# Patient Record
Sex: MALE | Race: WHITE | URBAN - METROPOLITAN AREA
[De-identification: names, ages, dates, MRNs, and addresses within clinical notes are randomized per-mention and may not be internally consistent; named-entity substitution may affect disease eponyms.]

---

## 2017-06-20 ENCOUNTER — OUTPATIENT (OUTPATIENT)
Dept: OUTPATIENT SERVICES | Facility: HOSPITAL | Age: 32
LOS: 1 days | Discharge: HOME | End: 2017-06-20

## 2017-06-20 DIAGNOSIS — F13.20 SEDATIVE, HYPNOTIC OR ANXIOLYTIC DEPENDENCE, UNCOMPLICATED: ICD-10-CM

## 2017-06-20 DIAGNOSIS — F10.20 ALCOHOL DEPENDENCE, UNCOMPLICATED: ICD-10-CM

## 2017-06-20 DIAGNOSIS — F11.20 OPIOID DEPENDENCE, UNCOMPLICATED: ICD-10-CM

## 2017-06-20 DIAGNOSIS — F17.200 NICOTINE DEPENDENCE, UNSPECIFIED, UNCOMPLICATED: ICD-10-CM

## 2017-06-20 DIAGNOSIS — F90.9 ATTENTION-DEFICIT HYPERACTIVITY DISORDER, UNSPECIFIED TYPE: ICD-10-CM

## 2017-06-20 DIAGNOSIS — F41.9 ANXIETY DISORDER, UNSPECIFIED: ICD-10-CM

## 2017-06-28 DIAGNOSIS — E78.00 PURE HYPERCHOLESTEROLEMIA, UNSPECIFIED: ICD-10-CM

## 2017-10-27 ENCOUNTER — TRANSCRIPTION ENCOUNTER (OUTPATIENT)
Age: 32
End: 2017-10-27

## 2018-01-11 ENCOUNTER — TRANSCRIPTION ENCOUNTER (OUTPATIENT)
Age: 33
End: 2018-01-11

## 2018-01-16 ENCOUNTER — OUTPATIENT (OUTPATIENT)
Dept: OUTPATIENT SERVICES | Facility: HOSPITAL | Age: 33
LOS: 1 days | Discharge: HOME | End: 2018-01-16

## 2018-01-16 DIAGNOSIS — Z00.00 ENCOUNTER FOR GENERAL ADULT MEDICAL EXAMINATION WITHOUT ABNORMAL FINDINGS: ICD-10-CM

## 2018-01-16 DIAGNOSIS — F11.20 OPIOID DEPENDENCE, UNCOMPLICATED: ICD-10-CM

## 2018-01-16 DIAGNOSIS — F17.200 NICOTINE DEPENDENCE, UNSPECIFIED, UNCOMPLICATED: ICD-10-CM

## 2018-01-16 DIAGNOSIS — F90.9 ATTENTION-DEFICIT HYPERACTIVITY DISORDER, UNSPECIFIED TYPE: ICD-10-CM

## 2018-01-16 DIAGNOSIS — F41.9 ANXIETY DISORDER, UNSPECIFIED: ICD-10-CM

## 2018-01-16 DIAGNOSIS — F13.20 SEDATIVE, HYPNOTIC OR ANXIOLYTIC DEPENDENCE, UNCOMPLICATED: ICD-10-CM

## 2018-01-16 DIAGNOSIS — F10.20 ALCOHOL DEPENDENCE, UNCOMPLICATED: ICD-10-CM

## 2018-02-17 ENCOUNTER — TRANSCRIPTION ENCOUNTER (OUTPATIENT)
Age: 33
End: 2018-02-17

## 2018-12-24 ENCOUNTER — TRANSCRIPTION ENCOUNTER (OUTPATIENT)
Age: 33
End: 2018-12-24

## 2019-03-18 ENCOUNTER — INPATIENT (INPATIENT)
Facility: HOSPITAL | Age: 34
LOS: 3 days | Discharge: HOME | End: 2019-03-22
Attending: INTERNAL MEDICINE | Admitting: INTERNAL MEDICINE

## 2019-03-18 VITALS
HEIGHT: 66 IN | OXYGEN SATURATION: 98 % | DIASTOLIC BLOOD PRESSURE: 83 MMHG | TEMPERATURE: 98 F | SYSTOLIC BLOOD PRESSURE: 151 MMHG | RESPIRATION RATE: 20 BRPM | HEART RATE: 95 BPM | WEIGHT: 149.91 LBS

## 2019-03-18 LAB
ALBUMIN SERPL ELPH-MCNC: 4.9 G/DL — SIGNIFICANT CHANGE UP (ref 3.5–5.2)
ALP SERPL-CCNC: 52 U/L — SIGNIFICANT CHANGE UP (ref 30–115)
ALT FLD-CCNC: 16 U/L — SIGNIFICANT CHANGE UP (ref 0–41)
AMMONIA BLD-MCNC: 42 UMOL/L — SIGNIFICANT CHANGE UP (ref 11–55)
ANION GAP SERPL CALC-SCNC: 9 MMOL/L — SIGNIFICANT CHANGE UP (ref 7–14)
APAP SERPL-MCNC: <5 UG/ML — LOW (ref 10–30)
APPEARANCE UR: ABNORMAL
AST SERPL-CCNC: 19 U/L — SIGNIFICANT CHANGE UP (ref 0–41)
BASOPHILS # BLD AUTO: 0.09 K/UL — SIGNIFICANT CHANGE UP (ref 0–0.2)
BASOPHILS NFR BLD AUTO: 0.8 % — SIGNIFICANT CHANGE UP (ref 0–1)
BILIRUB DIRECT SERPL-MCNC: <0.2 MG/DL — SIGNIFICANT CHANGE UP (ref 0–0.2)
BILIRUB INDIRECT FLD-MCNC: >0.4 MG/DL — SIGNIFICANT CHANGE UP (ref 0.2–1.2)
BILIRUB SERPL-MCNC: 0.6 MG/DL — SIGNIFICANT CHANGE UP (ref 0.2–1.2)
BILIRUB UR-MCNC: NEGATIVE — SIGNIFICANT CHANGE UP
BUN SERPL-MCNC: 7 MG/DL — LOW (ref 10–20)
CALCIUM SERPL-MCNC: 9.5 MG/DL — SIGNIFICANT CHANGE UP (ref 8.5–10.1)
CHLORIDE SERPL-SCNC: 97 MMOL/L — LOW (ref 98–110)
CO2 SERPL-SCNC: 30 MMOL/L — SIGNIFICANT CHANGE UP (ref 17–32)
COLOR SPEC: YELLOW — SIGNIFICANT CHANGE UP
CREAT SERPL-MCNC: 1.1 MG/DL — SIGNIFICANT CHANGE UP (ref 0.7–1.5)
DIFF PNL FLD: ABNORMAL
EOSINOPHIL # BLD AUTO: 0.07 K/UL — SIGNIFICANT CHANGE UP (ref 0–0.7)
EOSINOPHIL NFR BLD AUTO: 0.6 % — SIGNIFICANT CHANGE UP (ref 0–8)
EPI CELLS # UR: ABNORMAL /HPF
ETHANOL SERPL-MCNC: <10 MG/DL — HIGH
GLUCOSE SERPL-MCNC: 94 MG/DL — SIGNIFICANT CHANGE UP (ref 70–99)
GLUCOSE UR QL: NEGATIVE MG/DL — SIGNIFICANT CHANGE UP
HCT VFR BLD CALC: 46 % — SIGNIFICANT CHANGE UP (ref 42–52)
HGB BLD-MCNC: 15.2 G/DL — SIGNIFICANT CHANGE UP (ref 14–18)
IMM GRANULOCYTES NFR BLD AUTO: 0.4 % — HIGH (ref 0.1–0.3)
KETONES UR-MCNC: ABNORMAL
LEUKOCYTE ESTERASE UR-ACNC: NEGATIVE — SIGNIFICANT CHANGE UP
LIDOCAIN IGE QN: 32 U/L — SIGNIFICANT CHANGE UP (ref 7–60)
LYMPHOCYTES # BLD AUTO: 18.3 % — LOW (ref 20.5–51.1)
LYMPHOCYTES # BLD AUTO: 2.02 K/UL — SIGNIFICANT CHANGE UP (ref 1.2–3.4)
MAGNESIUM SERPL-MCNC: 1.9 MG/DL — SIGNIFICANT CHANGE UP (ref 1.8–2.4)
MCHC RBC-ENTMCNC: 29.2 PG — SIGNIFICANT CHANGE UP (ref 27–31)
MCHC RBC-ENTMCNC: 33 G/DL — SIGNIFICANT CHANGE UP (ref 32–37)
MCV RBC AUTO: 88.3 FL — SIGNIFICANT CHANGE UP (ref 80–94)
MONOCYTES # BLD AUTO: 0.7 K/UL — HIGH (ref 0.1–0.6)
MONOCYTES NFR BLD AUTO: 6.3 % — SIGNIFICANT CHANGE UP (ref 1.7–9.3)
NEUTROPHILS # BLD AUTO: 8.13 K/UL — HIGH (ref 1.4–6.5)
NEUTROPHILS NFR BLD AUTO: 73.6 % — SIGNIFICANT CHANGE UP (ref 42.2–75.2)
NITRITE UR-MCNC: NEGATIVE — SIGNIFICANT CHANGE UP
NRBC # BLD: 0 /100 WBCS — SIGNIFICANT CHANGE UP (ref 0–0)
PH UR: 7 — SIGNIFICANT CHANGE UP (ref 5–8)
PLATELET # BLD AUTO: 204 K/UL — SIGNIFICANT CHANGE UP (ref 130–400)
POTASSIUM SERPL-MCNC: 4 MMOL/L — SIGNIFICANT CHANGE UP (ref 3.5–5)
POTASSIUM SERPL-SCNC: 4 MMOL/L — SIGNIFICANT CHANGE UP (ref 3.5–5)
PROT SERPL-MCNC: 8 G/DL — SIGNIFICANT CHANGE UP (ref 6–8)
PROT UR-MCNC: 30 MG/DL
RBC # BLD: 5.21 M/UL — SIGNIFICANT CHANGE UP (ref 4.7–6.1)
RBC # FLD: 12.4 % — SIGNIFICANT CHANGE UP (ref 11.5–14.5)
RBC CASTS # UR COMP ASSIST: ABNORMAL /HPF
SALICYLATES SERPL-MCNC: <0.3 MG/DL — LOW (ref 4–30)
SODIUM SERPL-SCNC: 136 MMOL/L — SIGNIFICANT CHANGE UP (ref 135–146)
SP GR SPEC: 1.01 — SIGNIFICANT CHANGE UP (ref 1.01–1.03)
UROBILINOGEN FLD QL: 0.2 MG/DL — SIGNIFICANT CHANGE UP (ref 0.2–0.2)
WBC # BLD: 11.05 K/UL — HIGH (ref 4.8–10.8)
WBC # FLD AUTO: 11.05 K/UL — HIGH (ref 4.8–10.8)
WBC UR QL: SIGNIFICANT CHANGE UP /HPF

## 2019-03-18 RX ORDER — TUBERCULIN PURIFIED PROTEIN DERIVATIVE 5 [IU]/.1ML
5 INJECTION, SOLUTION INTRADERMAL ONCE
Qty: 0 | Refills: 0 | Status: COMPLETED | OUTPATIENT
Start: 2019-03-18 | End: 2019-03-19

## 2019-03-18 RX ORDER — MAGNESIUM HYDROXIDE 400 MG/1
30 TABLET, CHEWABLE ORAL ONCE
Qty: 0 | Refills: 0 | Status: DISCONTINUED | OUTPATIENT
Start: 2019-03-18 | End: 2019-03-22

## 2019-03-18 RX ORDER — PSEUDOEPHEDRINE HCL 30 MG
60 TABLET ORAL EVERY 6 HOURS
Qty: 0 | Refills: 0 | Status: DISCONTINUED | OUTPATIENT
Start: 2019-03-18 | End: 2019-03-22

## 2019-03-18 RX ORDER — GABAPENTIN 400 MG/1
200 CAPSULE ORAL DAILY
Qty: 0 | Refills: 0 | Status: DISCONTINUED | OUTPATIENT
Start: 2019-03-18 | End: 2019-03-22

## 2019-03-18 RX ORDER — THIAMINE MONONITRATE (VIT B1) 100 MG
100 TABLET ORAL DAILY
Qty: 0 | Refills: 0 | Status: COMPLETED | OUTPATIENT
Start: 2019-03-18 | End: 2019-03-21

## 2019-03-18 RX ORDER — GABAPENTIN 400 MG/1
600 CAPSULE ORAL AT BEDTIME
Qty: 0 | Refills: 0 | Status: DISCONTINUED | OUTPATIENT
Start: 2019-03-18 | End: 2019-03-22

## 2019-03-18 RX ORDER — MULTIVIT-MIN/FERROUS GLUCONATE 9 MG/15 ML
1 LIQUID (ML) ORAL DAILY
Qty: 0 | Refills: 0 | Status: DISCONTINUED | OUTPATIENT
Start: 2019-03-18 | End: 2019-03-22

## 2019-03-18 RX ORDER — HYDROXYZINE HCL 10 MG
50 TABLET ORAL EVERY 6 HOURS
Qty: 0 | Refills: 0 | Status: DISCONTINUED | OUTPATIENT
Start: 2019-03-18 | End: 2019-03-22

## 2019-03-18 RX ORDER — FLUOXETINE HCL 10 MG
40 CAPSULE ORAL DAILY
Qty: 0 | Refills: 0 | Status: DISCONTINUED | OUTPATIENT
Start: 2019-03-18 | End: 2019-03-22

## 2019-03-18 RX ORDER — PHENOBARBITAL 60 MG
TABLET ORAL
Qty: 0 | Refills: 0 | Status: COMPLETED | OUTPATIENT
Start: 2019-03-18 | End: 2019-03-22

## 2019-03-18 RX ORDER — METHOCARBAMOL 500 MG/1
500 TABLET, FILM COATED ORAL EVERY 6 HOURS
Qty: 0 | Refills: 0 | Status: DISCONTINUED | OUTPATIENT
Start: 2019-03-18 | End: 2019-03-22

## 2019-03-18 RX ORDER — FOLIC ACID 0.8 MG
1 TABLET ORAL DAILY
Qty: 0 | Refills: 0 | Status: DISCONTINUED | OUTPATIENT
Start: 2019-03-18 | End: 2019-03-22

## 2019-03-18 RX ORDER — TRAZODONE HCL 50 MG
50 TABLET ORAL AT BEDTIME
Qty: 0 | Refills: 0 | Status: DISCONTINUED | OUTPATIENT
Start: 2019-03-18 | End: 2019-03-22

## 2019-03-18 RX ORDER — HYDROXYZINE HCL 10 MG
100 TABLET ORAL AT BEDTIME
Qty: 0 | Refills: 0 | Status: DISCONTINUED | OUTPATIENT
Start: 2019-03-18 | End: 2019-03-22

## 2019-03-18 RX ORDER — DIAZEPAM 5 MG
10 TABLET ORAL ONCE
Qty: 0 | Refills: 0 | Status: DISCONTINUED | OUTPATIENT
Start: 2019-03-18 | End: 2019-03-18

## 2019-03-18 RX ORDER — THIAMINE MONONITRATE (VIT B1) 100 MG
100 TABLET ORAL ONCE
Qty: 0 | Refills: 0 | Status: COMPLETED | OUTPATIENT
Start: 2019-03-18 | End: 2019-03-18

## 2019-03-18 RX ADMIN — Medication 10 MILLIGRAM(S): at 22:05

## 2019-03-18 RX ADMIN — Medication 100 MILLIGRAM(S): at 22:05

## 2019-03-18 NOTE — H&P ADULT - HISTORY OF PRESENT ILLNESS
33 y/o male presents with complaints of polysubstance dependence, etoh and benzo.  Pt states that he uses approx 2 pints of vodka daily and approx 4mg of xanax daily.  Pt states that he does a shot before work and will either take PO or snort the xanax, 2mg.  Pt statse that when he returns home after work he will continue drinking and likely will take another xanax, 2mg.      Seizures: -  KADEEM; -  Tremors: -  HIV: -  STD: -  Hep B/C : - / -

## 2019-03-18 NOTE — ED PROVIDER NOTE - OBJECTIVE STATEMENT
Pt presents today for ETOH and drug detox. Pt no history of prior detox. pt has no current complaints. Pt states he drinks several pints a day and takes xanax last drink was 2 days ago. Pt denies chest pain, shortness of breath, headache, dizziness, nausea/vomiting/diarrhea, head injury, recent trauma. Denies current thoughts of Suicidal and Homicidal Ideations.

## 2019-03-18 NOTE — H&P ADULT - ATTENDING COMMENTS
Patient interviewed and examined.    Chart reviewed.    PA's H&P noted and modified, as appropriate.    Case discussed on team rounds    Following is my summary of the case.    Admitted for detox: from ____ED, _x__Intake, ____Med/Surg Floor    Alcohol_x___   Opioid_____  Benzo_x__ Other_____    Substance amount, duration of use, last usage, and prior attempts at detox or rehabs, are outlined above in the H&P and discussed with patient.    Associated withdrawal symptoms presents.  Comorbid conditions noted. Chronic and Stable.    Past Medical Hx, Psych Hx, family Hx, Social Hx from H&P reviewed and NO changes.    Old medical record and medication Hx. Reviewed    Following items reviewed and addressed:  1. labs  2. EKG  3. Imaging from PACs module    Examination: no change from PA's exam.    Place on following protocol  _____Medically Managed  __X__Medically Supervised    Ciwa_____Librium taper____Ativan taper___Methadone taper___ Phenobarb taper_x___ Suboxone Induction____MMTP____    Narcan Kit Offered    Psych Consult __X__N/A  ___Ordered    Physical Therapy  ___X N/A   ___  Ordered    Aftercare disposition to be addressed by counselors.    Estimated length of stay 3-5 days.

## 2019-03-18 NOTE — ED PROVIDER NOTE - ATTENDING CONTRIBUTION TO CARE
Pt is a 35yo male who comes in for detox from Xanax (8mg daily) and Alcohol (1-2 pints daily).  Last use of alcohol Saturday.  Last Xanax 4h ago.  Headache noted.  Notes that it is now interfering with his work.    Exam: normal neuro exam, no tremor, RRR, CTAB, soft NT abdomen, normal gait  Imp: polysubstance abuse  Plan: admit to detox, labs, EKG

## 2019-03-18 NOTE — H&P ADULT - ASSESSMENT
33 y/o male presents with complaints of polysubstance dependence, etoh and benzo.  Pt states that he uses approx 2 pints of vodka daily and approx 4mg of xanax daily.  Pt states that he does a shot before work and will either take PO or snort the xanax, 2mg.  Pt statse that when he returns home after work he will continue drinking and likely will take another xanax, 2mg.

## 2019-03-18 NOTE — ED ADULT NURSE NOTE - NSIMPLEMENTINTERV_GEN_ALL_ED
Implemented All Universal Safety Interventions:  Sisters to call system. Call bell, personal items and telephone within reach. Instruct patient to call for assistance. Room bathroom lighting operational. Non-slip footwear when patient is off stretcher. Physically safe environment: no spills, clutter or unnecessary equipment. Stretcher in lowest position, wheels locked, appropriate side rails in place.

## 2019-03-19 LAB
AMPHET UR-MCNC: POSITIVE
BARBITURATES UR SCN-MCNC: NEGATIVE — SIGNIFICANT CHANGE UP
BENZODIAZ UR-MCNC: POSITIVE
COCAINE METAB.OTHER UR-MCNC: POSITIVE
DRUG SCREEN 1, URINE RESULT: SIGNIFICANT CHANGE UP
METHADONE UR-MCNC: NEGATIVE — SIGNIFICANT CHANGE UP
OPIATES UR-MCNC: NEGATIVE — SIGNIFICANT CHANGE UP
PCP UR-MCNC: NEGATIVE — SIGNIFICANT CHANGE UP
PROPOXYPHENE QUALITATIVE URINE RESULT: NEGATIVE — SIGNIFICANT CHANGE UP
THC UR QL: NEGATIVE — SIGNIFICANT CHANGE UP

## 2019-03-19 RX ORDER — PHENOBARBITAL 60 MG
32.4 TABLET ORAL EVERY 6 HOURS
Qty: 0 | Refills: 0 | Status: DISCONTINUED | OUTPATIENT
Start: 2019-03-18 | End: 2019-03-20

## 2019-03-19 RX ORDER — PHENOBARBITAL 60 MG
32.4 TABLET ORAL EVERY 6 HOURS
Qty: 0 | Refills: 0 | Status: DISCONTINUED | OUTPATIENT
Start: 2019-03-20 | End: 2019-03-20

## 2019-03-19 RX ORDER — PHENOBARBITAL 60 MG
32.4 TABLET ORAL EVERY 6 HOURS
Qty: 0 | Refills: 0 | Status: DISCONTINUED | OUTPATIENT
Start: 2019-03-19 | End: 2019-03-22

## 2019-03-19 RX ORDER — PANTOPRAZOLE SODIUM 20 MG/1
40 TABLET, DELAYED RELEASE ORAL
Qty: 0 | Refills: 0 | Status: DISCONTINUED | OUTPATIENT
Start: 2019-03-19 | End: 2019-03-22

## 2019-03-19 RX ORDER — IBUPROFEN 200 MG
400 TABLET ORAL EVERY 6 HOURS
Qty: 0 | Refills: 0 | Status: DISCONTINUED | OUTPATIENT
Start: 2019-03-19 | End: 2019-03-22

## 2019-03-19 RX ORDER — PHENOBARBITAL 60 MG
32.4 TABLET ORAL EVERY 12 HOURS
Qty: 0 | Refills: 0 | Status: DISCONTINUED | OUTPATIENT
Start: 2019-03-21 | End: 2019-03-22

## 2019-03-19 RX ADMIN — TUBERCULIN PURIFIED PROTEIN DERIVATIVE 5 UNIT(S): 5 INJECTION, SOLUTION INTRADERMAL at 13:50

## 2019-03-19 RX ADMIN — Medication 32.4 MILLIGRAM(S): at 17:46

## 2019-03-19 RX ADMIN — Medication 1 MILLIGRAM(S): at 08:27

## 2019-03-19 RX ADMIN — GABAPENTIN 600 MILLIGRAM(S): 400 CAPSULE ORAL at 21:56

## 2019-03-19 RX ADMIN — Medication 15 MILLIGRAM(S): at 08:27

## 2019-03-19 RX ADMIN — Medication 100 MILLIGRAM(S): at 08:28

## 2019-03-19 RX ADMIN — Medication 32.4 MILLIGRAM(S): at 06:27

## 2019-03-19 RX ADMIN — Medication 50 MILLIGRAM(S): at 21:56

## 2019-03-19 RX ADMIN — METHOCARBAMOL 500 MILLIGRAM(S): 500 TABLET, FILM COATED ORAL at 17:46

## 2019-03-19 RX ADMIN — Medication 15 MILLIGRAM(S): at 21:56

## 2019-03-19 RX ADMIN — Medication 40 MILLIGRAM(S): at 08:27

## 2019-03-19 RX ADMIN — GABAPENTIN 200 MILLIGRAM(S): 400 CAPSULE ORAL at 08:28

## 2019-03-19 RX ADMIN — METHOCARBAMOL 500 MILLIGRAM(S): 500 TABLET, FILM COATED ORAL at 11:59

## 2019-03-19 RX ADMIN — PANTOPRAZOLE SODIUM 40 MILLIGRAM(S): 20 TABLET, DELAYED RELEASE ORAL at 11:56

## 2019-03-19 RX ADMIN — Medication 32.4 MILLIGRAM(S): at 11:56

## 2019-03-19 RX ADMIN — Medication 32.4 MILLIGRAM(S): at 00:35

## 2019-03-19 RX ADMIN — Medication 1 TABLET(S): at 08:28

## 2019-03-19 NOTE — CHART NOTE - NSCHARTNOTEFT_GEN_A_CORE
PPD placed on LFA, to be read 48-72hrs.................. Alexandra RRT PPD placed on LFA, to be read 48-72hrs.................. Alexandra NGUYEN      PPD read 3/21/19,  0mm induration

## 2019-03-20 LAB
HAV IGM SER-ACNC: SIGNIFICANT CHANGE UP
HBV CORE IGM SER-ACNC: SIGNIFICANT CHANGE UP
HBV SURFACE AG SER-ACNC: SIGNIFICANT CHANGE UP
HCV AB S/CO SERPL IA: 0.14 S/CO — SIGNIFICANT CHANGE UP (ref 0–0.79)
HCV AB SERPL-IMP: SIGNIFICANT CHANGE UP
T PALLIDUM AB TITR SER: NEGATIVE — SIGNIFICANT CHANGE UP

## 2019-03-20 RX ORDER — NICOTINE POLACRILEX 2 MG
1 GUM BUCCAL DAILY
Qty: 0 | Refills: 0 | Status: DISCONTINUED | OUTPATIENT
Start: 2019-03-20 | End: 2019-03-22

## 2019-03-20 RX ADMIN — METHOCARBAMOL 500 MILLIGRAM(S): 500 TABLET, FILM COATED ORAL at 09:31

## 2019-03-20 RX ADMIN — Medication 400 MILLIGRAM(S): at 00:44

## 2019-03-20 RX ADMIN — Medication 15 MILLIGRAM(S): at 21:09

## 2019-03-20 RX ADMIN — Medication 40 MILLIGRAM(S): at 09:10

## 2019-03-20 RX ADMIN — Medication 32.4 MILLIGRAM(S): at 11:54

## 2019-03-20 RX ADMIN — Medication 1 PATCH: at 11:54

## 2019-03-20 RX ADMIN — Medication 32.4 MILLIGRAM(S): at 17:33

## 2019-03-20 RX ADMIN — Medication 400 MILLIGRAM(S): at 00:14

## 2019-03-20 RX ADMIN — Medication 1 MILLIGRAM(S): at 09:11

## 2019-03-20 RX ADMIN — Medication 100 MILLIGRAM(S): at 09:10

## 2019-03-20 RX ADMIN — Medication 400 MILLIGRAM(S): at 09:31

## 2019-03-20 RX ADMIN — PANTOPRAZOLE SODIUM 40 MILLIGRAM(S): 20 TABLET, DELAYED RELEASE ORAL at 06:48

## 2019-03-20 RX ADMIN — GABAPENTIN 200 MILLIGRAM(S): 400 CAPSULE ORAL at 09:10

## 2019-03-20 RX ADMIN — Medication 32.4 MILLIGRAM(S): at 06:48

## 2019-03-20 RX ADMIN — Medication 400 MILLIGRAM(S): at 17:33

## 2019-03-20 RX ADMIN — Medication 32.4 MILLIGRAM(S): at 00:13

## 2019-03-20 RX ADMIN — Medication 32.4 MILLIGRAM(S): at 09:30

## 2019-03-20 RX ADMIN — Medication 15 MILLIGRAM(S): at 09:09

## 2019-03-20 RX ADMIN — Medication 1 TABLET(S): at 09:10

## 2019-03-20 RX ADMIN — Medication 50 MILLIGRAM(S): at 21:10

## 2019-03-20 RX ADMIN — GABAPENTIN 600 MILLIGRAM(S): 400 CAPSULE ORAL at 21:09

## 2019-03-20 NOTE — CHART NOTE - NSCHARTNOTEFT_GEN_A_CORE
Subsequent Inpatient Encounter                                       Detox Unit    JUNE SAINI   34y   Male      Chief Complaint:    Follow up for Polysubstance  Dependency    HPI:     I reviewed previous notes. No Change, except if noted below.             Detail:_    ROS:   I reviewed with patient.  No changes from previous notes except if noted below.             Detail: _    PFSH I reviewed with patient. No changes from previous notes except if noted below.             Detail_    Medication reconciliation performed.    MEDICATIONS  (STANDING):  busPIRone 15 milliGRAM(s) Oral two times a day  FLUoxetine 40 milliGRAM(s) Oral daily  folic acid 1 milliGRAM(s) Oral daily  gabapentin 200 milliGRAM(s) Oral daily  gabapentin 600 milliGRAM(s) Oral at bedtime  multivitamin/minerals 1 Tablet(s) Oral daily  pantoprazole    Tablet 40 milliGRAM(s) Oral before breakfast  PHENobarbital   Oral   PHENobarbital 32.4 milliGRAM(s) Oral every 6 hours  thiamine 100 milliGRAM(s) Oral daily  traZODone 50 milliGRAM(s) Oral at bedtime      MEDICATIONS  (PRN):  aluminum hydroxide/magnesium hydroxide/simethicone Suspension 30 milliLiter(s) Oral every 6 hours PRN Heartburn  bismuth subsalicylate Liquid 30 milliLiter(s) Oral every 6 hours PRN Diarrhea  cloNIDine 0.1 milliGRAM(s) Oral every 8 hours PRN Blood Pressure GREATER THAN 140/90 mmHG  guaiFENesin/dextromethorphan  Syrup 5 milliLiter(s) Oral every 4 hours PRN Cough  hydrOXYzine hydrochloride 50 milliGRAM(s) Oral every 6 hours PRN Anxiety  hydrOXYzine hydrochloride 100 milliGRAM(s) Oral at bedtime PRN insomnia  ibuprofen  Tablet. 400 milliGRAM(s) Oral every 6 hours PRN Temp greater or equal to 38C (100.4F), Mild Pain (1 - 3)  magnesium hydroxide Suspension 30 milliLiter(s) Oral once PRN Constipation  methocarbamol 500 milliGRAM(s) Oral every 6 hours PRN muscle pain  PHENobarbital 32.4 milliGRAM(s) Oral every 6 hours PRN WSXS  pseudoephedrine 60 milliGRAM(s) Oral every 6 hours PRN Rhinitis  trimethobenzamide 300 milliGRAM(s) Oral every 6 hours PRN Nausea and/or Vomiting  trimethobenzamide Injectable 200 milliGRAM(s) IntraMuscular every 6 hours PRN Nausea and/or Vomiting      T(C): 36.1 (19 @ 06:00), Max: 36.8 (19 @ 18:00)  HR: 62 (19 @ 06:00) (61 - 90)  BP: 96/51 (19 @ 06:00) (96/51 - 126/66)  RR: 14 (19 @ 06:00) (14 - 16)  SpO2: --    PHYSICAL EXAM:      Constitutional: NAD, A&O x3    Eyes: PERRLA, no conjuctivitis    Neck: no lymphadenopathy    Respiratory: +air entry, no rales, no rhonchi, no wheezes    Cardiovascular: +S1 and S2, regular rate and rhythm    Gastrointestinal: +BS, soft, non-tender, not distended    Extremities:  no edema, no calf tenderness    Skin: no rashes, normal turgor                            15.2   11.05 )-----------( 204      ( 18 Mar 2019 21:25 )             46.0       136  |  97<L>  |  7<L>  ----------------------------<  94  4.0   |  30  |  1.1    Ca    9.5      18 Mar 2019 21:25  Mg     1.9         TPro  8.0  /  Alb  4.9  /  TBili  0.6  /  DBili  <0.2  /  AST  19  /  ALT  16  /  AlkPhos  52      Magnesium, Serum: 1.9 mg/dL (19 @ 21:25)  Ammonia, Serum: 42 umol/L (19 @ 21:25)  Treponema Pallidum Antibody Interpretation: Negative (19 @ 21:25)  Hepatitis B Surface Antigen: Nonreact (19 @ 21:25)  Hepatitis C Virus S/CO Ratio: 0.14 S/CO (19 @ 21:25)    Hepatitis C Virus Interpretation: Nonreact (19 @ 21:25)      Urinalysis Basic - ( 18 Mar 2019 21:20 )    Color: Yellow / Appearance: Slightly Cloudy / S.015 / pH: x  Gluc: x / Ketone: Trace  / Bili: Negative / Urobili: 0.2 mg/dL   Blood: x / Protein: 30 mg/dL / Nitrite: Negative   Leuk Esterase: Negative / RBC: 26-50 /HPF / WBC 1-2 /HPF   Sq Epi: x / Non Sq Epi: Occasional /HPF / Bacteria: x    Drug Screen 1, Urine Result: Done (19 @ 09:51)        Impression and Plan:    Primary Diagnosis:  Benzo/EtOH Dependency                                Medication: Pheno Protocol    Secondary Diagnosis: MDD/SANDRITA                                                   Medication: c/w Prozac, Buspirone, Gabapentin     Tertiary Diagnosis:                                                                                     Medication:      Continue Detox Protocols. Use of PRNS as needed for withdrawal and comfort.    Adjustments to protocols:    Labs/ Tests reviewed.    Tests ordered:     Likely Disposition: ___Home       ___Rehab       ___Outpatient Program    ___Self Help     _____Other    Estimated Length of stay:_4___

## 2019-03-21 RX ADMIN — Medication 40 MILLIGRAM(S): at 08:45

## 2019-03-21 RX ADMIN — GABAPENTIN 600 MILLIGRAM(S): 400 CAPSULE ORAL at 21:19

## 2019-03-21 RX ADMIN — Medication 1 PATCH: at 06:23

## 2019-03-21 RX ADMIN — Medication 50 MILLIGRAM(S): at 21:31

## 2019-03-21 RX ADMIN — Medication 1 PATCH: at 08:44

## 2019-03-21 RX ADMIN — Medication 1 TABLET(S): at 08:44

## 2019-03-21 RX ADMIN — GABAPENTIN 200 MILLIGRAM(S): 400 CAPSULE ORAL at 08:44

## 2019-03-21 RX ADMIN — PANTOPRAZOLE SODIUM 40 MILLIGRAM(S): 20 TABLET, DELAYED RELEASE ORAL at 06:49

## 2019-03-21 RX ADMIN — Medication 100 MILLIGRAM(S): at 08:47

## 2019-03-21 RX ADMIN — Medication 15 MILLIGRAM(S): at 21:19

## 2019-03-21 RX ADMIN — Medication 15 MILLIGRAM(S): at 08:44

## 2019-03-21 RX ADMIN — TUBERCULIN PURIFIED PROTEIN DERIVATIVE 5 UNIT(S): 5 INJECTION, SOLUTION INTRADERMAL at 12:45

## 2019-03-21 RX ADMIN — Medication 32.4 MILLIGRAM(S): at 18:43

## 2019-03-21 RX ADMIN — Medication 1 MILLIGRAM(S): at 08:45

## 2019-03-21 RX ADMIN — Medication 32.4 MILLIGRAM(S): at 06:49

## 2019-03-21 NOTE — CHART NOTE - NSCHARTNOTEFT_GEN_A_CORE
Subsequent Inpatient Encounter                                       Detox Unit    JUNE SAINI   34y   Male      Chief Complaint:    Follow up for Polysubstance  Dependency    HPI:     I reviewed previous notes. No Change, except if noted below.             Detail:_    ROS:   I reviewed with patient.  No changes from previous notes except if noted below.             Detail: _    PFSH I reviewed with patient. No changes from previous notes except if noted below.             Detail_    Medication reconciliation performed.    MEDICATIONS  (STANDING):  busPIRone 15 milliGRAM(s) Oral two times a day  FLUoxetine 40 milliGRAM(s) Oral daily  folic acid 1 milliGRAM(s) Oral daily  gabapentin 200 milliGRAM(s) Oral daily  gabapentin 600 milliGRAM(s) Oral at bedtime  multivitamin/minerals 1 Tablet(s) Oral daily  pantoprazole    Tablet 40 milliGRAM(s) Oral before breakfast  PHENobarbital   Oral   PHENobarbital 32.4 milliGRAM(s) Oral every 6 hours  thiamine 100 milliGRAM(s) Oral daily  traZODone 50 milliGRAM(s) Oral at bedtime      MEDICATIONS  (PRN):  aluminum hydroxide/magnesium hydroxide/simethicone Suspension 30 milliLiter(s) Oral every 6 hours PRN Heartburn  bismuth subsalicylate Liquid 30 milliLiter(s) Oral every 6 hours PRN Diarrhea  cloNIDine 0.1 milliGRAM(s) Oral every 8 hours PRN Blood Pressure GREATER THAN 140/90 mmHG  guaiFENesin/dextromethorphan  Syrup 5 milliLiter(s) Oral every 4 hours PRN Cough  hydrOXYzine hydrochloride 50 milliGRAM(s) Oral every 6 hours PRN Anxiety  hydrOXYzine hydrochloride 100 milliGRAM(s) Oral at bedtime PRN insomnia  ibuprofen  Tablet. 400 milliGRAM(s) Oral every 6 hours PRN Temp greater or equal to 38C (100.4F), Mild Pain (1 - 3)  magnesium hydroxide Suspension 30 milliLiter(s) Oral once PRN Constipation  methocarbamol 500 milliGRAM(s) Oral every 6 hours PRN muscle pain  PHENobarbital 32.4 milliGRAM(s) Oral every 6 hours PRN WSXS  pseudoephedrine 60 milliGRAM(s) Oral every 6 hours PRN Rhinitis  trimethobenzamide 300 milliGRAM(s) Oral every 6 hours PRN Nausea and/or Vomiting  trimethobenzamide Injectable 200 milliGRAM(s) IntraMuscular every 6 hours PRN Nausea and/or Vomiting      T(C): 36.1 (19 @ 06:00), Max: 36.8 (19 @ 18:00)  HR: 62 (19 @ 06:00) (61 - 90)  BP: 96/51 (19 @ 06:00) (96/51 - 126/66)  RR: 14 (19 @ 06:00) (14 - 16)  SpO2: --    PHYSICAL EXAM:      Constitutional: NAD, A&O x3    Eyes: PERRLA, no conjuctivitis    Neck: no lymphadenopathy    Respiratory: +air entry, no rales, no rhonchi, no wheezes    Cardiovascular: +S1 and S2, regular rate and rhythm    Gastrointestinal: +BS, soft, non-tender, not distended    Extremities:  no edema, no calf tenderness    Skin: no rashes, normal turgor                            15.2   11.05 )-----------( 204      ( 18 Mar 2019 21:25 )             46.0       136  |  97<L>  |  7<L>  ----------------------------<  94  4.0   |  30  |  1.1    Ca    9.5      18 Mar 2019 21:25  Mg     1.9         TPro  8.0  /  Alb  4.9  /  TBili  0.6  /  DBili  <0.2  /  AST  19  /  ALT  16  /  AlkPhos  52      Magnesium, Serum: 1.9 mg/dL (19 @ 21:25)  Ammonia, Serum: 42 umol/L (19 @ 21:25)  Treponema Pallidum Antibody Interpretation: Negative (19 @ 21:25)  Hepatitis B Surface Antigen: Nonreact (19 @ 21:25)  Hepatitis C Virus S/CO Ratio: 0.14 S/CO (19 @ 21:25)    Hepatitis C Virus Interpretation: Nonreact (19 @ 21:25)      Urinalysis Basic - ( 18 Mar 2019 21:20 )    Color: Yellow / Appearance: Slightly Cloudy / S.015 / pH: x  Gluc: x / Ketone: Trace  / Bili: Negative / Urobili: 0.2 mg/dL   Blood: x / Protein: 30 mg/dL / Nitrite: Negative   Leuk Esterase: Negative / RBC: 26-50 /HPF / WBC 1-2 /HPF   Sq Epi: x / Non Sq Epi: Occasional /HPF / Bacteria: x    Drug Screen 1, Urine Result: Done (19 @ 09:51)        Impression and Plan:    Primary Diagnosis:  Benzo/EtOH Dependency                                Medication: Pheno Protocol    Secondary Diagnosis: MDD/SANDRITA                                                   Medication: c/w Prozac, Buspirone, Gabapentin     Tertiary Diagnosis:                                                                                     Medication:      Continue Detox Protocols. Use of PRNS as needed for withdrawal and comfort.    Adjustments to protocols:    Labs/ Tests reviewed.    Tests ordered:     Likely Disposition: _X__Home       ___Rehab       ___Outpatient Program    ___Self Help     _____Other    Estimated Length of stay:_4___

## 2019-03-22 ENCOUNTER — INPATIENT (INPATIENT)
Facility: HOSPITAL | Age: 34
LOS: 12 days | Discharge: HOME | End: 2019-04-04
Attending: INTERNAL MEDICINE | Admitting: INTERNAL MEDICINE

## 2019-03-22 VITALS
HEIGHT: 66 IN | RESPIRATION RATE: 17 BRPM | WEIGHT: 149.91 LBS | TEMPERATURE: 98 F | DIASTOLIC BLOOD PRESSURE: 65 MMHG | HEART RATE: 95 BPM | SYSTOLIC BLOOD PRESSURE: 120 MMHG

## 2019-03-22 VITALS
DIASTOLIC BLOOD PRESSURE: 60 MMHG | TEMPERATURE: 97 F | RESPIRATION RATE: 16 BRPM | SYSTOLIC BLOOD PRESSURE: 105 MMHG | HEART RATE: 78 BPM

## 2019-03-22 DIAGNOSIS — F11.20 OPIOID DEPENDENCE, UNCOMPLICATED: ICD-10-CM

## 2019-03-22 PROBLEM — Z78.9 OTHER SPECIFIED HEALTH STATUS: Chronic | Status: ACTIVE | Noted: 2019-03-18

## 2019-03-22 RX ORDER — PSEUDOEPHEDRINE HCL 30 MG
60 TABLET ORAL EVERY 6 HOURS
Qty: 0 | Refills: 0 | Status: DISCONTINUED | OUTPATIENT
Start: 2019-03-22 | End: 2019-04-04

## 2019-03-22 RX ORDER — METHOCARBAMOL 500 MG/1
500 TABLET, FILM COATED ORAL EVERY 6 HOURS
Qty: 0 | Refills: 0 | Status: DISCONTINUED | OUTPATIENT
Start: 2019-03-22 | End: 2019-04-04

## 2019-03-22 RX ORDER — HYDROXYZINE HCL 10 MG
50 TABLET ORAL EVERY 6 HOURS
Qty: 0 | Refills: 0 | Status: DISCONTINUED | OUTPATIENT
Start: 2019-03-22 | End: 2019-04-04

## 2019-03-22 RX ORDER — FLUOXETINE HCL 10 MG
40 CAPSULE ORAL DAILY
Qty: 0 | Refills: 0 | Status: DISCONTINUED | OUTPATIENT
Start: 2019-03-22 | End: 2019-03-26

## 2019-03-22 RX ORDER — MAGNESIUM HYDROXIDE 400 MG/1
30 TABLET, CHEWABLE ORAL ONCE
Qty: 0 | Refills: 0 | Status: DISCONTINUED | OUTPATIENT
Start: 2019-03-22 | End: 2019-04-04

## 2019-03-22 RX ORDER — FLUOXETINE HCL 10 MG
1 CAPSULE ORAL
Qty: 0 | Refills: 0 | COMMUNITY

## 2019-03-22 RX ORDER — FLUOXETINE HCL 10 MG
1 CAPSULE ORAL
Qty: 0 | Refills: 0 | COMMUNITY
Start: 2019-03-22

## 2019-03-22 RX ORDER — HYDROXYZINE HCL 10 MG
100 TABLET ORAL AT BEDTIME
Qty: 0 | Refills: 0 | Status: DISCONTINUED | OUTPATIENT
Start: 2019-03-22 | End: 2019-04-04

## 2019-03-22 RX ORDER — GABAPENTIN 400 MG/1
200 CAPSULE ORAL
Qty: 0 | Refills: 0 | COMMUNITY

## 2019-03-22 RX ORDER — TRAZODONE HCL 50 MG
1 TABLET ORAL
Qty: 0 | Refills: 0 | COMMUNITY
Start: 2019-03-22

## 2019-03-22 RX ORDER — GABAPENTIN 400 MG/1
200 CAPSULE ORAL DAILY
Qty: 0 | Refills: 0 | Status: DISCONTINUED | OUTPATIENT
Start: 2019-03-22 | End: 2019-04-04

## 2019-03-22 RX ORDER — TRAZODONE HCL 50 MG
50 TABLET ORAL AT BEDTIME
Qty: 0 | Refills: 0 | Status: DISCONTINUED | OUTPATIENT
Start: 2019-03-22 | End: 2019-04-04

## 2019-03-22 RX ORDER — NICOTINE POLACRILEX 2 MG
1 GUM BUCCAL DAILY
Qty: 0 | Refills: 0 | Status: DISCONTINUED | OUTPATIENT
Start: 2019-03-22 | End: 2019-03-27

## 2019-03-22 RX ORDER — MULTIVIT-MIN/FERROUS GLUCONATE 9 MG/15 ML
1 LIQUID (ML) ORAL DAILY
Qty: 0 | Refills: 0 | Status: DISCONTINUED | OUTPATIENT
Start: 2019-03-22 | End: 2019-04-04

## 2019-03-22 RX ORDER — ACETAMINOPHEN 500 MG
650 TABLET ORAL EVERY 4 HOURS
Qty: 0 | Refills: 0 | Status: DISCONTINUED | OUTPATIENT
Start: 2019-03-22 | End: 2019-04-04

## 2019-03-22 RX ORDER — GABAPENTIN 400 MG/1
2 CAPSULE ORAL
Qty: 0 | Refills: 0 | COMMUNITY
Start: 2019-03-22

## 2019-03-22 RX ORDER — IBUPROFEN 200 MG
400 TABLET ORAL EVERY 6 HOURS
Qty: 0 | Refills: 0 | Status: DISCONTINUED | OUTPATIENT
Start: 2019-03-22 | End: 2019-04-04

## 2019-03-22 RX ORDER — GABAPENTIN 400 MG/1
600 CAPSULE ORAL AT BEDTIME
Qty: 0 | Refills: 0 | Status: DISCONTINUED | OUTPATIENT
Start: 2019-03-22 | End: 2019-04-04

## 2019-03-22 RX ORDER — TRAZODONE HCL 50 MG
50 TABLET ORAL
Qty: 0 | Refills: 0 | COMMUNITY

## 2019-03-22 RX ORDER — GABAPENTIN 400 MG/1
600 CAPSULE ORAL
Qty: 0 | Refills: 0 | COMMUNITY

## 2019-03-22 RX ORDER — GABAPENTIN 400 MG/1
1 CAPSULE ORAL
Qty: 0 | Refills: 0 | COMMUNITY
Start: 2019-03-22

## 2019-03-22 RX ORDER — HYDROCORTISONE 1 %
1 OINTMENT (GRAM) TOPICAL
Qty: 0 | Refills: 0 | Status: DISCONTINUED | OUTPATIENT
Start: 2019-03-22 | End: 2019-04-04

## 2019-03-22 RX ADMIN — Medication 1 APPLICATION(S): at 21:18

## 2019-03-22 RX ADMIN — Medication 1 PATCH: at 08:58

## 2019-03-22 RX ADMIN — Medication 50 MILLIGRAM(S): at 23:21

## 2019-03-22 RX ADMIN — Medication 32.4 MILLIGRAM(S): at 06:19

## 2019-03-22 RX ADMIN — Medication 40 MILLIGRAM(S): at 08:57

## 2019-03-22 RX ADMIN — GABAPENTIN 200 MILLIGRAM(S): 400 CAPSULE ORAL at 08:56

## 2019-03-22 RX ADMIN — Medication 15 MILLIGRAM(S): at 21:17

## 2019-03-22 RX ADMIN — Medication 15 MILLIGRAM(S): at 08:57

## 2019-03-22 RX ADMIN — Medication 1 PATCH: at 06:16

## 2019-03-22 RX ADMIN — Medication 1 TABLET(S): at 08:56

## 2019-03-22 RX ADMIN — PANTOPRAZOLE SODIUM 40 MILLIGRAM(S): 20 TABLET, DELAYED RELEASE ORAL at 06:19

## 2019-03-22 RX ADMIN — GABAPENTIN 600 MILLIGRAM(S): 400 CAPSULE ORAL at 21:17

## 2019-03-22 RX ADMIN — Medication 1 MILLIGRAM(S): at 08:57

## 2019-03-22 NOTE — CHART NOTE - NSCHARTNOTEFT_GEN_A_CORE
Allergies:  Ceclor (Other)      Diet: Regular    Activity: as tolerated    Follow up with    1. PMD in 2 weeks    2. Psych in 2 weeks    3.    Follow up for abnormal labs/tests    1.    Extra Instructions:      Flu Vaccine given  Yes_____         No______      Diagnosis:  Chemical Dependency   Maintain sobriety  refrain from all use      Patient Signature___________________________________________  Date_________________      Nurse Signature_____________________________________________Date_________________

## 2019-03-22 NOTE — CHART NOTE - NSCHARTNOTEFT_GEN_A_CORE
The patient was admitted to the inpt detox unit CDU, for   ETOH__x__ Opioid___  Benzo____Polysubstance _____ Dependency.    Pt was admitted from ED____, Intake__x_, Med/surg Floor_______.    Details are present in the preceding History & Physical section and follow up chart notes.  patient was evaluated on daily detox team  rounds.  Withdrawal symptoms and signs were reviewed on a daily basis, and the protocols were adjusted accordingly.    Labs and imaging results were reviewed and discussed with the patient.    All questions from the patient were addressed.  The patient was seen by the Chemical dependency counselors, and different options for after care were discussed.  The patient attended groups, meetings and 1:1 sessions with the counselors.  Narcane Kit was offered and instructions given prior to discharge.    Psychiatry consultation reviewed______, N/A__x____    Physical therapy evaluation reviewed_____, N/A_x___    Pt was given copies of labs and imaging reports, if applicable.    Prescriptions if needed, were sent through [facility] system to the pharmacy amnd are noted in the discharge instruction sheet.    After care was arranged by counselors and pt was discharged to:    Home___, Outpt. Program___, Rehab x___, Long term____ [facility] ____ [facility]____ SNF____, AMA___, Admin Discharge____    Principal Diagnosis: Alcohol Dependency__x__ Opioid Dependency___ Benzo Dependency____ Polysubstance Dependency____

## 2019-03-23 RX ADMIN — GABAPENTIN 600 MILLIGRAM(S): 400 CAPSULE ORAL at 20:45

## 2019-03-23 RX ADMIN — Medication 15 MILLIGRAM(S): at 20:45

## 2019-03-23 RX ADMIN — Medication 1 PATCH: at 09:18

## 2019-03-23 RX ADMIN — Medication 1 TABLET(S): at 09:16

## 2019-03-23 RX ADMIN — Medication 50 MILLIGRAM(S): at 23:10

## 2019-03-23 RX ADMIN — METHOCARBAMOL 500 MILLIGRAM(S): 500 TABLET, FILM COATED ORAL at 09:16

## 2019-03-23 RX ADMIN — Medication 15 MILLIGRAM(S): at 09:15

## 2019-03-23 RX ADMIN — Medication 1 APPLICATION(S): at 21:47

## 2019-03-23 RX ADMIN — Medication 40 MILLIGRAM(S): at 09:15

## 2019-03-24 RX ADMIN — Medication 1 PATCH: at 08:29

## 2019-03-24 RX ADMIN — GABAPENTIN 600 MILLIGRAM(S): 400 CAPSULE ORAL at 22:17

## 2019-03-24 RX ADMIN — Medication 50 MILLIGRAM(S): at 22:17

## 2019-03-24 RX ADMIN — Medication 1 APPLICATION(S): at 08:28

## 2019-03-24 RX ADMIN — Medication 15 MILLIGRAM(S): at 22:16

## 2019-03-24 RX ADMIN — Medication 400 MILLIGRAM(S): at 16:47

## 2019-03-24 RX ADMIN — Medication 15 MILLIGRAM(S): at 08:29

## 2019-03-24 RX ADMIN — Medication 40 MILLIGRAM(S): at 08:29

## 2019-03-24 RX ADMIN — Medication 1 TABLET(S): at 08:30

## 2019-03-24 RX ADMIN — GABAPENTIN 200 MILLIGRAM(S): 400 CAPSULE ORAL at 08:30

## 2019-03-24 RX ADMIN — Medication 400 MILLIGRAM(S): at 08:31

## 2019-03-25 RX ADMIN — GABAPENTIN 200 MILLIGRAM(S): 400 CAPSULE ORAL at 10:06

## 2019-03-25 RX ADMIN — Medication 15 MILLIGRAM(S): at 22:13

## 2019-03-25 RX ADMIN — Medication 1 PATCH: at 10:07

## 2019-03-25 RX ADMIN — Medication 50 MILLIGRAM(S): at 22:12

## 2019-03-25 RX ADMIN — Medication 40 MILLIGRAM(S): at 10:06

## 2019-03-25 RX ADMIN — Medication 15 MILLIGRAM(S): at 10:06

## 2019-03-25 RX ADMIN — GABAPENTIN 600 MILLIGRAM(S): 400 CAPSULE ORAL at 22:13

## 2019-03-25 RX ADMIN — Medication 1 TABLET(S): at 10:06

## 2019-03-25 RX ADMIN — Medication 400 MILLIGRAM(S): at 10:08

## 2019-03-26 DIAGNOSIS — F13.20 SEDATIVE, HYPNOTIC OR ANXIOLYTIC DEPENDENCE, UNCOMPLICATED: ICD-10-CM

## 2019-03-26 DIAGNOSIS — F11.20 OPIOID DEPENDENCE, UNCOMPLICATED: ICD-10-CM

## 2019-03-26 DIAGNOSIS — F10.20 ALCOHOL DEPENDENCE, UNCOMPLICATED: ICD-10-CM

## 2019-03-26 DIAGNOSIS — F17.200 NICOTINE DEPENDENCE, UNSPECIFIED, UNCOMPLICATED: ICD-10-CM

## 2019-03-26 RX ORDER — FLUOXETINE HCL 10 MG
60 CAPSULE ORAL DAILY
Qty: 0 | Refills: 0 | Status: DISCONTINUED | OUTPATIENT
Start: 2019-03-26 | End: 2019-04-04

## 2019-03-26 RX ADMIN — Medication 50 MILLIGRAM(S): at 21:39

## 2019-03-26 RX ADMIN — GABAPENTIN 200 MILLIGRAM(S): 400 CAPSULE ORAL at 08:49

## 2019-03-26 RX ADMIN — Medication 1 APPLICATION(S): at 21:46

## 2019-03-26 RX ADMIN — Medication 10 MILLIGRAM(S): at 21:38

## 2019-03-26 RX ADMIN — Medication 40 MILLIGRAM(S): at 08:49

## 2019-03-26 RX ADMIN — Medication 1 TABLET(S): at 08:49

## 2019-03-26 RX ADMIN — Medication 15 MILLIGRAM(S): at 08:49

## 2019-03-26 RX ADMIN — GABAPENTIN 600 MILLIGRAM(S): 400 CAPSULE ORAL at 21:39

## 2019-03-26 NOTE — BEHAVIORAL HEALTH ASSESSMENT NOTE - HPI (INCLUDE ILLNESS QUALITY, SEVERITY, DURATION, TIMING, CONTEXT, MODIFYING FACTORS, ASSOCIATED SIGNS AND SYMPTOMS)
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 First use of mj and etoh was HS. In college, pt's use increased in frequency and pt began using xtc when he "went out to night clubs and stuff". Pt also states he would intermittently take benzos. Pt states in college his use was "under control". 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 Pt states he had dental surgery and was prescribed opioids which he began liking and started using "blues" and pt states "I got hooked on those really bad and I couldn't afford it so started using heroin". Pt states he went into a program and stayed sober for a year and was doing well with work and a gf but then began drinking again and began having "trouble at work. I wasn't functioning the best I could and ppl started noticing". In 2014, pt again went to recovery and began doing well again and having success in life. Attributes sobriety to "going to meetings but I got comfortable and alisha forgot I had that problem and stopped going to meetings and started drinking and using mj socially". Pt states that he then began smoking mj and then began using other substances "I started spiraling out of control again. My work saw it. I am probably on my last straw at work and I have to go back to the medical unit (office for [facility] fitness for duty assessment)". Pt states that he has a lot of unknowns in his life with respect to his career.   Re mental health, pt states in college, he was dx w depression and started on Zoloft but didn't stay on it very long because he realized he couldn't drink alcohol on it. He was also diagnosed with depression and anxiety while in his first rehab and started on lexapro, seroquel and "I don't remember the names of the other ones". Pt states he has also been to outpt programs that have started variety of meds. Latest program started him on most recent regimen. Pt states he doesn't like being on meds and has been self tapering his meds particularly the gabapentin where he went from 600 mg tid to 300 mg am, and 600 mg pm. Pt describes depressive episodes as "I don't wanna shower, I don't wanna get out of bed, I eat less and then drug use makes it worse." Does endorse fleeting PDW and states that "in between groups, I go to my room and bash myself over my head with things that I couldn't do differently and things that I lost. I talked to my dad and he reminds me I'm still young and I still have things to look forward to and I haven't lost everything". Pt reports he is experiencing sexual side effects from meds but is not sure which ones and states that he is used to taking it with adderall as he was diagnosed with adhd in college.

## 2019-03-26 NOTE — BEHAVIORAL HEALTH ASSESSMENT NOTE - NSBHCONSULTMEDS_PSY_A_CORE FT
Will taper Buspar to 10 mg 2x day x 4 days, then 7.5 mg 2x day x 2 days then 5 mg 2x day  Increase Prozac to 60 mg to address depressive sx

## 2019-03-26 NOTE — BEHAVIORAL HEALTH ASSESSMENT NOTE - NSBHSOCIALHXDETAILSFT_PSY_A_CORE
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

## 2019-03-27 RX ORDER — NICOTINE POLACRILEX 2 MG
1 GUM BUCCAL DAILY
Qty: 0 | Refills: 0 | Status: DISCONTINUED | OUTPATIENT
Start: 2019-03-27 | End: 2019-03-30

## 2019-03-27 RX ADMIN — Medication 1 APPLICATION(S): at 20:51

## 2019-03-27 RX ADMIN — GABAPENTIN 600 MILLIGRAM(S): 400 CAPSULE ORAL at 20:51

## 2019-03-27 RX ADMIN — Medication 50 MILLIGRAM(S): at 20:51

## 2019-03-27 RX ADMIN — Medication 1 PATCH: at 10:04

## 2019-03-27 RX ADMIN — Medication 10 MILLIGRAM(S): at 20:50

## 2019-03-27 RX ADMIN — Medication 60 MILLIGRAM(S): at 10:02

## 2019-03-27 RX ADMIN — Medication 400 MILLIGRAM(S): at 10:01

## 2019-03-27 RX ADMIN — Medication 1 TABLET(S): at 10:04

## 2019-03-27 RX ADMIN — Medication 10 MILLIGRAM(S): at 10:02

## 2019-03-27 RX ADMIN — GABAPENTIN 200 MILLIGRAM(S): 400 CAPSULE ORAL at 10:04

## 2019-03-28 RX ADMIN — Medication 10 MILLIGRAM(S): at 09:44

## 2019-03-28 RX ADMIN — Medication 400 MILLIGRAM(S): at 18:30

## 2019-03-28 RX ADMIN — Medication 10 MILLIGRAM(S): at 22:29

## 2019-03-28 RX ADMIN — Medication 1 TABLET(S): at 09:45

## 2019-03-28 RX ADMIN — Medication 400 MILLIGRAM(S): at 09:47

## 2019-03-28 RX ADMIN — Medication 60 MILLIGRAM(S): at 09:44

## 2019-03-28 RX ADMIN — Medication 50 MILLIGRAM(S): at 22:30

## 2019-03-28 RX ADMIN — Medication 1 PATCH: at 09:48

## 2019-03-28 RX ADMIN — GABAPENTIN 600 MILLIGRAM(S): 400 CAPSULE ORAL at 22:29

## 2019-03-28 RX ADMIN — GABAPENTIN 200 MILLIGRAM(S): 400 CAPSULE ORAL at 09:45

## 2019-03-28 RX ADMIN — Medication 1 PATCH: at 09:45

## 2019-03-29 RX ADMIN — Medication 1 PATCH: at 08:51

## 2019-03-29 RX ADMIN — Medication 50 MILLIGRAM(S): at 23:44

## 2019-03-29 RX ADMIN — Medication 60 MILLIGRAM(S): at 08:46

## 2019-03-29 RX ADMIN — Medication 1 TABLET(S): at 08:48

## 2019-03-29 RX ADMIN — GABAPENTIN 600 MILLIGRAM(S): 400 CAPSULE ORAL at 21:08

## 2019-03-29 RX ADMIN — Medication 10 MILLIGRAM(S): at 21:08

## 2019-03-29 RX ADMIN — Medication 400 MILLIGRAM(S): at 08:49

## 2019-03-29 RX ADMIN — Medication 10 MILLIGRAM(S): at 08:47

## 2019-03-29 RX ADMIN — Medication 1 PATCH: at 08:49

## 2019-03-29 RX ADMIN — GABAPENTIN 200 MILLIGRAM(S): 400 CAPSULE ORAL at 08:47

## 2019-03-30 RX ORDER — NICOTINE POLACRILEX 2 MG
1 GUM BUCCAL DAILY
Qty: 0 | Refills: 0 | Status: DISCONTINUED | OUTPATIENT
Start: 2019-03-30 | End: 2019-04-04

## 2019-03-30 RX ADMIN — Medication 1 TABLET(S): at 09:54

## 2019-03-30 RX ADMIN — Medication 50 MILLIGRAM(S): at 22:02

## 2019-03-30 RX ADMIN — GABAPENTIN 200 MILLIGRAM(S): 400 CAPSULE ORAL at 09:54

## 2019-03-30 RX ADMIN — Medication 1 PATCH: at 09:55

## 2019-03-30 RX ADMIN — Medication 10 MILLIGRAM(S): at 11:52

## 2019-03-30 RX ADMIN — GABAPENTIN 600 MILLIGRAM(S): 400 CAPSULE ORAL at 22:01

## 2019-03-30 RX ADMIN — Medication 400 MILLIGRAM(S): at 10:00

## 2019-03-30 RX ADMIN — Medication 7.5 MILLIGRAM(S): at 22:02

## 2019-03-30 RX ADMIN — Medication 60 MILLIGRAM(S): at 09:53

## 2019-03-31 RX ADMIN — Medication 1 PATCH: at 09:46

## 2019-03-31 RX ADMIN — Medication 7.5 MILLIGRAM(S): at 21:05

## 2019-03-31 RX ADMIN — GABAPENTIN 600 MILLIGRAM(S): 400 CAPSULE ORAL at 21:05

## 2019-03-31 RX ADMIN — Medication 5 MILLIGRAM(S): at 09:43

## 2019-03-31 RX ADMIN — Medication 60 MILLIGRAM(S): at 09:41

## 2019-03-31 RX ADMIN — Medication 1 TABLET(S): at 09:41

## 2019-03-31 RX ADMIN — Medication 50 MILLIGRAM(S): at 21:05

## 2019-03-31 RX ADMIN — GABAPENTIN 200 MILLIGRAM(S): 400 CAPSULE ORAL at 09:41

## 2019-03-31 RX ADMIN — Medication 50 MILLIGRAM(S): at 22:51

## 2019-04-01 RX ADMIN — Medication 400 MILLIGRAM(S): at 10:13

## 2019-04-01 RX ADMIN — Medication 50 MILLIGRAM(S): at 10:13

## 2019-04-01 RX ADMIN — GABAPENTIN 200 MILLIGRAM(S): 400 CAPSULE ORAL at 10:12

## 2019-04-01 RX ADMIN — Medication 1 PATCH: at 10:15

## 2019-04-01 RX ADMIN — Medication 60 MILLIGRAM(S): at 10:12

## 2019-04-01 RX ADMIN — Medication 15 MILLIGRAM(S): at 21:51

## 2019-04-01 RX ADMIN — Medication 50 MILLIGRAM(S): at 21:51

## 2019-04-01 RX ADMIN — Medication 1 PATCH: at 10:13

## 2019-04-01 RX ADMIN — GABAPENTIN 600 MILLIGRAM(S): 400 CAPSULE ORAL at 21:51

## 2019-04-01 RX ADMIN — Medication 10 MILLIGRAM(S): at 13:01

## 2019-04-01 RX ADMIN — Medication 5 MILLIGRAM(S): at 10:11

## 2019-04-01 RX ADMIN — Medication 1 TABLET(S): at 10:11

## 2019-04-02 RX ADMIN — Medication 15 MILLIGRAM(S): at 09:00

## 2019-04-02 RX ADMIN — Medication 60 MILLIGRAM(S): at 09:01

## 2019-04-02 RX ADMIN — Medication 1 PATCH: at 08:59

## 2019-04-02 RX ADMIN — Medication 1 TABLET(S): at 09:01

## 2019-04-02 RX ADMIN — GABAPENTIN 200 MILLIGRAM(S): 400 CAPSULE ORAL at 09:01

## 2019-04-03 VITALS
RESPIRATION RATE: 18 BRPM | DIASTOLIC BLOOD PRESSURE: 62 MMHG | HEART RATE: 91 BPM | TEMPERATURE: 97 F | SYSTOLIC BLOOD PRESSURE: 123 MMHG

## 2019-04-03 RX ADMIN — Medication 1 PATCH: at 09:07

## 2019-04-03 RX ADMIN — Medication 50 MILLIGRAM(S): at 20:56

## 2019-04-03 RX ADMIN — GABAPENTIN 600 MILLIGRAM(S): 400 CAPSULE ORAL at 20:55

## 2019-04-03 RX ADMIN — Medication 1 TABLET(S): at 09:03

## 2019-04-03 RX ADMIN — Medication 15 MILLIGRAM(S): at 20:55

## 2019-04-03 RX ADMIN — Medication 50 MILLIGRAM(S): at 22:36

## 2019-04-03 RX ADMIN — GABAPENTIN 200 MILLIGRAM(S): 400 CAPSULE ORAL at 09:04

## 2019-04-03 RX ADMIN — Medication 1 PATCH: at 09:05

## 2019-04-03 RX ADMIN — Medication 50 MILLIGRAM(S): at 20:55

## 2019-04-03 RX ADMIN — Medication 15 MILLIGRAM(S): at 09:04

## 2019-04-03 RX ADMIN — Medication 60 MILLIGRAM(S): at 09:04

## 2019-04-04 RX ORDER — FLUOXETINE HCL 10 MG
3 CAPSULE ORAL
Qty: 0 | Refills: 0 | COMMUNITY
Start: 2019-04-04

## 2019-04-04 RX ORDER — TRAZODONE HCL 50 MG
1 TABLET ORAL
Qty: 0 | Refills: 0 | COMMUNITY
Start: 2019-04-04

## 2019-04-04 RX ORDER — FLUOXETINE HCL 10 MG
3 CAPSULE ORAL
Qty: 42 | Refills: 0
Start: 2019-04-04 | End: 2019-04-17

## 2019-04-04 RX ORDER — GABAPENTIN 400 MG/1
2 CAPSULE ORAL
Qty: 0 | Refills: 0 | COMMUNITY
Start: 2019-04-04

## 2019-04-04 RX ORDER — TRAZODONE HCL 50 MG
1 TABLET ORAL
Qty: 14 | Refills: 0
Start: 2019-04-04 | End: 2019-04-17

## 2019-04-04 RX ORDER — GABAPENTIN 400 MG/1
1 CAPSULE ORAL
Qty: 0 | Refills: 0 | COMMUNITY
Start: 2019-04-04

## 2019-04-04 RX ORDER — GABAPENTIN 400 MG/1
1 CAPSULE ORAL
Qty: 14 | Refills: 0
Start: 2019-04-04 | End: 2019-04-17

## 2019-04-04 RX ORDER — GABAPENTIN 400 MG/1
2 CAPSULE ORAL
Qty: 28 | Refills: 0
Start: 2019-04-04 | End: 2019-04-17

## 2019-04-04 RX ADMIN — GABAPENTIN 200 MILLIGRAM(S): 400 CAPSULE ORAL at 08:31

## 2019-04-04 RX ADMIN — Medication 15 MILLIGRAM(S): at 08:32

## 2019-04-04 RX ADMIN — Medication 60 MILLIGRAM(S): at 08:32

## 2019-04-04 RX ADMIN — Medication 1 PATCH: at 08:34

## 2019-04-04 RX ADMIN — Medication 1 PATCH: at 08:32

## 2019-04-04 RX ADMIN — Medication 1 TABLET(S): at 08:32

## 2019-04-04 NOTE — CHART NOTE - NSCHARTNOTEFT_GEN_A_CORE
The patient was admitted to the in CDRU, for   ETOH_x___ Opioid___  Benzo__x__Polysubstance _____ Dependency.    Pt was admitted from ED____, Intake____, Med/surg Floor_______CDU____x___    Details are present in the preceding History & Physical section and follow up chart notes.  patient was evaluated on  team  rounds.  Withdrawal symptoms and signs were reviewed on a prn  basis, and the meds  were adjusted accordingly.    Labs and imaging results were reviewed and discussed with the patient.    All questions from the patient were addressed.  The patient was seen by the Chemical dependency counselors, and different options for after care were discussed.  The patient attended groups, meetings and 1:1 sessions with the counselors.  Narcane Kit was offered and instructions given prior to discharge.    Psychiatry consultation reviewed___x___, N/A______    Physical therapy evaluation reviewed_____, N/A_x___    Pt was given copies of labs and imaging reports, if applicable.    Prescriptions if needed, were sent through [facility] system to the pharmacy amnd are noted in the discharge instruction sheet.    After care was arranged by counselors and pt was discharged to:    Home_x__, Outpt. Program___, Rehab ___, Long term____ [facility] ____ [facility]____ SNF____, AMA___, Admin Discharge____    Principal Diagnosis: Alcohol Dependency__x__ Opioid Dependency___ Benzo Dependency__x__ Polysubstance Dependency____

## 2019-04-08 DIAGNOSIS — F11.20 OPIOID DEPENDENCE, UNCOMPLICATED: ICD-10-CM

## 2019-04-08 DIAGNOSIS — F10.20 ALCOHOL DEPENDENCE, UNCOMPLICATED: ICD-10-CM

## 2019-04-08 DIAGNOSIS — Z51.89 ENCOUNTER FOR OTHER SPECIFIED AFTERCARE: ICD-10-CM

## 2019-04-08 DIAGNOSIS — F13.20 SEDATIVE, HYPNOTIC OR ANXIOLYTIC DEPENDENCE, UNCOMPLICATED: ICD-10-CM

## 2019-05-30 ENCOUNTER — TRANSCRIPTION ENCOUNTER (OUTPATIENT)
Age: 34
End: 2019-05-30

## 2020-08-21 ENCOUNTER — TRANSCRIPTION ENCOUNTER (OUTPATIENT)
Age: 35
End: 2020-08-21

## 2021-03-28 ENCOUNTER — TRANSCRIPTION ENCOUNTER (OUTPATIENT)
Age: 36
End: 2021-03-28

## 2021-06-23 ENCOUNTER — OUTPATIENT (OUTPATIENT)
Dept: OUTPATIENT SERVICES | Facility: HOSPITAL | Age: 36
LOS: 1 days | Discharge: HOME | End: 2021-06-23
Payer: COMMERCIAL

## 2021-06-23 DIAGNOSIS — N20.0 CALCULUS OF KIDNEY: ICD-10-CM

## 2021-06-23 PROCEDURE — 76700 US EXAM ABDOM COMPLETE: CPT | Mod: 26

## 2021-07-19 ENCOUNTER — TRANSCRIPTION ENCOUNTER (OUTPATIENT)
Age: 36
End: 2021-07-19

## 2021-08-12 ENCOUNTER — TRANSCRIPTION ENCOUNTER (OUTPATIENT)
Age: 36
End: 2021-08-12

## 2021-08-30 ENCOUNTER — TRANSCRIPTION ENCOUNTER (OUTPATIENT)
Age: 36
End: 2021-08-30

## 2021-11-25 ENCOUNTER — TRANSCRIPTION ENCOUNTER (OUTPATIENT)
Age: 36
End: 2021-11-25

## 2021-12-14 ENCOUNTER — TRANSCRIPTION ENCOUNTER (OUTPATIENT)
Age: 36
End: 2021-12-14

## 2022-01-11 PROBLEM — Z00.00 ENCOUNTER FOR PREVENTIVE HEALTH EXAMINATION: Status: ACTIVE | Noted: 2022-01-11

## 2022-01-13 ENCOUNTER — APPOINTMENT (OUTPATIENT)
Dept: SURGERY | Facility: CLINIC | Age: 37
End: 2022-01-13
Payer: COMMERCIAL

## 2022-01-13 VITALS — BODY MASS INDEX: 26.2 KG/M2 | HEIGHT: 66 IN | WEIGHT: 163 LBS

## 2022-01-13 PROCEDURE — 99243 OFF/OP CNSLTJ NEW/EST LOW 30: CPT

## 2022-01-13 NOTE — CONSULT LETTER
[Dear  ___] : Dear  [unfilled], [Courtesy Letter:] : I had the pleasure of seeing your patient, [unfilled], in my office today. [Please see my note below.] : Please see my note below. [Consult Closing:] : Thank you very much for allowing me to participate in the care of this patient.  If you have any questions, please do not hesitate to contact me. [FreeTextEntry3] : Respectfully,\par \par Bradley Mathis M.D., FACS\par

## 2022-01-13 NOTE — PHYSICAL EXAM
[Normal Breath Sounds] : Normal breath sounds [No Rash or Lesion] : No rash or lesion [Alert] : alert [Calm] : calm [JVD] : no jugular venous distention  [de-identified] : healthy [de-identified] : normal [de-identified] : mildly protuberant abdomen, moderate diastasis recti\par \par  [de-identified] : supraumbilical ventral hernia, reducible

## 2022-01-13 NOTE — ASSESSMENT
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 He was recently lifting heavy desks at work which he believes may have contributed to this condition. \par \par Physical examination demonstrates a raspberry size tender bulge just above the umbilicus which is reducible with a moderate degree of difficulty consistent with a symptomatic protruding supraumbilical ventral hernia warranting surgical repair. This hernia is complicated by a mild diastasis recti which is likely related to his excess abdominal weight. He has gained approximately 30 pounds since the start of the pandemic. His current BMI is 26. \par \par I explained the pros and cons of surgery, as well as all risks, benefits, indications and alternatives of the procedure and the patient understood and agreed. Rafael was scheduled for the repair of his supraumbilical ventral hernia with mesh on Wednesday, February 2, 2022 under LOCAL with IV SEDATION at [facility] with presurgical testing waived.  He was encouraged to avoid heavy lifting and strenuous activity in the interim, of course.

## 2022-02-01 NOTE — ASU PATIENT PROFILE, ADULT - NSTRANFUSIONPLAN_GEN_ALL_CORE_SIUH
(See continued from above) Xray Chest on 1/5/19 displayed small bilateral pleural effusions and mild interstitial edema. No pneumothorax. The cardiac silhouette is enlarged. MR Head No Contrast on 1/6/19 displayed age-appropriate involutional and ischemic gliotic changes. Subacute left parietal infarct with mineralization. A few tiny scattered acute infarcts in the bilateral centrum semiovale ovale. not applicable Pt.'s daughter bedside explains pt. benefited from assistance with ADL's and functional mobility from 1 person while at [facility] for Rehab. Prior to initial hospitalization, pt.'s daughter reports pt was independent with ADL's and functional mobility.     (See continued from above) Xray Chest on 1/5/19 displayed small bilateral pleural effusions and mild interstitial edema. No pneumothorax. The cardiac silhouette is enlarged. MR Head No Contrast on 1/6/19 displayed age-appropriate involutional and ischemic gliotic changes. Subacute left parietal infarct with mineralization. A few tiny scattered acute infarcts in the bilateral centrum semiovale ovale.

## 2022-02-02 ENCOUNTER — OUTPATIENT (OUTPATIENT)
Dept: OUTPATIENT SERVICES | Facility: HOSPITAL | Age: 37
LOS: 1 days | Discharge: HOME | End: 2022-02-02
Payer: COMMERCIAL

## 2022-02-02 ENCOUNTER — APPOINTMENT (OUTPATIENT)
Dept: SURGERY | Facility: AMBULATORY SURGERY CENTER | Age: 37
End: 2022-02-02

## 2022-02-02 VITALS
SYSTOLIC BLOOD PRESSURE: 119 MMHG | OXYGEN SATURATION: 96 % | RESPIRATION RATE: 21 BRPM | HEART RATE: 88 BPM | DIASTOLIC BLOOD PRESSURE: 78 MMHG

## 2022-02-02 VITALS — WEIGHT: 165.35 LBS | HEIGHT: 66 IN

## 2022-02-02 PROCEDURE — 49568: CPT

## 2022-02-02 PROCEDURE — 49560: CPT

## 2022-02-02 RX ORDER — ONDANSETRON 8 MG/1
4 TABLET, FILM COATED ORAL ONCE
Refills: 0 | Status: DISCONTINUED | OUTPATIENT
Start: 2022-02-02 | End: 2022-02-17

## 2022-02-02 RX ORDER — MORPHINE SULFATE 50 MG/1
2 CAPSULE, EXTENDED RELEASE ORAL
Refills: 0 | Status: DISCONTINUED | OUTPATIENT
Start: 2022-02-02 | End: 2022-02-02

## 2022-02-02 RX ORDER — SODIUM CHLORIDE 9 MG/ML
1000 INJECTION, SOLUTION INTRAVENOUS
Refills: 0 | Status: DISCONTINUED | OUTPATIENT
Start: 2022-02-02 | End: 2022-02-17

## 2022-02-02 RX ORDER — TRAMADOL HYDROCHLORIDE 50 MG/1
1 TABLET ORAL
Qty: 20 | Refills: 0
Start: 2022-02-02 | End: 2022-02-05

## 2022-02-02 RX ADMIN — SODIUM CHLORIDE 100 MILLILITER(S): 9 INJECTION, SOLUTION INTRAVENOUS at 14:42

## 2022-02-02 NOTE — ASU DISCHARGE PLAN (ADULT/PEDIATRIC) - CARE PROVIDER_API CALL
Bradley Mathis)  Surgery  [address]  [place] [address]  Phone: [phone]  Fax: [phone]  Scheduled Appointment: 02/10/2022

## 2022-02-02 NOTE — ASU DISCHARGE PLAN (ADULT/PEDIATRIC) - PATIENT EDUCATION MATERIALS PROVIED
pain management./Provider pre-printed instructions given/Pre-printed instructions given for other (specify)

## 2022-02-04 DIAGNOSIS — K43.9 VENTRAL HERNIA WITHOUT OBSTRUCTION OR GANGRENE: ICD-10-CM

## 2022-02-04 DIAGNOSIS — F41.9 ANXIETY DISORDER, UNSPECIFIED: ICD-10-CM

## 2022-02-04 DIAGNOSIS — F32.9 MAJOR DEPRESSIVE DISORDER, SINGLE EPISODE, UNSPECIFIED: ICD-10-CM

## 2022-02-10 ENCOUNTER — APPOINTMENT (OUTPATIENT)
Dept: SURGERY | Facility: CLINIC | Age: 37
End: 2022-02-10
Payer: COMMERCIAL

## 2022-02-10 DIAGNOSIS — M62.08 SEPARATION OF MUSCLE (NONTRAUMATIC), OTHER SITE: ICD-10-CM

## 2022-02-10 DIAGNOSIS — K43.9 VENTRAL HERNIA W/OUT OBSTRUCTION OR GANGRENE: ICD-10-CM

## 2022-02-10 PROCEDURE — 99024 POSTOP FOLLOW-UP VISIT: CPT

## 2022-02-10 NOTE — ASSESSMENT
[FreeTextEntry1] : Cuong underwent the repair of his supraumbilical ventral hernia with mesh on February 2, 2022 under local with IV sedation without any problems or complications. His wound is clean, dry and intact. There is no evidence of erythema, seroma formation or infection. He is tolerating a diet and having normal bowel movements. He denies any significant postoperative pain or discomfort at this time.\par \par He was counseled and reassured. Cuong was discharged from the office with no specific followup necessary, but he knows to avoid any heavy lifting or strenuous activity for the next several weeks. He was encouraged to continue to wear his abdominal binder for the better part of the next month, especially since he was not wearing it today.

## 2022-02-10 NOTE — CONSULT LETTER
[FreeTextEntry1] : Dear Dr. Abdi Mcintyre, \par \par I had the pleasure of seeing your patient, JUNE SAINI, in my office today. Please see my note below. \par \par Thank you very much for allowing me to participate in the care of this patient. If you have any questions, please do not hesitate to contact me. \par \par \par Respectfully,\par \par Bradley Mathis M.D., FACS

## 2022-02-28 ENCOUNTER — TRANSCRIPTION ENCOUNTER (OUTPATIENT)
Age: 37
End: 2022-02-28

## 2022-05-12 ENCOUNTER — INPATIENT (INPATIENT)
Facility: HOSPITAL | Age: 37
LOS: 7 days | Discharge: HOME | End: 2022-05-20
Attending: STUDENT IN AN ORGANIZED HEALTH CARE EDUCATION/TRAINING PROGRAM | Admitting: STUDENT IN AN ORGANIZED HEALTH CARE EDUCATION/TRAINING PROGRAM
Payer: COMMERCIAL

## 2022-05-12 VITALS
DIASTOLIC BLOOD PRESSURE: 85 MMHG | HEIGHT: 66 IN | SYSTOLIC BLOOD PRESSURE: 171 MMHG | HEART RATE: 122 BPM | RESPIRATION RATE: 20 BRPM | OXYGEN SATURATION: 97 % | TEMPERATURE: 98 F

## 2022-05-12 DIAGNOSIS — E87.2 ACIDOSIS: ICD-10-CM

## 2022-05-12 LAB
ALBUMIN SERPL ELPH-MCNC: 4.9 G/DL — SIGNIFICANT CHANGE UP (ref 3.5–5.2)
ALP SERPL-CCNC: 69 U/L — SIGNIFICANT CHANGE UP (ref 30–115)
ALT FLD-CCNC: 151 U/L — HIGH (ref 0–41)
ANION GAP SERPL CALC-SCNC: 28 MMOL/L — HIGH (ref 7–14)
APAP SERPL-MCNC: <5 UG/ML — LOW (ref 10–30)
APTT BLD: 26.8 SEC — LOW (ref 27–39.2)
AST SERPL-CCNC: 217 U/L — HIGH (ref 0–41)
BASE EXCESS BLDV CALC-SCNC: 0.8 MMOL/L — SIGNIFICANT CHANGE UP (ref -2–3)
BASOPHILS # BLD AUTO: 0.07 K/UL — SIGNIFICANT CHANGE UP (ref 0–0.2)
BASOPHILS NFR BLD AUTO: 1.5 % — HIGH (ref 0–1)
BILIRUB SERPL-MCNC: 0.8 MG/DL — SIGNIFICANT CHANGE UP (ref 0.2–1.2)
BUN SERPL-MCNC: 5 MG/DL — LOW (ref 10–20)
CA-I SERPL-SCNC: 1.02 MMOL/L — LOW (ref 1.15–1.33)
CALCIUM SERPL-MCNC: 9.2 MG/DL — SIGNIFICANT CHANGE UP (ref 8.5–10.1)
CHLORIDE SERPL-SCNC: 85 MMOL/L — LOW (ref 98–110)
CO2 SERPL-SCNC: 19 MMOL/L — SIGNIFICANT CHANGE UP (ref 17–32)
CREAT SERPL-MCNC: 0.8 MG/DL — SIGNIFICANT CHANGE UP (ref 0.7–1.5)
EGFR: 118 ML/MIN/1.73M2 — SIGNIFICANT CHANGE UP
EOSINOPHIL # BLD AUTO: 0.04 K/UL — SIGNIFICANT CHANGE UP (ref 0–0.7)
EOSINOPHIL NFR BLD AUTO: 0.9 % — SIGNIFICANT CHANGE UP (ref 0–8)
ETHANOL SERPL-MCNC: 141 MG/DL — HIGH
GAS PNL BLDV: 126 MMOL/L — LOW (ref 136–145)
GAS PNL BLDV: SIGNIFICANT CHANGE UP
GLUCOSE SERPL-MCNC: 147 MG/DL — HIGH (ref 70–99)
HCO3 BLDV-SCNC: 24 MMOL/L — SIGNIFICANT CHANGE UP (ref 22–29)
HCT VFR BLD CALC: 43.9 % — SIGNIFICANT CHANGE UP (ref 42–52)
HCT VFR BLDA CALC: 47 % — SIGNIFICANT CHANGE UP (ref 39–51)
HGB BLD CALC-MCNC: 15.7 G/DL — SIGNIFICANT CHANGE UP (ref 12.6–17.4)
HGB BLD-MCNC: 15.1 G/DL — SIGNIFICANT CHANGE UP (ref 14–18)
IMM GRANULOCYTES NFR BLD AUTO: 0.4 % — HIGH (ref 0.1–0.3)
INR BLD: 0.95 RATIO — SIGNIFICANT CHANGE UP (ref 0.65–1.3)
LACTATE BLDV-MCNC: 6.6 MMOL/L — CRITICAL HIGH (ref 0.5–2)
LIDOCAIN IGE QN: 146 U/L — HIGH (ref 7–60)
LYMPHOCYTES # BLD AUTO: 0.55 K/UL — LOW (ref 1.2–3.4)
LYMPHOCYTES # BLD AUTO: 11.8 % — LOW (ref 20.5–51.1)
MCHC RBC-ENTMCNC: 30 PG — SIGNIFICANT CHANGE UP (ref 27–31)
MCHC RBC-ENTMCNC: 34.4 G/DL — SIGNIFICANT CHANGE UP (ref 32–37)
MCV RBC AUTO: 87.1 FL — SIGNIFICANT CHANGE UP (ref 80–94)
MONOCYTES # BLD AUTO: 0.73 K/UL — HIGH (ref 0.1–0.6)
MONOCYTES NFR BLD AUTO: 15.6 % — HIGH (ref 1.7–9.3)
NEUTROPHILS # BLD AUTO: 3.26 K/UL — SIGNIFICANT CHANGE UP (ref 1.4–6.5)
NEUTROPHILS NFR BLD AUTO: 69.8 % — SIGNIFICANT CHANGE UP (ref 42.2–75.2)
NRBC # BLD: 0 /100 WBCS — SIGNIFICANT CHANGE UP (ref 0–0)
PCO2 BLDV: 35 MMHG — LOW (ref 42–55)
PH BLDV: 7.45 — HIGH (ref 7.32–7.43)
PLATELET # BLD AUTO: 121 K/UL — LOW (ref 130–400)
PO2 BLDV: 40 MMHG — SIGNIFICANT CHANGE UP
POTASSIUM BLDV-SCNC: 3.4 MMOL/L — LOW (ref 3.5–5.1)
POTASSIUM SERPL-MCNC: 3.6 MMOL/L — SIGNIFICANT CHANGE UP (ref 3.5–5)
POTASSIUM SERPL-SCNC: 3.6 MMOL/L — SIGNIFICANT CHANGE UP (ref 3.5–5)
PROT SERPL-MCNC: 7.7 G/DL — SIGNIFICANT CHANGE UP (ref 6–8)
PROTHROM AB SERPL-ACNC: 10.9 SEC — SIGNIFICANT CHANGE UP (ref 9.95–12.87)
RBC # BLD: 5.04 M/UL — SIGNIFICANT CHANGE UP (ref 4.7–6.1)
RBC # FLD: 14.8 % — HIGH (ref 11.5–14.5)
SALICYLATES SERPL-MCNC: <0.3 MG/DL — LOW (ref 4–30)
SAO2 % BLDV: 64.6 % — SIGNIFICANT CHANGE UP
SARS-COV-2 RNA SPEC QL NAA+PROBE: SIGNIFICANT CHANGE UP
SODIUM SERPL-SCNC: 132 MMOL/L — LOW (ref 135–146)
WBC # BLD: 4.67 K/UL — LOW (ref 4.8–10.8)
WBC # FLD AUTO: 4.67 K/UL — LOW (ref 4.8–10.8)

## 2022-05-12 PROCEDURE — 93010 ELECTROCARDIOGRAM REPORT: CPT

## 2022-05-12 PROCEDURE — 99291 CRITICAL CARE FIRST HOUR: CPT

## 2022-05-12 PROCEDURE — 71045 X-RAY EXAM CHEST 1 VIEW: CPT | Mod: 26

## 2022-05-12 PROCEDURE — 76705 ECHO EXAM OF ABDOMEN: CPT | Mod: 26

## 2022-05-12 RX ORDER — CHLORHEXIDINE GLUCONATE 213 G/1000ML
1 SOLUTION TOPICAL
Refills: 0 | Status: DISCONTINUED | OUTPATIENT
Start: 2022-05-12 | End: 2022-05-20

## 2022-05-12 RX ORDER — SODIUM CHLORIDE 9 MG/ML
1000 INJECTION, SOLUTION INTRAVENOUS
Refills: 0 | Status: DISCONTINUED | OUTPATIENT
Start: 2022-05-12 | End: 2022-05-13

## 2022-05-12 RX ORDER — FOLIC ACID 0.8 MG
1 TABLET ORAL ONCE
Refills: 0 | Status: COMPLETED | OUTPATIENT
Start: 2022-05-12 | End: 2022-05-12

## 2022-05-12 RX ORDER — FLUOXETINE HCL 10 MG
60 CAPSULE ORAL DAILY
Refills: 0 | Status: DISCONTINUED | OUTPATIENT
Start: 2022-05-12 | End: 2022-05-14

## 2022-05-12 RX ORDER — SODIUM CHLORIDE 9 MG/ML
1000 INJECTION INTRAMUSCULAR; INTRAVENOUS; SUBCUTANEOUS ONCE
Refills: 0 | Status: COMPLETED | OUTPATIENT
Start: 2022-05-12 | End: 2022-05-12

## 2022-05-12 RX ORDER — DIAZEPAM 5 MG
10 TABLET ORAL ONCE
Refills: 0 | Status: DISCONTINUED | OUTPATIENT
Start: 2022-05-12 | End: 2022-05-12

## 2022-05-12 RX ORDER — THIAMINE MONONITRATE (VIT B1) 100 MG
100 TABLET ORAL DAILY
Refills: 0 | Status: DISCONTINUED | OUTPATIENT
Start: 2022-05-13 | End: 2022-05-20

## 2022-05-12 RX ORDER — DIAZEPAM 5 MG
20 TABLET ORAL ONCE
Refills: 0 | Status: DISCONTINUED | OUTPATIENT
Start: 2022-05-12 | End: 2022-05-12

## 2022-05-12 RX ORDER — MIDAZOLAM HYDROCHLORIDE 1 MG/ML
2 INJECTION, SOLUTION INTRAMUSCULAR; INTRAVENOUS ONCE
Refills: 0 | Status: DISCONTINUED | OUTPATIENT
Start: 2022-05-12 | End: 2022-05-12

## 2022-05-12 RX ORDER — GABAPENTIN 400 MG/1
600 CAPSULE ORAL AT BEDTIME
Refills: 0 | Status: DISCONTINUED | OUTPATIENT
Start: 2022-05-12 | End: 2022-05-20

## 2022-05-12 RX ORDER — THIAMINE MONONITRATE (VIT B1) 100 MG
100 TABLET ORAL ONCE
Refills: 0 | Status: COMPLETED | OUTPATIENT
Start: 2022-05-12 | End: 2022-05-12

## 2022-05-12 RX ORDER — FOLIC ACID 0.8 MG
1 TABLET ORAL DAILY
Refills: 0 | Status: DISCONTINUED | OUTPATIENT
Start: 2022-05-12 | End: 2022-05-20

## 2022-05-12 RX ORDER — PANTOPRAZOLE SODIUM 20 MG/1
40 TABLET, DELAYED RELEASE ORAL
Refills: 0 | Status: DISCONTINUED | OUTPATIENT
Start: 2022-05-12 | End: 2022-05-20

## 2022-05-12 RX ORDER — TRAZODONE HCL 50 MG
50 TABLET ORAL AT BEDTIME
Refills: 0 | Status: DISCONTINUED | OUTPATIENT
Start: 2022-05-12 | End: 2022-05-19

## 2022-05-12 RX ADMIN — SODIUM CHLORIDE 1000 MILLILITER(S): 9 INJECTION INTRAMUSCULAR; INTRAVENOUS; SUBCUTANEOUS at 20:48

## 2022-05-12 RX ADMIN — GABAPENTIN 600 MILLIGRAM(S): 400 CAPSULE ORAL at 23:57

## 2022-05-12 RX ADMIN — Medication 20 MILLIGRAM(S): at 18:20

## 2022-05-12 RX ADMIN — Medication 10 MILLIGRAM(S): at 17:08

## 2022-05-12 RX ADMIN — Medication 1 MILLIGRAM(S): at 20:48

## 2022-05-12 RX ADMIN — MIDAZOLAM HYDROCHLORIDE 2 MILLIGRAM(S): 1 INJECTION, SOLUTION INTRAMUSCULAR; INTRAVENOUS at 21:01

## 2022-05-12 RX ADMIN — Medication 1 MILLIGRAM(S): at 23:57

## 2022-05-12 RX ADMIN — Medication 100 MILLIGRAM(S): at 23:05

## 2022-05-12 RX ADMIN — SODIUM CHLORIDE 1000 MILLILITER(S): 9 INJECTION INTRAMUSCULAR; INTRAVENOUS; SUBCUTANEOUS at 20:49

## 2022-05-12 RX ADMIN — Medication 100 MILLIGRAM(S): at 18:41

## 2022-05-12 RX ADMIN — SODIUM CHLORIDE 1000 MILLILITER(S): 9 INJECTION INTRAMUSCULAR; INTRAVENOUS; SUBCUTANEOUS at 22:05

## 2022-05-12 RX ADMIN — Medication 4 MILLIGRAM(S): at 21:00

## 2022-05-12 RX ADMIN — SODIUM CHLORIDE 1000 MILLILITER(S): 9 INJECTION INTRAMUSCULAR; INTRAVENOUS; SUBCUTANEOUS at 19:18

## 2022-05-12 RX ADMIN — Medication 10 MILLIGRAM(S): at 19:49

## 2022-05-12 RX ADMIN — SODIUM CHLORIDE 2000 MILLILITER(S): 9 INJECTION INTRAMUSCULAR; INTRAVENOUS; SUBCUTANEOUS at 17:09

## 2022-05-12 RX ADMIN — SODIUM CHLORIDE 1000 MILLILITER(S): 9 INJECTION INTRAMUSCULAR; INTRAVENOUS; SUBCUTANEOUS at 21:50

## 2022-05-12 NOTE — ED ADULT TRIAGE NOTE - ACCOMPANIED BY
MONTHLY NURSING HOME VISIT    NAME: Michelle Child  : 1952  MRN: [id number]    DATE & TIME SEEN: [id number]    PCP: Coral Berry MD    NURSING HOME: [facility]    Monthly Nursing Home Visit for hospital follow-up for pneumonia, UTI, deconditioning and recently diagnosed squamous cell carcinoma    Chief Complaint: Shortness of breath    Subjective:     Michelle Child is a 68 y.o. female who is currently residing in [facility] after being discharged from our hospital after a hospitalization for pneumonia, UTI and was diagnosed recently with rectal squamous cell carcinoma.    Patient was seen by oncology yesterday with Dr. Everett.  She is scheduled to have a PET scan done tomorrow.  She was also started on B12 supplement.  Per RN at [facility] patient had an oxygen saturation on room air of 78% this morning and was started on supplemental oxygen via nasal cannula which was titrated to the flow rate she is currently on now of 2.5 L satting in the low 90s.  Per nursing her lungs sound diminished but no wheezes or rhonchi.  No unilateral or bilateral leg swelling.  She has been up and moving with physical therapy.  Patient has no complaints today other than mild constipation.  Her last bowel movement was a few days ago.  She is on multiple medications for this.    Current Meds:    Current Outpatient Medications:   •  acetaminophen (TYLENOL) 325 MG tablet, Take 650 mg by mouth Every 4 (Four) Hours As Needed for Mild Pain ., Disp: , Rfl:   •  albuterol sulfate  (90 Base) MCG/ACT inhaler, INHALE 2 PUFFS EVERY 4 (FOUR) AS NEEDED  FOR WHEEZING., Disp: 8.5 g, Rfl: 11  •  docusate sodium (COLACE) 100 MG capsule, Take 3 capsules by mouth Daily., Disp: 90 capsule, Rfl: 0  •  ferrous sulfate 325 (65 Fe) MG tablet, TAKE ONE TABLET BY MOUTH TWICE A DAY WITH MEALS , Disp: 60 each, Rfl: 5  •  FLUoxetine (PROzac) 20 MG capsule, Take 60 mg by mouth Daily., Disp: , Rfl:   •   folic acid (FOLVITE) 1 MG tablet, TAKE ONE TABLET BY MOUTH ONCE DAILY (Patient taking differently: Take 1 mg by mouth Daily.), Disp: 90 tablet, Rfl: 3  •  furosemide (LASIX) 20 MG tablet, TAKE 1/2 TABLET BY MOUTH DAILY FOR FLUID RETENTION (Patient taking differently: Take 20 mg by mouth Take As Directed. TAKE 1/2 TABLET BY MOUTH DAILY), Disp: 45 tablet, Rfl: 3  •  HYDROcodone-acetaminophen (Norco) 7.5-325 MG per tablet, Take 1 tablet by mouth Every 6 (Six) Hours As Needed for Moderate Pain ., Disp: 5 tablet, Rfl: 0  •  losartan (COZAAR) 100 MG tablet, Take 1 tablet by mouth Daily. For heart, Disp: 90 tablet, Rfl: 3  •  omeprazole (priLOSEC) 20 MG capsule, Take 20 mg by mouth Daily., Disp: , Rfl:   •  potassium chloride 10 MEQ CR tablet, TAKE ONE TABLET BY MOUTH TWICE A DAY , Disp: 60 tablet, Rfl: 0  •  pravastatin (PRAVACHOL) 40 MG tablet, Take 1 tablet by mouth Every Night., Disp: 90 tablet, Rfl: 3  •  risperiDONE (risperDAL) 1 MG tablet, TAKE ONE TABLET BY MOUTH EACH EVENING AT BEDTIME FOR INSOMNIA, Disp: 90 tablet, Rfl: 3  •  SM Aspirin Adult Low Strength 81 MG EC tablet, TAKE ONE TABLET BY MOUTH ONCE DAILY , Disp: 30 tablet, Rfl: 3  •  Symbicort 160-4.5 MCG/ACT inhaler, INHALE TWO (2) PUFFS BY MOUTH TWICE A DAY , Disp: 10.2 g, Rfl: 5  •  vitamin B-12 (CYANOCOBALAMIN) 1000 MCG tablet, Take 1 tablet by mouth Daily., Disp: 90 tablet, Rfl: 1    Allergies:  Codeine, Penicillins, and Sulfa antibiotics    Review of Systems:  Review of Systems   Constitutional: Negative for chills and fever.   HENT: Negative for congestion and sore throat.    Eyes: Negative for pain and redness.   Respiratory: Negative for cough and wheezing.    Cardiovascular: Negative for chest pain, palpitations and leg swelling.   Gastrointestinal: Positive for constipation. Negative for abdominal pain and blood in stool.   Endocrine: Negative for polydipsia and polyphagia.   Genitourinary: Negative for difficulty urinating and dysuria.   Skin:  Negative for rash and wound.   Neurological: Negative for dizziness and light-headedness.   Psychiatric/Behavioral: Negative for agitation and confusion.       Objective:     /60   Pulse 91   Temp 98 °F (36.7 °C)   Resp 18   SpO2 92% Comment: on 2.5 L  Physical Exam  Constitutional:       Comments: A full physical examination could not be completed as this was a video visit.  However patient appeared in no acute distress.  She had supplemental oxygen via nasal cannula.  Her breathing appears nonlabored.       Diagnostic Data:   Recent CBC from yesterday notable for elevated white count of 17,000.     Assessment/Plan:      68 y.o. female with:  Problem List Items Addressed This Visit     None      Visit Diagnoses     Acute hypoxemic respiratory failure (CMS/HCC)    -  Primary    Relevant Medications    ipratropium-albuterol (DUO-NEB) 0.5-2.5 mg/3 ml nebulizer    Other Relevant Orders    XR Chest PA & Lateral        Patient with new oxygen requirement starting this morning after satting 70% on room air.  Currently stable on 2.5 L.  Has a history of COPD.  Given her recent elevation in white count this is possibly due to an early pneumonia versus aspiration pneumonia.  We will start treatment with DuoNebs every 6 hours, get a chest x-ray and additional basic lab work today before pursuing further evaluation.    CODE STATUS: DNR    Dr. Berry  is the attending on record for this patient, She is aware of the patient's status and agrees with the above.      This document has been electronically signed by Michael Chavez MD on April 8, 2021 15:18 CDT         Immediate family member

## 2022-05-12 NOTE — ED ADULT NURSE NOTE - OBJECTIVE STATEMENT
Pt every day drinker states he has had alcohol since yesterday. complaining of shaking and withdrawal symptoms. Pt admits feeling depressed but denies suicidal ideations, GF at bedside states he had verbalized thoughts of self harm. Patient placed on one to one sit. MD aware

## 2022-05-12 NOTE — ED PROVIDER NOTE - PHYSICAL EXAMINATION
Gen: Alert, Shaking and tremulous  Skin: Warm, dry, intact  Head: NCAT  ENT: Mucous membranes moist  Neck: Supple  CV:Tachycardic, RR, normal S1, S2, no m/r/g  Resp: Non labored respirations, Lungs CTA b/l, no wheezes, rales, rhonchi  Abdomen: Soft, nondistended, nontender  Extremities: Moving all extremities, no edema  Neuro: No focal neuro deficits  Psych: Cooperative, Depressed, denies active SI right now but recent suicidal ideation

## 2022-05-12 NOTE — H&P ADULT - NSHPLABSRESULTS_GEN_ALL_CORE
15.1   4.67  )-----------( 121      ( 12 May 2022 19:26 )             43.9   05-12-22 @ 19:26    132<L>  |  85<L>  |  5<L>             --------------------------< 147<H>     3.6  |  19  | 0.8    eGFR AA: --  eGFR N-AA: --    Calcium: 9.2  Phosphorus: --  Magnesium: --    AST: 217<H>    ALT: 151<H>  AlkPhos: 69  Protein: 7.7  Albumin: 4.9  TBili: 0.8  D-Bili: --    Lipase, Serum: 146 U/L (05.12.22 @ 17:18)  Blood Gas Venous - Lactate: 6.60: Md. Diaz notified with critical results. mmol/L (05.12.22 @ 17:18)    < from: US Abdomen Upper Quadrant Right (05.12.22 @ 22:39) >      IMPRESSION:  No biliary dilation.  Evidence of hepatic steatosis.    < end of copied text >

## 2022-05-12 NOTE — ED PROVIDER NOTE - ATTENDING CONTRIBUTION TO CARE
Patient here with alcohol withdrawal.  Have given Valium 10 mg, then 20 mg.  Awaiting labs, does have lactic acidosis.  No infectious symptoms otherwise so suspect from withdrawal and not from sepsis.  Abdomen benign.  EKG nonischemic, chest x-ray clear, doubt ACS as cause of chest pain.  We will continue to treat withdrawal as needed with IV benzodiazepines and anticipate admission to ICU.  Exam by me above.

## 2022-05-12 NOTE — ED PROVIDER NOTE - OBJECTIVE STATEMENT
37-year-old male with past medical history of depression, anxiety, alcohol abuse, drinks daily, comes in with withdrawal.  Constant, since last night, last drink yesterday, drinks daily, last time he did not drink before today was months ago.  No history of alcohol withdrawal seizures or DTs but history of withdrawal if stopped drinking in the past.  Denies any nausea, vomiting, abdominal pain, or shortness of breath.  Mild mid intermittent nonexertional chest tightness.  No cough or fever.  Occasional diarrhea.  No melena or bright red blood per rectum.  Denies any urinary symptoms.  Also endorsed suicidal ideation to his girlfriend yesterday, no plan, depressed now, but denies suicidal ideation.Denies hallucinations 37-year-old male with past medical history of depression, anxiety, alcohol abuse, drinks daily, comes in with withdrawal.  Constant, since last night, last drink yesterday, drinks daily, last time he did not drink before today was months ago.  No history of alcohol withdrawal seizures or DTs but history of withdrawal if stopped drinking in the past.  Denies any nausea, vomiting, abdominal pain, or shortness of breath.  Mild mid intermittent nonexertional chest tightness.  No cough or fever.  Occasional diarrhea.  No melena or bright red blood per rectum.  Denies any urinary symptoms.  Also endorsed suicidal ideation to his girlfriend yesterday, no plan, depressed now, but denies suicidal ideation. Denies hallucinations

## 2022-05-12 NOTE — ED ADULT TRIAGE NOTE - CHIEF COMPLAINT QUOTE
pt every day drinker ( vodka) requesting detox. pt tremulous, + tongue fasciculations. last drink was yesterday

## 2022-05-12 NOTE — H&P ADULT - HISTORY OF PRESENT ILLNESS
36 yo M with PMH of depression (on fluoxetine), active alcohol abuse (1-2 bottles of vodka daily), and cocaine use (few times a week) presents to the hospital with sweats, anxiety, mild nausea, and tremors present for one day. Patient also endorsed one episode of chest discomfort in ed which resolved and lasted for a few minutes. Reports last drink and cocaine use yesterday. Has been drinking for a few years but denies history of alcohol withdrawal seizures or DTs. Patient also with suicidal ideation with no plan yesterday, however denies active suicidal or homicidal ideation on admission. He reports that he has been drinking more since covid19 started but had withdrawals prior to covid19 due to drinking alcohol. Has remote history of snorting xanax. Patient denies fever/ chills, cough, cp, dyspnea, abd pain, constipation, diarrhea, urinary frequency/urgency, melena, hematochezia. Denies visual, auditory or tactile hallucinations. Reports feeling hungry in emergency room. Endorses wants to inpatient rehab/detox when he is discharged. All ros negative except as above.     In Ed, received 30 mg of valium and NS Bolus 1L x 3.   CIWA score > 8 (~ 10) on admission      T(C): 36.8 , HR: 104, BP: 159/80 , RR: 20 , SpO2: 98% on room air   Labs: , Lactate 6.6, / , Alcohol level 141, lipase 146  EKG: no changes  CXR: wet read unremarkable- pending official read

## 2022-05-12 NOTE — ED PROVIDER NOTE - PROGRESS NOTE DETAILS
JFK: No focal consolidation, signs of opacities or edema, pneumothorax, free air or evidence of trauma on my interpretation. PS: HEIDI 11. Spoke to ICU fellow. Recommends 2 versed IV and 4 ativan po. Approved for ICU

## 2022-05-12 NOTE — H&P ADULT - NSHPPHYSICALEXAM_GEN_ALL_CORE
Vital Signs (24 Hrs):  T(C): 36.8 (05-12-22 @ 22:23), Max: 37.1 (05-12-22 @ 18:05)  HR: 104 (05-12-22 @ 22:23) (104 - 122)  BP: 159/80 (05-12-22 @ 22:23) (149/79 - 171/85)  RR: 20 (05-12-22 @ 22:23) (19 - 20)  SpO2: 98% on room air    PHYSICAL EXAM:  GENERAL: NAD, lying in bed comfortably, ANXIOUS APPEARING, TREMULOUS , SWEATING   HEAD:  Atraumatic, Normocephalic  EYES: EOMI, PERRLA, conjunctiva and sclera clear  ENT: Moist mucous membranes  NECK: Supple, No JVD  CHEST/LUNG: Clear to auscultation bilaterally; No rales, rhonchi, wheezing, or rubs. Unlabored respirations  HEART: Regular rate and rhythm; No murmurs, rubs, or gallops  ABDOMEN: Bowel sounds present; Soft, Nontender, Nondistended.   EXTREMITIES:  2+ Peripheral Pulses, brisk capillary refill. No clubbing, cyanosis, or edema  NERVOUS SYSTEM:  Alert & Oriented X3, speech clear. No deficits   MSK: FROM all 4 extremities, full and equal strength

## 2022-05-12 NOTE — H&P ADULT - ASSESSMENT
IMPRESSION:  Alcohol withdrawal, CIWA > 8   Lactic acidosis  Thrombocytopenia  Transaminitis   Elevated lipase   Suspected folate and thiamine deficiency   h/o alcohol abuse  h/o cocaine use   h/o xanax abuse       PLAN:  CNS: Avoid further sedatives if possible. Ativan taper. Urine/serum drug screen. CATCH Team consult. Psych consult for suicidal ideation. Constant observation. c/w home dose psych medications, hold gabapentin/ trazadone is sedated . Social work consult     HEENT: Oral care    PULMONARY: Supplemental O2 PRN - wean/titrate as tolerated. Target SpO2 92-96%. HOB @ 45 degrees. Aspiration precautions.     CARDIOVASCULAR:  I=O.  Avoid overload. c/w IVF. Check cardiac enzymes    GI: GI prophylaxis. Feeding as tolerated.  Bowel regimen. CT A/P . Hepatitis panel.     RENAL: Follow up renal function and lytes. Correct as needed. Keep potassium >4 and magnesium >2. Check repeat lactate    INFECTIOUS DISEASE: Monitor vital signs. Follow up cultures. MRSA. Procal. Monitor off abx.     HEMATOLOGICAL: DVT prophylaxis with SCD    ENDOCRINE: Follow up fasting sugar. Insulin protocol if needed. Folic acid and thiamine supplement. Check B12 level    MUSCULOSKELETAL: As tolerated    DISPO: MICU   IMPRESSION:  Alcohol withdrawal, CIWA > 8   Lactic acidosis  Thrombocytopenia  Transaminitis   Elevated lipase   Suspected folate and thiamine deficiency   h/o alcohol abuse  h/o cocaine use   h/o xanax abuse       PLAN:  CNS: Avoid further sedatives if possible. Ativan taper. Urine/serum drug screen. CATCH Team consult. Psych consult for suicidal ideation. Constant observation. c/w home dose psych medications, hold gabapentin/ trazadone is sedated . Social work consult     HEENT: Oral care    PULMONARY: Supplemental O2 PRN - wean/titrate as tolerated. Target SpO2 92-96%. HOB @ 45 degrees. Aspiration precautions.     CARDIOVASCULAR:  I=O.  Avoid overload. c/w IVF. Check cardiac enzymes    GI: GI prophylaxis. NPO except medications. CT A/P . RUQ sono noted.  Hepatitis panel.     RENAL: Follow up renal function and lytes. Correct as needed. Keep potassium >4 and magnesium >2. Check repeat lactate    INFECTIOUS DISEASE: Monitor vital signs. Follow up cultures. MRSA. Procal. Monitor off abx.     HEMATOLOGICAL: DVT prophylaxis with SCD    ENDOCRINE: Follow up fasting sugar. Insulin protocol if needed. Folic acid and thiamine supplement. Check B12 level    MUSCULOSKELETAL: As tolerated    DISPO: MICU   IMPRESSION:  Alcohol withdrawal, CIWA > 8   Lactic acidosis  Thrombocytopenia  Transaminitis   Elevated lipase   Suspected folate and thiamine deficiency   h/o alcohol abuse  h/o cocaine use   h/o xanax abuse       PLAN:  CNS: Avoid further sedatives if possible. Ativan taper. Urine/serum drug screen. CATCH Team consult. Psych consult for suicidal ideation. Constant observation. c/w home dose psych medications, hold gabapentin/ trazadone if sedated . Social work consult     HEENT: Oral care    PULMONARY: Supplemental O2 PRN - wean/titrate as tolerated. Target SpO2 92-96%. HOB @ 45 degrees. Aspiration precautions.     CARDIOVASCULAR:  I=O.  Avoid overload. c/w IVF. Check cardiac enzymes    GI: GI prophylaxis. NPO except medications. CT A/P . RUQ sono noted.  Hepatitis panel.     RENAL: Follow up renal function and lytes. Correct as needed. Keep potassium >4 and magnesium >2. Check repeat lactate    INFECTIOUS DISEASE: Monitor vital signs. Follow up cultures. MRSA. Procal. Monitor off abx.     HEMATOLOGICAL: DVT prophylaxis with SCD    ENDOCRINE: Follow up fasting sugar. Insulin protocol if needed. Folic acid and thiamine supplement. Check B12 level    MUSCULOSKELETAL: As tolerated    DISPO: MICU   IMPRESSION:  Alcohol withdrawal, CIWA > 8   Lactic acidosis  Thrombocytopenia  Transaminitis   Elevated lipase   Suspected folate and thiamine deficiency   h/o alcohol abuse  h/o cocaine use   h/o xanax abuse       PLAN:  CNS: Avoid further sedatives if possible. Ativan taper. Urine/serum drug screen. CATCH Team consult. Psych consult for suicidal ideation. Constant observation. c/w home dose psych medications, hold gabapentin/ trazadone if sedated . Social work consult     HEENT: Oral care    PULMONARY: Supplemental O2 PRN - wean/titrate as tolerated. Target SpO2 92-96%. HOB @ 45 degrees. Aspiration precautions.     CARDIOVASCULAR:  I=O.  Avoid overload. c/w IVF. Check cardiac enzymes    GI: GI prophylaxis. NPO except medications. CT A/P . RUQ sono noted.  Hepatitis panel.     RENAL: Follow up renal function and lytes. Correct as needed. Keep potassium >4 and magnesium >2. Check repeat lactate    INFECTIOUS DISEASE: Monitor vital signs. Follow up cultures. MRSA. Procal. Monitor off abx.     HEMATOLOGICAL: DVT prophylaxis with SCD. Monitor platelets     ENDOCRINE: Follow up fasting sugar. Insulin protocol if needed. Folic acid and thiamine supplement. Check B12 level    MUSCULOSKELETAL: As tolerated    DISPO: MICU   IMPRESSION:  Alcohol withdrawal, CIWA > 8   Lactic acidosis  Thrombocytopenia  Transaminitis   Elevated lipase   Suspected folate and thiamine deficiency   h/o alcohol abuse  h/o cocaine use   h/o xanax abuse       PLAN:  CNS: Ativan taper. Urine/serum drug screen. CATCH Team consult. Psych consult for suicidal ideation. Constant observation. c/w home dose psych medications, hold gabapentin/ trazadone if sedated . Social work consult     HEENT: Oral care    PULMONARY: Supplemental O2 PRN - wean/titrate as tolerated. Target SpO2 92-96%. HOB @ 45 degrees. Aspiration precautions.     CARDIOVASCULAR:  I=O.  Avoid overload. c/w IVF. Check cardiac enzymes    GI: GI prophylaxis. NPO except medications. CT A/P . RUQ sono noted.  Hepatitis panel.     RENAL: Follow up renal function and lytes. Correct as needed. Keep potassium >4 and magnesium >2. Check repeat lactate. Check Mg    INFECTIOUS DISEASE: Monitor vital signs. Follow up cultures. MRSA. Procal. Monitor off abx.     HEMATOLOGICAL: DVT prophylaxis with SCD. Monitor platelets     ENDOCRINE: Follow up fasting sugar. Insulin protocol if needed. Folic acid and thiamine supplement. Check B12 level    MUSCULOSKELETAL: As tolerated    DISPO: MICU   IMPRESSION:  Alcohol withdrawal, CIWA > 8   Suspected cocaine withdrawal  Lactic acidosis  Thrombocytopenia  Transaminitis   Elevated lipase   Suspected folate and thiamine deficiency   h/o alcohol abuse  h/o cocaine use   h/o xanax abuse       PLAN:  CNS: Ativan taper. Urine/serum drug screen. CATCH Team consult. Psych consult for suicidal ideation. Constant observation. c/w home dose psych medications, hold gabapentin/ trazadone if sedated . Social work consult     HEENT: Oral care    PULMONARY: Supplemental O2 PRN - wean/titrate as tolerated. Target SpO2 92-96%. HOB @ 45 degrees. Aspiration precautions.     CARDIOVASCULAR:  I=O.  Avoid overload. c/w IVF. Check cardiac enzymes    GI: GI prophylaxis. NPO except medications. CT A/P . RUQ sono noted.  Hepatitis panel.     RENAL: Follow up renal function and lytes. Correct as needed. Keep potassium >4 and magnesium >2. Check repeat lactate. Check Mg    INFECTIOUS DISEASE: Monitor vital signs. Follow up cultures. MRSA. Procal. Monitor off abx.     HEMATOLOGICAL: DVT prophylaxis with SCD. Monitor platelets     ENDOCRINE: Follow up fasting sugar. Insulin protocol if needed. Folic acid and thiamine supplement. Check B12 level    MUSCULOSKELETAL: As tolerated    DISPO: MICU

## 2022-05-12 NOTE — H&P ADULT - NSHPSOCIALHISTORY_GEN_ALL_CORE
active alcohol abuse and cocaine abuse  vapes  denies other illicit substance use  per chart review remote history of snorting xanax (2019)

## 2022-05-13 DIAGNOSIS — F32.9 MAJOR DEPRESSIVE DISORDER, SINGLE EPISODE, UNSPECIFIED: ICD-10-CM

## 2022-05-13 DIAGNOSIS — F10.20 ALCOHOL DEPENDENCE, UNCOMPLICATED: ICD-10-CM

## 2022-05-13 DIAGNOSIS — F14.10 COCAINE ABUSE, UNCOMPLICATED: ICD-10-CM

## 2022-05-13 LAB
ALBUMIN SERPL ELPH-MCNC: 4.3 G/DL — SIGNIFICANT CHANGE UP (ref 3.5–5.2)
ALP SERPL-CCNC: 60 U/L — SIGNIFICANT CHANGE UP (ref 30–115)
ALT FLD-CCNC: 110 U/L — HIGH (ref 0–41)
ANION GAP SERPL CALC-SCNC: 11 MMOL/L — SIGNIFICANT CHANGE UP (ref 7–14)
ANION GAP SERPL CALC-SCNC: 13 MMOL/L — SIGNIFICANT CHANGE UP (ref 7–14)
ANION GAP SERPL CALC-SCNC: 14 MMOL/L — SIGNIFICANT CHANGE UP (ref 7–14)
AST SERPL-CCNC: 150 U/L — HIGH (ref 0–41)
BASOPHILS # BLD AUTO: 0.05 K/UL — SIGNIFICANT CHANGE UP (ref 0–0.2)
BASOPHILS NFR BLD AUTO: 1 % — SIGNIFICANT CHANGE UP (ref 0–1)
BILIRUB SERPL-MCNC: 0.9 MG/DL — SIGNIFICANT CHANGE UP (ref 0.2–1.2)
BUN SERPL-MCNC: 7 MG/DL — LOW (ref 10–20)
CALCIUM SERPL-MCNC: 8.6 MG/DL — SIGNIFICANT CHANGE UP (ref 8.5–10.1)
CALCIUM SERPL-MCNC: 8.8 MG/DL — SIGNIFICANT CHANGE UP (ref 8.5–10.1)
CALCIUM SERPL-MCNC: 9.2 MG/DL — SIGNIFICANT CHANGE UP (ref 8.5–10.1)
CHLORIDE SERPL-SCNC: 100 MMOL/L — SIGNIFICANT CHANGE UP (ref 98–110)
CHLORIDE SERPL-SCNC: 98 MMOL/L — SIGNIFICANT CHANGE UP (ref 98–110)
CHLORIDE SERPL-SCNC: 99 MMOL/L — SIGNIFICANT CHANGE UP (ref 98–110)
CK MB CFR SERPL CALC: 3.6 NG/ML — SIGNIFICANT CHANGE UP (ref 0.6–6.3)
CO2 SERPL-SCNC: 24 MMOL/L — SIGNIFICANT CHANGE UP (ref 17–32)
CO2 SERPL-SCNC: 25 MMOL/L — SIGNIFICANT CHANGE UP (ref 17–32)
CO2 SERPL-SCNC: 28 MMOL/L — SIGNIFICANT CHANGE UP (ref 17–32)
CREAT SERPL-MCNC: 0.8 MG/DL — SIGNIFICANT CHANGE UP (ref 0.7–1.5)
D DIMER BLD IA.RAPID-MCNC: 330 NG/ML DDU — HIGH (ref 0–230)
EGFR: 118 ML/MIN/1.73M2 — SIGNIFICANT CHANGE UP
EOSINOPHIL # BLD AUTO: 0.03 K/UL — SIGNIFICANT CHANGE UP (ref 0–0.7)
EOSINOPHIL NFR BLD AUTO: 0.6 % — SIGNIFICANT CHANGE UP (ref 0–8)
GLUCOSE SERPL-MCNC: 100 MG/DL — HIGH (ref 70–99)
GLUCOSE SERPL-MCNC: 77 MG/DL — SIGNIFICANT CHANGE UP (ref 70–99)
GLUCOSE SERPL-MCNC: 78 MG/DL — SIGNIFICANT CHANGE UP (ref 70–99)
HAV IGM SER-ACNC: SIGNIFICANT CHANGE UP
HBV CORE IGM SER-ACNC: SIGNIFICANT CHANGE UP
HBV SURFACE AB SER-ACNC: REACTIVE
HBV SURFACE AG SER-ACNC: SIGNIFICANT CHANGE UP
HCT VFR BLD CALC: 42.9 % — SIGNIFICANT CHANGE UP (ref 42–52)
HCV AB S/CO SERPL IA: 0.04 COI — SIGNIFICANT CHANGE UP
HCV AB S/CO SERPL IA: 0.09 S/CO — SIGNIFICANT CHANGE UP (ref 0–0.99)
HCV AB SERPL-IMP: SIGNIFICANT CHANGE UP
HCV AB SERPL-IMP: SIGNIFICANT CHANGE UP
HGB BLD-MCNC: 14.4 G/DL — SIGNIFICANT CHANGE UP (ref 14–18)
IMM GRANULOCYTES NFR BLD AUTO: 0.4 % — HIGH (ref 0.1–0.3)
LACTATE SERPL-SCNC: 0.9 MMOL/L — SIGNIFICANT CHANGE UP (ref 0.7–2)
LACTATE SERPL-SCNC: 1 MMOL/L — SIGNIFICANT CHANGE UP (ref 0.7–2)
LACTATE SERPL-SCNC: 1.3 MMOL/L — SIGNIFICANT CHANGE UP (ref 0.7–2)
LACTATE SERPL-SCNC: 1.5 MMOL/L — SIGNIFICANT CHANGE UP (ref 0.7–2)
LYMPHOCYTES # BLD AUTO: 0.85 K/UL — LOW (ref 1.2–3.4)
LYMPHOCYTES # BLD AUTO: 17.2 % — LOW (ref 20.5–51.1)
MAGNESIUM SERPL-MCNC: 1 MG/DL — LOW (ref 1.8–2.4)
MAGNESIUM SERPL-MCNC: 2.4 MG/DL — SIGNIFICANT CHANGE UP (ref 1.8–2.4)
MCHC RBC-ENTMCNC: 30.3 PG — SIGNIFICANT CHANGE UP (ref 27–31)
MCHC RBC-ENTMCNC: 33.6 G/DL — SIGNIFICANT CHANGE UP (ref 32–37)
MCV RBC AUTO: 90.1 FL — SIGNIFICANT CHANGE UP (ref 80–94)
MONOCYTES # BLD AUTO: 0.73 K/UL — HIGH (ref 0.1–0.6)
MONOCYTES NFR BLD AUTO: 14.7 % — HIGH (ref 1.7–9.3)
NEUTROPHILS # BLD AUTO: 3.27 K/UL — SIGNIFICANT CHANGE UP (ref 1.4–6.5)
NEUTROPHILS NFR BLD AUTO: 66.1 % — SIGNIFICANT CHANGE UP (ref 42.2–75.2)
NRBC # BLD: 0 /100 WBCS — SIGNIFICANT CHANGE UP (ref 0–0)
PLATELET # BLD AUTO: 81 K/UL — LOW (ref 130–400)
POTASSIUM SERPL-MCNC: 3.7 MMOL/L — SIGNIFICANT CHANGE UP (ref 3.5–5)
POTASSIUM SERPL-MCNC: 4 MMOL/L — SIGNIFICANT CHANGE UP (ref 3.5–5)
POTASSIUM SERPL-MCNC: 4 MMOL/L — SIGNIFICANT CHANGE UP (ref 3.5–5)
POTASSIUM SERPL-SCNC: 3.7 MMOL/L — SIGNIFICANT CHANGE UP (ref 3.5–5)
POTASSIUM SERPL-SCNC: 4 MMOL/L — SIGNIFICANT CHANGE UP (ref 3.5–5)
POTASSIUM SERPL-SCNC: 4 MMOL/L — SIGNIFICANT CHANGE UP (ref 3.5–5)
PROCALCITONIN SERPL-MCNC: 0.08 NG/ML — SIGNIFICANT CHANGE UP (ref 0.02–0.1)
PROT SERPL-MCNC: 6.7 G/DL — SIGNIFICANT CHANGE UP (ref 6–8)
RBC # BLD: 4.76 M/UL — SIGNIFICANT CHANGE UP (ref 4.7–6.1)
RBC # FLD: 15 % — HIGH (ref 11.5–14.5)
SODIUM SERPL-SCNC: 135 MMOL/L — SIGNIFICANT CHANGE UP (ref 135–146)
SODIUM SERPL-SCNC: 138 MMOL/L — SIGNIFICANT CHANGE UP (ref 135–146)
SODIUM SERPL-SCNC: 139 MMOL/L — SIGNIFICANT CHANGE UP (ref 135–146)
TROPONIN T SERPL-MCNC: <0.01 NG/ML — SIGNIFICANT CHANGE UP
WBC # BLD: 4.95 K/UL — SIGNIFICANT CHANGE UP (ref 4.8–10.8)
WBC # FLD AUTO: 4.95 K/UL — SIGNIFICANT CHANGE UP (ref 4.8–10.8)

## 2022-05-13 PROCEDURE — 99222 1ST HOSP IP/OBS MODERATE 55: CPT

## 2022-05-13 PROCEDURE — 74176 CT ABD & PELVIS W/O CONTRAST: CPT | Mod: 26

## 2022-05-13 PROCEDURE — 71045 X-RAY EXAM CHEST 1 VIEW: CPT | Mod: 26

## 2022-05-13 PROCEDURE — 93010 ELECTROCARDIOGRAM REPORT: CPT

## 2022-05-13 PROCEDURE — 99253 IP/OBS CNSLTJ NEW/EST LOW 45: CPT

## 2022-05-13 RX ORDER — NICOTINE POLACRILEX 2 MG
1 GUM BUCCAL DAILY
Refills: 0 | Status: DISCONTINUED | OUTPATIENT
Start: 2022-05-13 | End: 2022-05-20

## 2022-05-13 RX ORDER — MAGNESIUM SULFATE 500 MG/ML
2 VIAL (ML) INJECTION ONCE
Refills: 0 | Status: COMPLETED | OUTPATIENT
Start: 2022-05-13 | End: 2022-05-13

## 2022-05-13 RX ORDER — SODIUM CHLORIDE 9 MG/ML
1000 INJECTION, SOLUTION INTRAVENOUS
Refills: 0 | Status: DISCONTINUED | OUTPATIENT
Start: 2022-05-13 | End: 2022-05-14

## 2022-05-13 RX ADMIN — Medication 4 MILLIGRAM(S): at 12:01

## 2022-05-13 RX ADMIN — Medication 1 MILLIGRAM(S): at 14:50

## 2022-05-13 RX ADMIN — Medication 4 MILLIGRAM(S): at 21:23

## 2022-05-13 RX ADMIN — Medication 100 MILLIGRAM(S): at 12:01

## 2022-05-13 RX ADMIN — GABAPENTIN 600 MILLIGRAM(S): 400 CAPSULE ORAL at 22:39

## 2022-05-13 RX ADMIN — Medication 25 GRAM(S): at 05:07

## 2022-05-13 RX ADMIN — Medication 4 MILLIGRAM(S): at 17:17

## 2022-05-13 RX ADMIN — Medication 50 MILLIGRAM(S): at 01:01

## 2022-05-13 RX ADMIN — SODIUM CHLORIDE 100 MILLILITER(S): 9 INJECTION, SOLUTION INTRAVENOUS at 08:11

## 2022-05-13 RX ADMIN — Medication 50 MILLIGRAM(S): at 23:06

## 2022-05-13 RX ADMIN — Medication 60 MILLIGRAM(S): at 12:01

## 2022-05-13 RX ADMIN — Medication 1 PATCH: at 22:37

## 2022-05-13 RX ADMIN — Medication 1 MILLIGRAM(S): at 23:54

## 2022-05-13 RX ADMIN — PANTOPRAZOLE SODIUM 40 MILLIGRAM(S): 20 TABLET, DELAYED RELEASE ORAL at 06:17

## 2022-05-13 RX ADMIN — Medication 1 MILLIGRAM(S): at 12:01

## 2022-05-13 RX ADMIN — Medication 4 MILLIGRAM(S): at 08:57

## 2022-05-13 RX ADMIN — Medication 4 MILLIGRAM(S): at 05:08

## 2022-05-13 RX ADMIN — SODIUM CHLORIDE 100 MILLILITER(S): 9 INJECTION, SOLUTION INTRAVENOUS at 01:02

## 2022-05-13 NOTE — CHART NOTE - NSCHARTNOTEFT_GEN_A_CORE
MICU Transfer Note    Transfer from: MICU  Transfer to:  (  ) Medicine    (  ) Telemetry    (  ) RCU    (  ) Palliative    (  ) Stroke Unit    ( x ) _SDU______________  Accepting physican:  HPI:  38 yo M with PMH of depression (on fluoxetine), active alcohol abuse (1-2 bottles of vodka daily), and cocaine use (few times a week) presents to the hospital with sweats, anxiety, mild nausea, and tremors present for one day. Patient also endorsed one episode of chest discomfort in ed which resolved and lasted for a few minutes. Reports last drink and cocaine use yesterday. Has been drinking for a few years but denies history of alcohol withdrawal seizures or DTs. Patient also with suicidal ideation with no plan yesterday, however denies active suicidal or homicidal ideation on admission. He reports that he has been drinking more since covid19 started but had withdrawals prior to covid19 due to drinking alcohol. Has remote history of snorting xanax. Patient denies fever/ chills, cough, cp, dyspnea, abd pain, constipation, diarrhea, urinary frequency/urgency, melena, hematochezia. Denies visual, auditory or tactile hallucinations. Reports feeling hungry in emergency room. Endorses wants to inpatient rehab/detox when he is discharged. All ros negative except as above.     In Ed, received 30 mg of valium and NS Bolus 1L x 3.   CIWA score > 8 (~ 10) on admission      T(C): 36.8 , HR: 104, BP: 159/80 , RR: 20 , SpO2: 98% on room air   Labs: , Lactate 6.6, / , Alcohol level 141, lipase 146  EKG: no changes  CXR: wet read unremarkable- pending official read   (12 May 2022 22:12)      MICU COURSE:  PT placed on an ativan taper with prn. Catch team consulted, will see pt for possible rehab.         ASSESSMENT & PLAN:     Alcohol withdrawal,   Suspected cocaine withdrawal  Lactic acidosis  Thrombocytopenia  Transaminitis   Elevated lipase CTA negative   h/o alcohol abuse  h/o cocaine use   h/o xanax abuse       PLAN:    CNS:  CIWA Protocol. Thiamine, Folic Acid. CT head -ve. Addiction medicine Eval    HEENT: Oral care    PULMONARY: Supplemental O2 PRN - wean/titrate as tolerated. Target SpO2 92-96%. HOB @ 45 degrees. Aspiration precautions.     CARDIOVASCULAR: Goal directed Fluid Resuscitation  Avoid overload. CE x3. D5WNS 100.    GI: GI prophylaxis. CTA negative.  RUQ sono noted.  Hepatitis panel. HIV     RENAL: Follow up renal function and lytes. Correct as needed. Keep potassium >4 and magnesium >2. Check repeat lactate. Check Mg    INFECTIOUS DISEASE:  Monitor off abx.     HEMATOLOGICAL: DVT prophylaxis.  FU CBC. Monitor platelets     ENDOCRINE: Follow up fasting sugar. Insulin protocol if needed.      MUSCULOSKELETAL: As tolerated    DISPO: SDU     For Follow-Up:  - Addiction medicine   - lytes       Vital Signs Last 24 Hrs  T(C): 36.5 (13 May 2022 11:46), Max: 37.1 (12 May 2022 18:05)  T(F): 97.7 (13 May 2022 11:46), Max: 98.7 (12 May 2022 18:05)  HR: 100 (13 May 2022 11:46) (84 - 122)  BP: 140/89 (13 May 2022 11:46) (132/81 - 171/85)  BP(mean): --  RR: 18 (13 May 2022 11:46) (18 - 20)  SpO2: 98% (13 May 2022 11:46) (96% - 100%)  I&O's Summary    12 May 2022 07:01  -  13 May 2022 07:00  --------------------------------------------------------  IN: 500 mL / OUT: 1200 mL / NET: -700 mL          MEDICATIONS  (STANDING):  chlorhexidine 4% Liquid 1 Application(s) Topical <User Schedule>  dextrose 5% + sodium chloride 0.9%. 1000 milliLiter(s) (100 mL/Hr) IV Continuous <Continuous>  FLUoxetine 60 milliGRAM(s) Oral daily  folic acid 1 milliGRAM(s) Oral daily  gabapentin 600 milliGRAM(s) Oral at bedtime  LORazepam   Injectable   IV Push   LORazepam   Injectable 4 milliGRAM(s) IV Push every 4 hours  pantoprazole    Tablet 40 milliGRAM(s) Oral before breakfast  thiamine 100 milliGRAM(s) Oral daily    MEDICATIONS  (PRN):  LORazepam   Injectable 1 milliGRAM(s) IV Push every 1 hour PRN CIWA-Ar score 8 or greater  traZODone 50 milliGRAM(s) Oral at bedtime PRN SLEEP/ INSOMNIA        LABS                                            14.4                  Neurophils% (auto):   66.1   (05-13 @ 07:08):    4.95 )-----------(81           Lymphocytes% (auto):  17.2                                          42.9                   Eosinphils% (auto):   0.6      Manual%: Neutrophils x    ; Lymphocytes x    ; Eosinophils x    ; Bands%: x    ; Blasts x                                    139    |  98     |  7                   Calcium: 8.8   / iCa: x      (05-13 @ 07:08)    ----------------------------<  77        Magnesium: 2.4                              4.0     |  28     |  0.8              Phosphorous: x        TPro  6.7    /  Alb  4.3    /  TBili  0.9    /  DBili  x      /  AST  150    /  ALT  110    /  AlkPhos  60     13 May 2022 07:08    ( 05-12 @ 17:18 )   PT: 10.90 sec;   INR: 0.95 ratio  aPTT: 26.8 sec MICU Transfer Note    Transfer from: MICU  Transfer to:  (  ) Medicine    (  ) Telemetry    (  ) RCU    (  ) Palliative    (  ) Stroke Unit    ( x ) _SDU______________  Accepting physican:  HPI:  36 yo M with PMH of depression (on fluoxetine), active alcohol abuse (1-2 bottles of vodka daily), and cocaine use (few times a week) presents to the hospital with sweats, anxiety, mild nausea, and tremors present for one day. Patient also endorsed one episode of chest discomfort in ed which resolved and lasted for a few minutes. Reports last drink and cocaine use yesterday. Has been drinking for a few years but denies history of alcohol withdrawal seizures or DTs. Patient also with suicidal ideation with no plan yesterday, however denies active suicidal or homicidal ideation on admission. He reports that he has been drinking more since covid19 started but had withdrawals prior to covid19 due to drinking alcohol. Has remote history of snorting xanax. Patient denies fever/ chills, cough, cp, dyspnea, abd pain, constipation, diarrhea, urinary frequency/urgency, melena, hematochezia. Denies visual, auditory or tactile hallucinations. Reports feeling hungry in emergency room. Endorses wants to inpatient rehab/detox when he is discharged. All ros negative except as above.     In Ed, received 30 mg of valium and NS Bolus 1L x 3.   CIWA score > 8 (~ 10) on admission      T(C): 36.8 , HR: 104, BP: 159/80 , RR: 20 , SpO2: 98% on room air   Labs: , Lactate 6.6, / , Alcohol level 141, lipase 146  EKG: no changes  CXR: wet read unremarkable- pending official read   (12 May 2022 22:12)      MICU COURSE:  PT placed on an ativan taper with prn. Catch team consulted, will see pt for possible rehab. Pt is stable for downgrade to SDU.         ASSESSMENT & PLAN:     Alcohol withdrawal,   Suspected cocaine withdrawal  Lactic acidosis  Thrombocytopenia  Transaminitis   Elevated lipase CTA negative   h/o alcohol abuse  h/o cocaine use   h/o xanax abuse       PLAN:    CNS:  CIWA Protocol. Thiamine, Folic Acid. CT head -ve. Addiction medicine Eval    HEENT: Oral care    PULMONARY: Supplemental O2 PRN - wean/titrate as tolerated. Target SpO2 92-96%. HOB @ 45 degrees. Aspiration precautions.     CARDIOVASCULAR: Goal directed Fluid Resuscitation  Avoid overload. CE x3. D5WNS 100.    GI: GI prophylaxis. CTA negative.  RUQ sono noted.  Hepatitis panel. HIV     RENAL: Follow up renal function and lytes. Correct as needed. Keep potassium >4 and magnesium >2. Check repeat lactate. Check Mg    INFECTIOUS DISEASE:  Monitor off abx.     HEMATOLOGICAL: DVT prophylaxis.  FU CBC. Monitor platelets     ENDOCRINE: Follow up fasting sugar. Insulin protocol if needed.      MUSCULOSKELETAL: As tolerated    DISPO: SDU     For Follow-Up:  - Addiction medicine   - lytes       Vital Signs Last 24 Hrs  T(C): 36.5 (13 May 2022 11:46), Max: 37.1 (12 May 2022 18:05)  T(F): 97.7 (13 May 2022 11:46), Max: 98.7 (12 May 2022 18:05)  HR: 100 (13 May 2022 11:46) (84 - 122)  BP: 140/89 (13 May 2022 11:46) (132/81 - 171/85)  BP(mean): --  RR: 18 (13 May 2022 11:46) (18 - 20)  SpO2: 98% (13 May 2022 11:46) (96% - 100%)  I&O's Summary    12 May 2022 07:01  -  13 May 2022 07:00  --------------------------------------------------------  IN: 500 mL / OUT: 1200 mL / NET: -700 mL          MEDICATIONS  (STANDING):  chlorhexidine 4% Liquid 1 Application(s) Topical <User Schedule>  dextrose 5% + sodium chloride 0.9%. 1000 milliLiter(s) (100 mL/Hr) IV Continuous <Continuous>  FLUoxetine 60 milliGRAM(s) Oral daily  folic acid 1 milliGRAM(s) Oral daily  gabapentin 600 milliGRAM(s) Oral at bedtime  LORazepam   Injectable   IV Push   LORazepam   Injectable 4 milliGRAM(s) IV Push every 4 hours  pantoprazole    Tablet 40 milliGRAM(s) Oral before breakfast  thiamine 100 milliGRAM(s) Oral daily    MEDICATIONS  (PRN):  LORazepam   Injectable 1 milliGRAM(s) IV Push every 1 hour PRN CIWA-Ar score 8 or greater  traZODone 50 milliGRAM(s) Oral at bedtime PRN SLEEP/ INSOMNIA        LABS                                            14.4                  Neurophils% (auto):   66.1   (05-13 @ 07:08):    4.95 )-----------(81           Lymphocytes% (auto):  17.2                                          42.9                   Eosinphils% (auto):   0.6      Manual%: Neutrophils x    ; Lymphocytes x    ; Eosinophils x    ; Bands%: x    ; Blasts x                                    139    |  98     |  7                   Calcium: 8.8   / iCa: x      (05-13 @ 07:08)    ----------------------------<  77        Magnesium: 2.4                              4.0     |  28     |  0.8              Phosphorous: x        TPro  6.7    /  Alb  4.3    /  TBili  0.9    /  DBili  x      /  AST  150    /  ALT  110    /  AlkPhos  60     13 May 2022 07:08    ( 05-12 @ 17:18 )   PT: 10.90 sec;   INR: 0.95 ratio  aPTT: 26.8 sec

## 2022-05-13 NOTE — PATIENT PROFILE ADULT - FALL HARM RISK - HARM RISK INTERVENTIONS
Assistance with ambulation/Assistance OOB with selected safe patient handling equipment/Communicate Risk of Fall with Harm to all staff/Monitor for mental status changes/Monitor gait and stability/Reinforce activity limits and safety measures with patient and family/Tailored Fall Risk Interventions/Toileting schedule using arm’s reach rule for commode and bathroom/Use of alarms - bed, chair and/or voice tab/Visual Cue: Yellow wristband and red socks/Bed in lowest position, wheels locked, appropriate side rails in place/Call bell, personal items and telephone in reach/Instruct patient to call for assistance before getting out of bed or chair/Non-slip footwear when patient is out of bed/[place] to call system/Physically safe environment - no spills, clutter or unnecessary equipment/Purposeful Proactive Rounding/Room/bathroom lighting operational, light cord in reach

## 2022-05-13 NOTE — BH CONSULTATION LIAISON ASSESSMENT NOTE - NSICDXBHSECONDARYDX_PSY_ALL_CORE
Severe alcohol use disorder   F10.20  Cocaine use disorder   F14.10  Current episode of major depressive disorder without prior episode, unspecified depression episode severity   F32.9

## 2022-05-13 NOTE — CONSULT NOTE ADULT - SUBJECTIVE AND OBJECTIVE BOX
Patient is a 37y old  Male who presents with a chief complaint of alcohol/ ? cocaine withdrawal (12 May 2022 22:12)      HPI:  36 yo M with PMH of depression (on fluoxetine), active alcohol abuse (1-2 bottles of vodka daily), and cocaine use (few times a week) presents to the hospital with sweats, anxiety, mild nausea, and tremors present for one day. Patient also endorsed one episode of chest discomfort in ed which resolved and lasted for a few minutes. Reports last drink and cocaine use yesterday. Has been drinking for a few years but denies history of alcohol withdrawal seizures or DTs. Patient also with suicidal ideation with no plan yesterday, however denies active suicidal or homicidal ideation on admission. He reports that he has been drinking more since covid19 started but had withdrawals prior to covid19 due to drinking alcohol. Has remote history of snorting xanax. Patient denies fever/ chills, cough, cp, dyspnea, abd pain, constipation, diarrhea, urinary frequency/urgency, melena, hematochezia. Denies visual, auditory or tactile hallucinations. Reports feeling hungry in emergency room. Endorses wants to inpatient rehab/detox when he is discharged. All ros negative except as above.     In Ed, received 30 mg of valium and NS Bolus 1L x 3.   CIWA score > 8 (~ 10) on admission      T(C): 36.8 , HR: 104, BP: 159/80 , RR: 20 , SpO2: 98% on room air   Labs: , Lactate 6.6, / , Alcohol level 141, lipase 146  EKG: no changes  CXR: wet read unremarkable- pending official read   (12 May 2022 22:12)      PAST MEDICAL & SURGICAL HISTORY:  No pertinent past medical history  anxiety  depression  polysubstance      No significant past surgical history          SOCIAL HX:   Smoking +ve                          FAMILY HISTORY:  :  No known cardiovacular family hisotry     Review Of Systems:     All ROS are negative except per HPI       Allergies    Ceclor (Other)    Intolerances          PHYSICAL EXAM    ICU Vital Signs Last 24 Hrs  T(C): 36.6 (13 May 2022 07:23), Max: 37.1 (12 May 2022 18:05)  T(F): 97.9 (13 May 2022 07:23), Max: 98.7 (12 May 2022 18:05)  HR: 106 (13 May 2022 07:23) (103 - 122)  BP: 145/81 (13 May 2022 07:23) (141/84 - 171/85)-  RR: 18 (13 May 2022 07:23) (18 - 20)  SpO2: 100% (13 May 2022 07:23) (96% - 100%)      CONSTITUTIONAL:  Anxious     ENT:   Airway patent,   Mouth with normal mucosa.   No thrush      CARDIAC:   Tachycardiac      Vascular:   normal systolic impulse  no bruits    RESPIRATORY:   No wheezing  Bilateral BS   Not tachypneic,  No use of accessory muscles    GASTROINTESTINAL:  Abdomen soft,   Non-tender,   No guarding,   + BS      NEUROLOGICAL:   Alert and oriented   No motor deficits.    SKIN:   Skin normal color for race,   No evidence of rash.      HEME LYMPH: .  No cervical  lymphadenopathy.  No inguinal lymphadenopathy            05-12-22 @ 07:01  -  05-13-22 @ 07:00  --------------------------------------------------------  IN:    IV PiggyBack: 50 mL    Lactated Ringers: 450 mL  Total IN: 500 mL    OUT:  Total OUT: 0 mL    Total NET: 500 mL          LABS:                          15.1   4.67  )-----------( 121      ( 12 May 2022 19:26 )             43.9                                               05-12    132<L>  |  85<L>  |  5<L>  ----------------------------<  147<H>  3.6   |  19  |  0.8    Ca    9.2      12 May 2022 19:26  Mg     1.0     05-13    TPro  7.7  /  Alb  4.9  /  TBili  0.8  /  DBili  x   /  AST  217<H>  /  ALT  151<H>  /  AlkPhos  69  05-12      PT/INR - ( 12 May 2022 17:18 )   PT: 10.90 sec;   INR: 0.95 ratio         PTT - ( 12 May 2022 17:18 )  PTT:26.8 sec                                           CARDIAC MARKERS ( 13 May 2022 00:56 )  x     / <0.01 ng/mL / x     / x     / 3.6 ng/mL                                            LIVER FUNCTIONS - ( 12 May 2022 19:26 )  Alb: 4.9 g/dL / Pro: 7.7 g/dL / ALK PHOS: 69 U/L / ALT: 151 U/L / AST: 217 U/L / GGT: x                                                                                                                                          CXR interpreted by me No consolidation    MEDICATIONS  (STANDING):  chlorhexidine 4% Liquid 1 Application(s) Topical <User Schedule>  FLUoxetine 60 milliGRAM(s) Oral daily  folic acid 1 milliGRAM(s) Oral daily  gabapentin 600 milliGRAM(s) Oral at bedtime  lactated ringers. 1000 milliLiter(s) (50 mL/Hr) IV Continuous <Continuous>  LORazepam   Injectable   IV Push   LORazepam   Injectable 4 milliGRAM(s) IV Push every 4 hours  pantoprazole    Tablet 40 milliGRAM(s) Oral before breakfast  thiamine 100 milliGRAM(s) Oral daily    MEDICATIONS  (PRN):  traZODone 50 milliGRAM(s) Oral at bedtime PRN SLEEP/ INSOMNIA         Patient is a 37y old  Male who presents with a chief complaint of alcohol/ ? cocaine withdrawal (12 May 2022 22:12)      HPI:  36 yo M with PMH of depression (on fluoxetine), active alcohol abuse (1-2 bottles of vodka daily), and cocaine use (few times a week) presents to the hospital with sweats, anxiety, mild nausea, and tremors present for one day. Patient also endorsed one episode of chest discomfort in ed which resolved and lasted for a few minutes. Reports last drink and cocaine use yesterday. Has been drinking for a few years but denies history of alcohol withdrawal seizures or DTs. Patient also with suicidal ideation with no plan yesterday, however denies active suicidal or homicidal ideation on admission. He reports that he has been drinking more since covid19 started but had withdrawals prior to covid19 due to drinking alcohol. Has remote history of snorting xanax. Patient denies fever/ chills, cough, cp, dyspnea, abd pain, constipation, diarrhea, urinary frequency/urgency, melena, hematochezia. Denies visual, auditory or tactile hallucinations. Reports feeling hungry in emergency room. Endorses wants to inpatient rehab/detox when he is discharged. All ros negative except as above.     In Ed, received 30 mg of valium and NS Bolus 1L x 3.   CIWA score > 8 (~ 10) on admission      T(C): 36.8 , HR: 104, BP: 159/80 , RR: 20 , SpO2: 98% on room air   Labs: , Lactate 6.6, / , Alcohol level 141, lipase 146  EKG: no changes  CXR: wet read unremarkable- pending official read   (12 May 2022 22:12)      PAST MEDICAL & SURGICAL HISTORY:  No pertinent past medical history  anxiety  depression  polysubstance      No significant past surgical history          SOCIAL HX:   Smoking +ve                          FAMILY HISTORY:  :  No known cardiovacular family hisotry     Review Of Systems:     All ROS are negative except per HPI       Allergies    Ceclor (Other)    Intolerances          PHYSICAL EXAM    ICU Vital Signs Last 24 Hrs  T(C): 36.6 (13 May 2022 07:23), Max: 37.1 (12 May 2022 18:05)  T(F): 97.9 (13 May 2022 07:23), Max: 98.7 (12 May 2022 18:05)  HR: 106 (13 May 2022 07:23) (103 - 122)  BP: 145/81 (13 May 2022 07:23) (141/84 - 171/85)-  RR: 18 (13 May 2022 07:23) (18 - 20)  SpO2: 100% (13 May 2022 07:23) (96% - 100%)      CONSTITUTIONAL:  Anxious looking     ENT:   Airway patent,   Mouth with normal mucosa.   No thrush      CARDIAC:   Tachycardiac      Vascular:   normal systolic impulse  no bruits    RESPIRATORY:   No wheezing  Bilateral BS   Not tachypneic,  No use of accessory muscles    GASTROINTESTINAL:  Abdomen soft,   Non-tender,   No guarding,   + BS      NEUROLOGICAL:   Alert and oriented   No motor deficits.    SKIN:   Skin normal color for race,   No evidence of rash.            05-12-22 @ 07:01  -  05-13-22 @ 07:00  --------------------------------------------------------  IN:    IV PiggyBack: 50 mL    Lactated Ringers: 450 mL  Total IN: 500 mL    OUT:  Total OUT: 0 mL    Total NET: 500 mL          LABS:                          15.1   4.67  )-----------( 121      ( 12 May 2022 19:26 )             43.9                                               05-12    132<L>  |  85<L>  |  5<L>  ----------------------------<  147<H>  3.6   |  19  |  0.8    Ca    9.2      12 May 2022 19:26  Mg     1.0     05-13    TPro  7.7  /  Alb  4.9  /  TBili  0.8  /  DBili  x   /  AST  217<H>  /  ALT  151<H>  /  AlkPhos  69  05-12      PT/INR - ( 12 May 2022 17:18 )   PT: 10.90 sec;   INR: 0.95 ratio         PTT - ( 12 May 2022 17:18 )  PTT:26.8 sec                                           CARDIAC MARKERS ( 13 May 2022 00:56 )  x     / <0.01 ng/mL / x     / x     / 3.6 ng/mL                                            LIVER FUNCTIONS - ( 12 May 2022 19:26 )  Alb: 4.9 g/dL / Pro: 7.7 g/dL / ALK PHOS: 69 U/L / ALT: 151 U/L / AST: 217 U/L / GGT: x                                                                                                                                          CXR interpreted by me No consolidation    MEDICATIONS  (STANDING):  chlorhexidine 4% Liquid 1 Application(s) Topical <User Schedule>  FLUoxetine 60 milliGRAM(s) Oral daily  folic acid 1 milliGRAM(s) Oral daily  gabapentin 600 milliGRAM(s) Oral at bedtime  lactated ringers. 1000 milliLiter(s) (50 mL/Hr) IV Continuous <Continuous>  LORazepam   Injectable   IV Push   LORazepam   Injectable 4 milliGRAM(s) IV Push every 4 hours  pantoprazole    Tablet 40 milliGRAM(s) Oral before breakfast  thiamine 100 milliGRAM(s) Oral daily    MEDICATIONS  (PRN):  traZODone 50 milliGRAM(s) Oral at bedtime PRN SLEEP/ INSOMNIA

## 2022-05-13 NOTE — BH CONSULTATION LIAISON ASSESSMENT NOTE - NSBHCONSULTFOLLOWAFTERCARE_PSY_A_CORE FT
Patient will meet with CATCH team  for referral in the community for substance use.   Alternative referral to [facility] outpatient psychiatry located at [address] [place] [address], [id number], [phone] can be provided.

## 2022-05-13 NOTE — MEDICAL STUDENT PROGRESS NOTE(EDUCATION) - NS MD HP STUD ASPLAN PLAN FT
#Alcohol withdrawal  - Lorazepam taper (currently on 4mg IVP q4h)  - Lorazepam 1mg IVP q1h PRN for withdrawal symptoms or CIWA >8  - Admit to MICU    #Alcohol abuse  - CATCH team  - outpatient treatment facility d/t patient's work schedule and unavailable days off

## 2022-05-13 NOTE — BH CONSULTATION LIAISON ASSESSMENT NOTE - OTHER PAST PSYCHIATRIC HISTORY (INCLUDE DETAILS REGARDING ONSET, COURSE OF ILLNESS, INPATIENT/OUTPATIENT TREATMENT)
History of depression on fluoxetine, which patient finds effective     Home medications include:  - Fluoxetine 60 mg oral qd  - Trazodone 50 mg oral qd at bedtime  - Gabapentin 600mg oral qd

## 2022-05-13 NOTE — BH CONSULTATION LIAISON ASSESSMENT NOTE - NSCDBILL_PSY_A_CORE
47337 - Inpatient, moderate complexity Vermilion Border Text: The closure involved the vermilion border.

## 2022-05-13 NOTE — MEDICAL STUDENT PROGRESS NOTE(EDUCATION) - SUBJECTIVE AND OBJECTIVE BOX
CC:  Suicidal Ideation      HPI:    Mental Status Exam:    Vitals:    Labs:    EKG:    Past Psych History:    Family History:    PMHx:    Social Hx:   CC:  Suicidal Ideation    HPI:  Patient is a 37 y M domiciled at home with PMH of depression (on fluoxetine), active alcohol abuse (1-2 bottles of vodka daily), and cocaine use (few times a week) presents to the hospital with sweats, anxiety, mild nausea, and tremors present for one day. Patient also endorsed one episode of chest discomfort in ed which resolved and lasted for a few minutes. Reports last drink and cocaine use yesterday. Has been drinking for a few years but denies history of alcohol withdrawal seizures or DTs. Patient also with suicidal ideation with no plan yesterday, however denies active suicidal or homicidal ideation on admission. He reports that he has been drinking more since covid19 started but had withdrawals prior to covid19 due to drinking alcohol. Has remote history of snorting xanax. Patient denies fever/ chills, cough, cp, dyspnea, abd pain, constipation, diarrhea, urinary frequency/urgency, melena, hematochezia. Denies visual, auditory or tactile hallucinations. Reports feeling hungry in emergency room. Endorses wants to inpatient rehab/detox when he is discharged. All ros negative except as above. Psychiatry was consulted for suicidal ideation.    On approach patient was in the hallway sleeping on the stretcher shirtless. He was easily aroused, friendly, and pleasant to talk to. After introducing myself and explaining why I was there he immediately replied by saying "I said something stupid impulsively while I was drunk, I would never do anything." He explained that he came to the ED because he "couldn't take it anymore" and that he wanted to start the detox process. He said that he has had problems with drinking in the past and that he has been drinking a lot more than usual. He says he drinks daily now since Easter, about half to 1 liter of vodka a day, depending if he has all day to drink or not. When asked why he drinks, he says its something he does because he is "bored" and that he started to drink more because of stress. He says recently he has gotten into fights with his girlfriend that he lives with, specifically about financial issues and when asked to split certain bills. However, he claims they are financially stable. When asked what alcohol does for him he says it helps him "relax." He says if he doesn't drink he experiences withdrawal symptoms including anxiety, tremors, nausea, vomiting, and diarrhea but has never had withdrawal seizures or required hospitalization. He has a counselor at the [facility] on [facility] ([place] He says that they were working on finding detox/addiction facilities, but had issues with insurance. He said that she told him it would be easier if he just came to the hospital to start the detox process.     Upon depression screening he says his sleep is okay. He has a decreased interest in doing things he once enjoyed like going out with his friends. He admits to feeling guilty about the things he "did the night before" when drunk like arguing with his girlfriend. He explains that his energy is not good and is tired constantly. He has no trouble concentrating at work or at home. His appetite has always been poor and typically does not eat breakfast. He admits to feelings of tension in his neck and feet that feels like "cramping" which may be evidence of psychomotor agitation. He does not have any active suicidal or homicidal ideation. He is currently negative for the DSM-5 MDD screening.    His current CIWA is 13. The patient had moderate/severe tremors during the interview, unable to remove ear plugs from his ears. Ativan PRN will be added to the taper if exhibiting withdrawal symptoms.     Collateral information was collected by his girlfriend Sarah ([phone]). They live together and have been dating for 3 years. She explained that he has a history of alcohol abuse requiring AA and previous struggles with "pills" before they dated, but doesn't know more information. She said while together, the patient slowly started drinking more, getting especially worse during covid. They were both unemployed, drinking and eating alot, which caused them both to gain alot of weight. The patient gained 40 pounds during this time and lost most of the weight when going back to work as a . She explained that she has told the patient he needed help for his drinking problem since the help she had been providing wasn't enough. She said the patient hides his drinking from her, putting vodka in water bottles that he drinks in the house and hiding bottles of vodka in his drawers. She states that when he is sober he is nice and pleasant but when intoxicated he becomes "nasty, impulsive, argumentative, but never physical." She said that the patient has trouble doing chores like grocery shopping or keeping up with things in the house. She said "Its a burden for him to do anything." She said that on Wednesday 5/11, he was extremely intoxicated requiring to be physically fed by the girlfriend. He seemed very distressed and vaguely saying things that he is "over it and needs to stop drinking" and naming random things that he is upset about at home, about family, and at work. This is when he said that "he was going to just drive to the [employer] and jump." The next day the patient went to work and Facetimed the girlfriend around 11am while he was on break, looking pale and upset saying that he wanted to go home. She said he had someone cover for him at work and left home early. The girlfriend said the patient didn't want to go into work the past few weeks and missed one day of work this week also. When the girlfriend came home from work and saw the state of the patient tremulous, sweating, and anxious, she told him he has to go into the hospital. She says the patient's close work friend and her are the reason why the patient willingly came into the hospital yesterday 5/12. She states that the patient has a "weird" relationship with his parents and family. She said he didn't see his mother or send her flowers on mother's day after the girlfriend recommends that he should. He does not spend time with his family for holidays and when the girlfriend asked about it, he replied "We have your family to spend time with." She says the patient's parents spend time at his older sister's house "almost everyday" who has a  and child. Since living with the patient, the mother has visited the house only once. The father is described as thick headed and old school and told the patient "never to cry as a man." She said that the father was never around for the son as a child, working as a busy teacher and . The father told the girlfriend to "just drop him off at the hospital" or "let him just uber there." The girlfriend believes the parents are in denial of the severity of distress the patient is in.     Home medications include:  - Fluoxetine 20 mg oral  - Trazodone 50 mg oral  - Gabapentin 600mg oral    Mental Status Exam:  Patient was somnolent but easily aroused. He was oriented in time, place, and person. His speech was quiet but normal in rate, volume, quality, and quantity. His mood is described as "good." His affect seemed constricted. Thought process is linear and goal oriented. Thought content is appropriate and currently denies suicidal ideation. His perception is normal and denies auditory and visual hallucinations. His insight and judgement is fair considering the situation.     Vitals:  T(C): 36.5 (13 May 2022 11:46), Max: 37.1 (12 May 2022 18:05)  T(F): 97.7 (13 May 2022 11:46), Max: 98.7 (12 May 2022 18:05)  HR: 100 (13 May 2022 11:46) (84 - 122)  BP: 140/89 (13 May 2022 11:46) (132/81 - 171/85)  RR: 18 (13 May 2022 11:46) (18 - 20)  SpO2: 98% (13 May 2022 11:46) (96% - 100%)    Labs:                        14.4   4.95  )-----------( 81       ( 13 May 2022 07:08 )             42.9   05-13    139  |  98  |  7<L>  ----------------------------<  77  4.0   |  28  |  0.8    Ca    8.8      13 May 2022 07:08  Mg     2.4     05-13    TPro  6.7  /  Alb  4.3  /  TBili  0.9  /  DBili  x   /  AST  150<H>  /  ALT  110<H>  /  AlkPhos  60  05-13    EKG:  Diagnosis Line Normal sinus rhythm  Normal ECG   ms    Past Psych History:  - depression    Substance History:  - Alcohol  - Cocaine  - Xanax  - Ketamine     Family History:  - none    PMHx:  - none    Social Hx:  - lives at home with girlfriend 3 years  -   - Master's degree  - both parents alive  - one older sister CC:  Suicidal Ideation    HPI:  Patient is a 37 y M domiciled at home with PMH of depression (on fluoxetine), active alcohol abuse (1-2 bottles of vodka daily), and cocaine use (few times a week) presents to the hospital with sweats, anxiety, mild nausea, and tremors present for one day. Patient also endorsed one episode of chest discomfort in ed which resolved and lasted for a few minutes. Reports last drink and cocaine use yesterday. Has been drinking for a few years but denies history of alcohol withdrawal seizures or DTs. Patient also with suicidal ideation with no plan yesterday, however denies active suicidal or homicidal ideation on admission. He reports that he has been drinking more since covid19 started but had withdrawals prior to covid19 due to drinking alcohol. Has remote history of snorting xanax. Patient denies fever/ chills, cough, cp, dyspnea, abd pain, constipation, diarrhea, urinary frequency/urgency, melena, hematochezia. Denies visual, auditory or tactile hallucinations. Reports feeling hungry in emergency room. Endorses wants to inpatient rehab/detox when he is discharged. All ros negative except as above. Psychiatry was consulted for suicidal ideation.    On approach patient was in the hallway sleeping on the stretcher shirtless. He was easily aroused, friendly, and pleasant to talk to. After introducing myself and explaining why I was there he immediately replied by saying "I said something stupid impulsively while I was drunk, I would never do anything." He explained that he came to the ED because he "couldn't take it anymore" and that he wanted to start the detox process. He said that he has had problems with drinking in the past and that he has been drinking a lot more than usual. He says he drinks daily now since Easter, about half to 1 liter of vodka a day, depending if he has all day to drink or not. When asked why he drinks, he says its something he does because he is "bored" and that he started to drink more because of stress. He says recently he has gotten into fights with his girlfriend that he lives with, specifically about financial issues and when asked to split certain bills. However, he claims they are financially stable. When asked what alcohol does for him he says it helps him "relax." He says if he doesn't drink he experiences withdrawal symptoms including anxiety, tremors, nausea, vomiting, and diarrhea but has never had withdrawal seizures or required hospitalization. He has a counselor at the [facility] on [facility] ([place] He says that they were working on finding detox/addiction facilities, but had issues with insurance. He said that she told him it would be easier if he just came to the hospital to start the detox process.     Upon depression screening he says his sleep is okay. He has a decreased interest in doing things he once enjoyed like going out with his friends. He admits to feeling guilty about the things he "did the night before" when drunk like arguing with his girlfriend. He explains that his energy is not good and is tired constantly. He has no trouble concentrating at work or at home. His appetite has always been poor and typically does not eat breakfast. He admits to feelings of tension in his neck and feet that feels like "cramping" which may be evidence of psychomotor agitation. He does not have any active suicidal or homicidal ideation. He is currently negative for the DSM-5 MDD screening.    His current CIWA is 13. The patient had moderate/severe tremors during the interview, unable to remove ear plugs from his ears. Ativan PRN will be added to the taper if exhibiting withdrawal symptoms.     Collateral information was collected by his girlfriend Sarah ([phone]). They live together and have been dating for 3 years. She explained that he has a history of alcohol abuse requiring AA and previous struggles with "pills" before they dated, but doesn't know more information. She said while together, the patient slowly started drinking more, getting especially worse during covid. They were both unemployed, drinking and eating alot, which caused them both to gain alot of weight. The patient gained 40 pounds during this time and lost most of the weight when going back to work as a . She explained that she has told the patient he needed help for his drinking problem since the help she had been providing wasn't enough. She said the patient hides his drinking from her, putting vodka in water bottles that he drinks in the house and hiding bottles of vodka in his drawers. She states that when he is sober he is nice and pleasant but when intoxicated he becomes "nasty, impulsive, argumentative, but never physical." She said that the patient has trouble doing chores like grocery shopping or keeping up with things in the house. She said "Its a burden for him to do anything." She said that on Wednesday 5/11, he was extremely intoxicated requiring to be physically fed by the girlfriend. He seemed very distressed and vaguely saying things that he is "over it and needs to stop drinking" and naming random things that he is upset about at home, about family, and at work. This is when he said that "he was going to just drive to the [employer] and jump." The next day the patient went to work and Facetimed the girlfriend around 11am while he was on break, looking pale and upset saying that he wanted to go home. She said he had someone cover for him at work and left home early. The girlfriend said the patient didn't want to go into work the past few weeks and missed one day of work this week also. When the girlfriend came home from work and saw the state of the patient tremulous, sweating, and anxious, she told him he has to go into the hospital. She says the patient's close work friend and her are the reason why the patient willingly came into the hospital yesterday 5/12. She states that the patient has a "weird" relationship with his parents and family. She said he didn't see his mother or send her flowers on mother's day after the girlfriend recommends that he should. He does not spend time with his family for holidays and when the girlfriend asked about it, he replied "We have your family to spend time with." She says the patient's parents spend time at his older sister's house "almost everyday" who has a  and child. Since living with the patient, the mother has visited the house only once. The father is described as thick headed and old school and told the patient "never to cry as a man." She said that the father was never around for the son as a child, working as a busy teacher and . The father told the girlfriend to "just drop him off at the hospital" or "let him just uber there." The girlfriend believes the parents are in denial of the severity of distress the patient is in.     Home medications include:  - Fluoxetine 20 mg oral qd  - Trazodone 50 mg oral qd at bedtime  - Gabapentin 600mg oral qd    Mental Status Exam:  Patient was somnolent but easily aroused. He was oriented in time, place, and person. His speech was quiet but normal in rate, volume, quality, and quantity. His mood is described as "good." His affect seemed constricted. Thought process is linear and goal oriented. Thought content is appropriate and currently denies suicidal ideation. His perception is normal and denies auditory and visual hallucinations. His insight and judgement is fair considering the situation.     Vitals:  T(C): 36.5 (13 May 2022 11:46), Max: 37.1 (12 May 2022 18:05)  T(F): 97.7 (13 May 2022 11:46), Max: 98.7 (12 May 2022 18:05)  HR: 100 (13 May 2022 11:46) (84 - 122)  BP: 140/89 (13 May 2022 11:46) (132/81 - 171/85)  RR: 18 (13 May 2022 11:46) (18 - 20)  SpO2: 98% (13 May 2022 11:46) (96% - 100%)    Labs:                        14.4   4.95  )-----------( 81       ( 13 May 2022 07:08 )             42.9   05-13    139  |  98  |  7<L>  ----------------------------<  77  4.0   |  28  |  0.8    Ca    8.8      13 May 2022 07:08  Mg     2.4     05-13    TPro  6.7  /  Alb  4.3  /  TBili  0.9  /  DBili  x   /  AST  150<H>  /  ALT  110<H>  /  AlkPhos  60  05-13    EKG:  Diagnosis Line Normal sinus rhythm  Normal ECG   ms    Past Psych History:  - depression    Substance History:  - Alcohol  - Cocaine  - Xanax  - Ketamine     Family History:  - none    PMHx:  - none    Social Hx:  - lives at home with girlfriend 3 years  -   - Master's degree  - both parents alive  - one older sister

## 2022-05-13 NOTE — MEDICAL STUDENT PROGRESS NOTE(EDUCATION) - NS MD HP STUD ASPLAN ASSES FT
Patient is a 37 y M domiciled at home with PMH of depression (on fluoxetine), active alcohol abuse (1-2 bottles of vodka daily), and cocaine use (few times a week) and suicidal ideation presents to the hospital with sweats, anxiety, mild nausea, and tremors present for one day most likely due to alcohol withdrawal exacerbated by cocaine use and underlying mood disorder.

## 2022-05-13 NOTE — BH CONSULTATION LIAISON ASSESSMENT NOTE - SUMMARY
Patient is a 37 year old male domiciled at home, employed with no prior history of inpatient psychiatry admission, psychiatric history of  depression (on fluoxetine), active alcohol abuse (1-2 bottles of vodka daily), and cocaine use (few times a week), with no significant medical history, who presents to the hospital with sweats, anxiety, mild nausea, and tremors present for one day, psychiatry is consulted for report of suicidal ideation, blood alcohol 141 at 5:18 PM on 5/12/22.    On evaluation, patient is observed to be in moderate withdrawal, but able to have a full complete appropriate conversation and further clarifies he did not make a suicide statement with the intent to die, instead he was desperate for help due to underlying alcohol use. Upon inquiring his mood currently, patient stated his mood is not well, due to persistent drinking the past couple of months. Patient could not attribute any trigger for the increase in drinking and cocaine use other than "being bored." He is currently not suicidal and not exhibiting any homicidal ideation. There is no psychosis elicited. At this time this patient will benefit from continuation of ativan taper for underlying withdrawal symptoms. His presentation in the ED     Impression  Delirium due to alcohol intoxication with withdrawal  Alcohol use disorder, cocaine use disorder  Unspecified depressive disorder, r/o depressive disorder due to alcohol use.     Plan  Agree with continuation of ativan taper along with prn ativan for underlying withdrawal symptoms  -There is no indication for psychiatric 1:1  -Continue home dose Trazodone 50 mg oral qd at bedtime, Gabapentin 600mg oral qd: patient can be discharged on these medications.   Recommend to increase  Fluoxetine 60 mg oral qd to fluoxetine 80mg po every morning and patient can be discharged on that dose of medication.  -Upon medical clearance, there is no psychiatric contraindication to discharge this patient.  -CATCH team  will meet with patient for support and referral  in the community.

## 2022-05-13 NOTE — CONSULT NOTE ADULT - ASSESSMENT
IMPRESSION:  Alcohol withdrawal,   Suspected cocaine withdrawal  Lactic acidosis  Thrombocytopenia  Transaminitis   Elevated lipase   h/o alcohol abuse  h/o cocaine use   h/o xanax abuse       PLAN:    CNS:  CIWA Protocol. Thiamine, Folic Acid. CT head -ve. Addiction medicine Eval    HEENT: Oral care    PULMONARY: Supplemental O2 PRN - wean/titrate as tolerated. Target SpO2 92-96%. HOB @ 45 degrees. Aspiration precautions.     CARDIOVASCULAR: Goal directed Fluid Resuscitation  Avoid overload. CE x3. D5WNS 100.    GI: GI prophylaxis. NPO except medications. CT A/P , RUQ sono noted.  Hepatitis panel. HIV     RENAL: Follow up renal function and lytes. Correct as needed. Keep potassium >4 and magnesium >2. Check repeat lactate. Check Mg    INFECTIOUS DISEASE: Monitor vital signs. Follow up cultures. MRSA. Procal. Monitor off abx.     HEMATOLOGICAL: DVT prophylaxis with SCD. Monitor platelets     ENDOCRINE: Follow up fasting sugar. Insulin protocol if needed.      MUSCULOSKELETAL: As tolerated    DISPO: MICU   IMPRESSION:  Alcohol withdrawal,   Suspected cocaine withdrawal  Lactic acidosis  Thrombocytopenia  Transaminitis   Elevated lipase CTA negative   h/o alcohol abuse  h/o cocaine use   h/o xanax abuse       PLAN:    CNS:  CIWA Protocol. Thiamine, Folic Acid. CT head -ve. Addiction medicine Eval    HEENT: Oral care    PULMONARY: Supplemental O2 PRN - wean/titrate as tolerated. Target SpO2 92-96%. HOB @ 45 degrees. Aspiration precautions.     CARDIOVASCULAR: Goal directed Fluid Resuscitation  Avoid overload. CE x3. D5WNS 100.    GI: GI prophylaxis. CTA negative.  RUQ sono noted.  Hepatitis panel. HIV     RENAL: Follow up renal function and lytes. Correct as needed. Keep potassium >4 and magnesium >2. Check repeat lactate. Check Mg    INFECTIOUS DISEASE:  Monitor off abx.     HEMATOLOGICAL: DVT prophylaxis.  FU CBC. Monitor platelets     ENDOCRINE: Follow up fasting sugar. Insulin protocol if needed.      MUSCULOSKELETAL: As tolerated    DISPO: SDU

## 2022-05-13 NOTE — CONSULT NOTE ADULT - ATTENDING COMMENTS
IMPRESSION:  Alcohol withdrawal,   Suspected cocaine withdrawal  Lactic acidosis  Thrombocytopenia  Transaminitis   Elevated lipase CTA negative   h/o alcohol abuse  h/o cocaine use   h/o xanax abuse     Plan as outlined above

## 2022-05-13 NOTE — BH CONSULTATION LIAISON ASSESSMENT NOTE - HPI (INCLUDE ILLNESS QUALITY, SEVERITY, DURATION, TIMING, CONTEXT, MODIFYING FACTORS, ASSOCIATED SIGNS AND SYMPTOMS)
Patient was seen and evaluated with third year medical student Ran Panchal    Patient is a 37 y M domiciled at home, employed with no prior history of inpatient psychiatry admission, psychiatric history of  depression (on fluoxetine), active alcohol abuse (1-2 bottles of vodka daily), and cocaine use (few times a week), with no significant medical history, who presents to the hospital with sweats, anxiety, mild nausea, and tremors present for one day, psychiatry is consulted for report of suicidal ideation.      Patient also endorsed one episode of chest discomfort in ed which resolved and lasted for a few minutes. Reports last drink and cocaine use yesterday. Has been drinking for a few years but denies history of alcohol withdrawal seizures or DTs. Patient also with suicidal ideation with no plan yesterday, however denies active suicidal or homicidal ideation on admission. He reports that he has been drinking more since covid19 started but had withdrawals prior to covid19 due to drinking alcohol. Has remote history of snorting xanax. Patient denies fever/ chills, cough, cp, dyspnea, abd pain, constipation, diarrhea, urinary frequency/urgency, melena, hematochezia. Denies visual, auditory or tactile hallucinations. Reports feeling hungry in emergency room. Endorses wants to inpatient rehab/detox when he is discharged. All ros negative except as above.   On approach patient was in the hallway sleeping on the stretcher shirtless. He was easily aroused, friendly, and pleasant to talk to. After introducing myself and explaining why I was there he immediately replied by saying "I said something stupid impulsively while I was drunk, I would never do anything." He explained that he came to the ED because he "couldn't take it anymore" and that he wanted to start the detox process. He said that he has had problems with drinking in the past and that he has been drinking a lot more than usual. He says he drinks daily now since Easter, about half to 1 liter of vodka a day, depending if he has all day to drink or not. When asked why he drinks, he says its something he does because he is "bored" and that he started to drink more because of stress. He says recently he has gotten into fights with his girlfriend that he lives with, specifically about financial issues and when asked to split certain bills. However, he claims they are financially stable. When asked what alcohol does for him he says it helps him "relax." He says if he doesn't drink he experiences withdrawal symptoms including anxiety, tremors, nausea, vomiting, and diarrhea but has never had withdrawal seizures or required hospitalization. He has a counselor at the [facility] ([place] He says that they were working on finding detox/addiction facilities, but had issues with insurance. He said that she told him it would be easier if he just came to the hospital to start the detox process.     Upon depression screening he says his sleep is okay. He has a decreased interest in doing things he once enjoyed like going out with his friends. He admits to feeling guilty about the things he "did the night before" when drunk like arguing with his girlfriend. He explains that his energy is not good and is tired constantly. He has no trouble concentrating at work or at home. His appetite has always been poor and typically does not eat breakfast. He admits to feelings of tension in his neck and feet that feels like "cramping" which may be evidence of psychomotor agitation. He does not have any active suicidal or homicidal ideation. He is currently negative for the DSM-5 MDD screening.    His current CIWA is 13. The patient had moderate/severe tremors during the interview, unable to remove ear plugs from his ears. Ativan PRN will be added to the taper if exhibiting withdrawal symptoms.     Collateral information was collected by his girlfriend Sarah ([phone]). They live together and have been dating for 3 years. She explained that he has a history of alcohol abuse requiring AA and previous struggles with "pills" before they dated, but doesn't know more information. She said while together, the patient slowly started drinking more, getting especially worse during covid. They were both unemployed, drinking and eating alot, which caused them both to gain alot of weight. The patient gained 40 pounds during this time and lost most of the weight when going back to work as a . She explained that she has told the patient he needed help for his drinking problem since the help she had been providing wasn't enough. She said the patient hides his drinking from her, putting vodka in water bottles that he drinks in the house and hiding bottles of vodka in his drawers. She states that when he is sober he is nice and pleasant but when intoxicated he becomes "nasty, impulsive, argumentative, but never physical." She said that the patient has trouble doing chores like grocery shopping or keeping up with things in the house. She said "Its a burden for him to do anything." She said that on Wednesday 5/11, he was extremely intoxicated requiring to be physically fed by the girlfriend. He seemed very distressed and vaguely saying things that he is "over it and needs to stop drinking" and naming random things that he is upset about at home, about family, and at work. This is when he said that "he was going to just [facility] and [facility]." The next day the patient went to work and Facetimed the girlfriend around 11am while he was on break, looking pale and upset saying that he wanted to go home. She said he had someone cover for him at work and left home early. The girlfriend said the patient didn't want to go into work the past few weeks and missed one day of work this week also. When the girlfriend came home from work and saw the state of the patient tremulous, sweating, and anxious, she told him he has to go into the hospital. She says the patient's close work friend and her are the reason why the patient willingly came into the hospital yesterday 5/12. She states that the patient has a "weird" relationship with his parents and family. She said he didn't see his mother or send her flowers on mother's day after the girlfriend recommends that he should. He does not spend time with his family for holidays and when the girlfriend asked about it, he replied "We have your family to spend time with." She says the patient's parents spend time at his older sister's house "almost everyday" who has a  and child. Since living with the patient, the mother has visited the house only once. The father is described as thick headed and old school and told the patient "never to cry as a man." She said that the father was never around for the son as a child, working as a busy teacher and . The father told the girlfriend to "just drop him off at the hospital" or "let him just uber there." The girlfriend believes the parents are in denial of the severity of distress the patient is in.     Home medications include:  - Fluoxetine 60 mg oral qd  - Trazodone 50 mg oral qd at bedtime  - Gabapentin 600mg oral qd

## 2022-05-13 NOTE — SBIRT NOTE ADULT - NSSBIRTDRGNOACTINTDET_GEN_A_CORE
Pt is currently in active alcohol withdrawal and denies current use of drugs to this writer. Pt did admit to stoping opiate use 17 years ago

## 2022-05-13 NOTE — PATIENT PROFILE ADULT - HAVE YOU EXPERIENCED VIOLENCE OR A TRAUMATIC EVENT?
Your provider has ordered a diagnostic test (example: x-ray, ultrasound, CT scan, MRI scan).  If have not been called by Central Scheduling with 3 business days to schedule this test, please call Central Scheduling at [phone] to schedule.      
no

## 2022-05-14 LAB
FOLATE SERPL-MCNC: >20 NG/ML — SIGNIFICANT CHANGE UP
HAV IGM SER-ACNC: SIGNIFICANT CHANGE UP
HBV CORE IGM SER-ACNC: SIGNIFICANT CHANGE UP
HBV SURFACE AG SER-ACNC: SIGNIFICANT CHANGE UP
HCV AB S/CO SERPL IA: 0.1 S/CO — SIGNIFICANT CHANGE UP (ref 0–0.99)
HCV AB SERPL-IMP: SIGNIFICANT CHANGE UP
HIV 1+2 AB+HIV1 P24 AG SERPL QL IA: SIGNIFICANT CHANGE UP
VIT B12 SERPL-MCNC: 627 PG/ML — SIGNIFICANT CHANGE UP (ref 232–1245)

## 2022-05-14 PROCEDURE — 99232 SBSQ HOSP IP/OBS MODERATE 35: CPT

## 2022-05-14 PROCEDURE — 99233 SBSQ HOSP IP/OBS HIGH 50: CPT

## 2022-05-14 RX ORDER — FLUOXETINE HCL 10 MG
80 CAPSULE ORAL DAILY
Refills: 0 | Status: DISCONTINUED | OUTPATIENT
Start: 2022-05-15 | End: 2022-05-20

## 2022-05-14 RX ADMIN — Medication 60 MILLIGRAM(S): at 12:20

## 2022-05-14 RX ADMIN — Medication 50 MILLIGRAM(S): at 20:58

## 2022-05-14 RX ADMIN — Medication 100 MILLIGRAM(S): at 12:20

## 2022-05-14 RX ADMIN — Medication 3 MILLIGRAM(S): at 20:57

## 2022-05-14 RX ADMIN — CHLORHEXIDINE GLUCONATE 1 APPLICATION(S): 213 SOLUTION TOPICAL at 05:44

## 2022-05-14 RX ADMIN — PANTOPRAZOLE SODIUM 40 MILLIGRAM(S): 20 TABLET, DELAYED RELEASE ORAL at 06:25

## 2022-05-14 RX ADMIN — Medication 1 PATCH: at 07:00

## 2022-05-14 RX ADMIN — Medication 3 MILLIGRAM(S): at 05:44

## 2022-05-14 RX ADMIN — Medication 1 MILLIGRAM(S): at 14:59

## 2022-05-14 RX ADMIN — Medication 3 MILLIGRAM(S): at 17:06

## 2022-05-14 RX ADMIN — Medication 1 PATCH: at 12:20

## 2022-05-14 RX ADMIN — Medication 4 MILLIGRAM(S): at 01:33

## 2022-05-14 RX ADMIN — Medication 3 MILLIGRAM(S): at 08:27

## 2022-05-14 RX ADMIN — GABAPENTIN 600 MILLIGRAM(S): 400 CAPSULE ORAL at 23:35

## 2022-05-14 RX ADMIN — Medication 3 MILLIGRAM(S): at 12:19

## 2022-05-14 RX ADMIN — Medication 1 MILLIGRAM(S): at 12:20

## 2022-05-15 LAB
ALBUMIN SERPL ELPH-MCNC: 4.5 G/DL — SIGNIFICANT CHANGE UP (ref 3.5–5.2)
ALP SERPL-CCNC: 60 U/L — SIGNIFICANT CHANGE UP (ref 30–115)
ALT FLD-CCNC: 95 U/L — HIGH (ref 0–41)
ANION GAP SERPL CALC-SCNC: 13 MMOL/L — SIGNIFICANT CHANGE UP (ref 7–14)
AST SERPL-CCNC: 100 U/L — HIGH (ref 0–41)
BASOPHILS # BLD AUTO: 0.09 K/UL — SIGNIFICANT CHANGE UP (ref 0–0.2)
BASOPHILS NFR BLD AUTO: 1.8 % — HIGH (ref 0–1)
BILIRUB SERPL-MCNC: 0.8 MG/DL — SIGNIFICANT CHANGE UP (ref 0.2–1.2)
BUN SERPL-MCNC: 8 MG/DL — LOW (ref 10–20)
CALCIUM SERPL-MCNC: 9.2 MG/DL — SIGNIFICANT CHANGE UP (ref 8.5–10.1)
CHLORIDE SERPL-SCNC: 102 MMOL/L — SIGNIFICANT CHANGE UP (ref 98–110)
CO2 SERPL-SCNC: 25 MMOL/L — SIGNIFICANT CHANGE UP (ref 17–32)
CREAT SERPL-MCNC: 0.9 MG/DL — SIGNIFICANT CHANGE UP (ref 0.7–1.5)
EGFR: 114 ML/MIN/1.73M2 — SIGNIFICANT CHANGE UP
EOSINOPHIL # BLD AUTO: 0.17 K/UL — SIGNIFICANT CHANGE UP (ref 0–0.7)
EOSINOPHIL NFR BLD AUTO: 3.4 % — SIGNIFICANT CHANGE UP (ref 0–8)
GLUCOSE SERPL-MCNC: 84 MG/DL — SIGNIFICANT CHANGE UP (ref 70–99)
HCT VFR BLD CALC: 42.9 % — SIGNIFICANT CHANGE UP (ref 42–52)
HGB BLD-MCNC: 14.4 G/DL — SIGNIFICANT CHANGE UP (ref 14–18)
IMM GRANULOCYTES NFR BLD AUTO: 0.4 % — HIGH (ref 0.1–0.3)
LYMPHOCYTES # BLD AUTO: 0.79 K/UL — LOW (ref 1.2–3.4)
LYMPHOCYTES # BLD AUTO: 15.6 % — LOW (ref 20.5–51.1)
MAGNESIUM SERPL-MCNC: 1.7 MG/DL — LOW (ref 1.8–2.4)
MCHC RBC-ENTMCNC: 30.4 PG — SIGNIFICANT CHANGE UP (ref 27–31)
MCHC RBC-ENTMCNC: 33.6 G/DL — SIGNIFICANT CHANGE UP (ref 32–37)
MCV RBC AUTO: 90.5 FL — SIGNIFICANT CHANGE UP (ref 80–94)
MONOCYTES # BLD AUTO: 0.66 K/UL — HIGH (ref 0.1–0.6)
MONOCYTES NFR BLD AUTO: 13 % — HIGH (ref 1.7–9.3)
NEUTROPHILS # BLD AUTO: 3.33 K/UL — SIGNIFICANT CHANGE UP (ref 1.4–6.5)
NEUTROPHILS NFR BLD AUTO: 65.8 % — SIGNIFICANT CHANGE UP (ref 42.2–75.2)
NRBC # BLD: 0 /100 WBCS — SIGNIFICANT CHANGE UP (ref 0–0)
PLATELET # BLD AUTO: 79 K/UL — LOW (ref 130–400)
POTASSIUM SERPL-MCNC: 4.3 MMOL/L — SIGNIFICANT CHANGE UP (ref 3.5–5)
POTASSIUM SERPL-SCNC: 4.3 MMOL/L — SIGNIFICANT CHANGE UP (ref 3.5–5)
PROT SERPL-MCNC: 6.8 G/DL — SIGNIFICANT CHANGE UP (ref 6–8)
RBC # BLD: 4.74 M/UL — SIGNIFICANT CHANGE UP (ref 4.7–6.1)
RBC # FLD: 14.2 % — SIGNIFICANT CHANGE UP (ref 11.5–14.5)
SODIUM SERPL-SCNC: 140 MMOL/L — SIGNIFICANT CHANGE UP (ref 135–146)
WBC # BLD: 5.06 K/UL — SIGNIFICANT CHANGE UP (ref 4.8–10.8)
WBC # FLD AUTO: 5.06 K/UL — SIGNIFICANT CHANGE UP (ref 4.8–10.8)

## 2022-05-15 PROCEDURE — 99232 SBSQ HOSP IP/OBS MODERATE 35: CPT

## 2022-05-15 RX ORDER — MAGNESIUM OXIDE 400 MG ORAL TABLET 241.3 MG
400 TABLET ORAL
Refills: 0 | Status: DISCONTINUED | OUTPATIENT
Start: 2022-05-15 | End: 2022-05-20

## 2022-05-15 RX ADMIN — Medication 80 MILLIGRAM(S): at 12:31

## 2022-05-15 RX ADMIN — Medication 3 MILLIGRAM(S): at 00:59

## 2022-05-15 RX ADMIN — CHLORHEXIDINE GLUCONATE 1 APPLICATION(S): 213 SOLUTION TOPICAL at 05:54

## 2022-05-15 RX ADMIN — Medication 1 MILLIGRAM(S): at 15:06

## 2022-05-15 RX ADMIN — MAGNESIUM OXIDE 400 MG ORAL TABLET 400 MILLIGRAM(S): 241.3 TABLET ORAL at 12:30

## 2022-05-15 RX ADMIN — Medication 50 MILLIGRAM(S): at 23:13

## 2022-05-15 RX ADMIN — Medication 100 MILLIGRAM(S): at 12:30

## 2022-05-15 RX ADMIN — GABAPENTIN 600 MILLIGRAM(S): 400 CAPSULE ORAL at 21:09

## 2022-05-15 RX ADMIN — Medication 2 MILLIGRAM(S): at 08:49

## 2022-05-15 RX ADMIN — MAGNESIUM OXIDE 400 MG ORAL TABLET 400 MILLIGRAM(S): 241.3 TABLET ORAL at 17:30

## 2022-05-15 RX ADMIN — Medication 1 PATCH: at 08:09

## 2022-05-15 RX ADMIN — Medication 2 MILLIGRAM(S): at 05:51

## 2022-05-15 RX ADMIN — Medication 1 MILLIGRAM(S): at 12:31

## 2022-05-15 RX ADMIN — Medication 2 MILLIGRAM(S): at 12:30

## 2022-05-15 RX ADMIN — Medication 1 PATCH: at 12:32

## 2022-05-15 RX ADMIN — PANTOPRAZOLE SODIUM 40 MILLIGRAM(S): 20 TABLET, DELAYED RELEASE ORAL at 07:00

## 2022-05-15 RX ADMIN — Medication 2 MILLIGRAM(S): at 21:07

## 2022-05-15 RX ADMIN — Medication 1 PATCH: at 13:27

## 2022-05-15 RX ADMIN — Medication 2 MILLIGRAM(S): at 17:29

## 2022-05-15 NOTE — PROGRESS NOTE ADULT - ASSESSMENT
ASSESSMENT & PLAN:     Alcohol withdrawal,   Suspected cocaine withdrawal  Lactic acidosis  Thrombocytopenia  Transaminitis   Elevated lipase CTA negative   h/o alcohol abuse  h/o cocaine use   h/o xanax abuse       PLAN:    CNS:  CIWA Protocol. Thiamine, Folic Acid. CT head -ve. Addiction medicine Eval    HEENT: Oral care    PULMONARY: Supplemental O2 PRN - wean/titrate as tolerated. Target SpO2 92-96%. HOB @ 45 degrees. Aspiration precautions.     CARDIOVASCULAR: Goal directed Fluid Resuscitation      GI: GI prophylaxis. CTA negative.  RUQ sono noted.  Hepatitis panel. HIV negative     RENAL: Follow up renal function and lytes. Correct as needed. Keep potassium >4 and magnesium >2.repeat lactate WNL. mag 2.4     INFECTIOUS DISEASE:  Monitor off abx.     HEMATOLOGICAL: DVT prophylaxis.  FU CBC. Monitor platelets     ENDOCRINE: Follow up fasting sugar. Insulin protocol if needed.      MUSCULOSKELETAL: As tolerated    Psych:  increased duloxetine dose as per psych     
ASSESSMENT & PLAN:     Alcohol withdrawal,   Suspected cocaine withdrawal  Lactic acidosis  Thrombocytopenia  Transaminitis   Elevated lipase CTA negative   h/o alcohol abuse  h/o cocaine use   h/o xanax abuse       PLAN:    CNS:  CIWA Protocol. Thiamine, Folic Acid. CT head -ve. Addiction medicine Eval    HEENT: Oral care    PULMONARY: Supplemental O2 PRN - wean/titrate as tolerated. Target SpO2 92-96%. HOB @ 45 degrees. Aspiration precautions.     CARDIOVASCULAR: Goal directed Fluid Resuscitation      GI: GI prophylaxis. CTA negative.  RUQ sono noted.  Hepatitis panel. HIV negative     RENAL: Follow up renal function and lytes. Correct as needed. Keep potassium >4 and magnesium >2.repeat lactate WNL. mag 2.4     INFECTIOUS DISEASE:  Monitor off abx.     HEMATOLOGICAL: DVT prophylaxis.  FU CBC. Monitor platelets     ENDOCRINE: Follow up fasting sugar. Insulin protocol if needed.      MUSCULOSKELETAL: As tolerated    Psych:  increased duloxetine dose as per psych       Continue current management. Patient is interested in going to rehab directly rom hospital. will discuss with case managment/.  
IMPRESSION:  Alcohol withdrawal,   Suspected cocaine withdrawal  Lactic acidosis resolved   Thrombocytopenia  Elevated lipase CTA negative   h/o alcohol abuse  h/o cocaine use   h/o xanax abuse       PLAN:    CNS:  CIWA Protocol. Thiamine, Folic Acid. CT head -ve.  Psych eval appreciated     HEENT: Oral care    PULMONARY:  Target SpO2 92-96%. HOB @ 45 degrees. Aspiration precautions.     CARDIOVASCULAR: DC IVF     GI: GI prophylaxis. CTA negative.  Feeding.  Hepatitis panel noted. GI FU as OP       RENAL: Follow up renal function and lytes. Correct as needed. Keep potassium >4 and magnesium >2.     INFECTIOUS DISEASE:  Monitor off abx.     HEMATOLOGICAL: DVT prophylaxis.  FU CBC. Monitor platelets.  HIT.  DIC>  Hem onc eval     ENDOCRINE: Follow up fasting sugar. Insulin protocol if needed.      MUSCULOSKELETAL: As tolerated    DISPO: Downgrade to floor

## 2022-05-16 LAB
ALBUMIN SERPL ELPH-MCNC: 4.5 G/DL — SIGNIFICANT CHANGE UP (ref 3.5–5.2)
ALP SERPL-CCNC: 59 U/L — SIGNIFICANT CHANGE UP (ref 30–115)
ALT FLD-CCNC: 84 U/L — HIGH (ref 0–41)
ANION GAP SERPL CALC-SCNC: 13 MMOL/L — SIGNIFICANT CHANGE UP (ref 7–14)
AST SERPL-CCNC: 75 U/L — HIGH (ref 0–41)
BASOPHILS # BLD AUTO: 0.1 K/UL — SIGNIFICANT CHANGE UP (ref 0–0.2)
BASOPHILS NFR BLD AUTO: 2 % — HIGH (ref 0–1)
BILIRUB SERPL-MCNC: 0.6 MG/DL — SIGNIFICANT CHANGE UP (ref 0.2–1.2)
BUN SERPL-MCNC: 11 MG/DL — SIGNIFICANT CHANGE UP (ref 10–20)
CALCIUM SERPL-MCNC: 9.3 MG/DL — SIGNIFICANT CHANGE UP (ref 8.5–10.1)
CHLORIDE SERPL-SCNC: 100 MMOL/L — SIGNIFICANT CHANGE UP (ref 98–110)
CO2 SERPL-SCNC: 26 MMOL/L — SIGNIFICANT CHANGE UP (ref 17–32)
CREAT SERPL-MCNC: 0.9 MG/DL — SIGNIFICANT CHANGE UP (ref 0.7–1.5)
EGFR: 114 ML/MIN/1.73M2 — SIGNIFICANT CHANGE UP
EOSINOPHIL # BLD AUTO: 0.19 K/UL — SIGNIFICANT CHANGE UP (ref 0–0.7)
EOSINOPHIL NFR BLD AUTO: 3.9 % — SIGNIFICANT CHANGE UP (ref 0–8)
GLUCOSE SERPL-MCNC: 104 MG/DL — HIGH (ref 70–99)
HCT VFR BLD CALC: 41.7 % — LOW (ref 42–52)
HGB BLD-MCNC: 14.4 G/DL — SIGNIFICANT CHANGE UP (ref 14–18)
IMM GRANULOCYTES NFR BLD AUTO: 0.4 % — HIGH (ref 0.1–0.3)
LYMPHOCYTES # BLD AUTO: 0.99 K/UL — LOW (ref 1.2–3.4)
LYMPHOCYTES # BLD AUTO: 20.2 % — LOW (ref 20.5–51.1)
MAGNESIUM SERPL-MCNC: 1.9 MG/DL — SIGNIFICANT CHANGE UP (ref 1.8–2.4)
MCHC RBC-ENTMCNC: 31 PG — SIGNIFICANT CHANGE UP (ref 27–31)
MCHC RBC-ENTMCNC: 34.5 G/DL — SIGNIFICANT CHANGE UP (ref 32–37)
MCV RBC AUTO: 89.9 FL — SIGNIFICANT CHANGE UP (ref 80–94)
MONOCYTES # BLD AUTO: 0.65 K/UL — HIGH (ref 0.1–0.6)
MONOCYTES NFR BLD AUTO: 13.3 % — HIGH (ref 1.7–9.3)
NEUTROPHILS # BLD AUTO: 2.94 K/UL — SIGNIFICANT CHANGE UP (ref 1.4–6.5)
NEUTROPHILS NFR BLD AUTO: 60.2 % — SIGNIFICANT CHANGE UP (ref 42.2–75.2)
NRBC # BLD: 0 /100 WBCS — SIGNIFICANT CHANGE UP (ref 0–0)
PLATELET # BLD AUTO: 103 K/UL — LOW (ref 130–400)
POTASSIUM SERPL-MCNC: 4 MMOL/L — SIGNIFICANT CHANGE UP (ref 3.5–5)
POTASSIUM SERPL-SCNC: 4 MMOL/L — SIGNIFICANT CHANGE UP (ref 3.5–5)
PROT SERPL-MCNC: 6.8 G/DL — SIGNIFICANT CHANGE UP (ref 6–8)
RBC # BLD: 4.64 M/UL — LOW (ref 4.7–6.1)
RBC # FLD: 14.3 % — SIGNIFICANT CHANGE UP (ref 11.5–14.5)
SODIUM SERPL-SCNC: 139 MMOL/L — SIGNIFICANT CHANGE UP (ref 135–146)
WBC # BLD: 4.89 K/UL — SIGNIFICANT CHANGE UP (ref 4.8–10.8)
WBC # FLD AUTO: 4.89 K/UL — SIGNIFICANT CHANGE UP (ref 4.8–10.8)

## 2022-05-16 PROCEDURE — 99232 SBSQ HOSP IP/OBS MODERATE 35: CPT

## 2022-05-16 RX ADMIN — Medication 1 MILLIGRAM(S): at 21:01

## 2022-05-16 RX ADMIN — Medication 1 MILLIGRAM(S): at 11:25

## 2022-05-16 RX ADMIN — CHLORHEXIDINE GLUCONATE 1 APPLICATION(S): 213 SOLUTION TOPICAL at 05:06

## 2022-05-16 RX ADMIN — Medication 1 PATCH: at 19:39

## 2022-05-16 RX ADMIN — Medication 1 PATCH: at 11:31

## 2022-05-16 RX ADMIN — GABAPENTIN 600 MILLIGRAM(S): 400 CAPSULE ORAL at 21:01

## 2022-05-16 RX ADMIN — MAGNESIUM OXIDE 400 MG ORAL TABLET 400 MILLIGRAM(S): 241.3 TABLET ORAL at 11:36

## 2022-05-16 RX ADMIN — MAGNESIUM OXIDE 400 MG ORAL TABLET 400 MILLIGRAM(S): 241.3 TABLET ORAL at 09:13

## 2022-05-16 RX ADMIN — Medication 50 MILLIGRAM(S): at 22:42

## 2022-05-16 RX ADMIN — Medication 1 PATCH: at 11:26

## 2022-05-16 RX ADMIN — Medication 1 MILLIGRAM(S): at 09:32

## 2022-05-16 RX ADMIN — PANTOPRAZOLE SODIUM 40 MILLIGRAM(S): 20 TABLET, DELAYED RELEASE ORAL at 06:12

## 2022-05-16 RX ADMIN — Medication 80 MILLIGRAM(S): at 11:24

## 2022-05-16 RX ADMIN — Medication 1 MILLIGRAM(S): at 17:05

## 2022-05-16 RX ADMIN — Medication 1 MILLIGRAM(S): at 13:13

## 2022-05-16 RX ADMIN — Medication 1 MILLIGRAM(S): at 05:06

## 2022-05-16 RX ADMIN — Medication 100 MILLIGRAM(S): at 11:25

## 2022-05-16 RX ADMIN — Medication 1 MILLIGRAM(S): at 10:26

## 2022-05-16 RX ADMIN — Medication 1 PATCH: at 07:40

## 2022-05-16 RX ADMIN — MAGNESIUM OXIDE 400 MG ORAL TABLET 400 MILLIGRAM(S): 241.3 TABLET ORAL at 17:05

## 2022-05-16 RX ADMIN — Medication 2 MILLIGRAM(S): at 01:06

## 2022-05-16 NOTE — PROGRESS NOTE ADULT - ATTENDING COMMENTS
Patient seen at bedside. Discussed with medical team that includes resident(s), medical student(s), nursing staff, case management.   Patient with alcohol withdrawal. posive hand tremors. on lorazepam taper.   Continue current management. Patient is interested in going to rehab directly rom hospital. will discuss with case management/. Patient seen at bedside. Discussed with medical team that includes resident(s), medical student(s), nursing staff, case management.   Patient with alcohol withdrawal. positive hand tremors. on lorazepam taper.   Continue current management. Patient is interested in going to rehab directly rom hospital. was per CM. pt. is now requesting a referral to [facility] outpatient chemical dependency treatment program.   Likely discharge in 48 hours

## 2022-05-17 LAB
ALBUMIN SERPL ELPH-MCNC: 4.2 G/DL — SIGNIFICANT CHANGE UP (ref 3.5–5.2)
ALP SERPL-CCNC: 55 U/L — SIGNIFICANT CHANGE UP (ref 30–115)
ALT FLD-CCNC: 73 U/L — HIGH (ref 0–41)
ANION GAP SERPL CALC-SCNC: 17 MMOL/L — HIGH (ref 7–14)
AST SERPL-CCNC: 63 U/L — HIGH (ref 0–41)
BASOPHILS # BLD AUTO: 0.12 K/UL — SIGNIFICANT CHANGE UP (ref 0–0.2)
BASOPHILS NFR BLD AUTO: 1.8 % — HIGH (ref 0–1)
BILIRUB SERPL-MCNC: 0.4 MG/DL — SIGNIFICANT CHANGE UP (ref 0.2–1.2)
BUN SERPL-MCNC: 12 MG/DL — SIGNIFICANT CHANGE UP (ref 10–20)
CALCIUM SERPL-MCNC: 9.4 MG/DL — SIGNIFICANT CHANGE UP (ref 8.5–10.1)
CHLORIDE SERPL-SCNC: 102 MMOL/L — SIGNIFICANT CHANGE UP (ref 98–110)
CO2 SERPL-SCNC: 19 MMOL/L — SIGNIFICANT CHANGE UP (ref 17–32)
CREAT SERPL-MCNC: 0.7 MG/DL — SIGNIFICANT CHANGE UP (ref 0.7–1.5)
EGFR: 122 ML/MIN/1.73M2 — SIGNIFICANT CHANGE UP
EOSINOPHIL # BLD AUTO: 0.17 K/UL — SIGNIFICANT CHANGE UP (ref 0–0.7)
EOSINOPHIL NFR BLD AUTO: 2.6 % — SIGNIFICANT CHANGE UP (ref 0–8)
GLUCOSE SERPL-MCNC: 118 MG/DL — HIGH (ref 70–99)
HCT VFR BLD CALC: 40.5 % — LOW (ref 42–52)
HGB BLD-MCNC: 13.7 G/DL — LOW (ref 14–18)
IMM GRANULOCYTES NFR BLD AUTO: 0.5 % — HIGH (ref 0.1–0.3)
LYMPHOCYTES # BLD AUTO: 1.01 K/UL — LOW (ref 1.2–3.4)
LYMPHOCYTES # BLD AUTO: 15.3 % — LOW (ref 20.5–51.1)
MAGNESIUM SERPL-MCNC: 1.8 MG/DL — SIGNIFICANT CHANGE UP (ref 1.8–2.4)
MCHC RBC-ENTMCNC: 30.4 PG — SIGNIFICANT CHANGE UP (ref 27–31)
MCHC RBC-ENTMCNC: 33.8 G/DL — SIGNIFICANT CHANGE UP (ref 32–37)
MCV RBC AUTO: 90 FL — SIGNIFICANT CHANGE UP (ref 80–94)
MONOCYTES # BLD AUTO: 1.08 K/UL — HIGH (ref 0.1–0.6)
MONOCYTES NFR BLD AUTO: 16.3 % — HIGH (ref 1.7–9.3)
NEUTROPHILS # BLD AUTO: 4.21 K/UL — SIGNIFICANT CHANGE UP (ref 1.4–6.5)
NEUTROPHILS NFR BLD AUTO: 63.5 % — SIGNIFICANT CHANGE UP (ref 42.2–75.2)
NRBC # BLD: 0 /100 WBCS — SIGNIFICANT CHANGE UP (ref 0–0)
PLATELET # BLD AUTO: 124 K/UL — LOW (ref 130–400)
POTASSIUM SERPL-MCNC: 4.3 MMOL/L — SIGNIFICANT CHANGE UP (ref 3.5–5)
POTASSIUM SERPL-SCNC: 4.3 MMOL/L — SIGNIFICANT CHANGE UP (ref 3.5–5)
PROT SERPL-MCNC: 6.5 G/DL — SIGNIFICANT CHANGE UP (ref 6–8)
RBC # BLD: 4.5 M/UL — LOW (ref 4.7–6.1)
RBC # FLD: 13.8 % — SIGNIFICANT CHANGE UP (ref 11.5–14.5)
SODIUM SERPL-SCNC: 138 MMOL/L — SIGNIFICANT CHANGE UP (ref 135–146)
WBC # BLD: 6.62 K/UL — SIGNIFICANT CHANGE UP (ref 4.8–10.8)
WBC # FLD AUTO: 6.62 K/UL — SIGNIFICANT CHANGE UP (ref 4.8–10.8)

## 2022-05-17 PROCEDURE — 99233 SBSQ HOSP IP/OBS HIGH 50: CPT

## 2022-05-17 RX ADMIN — Medication 1 MILLIGRAM(S): at 10:18

## 2022-05-17 RX ADMIN — MAGNESIUM OXIDE 400 MG ORAL TABLET 400 MILLIGRAM(S): 241.3 TABLET ORAL at 17:45

## 2022-05-17 RX ADMIN — MAGNESIUM OXIDE 400 MG ORAL TABLET 400 MILLIGRAM(S): 241.3 TABLET ORAL at 08:25

## 2022-05-17 RX ADMIN — Medication 100 MILLIGRAM(S): at 12:42

## 2022-05-17 RX ADMIN — Medication 1 MILLIGRAM(S): at 19:10

## 2022-05-17 RX ADMIN — PANTOPRAZOLE SODIUM 40 MILLIGRAM(S): 20 TABLET, DELAYED RELEASE ORAL at 05:19

## 2022-05-17 RX ADMIN — Medication 1 PATCH: at 12:41

## 2022-05-17 RX ADMIN — Medication 1 MILLIGRAM(S): at 15:38

## 2022-05-17 RX ADMIN — Medication 1 MILLIGRAM(S): at 00:55

## 2022-05-17 RX ADMIN — Medication 1 MILLIGRAM(S): at 13:33

## 2022-05-17 RX ADMIN — Medication 5 MILLIGRAM(S): at 10:14

## 2022-05-17 RX ADMIN — GABAPENTIN 600 MILLIGRAM(S): 400 CAPSULE ORAL at 21:45

## 2022-05-17 RX ADMIN — Medication 1 MILLIGRAM(S): at 17:47

## 2022-05-17 RX ADMIN — Medication 5 MILLIGRAM(S): at 11:11

## 2022-05-17 RX ADMIN — Medication 50 MILLIGRAM(S): at 22:59

## 2022-05-17 RX ADMIN — Medication 10 MILLIGRAM(S): at 22:59

## 2022-05-17 RX ADMIN — Medication 1 MILLIGRAM(S): at 08:31

## 2022-05-17 RX ADMIN — Medication 1 MILLIGRAM(S): at 05:13

## 2022-05-17 RX ADMIN — Medication 5 MILLIGRAM(S): at 02:34

## 2022-05-17 RX ADMIN — Medication 1 MILLIGRAM(S): at 12:40

## 2022-05-17 RX ADMIN — Medication 1 MILLIGRAM(S): at 21:45

## 2022-05-17 RX ADMIN — MAGNESIUM OXIDE 400 MG ORAL TABLET 400 MILLIGRAM(S): 241.3 TABLET ORAL at 12:41

## 2022-05-17 RX ADMIN — Medication 80 MILLIGRAM(S): at 12:40

## 2022-05-17 RX ADMIN — Medication 1 MILLIGRAM(S): at 01:23

## 2022-05-17 NOTE — CHART NOTE - NSCHARTNOTEFT_GEN_A_CORE
DO notified by RN that pt was complaining of pruritis.    On exam pt was tremulous, itching, anxious, pressured speech, not diaphoretic, no signs of rash or erythema.  CIWA between 15..20.    Considering tremulous nature and pruritis w/o rash more likely withdraw sxs v doubt other etiology.  DO asked RN to give prn of ativan per ciwa protocol, and 10mg prednisone as it was effective in the past.    f/u - pt ciwa score and pruritis in 4 hours.

## 2022-05-17 NOTE — PROGRESS NOTE ADULT - ATTENDING COMMENTS
***My note supersedes any discrepancies that may be above in the resident's note***    38 yo M with PMH of depression (on fluoxetine), active alcohol abuse (1-2 bottles of vodka daily), and cocaine use (few times a week) presents to the hospital with alcohol/ ? cocaine withdrawal.     # Alcohol Intoxication   # Alcohol detoxification  # Cocaine Withdrawal   - EtoH level elevated to 140; last drink prior to day of admission  - On CIWA protocol; still scoring high for agitation, anxiety, diaphoresis, tachycardia. receiving several PRN doses  - Seen by CATCH TEAM  - Seen by Addiction Medicine   - Potential Rehab upon DC    # Pruritis  - unknown etiology but patient states "this happens when I withdraw"  - benadryl not efficacious  - prednisone 10mg with good effect    # Transaminitis  # Alcoholic hepatitis, IMPROVING  -  and   - Maddrey discriminant function score(on admission): 5.4--> good prognosis; does not meet criteria for glucocorticoid steroids    # Folate and Thiamine Deficiency  - Folate and thiamine supplementation     # DVT prophylaxis  - Patient ambulates    # GI prophylaxis  - Pantoprazole 40QD     # Diet   - As tolerated    # Activity Score (AM-PAC)  - As tolerated     # Code status   - Full    #Progress Note Handoff  Pending (specify):  CATCH follow up, clinical improvement  Family discussion: updated.   Disposition: Unknown at this time________ ***My note supersedes any discrepancies that may be above in the resident's note***    38 yo M with PMH of depression (on fluoxetine), active alcohol abuse (1-2 bottles of vodka daily), and cocaine use (few times a week) presents to the hospital with alcohol/ ? cocaine withdrawal.     # Alcohol Intoxication   # Alcohol detoxification  # Cocaine Withdrawal   - EtoH level elevated to 140; last drink prior to day of admission  - On CIWA protocol; still scoring high for agitation, anxiety, diaphoresis, tachycardia. receiving several PRN doses  - Seen by CATCH TEAM  - Seen by Addiction Medicine   - Potential Rehab upon DC    # Pruritis  - unknown etiology but patient states "this happens when I withdraw"  - benadryl not efficacious  - prednisone 10mg with good effect    # Transaminitis  # Alcoholic hepatitis, IMPROVING  -  and   - Maddrey discriminant function score(on admission): 5.4--> good prognosis; does not meet criteria for glucocorticoid steroids    # Thrombocytopenia, IMPROVING  - 121-->81-->79-->103-->124  - likely 2/2 alcohol use disorder  - no heparin products given. low concern for HIT    # Folate and Thiamine Deficiency  - Folate and thiamine supplementation     # DVT prophylaxis  - Patient ambulates    # GI prophylaxis  - Pantoprazole 40QD     # Diet   - As tolerated    # Activity Score (AM-PAC)  - As tolerated     # Code status   - Full    #Progress Note Handoff  Pending (specify):  CATCH follow up, clinical improvement  Family discussion: updated.   Disposition: Unknown at this time________

## 2022-05-17 NOTE — BH CONSULTATION LIAISON PROGRESS NOTE - NSBHCHARTREVIEWVS_PSY_A_CORE FT
Vital Signs Last 24 Hrs  T(C): 36.4 (17 May 2022 05:53), Max: 36.6 (16 May 2022 21:31)  T(F): 97.6 (17 May 2022 05:53), Max: 97.8 (16 May 2022 21:31)  HR: 102 (17 May 2022 05:53) (81 - 102)  BP: 107/65 (17 May 2022 05:53) (107/65 - 138/77)  BP(mean): 102 (16 May 2022 21:31) (102 - 102)  RR: 18 (17 May 2022 05:53) (18 - 20)  SpO2: --

## 2022-05-17 NOTE — BH CONSULTATION LIAISON PROGRESS NOTE - NSBHCHARTREVIEWINVESTIGATE_PSY_A_CORE FT
< from: 12 Lead ECG (05.13.22 @ 10:14) >    Ventricular Rate 90 BPM    Atrial Rate 90 BPM    P-R Interval 136 ms    QRS Duration 76 ms    Q-T Interval 350 ms    QTC Calculation(Bazett) 428 ms    P Axis 54 degrees    R Axis 73 degrees    T Axis 55 degrees    Diagnosis Line Normal sinus rhythm  Normal ECG    Confirmed by MERRILL GONZALEZ MD ([id number]) on 5/13/2022 11:47:45 AM    < end of copied text >

## 2022-05-17 NOTE — CHART NOTE - NSCHARTNOTEFT_GEN_A_CORE
Called to bedside by RN, patient reports severe diffuse itching to torso and extremities. Reports re-occurrence episodes of pruritus occur after usage of alcohol. Last time experiencing severe itching was several months prior and presented to  Urgent Care where he was successfully treated with steroids.     Patient currently on Ativan for CIWA protocol for ETOH abuse, tolerating; Prozac for depression, Gabapentin for anxiety. However, medications are refractory to prutirus.    Plan  - Will try Prednisone 5 mg po x 1 and evaluate response.

## 2022-05-17 NOTE — BH CONSULTATION LIAISON PROGRESS NOTE - NSBHCONSULTFOLLOWAFTERCARE_PSY_A_CORE FT
Patient will meet with CATCH team  for referral in the community for substance use.   Alternative referral to [facility] outpatient psychiatry located at [address] [place] [address], [id number], [phone] can be provided.

## 2022-05-17 NOTE — BH CONSULTATION LIAISON PROGRESS NOTE - NSBHCHARTREVIEWLAB_PSY_A_CORE FT
13.7   6.62  )-----------( 124      ( 17 May 2022 06:12 )             40.5   05-17    138  |  102  |  12  ----------------------------<  118<H>  4.3   |  19  |  0.7    Ca    9.4      17 May 2022 06:12  Mg     1.8     05-17    TPro  6.5  /  Alb  4.2  /  TBili  0.4  /  DBili  x   /  AST  63<H>  /  ALT  73<H>  /  AlkPhos  55  05-17

## 2022-05-17 NOTE — BH CONSULTATION LIAISON PROGRESS NOTE - NSBHASSESSMENTFT_PSY_ALL_CORE
Patient is a 37 year old male domiciled at home, employed with no prior history of inpatient psychiatry admission, psychiatric history of  depression (on fluoxetine), active alcohol abuse (1-2 bottles of vodka daily), and cocaine use (few times a week), with no significant medical history, who presents to the hospital with sweats, anxiety, mild nausea, and tremors present for one day, psychiatry is consulted for report of suicidal ideation, blood alcohol 141 at 5:18 PM on 5/12/22.    On follow up today, he remains free of thought of dying. 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 He plans to go to inpatient at the end of the school. He is future oriented. Given the active moderate withdrawal symptoms that are present, this patient will benefit from continuation of tapering of ativan, may consider normal ativan taper order set for this patient.     Impression  Delirium due to alcohol intoxication with withdrawal  Alcohol use disorder, cocaine use disorder  Unspecified depressive disorder, r/o depressive disorder due to alcohol use.     Plan  may consider normal ativan taper order set for this patient.   -There is no indication for psychiatric 1:1  -Continue home dose Trazodone 50 mg oral qd at bedtime, Gabapentin 600mg oral qd: patient can be discharged on these medications.   -Continue fluoxetine 80mg po every morning and patient can be discharged on that dose of medication.  -Upon medical clearance, there is no psychiatric contraindication to discharge this patient.  -CATCH team   is on board

## 2022-05-17 NOTE — BH CONSULTATION LIAISON PROGRESS NOTE - NSBHFUPINTERVALHXFT_PSY_A_CORE
Previously seen by psychiatry service for alcohol use disorder and MDD and report of suicidal ideation.   Follow up done today and it is observed patient completed ativan taper, he is now on fluoxetine 80mg po every morning. He is still denying all suicidal ideation, in fact he is working to follow up with [facility] outpatient substance rehab program upon his discharge from the hospital. He is not psychotic, he is not manic. However, this patient is observed to still be in moderate  withdrawal with a CIWA of 12 as noted by the severe bilateral hand tremor.

## 2022-05-18 LAB
ALBUMIN SERPL ELPH-MCNC: 4.4 G/DL — SIGNIFICANT CHANGE UP (ref 3.5–5.2)
ALBUMIN SERPL ELPH-MCNC: 4.4 G/DL — SIGNIFICANT CHANGE UP (ref 3.5–5.2)
ALP SERPL-CCNC: 52 U/L — SIGNIFICANT CHANGE UP (ref 30–115)
ALP SERPL-CCNC: 55 U/L — SIGNIFICANT CHANGE UP (ref 30–115)
ALT FLD-CCNC: 68 U/L — HIGH (ref 0–41)
ALT FLD-CCNC: 68 U/L — HIGH (ref 0–41)
ANION GAP SERPL CALC-SCNC: 11 MMOL/L — SIGNIFICANT CHANGE UP (ref 7–14)
ANION GAP SERPL CALC-SCNC: 15 MMOL/L — HIGH (ref 7–14)
AST SERPL-CCNC: 47 U/L — HIGH (ref 0–41)
AST SERPL-CCNC: 47 U/L — HIGH (ref 0–41)
BASOPHILS # BLD AUTO: 0.07 K/UL — SIGNIFICANT CHANGE UP (ref 0–0.2)
BASOPHILS NFR BLD AUTO: 1.3 % — HIGH (ref 0–1)
BILIRUB DIRECT SERPL-MCNC: <0.2 MG/DL — SIGNIFICANT CHANGE UP (ref 0–0.3)
BILIRUB INDIRECT FLD-MCNC: >0.2 MG/DL — SIGNIFICANT CHANGE UP (ref 0.2–1.2)
BILIRUB SERPL-MCNC: 0.4 MG/DL — SIGNIFICANT CHANGE UP (ref 0.2–1.2)
BILIRUB SERPL-MCNC: 0.5 MG/DL — SIGNIFICANT CHANGE UP (ref 0.2–1.2)
BUN SERPL-MCNC: 14 MG/DL — SIGNIFICANT CHANGE UP (ref 10–20)
BUN SERPL-MCNC: 14 MG/DL — SIGNIFICANT CHANGE UP (ref 10–20)
CALCIUM SERPL-MCNC: 9.2 MG/DL — SIGNIFICANT CHANGE UP (ref 8.5–10.1)
CALCIUM SERPL-MCNC: 9.3 MG/DL — SIGNIFICANT CHANGE UP (ref 8.5–10.1)
CHLORIDE SERPL-SCNC: 100 MMOL/L — SIGNIFICANT CHANGE UP (ref 98–110)
CHLORIDE SERPL-SCNC: 103 MMOL/L — SIGNIFICANT CHANGE UP (ref 98–110)
CO2 SERPL-SCNC: 22 MMOL/L — SIGNIFICANT CHANGE UP (ref 17–32)
CO2 SERPL-SCNC: 26 MMOL/L — SIGNIFICANT CHANGE UP (ref 17–32)
CREAT SERPL-MCNC: 0.8 MG/DL — SIGNIFICANT CHANGE UP (ref 0.7–1.5)
CREAT SERPL-MCNC: 0.8 MG/DL — SIGNIFICANT CHANGE UP (ref 0.7–1.5)
CULTURE RESULTS: SIGNIFICANT CHANGE UP
EGFR: 118 ML/MIN/1.73M2 — SIGNIFICANT CHANGE UP
EGFR: 118 ML/MIN/1.73M2 — SIGNIFICANT CHANGE UP
EOSINOPHIL # BLD AUTO: 0.04 K/UL — SIGNIFICANT CHANGE UP (ref 0–0.7)
EOSINOPHIL NFR BLD AUTO: 0.8 % — SIGNIFICANT CHANGE UP (ref 0–8)
GLUCOSE SERPL-MCNC: 118 MG/DL — HIGH (ref 70–99)
GLUCOSE SERPL-MCNC: 148 MG/DL — HIGH (ref 70–99)
HCT VFR BLD CALC: 41.1 % — LOW (ref 42–52)
HGB BLD-MCNC: 13.4 G/DL — LOW (ref 14–18)
IMM GRANULOCYTES NFR BLD AUTO: 0.4 % — HIGH (ref 0.1–0.3)
LYMPHOCYTES # BLD AUTO: 0.73 K/UL — LOW (ref 1.2–3.4)
LYMPHOCYTES # BLD AUTO: 14 % — LOW (ref 20.5–51.1)
MAGNESIUM SERPL-MCNC: 2 MG/DL — SIGNIFICANT CHANGE UP (ref 1.8–2.4)
MAGNESIUM SERPL-MCNC: 2 MG/DL — SIGNIFICANT CHANGE UP (ref 1.8–2.4)
MCHC RBC-ENTMCNC: 30 PG — SIGNIFICANT CHANGE UP (ref 27–31)
MCHC RBC-ENTMCNC: 32.6 G/DL — SIGNIFICANT CHANGE UP (ref 32–37)
MCV RBC AUTO: 91.9 FL — SIGNIFICANT CHANGE UP (ref 80–94)
MONOCYTES # BLD AUTO: 0.52 K/UL — SIGNIFICANT CHANGE UP (ref 0.1–0.6)
MONOCYTES NFR BLD AUTO: 9.9 % — HIGH (ref 1.7–9.3)
NEUTROPHILS # BLD AUTO: 3.85 K/UL — SIGNIFICANT CHANGE UP (ref 1.4–6.5)
NEUTROPHILS NFR BLD AUTO: 73.6 % — SIGNIFICANT CHANGE UP (ref 42.2–75.2)
NRBC # BLD: 0 /100 WBCS — SIGNIFICANT CHANGE UP (ref 0–0)
PHOSPHATE SERPL-MCNC: 3.5 MG/DL — SIGNIFICANT CHANGE UP (ref 2.1–4.9)
PLATELET # BLD AUTO: 144 K/UL — SIGNIFICANT CHANGE UP (ref 130–400)
POTASSIUM SERPL-MCNC: 4.2 MMOL/L — SIGNIFICANT CHANGE UP (ref 3.5–5)
POTASSIUM SERPL-MCNC: 4.4 MMOL/L — SIGNIFICANT CHANGE UP (ref 3.5–5)
POTASSIUM SERPL-SCNC: 4.2 MMOL/L — SIGNIFICANT CHANGE UP (ref 3.5–5)
POTASSIUM SERPL-SCNC: 4.4 MMOL/L — SIGNIFICANT CHANGE UP (ref 3.5–5)
PROT SERPL-MCNC: 6.7 G/DL — SIGNIFICANT CHANGE UP (ref 6–8)
PROT SERPL-MCNC: 6.9 G/DL — SIGNIFICANT CHANGE UP (ref 6–8)
RBC # BLD: 4.47 M/UL — LOW (ref 4.7–6.1)
RBC # FLD: 13.9 % — SIGNIFICANT CHANGE UP (ref 11.5–14.5)
SARS-COV-2 RNA SPEC QL NAA+PROBE: SIGNIFICANT CHANGE UP
SODIUM SERPL-SCNC: 137 MMOL/L — SIGNIFICANT CHANGE UP (ref 135–146)
SODIUM SERPL-SCNC: 140 MMOL/L — SIGNIFICANT CHANGE UP (ref 135–146)
SPECIMEN SOURCE: SIGNIFICANT CHANGE UP
WBC # BLD: 5.23 K/UL — SIGNIFICANT CHANGE UP (ref 4.8–10.8)
WBC # FLD AUTO: 5.23 K/UL — SIGNIFICANT CHANGE UP (ref 4.8–10.8)

## 2022-05-18 PROCEDURE — 99233 SBSQ HOSP IP/OBS HIGH 50: CPT

## 2022-05-18 RX ADMIN — MAGNESIUM OXIDE 400 MG ORAL TABLET 400 MILLIGRAM(S): 241.3 TABLET ORAL at 08:11

## 2022-05-18 RX ADMIN — MAGNESIUM OXIDE 400 MG ORAL TABLET 400 MILLIGRAM(S): 241.3 TABLET ORAL at 12:54

## 2022-05-18 RX ADMIN — PANTOPRAZOLE SODIUM 40 MILLIGRAM(S): 20 TABLET, DELAYED RELEASE ORAL at 06:06

## 2022-05-18 RX ADMIN — Medication 1 PATCH: at 17:14

## 2022-05-18 RX ADMIN — Medication 50 MILLIGRAM(S): at 23:19

## 2022-05-18 RX ADMIN — GABAPENTIN 600 MILLIGRAM(S): 400 CAPSULE ORAL at 22:45

## 2022-05-18 RX ADMIN — Medication 1 MILLIGRAM(S): at 11:03

## 2022-05-18 RX ADMIN — MAGNESIUM OXIDE 400 MG ORAL TABLET 400 MILLIGRAM(S): 241.3 TABLET ORAL at 17:15

## 2022-05-18 RX ADMIN — Medication 0.5 MILLIGRAM(S): at 17:14

## 2022-05-18 RX ADMIN — CHLORHEXIDINE GLUCONATE 1 APPLICATION(S): 213 SOLUTION TOPICAL at 05:11

## 2022-05-18 RX ADMIN — Medication 0.5 MILLIGRAM(S): at 11:03

## 2022-05-18 RX ADMIN — Medication 1 MILLIGRAM(S): at 00:07

## 2022-05-18 RX ADMIN — Medication 0.5 MILLIGRAM(S): at 16:07

## 2022-05-18 RX ADMIN — Medication 80 MILLIGRAM(S): at 11:03

## 2022-05-18 RX ADMIN — Medication 100 MILLIGRAM(S): at 11:03

## 2022-05-18 RX ADMIN — Medication 1 MILLIGRAM(S): at 06:06

## 2022-05-18 NOTE — CHART NOTE - NSCHARTNOTEFT_GEN_A_CORE
Pt is a 38 y/o male with PMHx of depression, active alcohol abuse (1-2 bottles of vodka daily) and cocaine use (few times a week). Presents to the hospital with alcohol/ ? cocaine withdrawal. Pt weight is 72.6 KG; BMI is 25.9. Currently on a regular diet order as of 05/13. Pt has a good appetite; consuming % of meals provided. No chewing or swallowing difficulties. Denies nausea or vomiting. Last BM on 5/18 - 1x per flowsheet. Skin is intact. Will follow up in 10 days or c/s RD prn.     RD remains available: Dali Grissom, RDN [phone]

## 2022-05-18 NOTE — PROGRESS NOTE ADULT - ATTENDING COMMENTS
***My note supersedes any discrepancies that may be above in the resident's note***    36 yo M with PMH of depression (on fluoxetine), active alcohol abuse (1-2 bottles of vodka daily), and cocaine use (few times a week) presents to the hospital with alcohol/ ? cocaine withdrawal.     # Alcohol Intoxication   # Alcohol detoxification, NOT resolved  # Cocaine Withdrawal   - EtoH level elevated to 140; last drink prior to day of admission  - On CIWA protocol; still scoring high for agitation, anxiety, diaphoresis, tachycardia. receiving several PRN doses. Re-started taper of ativan  - Seen by CATCH TEAM; will follow up outpatient appt at [facility] on Monday 5/23  - Seen by Addiction Medicine   - Potential Rehab upon DC    # Pruritis  - unknown etiology but patient states "this happens when I withdraw"  - benadryl not efficacious  - prednisone 10mg with good effect    # Transaminitis  # Alcoholic hepatitis, IMPROVING  -  and   - Maddrey discriminant function score(on admission): 5.4--> good prognosis; does not meet criteria for glucocorticoid steroids    # Thrombocytopenia, IMPROVING  - 121-->81-->79-->103-->124  - likely 2/2 alcohol use disorder  - no heparin products given. low concern for HIT    # Folate and Thiamine Deficiency  - Folate and thiamine supplementation     # DVT prophylaxis  - Patient ambulates    # GI prophylaxis  - Pantoprazole 40QD     # Diet   - As tolerated    # Activity Score (AM-PAC)  - As tolerated     # Code status   - Full    #Progress Note Handoff  Pending (specify):  CATCH follow up, clinical improvement  Family discussion: updated.   Disposition: Unknown at this time________.

## 2022-05-19 LAB
ALBUMIN SERPL ELPH-MCNC: 4.3 G/DL — SIGNIFICANT CHANGE UP (ref 3.5–5.2)
ALP SERPL-CCNC: 54 U/L — SIGNIFICANT CHANGE UP (ref 30–115)
ALT FLD-CCNC: 69 U/L — HIGH (ref 0–41)
ANION GAP SERPL CALC-SCNC: 12 MMOL/L — SIGNIFICANT CHANGE UP (ref 7–14)
AST SERPL-CCNC: 50 U/L — HIGH (ref 0–41)
BASOPHILS # BLD AUTO: 0.06 K/UL — SIGNIFICANT CHANGE UP (ref 0–0.2)
BASOPHILS NFR BLD AUTO: 1.1 % — HIGH (ref 0–1)
BILIRUB SERPL-MCNC: 0.4 MG/DL — SIGNIFICANT CHANGE UP (ref 0.2–1.2)
BUN SERPL-MCNC: 15 MG/DL — SIGNIFICANT CHANGE UP (ref 10–20)
CALCIUM SERPL-MCNC: 9.3 MG/DL — SIGNIFICANT CHANGE UP (ref 8.5–10.1)
CHLORIDE SERPL-SCNC: 101 MMOL/L — SIGNIFICANT CHANGE UP (ref 98–110)
CO2 SERPL-SCNC: 25 MMOL/L — SIGNIFICANT CHANGE UP (ref 17–32)
CREAT SERPL-MCNC: 0.9 MG/DL — SIGNIFICANT CHANGE UP (ref 0.7–1.5)
EGFR: 114 ML/MIN/1.73M2 — SIGNIFICANT CHANGE UP
EOSINOPHIL # BLD AUTO: 0.07 K/UL — SIGNIFICANT CHANGE UP (ref 0–0.7)
EOSINOPHIL NFR BLD AUTO: 1.3 % — SIGNIFICANT CHANGE UP (ref 0–8)
GLUCOSE SERPL-MCNC: 100 MG/DL — HIGH (ref 70–99)
HCT VFR BLD CALC: 40 % — LOW (ref 42–52)
HGB BLD-MCNC: 13.6 G/DL — LOW (ref 14–18)
IMM GRANULOCYTES NFR BLD AUTO: 0.7 % — HIGH (ref 0.1–0.3)
LYMPHOCYTES # BLD AUTO: 0.97 K/UL — LOW (ref 1.2–3.4)
LYMPHOCYTES # BLD AUTO: 18.1 % — LOW (ref 20.5–51.1)
MAGNESIUM SERPL-MCNC: 2 MG/DL — SIGNIFICANT CHANGE UP (ref 1.8–2.4)
MCHC RBC-ENTMCNC: 31.1 PG — HIGH (ref 27–31)
MCHC RBC-ENTMCNC: 34 G/DL — SIGNIFICANT CHANGE UP (ref 32–37)
MCV RBC AUTO: 91.3 FL — SIGNIFICANT CHANGE UP (ref 80–94)
MONOCYTES # BLD AUTO: 0.79 K/UL — HIGH (ref 0.1–0.6)
MONOCYTES NFR BLD AUTO: 14.8 % — HIGH (ref 1.7–9.3)
NEUTROPHILS # BLD AUTO: 3.42 K/UL — SIGNIFICANT CHANGE UP (ref 1.4–6.5)
NEUTROPHILS NFR BLD AUTO: 64 % — SIGNIFICANT CHANGE UP (ref 42.2–75.2)
NRBC # BLD: 0 /100 WBCS — SIGNIFICANT CHANGE UP (ref 0–0)
PLATELET # BLD AUTO: 151 K/UL — SIGNIFICANT CHANGE UP (ref 130–400)
POTASSIUM SERPL-MCNC: 4.4 MMOL/L — SIGNIFICANT CHANGE UP (ref 3.5–5)
POTASSIUM SERPL-SCNC: 4.4 MMOL/L — SIGNIFICANT CHANGE UP (ref 3.5–5)
PROT SERPL-MCNC: 6.6 G/DL — SIGNIFICANT CHANGE UP (ref 6–8)
RBC # BLD: 4.38 M/UL — LOW (ref 4.7–6.1)
RBC # FLD: 14 % — SIGNIFICANT CHANGE UP (ref 11.5–14.5)
SARS-COV-2 RNA SPEC QL NAA+PROBE: SIGNIFICANT CHANGE UP
SODIUM SERPL-SCNC: 138 MMOL/L — SIGNIFICANT CHANGE UP (ref 135–146)
WBC # BLD: 5.35 K/UL — SIGNIFICANT CHANGE UP (ref 4.8–10.8)
WBC # FLD AUTO: 5.35 K/UL — SIGNIFICANT CHANGE UP (ref 4.8–10.8)

## 2022-05-19 PROCEDURE — 99233 SBSQ HOSP IP/OBS HIGH 50: CPT

## 2022-05-19 RX ORDER — IBUPROFEN 200 MG
400 TABLET ORAL ONCE
Refills: 0 | Status: COMPLETED | OUTPATIENT
Start: 2022-05-19 | End: 2022-05-19

## 2022-05-19 RX ORDER — TRAZODONE HCL 50 MG
50 TABLET ORAL AT BEDTIME
Refills: 0 | Status: DISCONTINUED | OUTPATIENT
Start: 2022-05-19 | End: 2022-05-20

## 2022-05-19 RX ADMIN — Medication 0.5 MILLIGRAM(S): at 17:28

## 2022-05-19 RX ADMIN — PANTOPRAZOLE SODIUM 40 MILLIGRAM(S): 20 TABLET, DELAYED RELEASE ORAL at 06:08

## 2022-05-19 RX ADMIN — Medication 80 MILLIGRAM(S): at 11:08

## 2022-05-19 RX ADMIN — Medication 5 MILLIGRAM(S): at 03:45

## 2022-05-19 RX ADMIN — Medication 0.5 MILLIGRAM(S): at 13:40

## 2022-05-19 RX ADMIN — MAGNESIUM OXIDE 400 MG ORAL TABLET 400 MILLIGRAM(S): 241.3 TABLET ORAL at 11:34

## 2022-05-19 RX ADMIN — MAGNESIUM OXIDE 400 MG ORAL TABLET 400 MILLIGRAM(S): 241.3 TABLET ORAL at 17:26

## 2022-05-19 RX ADMIN — Medication 400 MILLIGRAM(S): at 12:26

## 2022-05-19 RX ADMIN — Medication 1 PATCH: at 11:07

## 2022-05-19 RX ADMIN — Medication 50 MILLIGRAM(S): at 22:47

## 2022-05-19 RX ADMIN — CHLORHEXIDINE GLUCONATE 1 APPLICATION(S): 213 SOLUTION TOPICAL at 06:09

## 2022-05-19 RX ADMIN — Medication 400 MILLIGRAM(S): at 11:48

## 2022-05-19 RX ADMIN — Medication 1 PATCH: at 07:45

## 2022-05-19 RX ADMIN — Medication 0.5 MILLIGRAM(S): at 23:41

## 2022-05-19 RX ADMIN — MAGNESIUM OXIDE 400 MG ORAL TABLET 400 MILLIGRAM(S): 241.3 TABLET ORAL at 07:59

## 2022-05-19 RX ADMIN — GABAPENTIN 600 MILLIGRAM(S): 400 CAPSULE ORAL at 22:48

## 2022-05-19 RX ADMIN — Medication 1 MILLIGRAM(S): at 11:08

## 2022-05-19 RX ADMIN — Medication 0.5 MILLIGRAM(S): at 06:06

## 2022-05-19 RX ADMIN — Medication 100 MILLIGRAM(S): at 11:08

## 2022-05-19 RX ADMIN — Medication 1 PATCH: at 17:25

## 2022-05-19 NOTE — PROGRESS NOTE ADULT - PROVIDER SPECIALTY LIST ADULT
Internal Medicine
Pulmonology
Hospitalist
Internal Medicine
Hospitalist

## 2022-05-19 NOTE — PROGRESS NOTE ADULT - ATTENDING COMMENTS
***My note supersedes any discrepancies that may be above in the resident's note***    36 yo M with PMH of depression (on fluoxetine), active alcohol abuse (1-2 bottles of vodka daily), and cocaine use (few times a week) presents to the hospital with alcohol/ ? cocaine withdrawal.     # Alcohol Intoxication   # Alcohol detoxification, NOT resolved  # Cocaine Withdrawal   - EtoH level elevated to 140; last drink prior to day of admission  - On CIWA protocol; still scoring high for agitation, anxiety, diaphoresis, tachycardia. receiving several PRN doses. Re-started taper of ativan(ends tomorrow 5/20). will reassess then as CIWA scores improving  - Seen by CATCH TEAM; will follow up outpatient appt at [facility] on Monday 5/23  - Seen by Addiction Medicine   - Potential Rehab upon DC    # Pruritis  - unknown etiology but patient states "this happens when I withdraw"  - benadryl not efficacious  - prednisone 10mg with good effect    # Transaminitis  # Alcoholic hepatitis, IMPROVING  -  and   - Maddrey discriminant function score(on admission): 5.4--> good prognosis; does not meet criteria for glucocorticoid steroids    # Thrombocytopenia, IMPROVING  - 121-->81-->79-->103-->124  - likely 2/2 alcohol use disorder  - no heparin products given. low concern for HIT    # Folate and Thiamine Deficiency  - Folate and thiamine supplementation     # DVT prophylaxis  - Patient ambulates    # GI prophylaxis  - Pantoprazole 40QD     # Diet   - As tolerated    # Activity Score (AM-PAC)  - As tolerated     # Code status   - Full    #Progress Note Handoff  Pending (specify):  CATCH follow up, clinical improvement  Family discussion: updated.   Disposition: CIWA completes tomorrow. reasess but likely discharge home with outpatient follow up on Monday at [facility]

## 2022-05-19 NOTE — PROGRESS NOTE ADULT - REASON FOR ADMISSION
alcohol/ ? cocaine withdrawal

## 2022-05-19 NOTE — PROGRESS NOTE ADULT - TIME BILLING
charting, resident teaching rounds, and interdisciplinary planning.

## 2022-05-20 ENCOUNTER — TRANSCRIPTION ENCOUNTER (OUTPATIENT)
Age: 37
End: 2022-05-20

## 2022-05-20 VITALS
HEART RATE: 76 BPM | SYSTOLIC BLOOD PRESSURE: 121 MMHG | DIASTOLIC BLOOD PRESSURE: 56 MMHG | RESPIRATION RATE: 18 BRPM | TEMPERATURE: 97 F

## 2022-05-20 LAB
ALBUMIN SERPL ELPH-MCNC: 4.3 G/DL — SIGNIFICANT CHANGE UP (ref 3.5–5.2)
ALP SERPL-CCNC: 65 U/L — SIGNIFICANT CHANGE UP (ref 30–115)
ALT FLD-CCNC: 69 U/L — HIGH (ref 0–41)
ANION GAP SERPL CALC-SCNC: 13 MMOL/L — SIGNIFICANT CHANGE UP (ref 7–14)
AST SERPL-CCNC: 50 U/L — HIGH (ref 0–41)
BASOPHILS # BLD AUTO: 0.1 K/UL — SIGNIFICANT CHANGE UP (ref 0–0.2)
BASOPHILS NFR BLD AUTO: 1.9 % — HIGH (ref 0–1)
BILIRUB SERPL-MCNC: <0.2 MG/DL — SIGNIFICANT CHANGE UP (ref 0.2–1.2)
BUN SERPL-MCNC: 11 MG/DL — SIGNIFICANT CHANGE UP (ref 10–20)
CALCIUM SERPL-MCNC: 8.9 MG/DL — SIGNIFICANT CHANGE UP (ref 8.5–10.1)
CHLORIDE SERPL-SCNC: 100 MMOL/L — SIGNIFICANT CHANGE UP (ref 98–110)
CO2 SERPL-SCNC: 24 MMOL/L — SIGNIFICANT CHANGE UP (ref 17–32)
CREAT SERPL-MCNC: 0.8 MG/DL — SIGNIFICANT CHANGE UP (ref 0.7–1.5)
EGFR: 118 ML/MIN/1.73M2 — SIGNIFICANT CHANGE UP
EOSINOPHIL # BLD AUTO: 0.11 K/UL — SIGNIFICANT CHANGE UP (ref 0–0.7)
EOSINOPHIL NFR BLD AUTO: 2.1 % — SIGNIFICANT CHANGE UP (ref 0–8)
GLUCOSE SERPL-MCNC: 122 MG/DL — HIGH (ref 70–99)
HCT VFR BLD CALC: 40.5 % — LOW (ref 42–52)
HGB BLD-MCNC: 13.5 G/DL — LOW (ref 14–18)
IMM GRANULOCYTES NFR BLD AUTO: 0.7 % — HIGH (ref 0.1–0.3)
LYMPHOCYTES # BLD AUTO: 1.41 K/UL — SIGNIFICANT CHANGE UP (ref 1.2–3.4)
LYMPHOCYTES # BLD AUTO: 26.4 % — SIGNIFICANT CHANGE UP (ref 20.5–51.1)
MAGNESIUM SERPL-MCNC: 2 MG/DL — SIGNIFICANT CHANGE UP (ref 1.8–2.4)
MCHC RBC-ENTMCNC: 30.9 PG — SIGNIFICANT CHANGE UP (ref 27–31)
MCHC RBC-ENTMCNC: 33.3 G/DL — SIGNIFICANT CHANGE UP (ref 32–37)
MCV RBC AUTO: 92.7 FL — SIGNIFICANT CHANGE UP (ref 80–94)
MONOCYTES # BLD AUTO: 0.79 K/UL — HIGH (ref 0.1–0.6)
MONOCYTES NFR BLD AUTO: 14.8 % — HIGH (ref 1.7–9.3)
NEUTROPHILS # BLD AUTO: 2.9 K/UL — SIGNIFICANT CHANGE UP (ref 1.4–6.5)
NEUTROPHILS NFR BLD AUTO: 54.1 % — SIGNIFICANT CHANGE UP (ref 42.2–75.2)
NRBC # BLD: 0 /100 WBCS — SIGNIFICANT CHANGE UP (ref 0–0)
PLATELET # BLD AUTO: 191 K/UL — SIGNIFICANT CHANGE UP (ref 130–400)
POTASSIUM SERPL-MCNC: 4.1 MMOL/L — SIGNIFICANT CHANGE UP (ref 3.5–5)
POTASSIUM SERPL-SCNC: 4.1 MMOL/L — SIGNIFICANT CHANGE UP (ref 3.5–5)
PROT SERPL-MCNC: 6.9 G/DL — SIGNIFICANT CHANGE UP (ref 6–8)
RBC # BLD: 4.37 M/UL — LOW (ref 4.7–6.1)
RBC # FLD: 14.2 % — SIGNIFICANT CHANGE UP (ref 11.5–14.5)
SODIUM SERPL-SCNC: 137 MMOL/L — SIGNIFICANT CHANGE UP (ref 135–146)
WBC # BLD: 5.35 K/UL — SIGNIFICANT CHANGE UP (ref 4.8–10.8)
WBC # FLD AUTO: 5.35 K/UL — SIGNIFICANT CHANGE UP (ref 4.8–10.8)

## 2022-05-20 PROCEDURE — 99239 HOSP IP/OBS DSCHRG MGMT >30: CPT

## 2022-05-20 RX ORDER — FLUOXETINE HCL 10 MG
2 CAPSULE ORAL
Qty: 60 | Refills: 0
Start: 2022-05-20 | End: 2022-06-18

## 2022-05-20 RX ORDER — FLUOXETINE HCL 10 MG
4 CAPSULE ORAL
Qty: 56 | Refills: 0
Start: 2022-05-20 | End: 2022-06-02

## 2022-05-20 RX ORDER — GABAPENTIN 400 MG/1
1 CAPSULE ORAL
Qty: 30 | Refills: 0
Start: 2022-05-20 | End: 2022-06-18

## 2022-05-20 RX ADMIN — MAGNESIUM OXIDE 400 MG ORAL TABLET 400 MILLIGRAM(S): 241.3 TABLET ORAL at 12:19

## 2022-05-20 RX ADMIN — PANTOPRAZOLE SODIUM 40 MILLIGRAM(S): 20 TABLET, DELAYED RELEASE ORAL at 06:01

## 2022-05-20 RX ADMIN — MAGNESIUM OXIDE 400 MG ORAL TABLET 400 MILLIGRAM(S): 241.3 TABLET ORAL at 17:22

## 2022-05-20 RX ADMIN — CHLORHEXIDINE GLUCONATE 1 APPLICATION(S): 213 SOLUTION TOPICAL at 06:01

## 2022-05-20 RX ADMIN — Medication 0.5 MILLIGRAM(S): at 06:00

## 2022-05-20 RX ADMIN — Medication 100 MILLIGRAM(S): at 12:17

## 2022-05-20 RX ADMIN — Medication 1 PATCH: at 12:18

## 2022-05-20 RX ADMIN — Medication 1 MILLIGRAM(S): at 12:17

## 2022-05-20 RX ADMIN — Medication 80 MILLIGRAM(S): at 12:18

## 2022-05-20 RX ADMIN — MAGNESIUM OXIDE 400 MG ORAL TABLET 400 MILLIGRAM(S): 241.3 TABLET ORAL at 08:17

## 2022-05-20 NOTE — DISCHARGE NOTE PROVIDER - ATTENDING DISCHARGE PHYSICAL EXAMINATION:
GENERAL: Patient lying on bed, anxious looking   CHEST/LUNG: NVB, no wehezing   HEART: R1+R2, RRR  ABDOMEN: Soft. non tender, BS positive  EXTREMITIES:  no edema, Bruising, tremors   CNS: AAAx4. No cranial nerves deficit. no fine or course tremors  PSYCH: no psychomotor agitation or anxiety demonstrated  SKIN: warm, dry, intact

## 2022-05-20 NOTE — DISCHARGE NOTE PROVIDER - NSDCCPCAREPLAN_GEN_ALL_CORE_FT
PRINCIPAL DISCHARGE DIAGNOSIS  Diagnosis: Alcohol withdrawal  Assessment and Plan of Treatment: You were  admitted to MICU for management of Alcohol withdrawal and possible cocaine withdrawal placed on an ativan taper.  you were stable and downgraded to the floor next day  You were evaluated by Addiction Medicine and Psychiatry, your home medication is adjusted by Psych as follows:  -Continue home dose Trazodone 50 mg oral qd at bedtime  -Gabapentin 600mg oral qd  -Increased  Fluoxetine 60 mg oral qd to fluoxetine 80mg po every morning   -CATCH team  will meet with you for support and referral  in the community  You are now off of Ativan Taper and  hemodynamically Stable for Discharge  Please  follow up outpatient appt at [facility] on Monday 5/23  Please follow up with Psychiatry within 2 weeks of discharge  [facility] outpatient psychiatry located at [address] [place] [address], [facility], [phone]         SECONDARY DISCHARGE DIAGNOSES  Diagnosis: Alcoholic hepatitis  Assessment and Plan of Treatment: Your hospital course was complicated by elevated liver enzymes likely secondary to Alcoholic hepatitis, low platelets likely due to alcohol use disorder which is resolved  Please follow up with your PCP within 2 weeks of discharge for further management and work up

## 2022-05-20 NOTE — DISCHARGE NOTE PROVIDER - NSDCMRMEDTOKEN_GEN_ALL_CORE_FT
gabapentin 600 mg oral tablet: 1 tab(s) orally once a day (at bedtime)  traZODone 50 mg oral tablet: 1 tab(s) orally once a day (at bedtime)   FLUoxetine 40 mg oral capsule: 2 cap(s) orally once a day  gabapentin 600 mg oral tablet: 1 tab(s) orally once a day (at bedtime)  traZODone 50 mg oral tablet: 1 tab(s) orally once a day (at bedtime)

## 2022-05-20 NOTE — DISCHARGE NOTE PROVIDER - PROVIDER TOKENS
PROVIDER:[TOKEN:[[id number]],FOLLOWUP:[2 weeks],ESTABLISHEDPATIENT:[T]],PROVIDER:[TOKEN:[[id number]],FOLLOWUP:[1 week]]

## 2022-05-20 NOTE — DISCHARGE NOTE NURSING/CASE MANAGEMENT/SOCIAL WORK - PATIENT PORTAL LINK FT
You can access the FollowMyHealth Patient Portal offered by [facility] by registering at the following website: [email or web address]. By joining [facility]’s FollowMyHealth portal, you will also be able to view your health information using other applications (apps) compatible with our system.

## 2022-05-20 NOTE — CHART NOTE - NSCHARTNOTEFT_GEN_A_CORE
To Whom May It Concern:      Please excuse, Mr. Cuong Morton, from any and all work duties May 12th- May 20th 2022. He was under my care at [facility] during this period. He is allowed to return back to work without restrictions or limitations beginning Monday 5/23/2022.    If you have any questions and or concerns, please feel free to contact me directly at [phone] or email me at [email or web address].      Sincerely,            Mack Barcenas MD

## 2022-05-20 NOTE — DISCHARGE NOTE PROVIDER - CARE PROVIDER_API CALL
[place] [address]  Phone: [phone]  Fax: [phone]  Established Patient  Follow Up Time: 2 weeks    April Andrew)  Psychiatry; Psychosomatic Medicine  [address] [place] [address]  Phone: [phone]  Fax: [phone]  Follow Up Time: 1 week

## 2022-05-20 NOTE — DISCHARGE NOTE PROVIDER - NSDCFUADDINST_GEN_ALL_CORE_FT
[facility] outpatient psychiatry located at [address] [place] [address], [id number], [phone]  Please follow up outpatient appt at [facility] on Monday 5/23  Please take your medication as prescribed

## 2022-05-20 NOTE — DISCHARGE NOTE NURSING/CASE MANAGEMENT/SOCIAL WORK - NSDCPEFALRISK_GEN_ALL_CORE
For information on Fall & Injury Prevention, visit: [email or web address] OR  [email or web address] OR  https://www.cdc.gov/steadi/patient.html

## 2022-05-22 LAB — DRUG SCREEN, SERUM: ABNORMAL

## 2022-05-26 DIAGNOSIS — F32.9 MAJOR DEPRESSIVE DISORDER, SINGLE EPISODE, UNSPECIFIED: ICD-10-CM

## 2022-05-26 DIAGNOSIS — F41.9 ANXIETY DISORDER, UNSPECIFIED: ICD-10-CM

## 2022-05-26 DIAGNOSIS — F14.13 COCAINE ABUSE, UNSPECIFIED WITH WITHDRAWAL: ICD-10-CM

## 2022-05-26 DIAGNOSIS — K70.10 ALCOHOLIC HEPATITIS WITHOUT ASCITES: ICD-10-CM

## 2022-05-26 DIAGNOSIS — E51.9 THIAMINE DEFICIENCY, UNSPECIFIED: ICD-10-CM
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
## 2022-05-26 DIAGNOSIS — E83.42 HYPOMAGNESEMIA: ICD-10-CM

## 2022-05-26 DIAGNOSIS — F10.130 ALCOHOL ABUSE WITH WITHDRAWAL, UNCOMPLICATED: ICD-10-CM

## 2022-05-26 DIAGNOSIS — F10.131 ALCOHOL ABUSE WITH WITHDRAWAL DELIRIUM: ICD-10-CM

## 2022-05-26 DIAGNOSIS — E87.2 ACIDOSIS: ICD-10-CM

## 2022-05-26 DIAGNOSIS — Z88.8 ALLERGY STATUS TO OTHER DRUGS, MEDICAMENTS AND BIOLOGICAL SUBSTANCES: ICD-10-CM

## 2022-05-26 DIAGNOSIS — E53.8 DEFICIENCY OF OTHER SPECIFIED B GROUP VITAMINS: ICD-10-CM

## 2022-05-26 DIAGNOSIS — L29.8 OTHER PRURITUS: ICD-10-CM

## 2022-05-26 DIAGNOSIS — D69.59 OTHER SECONDARY THROMBOCYTOPENIA: ICD-10-CM

## 2022-05-26 DIAGNOSIS — F10.121 ALCOHOL ABUSE WITH INTOXICATION DELIRIUM: ICD-10-CM

## 2022-07-27 ENCOUNTER — NON-APPOINTMENT (OUTPATIENT)
Age: 37
End: 2022-07-27

## 2022-09-22 NOTE — ED ADULT NURSE NOTE - NSFALLRSKASSESSDT_ED_ALL_ED
normal appearance , without tenderness upon palpation , no deformities , trachea midline , Thyroid normal size , no thyroid nodules , no masses , no JVD , thyroid nontender 12-May-2022 18:55

## 2022-12-26 ENCOUNTER — NON-APPOINTMENT (OUTPATIENT)
Age: 37
End: 2022-12-26

## 2023-01-03 ENCOUNTER — NON-APPOINTMENT (OUTPATIENT)
Age: 38
End: 2023-01-03

## 2023-01-13 NOTE — PROGRESS NOTE ADULT - SUBJECTIVE AND OBJECTIVE BOX
JUNE SAINI  37y, Male  Allergy: Ceclor (Other)    Hospital Day: 2d    Patient seen and examined earlier today. still have some shaking. on ciwa protocol   discussed in legth with parents whoi were present in room.     PMH/PSH:  PAST MEDICAL & SURGICAL HISTORY:  No pertinent past medical history  anxiety  depression  polysubstance      No significant past surgical history          LAST 24-Hr EVENTS:    VITALS:  ICU Vital Signs Last 24 Hrs  T(C): 35.6 (15 May 2022 11:26), Max: 36.6 (14 May 2022 16:44)  T(F): 96 (15 May 2022 11:26), Max: 97.9 (14 May 2022 16:44)  HR: 90 (15 May 2022 11:26) (90 - 97)  BP: 146/67 (15 May 2022 11:26) (124/75 - 146/80)  BP(mean): 108 (14 May 2022 19:28) (108 - 108)  ABP: --  ABP(mean): --  RR: 18 (15 May 2022 11:26) (18 - 20)  SpO2: 98% (15 May 2022 00:50) (98% - 99%)            TESTS & MEASUREMENTS:  Weight/BMI  72.6 (05-13-22 @ 14:18)  25.8 (05-13-22 @ 14:18)    05-12-22 @ 07:01  -  05-13-22 @ 07:00  --------------------------------------------------------  IN: 500 mL / OUT: 1200 mL / NET: -700 mL    05-13-22 @ 07:01  -  05-14-22 @ 07:00  --------------------------------------------------------  IN: 1800 mL / OUT: 0 mL / NET: 1800 mL    05-14-22 @ 07:01  -  05-14-22 @ 13:24  --------------------------------------------------------  IN: 350 mL / OUT: 0 mL / NET: 350 mL                            14.4   4.95  )-----------( 81       ( 13 May 2022 07:08 )             42.9     PT/INR - ( 12 May 2022 17:18 )   PT: 10.90 sec;   INR: 0.95 ratio         PTT - ( 12 May 2022 17:18 )  PTT:26.8 sec  INR: 0.95 ratio (05-12-22 @ 17:18)    05-13    138  |  100  |  7<L>  ----------------------------<  78  3.7   |  24  |  0.8    Ca    9.2      13 May 2022 19:17  Mg     2.4     05-13    TPro  6.7  /  Alb  4.3  /  TBili  0.9  /  DBili  x   /  AST  150<H>  /  ALT  110<H>  /  AlkPhos  60  05-13    LIVER FUNCTIONS - ( 13 May 2022 07:08 )  Alb: 4.3 g/dL / Pro: 6.7 g/dL / ALK PHOS: 60 U/L / ALT: 110 U/L / AST: 150 U/L / GGT: x           CARDIAC MARKERS ( 13 May 2022 00:56 )  x     / <0.01 ng/mL / x     / x     / 3.6 ng/mL        Culture - Blood (collected 05-13-22 @ 00:56)  Source: .Blood None  Preliminary Report (05-14-22 @ 09:01):    No growth to date.        Procalcitonin, Serum: 0.08 ng/mL (05-13-22 @ 00:56)    D-Dimer Assay, Quantitative: 330 ng/mL DDU (05-13-22 @ 00:56)        COVID-19 PCR: NotDetec (05-12-22 @ 18:26)                RADIOLOGY, ECG, & ADDITIONAL TESTS:  12 Lead ECG:   Ventricular Rate 90 BPM    Atrial Rate 90 BPM    P-R Interval 136 ms    QRS Duration 76 ms    Q-T Interval 350 ms    QTC Calculation(Bazett) 428 ms    P Axis 54 degrees    R Axis 73 degrees    T Axis 55 degrees    Diagnosis Line Normal sinus rhythm  Normal ECG    Confirmed by MERRILL GONZALEZ MD ([id number]) on 5/13/2022 11:47:45 AM (05-13-22 @ 10:14)    CT Abdomen and Pelvis No Cont:   ACC: [id number] EXAM:  CT ABDOMEN AND PELVIS                          PROCEDURE DATE:  05/13/2022          INTERPRETATION:  CLINICAL STATEMENT: Transaminitis. Pancreatitis lipase   146.    TECHNIQUE: Contiguous axial CT images were obtained from the lower chest   to the pubic symphysis without intravenous contrast.  Oral contrast was   not administered.  Reformatted images in the coronal and sagittal planes   were acquired.    Comparison made with abdominal ultrasound May 12, 2022.    FINDINGS:    LOWER CHEST: Unremarkable.    HEPATOBILIARY: Hepatic steatosis. Gallbladder unremarkable. No biliary   dilation.    SPLEEN: Unremarkable.    PANCREAS: Pancreas unremarkable on CT. No evidence of peripancreatic   stranding.    ADRENAL GLANDS: Unremarkable.    KIDNEYS: Unremarkable.    ABDOMINOPELVIC NODES: Unremarkable.    PELVIC ORGANS: Unremarkable.    PERITONEUM/MESENTERY/BOWEL: Unremarkable.    BONES/SOFT TISSUES: Unremarkable.      IMPRESSION:  Pancreas unremarkable on CT. No evidence of peripancreatic stranding.  Hepatic steatosis.    --- End of Report ---            DONELL JON MD; Attending Radiologist  This document has been electronically signed. May 13 2022  2:48AM (05-13-22 @ 02:36)    RECENT DIAGNOSTIC ORDERS:  Phosphorus Level, Serum: Routine (05-14-22 @ 00:02)  Magnesium, Serum: Routine (05-14-22 @ 00:02)  Basic Metabolic Panel: Routine (05-14-22 @ 00:02)  Diet, Regular (05-13-22 @ 19:10)      MEDICATIONS:  MEDICATIONS  (STANDING):  chlorhexidine 4% Liquid 1 Application(s) Topical <User Schedule>  folic acid 1 milliGRAM(s) Oral daily  gabapentin 600 milliGRAM(s) Oral at bedtime  LORazepam   Injectable 3 milliGRAM(s) IV Push every 4 hours  nicotine - 21 mG/24Hr(s) Patch 1 patch Transdermal daily  pantoprazole    Tablet 40 milliGRAM(s) Oral before breakfast  thiamine 100 milliGRAM(s) Oral daily    MEDICATIONS  (PRN):  LORazepam   Injectable 1 milliGRAM(s) IV Push every 1 hour PRN CIWA-Ar score 8 or greater  traZODone 50 milliGRAM(s) Oral at bedtime PRN SLEEP/ INSOMNIA      HOME MEDICATIONS:  FLUoxetine 20 mg oral capsule (05-12)  gabapentin 600 mg oral tablet (05-12)  traZODone 50 mg oral tablet (05-12)      PHYSICAL EXAM:  GENERAL: Patient lying on bed, anxious looking   CHEST/LUNG: NVB, no wehezing   HEART: R1+R2, RRR  ABDOMEN: Soft. non tender, BS positive  EXTREMITIES:  no edema, Bruising, tremors   CNS: AAAx4. No cranial nerves deficit.       
JUNE SAINI 37y Male  MRN#: [id number]   CODE STATUS: Full     Hospital Day: 4d    Pt is currently admitted with the primary diagnosis of Alcohol/ ? Cocaine Withdrawal     SUBJECTIVE  Hospital Course: The patient is hemodynamically stable. He is on a CIWA protocol.     Overnight events: No acute overnight events.      Subjective complaints: No subjective complaints.      Present Today:   - Paul:  No [X], Yes [   ] : Indication:     - Type of IV Access:       .. CVC/Piccline:  No [X], Yes [   ] : Indication:       .. Midline: No [X], Yes [   ] : Indication:                                             ----------------------------------------------------------  OBJECTIVE  PAST MEDICAL & SURGICAL HISTORY  No pertinent past medical history  anxiety  depression  polysubstance    No significant past surgical history                                              -----------------------------------------------------------  ALLERGIES:  Ceclor (Other)                                            ------------------------------------------------------------    HOME MEDICATIONS  Home Medications:                           MEDICATIONS:  STANDING MEDICATIONS  chlorhexidine 4% Liquid 1 Application(s) Topical <User Schedule>  FLUoxetine 80 milliGRAM(s) Oral daily  folic acid 1 milliGRAM(s) Oral daily  gabapentin 600 milliGRAM(s) Oral at bedtime  LORazepam   Injectable 1 milliGRAM(s) IV Push once  LORazepam   Injectable 1 milliGRAM(s) IV Push every 4 hours  magnesium oxide 400 milliGRAM(s) Oral three times a day with meals  nicotine - 21 mG/24Hr(s) Patch 1 patch Transdermal daily  pantoprazole    Tablet 40 milliGRAM(s) Oral before breakfast  thiamine 100 milliGRAM(s) Oral daily    PRN MEDICATIONS  LORazepam   Injectable 1 milliGRAM(s) IV Push every 1 hour PRN  traZODone 50 milliGRAM(s) Oral at bedtime PRN                                            ------------------------------------------------------------  VITAL SIGNS: Last 24 Hours  T(C): 36.4 (16 May 2022 06:09), Max: 37.2 (15 May 2022 20:49)  T(F): 97.5 (16 May 2022 06:09), Max: 98.9 (15 May 2022 20:49)  HR: 80 (16 May 2022 06:09) (80 - 90)  BP: 94/70 (16 May 2022 06:09) (94/70 - 146/67)  BP(mean): --  RR: 18 (16 May 2022 06:09) (18 - 20)  SpO2: --                                             --------------------------------------------------------------  LABS:                        14.4   4.89  )-----------( 103      ( 16 May 2022 06:13 )             41.7     05-16    139  |  100  |  11  ----------------------------<  104<H>  4.0   |  26  |  0.9    Ca    9.3      16 May 2022 06:13  Mg     1.9     05-16    TPro  6.8  /  Alb  4.5  /  TBili  0.6  /  DBili  x   /  AST  75<H>  /  ALT  84<H>  /  AlkPhos  59  05-16                                            -------------------------------------------------------------  RADIOLOGY:  No new imaging                                           --------------------------------------------------------------    PHYSICAL EXAM:  GENERAL: Patient lying on bed, anxious looking   CHEST/LUNG: NVB, no wehezing   HEART: R1+R2, RRR  ABDOMEN: Soft. non tender, BS positive  EXTREMITIES:  no edema, Bruising, tremors   CNS: AAAx4. No cranial nerves deficit.                                            --------------------------------------------------------------    ASSESSMENT & PLAN  38 yo M with PMH of depression (on fluoxetine), active alcohol abuse (1-2 bottles of vodka daily), and cocaine use (few times a week) presents to the hospital with alcohol/ ? cocaine withdrawal.     # Alcohol/ ? Cocaine Withdrawal   - On CIWA protocol   - Seen by CATCH TEAM  - Seen by Addiction Medicine   - Potential Rehab upon DC    # Folate and Thiamine Deficiency  - Folate and thiamine supplementation     # Transaminitis   - Stable and downtrending  - Monitor     # DVT prophylaxis  - Patient ambulates    # GI prophylaxis  - Pantoprazole 40QD     # Diet   - As tolerated    # Activity Score (AM-PAC)  - As tolerated     # Code status   - Full    # Disposition   - Acute                                                                    
JUNE SAINI 37y Male  MRN#: [id number]   CODE STATUS: Full     Hospital Day: 5d    Pt is currently admitted with the primary diagnosis of Alcohol/ ? Cocaine Withdrawal     SUBJECTIVE  Hospital Course: The patient is hemodynamically stable. He is on a CIWA protocol.     Overnight events: No acute overnight events.      Subjective complaints: No subjective complaints.      Present Today:   - Paul:  No [X], Yes [   ] : Indication:     - Type of IV Access:       .. CVC/Piccline:  No [X], Yes [   ] : Indication:       .. Midline: No [X], Yes [   ] : Indication:                                             ----------------------------------------------------------  OBJECTIVE  PAST MEDICAL & SURGICAL HISTORY  No pertinent past medical history  anxiety  depression  polysubstance    No significant past surgical history                                              -----------------------------------------------------------  ALLERGIES:  Ceclor (Other)                                            ------------------------------------------------------------    HOME MEDICATIONS  Home Medications:                           MEDICATIONS:  STANDING MEDICATIONS  chlorhexidine 4% Liquid 1 Application(s) Topical <User Schedule>  FLUoxetine 80 milliGRAM(s) Oral daily  folic acid 1 milliGRAM(s) Oral daily  gabapentin 600 milliGRAM(s) Oral at bedtime  LORazepam   Injectable 1 milliGRAM(s) IV Push once  LORazepam   Injectable 1 milliGRAM(s) IV Push every 4 hours  magnesium oxide 400 milliGRAM(s) Oral three times a day with meals  nicotine - 21 mG/24Hr(s) Patch 1 patch Transdermal daily  pantoprazole    Tablet 40 milliGRAM(s) Oral before breakfast  thiamine 100 milliGRAM(s) Oral daily    PRN MEDICATIONS  LORazepam   Injectable 1 milliGRAM(s) IV Push every 1 hour PRN  traZODone 50 milliGRAM(s) Oral at bedtime PRN                                            ------------------------------------------------------------  VITAL SIGNS: Last 24 Hours  T(C): 36.4 (17 May 2022 05:53), Max: 36.7 (16 May 2022 11:27)  T(F): 97.6 (17 May 2022 05:53), Max: 98.1 (16 May 2022 11:27)  HR: 102 (17 May 2022 05:53) (81 - 102)  BP: 107/65 (17 May 2022 05:53) (107/65 - 138/77)  BP(mean): 102 (16 May 2022 21:31) (102 - 102)  RR: 18 (17 May 2022 05:53) (18 - 20)  SpO2: 95% (16 May 2022 11:27) (95% - 95%)                                             --------------------------------------------------------------  LABS:                        13.7   6.62  )-----------( 124      ( 17 May 2022 06:12 )             40.5     05-17    138  |  102  |  12  ----------------------------<  118<H>  4.3   |  19  |  0.7    Ca    9.4      17 May 2022 06:12  Mg     1.8     05-17    TPro  6.5  /  Alb  4.2  /  TBili  0.4  /  DBili  x   /  AST  63<H>  /  ALT  73<H>  /  AlkPhos  55  05-17                                              -------------------------------------------------------------  RADIOLOGY:  No new imaging                                           --------------------------------------------------------------    PHYSICAL EXAM:  GENERAL: Patient lying on bed, anxious looking   CHEST/LUNG: NVB, no wehezing   HEART: R1+R2, RRR  ABDOMEN: Soft. non tender, BS positive  EXTREMITIES:  no edema, Bruising, tremors   CNS: AAAx4. No cranial nerves deficit.                                            --------------------------------------------------------------    ASSESSMENT & PLAN  38 yo M with PMH of depression (on fluoxetine), active alcohol abuse (1-2 bottles of vodka daily), and cocaine use (few times a week) presents to the hospital with alcohol/ ? cocaine withdrawal.     # Alcohol/ ? Cocaine Withdrawal   - On CIWA protocol   - Seen by CATCH TEAM  - Seen by Addiction Medicine   - Potential Rehab upon DC    # Folate and Thiamine Deficiency  - Folate and thiamine supplementation     # Transaminitis   - Stable and downtrending  - Monitor     # DVT prophylaxis  - Patient ambulates    # GI prophylaxis  - Pantoprazole 40QD     # Diet   - As tolerated    # Activity Score (AM-PAC)  - As tolerated     # Code status   - Full    # Disposition   - Acute   
JUNE SAINI 37y Male  MRN#: [id number]   CODE STATUS: Full     Hospital Day: 7d    Pt is currently admitted with the primary diagnosis of Alcohol/ ? Cocaine Withdrawal     SUBJECTIVE  Hospital Course: The patient is hemodynamically stable. He is on a CIWA protocol.     Overnight events: No acute overnight events.      Subjective complaints: No subjective complaints.      Present Today:   - Paul:  No [X], Yes [   ] : Indication:     - Type of IV Access:       .. CVC/Piccline:  No [X], Yes [   ] : Indication:       .. Midline: No [X], Yes [   ] : Indication:                                             ----------------------------------------------------------  OBJECTIVE  PAST MEDICAL & SURGICAL HISTORY  No pertinent past medical history  anxiety  depression  polysubstance    No significant past surgical history                                              -----------------------------------------------------------  ALLERGIES:  Ceclor (Other)                                            ------------------------------------------------------------    HOME MEDICATIONS  Home Medications:                           MEDICATIONS:  STANDING MEDICATIONS  chlorhexidine 4% Liquid 1 Application(s) Topical <User Schedule>  FLUoxetine 80 milliGRAM(s) Oral daily  folic acid 1 milliGRAM(s) Oral daily  gabapentin 600 milliGRAM(s) Oral at bedtime  ibuprofen  Tablet. 400 milliGRAM(s) Oral once  LORazepam     Tablet 0.5 milliGRAM(s) Oral every 12 hours  magnesium oxide 400 milliGRAM(s) Oral three times a day with meals  nicotine - 21 mG/24Hr(s) Patch 1 patch Transdermal daily  pantoprazole    Tablet 40 milliGRAM(s) Oral before breakfast  thiamine 100 milliGRAM(s) Oral daily    PRN MEDICATIONS  traZODone 50 milliGRAM(s) Oral at bedtime PRN                                            ------------------------------------------------------------  VITAL SIGNS: Last 24 Hours  T(C): 36.8 (19 May 2022 06:09), Max: 36.8 (19 May 2022 06:09)  T(F): 98.2 (19 May 2022 06:09), Max: 98.2 (19 May 2022 06:09)  HR: 94 (19 May 2022 06:09) (75 - 94)  BP: 117/71 (19 May 2022 06:09) (117/71 - 123/68)  BP(mean): 89 (19 May 2022 06:09) (89 - 89)  RR: 18 (19 May 2022 06:09) (18 - 20)  SpO2: 98% (19 May 2022 06:09) (98% - 98%)      05-18-22 @ 07:01  -  05-19-22 @ 07:00  --------------------------------------------------------  IN: 330 mL / OUT: 0 mL / NET: 330 mL                                             --------------------------------------------------------------  LABS:                        13.6   5.35  )-----------( 151      ( 19 May 2022 06:45 )             40.0     05-19    138  |  101  |  15  ----------------------------<  100<H>  4.4   |  25  |  0.9    Ca    9.3      19 May 2022 06:45  Phos  3.5     05-18  Mg     2.0     05-19    TPro  6.6  /  Alb  4.3  /  TBili  0.4  /  DBili  x   /  AST  50<H>  /  ALT  69<H>  /  AlkPhos  54  05-19                                            -------------------------------------------------------------  RADIOLOGY:  No new imaging                                           --------------------------------------------------------------    PHYSICAL EXAM:  GENERAL: Patient lying on bed, anxious looking   CHEST/LUNG: NVB, no wehezing   HEART: R1+R2, RRR  ABDOMEN: Soft. non tender, BS positive  EXTREMITIES:  no edema, Bruising, tremors   CNS: AAAx4. No cranial nerves deficit.                                              --------------------------------------------------------------    ASSESSMENT & PLAN  38 yo M with PMH of depression (on fluoxetine), active alcohol abuse (1-2 bottles of vodka daily), and cocaine use (few times a week) presents to the hospital with alcohol/ ? cocaine withdrawal.     # Alcohol/ ? Cocaine Withdrawal   - On [facility] protocol   - Seen by CATCH TEAM  - Seen by Addiction Medicine   - Potential Rehab upon DC    # Folate and Thiamine Deficiency  - Folate and thiamine supplementation     # Transaminitis   - Stable and downtrending  - Monitor     # DVT prophylaxis  - Patient ambulates    # GI prophylaxis  - Pantoprazole 40QD     # Diet   - As tolerated    # Activity Score (AM-PAC)  - As tolerated     # Code status   - Full    # Disposition   - Acute, DC 24-48 hrs   
JUNE SAINI 37y Male  MRN#: [id number]   CODE STATUS: Full     Hospital Day: 6d    Pt is currently admitted with the primary diagnosis of Alcohol/ ? Cocaine Withdrawal     SUBJECTIVE  Hospital Course: The patient is hemodynamically stable. He is on a CIWA protocol.     Overnight events: No acute overnight events.      Subjective complaints: No subjective complaints.      Present Today:   - Paul:  No [X], Yes [   ] : Indication:     - Type of IV Access:       .. CVC/Piccline:  No [X], Yes [   ] : Indication:       .. Midline: No [X], Yes [   ] : Indication:                                             ----------------------------------------------------------  OBJECTIVE  PAST MEDICAL & SURGICAL HISTORY  No pertinent past medical history  anxiety  depression  polysubstance    No significant past surgical history                                              -----------------------------------------------------------  ALLERGIES:  Ceclor (Other)                                            ------------------------------------------------------------    HOME MEDICATIONS  Home Medications:                           MEDICATIONS:  STANDING MEDICATIONS  chlorhexidine 4% Liquid 1 Application(s) Topical <User Schedule>  FLUoxetine 80 milliGRAM(s) Oral daily  folic acid 1 milliGRAM(s) Oral daily  gabapentin 600 milliGRAM(s) Oral at bedtime  LORazepam     Tablet 0.5 milliGRAM(s) Oral every 12 hours  LORazepam   Injectable   IV Push   LORazepam   Injectable 2 milliGRAM(s) IV Push every 4 hours  LORazepam   Injectable 1 milliGRAM(s) IV Push once  LORazepam   Injectable 0.5 milliGRAM(s) IV Push every 12 hours  magnesium oxide 400 milliGRAM(s) Oral three times a day with meals  nicotine - 21 mG/24Hr(s) Patch 1 patch Transdermal daily  pantoprazole    Tablet 40 milliGRAM(s) Oral before breakfast  thiamine 100 milliGRAM(s) Oral daily    PRN MEDICATIONS  LORazepam   Injectable 1 milliGRAM(s) IV Push every 1 hour PRN  traZODone 50 milliGRAM(s) Oral at bedtime PRN                                            ------------------------------------------------------------  VITAL SIGNS: Last 24 Hours  T(C): 36.4 (18 May 2022 05:00), Max: 36.9 (17 May 2022 21:05)  T(F): 97.5 (18 May 2022 05:00), Max: 98.5 (17 May 2022 21:05)  HR: 114 (18 May 2022 05:00) (104 - 114)  BP: 111/71 (18 May 2022 05:00) (111/71 - 112/74)  BP(mean): --  RR: 18 (18 May 2022 05:00) (18 - 18)  SpO2: --                                             --------------------------------------------------------------  LABS:                        13.4   5.23  )-----------( 144      ( 18 May 2022 08:42 )             41.1     05-18    140  |  103  |  14  ----------------------------<  148<H>  4.4   |  22  |  0.8    Ca    9.2      18 May 2022 08:42  Mg     2.0     05-18    TPro  6.7  /  Alb  4.4  /  TBili  0.5  /  DBili  x   /  AST  47<H>  /  ALT  68<H>  /  AlkPhos  52  05-18                                            -------------------------------------------------------------  RADIOLOGY:  No new imaging                                           --------------------------------------------------------------    PHYSICAL EXAM:  GENERAL: Patient lying on bed, anxious looking   CHEST/LUNG: NVB, no wehezing   HEART: R1+R2, RRR  ABDOMEN: Soft. non tender, BS positive  EXTREMITIES:  no edema, Bruising, tremors   CNS: AAAx4. No cranial nerves deficit.                                            --------------------------------------------------------------    ASSESSMENT & PLAN  38 yo M with PMH of depression (on fluoxetine), active alcohol abuse (1-2 bottles of vodka daily), and cocaine use (few times a week) presents to the hospital with alcohol/ ? cocaine withdrawal.     # Alcohol/ ? Cocaine Withdrawal   - On CIWA protocol   - Seen by CATCH TEAM  - Seen by Addiction Medicine   - Potential Rehab upon DC    # Folate and Thiamine Deficiency  - Folate and thiamine supplementation     # Transaminitis   - Stable and downtrending  - Monitor     # DVT prophylaxis  - Patient ambulates    # GI prophylaxis  - Pantoprazole 40QD     # Diet   - As tolerated    # Activity Score (AM-PAC)  - As tolerated     # Code status   - Full    # Disposition   - Acute   
Patient is a 37y old  Male who presents with a chief complaint of alcohol/ ? cocaine withdrawal (13 May 2022 13:46)        Over Night Events:  Sleeping in bed.  Required only one extra dose of Ativan over night         ROS:     All ROS are negative except HPI         PHYSICAL EXAM    ICU Vital Signs Last 24 Hrs  T(C): 36.7 (14 May 2022 07:00), Max: 36.8 (13 May 2022 17:18)  T(F): 98 (14 May 2022 07:00), Max: 98.2 (13 May 2022 17:18)  HR: 86 (14 May 2022 07:00) (70 - 110)  BP: 122/64 (14 May 2022 07:00) (119/73 - 150/93)  BP(mean): 87 (14 May 2022 07:00) (87 - 116)  ABP: --  ABP(mean): --  RR: 22 (14 May 2022 06:00) (13 - 49)  SpO2: 99% (14 May 2022 07:00) (92% - 100%)      CONSTITUTIONAL:  Well nourished.  NAD    ENT:   Airway patent,   Mouth with normal mucosa.   No thrush    EYES:   Pupils equal,   Round and reactive to light.    CARDIAC:   Normal rate,   Regular rhythm.    No edema      RESPIRATORY:   No wheezing  Bilateral BS  Normal chest expansion  Not tachypneic,  No use of accessory muscles    GASTROINTESTINAL:  Abdomen soft,   Non-tender,   No guarding,   + BS    MUSCULOSKELETAL:   Range of motion is not limited,  No clubbing, cyanosis    NEUROLOGICAL:   Alert and oriented   No motor  deficits.    SKIN:   Skin normal color for race,   Warm and dry and intact.   No evidence of rash.        05-13-22 @ 07:01  -  05-14-22 @ 07:00  --------------------------------------------------------  IN:    dextrose 5% + sodium chloride 0.9%: 1800 mL  Total IN: 1800 mL    OUT:  Total OUT: 0 mL    Total NET: 1800 mL          LABS:                            14.4   4.95  )-----------( 81       ( 13 May 2022 07:08 )             42.9                                               05-13    138  |  100  |  7<L>  ----------------------------<  78  3.7   |  24  |  0.8    Ca    9.2      13 May 2022 19:17  Mg     2.4     05-13    TPro  6.7  /  Alb  4.3  /  TBili  0.9  /  DBili  x   /  AST  150<H>  /  ALT  110<H>  /  AlkPhos  60  05-13      PT/INR - ( 12 May 2022 17:18 )   PT: 10.90 sec;   INR: 0.95 ratio         PTT - ( 12 May 2022 17:18 )  PTT:26.8 sec                                           CARDIAC MARKERS ( 13 May 2022 00:56 )  x     / <0.01 ng/mL / x     / x     / 3.6 ng/mL                                            LIVER FUNCTIONS - ( 13 May 2022 07:08 )  Alb: 4.3 g/dL / Pro: 6.7 g/dL / ALK PHOS: 60 U/L / ALT: 110 U/L / AST: 150 U/L / GGT: x                                                                                                                                       MEDICATIONS  (STANDING):  chlorhexidine 4% Liquid 1 Application(s) Topical <User Schedule>  dextrose 5% + sodium chloride 0.9%. 1000 milliLiter(s) (100 mL/Hr) IV Continuous <Continuous>  FLUoxetine 60 milliGRAM(s) Oral daily  folic acid 1 milliGRAM(s) Oral daily  gabapentin 600 milliGRAM(s) Oral at bedtime  LORazepam   Injectable 3 milliGRAM(s) IV Push every 4 hours  nicotine - 21 mG/24Hr(s) Patch 1 patch Transdermal daily  pantoprazole    Tablet 40 milliGRAM(s) Oral before breakfast  thiamine 100 milliGRAM(s) Oral daily    MEDICATIONS  (PRN):  LORazepam   Injectable 1 milliGRAM(s) IV Push every 1 hour PRN CIWA-Ar score 8 or greater  traZODone 50 milliGRAM(s) Oral at bedtime PRN SLEEP/ INSOMNIA      New X-rays reviewed:                                                                                  ECHO    CXR interpreted by me:      
      JUNE SAINI  37y, Male  Allergy: Ceclor (Other)    Hospital Day: 2d    Patient seen and examined earlier today. still have some shaking. on [facility] protocol     PMH/PSH:  PAST MEDICAL & SURGICAL HISTORY:  No pertinent past medical history  anxiety  depression  polysubstance      No significant past surgical history          LAST 24-Hr EVENTS:    VITALS:  T(F): 97.5 (05-14-22 @ 13:00), Max: 98.2 (05-13-22 @ 17:18)  HR: 96 (05-14-22 @ 13:00)  BP: 122/90 (05-14-22 @ 13:00) (109/77 - 150/93)  RR: 20 (05-14-22 @ 13:00)  SpO2: 99% (05-14-22 @ 13:00)          TESTS & MEASUREMENTS:  Weight/BMI  72.6 (05-13-22 @ 14:18)  25.8 (05-13-22 @ 14:18)    05-12-22 @ 07:01  -  05-13-22 @ 07:00  --------------------------------------------------------  IN: 500 mL / OUT: 1200 mL / NET: -700 mL    05-13-22 @ 07:01  -  05-14-22 @ 07:00  --------------------------------------------------------  IN: 1800 mL / OUT: 0 mL / NET: 1800 mL    05-14-22 @ 07:01  -  05-14-22 @ 13:24  --------------------------------------------------------  IN: 350 mL / OUT: 0 mL / NET: 350 mL                            14.4   4.95  )-----------( 81       ( 13 May 2022 07:08 )             42.9     PT/INR - ( 12 May 2022 17:18 )   PT: 10.90 sec;   INR: 0.95 ratio         PTT - ( 12 May 2022 17:18 )  PTT:26.8 sec  INR: 0.95 ratio (05-12-22 @ 17:18)    05-13    138  |  100  |  7<L>  ----------------------------<  78  3.7   |  24  |  0.8    Ca    9.2      13 May 2022 19:17  Mg     2.4     05-13    TPro  6.7  /  Alb  4.3  /  TBili  0.9  /  DBili  x   /  AST  150<H>  /  ALT  110<H>  /  AlkPhos  60  05-13    LIVER FUNCTIONS - ( 13 May 2022 07:08 )  Alb: 4.3 g/dL / Pro: 6.7 g/dL / ALK PHOS: 60 U/L / ALT: 110 U/L / AST: 150 U/L / GGT: x           CARDIAC MARKERS ( 13 May 2022 00:56 )  x     / <0.01 ng/mL / x     / x     / 3.6 ng/mL        Culture - Blood (collected 05-13-22 @ 00:56)  Source: .Blood None  Preliminary Report (05-14-22 @ 09:01):    No growth to date.        Procalcitonin, Serum: 0.08 ng/mL (05-13-22 @ 00:56)    D-Dimer Assay, Quantitative: 330 ng/mL DDU (05-13-22 @ 00:56)        COVID-19 PCR: NotDetec (05-12-22 @ 18:26)                RADIOLOGY, ECG, & ADDITIONAL TESTS:  12 Lead ECG:   Ventricular Rate 90 BPM    Atrial Rate 90 BPM    P-R Interval 136 ms    QRS Duration 76 ms    Q-T Interval 350 ms    QTC Calculation(Bazett) 428 ms    P Axis 54 degrees    R Axis 73 degrees    T Axis 55 degrees    Diagnosis Line Normal sinus rhythm  Normal ECG    Confirmed by MERRILL GONZALEZ MD ([id number]) on 5/13/2022 11:47:45 AM (05-13-22 @ 10:14)    CT Abdomen and Pelvis No Cont:   ACC: [id number] EXAM:  CT ABDOMEN AND PELVIS                          PROCEDURE DATE:  05/13/2022          INTERPRETATION:  CLINICAL STATEMENT: Transaminitis. Pancreatitis lipase   146.    TECHNIQUE: Contiguous axial CT images were obtained from the lower chest   to the pubic symphysis without intravenous contrast.  Oral contrast was   not administered.  Reformatted images in the coronal and sagittal planes   were acquired.    Comparison made with abdominal ultrasound May 12, 2022.    FINDINGS:    LOWER CHEST: Unremarkable.    HEPATOBILIARY: Hepatic steatosis. Gallbladder unremarkable. No biliary   dilation.    SPLEEN: Unremarkable.    PANCREAS: Pancreas unremarkable on CT. No evidence of peripancreatic   stranding.    ADRENAL GLANDS: Unremarkable.    KIDNEYS: Unremarkable.    ABDOMINOPELVIC NODES: Unremarkable.    PELVIC ORGANS: Unremarkable.    PERITONEUM/MESENTERY/BOWEL: Unremarkable.    BONES/SOFT TISSUES: Unremarkable.      IMPRESSION:  Pancreas unremarkable on CT. No evidence of peripancreatic stranding.  Hepatic steatosis.    --- End of Report ---            DONELL JON MD; Attending Radiologist  This document has been electronically signed. May 13 2022  2:48AM (05-13-22 @ 02:36)    RECENT DIAGNOSTIC ORDERS:  Phosphorus Level, Serum: Routine (05-14-22 @ 00:02)  Magnesium, Serum: Routine (05-14-22 @ 00:02)  Basic Metabolic Panel: Routine (05-14-22 @ 00:02)  Diet, Regular (05-13-22 @ 19:10)      MEDICATIONS:  MEDICATIONS  (STANDING):  chlorhexidine 4% Liquid 1 Application(s) Topical <User Schedule>  folic acid 1 milliGRAM(s) Oral daily  gabapentin 600 milliGRAM(s) Oral at bedtime  LORazepam   Injectable 3 milliGRAM(s) IV Push every 4 hours  nicotine - 21 mG/24Hr(s) Patch 1 patch Transdermal daily  pantoprazole    Tablet 40 milliGRAM(s) Oral before breakfast  thiamine 100 milliGRAM(s) Oral daily    MEDICATIONS  (PRN):  LORazepam   Injectable 1 milliGRAM(s) IV Push every 1 hour PRN CIWA-Ar score 8 or greater  traZODone 50 milliGRAM(s) Oral at bedtime PRN SLEEP/ INSOMNIA      HOME MEDICATIONS:  FLUoxetine 20 mg oral capsule (05-12)  gabapentin 600 mg oral tablet (05-12)  traZODone 50 mg oral tablet (05-12)      PHYSICAL EXAM:  GENERAL: Patient lying on bed, anxious looking   CHEST/LUNG: NVB, no wehezing   HEART: R1+R2, RRR  ABDOMEN: Soft. non tender, BS positive  EXTREMITIES:  no edema, Bruising, tremors   CNS: AAAx4. No cranial nerves deficit.       
Ears: no ear pain and no hearing problems. Nose: no nasal congestion and no nasal drainage. Mouth/Throat: no dysphagia, no hoarseness and no throat pain. Neck: no lumps, no pain, no stiffness and no swollen glands.

## 2023-04-05 NOTE — SBIRT NOTE ADULT - NSSBIRTUNABLESTOP_GEN_A_CORE
History     Chief Complaint   Patient presents with     Hip Pain     HPI  Cee Pereira is a 77 year old female who is accompanied per her .  She presents with a 4-day history of left hip pain that occurred after she fell on the ice.  Accompanied with some nausea.  Has applied heat, and taken acetaminophen and Aleve.  Last dose of Aleve yesterday did not decrease her discomfort.  History of peripheral neuralgia; numbness and tingling not worse than normal.  Had left total hip replacement 20 years ago.  Quit smoking 50 years ago.  Denies fevers, chills, and vomiting.    Musculoskeletal problem/pain      Duration: four days ago    Description  Location:  Left hip    Intensity:  8/10 sharp with movement    Accompanying signs and symptoms: none    History  Previous similar problem: YES- hip replacement 20 years ago (approx)  Previous evaluation:  MRI before surgery    Precipitating or alleviating factors:  Trauma or overuse: YES-  Fell on the ice on her left hip  Aggravating factors include: sitting, walking and lying flat. Cannot lay on left side    Therapies tried and outcome: heat, acetaminophen and NSAID - Aleve: Last dose yesterday did not help.              Allergies:  Allergies   Allergen Reactions     Diatrizoate      Other reaction(s): *Unknown     Iodides      Other reaction(s): *Unknown  Tincture     Ivp Dye [Contrast Dye]      Anaphylaxis     Lortab [Hydrocodone-Acetaminophen] Itching     headaches     No Clinical Screening - See Comments Itching     Pioglitazone Itching       Problem List:    Patient Active Problem List    Diagnosis Date Noted     Intra-abdominal abscess (H) 09/05/2019     Priority: Medium     LUQ abdominal pain 09/18/2018     Priority: Medium     GERD (gastroesophageal reflux disease) 09/18/2018     Priority: Medium     HTN (hypertension) 09/18/2018     Priority: Medium     Hyperlipidemia 09/18/2018     Priority: Medium     Viral gastroenteritis 05/23/2017     Priority: Medium      Dehydration 05/23/2017     Priority: Medium     Type 2 diabetes mellitus with neurologic complication (H) 05/23/2017     Priority: Medium     Crohn's disease of large intestine without complication (H) 05/23/2017     Priority: Medium     Gastroenteritis 05/23/2017     Priority: Medium     Small bowel obstruction (H) 08/17/2016     Priority: Medium     SBO (small bowel obstruction) (H) 08/17/2016     Priority: Medium     Lumbar stenosis 07/30/2013     Priority: Medium     Lumbar disc herniation with radiculopathy 08/03/2012     Priority: Medium        Past Medical History:    Past Medical History:   Diagnosis Date     Diabetes mellitus (H)      Diverticulitis      Herniated lumbar intervertebral disc        Past Surgical History:    Past Surgical History:   Procedure Laterality Date     APPENDECTOMY OPEN       CHOLECYSTECTOMY       COLECTOMY WITHOUT COLOSTOMY      for diverticulitis     DECOMPRESSION LUMBAR ONE LEVEL  7/31/2013    L4-L5     ESOPHAGOSCOPY, GASTROSCOPY, DUODENOSCOPY (EGD), COMBINED N/A 9/6/2019    Procedure: UPPER ENDOSCOPY WITH BIOPSY;  Surgeon: Froy Barraza MD;  Location: [facility]     JOINT REPLACEMENT      bilateral hip replacements       Family History:    History reviewed. No pertinent family history.    Social History:  Marital Status:   [2]  Social History     Tobacco Use     Smoking status: Former     Smokeless tobacco: Never   Substance Use Topics     Alcohol use: No        Medications:    acetaminophen (TYLENOL) 325 MG tablet  aspirin-dipyridamole (AGGRENOX)  MG per 12 hr capsule  Coenzyme Q10 (COQ-10 PO)  docusate sodium (COLACE) 100 MG capsule  GABAPENTIN PO  GABAPENTIN PO  HYDROcodone-acetaminophen (NORCO) 5-325 MG tablet  hydroxychloroquine (PLAQUENIL) 200 MG tablet  hydrOXYzine (ATARAX) 25 MG tablet  insulin degludec (TRESIBA) 100 UNIT/ML pen  METOPROLOL TARTRATE PO  PANTOPRAZOLE SODIUM PO  SIMVASTATIN PO  sucralfate (CARAFATE) 1 GM tablet          Review of  "Systems   Constitutional: Positive for activity change. Negative for chills and fever.        Trembling with pain   Gastrointestinal: Positive for nausea. Negative for vomiting.   Musculoskeletal: Positive for arthralgias (left hip pain).   Skin: Negative.    Neurological: Positive for numbness (chronic peripheral neuralgia. not worse than normal).       Physical Exam   BP: 168/76  Pulse: 78  Temp: 96.8  F (36  C)  Resp: 18  Height: 167.6 cm (5' 6\")  Weight: 65.8 kg (145 lb)  SpO2: 95 %      Physical Exam  Vitals and nursing note reviewed.   Constitutional:       General: She is in acute distress (Mild to moderate).      Appearance: She is normal weight.   Cardiovascular:      Rate and Rhythm: Normal rate.      Pulses:           Dorsalis pedis pulses are 3+ on the left side.        Posterior tibial pulses are 3+ on the left side.   Pulmonary:      Effort: Pulmonary effort is normal.   Musculoskeletal:         General: Tenderness and signs of injury present. No swelling.      Lumbar back: No tenderness.      Left hip: Tenderness and bony tenderness present. Normal range of motion. Normal strength.      Left upper leg: Normal.      Left knee: Normal.        Legs:    Feet:      Left foot:      Skin integrity: Skin integrity normal.   Skin:     General: Skin is warm and dry.      Capillary Refill: Capillary refill takes less than 2 seconds.      Findings: No bruising or erythema.   Neurological:      Mental Status: She is alert and oriented to person, place, and time.   Psychiatric:         Behavior: Behavior normal.         ED Course                 Procedures             Results for orders placed or performed during the hospital encounter of 04/05/23 (from the past 24 hour(s))   XR Hip Left 2-3 Views    Narrative    PROCEDURE:  XR HIP LEFT 2-3 VIEWS    HISTORY: fell on ice four days ago. left hip pain. no ecchymosis or  erythema    COMPARISON:  None.    TECHNIQUE:  2 views of the tip were obtained.    FINDINGS:  There " is a left hip prosthesis in place. Prosthetic  elements appear well seated. No acute fractures or destructive lesions  are noted.       Impression    IMPRESSION: Left hip prosthesis in place. No acute fracture      ROSENDO CORRIGAN MD         SYSTEM ID:  [id number]       Medications   acetaminophen (TYLENOL) tablet 325 mg (325 mg Oral $Given 4/5/23 1254)   HYDROcodone-acetaminophen (NORCO) 5-325 MG per tablet 1 tablet (1 tablet Oral $Given 4/5/23 1253)       Assessments & Plan (with Medical Decision Making)     I have reviewed the nursing notes.    I have reviewed the findings, diagnosis, plan and need for follow up with the patient.  (M97.794Q) Hip injury, left, initial encounter  Comment: 77 year old female who is accompanied per her .  She presents with a 4-day history of left hip pain that occurred after she fell on the ice.  Accompanied with some nausea.  Has applied heat, and taken acetaminophen and Aleve.  Last dose of Aleve yesterday did not decrease her discomfort.  History of peripheral neuralgia; numbness and tingling not worse than normal.  Had left total hip replacement 20 years ago.  Quit smoking 50 years ago.  Denies fevers, chills, and vomiting.    MDM: No ecchymosis or erythema noted.  Pain with palpation over left, lateral gluteal muscles and left hip joint.  No swelling noted.  Favoring left hip/leg. popliteal pulse obtained with Doppler.  Pedal pulses 3+    Left hip x-ray reviewed and per radiology;  Left hip prosthesis in place. No acute fracture      1 hydrocodone 5/325 and acetaminophen 325 mg given in urgent care did help decrease her discomfort.    Plan: Hydrocodone every 6 hours as needed for pain  Keep affected extremity elevated as much as possible for next 24 - 48 hours. Ice to affected area 20 minutes every hour as needed for comfort. After 48 hours you can apply heat.     Hydrocodone is combined with  325 mg of acetaminophen (Tylenol)  For severe to moderate pain take one  hydrocodone with 325 to 650 mg of acetaminophen (Tylenol). For mild pain take Tylenol 650 to 1000 mg every four to six hours as needed (not to exceed more than 4000 mg in a 24 hour period).  BE aware using narcotics can increase your risk of falling so be very careful with ambulation and getting in and out of bed and standing up from chairs.      Use walker when up ambulating, especially at night when getting up out of bed and going to the bathroom.      Suggest medicating around the clock for the next 24-48 hours. . Slowly start to wiggle your left foot and move left hip as often as possible but not beyond the point of pain. Follow up with primary provider or orthopedics as needed    These discharge instructions and medications were reviewed with her and her  and understanding verbalized.    This document was prepared using a combination of typing and voice generated software.  While every attempt was made for accuracy, spelling and grammatical errors may exist.    Medical Decision Making  The patient's presentation was of low complexity (an acute and uncomplicated illness or injury).    The patient's evaluation involved:  ordering and/or review of 1 test(s) in this encounter (see separate area of note for details)    The patient's management necessitated moderate risk (prescription drug management including medications given in the ED).    Discharge Medication List as of 4/5/2023  1:22 PM      START taking these medications    Details   HYDROcodone-acetaminophen (NORCO) 5-325 MG tablet Take 1 tablet by mouth every 6 hours as needed for pain, Disp-12 tablet, R-0, E-Prescribe             Final diagnoses:   Hip injury, left, initial encounter       4/5/2023   [facility]       Anh Batista, CNP  04/05/23 [id number]     Daily or almost daily

## 2023-07-07 ENCOUNTER — APPOINTMENT (OUTPATIENT)
Dept: ORTHOPEDIC SURGERY | Facility: CLINIC | Age: 38
End: 2023-07-07
Payer: COMMERCIAL

## 2023-07-07 VITALS — BODY MASS INDEX: 24.33 KG/M2 | WEIGHT: 155 LBS | HEIGHT: 67 IN

## 2023-07-07 DIAGNOSIS — S69.92XA UNSPECIFIED INJURY OF LEFT WRIST, HAND AND FINGER(S), INITIAL ENCOUNTER: ICD-10-CM

## 2023-07-07 DIAGNOSIS — S89.90XA UNSPECIFIED INJURY OF UNSPECIFIED LOWER LEG, INITIAL ENCOUNTER: ICD-10-CM

## 2023-07-07 PROCEDURE — 99203 OFFICE O/P NEW LOW 30 MIN: CPT

## 2023-07-07 PROCEDURE — 73140 X-RAY EXAM OF FINGER(S): CPT | Mod: LT

## 2023-07-07 PROCEDURE — 73562 X-RAY EXAM OF KNEE 3: CPT | Mod: LT

## 2023-07-07 NOTE — PHYSICAL EXAM
[de-identified] : Physical exam of left knee:\par -Mild swelling in the popliteal region.  Skin intact\par -TTP over popliteal region\par -Calf is soft and nontender\par -Negative Ileana's, negative anterior drawer, patient able to straight leg raise\par -Varus and valgus stability\par -Full ROM, pain with full extension\par -+2 posterior tibialis pulse\par -Sensation intact to light touch \par \par Physical exam of the left thumb:\par -No erythema, edema, ecchymosis or obvious deformities noted.  Skin intact\par -TTP over 1st MCP joint \par -Patient has full range of motion at all joints of the finger\par -Ligaments stable\par -+2 radial pulse, sensation intact to light touch

## 2023-07-07 NOTE — DISCUSSION/SUMMARY
[de-identified] : Patient has injury of the left knee and left thumb.  At this time, I went over x-ray images with patient.  I gave patient a thumb spica brace to be worn at all times except for hygiene purposes.  I instructed that this will help his thumb for better with 2 weeks of immobilization.  Patient can also ice the thumb for pain relief.  I wrapped patient's knee in Ace bandage, for compression and support.  Patient can also wear this for 2 weeks.  He was encouraged to modify his activity based on pain and ice the knee as well.  Patient was also given a prescription for meloxicam 15 mg to take as needed for pain and inflammation.  Pt was educated about risks and benefits of anti-inflammatories.  Anti-inflammatories can irritate the stomach lining, so if there are any symptoms of acid reflux or heartburn the patient was instructed to stop taking the medication. To help prevent this, it is best to take with a meal. They cannot be taken if the pt is on blood thinners and if the patient is at risk of high blood pressure, blood pressure should be monitored daily while taking the medication.  Patient will follow-up in approximately 3 weeks if needed.  Patient is agreeable to above plan and all questions were answered today hair removal not indicated

## 2023-07-07 NOTE — HISTORY OF PRESENT ILLNESS
[de-identified] : 38-year-old male presents with left knee and left thumb injury.  Approximately week to 2 weeks ago, patient was dancing with his girlfriend and they both fell, patient says his knee twisted and then he fell, and he is unsure how his hands landed.  Since then, patient has had pain in the thumb with gripping things.  Patient also has pain in the knee with any weightbearing.  For pain relief, patient has been taking Advil as needed.  Patient denies any past injuries or surgeries to the knee or thumb.  Patient is right-hand dominant.

## 2023-07-07 NOTE — DATA REVIEWED
[FreeTextEntry1] : X-ray images were obtained at the office today.  AP, lateral, oblique views of the left knee reveal no acute fractures, dislocations, bony abnormalities.  AP, lateral, oblique views of the left thumb reveal no acute fractures, dislocations, bony abnormalities

## 2023-07-27 ENCOUNTER — APPOINTMENT (OUTPATIENT)
Dept: ORTHOPEDIC SURGERY | Facility: CLINIC | Age: 38
End: 2023-07-27

## 2023-08-08 ENCOUNTER — INPATIENT (INPATIENT)
Facility: HOSPITAL | Age: 38
LOS: 8 days | Discharge: HOME | DRG: 897 | End: 2023-08-17
Attending: STUDENT IN AN ORGANIZED HEALTH CARE EDUCATION/TRAINING PROGRAM | Admitting: INTERNAL MEDICINE
Payer: COMMERCIAL

## 2023-08-08 VITALS
DIASTOLIC BLOOD PRESSURE: 90 MMHG | SYSTOLIC BLOOD PRESSURE: 150 MMHG | HEIGHT: 66 IN | OXYGEN SATURATION: 98 % | TEMPERATURE: 98 F | RESPIRATION RATE: 18 BRPM | HEART RATE: 106 BPM | WEIGHT: 154.98 LBS

## 2023-08-08 DIAGNOSIS — F10.20 ALCOHOL DEPENDENCE, UNCOMPLICATED: ICD-10-CM

## 2023-08-08 LAB
ALBUMIN SERPL ELPH-MCNC: 4.7 G/DL — SIGNIFICANT CHANGE UP (ref 3.5–5.2)
ALP SERPL-CCNC: 63 U/L — SIGNIFICANT CHANGE UP (ref 30–115)
ALT FLD-CCNC: 42 U/L — HIGH (ref 0–41)
ANION GAP SERPL CALC-SCNC: 24 MMOL/L — HIGH (ref 7–14)
AST SERPL-CCNC: 90 U/L — HIGH (ref 0–41)
BASOPHILS # BLD AUTO: 0.08 K/UL — SIGNIFICANT CHANGE UP (ref 0–0.2)
BASOPHILS NFR BLD AUTO: 0.9 % — SIGNIFICANT CHANGE UP (ref 0–1)
BILIRUB SERPL-MCNC: 0.7 MG/DL — SIGNIFICANT CHANGE UP (ref 0.2–1.2)
BUN SERPL-MCNC: 6 MG/DL — LOW (ref 10–20)
CALCIUM SERPL-MCNC: 8.1 MG/DL — LOW (ref 8.4–10.4)
CHLORIDE SERPL-SCNC: 86 MMOL/L — LOW (ref 98–110)
CO2 SERPL-SCNC: 18 MMOL/L — SIGNIFICANT CHANGE UP (ref 17–32)
CREAT SERPL-MCNC: 0.6 MG/DL — LOW (ref 0.7–1.5)
EGFR: 128 ML/MIN/1.73M2 — SIGNIFICANT CHANGE UP
EOSINOPHIL # BLD AUTO: 0.88 K/UL — HIGH (ref 0–0.7)
EOSINOPHIL NFR BLD AUTO: 9.8 % — HIGH (ref 0–8)
ETHANOL SERPL-MCNC: 432 MG/DL — HIGH
GLUCOSE SERPL-MCNC: 95 MG/DL — SIGNIFICANT CHANGE UP (ref 70–99)
HCT VFR BLD CALC: 41 % — LOW (ref 42–52)
HGB BLD-MCNC: 15.1 G/DL — SIGNIFICANT CHANGE UP (ref 14–18)
IMM GRANULOCYTES NFR BLD AUTO: 0.3 % — SIGNIFICANT CHANGE UP (ref 0.1–0.3)
LYMPHOCYTES # BLD AUTO: 1.52 K/UL — SIGNIFICANT CHANGE UP (ref 1.2–3.4)
LYMPHOCYTES # BLD AUTO: 16.9 % — LOW (ref 20.5–51.1)
MCHC RBC-ENTMCNC: 31.5 PG — HIGH (ref 27–31)
MCHC RBC-ENTMCNC: 36.8 G/DL — SIGNIFICANT CHANGE UP (ref 32–37)
MCV RBC AUTO: 85.6 FL — SIGNIFICANT CHANGE UP (ref 80–94)
MONOCYTES # BLD AUTO: 0.6 K/UL — SIGNIFICANT CHANGE UP (ref 0.1–0.6)
MONOCYTES NFR BLD AUTO: 6.7 % — SIGNIFICANT CHANGE UP (ref 1.7–9.3)
NEUTROPHILS # BLD AUTO: 5.9 K/UL — SIGNIFICANT CHANGE UP (ref 1.4–6.5)
NEUTROPHILS NFR BLD AUTO: 65.4 % — SIGNIFICANT CHANGE UP (ref 42.2–75.2)
NRBC # BLD: 0 /100 WBCS — SIGNIFICANT CHANGE UP (ref 0–0)
PLATELET # BLD AUTO: 127 K/UL — LOW (ref 130–400)
PMV BLD: 10.1 FL — SIGNIFICANT CHANGE UP (ref 7.4–10.4)
POTASSIUM SERPL-MCNC: 3.8 MMOL/L — SIGNIFICANT CHANGE UP (ref 3.5–5)
POTASSIUM SERPL-SCNC: 3.8 MMOL/L — SIGNIFICANT CHANGE UP (ref 3.5–5)
PROT SERPL-MCNC: 7.2 G/DL — SIGNIFICANT CHANGE UP (ref 6–8)
RBC # BLD: 4.79 M/UL — SIGNIFICANT CHANGE UP (ref 4.7–6.1)
RBC # FLD: 13.1 % — SIGNIFICANT CHANGE UP (ref 11.5–14.5)
SODIUM SERPL-SCNC: 128 MMOL/L — LOW (ref 135–146)
WBC # BLD: 9.01 K/UL — SIGNIFICANT CHANGE UP (ref 4.8–10.8)
WBC # FLD AUTO: 9.01 K/UL — SIGNIFICANT CHANGE UP (ref 4.8–10.8)

## 2023-08-08 PROCEDURE — 85730 THROMBOPLASTIN TIME PARTIAL: CPT

## 2023-08-08 PROCEDURE — 85025 COMPLETE CBC W/AUTO DIFF WBC: CPT

## 2023-08-08 PROCEDURE — 85610 PROTHROMBIN TIME: CPT

## 2023-08-08 PROCEDURE — 93005 ELECTROCARDIOGRAM TRACING: CPT

## 2023-08-08 PROCEDURE — 83690 ASSAY OF LIPASE: CPT

## 2023-08-08 PROCEDURE — 36415 COLL VENOUS BLD VENIPUNCTURE: CPT

## 2023-08-08 PROCEDURE — 99223 1ST HOSP IP/OBS HIGH 75: CPT

## 2023-08-08 PROCEDURE — 82607 VITAMIN B-12: CPT

## 2023-08-08 PROCEDURE — 80048 BASIC METABOLIC PNL TOTAL CA: CPT

## 2023-08-08 PROCEDURE — 84100 ASSAY OF PHOSPHORUS: CPT

## 2023-08-08 PROCEDURE — 83735 ASSAY OF MAGNESIUM: CPT

## 2023-08-08 PROCEDURE — 87493 C DIFF AMPLIFIED PROBE: CPT

## 2023-08-08 PROCEDURE — 93010 ELECTROCARDIOGRAM REPORT: CPT

## 2023-08-08 PROCEDURE — 80053 COMPREHEN METABOLIC PANEL: CPT

## 2023-08-08 PROCEDURE — 80076 HEPATIC FUNCTION PANEL: CPT

## 2023-08-08 PROCEDURE — 99285 EMERGENCY DEPT VISIT HI MDM: CPT

## 2023-08-08 PROCEDURE — 85027 COMPLETE CBC AUTOMATED: CPT

## 2023-08-08 PROCEDURE — 80307 DRUG TEST PRSMV CHEM ANLYZR: CPT

## 2023-08-08 RX ORDER — TRAZODONE HCL 50 MG
100 TABLET ORAL AT BEDTIME
Refills: 0 | Status: DISCONTINUED | OUTPATIENT
Start: 2023-08-08 | End: 2023-08-17

## 2023-08-08 RX ORDER — GABAPENTIN 400 MG/1
600 CAPSULE ORAL AT BEDTIME
Refills: 0 | Status: DISCONTINUED | OUTPATIENT
Start: 2023-08-08 | End: 2023-08-09

## 2023-08-08 RX ORDER — PANTOPRAZOLE SODIUM 20 MG/1
40 TABLET, DELAYED RELEASE ORAL
Refills: 0 | Status: DISCONTINUED | OUTPATIENT
Start: 2023-08-08 | End: 2023-08-17

## 2023-08-08 RX ORDER — SODIUM CHLORIDE 9 MG/ML
1000 INJECTION INTRAMUSCULAR; INTRAVENOUS; SUBCUTANEOUS ONCE
Refills: 0 | Status: COMPLETED | OUTPATIENT
Start: 2023-08-08 | End: 2023-08-08

## 2023-08-08 RX ORDER — THIAMINE MONONITRATE (VIT B1) 100 MG
100 TABLET ORAL DAILY
Refills: 0 | Status: DISCONTINUED | OUTPATIENT
Start: 2023-08-08 | End: 2023-08-10

## 2023-08-08 RX ORDER — FLUOXETINE HCL 10 MG
10 CAPSULE ORAL DAILY
Refills: 0 | Status: DISCONTINUED | OUTPATIENT
Start: 2023-08-08 | End: 2023-08-13

## 2023-08-08 RX ORDER — SODIUM CHLORIDE 9 MG/ML
1000 INJECTION INTRAMUSCULAR; INTRAVENOUS; SUBCUTANEOUS
Refills: 0 | Status: DISCONTINUED | OUTPATIENT
Start: 2023-08-08 | End: 2023-08-08

## 2023-08-08 RX ORDER — FOLIC ACID 0.8 MG
1 TABLET ORAL DAILY
Refills: 0 | Status: DISCONTINUED | OUTPATIENT
Start: 2023-08-08 | End: 2023-08-17

## 2023-08-08 RX ORDER — DIAZEPAM 5 MG
5 TABLET ORAL ONCE
Refills: 0 | Status: DISCONTINUED | OUTPATIENT
Start: 2023-08-08 | End: 2023-08-08

## 2023-08-08 RX ORDER — SODIUM CHLORIDE 9 MG/ML
1000 INJECTION INTRAMUSCULAR; INTRAVENOUS; SUBCUTANEOUS
Refills: 0 | Status: DISCONTINUED | OUTPATIENT
Start: 2023-08-08 | End: 2023-08-10

## 2023-08-08 RX ORDER — FLUOXETINE HCL 10 MG
15 CAPSULE ORAL DAILY
Refills: 0 | Status: DISCONTINUED | OUTPATIENT
Start: 2023-08-08 | End: 2023-08-08

## 2023-08-08 RX ADMIN — Medication 2 MILLIGRAM(S): at 21:18

## 2023-08-08 RX ADMIN — Medication 1 MILLIGRAM(S): at 22:01

## 2023-08-08 RX ADMIN — SODIUM CHLORIDE 1000 MILLILITER(S): 9 INJECTION INTRAMUSCULAR; INTRAVENOUS; SUBCUTANEOUS at 17:16

## 2023-08-08 RX ADMIN — Medication 100 MILLIGRAM(S): at 22:02

## 2023-08-08 RX ADMIN — Medication 5 MILLIGRAM(S): at 17:00

## 2023-08-08 RX ADMIN — Medication 50 MILLIGRAM(S): at 17:14

## 2023-08-08 RX ADMIN — SODIUM CHLORIDE 1000 MILLILITER(S): 9 INJECTION INTRAMUSCULAR; INTRAVENOUS; SUBCUTANEOUS at 18:16

## 2023-08-08 RX ADMIN — GABAPENTIN 600 MILLIGRAM(S): 400 CAPSULE ORAL at 22:02

## 2023-08-08 RX ADMIN — SODIUM CHLORIDE 75 MILLILITER(S): 9 INJECTION INTRAMUSCULAR; INTRAVENOUS; SUBCUTANEOUS at 22:13

## 2023-08-08 NOTE — ED ADULT TRIAGE NOTE - CHIEF COMPLAINT QUOTE
Patient states he drank alcohol this morning, +shaking x 2 weeks, patient states he drinks vodka daily

## 2023-08-08 NOTE — ED PROVIDER NOTE - CLINICAL SUMMARY MEDICAL DECISION MAKING FREE TEXT BOX
38 year old male with PMH anxiety/depression and alcohol misuse who presents for headache, shakiness, and nausea after stopping to drink alcohol.  Patient states that 3 weeks ago he went through a break up with his girlfriend and started drinking 750ml vodka and multiple beers daily.  His last drink was this morning (8/8 in the AM).  Patient denies withdrawal seizures.  He also states he has not eaten in 2 days, he has just been drinking alcohol.  He currently has hiccups, bilateral hand tremors and a headache.    In ED vitals: T 98.4, , /90, O2 sat 98% on room air  Labs: Na 128, AST 90, ALT 42, alcohol level 432  Patient was given fluid bolus, valium and librium.  ADMIT.  [facility] protocol

## 2023-08-08 NOTE — ED PROVIDER NOTE - OBJECTIVE STATEMENT
38-year-old male past medical history of alcohol dependence presents for alcohol withdrawal.  Patient endorses drinking 1.5 L of vodka a day x 3 weeks.  Patient attempted to wean off of alcohol yesterday but a couple hours after he stopped drinking developed shakes, agitation, paranoia and palpitations.  Patient's had a couple drinks this morning prior to coming to ED.  Now endorses palpitations, sweating and tremor.  Denies fever, headache, chest pain, shortness of breath, weakness, numbness, dysuria, hematuria, vomiting, diarrhea.

## 2023-08-08 NOTE — H&P ADULT - NSHPLABSRESULTS_GEN_ALL_CORE
15.1   9.01  )-----------( 127      ( 08 Aug 2023 17:01 )             41.0       08-08    128<L>  |  86<L>  |  6<L>  ----------------------------<  95  3.8   |  18  |  0.6<L>    Ca    8.1<L>      08 Aug 2023 17:01    TPro  7.2  /  Alb  4.7  /  TBili  0.7  /  DBili  x   /  AST  90<H>  /  ALT  42<H>  /  AlkPhos  63  08-08              Urinalysis Basic - ( 08 Aug 2023 17:01 )    Color: x / Appearance: x / SG: x / pH: x  Gluc: 95 mg/dL / Ketone: x  / Bili: x / Urobili: x   Blood: x / Protein: x / Nitrite: x   Leuk Esterase: x / RBC: x / WBC x   Sq Epi: x / Non Sq Epi: x / Bacteria: x            Lactate Trend            CAPILLARY BLOOD GLUCOSE

## 2023-08-08 NOTE — H&P ADULT - ATTENDING COMMENTS
Patient seen and examined at bedside independently of the residents. I read the resident's note and agree with the plan with the additions and corrections as noted below. My note supersedes the resident's note.     REVIEW OF SYSTEMS:  Negative except in HPI.     PMH:  Anxiety/Depression, Alcohol abuse    FHx: Reviewed. No fhx of asthma/copd, No fhx of liver and pulmonary disease. No fhx of hematological disorder.     Physical Exam:  GEN: No acute distress. Awake, Alert and oriented x 3.   Head: Atraumatic, Normocephalic.   Eye: PEERLA. No sclera icterus. EOMI.   ENT: Normal oropharynx, no thyromegaly, no mass, no lymphadenopathy.   LUNGS: Clear to auscultation bilaterally. No wheeze/rales/crackles.   HEART: Normal. S1/S2 present. RRR. No murmur/gallops.   ABD: Soft, non-tender, non-distended. Bowel sounds present.   EXT: No pitting edema. No erythema. No tenderness. + mild tremor b/l UE.   Integumentary: No rash, No sore, No petechia.   NEURO: CN III-XII intact. Strength: 5/5 b/l ULE. Sensory intact b/l ULE.     Vital Signs Last 24 Hrs  T(C): 36.3 (08 Aug 2023 17:35), Max: 36.9 (08 Aug 2023 15:47)  T(F): 97.3 (08 Aug 2023 17:35), Max: 98.4 (08 Aug 2023 15:47)  HR: 94 (08 Aug 2023 17:35) (94 - 106)  BP: 139/60 (08 Aug 2023 17:35) (139/60 - 150/90)  BP(mean): --  RR: 18 (08 Aug 2023 17:35) (18 - 18)  SpO2: 96% (08 Aug 2023 17:35) (96% - 98%)    Parameters below as of 08 Aug 2023 17:35  Patient On (Oxygen Delivery Method): room air      Please see the above notes for Labs and radiology.     Assessment and Plan:     39 yo M with hx of Anxiety/Depression, Alcohol abuse presents to ED for headache, shakiness and nausea after stop drinking alcohol this morning.     Alcohol withdrawal/ Suspected thiamine and folate deficiency   - check Utox   - CIWA protocol with ativan taper   - thiamine, folic acid and multivitamin   - CATCH team consult.     Hyponatremia likely 2/2 poor dietary intake in the setting of alcohol consumption   - c/w IVF NS gentle hydration  - check urine studies, serum osmolarity/urine osmolarity  - repeat BMP.     Transaminitis   - likely 2/2 alcohol   - monitor LFT     Anxiety/Depression   - c/w home med    DVT ppx: not indicated.   GI ppx:  PPI  Diet: Regular diet   Activity: as tolerated.     Date seen by the attendin2023  Total time spent: 75 minutes.

## 2023-08-08 NOTE — H&P ADULT - HISTORY OF PRESENT ILLNESS
Patient is a 38 year old male with PMH anxiety/depression and alcohol misuse who presents for headache, shakiness, and nausea after stopping to drink alcohol.  Patient states that 3 weeks ago he went through a break up with his girlfriend and started drinking 750ml vodka and multiple beers daily.  His last drink was this morning (8/8 in the AM).  Patient denies withdrawal seizures.  He also states he has not eaten in 2 days, he has just been drinking alcohol.  He currently has hiccups, bilateral hand tremors and a headache.    In ED vitals: T 98.4, , /90, O2 sat 98% on room air  Labs: Na 128, AST 90, ALT 42, alcohol level 432  Patient was given fluid bolus, valium and librium

## 2023-08-08 NOTE — ED PROVIDER NOTE - PHYSICAL EXAMINATION
CONST: NAD  EYES: Sclera and conjunctiva clear.   ENT: No nasal discharge. Oropharynx normal appearing  NECK: Non-tender, no meningeal signs. normal ROM. supple   CARD: Tachycardiac S1 S2; No jvd  RESP: Equal BS B/L, No wheezes, rhonchi or rales. No distress  GI: Soft, non-tender, non-distended. no cva tenderness. normal BS  MS: Normal ROM in all extremities. pulses 2 +. no calf tenderness or swelling  SKIN: Diaphoretic   NEURO: Tremulous. A&Ox3, No focal deficits. Strength 5/5 with no sensory deficits.

## 2023-08-08 NOTE — H&P ADULT - ASSESSMENT
Patient is a 38 year old male with PMH anxiety/depression and alcohol misuse who presents for headache, shakiness, and nausea after stopping to drink alcohol this morning.    #Alcohol withdrawal  -patient went though a break up with girlfriend 3 weeks ago and has been drinking many beers and 750 ml vodka daily  -last drink this morning  -alcohol level >400  -s/p librium and valium in ED  -start ciwa protocol  -ativan taper, patient states he has had ativan taper before for withdrawal from alcohol and this works well for him  -urine/serum drug screen  -psych eval   -CATCH consult  -Addiction medicine consult  -start fluids  -start folic acid and thiamine  -check B12    #Hyponatremia   -Na 128  -likely component of alcohol and poor po intake for the past few days  -start IV fluids and continue to monitor Na    #Elevated liver enzymes  -AST 90, ALT 42  -most likely due to drinking alcohol  -continue to monitor    #Anxiety/depression  -c/w home meds    GI proph: protonix

## 2023-08-08 NOTE — ED ADULT NURSE NOTE - NSFALLRISKINTERV_ED_ALL_ED
Assistance OOB with selected safe patient handling equipment if applicable/Assistance with ambulation/Communicate fall risk and risk factors to all staff, patient, and family/Monitor gait and stability/Monitor for mental status changes and reorient to person, place, and time, as needed/Provide visual cue: yellow wristband, yellow gown, etc/Reinforce activity limits and safety measures with patient and family/Toileting schedule using arm’s reach rule for commode and bathroom/Use of alarms - bed, stretcher, chair and/or video monitoring/Call bell, personal items and telephone in reach/Instruct patient to call for assistance before getting out of bed/chair/stretcher/Non-slip footwear applied when patient is off stretcher/[place] to call system/Physically safe environment - no spills, clutter or unnecessary equipment/Purposeful Proactive Rounding/Room/bathroom lighting operational, light cord in reach

## 2023-08-08 NOTE — H&P ADULT - NSHPPHYSICALEXAM_GEN_ALL_CORE
PHYSICAL EXAM:  GENERAL: tremors present, anxious appearing  HEAD:  Atraumatic, Normocephalic  EYES: EOMI, PERRLA, conjunctiva and sclera clear  ENMT: No tonsillar erythema, exudates, or enlargement; Moist mucous membranes, hiccups  NECK: Supple, No JVD, Normal thyroid  HEART: tachycardic; No murmurs, rubs, or gallops  RESPIRATORY: CTA B/L, No W/R/R  ABDOMEN: Soft, Nontender, Nondistended; Bowel sounds present  NEUROLOGY: A&Ox3, nonfocal, moving all extremities  EXTREMITIES:  tremors presents bilateral upper extremities

## 2023-08-09 LAB
ALBUMIN SERPL ELPH-MCNC: 4.4 G/DL — SIGNIFICANT CHANGE UP (ref 3.5–5.2)
ALP SERPL-CCNC: 58 U/L — SIGNIFICANT CHANGE UP (ref 30–115)
ALT FLD-CCNC: 38 U/L — SIGNIFICANT CHANGE UP (ref 0–41)
ANION GAP SERPL CALC-SCNC: 16 MMOL/L — HIGH (ref 7–14)
AST SERPL-CCNC: 85 U/L — HIGH (ref 0–41)
BASOPHILS # BLD AUTO: 0.04 K/UL — SIGNIFICANT CHANGE UP (ref 0–0.2)
BASOPHILS NFR BLD AUTO: 0.6 % — SIGNIFICANT CHANGE UP (ref 0–1)
BILIRUB SERPL-MCNC: 0.8 MG/DL — SIGNIFICANT CHANGE UP (ref 0.2–1.2)
BUN SERPL-MCNC: 9 MG/DL — LOW (ref 10–20)
CALCIUM SERPL-MCNC: 8.7 MG/DL — SIGNIFICANT CHANGE UP (ref 8.4–10.5)
CHLORIDE SERPL-SCNC: 95 MMOL/L — LOW (ref 98–110)
CO2 SERPL-SCNC: 25 MMOL/L — SIGNIFICANT CHANGE UP (ref 17–32)
CREAT SERPL-MCNC: 0.7 MG/DL — SIGNIFICANT CHANGE UP (ref 0.7–1.5)
EGFR: 122 ML/MIN/1.73M2 — SIGNIFICANT CHANGE UP
EOSINOPHIL # BLD AUTO: 0.01 K/UL — SIGNIFICANT CHANGE UP (ref 0–0.7)
EOSINOPHIL NFR BLD AUTO: 0.1 % — SIGNIFICANT CHANGE UP (ref 0–8)
GLUCOSE SERPL-MCNC: 97 MG/DL — SIGNIFICANT CHANGE UP (ref 70–99)
HCT VFR BLD CALC: 38.9 % — LOW (ref 42–52)
HGB BLD-MCNC: 13.7 G/DL — LOW (ref 14–18)
IMM GRANULOCYTES NFR BLD AUTO: 0.3 % — SIGNIFICANT CHANGE UP (ref 0.1–0.3)
LYMPHOCYTES # BLD AUTO: 0.88 K/UL — LOW (ref 1.2–3.4)
LYMPHOCYTES # BLD AUTO: 13 % — LOW (ref 20.5–51.1)
MAGNESIUM SERPL-MCNC: 1.4 MG/DL — LOW (ref 1.8–2.4)
MCHC RBC-ENTMCNC: 31.2 PG — HIGH (ref 27–31)
MCHC RBC-ENTMCNC: 35.2 G/DL — SIGNIFICANT CHANGE UP (ref 32–37)
MCV RBC AUTO: 88.6 FL — SIGNIFICANT CHANGE UP (ref 80–94)
MONOCYTES # BLD AUTO: 0.71 K/UL — HIGH (ref 0.1–0.6)
MONOCYTES NFR BLD AUTO: 10.5 % — HIGH (ref 1.7–9.3)
NEUTROPHILS # BLD AUTO: 5.09 K/UL — SIGNIFICANT CHANGE UP (ref 1.4–6.5)
NEUTROPHILS NFR BLD AUTO: 75.5 % — HIGH (ref 42.2–75.2)
NRBC # BLD: 0 /100 WBCS — SIGNIFICANT CHANGE UP (ref 0–0)
PLATELET # BLD AUTO: 88 K/UL — LOW (ref 130–400)
PMV BLD: 10.8 FL — HIGH (ref 7.4–10.4)
POTASSIUM SERPL-MCNC: 3.8 MMOL/L — SIGNIFICANT CHANGE UP (ref 3.5–5)
POTASSIUM SERPL-SCNC: 3.8 MMOL/L — SIGNIFICANT CHANGE UP (ref 3.5–5)
PROT SERPL-MCNC: 6.6 G/DL — SIGNIFICANT CHANGE UP (ref 6–8)
RBC # BLD: 4.39 M/UL — LOW (ref 4.7–6.1)
RBC # FLD: 13.8 % — SIGNIFICANT CHANGE UP (ref 11.5–14.5)
SODIUM SERPL-SCNC: 136 MMOL/L — SIGNIFICANT CHANGE UP (ref 135–146)
VIT B12 SERPL-MCNC: 503 PG/ML — SIGNIFICANT CHANGE UP (ref 232–1245)
WBC # BLD: 6.75 K/UL — SIGNIFICANT CHANGE UP (ref 4.8–10.8)
WBC # FLD AUTO: 6.75 K/UL — SIGNIFICANT CHANGE UP (ref 4.8–10.8)

## 2023-08-09 PROCEDURE — 99232 SBSQ HOSP IP/OBS MODERATE 35: CPT

## 2023-08-09 RX ORDER — IBUPROFEN 200 MG
400 TABLET ORAL ONCE
Refills: 0 | Status: COMPLETED | OUTPATIENT
Start: 2023-08-09 | End: 2023-08-10

## 2023-08-09 RX ORDER — GABAPENTIN 400 MG/1
600 CAPSULE ORAL DAILY
Refills: 0 | Status: DISCONTINUED | OUTPATIENT
Start: 2023-08-09 | End: 2023-08-13

## 2023-08-09 RX ORDER — MAGNESIUM SULFATE 500 MG/ML
2 VIAL (ML) INJECTION ONCE
Refills: 0 | Status: COMPLETED | OUTPATIENT
Start: 2023-08-09 | End: 2023-08-09

## 2023-08-09 RX ORDER — IBUPROFEN 200 MG
400 TABLET ORAL ONCE
Refills: 0 | Status: COMPLETED | OUTPATIENT
Start: 2023-08-09 | End: 2023-08-09

## 2023-08-09 RX ADMIN — Medication 100 MILLIGRAM(S): at 23:05

## 2023-08-09 RX ADMIN — Medication 1 TABLET(S): at 10:44

## 2023-08-09 RX ADMIN — Medication 1 MILLIGRAM(S): at 10:44

## 2023-08-09 RX ADMIN — Medication 1.5 MILLIGRAM(S): at 22:07

## 2023-08-09 RX ADMIN — Medication 100 MILLIGRAM(S): at 10:44

## 2023-08-09 RX ADMIN — Medication 400 MILLIGRAM(S): at 16:07

## 2023-08-09 RX ADMIN — Medication 2 MILLIGRAM(S): at 11:20

## 2023-08-09 RX ADMIN — Medication 2 MILLIGRAM(S): at 17:39

## 2023-08-09 RX ADMIN — Medication 2 MILLIGRAM(S): at 01:01

## 2023-08-09 RX ADMIN — Medication 2 MILLIGRAM(S): at 15:39

## 2023-08-09 RX ADMIN — Medication 1 MILLIGRAM(S): at 20:18

## 2023-08-09 RX ADMIN — Medication 150 GRAM(S): at 12:02

## 2023-08-09 RX ADMIN — Medication 10 MILLIGRAM(S): at 10:42

## 2023-08-09 RX ADMIN — Medication 400 MILLIGRAM(S): at 07:47

## 2023-08-09 RX ADMIN — Medication 1 MILLIGRAM(S): at 22:34

## 2023-08-09 RX ADMIN — Medication 2 MILLIGRAM(S): at 18:08

## 2023-08-09 RX ADMIN — Medication 2 MILLIGRAM(S): at 07:50

## 2023-08-09 RX ADMIN — PANTOPRAZOLE SODIUM 40 MILLIGRAM(S): 20 TABLET, DELAYED RELEASE ORAL at 07:48

## 2023-08-09 RX ADMIN — Medication 2 MILLIGRAM(S): at 08:32

## 2023-08-09 RX ADMIN — GABAPENTIN 600 MILLIGRAM(S): 400 CAPSULE ORAL at 07:53

## 2023-08-09 RX ADMIN — Medication 2 MILLIGRAM(S): at 05:00

## 2023-08-09 RX ADMIN — Medication 2 MILLIGRAM(S): at 14:02

## 2023-08-09 NOTE — PROGRESS NOTE ADULT - SUBJECTIVE AND OBJECTIVE BOX
JUNE SAINI  38y  Male      Patient is a 38y old  Male who presents with a chief complaint of     INTERVAL HPI/OVERNIGHT EVENTS:  He feels anxious, headache and shaky, no chest pain  Vital Signs Last 24 Hrs  T(C): 36.2 (09 Aug 2023 08:01), Max: 36.9 (08 Aug 2023 15:47)  T(F): 97.1 (09 Aug 2023 08:01), Max: 98.4 (08 Aug 2023 15:47)  HR: 100 (09 Aug 2023 08:01) (94 - 117)  BP: 141/81 (09 Aug 2023 08:01) (128/66 - 150/90)  BP(mean): --  RR: 18 (09 Aug 2023 08:01) (18 - 20)  SpO2: 97% (09 Aug 2023 08:01) (96% - 98%)    Parameters below as of 09 Aug 2023 08:01  Patient On (Oxygen Delivery Method): room air                Consultant(s) Notes Reviewed:  [x ] YES  [ ] NO          MEDICATIONS  (STANDING):  FLUoxetine 10 milliGRAM(s) Oral daily  folic acid 1 milliGRAM(s) Oral daily  gabapentin 600 milliGRAM(s) Oral daily  LORazepam   Injectable   IV Push   LORazepam   Injectable 2 milliGRAM(s) IV Push every 4 hours  LORazepam   Injectable 1.5 milliGRAM(s) IV Push every 4 hours  multivitamin 1 Tablet(s) Oral daily  pantoprazole    Tablet 40 milliGRAM(s) Oral before breakfast  sodium chloride 0.9%. 1000 milliLiter(s) (75 mL/Hr) IV Continuous <Continuous>  thiamine 100 milliGRAM(s) Oral daily  traZODone 100 milliGRAM(s) Oral at bedtime    MEDICATIONS  (PRN):  LORazepam   Injectable 2 milliGRAM(s) IV Push every 2 hours PRN Agitation      LABS                          13.7   6.75  )-----------( 88       ( 09 Aug 2023 07:28 )             38.9     08-09    136  |  95<L>  |  9<L>  ----------------------------<  97  3.8   |  25  |  0.7    Ca    8.7      09 Aug 2023 07:28  Mg     1.4     08-09    TPro  6.6  /  Alb  4.4  /  TBili  0.8  /  DBili  x   /  AST  85<H>  /  ALT  38  /  AlkPhos  58  08-09      Urinalysis Basic - ( 09 Aug 2023 07:28 )    Color: x / Appearance: x / SG: x / pH: x  Gluc: 97 mg/dL / Ketone: x  / Bili: x / Urobili: x   Blood: x / Protein: x / Nitrite: x   Leuk Esterase: x / RBC: x / WBC x   Sq Epi: x / Non Sq Epi: x / Bacteria: x        Lactate Trend        CAPILLARY BLOOD GLUCOSE            RADIOLOGY & ADDITIONAL TESTS:    Imaging Personally Reviewed:  [ ] YES  [ ] NO    HEALTH ISSUES - PROBLEM Dx:          PHYSICAL EXAM:  GENERAL: anxious, shaky,   HEAD:  Atraumatic, Normocephalic.  EYES: EOMI, PERRLA, conjunctiva and sclera clear.  NECK: Supple, No JVD.  CHEST/LUNG: Clear to auscultation bilaterally; No wheeze.  HEART: Regular rate and rhythm; S1 S2.   ABDOMEN: Soft, Nontender, Nondistended; Bowel sounds present.  EXTREMITIES:  2+ Peripheral Pulses, No clubbing, cyanosis, or edema.  PSYCH: AAOx3.  NEUROLOGY: bilateral hands tremor,   SKIN: No rashes or lesions.

## 2023-08-09 NOTE — PROGRESS NOTE ADULT - ASSESSMENT
37 yo M with hx of Anxiety/Depression, Alcohol abuse presents to ED for headache, shakiness and nausea after stop drinking alcohol this morning.     Alcohol withdrawal  Patient with anxiety, tremor, headache. Alcohol level>400  Continue HEIDI protocol with Ativan.  CATCH team follow up.   Monitor closely for Delirium tremens.     Hypomagnesemia: replace.   Hyponatremia from alcohol consumption, resolved with IV hydration.      Alcoholic Transaminitis   AST>ALT, monitor.     Suspected Thiamin and folate deficiency:   Continue thiamin and folic acid.     Anxiety/Depression   - c/w home med    Pending: HEIDI durand

## 2023-08-09 NOTE — PATIENT PROFILE ADULT - FALL HARM RISK - HARM RISK INTERVENTIONS
Assistance with ambulation/Assistance OOB with selected safe patient handling equipment/Communicate Risk of Fall with Harm to all staff/Monitor for mental status changes/Monitor gait and stability/Reinforce activity limits and safety measures with patient and family/Tailored Fall Risk Interventions/Toileting schedule using arm’s reach rule for commode and bathroom/Use of alarms - bed, chair and/or voice tab/Visual Cue: Yellow wristband and red socks/Bed in lowest position, wheels locked, appropriate side rails in place/Call bell, personal items and telephone in reach/Instruct patient to call for assistance before getting out of bed or chair/Non-slip footwear when patient is out of bed/[place] to call system/Physically safe environment - no spills, clutter or unnecessary equipment/Purposeful Proactive Rounding/Room/bathroom lighting operational, light cord in reach

## 2023-08-09 NOTE — ED ADULT NURSE REASSESSMENT NOTE - NS ED NURSE REASSESS COMMENT FT1
received pt from previous RN, pt assessed. pts experiencing visible tremors, V/S stable at this moment. will medicate pt as ordered

## 2023-08-10 LAB
ALBUMIN SERPL ELPH-MCNC: 4.5 G/DL — SIGNIFICANT CHANGE UP (ref 3.5–5.2)
ALBUMIN SERPL ELPH-MCNC: 4.5 G/DL — SIGNIFICANT CHANGE UP (ref 3.5–5.2)
ALP SERPL-CCNC: 59 U/L — SIGNIFICANT CHANGE UP (ref 30–115)
ALP SERPL-CCNC: 61 U/L — SIGNIFICANT CHANGE UP (ref 30–115)
ALT FLD-CCNC: 35 U/L — SIGNIFICANT CHANGE UP (ref 0–41)
ALT FLD-CCNC: 36 U/L — SIGNIFICANT CHANGE UP (ref 0–41)
ANION GAP SERPL CALC-SCNC: 13 MMOL/L — SIGNIFICANT CHANGE UP (ref 7–14)
ANION GAP SERPL CALC-SCNC: 13 MMOL/L — SIGNIFICANT CHANGE UP (ref 7–14)
AST SERPL-CCNC: 71 U/L — HIGH (ref 0–41)
AST SERPL-CCNC: 74 U/L — HIGH (ref 0–41)
BILIRUB DIRECT SERPL-MCNC: 0.3 MG/DL — SIGNIFICANT CHANGE UP (ref 0–0.3)
BILIRUB INDIRECT FLD-MCNC: 0.6 MG/DL — SIGNIFICANT CHANGE UP (ref 0.2–1.2)
BILIRUB SERPL-MCNC: 0.8 MG/DL — SIGNIFICANT CHANGE UP (ref 0.2–1.2)
BILIRUB SERPL-MCNC: 0.9 MG/DL — SIGNIFICANT CHANGE UP (ref 0.2–1.2)
BUN SERPL-MCNC: 11 MG/DL — SIGNIFICANT CHANGE UP (ref 10–20)
BUN SERPL-MCNC: 11 MG/DL — SIGNIFICANT CHANGE UP (ref 10–20)
CALCIUM SERPL-MCNC: 9.4 MG/DL — SIGNIFICANT CHANGE UP (ref 8.4–10.5)
CALCIUM SERPL-MCNC: 9.5 MG/DL — SIGNIFICANT CHANGE UP (ref 8.4–10.5)
CHLORIDE SERPL-SCNC: 97 MMOL/L — LOW (ref 98–110)
CHLORIDE SERPL-SCNC: 97 MMOL/L — LOW (ref 98–110)
CO2 SERPL-SCNC: 25 MMOL/L — SIGNIFICANT CHANGE UP (ref 17–32)
CO2 SERPL-SCNC: 25 MMOL/L — SIGNIFICANT CHANGE UP (ref 17–32)
CREAT SERPL-MCNC: 0.6 MG/DL — LOW (ref 0.7–1.5)
CREAT SERPL-MCNC: 0.7 MG/DL — SIGNIFICANT CHANGE UP (ref 0.7–1.5)
EGFR: 122 ML/MIN/1.73M2 — SIGNIFICANT CHANGE UP
EGFR: 128 ML/MIN/1.73M2 — SIGNIFICANT CHANGE UP
GLUCOSE SERPL-MCNC: 109 MG/DL — HIGH (ref 70–99)
GLUCOSE SERPL-MCNC: 84 MG/DL — SIGNIFICANT CHANGE UP (ref 70–99)
HCT VFR BLD CALC: 41.7 % — LOW (ref 42–52)
HGB BLD-MCNC: 14.5 G/DL — SIGNIFICANT CHANGE UP (ref 14–18)
MAGNESIUM SERPL-MCNC: 1.9 MG/DL — SIGNIFICANT CHANGE UP (ref 1.8–2.4)
MAGNESIUM SERPL-MCNC: 2 MG/DL — SIGNIFICANT CHANGE UP (ref 1.8–2.4)
MCHC RBC-ENTMCNC: 31.3 PG — HIGH (ref 27–31)
MCHC RBC-ENTMCNC: 34.8 G/DL — SIGNIFICANT CHANGE UP (ref 32–37)
MCV RBC AUTO: 89.9 FL — SIGNIFICANT CHANGE UP (ref 80–94)
NRBC # BLD: 0 /100 WBCS — SIGNIFICANT CHANGE UP (ref 0–0)
PHOSPHATE SERPL-MCNC: 3.1 MG/DL — SIGNIFICANT CHANGE UP (ref 2.1–4.9)
PLATELET # BLD AUTO: 77 K/UL — LOW (ref 130–400)
PMV BLD: 11.2 FL — HIGH (ref 7.4–10.4)
POTASSIUM SERPL-MCNC: 3.6 MMOL/L — SIGNIFICANT CHANGE UP (ref 3.5–5)
POTASSIUM SERPL-MCNC: 3.7 MMOL/L — SIGNIFICANT CHANGE UP (ref 3.5–5)
POTASSIUM SERPL-SCNC: 3.6 MMOL/L — SIGNIFICANT CHANGE UP (ref 3.5–5)
POTASSIUM SERPL-SCNC: 3.7 MMOL/L — SIGNIFICANT CHANGE UP (ref 3.5–5)
PROT SERPL-MCNC: 6.8 G/DL — SIGNIFICANT CHANGE UP (ref 6–8)
PROT SERPL-MCNC: 6.9 G/DL — SIGNIFICANT CHANGE UP (ref 6–8)
RBC # BLD: 4.64 M/UL — LOW (ref 4.7–6.1)
RBC # FLD: 13.3 % — SIGNIFICANT CHANGE UP (ref 11.5–14.5)
SODIUM SERPL-SCNC: 135 MMOL/L — SIGNIFICANT CHANGE UP (ref 135–146)
SODIUM SERPL-SCNC: 135 MMOL/L — SIGNIFICANT CHANGE UP (ref 135–146)
WBC # BLD: 4.26 K/UL — LOW (ref 4.8–10.8)
WBC # FLD AUTO: 4.26 K/UL — LOW (ref 4.8–10.8)

## 2023-08-10 PROCEDURE — 99233 SBSQ HOSP IP/OBS HIGH 50: CPT

## 2023-08-10 RX ORDER — CHLORHEXIDINE GLUCONATE 213 G/1000ML
1 SOLUTION TOPICAL DAILY
Refills: 0 | Status: DISCONTINUED | OUTPATIENT
Start: 2023-08-10 | End: 2023-08-17

## 2023-08-10 RX ORDER — ENOXAPARIN SODIUM 100 MG/ML
40 INJECTION SUBCUTANEOUS EVERY 24 HOURS
Refills: 0 | Status: DISCONTINUED | OUTPATIENT
Start: 2023-08-10 | End: 2023-08-17

## 2023-08-10 RX ORDER — LANOLIN ALCOHOL/MO/W.PET/CERES
5 CREAM (GRAM) TOPICAL AT BEDTIME
Refills: 0 | Status: DISCONTINUED | OUTPATIENT
Start: 2023-08-10 | End: 2023-08-17

## 2023-08-10 RX ORDER — THIAMINE MONONITRATE (VIT B1) 100 MG
500 TABLET ORAL EVERY 8 HOURS
Refills: 0 | Status: COMPLETED | OUTPATIENT
Start: 2023-08-10 | End: 2023-08-13

## 2023-08-10 RX ADMIN — Medication 2 MILLIGRAM(S): at 13:21

## 2023-08-10 RX ADMIN — Medication 100 MILLIGRAM(S): at 21:20

## 2023-08-10 RX ADMIN — Medication 5 MILLIGRAM(S): at 21:20

## 2023-08-10 RX ADMIN — Medication 1 MILLIGRAM(S): at 10:51

## 2023-08-10 RX ADMIN — Medication 2 MILLIGRAM(S): at 21:19

## 2023-08-10 RX ADMIN — Medication 2 MILLIGRAM(S): at 10:50

## 2023-08-10 RX ADMIN — GABAPENTIN 600 MILLIGRAM(S): 400 CAPSULE ORAL at 12:56

## 2023-08-10 RX ADMIN — Medication 1.5 MILLIGRAM(S): at 06:19

## 2023-08-10 RX ADMIN — Medication 1 TABLET(S): at 10:50

## 2023-08-10 RX ADMIN — Medication 2 MILLIGRAM(S): at 18:54

## 2023-08-10 RX ADMIN — Medication 400 MILLIGRAM(S): at 13:56

## 2023-08-10 RX ADMIN — PANTOPRAZOLE SODIUM 40 MILLIGRAM(S): 20 TABLET, DELAYED RELEASE ORAL at 05:19

## 2023-08-10 RX ADMIN — Medication 0.1 MILLIGRAM(S): at 21:20

## 2023-08-10 RX ADMIN — Medication 105 MILLIGRAM(S): at 21:19

## 2023-08-10 RX ADMIN — Medication 1.5 MILLIGRAM(S): at 02:00

## 2023-08-10 RX ADMIN — Medication 400 MILLIGRAM(S): at 12:56

## 2023-08-10 RX ADMIN — Medication 10 MILLIGRAM(S): at 13:21

## 2023-08-10 RX ADMIN — Medication 105 MILLIGRAM(S): at 13:21

## 2023-08-10 RX ADMIN — ENOXAPARIN SODIUM 40 MILLIGRAM(S): 100 INJECTION SUBCUTANEOUS at 18:55

## 2023-08-10 RX ADMIN — Medication 2 MILLIGRAM(S): at 08:12

## 2023-08-10 NOTE — PROGRESS NOTE ADULT - SUBJECTIVE AND OBJECTIVE BOX
JUNE SAINI  38y  Male  ***My note supersedes ALL resident notes that I sign.  My corrections for their notes are in my note.***    I can be reached directly on [employer] [id number]. My office number is [phone]. My personal cell number is [phone].    INTERVAL EVENTS: Here for f/u of EtOH w/drawal. Pt doing OK, but does have w/drawals, but these are basically controlled w/ ativan. Pt is fully oriented w/out hallucinations. Pt is able to eat and drink. Pt c/o not sleeping well and having night sweats.    T(F): 97.8 (08-10-23 @ 14:56), Max: 98.4 (08-09-23 @ 19:15)  HR: 88 (08-10-23 @ 14:56) (61 - 98)  BP: 138/79 (08-10-23 @ 14:56) (136/78 - 154/89)  RR: 18 (08-10-23 @ 14:56) (18 - 18)  SpO2: 98% (08-10-23 @ 05:13) (96% - 98%)    Gen: NAD  HEENT: PERRL, EOMI, mouth clr, nose clr  Neck: no nodes, no JVD, thyroid nl  lungs: clr  hrt: s1 s2 rrr no murmur  abd: soft, NT/ND, no HS megaly  ext: no edema, no c/c  neuro: aa ox3, cn intact, can move all 4 ext; mildly jittery; not sleepy    LABS:                      14.5    (    89.9   4.26  )-----------( ---------      77       ( 10 Aug 2023 07:40 )             41.7    (    13.3     Platelet Count - Automated: 77 K/uL (08-10-23 @ 07:40) - prob EtOH related toxicity  Platelet Count - Automated: 88 K/uL (08-09-23 @ 07:28)  Platelet Count - Automated: 127 K/uL (08-08-23 @ 17:01)    135   (   97   (   84      08-10-23 @ 11:22  ----------------------               3.7   (   25   (   11                             -----                        0.6  Ca  9.5   Mg  1.9    P   3.1     LFT  6.8  (  0.9  (  74       08-10-23 @ 11:22  -------------------------  4.5  (  59  (  36  LFT  6.9  (  0.8  (  71       08-10-23 @ 07:40  -------------------------  4.5  (  61  (  35    Urinalysis Basic - ( 10 Aug 2023 11:22 )    Color: x / Appearance: x / SG: x / pH: x  Gluc: 84 mg/dL / Ketone: x  / Bili: x / Urobili: x   Blood: x / Protein: x / Nitrite: x   Leuk Esterase: x / RBC: x / WBC x   Sq Epi: x / Non Sq Epi: x / Bacteria: x    RADIOLOGY & ADDITIONAL TESTS:    MEDICATIONS:    cloNIDine 0.1 milliGRAM(s) Oral at bedtime  FLUoxetine 10 milliGRAM(s) Oral daily  folic acid 1 milliGRAM(s) Oral daily  gabapentin 600 milliGRAM(s) Oral daily  LORazepam     Tablet 2 milliGRAM(s) Oral every 2 hours PRN  LORazepam     Tablet 2 milliGRAM(s) Oral every 4 hours  melatonin 5 milliGRAM(s) Oral at bedtime  multivitamin 1 Tablet(s) Oral daily  pantoprazole    Tablet 40 milliGRAM(s) Oral before breakfast  sodium chloride 0.9%. 1000 milliLiter(s) IV Continuous <Continuous>  thiamine IVPB 500 milliGRAM(s) IV Intermittent every 8 hours  traZODone 100 milliGRAM(s) Oral at bedtime

## 2023-08-10 NOTE — PROGRESS NOTE ADULT - SUBJECTIVE AND OBJECTIVE BOX
Subjective:    Today is hospital day 2d. This morning patient was seen and examined at bedside, resting comfortably in bed. No acute or major events overnight. Patient complains of moderate tremors and asks for his prn dose of lorazepam.    Code Status: not discussed    Family communication: not requested/ needed    MEDICATIONS:  STANDING MEDICATIONS  cloNIDine 0.1 milliGRAM(s) Oral at bedtime  enoxaparin Injectable 40 milliGRAM(s) SubCutaneous every 24 hours  FLUoxetine 10 milliGRAM(s) Oral daily  folic acid 1 milliGRAM(s) Oral daily  gabapentin 600 milliGRAM(s) Oral daily  LORazepam     Tablet 2 milliGRAM(s) Oral every 4 hours  melatonin 5 milliGRAM(s) Oral at bedtime  multivitamin 1 Tablet(s) Oral daily  pantoprazole    Tablet 40 milliGRAM(s) Oral before breakfast  thiamine IVPB 500 milliGRAM(s) IV Intermittent every 8 hours  traZODone 100 milliGRAM(s) Oral at bedtime    PRN MEDICATIONS  LORazepam     Tablet 2 milliGRAM(s) Oral every 2 hours PRN      Objective:  VITALS:   T(F): 97.8  HR: 88  BP: 138/79  RR: 18  SpO2: 98%    PHYSICAL EXAM:  GENERAL:   ( x ) NAD, lying in bed comfortably     (  ) obtunded     (  ) lethargic     (  ) somnolent    HEART:  Rate -->     (  x) normal rate     (  ) bradycardic     (  ) tachycardic  Rhythm -->     (x  ) regular     (  ) regularly irregular     (  ) irregularly irregular  Murmurs -->     ( x ) normal s1s2     (  ) systolic murmur     (  ) diastolic murmur     (  ) continuous murmur      (  ) S3 present     (  ) S4 present    LUNGS:   ( x )Unlabored respirations     (  ) tachypnea  ( x ) B/L air entry     (  ) decreased breath sounds in:  (location     )    ( x ) no adventitious sound     (  ) crackles     (  ) wheezing      (  ) rhonchi      (specify location:       )  (  ) chest wall tenderness (specify location:       )    ABDOMEN:   ( x ) Soft     (  ) tense   |   ( x ) nondistended     (  ) distended   |   ( x ) +BS     (  ) hypoactive bowel sounds     (  ) hyperactive bowel sounds  (x  ) nontender     (  ) RUQ tenderness     (  ) RLQ tenderness     (  ) LLQ tenderness     (  ) epigastric tenderness     (  ) diffuse tenderness  (  ) Splenomegaly      (  ) Hepatomegaly      (  ) Jaundice     (  ) ecchymosis     EXTREMITIES:  no LLE; intact skin        (  ) Indwelling Paul Catheter:   none    (  ) Central Line:   none      LABS:                        14.5   4.26  )-----------( 77       ( 10 Aug 2023 07:40 )             41.7     08-10    135  |  97<L>  |  11  ----------------------------<  84  3.7   |  25  |  0.6<L>    Ca    9.5      10 Aug 2023 11:22  Phos  3.1     08-10  Mg     1.9     08-10    TPro  6.8  /  Alb  4.5  /  TBili  0.9  /  DBili  0.3  /  AST  74<H>  /  ALT  36  /  AlkPhos  59  08-10

## 2023-08-10 NOTE — PROGRESS NOTE ADULT - ASSESSMENT
39 yo M with hx of Anxiety/Depression, Alcohol use disorder presents to ED for headache, shakiness and nausea after stopping drinking alcohol this morning. The patient was binging on alcohol over the past few days after breakup with his girlfriend with decreased to null non-alcohol PO intake.    1> Alcohol withdrawal  Patient with anxiety, tremor, headache.   Alcohol level>400 on presentation.  continue CIWA protocol  ativan 2mg po q4 - standing  ativan 2mg po q2 prn   clonidine 0.1mg po qhs for vasomotor symp (night sweats)  melatonin 5mg po qhs (w/ trazodone, for insomnia)  thiamine 500mg iv q8 x3 days, then po  folate 1mg po q24  MVI 1 po q24  CATCH team follow up: pt is planning to go home eventually and will self-refer to [facility] in community  Monitor closely for Delirium tremens (expected on day4/5 of admission)  off IVFs    2>Hypomagnesemia: replaced  Hyponatremia from alcohol consumption, resolved with IV hydration.      3>Alcoholic Transaminitis   AST>ALT, monitor.     4>Anxiety/Depression   - c/w home med    5># thrombocytopenia is likely direct effect of EtOH    Pending: complete detox; dc home after

## 2023-08-10 NOTE — PROGRESS NOTE ADULT - ASSESSMENT
39 yo man with hx of Anxiety/Depression, Alcohol abuse presents to ED for headache, shakiness and nausea after stop drinking alcohol this morning.     # acute Alcohol withdrawal; continuous EtOH abuse  no hx of sz or DTs  has done 1 formal detox in past (this is 2nd one)  Alcohol level >400  continue CIWA protocol  ativan 2mg po q4 - standing  ativan 2mg po q2 prn   clonidine 0.1mg po qhs for vasomotor symp (night sweats)  melatonin 5mg po qhs (w/ trazodone, for insomnia)  thiamine 500mg iv q8 x3 days, then po  folate 1mg po q24  MVI 1 po q24  CATCH team follow up: pt is planning to go home eventually and will self-refer to [facility] in community  Monitor closely for Delirium tremens  off IVFs    # Hypomagnesemia: better    # Hyponatremia from alcohol consumption, resolved with IV hydration.      # Alcoholic Transaminitis (mild)  AST>ALT  no need to follow LFTs    # thrombocytopenia is likely direct effect of EtOH    # Anxiety/Depression   c/w home med: prozac, neurontin, trazodone    # DVT ppx: LMWH    # GI ppx: PPI po q24    # Activity: independent    Dispo: tx w/drawal; f/u CATCH  eventually, pt will go home w/ no needs - prob will take 1 wk to detox fully

## 2023-08-11 LAB
ANION GAP SERPL CALC-SCNC: 13 MMOL/L — SIGNIFICANT CHANGE UP (ref 7–14)
APTT BLD: 31.7 SEC — SIGNIFICANT CHANGE UP (ref 27–39.2)
BUN SERPL-MCNC: 13 MG/DL — SIGNIFICANT CHANGE UP (ref 10–20)
C DIFF BY PCR RESULT: SIGNIFICANT CHANGE UP
CALCIUM SERPL-MCNC: 9.3 MG/DL — SIGNIFICANT CHANGE UP (ref 8.4–10.5)
CHLORIDE SERPL-SCNC: 99 MMOL/L — SIGNIFICANT CHANGE UP (ref 98–110)
CO2 SERPL-SCNC: 24 MMOL/L — SIGNIFICANT CHANGE UP (ref 17–32)
CREAT SERPL-MCNC: 0.7 MG/DL — SIGNIFICANT CHANGE UP (ref 0.7–1.5)
EGFR: 122 ML/MIN/1.73M2 — SIGNIFICANT CHANGE UP
GLUCOSE SERPL-MCNC: 91 MG/DL — SIGNIFICANT CHANGE UP (ref 70–99)
HCT VFR BLD CALC: 41.7 % — LOW (ref 42–52)
HGB BLD-MCNC: 14.3 G/DL — SIGNIFICANT CHANGE UP (ref 14–18)
INR BLD: 0.86 RATIO — SIGNIFICANT CHANGE UP (ref 0.65–1.3)
MAGNESIUM SERPL-MCNC: 1.8 MG/DL — SIGNIFICANT CHANGE UP (ref 1.8–2.4)
MCHC RBC-ENTMCNC: 31.2 PG — HIGH (ref 27–31)
MCHC RBC-ENTMCNC: 34.3 G/DL — SIGNIFICANT CHANGE UP (ref 32–37)
MCV RBC AUTO: 91 FL — SIGNIFICANT CHANGE UP (ref 80–94)
NRBC # BLD: 0 /100 WBCS — SIGNIFICANT CHANGE UP (ref 0–0)
PHOSPHATE SERPL-MCNC: 4.1 MG/DL — SIGNIFICANT CHANGE UP (ref 2.1–4.9)
PLATELET # BLD AUTO: 85 K/UL — LOW (ref 130–400)
PMV BLD: 11.9 FL — HIGH (ref 7.4–10.4)
POTASSIUM SERPL-MCNC: 4 MMOL/L — SIGNIFICANT CHANGE UP (ref 3.5–5)
POTASSIUM SERPL-SCNC: 4 MMOL/L — SIGNIFICANT CHANGE UP (ref 3.5–5)
PROTHROM AB SERPL-ACNC: 9.8 SEC — LOW (ref 9.95–12.87)
RBC # BLD: 4.58 M/UL — LOW (ref 4.7–6.1)
RBC # FLD: 13.1 % — SIGNIFICANT CHANGE UP (ref 11.5–14.5)
SODIUM SERPL-SCNC: 136 MMOL/L — SIGNIFICANT CHANGE UP (ref 135–146)
WBC # BLD: 4.56 K/UL — LOW (ref 4.8–10.8)
WBC # FLD AUTO: 4.56 K/UL — LOW (ref 4.8–10.8)

## 2023-08-11 PROCEDURE — 99233 SBSQ HOSP IP/OBS HIGH 50: CPT

## 2023-08-11 RX ORDER — LOPERAMIDE HCL 2 MG
2 TABLET ORAL ONCE
Refills: 0 | Status: DISCONTINUED | OUTPATIENT
Start: 2023-08-11 | End: 2023-08-11

## 2023-08-11 RX ORDER — LOPERAMIDE HCL 2 MG
2 TABLET ORAL EVERY 4 HOURS
Refills: 0 | Status: DISCONTINUED | OUTPATIENT
Start: 2023-08-11 | End: 2023-08-17

## 2023-08-11 RX ORDER — LOPERAMIDE HCL 2 MG
4 TABLET ORAL ONCE
Refills: 0 | Status: COMPLETED | OUTPATIENT
Start: 2023-08-11 | End: 2023-08-11

## 2023-08-11 RX ORDER — DIPHENHYDRAMINE HCL 50 MG
25 CAPSULE ORAL EVERY 4 HOURS
Refills: 0 | Status: DISCONTINUED | OUTPATIENT
Start: 2023-08-11 | End: 2023-08-17

## 2023-08-11 RX ADMIN — Medication 1 MILLIGRAM(S): at 11:00

## 2023-08-11 RX ADMIN — Medication 10 MILLIGRAM(S): at 11:00

## 2023-08-11 RX ADMIN — GABAPENTIN 600 MILLIGRAM(S): 400 CAPSULE ORAL at 11:00

## 2023-08-11 RX ADMIN — Medication 3 MILLIGRAM(S): at 21:20

## 2023-08-11 RX ADMIN — Medication 5 MILLIGRAM(S): at 21:20

## 2023-08-11 RX ADMIN — Medication 2 MILLIGRAM(S): at 01:37

## 2023-08-11 RX ADMIN — Medication 2 MILLIGRAM(S): at 16:18

## 2023-08-11 RX ADMIN — Medication 2 MILLIGRAM(S): at 19:30

## 2023-08-11 RX ADMIN — Medication 2 MILLIGRAM(S): at 09:48

## 2023-08-11 RX ADMIN — Medication 105 MILLIGRAM(S): at 12:49

## 2023-08-11 RX ADMIN — Medication 2 MILLIGRAM(S): at 10:03

## 2023-08-11 RX ADMIN — Medication 100 MILLIGRAM(S): at 21:20

## 2023-08-11 RX ADMIN — Medication 2 MILLIGRAM(S): at 19:44

## 2023-08-11 RX ADMIN — Medication 2 MILLIGRAM(S): at 13:01

## 2023-08-11 RX ADMIN — Medication 105 MILLIGRAM(S): at 05:17

## 2023-08-11 RX ADMIN — Medication 2 MILLIGRAM(S): at 14:26

## 2023-08-11 RX ADMIN — ENOXAPARIN SODIUM 40 MILLIGRAM(S): 100 INJECTION SUBCUTANEOUS at 17:43

## 2023-08-11 RX ADMIN — Medication 3 MILLIGRAM(S): at 17:45

## 2023-08-11 RX ADMIN — Medication 2 MILLIGRAM(S): at 08:01

## 2023-08-11 RX ADMIN — Medication 1 TABLET(S): at 11:00

## 2023-08-11 RX ADMIN — Medication 0.1 MILLIGRAM(S): at 21:20

## 2023-08-11 RX ADMIN — Medication 105 MILLIGRAM(S): at 22:00

## 2023-08-11 RX ADMIN — CHLORHEXIDINE GLUCONATE 1 APPLICATION(S): 213 SOLUTION TOPICAL at 10:59

## 2023-08-11 RX ADMIN — Medication 2 MILLIGRAM(S): at 05:19

## 2023-08-11 NOTE — PROGRESS NOTE ADULT - ASSESSMENT
37 yo M with hx of Anxiety/Depression, Alcohol use disorder presents to ED for headache, shakiness and nausea after stopping drinking alcohol this morning. The patient was binging on alcohol over the past few days after breakup with his girlfriend with decreased to null non-alcohol PO intake.    1> Alcohol withdrawal  Patient with anxiety, tremor, headache.   Alcohol level>400 on presentation.  continue CIWA protocol  increase ativan to 3mg po q4 - standing  ativan 2mg po q2 prn   clonidine 0.1mg po qhs for vasomotor symp (night sweats)  melatonin 5mg po qhs (w/ trazodone, for insomnia)  thiamine 500mg iv q8 x3 days, then po  folate 1mg po q24  MVI 1 po q24  CATCH team follow up: pt is planning to go home eventually and will self-refer to [facility] in community  Monitor closely for Delirium tremens (expected on day4/5 of admission)  Evaluate daily for titration in ativan as per patient symptomatology  off IVFs    2>Hypomagnesemia: replaced  Hyponatremia from alcohol consumption, resolved with IV hydration.      3>Alcoholic Transaminitis   AST>ALT, monitor. No need for further trending.    4>Anxiety/Depression   - c/w home med  - psych eval    5> thrombocytopenia is likely direct effect of EtOH    6> Diarrhea: C-diff negative; can start imodium 2mg po q4h prn loose BM    Dispo: complete Detox; DC home after

## 2023-08-11 NOTE — PROGRESS NOTE ADULT - SUBJECTIVE AND OBJECTIVE BOX
JUNE SAINI  38y  Male  ***My note supersedes ALL resident notes that I sign.  My corrections for their notes are in my note.***    I can be reached directly on [employer]. My office number is [phone]. My personal cell number is [phone].    INTERVAL EVENTS: Here for f/u of EtOH w/drawal. Pt says he still feels jittery and unwell. Has taken about 3 ativan prn's over 24 hrs. Pt asked to incr standing ativan. He is wide awake and NOT slurring speech. Slept better and night sweats better. C/O itch now all over and asked for benadryl. Pt had 3-4 loose BMs.    T(F): 97.9 (08-11-23 @ 13:43), Max: 97.9 (08-11-23 @ 13:43)  HR: 82 (08-11-23 @ 13:43) (81 - 82)  BP: 124/68 (08-11-23 @ 13:43) (124/68 - 124/69)  RR: 18 (08-11-23 @ 13:43) (18 - 18)  SpO2: 99% (08-11-23 @ 13:43) (98% - 99%)    Gen: No resp distress; seems mildly uncomfortable from w/drawal; + tremors in hands; cooperative  HEENT: PERRL, EOMI, mouth clr, nose clr  Neck: no nodes, no JVD, thyroid nl  lungs: clr  hrt: s1 s2 rrr no murmur  abd: soft, NT/ND, no HS megaly  ext: no edema, no c/c  neuro: aa ox3, cn intact, can move all 4 ext; mildly jittery; not sleepy    LABS:                      14.3    (    91.0   4.56  )-----------( ---------      85       ( 11 Aug 2023 07:42 )             41.7    (    13.1     Platelet Count - Automated: 85 K/uL (08-11-23 @ 07:42)  Platelet Count - Automated: 77 K/uL (08-10-23 @ 07:40)  Platelet Count - Automated: 88 K/uL (08-09-23 @ 07:28)  Platelet Count - Automated: 127 K/uL (08-08-23 @ 17:01)    136   (   99   (   91      08-11-23 @ 07:42  ----------------------               4.0   (   24   (   13                             -----                        0.7  Ca  9.3   Mg  1.8    P   4.1     PT/INR - ( 11 Aug 2023 07:42 )   PT: 9.80 sec;   INR: 0.86 ratio    PTT - ( 11 Aug 2023 07:42 )  PTT: 31.7 sec    Urinalysis Basic - ( 11 Aug 2023 07:42 )    Color: x / Appearance: x / SG: x / pH: x  Gluc: 91 mg/dL / Ketone: x  / Bili: x / Urobili: x   Blood: x / Protein: x / Nitrite: x   Leuk Esterase: x / RBC: x / WBC x   Sq Epi: x / Non Sq Epi: x / Bacteria: x    RADIOLOGY & ADDITIONAL TESTS:    MEDICATIONS:    chlorhexidine 2% Cloths 1 Application(s) Topical daily  cloNIDine 0.1 milliGRAM(s) Oral at bedtime  diphenhydrAMINE 25 milliGRAM(s) Oral every 4 hours PRN  enoxaparin Injectable 40 milliGRAM(s) SubCutaneous every 24 hours  FLUoxetine 10 milliGRAM(s) Oral daily  folic acid 1 milliGRAM(s) Oral daily  gabapentin 600 milliGRAM(s) Oral daily  loperamide Liquid 2 milliGRAM(s) Oral once PRN  loperamide Liquid 4 milliGRAM(s) Oral once  LORazepam     Tablet 3 milliGRAM(s) Oral every 4 hours  LORazepam     Tablet 2 milliGRAM(s) Oral every 2 hours PRN  melatonin 5 milliGRAM(s) Oral at bedtime  multivitamin 1 Tablet(s) Oral daily  pantoprazole    Tablet 40 milliGRAM(s) Oral before breakfast  thiamine IVPB 500 milliGRAM(s) IV Intermittent every 8 hours  traZODone 100 milliGRAM(s) Oral at bedtime

## 2023-08-11 NOTE — PROGRESS NOTE ADULT - SUBJECTIVE AND OBJECTIVE BOX
Day 1:  Day 2:  Day 3:     Subjective:    Today is hospital day 3d. This morning patient was seen and examined at bedside, resting comfortably in bed.    No acute or major events overnight.    Code Status:    Family communication:      PAST MEDICAL & SURGICAL HISTORY  No pertinent past medical history  anxiety  depression  polysubstance    No significant past surgical history      SOCIAL HISTORY:  Social History:      ALLERGIES:  Ceclor (Other)    MEDICATIONS:  STANDING MEDICATIONS  chlorhexidine 2% Cloths 1 Application(s) Topical daily  cloNIDine 0.1 milliGRAM(s) Oral at bedtime  enoxaparin Injectable 40 milliGRAM(s) SubCutaneous every 24 hours  FLUoxetine 10 milliGRAM(s) Oral daily  folic acid 1 milliGRAM(s) Oral daily  gabapentin 600 milliGRAM(s) Oral daily  loperamide Liquid 4 milliGRAM(s) Oral once  LORazepam     Tablet 3 milliGRAM(s) Oral every 4 hours  melatonin 5 milliGRAM(s) Oral at bedtime  multivitamin 1 Tablet(s) Oral daily  pantoprazole    Tablet 40 milliGRAM(s) Oral before breakfast  thiamine IVPB 500 milliGRAM(s) IV Intermittent every 8 hours  traZODone 100 milliGRAM(s) Oral at bedtime    PRN MEDICATIONS  diphenhydrAMINE 25 milliGRAM(s) Oral every 4 hours PRN  loperamide 2 milliGRAM(s) Oral every 4 hours PRN  LORazepam     Tablet 2 milliGRAM(s) Oral every 2 hours PRN      Objective:  VITALS:   T(F): 97.9  HR: 82  BP: 124/68  RR: 18  SpO2: 99%    PHYSICAL EXAM:  GENERAL:   (  ) NAD, lying in bed comfortably     (  ) obtunded     (  ) lethargic     (  ) somnolent    HEAD:   (  ) Atraumatic     (  ) hematoma     (  ) laceration (specify location:       )     NECK:  (  ) Supple     (  ) neck stiffness     (  ) nuchal rigidity     (  )  no JVD     (  ) JVD present ( -- cm)    HEART:  Rate -->     (  ) normal rate     (  ) bradycardic     (  ) tachycardic  Rhythm -->     (  ) regular     (  ) regularly irregular     (  ) irregularly irregular  Murmurs -->     (  ) normal s1s2     (  ) systolic murmur     (  ) diastolic murmur     (  ) continuous murmur      (  ) S3 present     (  ) S4 present    LUNGS:   (  )Unlabored respirations     (  ) tachypnea  (  ) B/L air entry     (  ) decreased breath sounds in:  (location     )    (  ) no adventitious sound     (  ) crackles     (  ) wheezing      (  ) rhonchi      (specify location:       )  (  ) chest wall tenderness (specify location:       )    ABDOMEN:   (  ) Soft     (  ) tense   |   (  ) nondistended     (  ) distended   |   (  ) +BS     (  ) hypoactive bowel sounds     (  ) hyperactive bowel sounds  (  ) nontender     (  ) RUQ tenderness     (  ) RLQ tenderness     (  ) LLQ tenderness     (  ) epigastric tenderness     (  ) diffuse tenderness  (  ) Splenomegaly      (  ) Hepatomegaly      (  ) Jaundice     (  ) ecchymosis     EXTREMITIES:  (  ) Normal     (  ) Rash     (  ) ecchymosis     (  ) varicose veins      (  ) pitting edema     (  ) non-pitting edema   (  ) ulceration     (  ) gangrene:     (location:     )    NERVOUS SYSTEM:    (  ) A&Ox3     (  ) confused     (  ) lethargic  CN II-XII:     (  ) Intact     (  ) deficits found     (Specify:     )   Upper extremities:     (  ) no sensorimotor deficits     (  ) weakness     (  ) loss of proprioception/vibration     (  ) loss of touch/temperature (specify:    )  Lower extremities:     (  ) no sensorimotor deficits     (  ) weakness     (  ) loss of proprioception/vibration     (  ) loss of touch/temperature (specify:    )    SKIN:   (  ) No rashes or lesions     (  ) maculopapular rash     (  ) pustules     (  ) vesicles     (  ) ulcer     (  ) ecchymosis     (specify location:     )        (  ) Indwelling Paul Catheter:   Date insterted:    Reason (  ) Critical illness     (  ) urinary retention    (  ) Accurate Ins/Outs Monitoring     (  ) CMO patient    (  ) Central Line:   Date inserted:  Location: (  ) Right IJ     (  ) Left IJ     (  ) Right Fem     (  ) Left Fem      LABS:                        14.3   4.56  )-----------( 85       ( 11 Aug 2023 07:42 )             41.7     08-11    136  |  99  |  13  ----------------------------<  91  4.0   |  24  |  0.7    Ca    9.3      11 Aug 2023 07:42  Phos  4.1     08-11  Mg     1.8     08-11    TPro  6.8  /  Alb  4.5  /  TBili  0.9  /  DBili  0.3  /  AST  74<H>  /  ALT  36  /  AlkPhos  59  08-10    PT/INR - ( 11 Aug 2023 07:42 )   PT: 9.80 sec;   INR: 0.86 ratio         PTT - ( 11 Aug 2023 07:42 )  PTT:31.7 sec  Urinalysis Basic - ( 11 Aug 2023 07:42 )    Color: x / Appearance: x / SG: x / pH: x  Gluc: 91 mg/dL / Ketone: x  / Bili: x / Urobili: x   Blood: x / Protein: x / Nitrite: x   Leuk Esterase: x / RBC: x / WBC x   Sq Epi: x / Non Sq Epi: x / Bacteria: x                RADIOLOGY:               Subjective:    Today is hospital day 3d. This morning patient was seen and examined at bedside, resting comfortably in bed.    No acute or major events overnight. Patient complains of 3 days history of loose stools.    MEDICATIONS:  STANDING MEDICATIONS  chlorhexidine 2% Cloths 1 Application(s) Topical daily  cloNIDine 0.1 milliGRAM(s) Oral at bedtime  enoxaparin Injectable 40 milliGRAM(s) SubCutaneous every 24 hours  FLUoxetine 10 milliGRAM(s) Oral daily  folic acid 1 milliGRAM(s) Oral daily  gabapentin 600 milliGRAM(s) Oral daily  loperamide Liquid 4 milliGRAM(s) Oral once  LORazepam     Tablet 3 milliGRAM(s) Oral every 4 hours  melatonin 5 milliGRAM(s) Oral at bedtime  multivitamin 1 Tablet(s) Oral daily  pantoprazole    Tablet 40 milliGRAM(s) Oral before breakfast  thiamine IVPB 500 milliGRAM(s) IV Intermittent every 8 hours  traZODone 100 milliGRAM(s) Oral at bedtime    PRN MEDICATIONS  diphenhydrAMINE 25 milliGRAM(s) Oral every 4 hours PRN  loperamide 2 milliGRAM(s) Oral every 4 hours PRN  LORazepam     Tablet 2 milliGRAM(s) Oral every 2 hours PRN      Objective:  VITALS:   T(F): 97.9  HR: 82  BP: 124/68  RR: 18  SpO2: 99%    PHYSICAL EXAM:  Unchanged compared to yesterday.     (  ) Indwelling Paul Catheter:  none    (  ) Central Line:   none      LABS:                        14.3   4.56  )-----------( 85       ( 11 Aug 2023 07:42 )             41.7     08-11    136  |  99  |  13  ----------------------------<  91  4.0   |  24  |  0.7    Ca    9.3      11 Aug 2023 07:42  Phos  4.1     08-11  Mg     1.8     08-11    TPro  6.8  /  Alb  4.5  /  TBili  0.9  /  DBili  0.3  /  AST  74<H>  /  ALT  36  /  AlkPhos  59  08-10    PT/INR - ( 11 Aug 2023 07:42 )   PT: 9.80 sec;   INR: 0.86 ratio         PTT - ( 11 Aug 2023 07:42 )  PTT:31.7 sec

## 2023-08-11 NOTE — PROGRESS NOTE ADULT - ASSESSMENT
37 yo man with hx of Anxiety/Depression, Alcohol abuse presents to ED for headache, shakiness and nausea after stop drinking alcohol this morning.     # LIMIT LABS    # acute Alcohol withdrawal; continuous EtOH abuse  no hx of sz or DTs  has done 1 formal detox in past (this is 2nd one)  Alcohol level >400  continue CIWA protocol  incr ativan 3mg po q4 - standing (not ready to taper yet)  ativan 2mg po q2 prn   clonidine 0.1mg po qhs for vasomotor symp (night sweats)  melatonin 5mg po qhs (w/ trazodone, for insomnia)  thiamine 500mg iv q8 x3 days, then po  folate 1mg po q24  MVI 1 po q24  BRAT diet  imodium 2mg po q4 prn loose BM (C Diff is neg)  CATCH team follow up: pt is planning to go home eventually and will self-refer to [facility] in community  Monitor closely for Delirium tremens  off IVFs    # Alcoholic Transaminitis (mild)  AST>ALT  no need to follow LFTs daily    # thrombocytopenia is likely direct effect of EtOH  no need to follow daily    # Anxiety/Depression   c/w home med: prozac, neurontin, trazodone    # DVT ppx: LMWH    # GI ppx: PPI po q24    # Activity: independent    Dispo: tx w/drawal; f/u CATCH  eventually, pt will go home w/ no needs - prob will take at least 1 wk to detox fully - pt aware and cooperating 37 yo man with hx of Anxiety/Depression, Alcohol abuse presents to ED for headache, shakiness and nausea after stop drinking alcohol this morning.     # LIMIT LABS    # acute Alcohol withdrawal; continuous EtOH abuse  no hx of sz or DTs  has done 1 formal detox in past (this is 2nd one)  Alcohol level >400  continue CIWA protocol  incr ativan 3mg po q4 - standing (not ready to taper yet)  ativan 2mg po q2 prn   benadryl 25mg po q4 prn itching  clonidine 0.1mg po qhs for vasomotor symp (night sweats)  melatonin 5mg po qhs (w/ trazodone, for insomnia)  thiamine 500mg iv q8 x3 days, then po  folate 1mg po q24  MVI 1 po q24  BRAT diet  imodium 2mg po q4 prn loose BM (C Diff is neg)  CATCH team follow up: pt is planning to go home eventually and will self-refer to [facility] in community  Monitor closely for Delirium tremens  off IVFs    # Alcoholic Transaminitis (mild)  AST>ALT  no need to follow LFTs daily    # thrombocytopenia is likely direct effect of EtOH  no need to follow daily    # Anxiety/Depression   c/w home med: prozac, neurontin, trazodone    # DVT ppx: LMWH    # GI ppx: PPI po q24    # Activity: independent    Dispo: tx w/drawal and symptoms; f/u CATCH  eventually, pt will go home w/ no needs - prob will take at least 1 wk to detox fully - pt aware and cooperating

## 2023-08-12 DIAGNOSIS — F90.9 ATTENTION-DEFICIT HYPERACTIVITY DISORDER, UNSPECIFIED TYPE: ICD-10-CM

## 2023-08-12 DIAGNOSIS — F32.9 MAJOR DEPRESSIVE DISORDER, SINGLE EPISODE, UNSPECIFIED: ICD-10-CM

## 2023-08-12 DIAGNOSIS — F10.20 ALCOHOL DEPENDENCE, UNCOMPLICATED: ICD-10-CM

## 2023-08-12 PROCEDURE — 99222 1ST HOSP IP/OBS MODERATE 55: CPT

## 2023-08-12 RX ORDER — PREGABALIN 225 MG/1
1000 CAPSULE ORAL DAILY
Refills: 0 | Status: DISCONTINUED | OUTPATIENT
Start: 2023-08-12 | End: 2023-08-17

## 2023-08-12 RX ADMIN — Medication 2 MILLIGRAM(S): at 12:04

## 2023-08-12 RX ADMIN — Medication 2 MILLIGRAM(S): at 16:04

## 2023-08-12 RX ADMIN — Medication 3 MILLIGRAM(S): at 13:57

## 2023-08-12 RX ADMIN — Medication 3 MILLIGRAM(S): at 10:08

## 2023-08-12 RX ADMIN — Medication 100 MILLIGRAM(S): at 21:09

## 2023-08-12 RX ADMIN — Medication 0.1 MILLIGRAM(S): at 21:09

## 2023-08-12 RX ADMIN — Medication 3 MILLIGRAM(S): at 01:50

## 2023-08-12 RX ADMIN — GABAPENTIN 600 MILLIGRAM(S): 400 CAPSULE ORAL at 12:26

## 2023-08-12 RX ADMIN — Medication 5 MILLIGRAM(S): at 21:09

## 2023-08-12 RX ADMIN — CHLORHEXIDINE GLUCONATE 1 APPLICATION(S): 213 SOLUTION TOPICAL at 12:29

## 2023-08-12 RX ADMIN — Medication 105 MILLIGRAM(S): at 15:02

## 2023-08-12 RX ADMIN — Medication 3 MILLIGRAM(S): at 05:19

## 2023-08-12 RX ADMIN — Medication 3 MILLIGRAM(S): at 21:09

## 2023-08-12 RX ADMIN — Medication 1 TABLET(S): at 12:25

## 2023-08-12 RX ADMIN — PANTOPRAZOLE SODIUM 40 MILLIGRAM(S): 20 TABLET, DELAYED RELEASE ORAL at 05:20

## 2023-08-12 RX ADMIN — Medication 1 MILLIGRAM(S): at 12:25

## 2023-08-12 RX ADMIN — Medication 2 MILLIGRAM(S): at 00:16

## 2023-08-12 RX ADMIN — Medication 105 MILLIGRAM(S): at 05:19

## 2023-08-12 RX ADMIN — PREGABALIN 1000 MICROGRAM(S): 225 CAPSULE ORAL at 12:29

## 2023-08-12 RX ADMIN — Medication 2 MILLIGRAM(S): at 02:32

## 2023-08-12 RX ADMIN — Medication 2 MILLIGRAM(S): at 07:51

## 2023-08-12 RX ADMIN — Medication 10 MILLIGRAM(S): at 12:25

## 2023-08-12 RX ADMIN — Medication 105 MILLIGRAM(S): at 21:10

## 2023-08-12 RX ADMIN — Medication 3 MILLIGRAM(S): at 17:52

## 2023-08-12 NOTE — PROGRESS NOTE ADULT - SUBJECTIVE AND OBJECTIVE BOX
SUBJECTIVE:    Patient is a 38y old Male who presents with a chief complaint of   Currently admitted to medicine with the primary diagnosis of Alcohol dependency       Today is hospital day 4d. This morning he is resting comfortably in bed and reports no new issues or overnight events.     PAST MEDICAL & SURGICAL HISTORY  No pertinent past medical history  anxiety  depression  polysubstance    No significant past surgical history      SOCIAL HISTORY:  Negative for smoking/alcohol/drug use.     ALLERGIES:  Ceclor (Other)    MEDICATIONS:  STANDING MEDICATIONS  chlorhexidine 2% Cloths 1 Application(s) Topical daily  cloNIDine 0.1 milliGRAM(s) Oral at bedtime  enoxaparin Injectable 40 milliGRAM(s) SubCutaneous every 24 hours  FLUoxetine 10 milliGRAM(s) Oral daily  folic acid 1 milliGRAM(s) Oral daily  gabapentin 600 milliGRAM(s) Oral daily  LORazepam     Tablet 3 milliGRAM(s) Oral every 4 hours  melatonin 5 milliGRAM(s) Oral at bedtime  multivitamin 1 Tablet(s) Oral daily  pantoprazole    Tablet 40 milliGRAM(s) Oral before breakfast  thiamine IVPB 500 milliGRAM(s) IV Intermittent every 8 hours  traZODone 100 milliGRAM(s) Oral at bedtime    PRN MEDICATIONS  diphenhydrAMINE 25 milliGRAM(s) Oral every 4 hours PRN  loperamide 2 milliGRAM(s) Oral every 4 hours PRN  LORazepam     Tablet 2 milliGRAM(s) Oral every 2 hours PRN    VITALS:   T(F): 97.5  HR: 84  BP: 111/72  RR: 18  SpO2: 98%    PHYSICAL EXAM:  GEN: No acute distress  LUNGS: Clear to auscultation bilaterally   HEART: S1/S2 present   ABD: Soft, non-tender, non-distended. Bowel sounds present       LABS:                        14.3   4.56  )-----------( 85       ( 11 Aug 2023 07:42 )             41.7     08-11    136  |  99  |  13  ----------------------------<  91  4.0   |  24  |  0.7    Ca    9.3      11 Aug 2023 07:42  Phos  4.1     08-11  Mg     1.8     08-11    TPro  6.8  /  Alb  4.5  /  TBili  0.9  /  DBili  0.3  /  AST  74<H>  /  ALT  36  /  AlkPhos  59  08-10    PT/INR - ( 11 Aug 2023 07:42 )   PT: 9.80 sec;   INR: 0.86 ratio         PTT - ( 11 Aug 2023 07:42 )  PTT:31.7 sec  Urinalysis Basic - ( 11 Aug 2023 07:42 )    Color: x / Appearance: x / SG: x / pH: x  Gluc: 91 mg/dL / Ketone: x  / Bili: x / Urobili: x   Blood: x / Protein: x / Nitrite: x   Leuk Esterase: x / RBC: x / WBC x   Sq Epi: x / Non Sq Epi: x / Bacteria: x                    RADIOLOGY:

## 2023-08-12 NOTE — BH CONSULTATION LIAISON ASSESSMENT NOTE - HPI (INCLUDE ILLNESS QUALITY, SEVERITY, DURATION, TIMING, CONTEXT, MODIFYING FACTORS, ASSOCIATED SIGNS AND SYMPTOMS)
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 Psychiatry was consulted for anxiety.     On approach, patient was alert in bed, calm and cooperative. Patient reports that he has been having arguments with his girlfriends which has made him feel sad but he reports, "not more depressed than what is appropriate for the situation." When asked about increasing dose of Prozac to 20mg daily, patient reports that he will try anything to help with the anxiety. Patient reports having a history of ADHD since 21 years old and takes Adderall 10mg BID, in addition to Fluoxetine and Trazadone for depression and anxiety. Patient declines for us to contact personal collateral. Patient was receptive to participating in Dual Diagnosis program. Patient denies irregular sleep, appetite changes, SI, HI, and A/V hallucinations.

## 2023-08-12 NOTE — BH CONSULTATION LIAISON ASSESSMENT NOTE - NSBHCHARTREVIEWVS_PSY_A_CORE FT
Vital Signs Last 24 Hrs  T(C): 36.4 (12 Aug 2023 04:31), Max: 36.6 (11 Aug 2023 13:43)  T(F): 97.5 (12 Aug 2023 04:31), Max: 97.9 (11 Aug 2023 13:43)  HR: 84 (12 Aug 2023 04:31) (82 - 98)  BP: 111/72 (12 Aug 2023 04:31) (111/72 - 135/82)  BP(mean): --  RR: 18 (12 Aug 2023 04:31) (18 - 18)  SpO2: 98% (12 Aug 2023 04:31) (98% - 99%)

## 2023-08-12 NOTE — PROGRESS NOTE ADULT - ASSESSMENT
# acute Alcohol withdrawal; continuous EtOH abuse  no hx of sz or DTs  has done 1 formal detox in past (this is 2nd one)  Alcohol level >400  continue CIWA protocol  incr ativan 3mg po q4 - standing (not ready to taper yet)  ativan 2mg po q2 prn   benadryl 25mg po q4 prn itching  clonidine 0.1mg po qhs for vasomotor symp (night sweats)  melatonin 5mg po qhs (w/ trazodone, for insomnia)  thiamine 500mg iv q8 x3 days, then po  folate 1mg po q24  MVI 1 po q24  BRAT diet  imodium 2mg po q4 prn loose BM (C Diff is neg)  CATCH team follow up: pt is planning to go home eventually and will self-refer to [facility] in community  Monitor closely for Delirium tremens  off IVFs    # Alcoholic Transaminitis (mild)  AST>ALT  no need to follow LFTs daily    # thrombocytopenia is likely direct effect of EtOH  no need to follow daily    # Anxiety/Depression   c/w home med: prozac, neurontin, trazodone    # DVT ppx: LMWH    # GI ppx: PPI po q24    # Activity: independent    Dispo: tx w/drawal and symptoms; f/u CATCH  eventually, pt will go home w/ no needs - prob will take at least 1 wk to detox fully - pt aware and cooperating

## 2023-08-12 NOTE — BH CONSULTATION LIAISON ASSESSMENT NOTE - CURRENT MEDICATION
MEDICATIONS  (STANDING):  chlorhexidine 2% Cloths 1 Application(s) Topical daily  cloNIDine 0.1 milliGRAM(s) Oral at bedtime  cyanocobalamin 1000 MICROGram(s) Oral daily  enoxaparin Injectable 40 milliGRAM(s) SubCutaneous every 24 hours  FLUoxetine 10 milliGRAM(s) Oral daily  folic acid 1 milliGRAM(s) Oral daily  gabapentin 600 milliGRAM(s) Oral daily  LORazepam     Tablet 3 milliGRAM(s) Oral every 4 hours  melatonin 5 milliGRAM(s) Oral at bedtime  multivitamin 1 Tablet(s) Oral daily  pantoprazole    Tablet 40 milliGRAM(s) Oral before breakfast  thiamine IVPB 500 milliGRAM(s) IV Intermittent every 8 hours  traZODone 100 milliGRAM(s) Oral at bedtime    MEDICATIONS  (PRN):  diphenhydrAMINE 25 milliGRAM(s) Oral every 4 hours PRN Rash and/or Itching  loperamide 2 milliGRAM(s) Oral every 4 hours PRN Diarrhea  LORazepam     Tablet 2 milliGRAM(s) Oral every 2 hours PRN Symptom-triggered: each CIWA -Ar score 8 or GREATER

## 2023-08-12 NOTE — BH CONSULTATION LIAISON ASSESSMENT NOTE - RISK ASSESSMENT
Modifiable risk factors include psychiatric illness, impulsivity, recent psychosocial stressors, substance use/intoxication.  Static risk factors include male gender,  race.  Protective factors include seeking out treatment.

## 2023-08-12 NOTE — PROGRESS NOTE ADULT - SUBJECTIVE AND OBJECTIVE BOX
Subjective:    Today is hospital day 5d. This morning patient was seen and examined at bedside, resting comfortably in bed.    No acute or major events overnight. Patient reports resolution of diarrhea before starting imodium.    MEDICATIONS:  STANDING MEDICATIONS  chlorhexidine 2% Cloths 1 Application(s) Topical daily  cloNIDine 0.1 milliGRAM(s) Oral at bedtime  cyanocobalamin 1000 MICROGram(s) Oral daily  enoxaparin Injectable 40 milliGRAM(s) SubCutaneous every 24 hours  FLUoxetine 10 milliGRAM(s) Oral daily  folic acid 1 milliGRAM(s) Oral daily  gabapentin 600 milliGRAM(s) Oral daily  LORazepam     Tablet 3 milliGRAM(s) Oral every 4 hours  melatonin 5 milliGRAM(s) Oral at bedtime  multivitamin 1 Tablet(s) Oral daily  pantoprazole    Tablet 40 milliGRAM(s) Oral before breakfast  traZODone 100 milliGRAM(s) Oral at bedtime    PRN MEDICATIONS  diphenhydrAMINE 25 milliGRAM(s) Oral every 4 hours PRN  loperamide 2 milliGRAM(s) Oral every 4 hours PRN  LORazepam     Tablet 2 milliGRAM(s) Oral every 2 hours PRN      Objective:  VITALS:   T(F): 98.9  HR: 80  BP: 127/75  RR: 18  SpO2: 98%    PHYSICAL EXAM:  unchanged compared to yesterday

## 2023-08-12 NOTE — BH CONSULTATION LIAISON ASSESSMENT NOTE - SUMMARY
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 Psychiatry was consulted for anxiety.   Patient is expressing symptoms suggesting anxiety and substance use disorder. Patient feels anxious and consumes copious amounts of alcohol daily. Patient does not need to continue being followed by psychiatry team. Patient denies SI and does not appear to be an acute risk of harm to self or others.     Plan:  - Observation per nursing  - Haldol 5mg PO/IM, Lorazepam 2mg PO/IM, Benadryl 50mg PO/IM q6h PRN for agitation not responsive to verbal redirection. Hold haldol for QTc above 500ms.  - Increase Fluoxetine to 20mg daily  - Continue home Trazadone 100mg at night  - Continue Gabapentin 600 mg daily.    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 Psychiatry was consulted for anxiety.   Patient is expressing symptoms suggesting anxiety and substance use disorder. Patient feels anxious and consumes copious amounts of alcohol daily. Patient does not need to continue being followed by psychiatry team. Patient denies SI and does not appear to be an acute risk of harm to self or others.     Plan:  - Observation per nursing  - Haldol 5mg PO/IM, Lorazepam 2mg PO/IM, Benadryl 50mg PO/IM q6h PRN for agitation not responsive to verbal redirection. Hold haldol for QTc above 500ms.  - Increase Fluoxetine to 20mg daily  - Continue home Trazadone 100mg at night  - Change Gabapentin 600 mg daily to 300mg po TID.

## 2023-08-12 NOTE — BH CONSULTATION LIAISON ASSESSMENT NOTE - NSSUICPROTFACT_PSY_ALL_CORE
Alert and oriented, no focal deficits, no motor or sensory deficits. Engaged in work or school/Positive therapeutic relationships

## 2023-08-12 NOTE — PROGRESS NOTE ADULT - ASSESSMENT
39 yo M with hx of Anxiety/Depression, Alcohol use disorder presents to ED for headache, shakiness and nausea after stopping drinking alcohol this morning. The patient was binging on alcohol over the past few days after breakup with his girlfriend with decreased to null non-alcohol PO intake.    1> Alcohol withdrawal  Patient with anxiety, tremor, headache.   Alcohol level>400 on presentation.  continue CIWA protocol  increase ativan to 3mg po q4 - standing: keep same dose (patient reports mild withdrawal symptoms and requests staying on same dose for now)  ativan 2mg po q2 prn   clonidine 0.1mg po qhs for vasomotor symp (night sweats)  melatonin 5mg po qhs (w/ trazodone, for insomnia)  thiamine 500mg iv q8 x3 days, then po  folate 1mg po q24  start vitamin B12 1000mg OD (08.13.2023)  MVI 1 po q24  CATCH team follow up: pt is planning to go home eventually and will self-refer to [facility] in community  Monitor closely for Delirium tremens (expected on day4/5 of admission)  Evaluate daily for titration in ativan as per patient symptomatology  off IVFs    2>Hypomagnesemia: replaced  Hyponatremia from alcohol consumption, resolved with IV hydration.      3>Alcoholic Transaminitis   AST>ALT, monitor. No need for further trending.    4>Anxiety/Depression   - c/w home med  - psych eval:   - Increase Fluoxetine to 20mg daily  - Continue home Trazadone 100mg at night  - Change Gabapentin 600 mg daily to 300mg po TID.      5> thrombocytopenia is likely direct effect of EtOH    6> Diarrhea: C-diff negative; can use imodium 2mg po q4h prn loose BM    Dispo: finish Detox; DC home after

## 2023-08-13 LAB — LIDOCAIN IGE QN: 136 U/L — HIGH (ref 7–60)

## 2023-08-13 PROCEDURE — 99232 SBSQ HOSP IP/OBS MODERATE 35: CPT

## 2023-08-13 RX ORDER — FLUOXETINE HCL 10 MG
20 CAPSULE ORAL DAILY
Refills: 0 | Status: DISCONTINUED | OUTPATIENT
Start: 2023-08-13 | End: 2023-08-17

## 2023-08-13 RX ORDER — THIAMINE MONONITRATE (VIT B1) 100 MG
100 TABLET ORAL DAILY
Refills: 0 | Status: DISCONTINUED | OUTPATIENT
Start: 2023-08-13 | End: 2023-08-17

## 2023-08-13 RX ORDER — GABAPENTIN 400 MG/1
300 CAPSULE ORAL THREE TIMES A DAY
Refills: 0 | Status: DISCONTINUED | OUTPATIENT
Start: 2023-08-13 | End: 2023-08-15

## 2023-08-13 RX ADMIN — GABAPENTIN 300 MILLIGRAM(S): 400 CAPSULE ORAL at 22:25

## 2023-08-13 RX ADMIN — Medication 100 MILLIGRAM(S): at 22:25

## 2023-08-13 RX ADMIN — Medication 5 MILLIGRAM(S): at 22:25

## 2023-08-13 RX ADMIN — Medication 1 TABLET(S): at 12:41

## 2023-08-13 RX ADMIN — Medication 3 MILLIGRAM(S): at 22:25

## 2023-08-13 RX ADMIN — PREGABALIN 1000 MICROGRAM(S): 225 CAPSULE ORAL at 12:40

## 2023-08-13 RX ADMIN — Medication 2 MILLIGRAM(S): at 20:01

## 2023-08-13 RX ADMIN — Medication 3 MILLIGRAM(S): at 01:47

## 2023-08-13 RX ADMIN — Medication 3 MILLIGRAM(S): at 18:13

## 2023-08-13 RX ADMIN — Medication 105 MILLIGRAM(S): at 05:16

## 2023-08-13 RX ADMIN — Medication 25 MILLIGRAM(S): at 14:21

## 2023-08-13 RX ADMIN — Medication 3 MILLIGRAM(S): at 05:16

## 2023-08-13 RX ADMIN — CHLORHEXIDINE GLUCONATE 1 APPLICATION(S): 213 SOLUTION TOPICAL at 12:41

## 2023-08-13 RX ADMIN — ENOXAPARIN SODIUM 40 MILLIGRAM(S): 100 INJECTION SUBCUTANEOUS at 18:09

## 2023-08-13 RX ADMIN — Medication 3 MILLIGRAM(S): at 14:20

## 2023-08-13 RX ADMIN — Medication 25 MILLIGRAM(S): at 20:49

## 2023-08-13 RX ADMIN — Medication 3 MILLIGRAM(S): at 09:59

## 2023-08-13 RX ADMIN — Medication 2 MILLIGRAM(S): at 08:33

## 2023-08-13 RX ADMIN — Medication 0.1 MILLIGRAM(S): at 22:25

## 2023-08-13 RX ADMIN — Medication 25 MILLIGRAM(S): at 05:17

## 2023-08-13 RX ADMIN — GABAPENTIN 300 MILLIGRAM(S): 400 CAPSULE ORAL at 14:21

## 2023-08-13 RX ADMIN — PANTOPRAZOLE SODIUM 40 MILLIGRAM(S): 20 TABLET, DELAYED RELEASE ORAL at 05:16

## 2023-08-13 RX ADMIN — Medication 1 MILLIGRAM(S): at 12:40

## 2023-08-13 RX ADMIN — Medication 20 MILLIGRAM(S): at 12:41

## 2023-08-13 RX ADMIN — Medication 2 MILLIGRAM(S): at 16:02

## 2023-08-13 RX ADMIN — Medication 100 MILLIGRAM(S): at 12:44

## 2023-08-13 NOTE — PROGRESS NOTE ADULT - SUBJECTIVE AND OBJECTIVE BOX
Hospital Day:  5d    Subjective:    Patient is a 38y old  Male who presents with a chief complaint of alcohol withdrawal.  This morning patient is lying in bed comfortably. He reports he still gets anxious/agitated at times. He needed symptom triggered ativan 4 times over last 24 hours.      Past Medical Hx:   No pertinent past medical history      Past Sx:  No significant past surgical history      Allergies:  Ceclor (Other)    Current Meds:   Standng Meds:  chlorhexidine 2% Cloths 1 Application(s) Topical daily  cloNIDine 0.1 milliGRAM(s) Oral at bedtime  cyanocobalamin 1000 MICROGram(s) Oral daily  enoxaparin Injectable 40 milliGRAM(s) SubCutaneous every 24 hours  FLUoxetine 20 milliGRAM(s) Oral daily  folic acid 1 milliGRAM(s) Oral daily  gabapentin 300 milliGRAM(s) Oral three times a day  LORazepam     Tablet 3 milliGRAM(s) Oral every 4 hours  melatonin 5 milliGRAM(s) Oral at bedtime  multivitamin 1 Tablet(s) Oral daily  pantoprazole    Tablet 40 milliGRAM(s) Oral before breakfast  traZODone 100 milliGRAM(s) Oral at bedtime    PRN Meds:  diphenhydrAMINE 25 milliGRAM(s) Oral every 4 hours PRN Rash and/or Itching  loperamide 2 milliGRAM(s) Oral every 4 hours PRN Diarrhea  LORazepam     Tablet 2 milliGRAM(s) Oral every 2 hours PRN Symptom-triggered: each CIWA -Ar score 8 or GREATER    HOME MEDICATIONS:  FLUoxetine 15 mg oral tablet: orally once a day  traZODone 100 mg oral tablet: orally once a day (at bedtime)      Vital Signs:   T(F): 98.9 (08-13-23 @ 04:54), Max: 98.9 (08-13-23 @ 04:54)  HR: 80 (08-13-23 @ 04:54) (80 - 99)  BP: 127/75 (08-13-23 @ 04:54) (120/80 - 128/78)  RR: 18 (08-13-23 @ 04:54) (18 - 18)  SpO2: 98% (08-12-23 @ 14:29) (98% - 98%)      08-12-23 @ 07:01  -  08-13-23 @ 07:00  --------------------------------------------------------  IN: 800 mL / OUT: 0 mL / NET: 800 mL        Physical Exam:   GENERAL: NAD  HEENT: NCAT  CHEST/LUNG: CTAB  HEART: Regular rate and rhythm; s1 s2 appreciated, No murmurs, rubs, or gallops  ABDOMEN: Soft, Nontender, Nondistended; Bowel sounds present  EXTREMITIES: No LE edema b/l  SKIN: no rashes, no new lesions  NERVOUS SYSTEM:  Alert & Oriented X3  LINES/CATHETERS:        Labs:                 Amylase --, Lipase 136, 08-13-23 @ 06:31              Urinalysis Basic - ( 11 Aug 2023 07:42 )    Color: x / Appearance: x / SG: x / pH: x  Gluc: 91 mg/dL / Ketone: x  / Bili: x / Urobili: x   Blood: x / Protein: x / Nitrite: x   Leuk Esterase: x / RBC: x / WBC x   Sq Epi: x / Non Sq Epi: x / Bacteria: x                Assessment and Plan:

## 2023-08-13 NOTE — PROGRESS NOTE ADULT - ASSESSMENT
# acute Alcohol withdrawal; continuous EtOH abuse, no hx of sz or DTs  continue CIWA protocol: ativan 3mg po q4. He needed 8 mg symptom triggered ativan over last 24 hours; he is not ready to taper down yet  benadryl 25mg po q4 prn itching  clonidine 0.1mg po qhs for vasomotor symp (night sweats)  melatonin 5mg po qhs (w/ trazodone, for insomnia)  Thiamine and folate  MVI 1 po q24  BRAT diet  imodium 2mg po q4 prn loose BM (C Diff is neg)  CATCH team follow up: pt is planning to go home eventually and will self-refer to [facility] in community  Monitor closely for Delirium tremens  off IVFs    # Alcoholic Transaminitis (mild)  # thrombocytopenia is likely direct effect of EtOH    # Anxiety/Depression   c/w home med: prozac, neurontin, trazodone    # DVT ppx: LMWH    # GI ppx: PPI po q24    # Activity: independent    Dispo: tx w/drawal and symptoms; f/u CATCH  eventually, pt will go home w/ no needs - prob will take at least 1 wk to detox fully - pt aware and cooperating

## 2023-08-14 LAB
ALBUMIN SERPL ELPH-MCNC: 4.8 G/DL — SIGNIFICANT CHANGE UP (ref 3.5–5.2)
ALP SERPL-CCNC: 51 U/L — SIGNIFICANT CHANGE UP (ref 30–115)
ALT FLD-CCNC: 48 U/L — HIGH (ref 0–41)
ANION GAP SERPL CALC-SCNC: 12 MMOL/L — SIGNIFICANT CHANGE UP (ref 7–14)
AST SERPL-CCNC: 51 U/L — HIGH (ref 0–41)
BASOPHILS # BLD AUTO: 0.09 K/UL — SIGNIFICANT CHANGE UP (ref 0–0.2)
BASOPHILS NFR BLD AUTO: 1.7 % — HIGH (ref 0–1)
BILIRUB SERPL-MCNC: 0.4 MG/DL — SIGNIFICANT CHANGE UP (ref 0.2–1.2)
BUN SERPL-MCNC: 17 MG/DL — SIGNIFICANT CHANGE UP (ref 10–20)
CALCIUM SERPL-MCNC: 9.8 MG/DL — SIGNIFICANT CHANGE UP (ref 8.4–10.5)
CHLORIDE SERPL-SCNC: 97 MMOL/L — LOW (ref 98–110)
CO2 SERPL-SCNC: 30 MMOL/L — SIGNIFICANT CHANGE UP (ref 17–32)
CREAT SERPL-MCNC: 1 MG/DL — SIGNIFICANT CHANGE UP (ref 0.7–1.5)
DRUG SCREEN, SERUM: SIGNIFICANT CHANGE UP
EGFR: 99 ML/MIN/1.73M2 — SIGNIFICANT CHANGE UP
EOSINOPHIL # BLD AUTO: 0.13 K/UL — SIGNIFICANT CHANGE UP (ref 0–0.7)
EOSINOPHIL NFR BLD AUTO: 2.4 % — SIGNIFICANT CHANGE UP (ref 0–8)
GLUCOSE SERPL-MCNC: 91 MG/DL — SIGNIFICANT CHANGE UP (ref 70–99)
HCT VFR BLD CALC: 43.5 % — SIGNIFICANT CHANGE UP (ref 42–52)
HGB BLD-MCNC: 14.6 G/DL — SIGNIFICANT CHANGE UP (ref 14–18)
IMM GRANULOCYTES NFR BLD AUTO: 0.9 % — HIGH (ref 0.1–0.3)
LYMPHOCYTES # BLD AUTO: 1.46 K/UL — SIGNIFICANT CHANGE UP (ref 1.2–3.4)
LYMPHOCYTES # BLD AUTO: 27.2 % — SIGNIFICANT CHANGE UP (ref 20.5–51.1)
MCHC RBC-ENTMCNC: 31 PG — SIGNIFICANT CHANGE UP (ref 27–31)
MCHC RBC-ENTMCNC: 33.6 G/DL — SIGNIFICANT CHANGE UP (ref 32–37)
MCV RBC AUTO: 92.4 FL — SIGNIFICANT CHANGE UP (ref 80–94)
MONOCYTES # BLD AUTO: 0.8 K/UL — HIGH (ref 0.1–0.6)
MONOCYTES NFR BLD AUTO: 14.9 % — HIGH (ref 1.7–9.3)
NEUTROPHILS # BLD AUTO: 2.84 K/UL — SIGNIFICANT CHANGE UP (ref 1.4–6.5)
NEUTROPHILS NFR BLD AUTO: 52.9 % — SIGNIFICANT CHANGE UP (ref 42.2–75.2)
NRBC # BLD: 0 /100 WBCS — SIGNIFICANT CHANGE UP (ref 0–0)
PLATELET # BLD AUTO: 151 K/UL — SIGNIFICANT CHANGE UP (ref 130–400)
PMV BLD: 10.9 FL — HIGH (ref 7.4–10.4)
POTASSIUM SERPL-MCNC: 4.4 MMOL/L — SIGNIFICANT CHANGE UP (ref 3.5–5)
POTASSIUM SERPL-SCNC: 4.4 MMOL/L — SIGNIFICANT CHANGE UP (ref 3.5–5)
PROT SERPL-MCNC: 7 G/DL — SIGNIFICANT CHANGE UP (ref 6–8)
RBC # BLD: 4.71 M/UL — SIGNIFICANT CHANGE UP (ref 4.7–6.1)
RBC # FLD: 13 % — SIGNIFICANT CHANGE UP (ref 11.5–14.5)
SODIUM SERPL-SCNC: 139 MMOL/L — SIGNIFICANT CHANGE UP (ref 135–146)
WBC # BLD: 5.37 K/UL — SIGNIFICANT CHANGE UP (ref 4.8–10.8)
WBC # FLD AUTO: 5.37 K/UL — SIGNIFICANT CHANGE UP (ref 4.8–10.8)

## 2023-08-14 PROCEDURE — 99232 SBSQ HOSP IP/OBS MODERATE 35: CPT

## 2023-08-14 RX ORDER — HALOPERIDOL DECANOATE 100 MG/ML
5 INJECTION INTRAMUSCULAR EVERY 6 HOURS
Refills: 0 | Status: DISCONTINUED | OUTPATIENT
Start: 2023-08-14 | End: 2023-08-17

## 2023-08-14 RX ADMIN — Medication 1 TABLET(S): at 11:25

## 2023-08-14 RX ADMIN — CHLORHEXIDINE GLUCONATE 1 APPLICATION(S): 213 SOLUTION TOPICAL at 11:24

## 2023-08-14 RX ADMIN — Medication 2 MILLIGRAM(S): at 07:40

## 2023-08-14 RX ADMIN — Medication 0.1 MILLIGRAM(S): at 21:43

## 2023-08-14 RX ADMIN — GABAPENTIN 300 MILLIGRAM(S): 400 CAPSULE ORAL at 17:05

## 2023-08-14 RX ADMIN — Medication 25 MILLIGRAM(S): at 17:08

## 2023-08-14 RX ADMIN — Medication 3 MILLIGRAM(S): at 23:08

## 2023-08-14 RX ADMIN — Medication 3 MILLIGRAM(S): at 13:56

## 2023-08-14 RX ADMIN — Medication 3 MILLIGRAM(S): at 09:40

## 2023-08-14 RX ADMIN — Medication 5 MILLIGRAM(S): at 21:43

## 2023-08-14 RX ADMIN — Medication 2 MILLIGRAM(S): at 15:45

## 2023-08-14 RX ADMIN — Medication 3 MILLIGRAM(S): at 06:34

## 2023-08-14 RX ADMIN — GABAPENTIN 300 MILLIGRAM(S): 400 CAPSULE ORAL at 21:43

## 2023-08-14 RX ADMIN — GABAPENTIN 300 MILLIGRAM(S): 400 CAPSULE ORAL at 06:33

## 2023-08-14 RX ADMIN — Medication 100 MILLIGRAM(S): at 11:25

## 2023-08-14 RX ADMIN — Medication 2 MILLIGRAM(S): at 11:41

## 2023-08-14 RX ADMIN — Medication 100 MILLIGRAM(S): at 21:43

## 2023-08-14 RX ADMIN — Medication 20 MILLIGRAM(S): at 11:24

## 2023-08-14 RX ADMIN — PREGABALIN 1000 MICROGRAM(S): 225 CAPSULE ORAL at 11:24

## 2023-08-14 RX ADMIN — Medication 2 MILLIGRAM(S): at 20:05

## 2023-08-14 RX ADMIN — Medication 1 MILLIGRAM(S): at 11:24

## 2023-08-14 RX ADMIN — Medication 3 MILLIGRAM(S): at 18:09

## 2023-08-14 RX ADMIN — Medication 3 MILLIGRAM(S): at 02:30

## 2023-08-14 NOTE — PROGRESS NOTE ADULT - ASSESSMENT
#Acute Alcohol withdrawal; continuous EtOH abuse, no hx of sz or DTs  continue CIWA protocol: ativan 3mg po q4. He needed 8 mg symptom triggered ativan over last 24 hours; he is not ready to taper down yet  benadryl 25mg po q4 prn itching  clonidine 0.1mg po qhs for vasomotor symp (night sweats)  melatonin 5mg po qhs (w/ trazodone, for insomnia)  Thiamine and folate  MVI 1 po q24  BRAT diet  imodium 2mg po q4 prn loose BM (C Diff is neg)  CATCH team follow up: pt is planning to go home eventually and will self-refer to [facility] in community  Monitor closely for Delirium tremens  off IVFs  follow psych, may need to switch to scheduled ciwa tapering protocol. final plan as per psych/    #Alcoholic Transaminitis (mild)  #thrombocytopenia is likely direct effect of EtOH    #Anxiety/Depression   - c/w home med: prozac, neurontin, trazodone    #DVT ppx: LMWH  #GI ppx: PPI po q24  #Activity: independent   #Acute Alcohol withdrawal; continuous EtOH abuse, no hx of sz or DTs  continue CIWA protocol: ativan 3mg po q4. He needed 8 mg symptom triggered ativan over last 24 hours; he is not ready to taper down yet  benadryl 25mg po q4 prn itching  clonidine 0.1mg po qhs for vasomotor symp (night sweats)  melatonin 5mg po qhs (w/ trazodone, for insomnia)  Thiamine and folate  MVI 1 po q24  BRAT diet  imodium 2mg po q4 prn loose BM (C Diff is neg)  CATCH team follow up: pt is planning to go home eventually and will self-refer to [facility] in community  Monitor closely for Delirium tremens  off IVFs  Psych f/u, may need to switch to scheduled ciwa tapering protocol    #Alcoholic Transaminitis (mild)  #thrombocytopenia is likely direct effect of EtOH    #Anxiety/Depression   - c/w home med: prozac, neurontin, trazodone    #DVT ppx: LMWH  #GI ppx: PPI po q24  #Activity: independent

## 2023-08-14 NOTE — PROGRESS NOTE ADULT - SUBJECTIVE AND OBJECTIVE BOX
Hospital Day:  5d    Subjective:    Patient is a 38y old  Male who presents with a chief complaint of alcohol withdrawal.  This morning patient is lying in bed comfortably. He reports he still gets anxious/agitated at times. He needed symptom triggered ativan 4 times over last 24 hours.      Past Medical Hx:   No pertinent past medical history      Past Sx:  No significant past surgical history      Allergies:  Ceclor (Other)    Current Meds:   Standng Meds:  chlorhexidine 2% Cloths 1 Application(s) Topical daily  cloNIDine 0.1 milliGRAM(s) Oral at bedtime  cyanocobalamin 1000 MICROGram(s) Oral daily  enoxaparin Injectable 40 milliGRAM(s) SubCutaneous every 24 hours  FLUoxetine 20 milliGRAM(s) Oral daily  folic acid 1 milliGRAM(s) Oral daily  gabapentin 300 milliGRAM(s) Oral three times a day  LORazepam     Tablet 3 milliGRAM(s) Oral every 4 hours  melatonin 5 milliGRAM(s) Oral at bedtime  multivitamin 1 Tablet(s) Oral daily  pantoprazole    Tablet 40 milliGRAM(s) Oral before breakfast  traZODone 100 milliGRAM(s) Oral at bedtime    PRN Meds:  diphenhydrAMINE 25 milliGRAM(s) Oral every 4 hours PRN Rash and/or Itching  loperamide 2 milliGRAM(s) Oral every 4 hours PRN Diarrhea  LORazepam     Tablet 2 milliGRAM(s) Oral every 2 hours PRN Symptom-triggered: each CIWA -Ar score 8 or GREATER    HOME MEDICATIONS:  FLUoxetine 15 mg oral tablet: orally once a day  traZODone 100 mg oral tablet: orally once a day (at bedtime)      Vital Signs:   T(F): 98.9 (08-13-23 @ 04:54), Max: 98.9 (08-13-23 @ 04:54)  HR: 80 (08-13-23 @ 04:54) (80 - 99)  BP: 127/75 (08-13-23 @ 04:54) (120/80 - 128/78)  RR: 18 (08-13-23 @ 04:54) (18 - 18)  SpO2: 98% (08-12-23 @ 14:29) (98% - 98%)      08-12-23 @ 07:01  -  08-13-23 @ 07:00  --------------------------------------------------------  IN: 800 mL / OUT: 0 mL / NET: 800 mL        Physical Exam:   GENERAL: NAD  HEENT: NCAT  CHEST/LUNG: CTAB  HEART: Regular rate and rhythm; s1 s2 appreciated, No murmurs, rubs, or gallops  ABDOMEN: Soft, Nontender, Nondistended; Bowel sounds present  EXTREMITIES: No LE edema b/l  SKIN: no rashes, no new lesions  NERVOUS SYSTEM:  Alert & Oriented X3  LINES/CATHETERS:        Labs:                 Amylase --, Lipase 136, 08-13-23 @ 06:31              Urinalysis Basic - ( 11 Aug 2023 07:42 )    Color: x / Appearance: x / SG: x / pH: x  Gluc: 91 mg/dL / Ketone: x  / Bili: x / Urobili: x   Blood: x / Protein: x / Nitrite: x   Leuk Esterase: x / RBC: x / WBC x   Sq Epi: x / Non Sq Epi: x / Bacteria: x                Assessment and Plan:    Hospital Day:  5d    Subjective:    Patient is a 38y old  Male who presents with a chief complaint of alcohol withdrawal.  This morning patient is lying in bed comfortably. He reports he still gets anxious/agitated at times.  patient still has some tremors.       Past Medical Hx:   No pertinent past medical history      Past Sx:  No significant past surgical history      Allergies:  Ceclor (Other)    Current Meds:   Standng Meds:  chlorhexidine 2% Cloths 1 Application(s) Topical daily  cloNIDine 0.1 milliGRAM(s) Oral at bedtime  cyanocobalamin 1000 MICROGram(s) Oral daily  enoxaparin Injectable 40 milliGRAM(s) SubCutaneous every 24 hours  FLUoxetine 20 milliGRAM(s) Oral daily  folic acid 1 milliGRAM(s) Oral daily  gabapentin 300 milliGRAM(s) Oral three times a day  LORazepam     Tablet 3 milliGRAM(s) Oral every 4 hours  melatonin 5 milliGRAM(s) Oral at bedtime  multivitamin 1 Tablet(s) Oral daily  pantoprazole    Tablet 40 milliGRAM(s) Oral before breakfast  traZODone 100 milliGRAM(s) Oral at bedtime    PRN Meds:  diphenhydrAMINE 25 milliGRAM(s) Oral every 4 hours PRN Rash and/or Itching  loperamide 2 milliGRAM(s) Oral every 4 hours PRN Diarrhea  LORazepam     Tablet 2 milliGRAM(s) Oral every 2 hours PRN Symptom-triggered: each CIWA -Ar score 8 or GREATER    HOME MEDICATIONS:  FLUoxetine 15 mg oral tablet: orally once a day  traZODone 100 mg oral tablet: orally once a day (at bedtime)      Vital Signs:   Vital Signs Last 24 Hrs  T(C): 36.7 (14 Aug 2023 05:11), Max: 36.7 (14 Aug 2023 05:11)  T(F): 98 (14 Aug 2023 05:11), Max: 98 (14 Aug 2023 05:11)  HR: 80 (14 Aug 2023 05:11) (78 - 86)  BP: 111/68 (14 Aug 2023 05:11) (111/68 - 131/73)  BP(mean): --  RR: 20 (14 Aug 2023 05:11) (18 - 20)  SpO2: --          Physical Exam:   GENERAL: NAD  HEENT: NCAT  CHEST/LUNG: CTAB  HEART: Regular rate and rhythm; s1 s2 appreciated, No murmurs, rubs, or gallops  ABDOMEN: Soft, Nontender, Nondistended; Bowel sounds present  EXTREMITIES: No LE edema b/l  SKIN: no rashes, no new lesions  NERVOUS SYSTEM:  Alert & Oriented X3  LINES/CATHETERS:        Labs:       LABS:                        14.6   5.37  )-----------( 151      ( 14 Aug 2023 06:18 )             43.5     08-14    139  |  97<L>  |  17  ----------------------------<  91  4.4   |  30  |  1.0    Ca    9.8      14 Aug 2023 06:18    TPro  7.0  /  Alb  4.8  /  TBili  0.4  /  DBili  x   /  AST  51<H>  /  ALT  48<H>  /  AlkPhos  51  08-14                        Assessment and Plan:

## 2023-08-14 NOTE — PROGRESS NOTE ADULT - ASSESSMENT
# acute Alcohol withdrawal; continuous EtOH abuse, no hx of sz or DTs  continue CIWA protocol: ativan 3mg po q4. He needed 8 mg symptom triggered ativan over last 24 hours; he is not ready to taper down yet  benadryl 25mg po q4 prn itching  clonidine 0.1mg po qhs for vasomotor symp (night sweats)  melatonin 5mg po qhs (w/ trazodone, for insomnia)  Thiamine and folate  MVI 1 po q24  BRAT diet  imodium 2mg po q4 prn loose BM (C Diff is neg)  CATCH team follow up: pt is planning to go home eventually and will self-refer to [facility] in community  Monitor closely for Delirium tremens  off IVFs    # Alcoholic Transaminitis (mild)  # thrombocytopenia is likely direct effect of EtOH    # Anxiety/Depression   c/w home med: prozac, neurontin, trazodone    # DVT ppx: LMWH    # GI ppx: PPI po q24    # Activity: independent    Dispo: tx w/drawal and symptoms; f/u CATCH  eventually, pt will go home w/ no needs - prob will take at least 1 wk to detox fully - pt aware and cooperating   # Acute Alcohol withdrawal; continuous EtOH abuse, no hx of sz or DTs  continue CIWA protocol: ativan 3mg po q4. He needed 8 mg symptom triggered ativan over last 24 hours; he is not ready to taper down yet  benadryl 25mg po q4 prn itching  clonidine 0.1mg po qhs for vasomotor symp (night sweats)  melatonin 5mg po qhs (w/ trazodone, for insomnia)  Thiamine and folate  MVI 1 po q24  BRAT diet  imodium 2mg po q4 prn loose BM (C Diff is neg)  CATCH team follow up: pt is planning to go home eventually and will self-refer to [facility] in community  Monitor closely for Delirium tremens  off IVFs  follow psych, may need to switch to scheduled ciwa tapering protocol. final plan as per psych/    # Alcoholic Transaminitis (mild)  # thrombocytopenia is likely direct effect of EtOH    # Anxiety/Depression   c/w home med: prozac, neurontin, trazodone    # DVT ppx: LMWH    # GI ppx: PPI po q24    # Activity: independent    Dispo: tx w/drawal and symptoms; f/u CATCH  eventually, pt will go home w/ no needs - still having some tremors.  - pt aware and cooperating, follow psych. follow CATCH

## 2023-08-14 NOTE — PROGRESS NOTE ADULT - SUBJECTIVE AND OBJECTIVE BOX
24H events:    Patient is a 38y old Male who presents with a chief complaint of shakiness  Primary diagnosis of Alcohol dependency    Today is hospital day 6d. This morning patient was seen and examined at bedside, resting comfortably in bed.    No acute or major events overnight.    Code Status: Full    Family communication:  Contact date:  Name of person contacted:  Relationship to patient:  Communication details:  What matters most:    PAST MEDICAL & SURGICAL HISTORY  No pertinent past medical history  anxiety  depression  polysubstance    No significant past surgical history      ALLERGIES:  Ceclor (Other)    MEDICATIONS:  STANDING MEDICATIONS  chlorhexidine 2% Cloths 1 Application(s) Topical daily  cloNIDine 0.1 milliGRAM(s) Oral at bedtime  cyanocobalamin 1000 MICROGram(s) Oral daily  enoxaparin Injectable 40 milliGRAM(s) SubCutaneous every 24 hours  FLUoxetine 20 milliGRAM(s) Oral daily  folic acid 1 milliGRAM(s) Oral daily  gabapentin 300 milliGRAM(s) Oral three times a day  LORazepam     Tablet 3 milliGRAM(s) Oral every 4 hours  melatonin 5 milliGRAM(s) Oral at bedtime  multivitamin 1 Tablet(s) Oral daily  pantoprazole    Tablet 40 milliGRAM(s) Oral before breakfast  thiamine 100 milliGRAM(s) Oral daily  traZODone 100 milliGRAM(s) Oral at bedtime    PRN MEDICATIONS  diphenhydrAMINE 25 milliGRAM(s) Oral every 4 hours PRN  haloperidol     Tablet 5 milliGRAM(s) Oral every 6 hours PRN  loperamide 2 milliGRAM(s) Oral every 4 hours PRN  LORazepam     Tablet 2 milliGRAM(s) Oral every 2 hours PRN    VITALS:   T(F): 98  HR: 80  BP: 111/68  RR: 20  SpO2: --    PHYSICAL EXAM:  GENERAL: NAD, lying in bed comfortably    HEAD: Atraumatic    NECK: Supple    HEART: normal rate, regular rhythm, normal s1s2    LUNGS: Unlabored respirations, B/L air entry, no adventitious sound    ABDOMEN: Soft, nondistended, +BS, nontender    NERVOUS SYSTEM: A&Ox3      LABS:                        14.6   5.37  )-----------( 151      ( 14 Aug 2023 06:18 )             43.5     08-14    139  |  97<L>  |  17  ----------------------------<  91  4.4   |  30  |  1.0    Ca    9.8      14 Aug 2023 06:18    TPro  7.0  /  Alb  4.8  /  TBili  0.4  /  DBili  x   /  AST  51<H>  /  ALT  48<H>  /  AlkPhos  51  08-14      Urinalysis Basic - ( 14 Aug 2023 06:18 )    Color: x / Appearance: x / SG: x / pH: x  Gluc: 91 mg/dL / Ketone: x  / Bili: x / Urobili: x   Blood: x / Protein: x / Nitrite: x   Leuk Esterase: x / RBC: x / WBC x   Sq Epi: x / Non Sq Epi: x / Bacteria: x

## 2023-08-15 LAB
ALBUMIN SERPL ELPH-MCNC: 4.5 G/DL — SIGNIFICANT CHANGE UP (ref 3.5–5.2)
ALP SERPL-CCNC: 45 U/L — SIGNIFICANT CHANGE UP (ref 30–115)
ALT FLD-CCNC: 42 U/L — HIGH (ref 0–41)
ANION GAP SERPL CALC-SCNC: 13 MMOL/L — SIGNIFICANT CHANGE UP (ref 7–14)
ANION GAP SERPL CALC-SCNC: 9 MMOL/L — SIGNIFICANT CHANGE UP (ref 7–14)
AST SERPL-CCNC: 36 U/L — SIGNIFICANT CHANGE UP (ref 0–41)
BASOPHILS # BLD AUTO: 0.08 K/UL — SIGNIFICANT CHANGE UP (ref 0–0.2)
BASOPHILS NFR BLD AUTO: 1.7 % — HIGH (ref 0–1)
BILIRUB SERPL-MCNC: 0.9 MG/DL — SIGNIFICANT CHANGE UP (ref 0.2–1.2)
BUN SERPL-MCNC: 15 MG/DL — SIGNIFICANT CHANGE UP (ref 10–20)
BUN SERPL-MCNC: 17 MG/DL — SIGNIFICANT CHANGE UP (ref 10–20)
CALCIUM SERPL-MCNC: 9.2 MG/DL — SIGNIFICANT CHANGE UP (ref 8.4–10.5)
CALCIUM SERPL-MCNC: 9.5 MG/DL — SIGNIFICANT CHANGE UP (ref 8.4–10.5)
CHLORIDE SERPL-SCNC: 100 MMOL/L — SIGNIFICANT CHANGE UP (ref 98–110)
CHLORIDE SERPL-SCNC: 103 MMOL/L — SIGNIFICANT CHANGE UP (ref 98–110)
CO2 SERPL-SCNC: 25 MMOL/L — SIGNIFICANT CHANGE UP (ref 17–32)
CO2 SERPL-SCNC: 29 MMOL/L — SIGNIFICANT CHANGE UP (ref 17–32)
CREAT SERPL-MCNC: 0.9 MG/DL — SIGNIFICANT CHANGE UP (ref 0.7–1.5)
CREAT SERPL-MCNC: 0.9 MG/DL — SIGNIFICANT CHANGE UP (ref 0.7–1.5)
EGFR: 113 ML/MIN/1.73M2 — SIGNIFICANT CHANGE UP
EGFR: 113 ML/MIN/1.73M2 — SIGNIFICANT CHANGE UP
EOSINOPHIL # BLD AUTO: 0.09 K/UL — SIGNIFICANT CHANGE UP (ref 0–0.7)
EOSINOPHIL NFR BLD AUTO: 1.9 % — SIGNIFICANT CHANGE UP (ref 0–8)
GLUCOSE SERPL-MCNC: 80 MG/DL — SIGNIFICANT CHANGE UP (ref 70–99)
GLUCOSE SERPL-MCNC: 95 MG/DL — SIGNIFICANT CHANGE UP (ref 70–99)
HCT VFR BLD CALC: 41.2 % — LOW (ref 42–52)
HGB BLD-MCNC: 14 G/DL — SIGNIFICANT CHANGE UP (ref 14–18)
IMM GRANULOCYTES NFR BLD AUTO: 1.1 % — HIGH (ref 0.1–0.3)
LYMPHOCYTES # BLD AUTO: 1.28 K/UL — SIGNIFICANT CHANGE UP (ref 1.2–3.4)
LYMPHOCYTES # BLD AUTO: 27.4 % — SIGNIFICANT CHANGE UP (ref 20.5–51.1)
MAGNESIUM SERPL-MCNC: 2.1 MG/DL — SIGNIFICANT CHANGE UP (ref 1.8–2.4)
MCHC RBC-ENTMCNC: 32.1 PG — HIGH (ref 27–31)
MCHC RBC-ENTMCNC: 34 G/DL — SIGNIFICANT CHANGE UP (ref 32–37)
MCV RBC AUTO: 94.5 FL — HIGH (ref 80–94)
MONOCYTES # BLD AUTO: 0.56 K/UL — SIGNIFICANT CHANGE UP (ref 0.1–0.6)
MONOCYTES NFR BLD AUTO: 12 % — HIGH (ref 1.7–9.3)
NEUTROPHILS # BLD AUTO: 2.61 K/UL — SIGNIFICANT CHANGE UP (ref 1.4–6.5)
NEUTROPHILS NFR BLD AUTO: 55.9 % — SIGNIFICANT CHANGE UP (ref 42.2–75.2)
NRBC # BLD: 0 /100 WBCS — SIGNIFICANT CHANGE UP (ref 0–0)
PLATELET # BLD AUTO: 147 K/UL — SIGNIFICANT CHANGE UP (ref 130–400)
PMV BLD: 10.5 FL — HIGH (ref 7.4–10.4)
POTASSIUM SERPL-MCNC: 4 MMOL/L — SIGNIFICANT CHANGE UP (ref 3.5–5)
POTASSIUM SERPL-MCNC: 4.5 MMOL/L — SIGNIFICANT CHANGE UP (ref 3.5–5)
POTASSIUM SERPL-SCNC: 4 MMOL/L — SIGNIFICANT CHANGE UP (ref 3.5–5)
POTASSIUM SERPL-SCNC: 4.5 MMOL/L — SIGNIFICANT CHANGE UP (ref 3.5–5)
PROT SERPL-MCNC: 6.7 G/DL — SIGNIFICANT CHANGE UP (ref 6–8)
RBC # BLD: 4.36 M/UL — LOW (ref 4.7–6.1)
RBC # FLD: 13.2 % — SIGNIFICANT CHANGE UP (ref 11.5–14.5)
SODIUM SERPL-SCNC: 138 MMOL/L — SIGNIFICANT CHANGE UP (ref 135–146)
SODIUM SERPL-SCNC: 141 MMOL/L — SIGNIFICANT CHANGE UP (ref 135–146)
WBC # BLD: 4.67 K/UL — LOW (ref 4.8–10.8)
WBC # FLD AUTO: 4.67 K/UL — LOW (ref 4.8–10.8)

## 2023-08-15 PROCEDURE — 99233 SBSQ HOSP IP/OBS HIGH 50: CPT | Mod: 95

## 2023-08-15 PROCEDURE — 99232 SBSQ HOSP IP/OBS MODERATE 35: CPT

## 2023-08-15 RX ORDER — GABAPENTIN 400 MG/1
400 CAPSULE ORAL THREE TIMES A DAY
Refills: 0 | Status: DISCONTINUED | OUTPATIENT
Start: 2023-08-15 | End: 2023-08-17

## 2023-08-15 RX ORDER — DIPHENHYDRAMINE HCL 50 MG
25 CAPSULE ORAL ONCE
Refills: 0 | Status: COMPLETED | OUTPATIENT
Start: 2023-08-15 | End: 2023-08-15

## 2023-08-15 RX ADMIN — Medication 2 MILLIGRAM(S): at 18:26

## 2023-08-15 RX ADMIN — Medication 2 MILLIGRAM(S): at 22:51

## 2023-08-15 RX ADMIN — Medication 20 MILLIGRAM(S): at 12:44

## 2023-08-15 RX ADMIN — Medication 2 MILLIGRAM(S): at 20:33

## 2023-08-15 RX ADMIN — Medication 100 MILLIGRAM(S): at 22:51

## 2023-08-15 RX ADMIN — Medication 2 MILLIGRAM(S): at 07:52

## 2023-08-15 RX ADMIN — Medication 25 MILLIGRAM(S): at 07:53

## 2023-08-15 RX ADMIN — Medication 2 MILLIGRAM(S): at 12:18

## 2023-08-15 RX ADMIN — Medication 0.1 MILLIGRAM(S): at 22:51

## 2023-08-15 RX ADMIN — CHLORHEXIDINE GLUCONATE 1 APPLICATION(S): 213 SOLUTION TOPICAL at 12:42

## 2023-08-15 RX ADMIN — Medication 5 MILLIGRAM(S): at 22:51

## 2023-08-15 RX ADMIN — GABAPENTIN 400 MILLIGRAM(S): 400 CAPSULE ORAL at 22:52

## 2023-08-15 RX ADMIN — Medication 25 MILLIGRAM(S): at 15:27

## 2023-08-15 RX ADMIN — Medication 1 MILLIGRAM(S): at 12:44

## 2023-08-15 RX ADMIN — GABAPENTIN 300 MILLIGRAM(S): 400 CAPSULE ORAL at 06:04

## 2023-08-15 RX ADMIN — GABAPENTIN 400 MILLIGRAM(S): 400 CAPSULE ORAL at 12:44

## 2023-08-15 RX ADMIN — PANTOPRAZOLE SODIUM 40 MILLIGRAM(S): 20 TABLET, DELAYED RELEASE ORAL at 05:14

## 2023-08-15 RX ADMIN — PREGABALIN 1000 MICROGRAM(S): 225 CAPSULE ORAL at 12:43

## 2023-08-15 RX ADMIN — Medication 3 MILLIGRAM(S): at 06:04

## 2023-08-15 RX ADMIN — Medication 2 MILLIGRAM(S): at 14:08

## 2023-08-15 RX ADMIN — Medication 100 MILLIGRAM(S): at 12:44

## 2023-08-15 RX ADMIN — Medication 2 MILLIGRAM(S): at 16:41

## 2023-08-15 RX ADMIN — Medication 3 MILLIGRAM(S): at 02:03

## 2023-08-15 RX ADMIN — Medication 1 TABLET(S): at 12:44

## 2023-08-15 RX ADMIN — Medication 2 MILLIGRAM(S): at 10:02

## 2023-08-15 NOTE — BH CONSULTATION LIAISON PROGRESS NOTE - NSBHASSESSMENTFT_PSY_ALL_CORE
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, denies prior suicide attempts, arrested for DWI in 2008, admitted for headache, tremors, and nausea after alcohol intoxication. Psychiatry was consulted for anxiety initially.     Reconsulted for anxiety, alcohol use.     On interview, patient expresses chronic depression, anxiety, and substance use. He denies any history of self harm or suicide attempts. Currently there is no SI. Patient does not present with any acute mindy or psychosis. Declined voluntary admission and does not meet criteria for involuntary psych admission. Was able to safety plan. In terms of medications for anxiety, see below.    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 Psychiatry was consulted for anxiety initially.     Reconsulted for anxiety, alcohol use.     On interview, patient expresses chronic depression, anxiety, and substance use. He denies any history of self harm or suicide attempts. Currently there is no SI/HI. Patient does not present with any acute mindy or psychosis. Declined voluntary admission and does not meet criteria for involuntary psych admission. Was able to safety plan. In terms of medications for anxiety, see below.    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 Psychiatry was consulted for anxiety initially.     Reconsulted for anxiety, alcohol use.     On interview, patient expresses chronic depression, anxiety, and substance use. He denies any history of self harm or suicide attempts. Currently there is no SI/HI. Patient does not present with any acute mindy or psychosis. Declined voluntary admission and does not meet criteria for involuntary psych admission. Was able to safety plan. In terms of medications for anxiety, see below.    -Observation per primary team  -If Ativan is not managing symptoms, may consider Librium use and addiction consult   -Offered increase in Gabapentin from 300mg TID to 400mg TID, however patient expressed concerns  -Can offer instead Hydroxyzine 50mg q8 PRN for anxiety  -Continue Prozac 20mg PO daily  -Continue Trazodone 100mg qHS

## 2023-08-15 NOTE — BH CONSULTATION LIAISON PROGRESS NOTE - NSBHCHARTREVIEWVS_PSY_A_CORE FT
Vital Signs Last 24 Hrs  T(C): 36.3 (15 Aug 2023 05:21), Max: 36.6 (14 Aug 2023 21:34)  T(F): 97.4 (15 Aug 2023 05:21), Max: 97.8 (14 Aug 2023 21:34)  HR: 82 (15 Aug 2023 05:21) (82 - 90)  BP: 110/55 (15 Aug 2023 05:21) (110/55 - 131/68)  BP(mean): --  RR: 18 (15 Aug 2023 05:21) (18 - 18)  SpO2: 98% (15 Aug 2023 05:21) (98% - 98%)

## 2023-08-15 NOTE — BH CONSULTATION LIAISON PROGRESS NOTE - NSBHFUPINTERVALHXFT_PSY_A_CORE
Interval chart reviewed. Patient was recently seen for anxiety 8/12/23. Prozac was increased to 20mg daily. Gabapentin was increased from 600mg daily to 300mg TID. There has been no agitation.    On interview, patient is AOx4 and conversant. Reports that he has chronically suffered from anxiety and depression. Reports a remote psych admission in the context of cannabis use. Reports remote opiate, benzo use as well and prior detox/rehab. Patient denies feeling continuous anhedonia. 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 He denies any poor appetite and states "I take trazodone for sleep," and it helps him. He reports some guilt over his drinking and the effects it has had on his gf and family. Pt states that his gf is in the process of moving out. Pt denies any current thoughts of self harm or SI. Denies any SI this past month. Denies any SA. Denies any paranoia, AH, HI. Patient reports that he sees Dr. Worthington at [facility]. Also sees an individual provider and is in groups. Declined any collateral contacts. Safety planning was done.     I discuss with him medications for anxiety. Patient states that he would eventually like to be off gabapentin as he states he used to take 600mg TID and he noticed that when he would miss a dose he would be disoriented. He is amenable to hydroxyzine PRN for anxiety.  Interval chart reviewed. Patient was recently seen for anxiety 8/12/23. Prozac was increased to 20mg daily. Gabapentin was increased from 600mg daily to 300mg TID. There has been no agitation.    On interview, patient is AOx4 and conversant. Reports that he has chronically suffered from anxiety and depression. Reports a remote psych admission in the context of cannabis use. Reports remote opiate, benzo use as well and prior detox/rehab. Patient denies feeling continuous anhedonia. 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 He denies any poor appetite and states "I take trazodone for sleep," and it helps him. He reports some guilt over his drinking and the effects it has had on his gf and family. Pt states that his gf is in the process of moving out. Pt denies any current thoughts of self harm or SI. Denies any SI this past month. Denies any past SA. Denies any paranoia, AH, HI. Patient reports that he sees Dr. Worthington at [facility]. Also sees an individual provider and is in groups. Declined any collateral contacts. Safety planning was done.     I discuss with him medications for anxiety. Patient states that he would eventually like to be off gabapentin as he states he used to take 600mg TID and he noticed that when he would miss a dose he would be disoriented. He is amenable to hydroxyzine PRN for anxiety.  Interval chart reviewed. Patient was recently seen for anxiety 8/12/23. Prozac was increased to 20mg daily. Gabapentin was increased from 600mg daily to 300mg TID. There has been no agitation.    On interview, patient is AOx4 and conversant. Reports that he has chronically suffered from anxiety and depression. Reports a remote psych admission in the context of cannabis use. Reports remote opiate, benzo use as well and prior detox/rehab. Patient denies feeling continuous anhedonia. 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 He denies any poor appetite and states "I take trazodone for sleep," and it helps him. He reports some guilt over his drinking and the effects it has had on his gf and family. Pt states that his gf is in the process of moving out. Pt denies any current thoughts of self harm or SI. Denies any SI this past month. Denies any past SA. Denies any paranoia, hallucinations, HI. Patient reports that he sees Dr. Worthington at [facility]. Also sees an individual counselor and is in groups. Declined any collateral contacts. Safety planning was done.     I discuss with him medications for anxiety. Patient states that he would eventually like to be off gabapentin as he states he used to take 600mg TID and he noticed that when he would miss a dose he would be disoriented. He is amenable to hydroxyzine PRN for anxiety.  Interval chart reviewed. Patient was recently seen for anxiety 8/12/23. Prozac was increased to 20mg daily. Gabapentin was increased from 600mg daily to 300mg TID.    On interview, patient is AOx4 and conversant. Reports that he has chronically suffered from anxiety and depression. Reports a remote psych admission in the context of cannabis use. Reports remote opiate, benzo use as well and prior detox/rehab. Patient denies feeling continuous anhedonia. 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 He denies any poor appetite and states "I take trazodone for sleep," and it helps him. He reports some guilt over his drinking and the effects it has had on his gf and family. Pt states that his gf is in the process of moving out. Pt denies any current thoughts of self harm or SI. Denies any SI this past month. Denies any past SA. Denies any paranoia, hallucinations, HI. Patient reports that he sees Dr. Worthington at [facility]. Also sees an individual counselor and is in groups. Declined any collateral contacts. Safety planning was done.     I discuss with him medications for anxiety. Patient states that he would eventually like to be off gabapentin as he states he used to take 600mg TID and he noticed that when he would miss a dose he would be disoriented. He is amenable to hydroxyzine PRN for anxiety.

## 2023-08-15 NOTE — BH CONSULTATION LIAISON PROGRESS NOTE - NSBHCONSULTFOLLOWAFTERCARE_PSY_A_CORE FT
please have SW arrange for f/up with outpatient provider, pt also expresses interest in outpatient programs at [facility]

## 2023-08-15 NOTE — PROGRESS NOTE ADULT - ASSESSMENT
# Acute Alcohol withdrawal; continuous EtOH abuse, no hx of sz or DTs  continue CIWA protocol: ativan 3mg po q4. He needed 8 mg symptom triggered ativan over last 24 hours; he is not ready to taper down yet  benadryl 25mg po q4 prn itching  clonidine 0.1mg po qhs for vasomotor symp (night sweats)  melatonin 5mg po qhs (w/ trazodone, for insomnia)  Thiamine and folate  MVI 1 po q24  imodium 2mg po q4 prn loose BM (C Diff is neg)  CATCH team follow up: pt is planning to go home eventually and will self-refer to [facility] in community  Monitor closely for Delirium tremens  off IVFs  d/w psych  recomended addiction consult, increased gabapentin to 400 mg tid     # Alcoholic Transaminitis (mild)  # thrombocytopenia is likely direct effect of EtOH    # Anxiety/Depression   c/w home med: prozac, neurontin, trazodone    # DVT ppx: LMWH    # GI ppx: PPI po q24    # Activity: independent    Dispo: tx w/drawal and symptoms; f/u addiction team, follow psych   eventually,tlikely will go home w/ no needs - patgient does not want to go to inpatient rehab. still having some tremors.  - pt aware and cooperating, follow psych. follow addiction.

## 2023-08-15 NOTE — PROGRESS NOTE ADULT - SUBJECTIVE AND OBJECTIVE BOX
24H events:    Patient is a 38y old Male who presents with a chief complaint of Alcohol withdrawal (14 Aug 2023 15:48)    Primary diagnosis of Alcohol dependency    Today is hospital day 7d. This morning patient was seen and examined at bedside, resting comfortably in bed.    No acute or major events overnight.    Code Status: Full    PAST MEDICAL & SURGICAL HISTORY  No pertinent past medical history  anxiety  depression  polysubstance    No significant past surgical history      SOCIAL HISTORY:  Social History: alcohol abuse      ALLERGIES:  Ceclor (Other)    MEDICATIONS:  STANDING MEDICATIONS  chlorhexidine 2% Cloths 1 Application(s) Topical daily  cloNIDine 0.1 milliGRAM(s) Oral at bedtime  cyanocobalamin 1000 MICROGram(s) Oral daily  enoxaparin Injectable 40 milliGRAM(s) SubCutaneous every 24 hours  FLUoxetine 20 milliGRAM(s) Oral daily  folic acid 1 milliGRAM(s) Oral daily  gabapentin 400 milliGRAM(s) Oral three times a day  melatonin 5 milliGRAM(s) Oral at bedtime  multivitamin 1 Tablet(s) Oral daily  pantoprazole    Tablet 40 milliGRAM(s) Oral before breakfast  thiamine 100 milliGRAM(s) Oral daily  traZODone 100 milliGRAM(s) Oral at bedtime    PRN MEDICATIONS  diphenhydrAMINE 25 milliGRAM(s) Oral every 4 hours PRN  haloperidol     Tablet 5 milliGRAM(s) Oral every 6 hours PRN  loperamide 2 milliGRAM(s) Oral every 4 hours PRN  LORazepam     Tablet 2 milliGRAM(s) Oral every 2 hours PRN    VITALS:   T(F): 97.4  HR: 82  BP: 110/55  RR: 18  SpO2: 98%    PHYSICAL EXAM:  GENERAL: anxious    HEAD: Atraumatic    NECK: Supple    HEART: normal rate, regular rhythm, normal s1s2    LUNGS: Unlabored respirations, B/L air entry, no adventitious sound    ABDOMEN: Soft, nondistended, +BS, nontender    EXTREMITIES: no edema, tremor b/l UE    NERVOUS SYSTEM: A&Ox3  Upper extremities: no sensorimotor deficits  Lower extremities: no sensorimotor deficits        LABS:                        14.0   4.67  )-----------( 147      ( 15 Aug 2023 05:53 )             41.2     08-15    138  |  100  |  15  ----------------------------<  80  4.5   |  29  |  0.9    Ca    9.5      15 Aug 2023 05:53  Mg     2.1     08-15    TPro  6.7  /  Alb  4.5  /  TBili  0.9  /  DBili  x   /  AST  36  /  ALT  42<H>  /  AlkPhos  45  08-15      Urinalysis Basic - ( 15 Aug 2023 05:53 )    Color: x / Appearance: x / SG: x / pH: x  Gluc: 80 mg/dL / Ketone: x  / Bili: x / Urobili: x   Blood: x / Protein: x / Nitrite: x   Leuk Esterase: x / RBC: x / WBC x   Sq Epi: x / Non Sq Epi: x / Bacteria: x

## 2023-08-15 NOTE — PROGRESS NOTE ADULT - ASSESSMENT
#Acute Alcohol withdrawal; continuous EtOH abuse, no hx of sz or DTs  - continue CIWA protocol: ativan 3mg po q4. He needed 8 mg symptom triggered ativan over last 24 hours; he is not ready to taper down yet  - Telepsych consulted: If Ativan is not managing symptoms, may consider Librium use  - addiction consult  - Can offer Hydroxyzine 50mg q8 PRN for anxiety  - benadryl 25mg po q4 prn itching  - clonidine 0.1mg po qhs for vasomotor symp (night sweats)  - melatonin 5mg po qhs (w/ trazodone, for insomnia)  - Thiamine and folate  - MVI 1 po q24  - BRAT diet  - imodium 2mg po q4 prn loose BM (C Diff is neg)  - CATCH team follow up: pt is planning to go home eventually and will self-refer to [facility] in community  - Monitor closely for Delirium tremens    #Alcoholic Transaminitis (mild)  #thrombocytopenia is likely direct effect of EtOH    #Anxiety/Depression  - c/w home med: prozac, neurontin, trazodone    #DVT ppx: LMWH  #GI ppx: PPI po q24  #Activity: AAT   #Acute Alcohol withdrawal; continuous EtOH abuse, no hx of sz or DTs  - continue CIWA protocol: ativan 3mg po q4. He needed 8 mg symptom triggered ativan over last 24 hours; he is not ready to taper down yet  - Telepsych consulted: If Ativan is not managing symptoms, may consider Librium use  - addiction consult  - increase gabapentin to 400mg TID  - benadryl 25mg po q4 prn itching  - clonidine 0.1mg po qhs for vasomotor symp (night sweats)  - melatonin 5mg po qhs (w/ trazodone, for insomnia)  - Thiamine and folate  - MVI 1 po q24  - BRAT diet  - imodium 2mg po q4 prn loose BM (C Diff is neg)  - CATCH team follow up: pt is planning to go home eventually and will self-refer to [facility] in community  - Monitor closely for Delirium tremens    #Alcoholic Transaminitis (mild)  #thrombocytopenia is likely direct effect of EtOH    #Anxiety/Depression  - c/w home med: prozac, neurontin, trazodone    #DVT ppx: LMWH  #GI ppx: PPI po q24  #Activity: AAT

## 2023-08-15 NOTE — PROGRESS NOTE ADULT - SUBJECTIVE AND OBJECTIVE BOX
Hospital Day:  5d    Subjective:    Patient is a 38y old  Male who presents with a chief complaint of alcohol withdrawal.  This morning patient is lying in bed comfortably. He reports he still gets anxious/agitated at times.  patient still has some tremors.   addiction team consulted, d/w psych       Past Medical Hx:   No pertinent past medical history      Past Sx:  No significant past surgical history      Allergies:  Ceclor (Other)    Current Meds:   Standng Meds:  chlorhexidine 2% Cloths 1 Application(s) Topical daily  cloNIDine 0.1 milliGRAM(s) Oral at bedtime  cyanocobalamin 1000 MICROGram(s) Oral daily  enoxaparin Injectable 40 milliGRAM(s) SubCutaneous every 24 hours  FLUoxetine 20 milliGRAM(s) Oral daily  folic acid 1 milliGRAM(s) Oral daily  gabapentin 300 milliGRAM(s) Oral three times a day  LORazepam     Tablet 3 milliGRAM(s) Oral every 4 hours  melatonin 5 milliGRAM(s) Oral at bedtime  multivitamin 1 Tablet(s) Oral daily  pantoprazole    Tablet 40 milliGRAM(s) Oral before breakfast  traZODone 100 milliGRAM(s) Oral at bedtime    PRN Meds:  diphenhydrAMINE 25 milliGRAM(s) Oral every 4 hours PRN Rash and/or Itching  loperamide 2 milliGRAM(s) Oral every 4 hours PRN Diarrhea  LORazepam     Tablet 2 milliGRAM(s) Oral every 2 hours PRN Symptom-triggered: each CIWA -Ar score 8 or GREATER    HOME MEDICATIONS:  FLUoxetine 15 mg oral tablet: orally once a day  traZODone 100 mg oral tablet: orally once a day (at bedtime)      Vital Signs:   Vital Signs Last 24 Hrs  Vital Signs Last 24 Hrs  T(C): 36.3 (15 Aug 2023 05:21), Max: 36.6 (14 Aug 2023 21:34)  T(F): 97.4 (15 Aug 2023 05:21), Max: 97.8 (14 Aug 2023 21:34)  HR: 82 (15 Aug 2023 05:21) (82 - 90)  BP: 110/55 (15 Aug 2023 05:21) (110/55 - 131/68)  BP(mean): --  RR: 18 (15 Aug 2023 05:21) (18 - 18)  SpO2: 98% (15 Aug 2023 05:21) (98% - 98%)              Physical Exam:   GENERAL: NAD  HEENT: NCAT  CHEST/LUNG: CTAB  HEART: Regular rate and rhythm; s1 s2 appreciated, No murmurs, rubs, or gallops  ABDOMEN: Soft, Nontender, Nondistended; Bowel sounds present  EXTREMITIES: No LE edema b/l  SKIN: no rashes, no new lesions  NERVOUS SYSTEM:  Alert & Oriented X3  LINES/CATHETERS:        Labs:         LABS:                        14.0   4.67  )-----------( 147      ( 15 Aug 2023 05:53 )             41.2     08-15    138  |  100  |  15  ----------------------------<  80  4.5   |  29  |  0.9    Ca    9.5      15 Aug 2023 05:53  Mg     2.1     08-15    TPro  6.7  /  Alb  4.5  /  TBili  0.9  /  DBili  x   /  AST  36  /  ALT  42<H>  /  AlkPhos  45  08-15                                Assessment and Plan:

## 2023-08-15 NOTE — BH CONSULTATION LIAISON PROGRESS NOTE - CURRENT MEDICATION
MEDICATIONS  (STANDING):  chlorhexidine 2% Cloths 1 Application(s) Topical daily  cloNIDine 0.1 milliGRAM(s) Oral at bedtime  cyanocobalamin 1000 MICROGram(s) Oral daily  enoxaparin Injectable 40 milliGRAM(s) SubCutaneous every 24 hours  FLUoxetine 20 milliGRAM(s) Oral daily  folic acid 1 milliGRAM(s) Oral daily  gabapentin 300 milliGRAM(s) Oral three times a day  melatonin 5 milliGRAM(s) Oral at bedtime  multivitamin 1 Tablet(s) Oral daily  pantoprazole    Tablet 40 milliGRAM(s) Oral before breakfast  thiamine 100 milliGRAM(s) Oral daily  traZODone 100 milliGRAM(s) Oral at bedtime    MEDICATIONS  (PRN):  diphenhydrAMINE 25 milliGRAM(s) Oral every 4 hours PRN Rash and/or Itching  haloperidol     Tablet 5 milliGRAM(s) Oral every 6 hours PRN agitation  loperamide 2 milliGRAM(s) Oral every 4 hours PRN Diarrhea  LORazepam     Tablet 2 milliGRAM(s) Oral every 2 hours PRN Symptom-triggered: each CIWA -Ar score 8 or GREATER

## 2023-08-16 LAB
ALBUMIN SERPL ELPH-MCNC: 4.5 G/DL — SIGNIFICANT CHANGE UP (ref 3.5–5.2)
ALP SERPL-CCNC: 44 U/L — SIGNIFICANT CHANGE UP (ref 30–115)
ALT FLD-CCNC: 37 U/L — SIGNIFICANT CHANGE UP (ref 0–41)
ANION GAP SERPL CALC-SCNC: 11 MMOL/L — SIGNIFICANT CHANGE UP (ref 7–14)
AST SERPL-CCNC: 29 U/L — SIGNIFICANT CHANGE UP (ref 0–41)
BASOPHILS # BLD AUTO: 0.07 K/UL — SIGNIFICANT CHANGE UP (ref 0–0.2)
BASOPHILS NFR BLD AUTO: 1.4 % — HIGH (ref 0–1)
BILIRUB SERPL-MCNC: 0.2 MG/DL — SIGNIFICANT CHANGE UP (ref 0.2–1.2)
BUN SERPL-MCNC: 17 MG/DL — SIGNIFICANT CHANGE UP (ref 10–20)
CALCIUM SERPL-MCNC: 9.2 MG/DL — SIGNIFICANT CHANGE UP (ref 8.4–10.5)
CHLORIDE SERPL-SCNC: 104 MMOL/L — SIGNIFICANT CHANGE UP (ref 98–110)
CO2 SERPL-SCNC: 27 MMOL/L — SIGNIFICANT CHANGE UP (ref 17–32)
CREAT SERPL-MCNC: 0.9 MG/DL — SIGNIFICANT CHANGE UP (ref 0.7–1.5)
EGFR: 113 ML/MIN/1.73M2 — SIGNIFICANT CHANGE UP
EOSINOPHIL # BLD AUTO: 0.09 K/UL — SIGNIFICANT CHANGE UP (ref 0–0.7)
EOSINOPHIL NFR BLD AUTO: 1.8 % — SIGNIFICANT CHANGE UP (ref 0–8)
GLUCOSE SERPL-MCNC: 90 MG/DL — SIGNIFICANT CHANGE UP (ref 70–99)
HCT VFR BLD CALC: 40.7 % — LOW (ref 42–52)
HGB BLD-MCNC: 13.6 G/DL — LOW (ref 14–18)
IMM GRANULOCYTES NFR BLD AUTO: 1.2 % — HIGH (ref 0.1–0.3)
LYMPHOCYTES # BLD AUTO: 1.07 K/UL — LOW (ref 1.2–3.4)
LYMPHOCYTES # BLD AUTO: 21.4 % — SIGNIFICANT CHANGE UP (ref 20.5–51.1)
MAGNESIUM SERPL-MCNC: 2.1 MG/DL — SIGNIFICANT CHANGE UP (ref 1.8–2.4)
MCHC RBC-ENTMCNC: 31.6 PG — HIGH (ref 27–31)
MCHC RBC-ENTMCNC: 33.4 G/DL — SIGNIFICANT CHANGE UP (ref 32–37)
MCV RBC AUTO: 94.7 FL — HIGH (ref 80–94)
MONOCYTES # BLD AUTO: 0.77 K/UL — HIGH (ref 0.1–0.6)
MONOCYTES NFR BLD AUTO: 15.4 % — HIGH (ref 1.7–9.3)
NEUTROPHILS # BLD AUTO: 2.95 K/UL — SIGNIFICANT CHANGE UP (ref 1.4–6.5)
NEUTROPHILS NFR BLD AUTO: 58.8 % — SIGNIFICANT CHANGE UP (ref 42.2–75.2)
NRBC # BLD: 0 /100 WBCS — SIGNIFICANT CHANGE UP (ref 0–0)
PLATELET # BLD AUTO: 184 K/UL — SIGNIFICANT CHANGE UP (ref 130–400)
PMV BLD: 10.5 FL — HIGH (ref 7.4–10.4)
POTASSIUM SERPL-MCNC: 4.3 MMOL/L — SIGNIFICANT CHANGE UP (ref 3.5–5)
POTASSIUM SERPL-SCNC: 4.3 MMOL/L — SIGNIFICANT CHANGE UP (ref 3.5–5)
PROT SERPL-MCNC: 6.6 G/DL — SIGNIFICANT CHANGE UP (ref 6–8)
RBC # BLD: 4.3 M/UL — LOW (ref 4.7–6.1)
RBC # FLD: 13.1 % — SIGNIFICANT CHANGE UP (ref 11.5–14.5)
SODIUM SERPL-SCNC: 142 MMOL/L — SIGNIFICANT CHANGE UP (ref 135–146)
WBC # BLD: 5.01 K/UL — SIGNIFICANT CHANGE UP (ref 4.8–10.8)
WBC # FLD AUTO: 5.01 K/UL — SIGNIFICANT CHANGE UP (ref 4.8–10.8)

## 2023-08-16 PROCEDURE — 99231 SBSQ HOSP IP/OBS SF/LOW 25: CPT

## 2023-08-16 RX ADMIN — PREGABALIN 1000 MICROGRAM(S): 225 CAPSULE ORAL at 13:03

## 2023-08-16 RX ADMIN — Medication 5 MILLIGRAM(S): at 21:18

## 2023-08-16 RX ADMIN — ENOXAPARIN SODIUM 40 MILLIGRAM(S): 100 INJECTION SUBCUTANEOUS at 17:32

## 2023-08-16 RX ADMIN — Medication 1 MILLIGRAM(S): at 23:01

## 2023-08-16 RX ADMIN — GABAPENTIN 400 MILLIGRAM(S): 400 CAPSULE ORAL at 13:02

## 2023-08-16 RX ADMIN — GABAPENTIN 400 MILLIGRAM(S): 400 CAPSULE ORAL at 21:18

## 2023-08-16 RX ADMIN — Medication 2 MILLIGRAM(S): at 07:46

## 2023-08-16 RX ADMIN — Medication 0.1 MILLIGRAM(S): at 21:18

## 2023-08-16 RX ADMIN — Medication 1 MILLIGRAM(S): at 19:23

## 2023-08-16 RX ADMIN — GABAPENTIN 400 MILLIGRAM(S): 400 CAPSULE ORAL at 05:39

## 2023-08-16 RX ADMIN — Medication 2 MILLIGRAM(S): at 03:36

## 2023-08-16 RX ADMIN — PANTOPRAZOLE SODIUM 40 MILLIGRAM(S): 20 TABLET, DELAYED RELEASE ORAL at 05:38

## 2023-08-16 RX ADMIN — Medication 1 TABLET(S): at 13:02

## 2023-08-16 RX ADMIN — Medication 2 MILLIGRAM(S): at 05:38

## 2023-08-16 RX ADMIN — Medication 1 MILLIGRAM(S): at 13:02

## 2023-08-16 RX ADMIN — Medication 1 MILLIGRAM(S): at 15:16

## 2023-08-16 RX ADMIN — Medication 20 MILLIGRAM(S): at 13:02

## 2023-08-16 RX ADMIN — Medication 2 MILLIGRAM(S): at 11:14

## 2023-08-16 RX ADMIN — Medication 100 MILLIGRAM(S): at 13:01

## 2023-08-16 RX ADMIN — Medication 100 MILLIGRAM(S): at 21:18

## 2023-08-16 NOTE — PROGRESS NOTE ADULT - ASSESSMENT
#Acute Alcohol withdrawal; continuous EtOH abuse, no hx of sz or DTs  - cut ativan down to 1mg q4 for 3 doses, then 1mg q6. monitor withdrawal sx  - addiction consult  - Telepsych consulted: If Ativan is not managing symptoms, may consider Librium use  - increase gabapentin to 400mg TID  - benadryl 25mg po q4 prn itching  - clonidine 0.1mg po qhs for vasomotor symp (night sweats)  - melatonin 5mg po qhs (w/ trazodone, for insomnia)  - Thiamine and folate  - MVI 1 po q24  - BRAT diet  - imodium 2mg po q4 prn loose BM (C Diff is neg)  - CATCH team follow up: pt is planning to go home eventually and will self-refer to [facility] in community  - Monitor closely for Delirium tremens    #Alcoholic Transaminitis (mild)  #thrombocytopenia is likely direct effect of EtOH    #Anxiety/Depression  - c/w home med: prozac, neurontin, trazodone    #DVT ppx: LMWH  #GI ppx: PPI po q24  #Activity: AAT   #Acute Alcohol withdrawal; continuous EtOH abuse, no hx of sz or DTs  - cut ativan down to 1mg q4 for 3 doses, then 1mg q6. monitor withdrawal sx  - talked to addiction consult, pt doesn't need ativan when discharged, have sw arrange for [facility] outpatient  - Telepsych consulted: If Ativan is not managing symptoms, may consider Librium use  - increase gabapentin to 400mg TID  - benadryl 25mg po q4 prn itching  - clonidine 0.1mg po qhs for vasomotor symp (night sweats)  - melatonin 5mg po qhs (w/ trazodone, for insomnia)  - Thiamine and folate  - MVI 1 po q24  - BRAT diet  - imodium 2mg po q4 prn loose BM (C Diff is neg)  - CATCH team follow up: pt is planning to go home eventually and will self-refer to [facility] in community  - Monitor closely for Delirium tremens    #Alcoholic Transaminitis (mild)  #thrombocytopenia is likely direct effect of EtOH    #Anxiety/Depression  - c/w home med: prozac, neurontin, trazodone    #DVT ppx: LMWH  #GI ppx: PPI po q24  #Activity: AAT

## 2023-08-16 NOTE — PROGRESS NOTE ADULT - PROVIDER SPECIALTY LIST ADULT
Hospitalist
Internal Medicine
Internal Medicine
Hospitalist
Hospitalist
Internal Medicine
Hospitalist
Internal Medicine

## 2023-08-16 NOTE — PROGRESS NOTE ADULT - SUBJECTIVE AND OBJECTIVE BOX
Patient is a 38y old  Male who presents with a chief complaint of Alcohol withdrawal (16 Aug 2023 13:09)      OVERNIGHT EVENTS: remained stable, denies fever, chills, syncope, seizure, headache, cough, SOB, abd pain, N/V/D, urinary symptoms, legs swelling,    SUBJECTIVE / INTERVAL HPI: Patient seen and examined at bedside.     VITAL SIGNS:  Vital Signs Last 24 Hrs  T(C): 37.2 (16 Aug 2023 14:19), Max: 37.2 (16 Aug 2023 14:19)  T(F): 98.9 (16 Aug 2023 14:19), Max: 98.9 (16 Aug 2023 14:19)  HR: 95 (16 Aug 2023 14:19) (62 - 95)  BP: 110/68 (16 Aug 2023 14:19) (97/51 - 111/58)  BP(mean): --  RR: 18 (16 Aug 2023 14:19) (18 - 18)  SpO2: 100% (16 Aug 2023 05:20) (100% - 100%)        PHYSICAL EXAM:    General: WDWN  HEENT: NC/AT; PERRL, clear conjunctiva  Neck: supple  Cardiovascular: +S1/S2; RRR  Respiratory: CTA b/l; no W/R/R  Gastrointestinal: soft, NT/ND; +BSx4  Extremities: WWP; 2+ peripheral pulses; no edema   Neurological: AAOx3; no focal deficits    MEDICATIONS:  MEDICATIONS  (STANDING):  chlorhexidine 2% Cloths 1 Application(s) Topical daily  cloNIDine 0.1 milliGRAM(s) Oral at bedtime  cyanocobalamin 1000 MICROGram(s) Oral daily  enoxaparin Injectable 40 milliGRAM(s) SubCutaneous every 24 hours  FLUoxetine 20 milliGRAM(s) Oral daily  folic acid 1 milliGRAM(s) Oral daily  gabapentin 400 milliGRAM(s) Oral three times a day  LORazepam     Tablet 1 milliGRAM(s) Oral every 4 hours  LORazepam     Tablet 1 milliGRAM(s) Oral every 6 hours  melatonin 5 milliGRAM(s) Oral at bedtime  multivitamin 1 Tablet(s) Oral daily  pantoprazole    Tablet 40 milliGRAM(s) Oral before breakfast  thiamine 100 milliGRAM(s) Oral daily  traZODone 100 milliGRAM(s) Oral at bedtime    MEDICATIONS  (PRN):  diphenhydrAMINE 25 milliGRAM(s) Oral every 4 hours PRN Rash and/or Itching  haloperidol     Tablet 5 milliGRAM(s) Oral every 6 hours PRN agitation  loperamide 2 milliGRAM(s) Oral every 4 hours PRN Diarrhea      ALLERGIES:  Allergies    Ceclor (Other)    Intolerances        LABS:                        13.6   5.01  )-----------( 184      ( 16 Aug 2023 07:16 )             40.7     08-16    142  |  104  |  17  ----------------------------<  90  4.3   |  27  |  0.9    Ca    9.2      16 Aug 2023 07:16  Mg     2.1     08-16    TPro  6.6  /  Alb  4.5  /  TBili  0.2  /  DBili  x   /  AST  29  /  ALT  37  /  AlkPhos  44  08-16      Urinalysis Basic - ( 16 Aug 2023 07:16 )    Color: x / Appearance: x / SG: x / pH: x  Gluc: 90 mg/dL / Ketone: x  / Bili: x / Urobili: x   Blood: x / Protein: x / Nitrite: x   Leuk Esterase: x / RBC: x / WBC x   Sq Epi: x / Non Sq Epi: x / Bacteria: x      CAPILLARY BLOOD GLUCOSE          RADIOLOGY & ADDITIONAL TESTS: Reviewed.    ASSESSMENT:    PLAN:

## 2023-08-16 NOTE — PROGRESS NOTE ADULT - ASSESSMENT
# Acute Alcohol withdrawal; continuous EtOH abuse,  - has been on ativan but suspected triggered symtpoms to get Benzo, cut doiwn ativan  to 1mg q4h, addiction emd eval, anticipate for DC in 24 hr   #Thiamine and folate def  # Alcoholic Transaminitis (mild)  # thrombocytopenia is likely direct effect of EtOH  # Anxiety/Depression   # DVT ppx: LMWH  # GI ppx: PPI po q24  # Activity: independent    Dispo: patient does not want to go to inpatient rehab finish # Acute Alcohol withdrawal; continuous EtOH abuse,  - has been on ativan but suspected triggered symtpoms to get Benzo, cut doiwn ativan  to 1mg q4h, addiction emd eval, anticipate for DC in 24 hr   #Thiamine and folate def  # Alcoholic Transaminitis (mild)  # thrombocytopenia is likely direct effect of EtOH  # Anxiety/Depression   # DVT ppx: LMWH  # GI ppx: PPI po q24  # Activity: independent    Dispo: sivan DC home in 24 hours after finish ativan PO, patient does not want to go to inpatient rehab,

## 2023-08-16 NOTE — PROGRESS NOTE ADULT - SUBJECTIVE AND OBJECTIVE BOX
24H events:    Patient is a 38y old Male who presents with a chief complaint of Alcohol withdrawal (15 Aug 2023 11:45)    Primary diagnosis of Alcohol dependency    Today is hospital day 8d. This morning patient was seen and examined at bedside, resting comfortably in bed.    No acute or major events overnight.    Code Status: Full      PAST MEDICAL & SURGICAL HISTORY  No pertinent past medical history  anxiety  depression  polysubstance    No significant past surgical history      ALLERGIES:  Ceclor (Other)    MEDICATIONS:  STANDING MEDICATIONS  chlorhexidine 2% Cloths 1 Application(s) Topical daily  cloNIDine 0.1 milliGRAM(s) Oral at bedtime  cyanocobalamin 1000 MICROGram(s) Oral daily  enoxaparin Injectable 40 milliGRAM(s) SubCutaneous every 24 hours  FLUoxetine 20 milliGRAM(s) Oral daily  folic acid 1 milliGRAM(s) Oral daily  gabapentin 400 milliGRAM(s) Oral three times a day  LORazepam     Tablet 1 milliGRAM(s) Oral every 4 hours  LORazepam     Tablet 1 milliGRAM(s) Oral every 6 hours  melatonin 5 milliGRAM(s) Oral at bedtime  multivitamin 1 Tablet(s) Oral daily  pantoprazole    Tablet 40 milliGRAM(s) Oral before breakfast  thiamine 100 milliGRAM(s) Oral daily  traZODone 100 milliGRAM(s) Oral at bedtime    PRN MEDICATIONS  diphenhydrAMINE 25 milliGRAM(s) Oral every 4 hours PRN  haloperidol     Tablet 5 milliGRAM(s) Oral every 6 hours PRN  loperamide 2 milliGRAM(s) Oral every 4 hours PRN    VITALS:   T(F): 96.9  HR: 92  BP: 111/58  RR: 18  SpO2: 100%    PHYSICAL EXAM:  GENERAL: NAD, lying in bed comfortably    HEAD: Atraumatic    NECK: Supple    HEART: normal rate, regular rhythm, normal s1s2    LUNGS: Unlabored respirations, B/L air entry, no adventitious sound    ABDOMEN: Soft, nondistended, nontender    EXTREMITIES: no edema    NERVOUS SYSTEM: A&Ox3  Upper extremities: no sensorimotor deficits  Lower extremities: no sensorimotor deficits      LABS:                        13.6   5.01  )-----------( 184      ( 16 Aug 2023 07:16 )             40.7     08-16    142  |  104  |  17  ----------------------------<  90  4.3   |  27  |  0.9    Ca    9.2      16 Aug 2023 07:16  Mg     2.1     08-16    TPro  6.6  /  Alb  4.5  /  TBili  0.2  /  DBili  x   /  AST  29  /  ALT  37  /  AlkPhos  44  08-16      Urinalysis Basic - ( 16 Aug 2023 07:16 )    Color: x / Appearance: x / SG: x / pH: x  Gluc: 90 mg/dL / Ketone: x  / Bili: x / Urobili: x   Blood: x / Protein: x / Nitrite: x   Leuk Esterase: x / RBC: x / WBC x   Sq Epi: x / Non Sq Epi: x / Bacteria: x

## 2023-08-17 ENCOUNTER — TRANSCRIPTION ENCOUNTER (OUTPATIENT)
Age: 38
End: 2023-08-17

## 2023-08-17 VITALS — HEART RATE: 94 BPM

## 2023-08-17 LAB
ALBUMIN SERPL ELPH-MCNC: 4.8 G/DL — SIGNIFICANT CHANGE UP (ref 3.5–5.2)
ALP SERPL-CCNC: 43 U/L — SIGNIFICANT CHANGE UP (ref 30–115)
ALT FLD-CCNC: 35 U/L — SIGNIFICANT CHANGE UP (ref 0–41)
ANION GAP SERPL CALC-SCNC: 9 MMOL/L — SIGNIFICANT CHANGE UP (ref 7–14)
AST SERPL-CCNC: 26 U/L — SIGNIFICANT CHANGE UP (ref 0–41)
BASOPHILS # BLD AUTO: 0.09 K/UL — SIGNIFICANT CHANGE UP (ref 0–0.2)
BASOPHILS NFR BLD AUTO: 1.7 % — HIGH (ref 0–1)
BILIRUB SERPL-MCNC: 0.3 MG/DL — SIGNIFICANT CHANGE UP (ref 0.2–1.2)
BUN SERPL-MCNC: 18 MG/DL — SIGNIFICANT CHANGE UP (ref 10–20)
CALCIUM SERPL-MCNC: 9.6 MG/DL — SIGNIFICANT CHANGE UP (ref 8.4–10.5)
CHLORIDE SERPL-SCNC: 99 MMOL/L — SIGNIFICANT CHANGE UP (ref 98–110)
CO2 SERPL-SCNC: 29 MMOL/L — SIGNIFICANT CHANGE UP (ref 17–32)
CREAT SERPL-MCNC: 0.9 MG/DL — SIGNIFICANT CHANGE UP (ref 0.7–1.5)
EGFR: 113 ML/MIN/1.73M2 — SIGNIFICANT CHANGE UP
EOSINOPHIL # BLD AUTO: 0.07 K/UL — SIGNIFICANT CHANGE UP (ref 0–0.7)
EOSINOPHIL NFR BLD AUTO: 1.4 % — SIGNIFICANT CHANGE UP (ref 0–8)
GLUCOSE SERPL-MCNC: 91 MG/DL — SIGNIFICANT CHANGE UP (ref 70–99)
HCT VFR BLD CALC: 41.7 % — LOW (ref 42–52)
HGB BLD-MCNC: 13.8 G/DL — LOW (ref 14–18)
IMM GRANULOCYTES NFR BLD AUTO: 1.2 % — HIGH (ref 0.1–0.3)
LYMPHOCYTES # BLD AUTO: 1.31 K/UL — SIGNIFICANT CHANGE UP (ref 1.2–3.4)
LYMPHOCYTES # BLD AUTO: 25.3 % — SIGNIFICANT CHANGE UP (ref 20.5–51.1)
MAGNESIUM SERPL-MCNC: 2 MG/DL — SIGNIFICANT CHANGE UP (ref 1.8–2.4)
MCHC RBC-ENTMCNC: 31.3 PG — HIGH (ref 27–31)
MCHC RBC-ENTMCNC: 33.1 G/DL — SIGNIFICANT CHANGE UP (ref 32–37)
MCV RBC AUTO: 94.6 FL — HIGH (ref 80–94)
MONOCYTES # BLD AUTO: 0.8 K/UL — HIGH (ref 0.1–0.6)
MONOCYTES NFR BLD AUTO: 15.4 % — HIGH (ref 1.7–9.3)
NEUTROPHILS # BLD AUTO: 2.85 K/UL — SIGNIFICANT CHANGE UP (ref 1.4–6.5)
NEUTROPHILS NFR BLD AUTO: 55 % — SIGNIFICANT CHANGE UP (ref 42.2–75.2)
NRBC # BLD: 0 /100 WBCS — SIGNIFICANT CHANGE UP (ref 0–0)
PLATELET # BLD AUTO: 239 K/UL — SIGNIFICANT CHANGE UP (ref 130–400)
PMV BLD: 10.5 FL — HIGH (ref 7.4–10.4)
POTASSIUM SERPL-MCNC: 4.4 MMOL/L — SIGNIFICANT CHANGE UP (ref 3.5–5)
POTASSIUM SERPL-SCNC: 4.4 MMOL/L — SIGNIFICANT CHANGE UP (ref 3.5–5)
PROT SERPL-MCNC: 7.1 G/DL — SIGNIFICANT CHANGE UP (ref 6–8)
RBC # BLD: 4.41 M/UL — LOW (ref 4.7–6.1)
RBC # FLD: 12.8 % — SIGNIFICANT CHANGE UP (ref 11.5–14.5)
SODIUM SERPL-SCNC: 137 MMOL/L — SIGNIFICANT CHANGE UP (ref 135–146)
WBC # BLD: 5.18 K/UL — SIGNIFICANT CHANGE UP (ref 4.8–10.8)
WBC # FLD AUTO: 5.18 K/UL — SIGNIFICANT CHANGE UP (ref 4.8–10.8)

## 2023-08-17 PROCEDURE — 99239 HOSP IP/OBS DSCHRG MGMT >30: CPT

## 2023-08-17 RX ORDER — FLUOXETINE HCL 10 MG
0 CAPSULE ORAL
Refills: 0 | DISCHARGE

## 2023-08-17 RX ORDER — FLUOXETINE HCL 10 MG
1 CAPSULE ORAL
Qty: 21 | Refills: 0
Start: 2023-08-17 | End: 2023-09-06

## 2023-08-17 RX ORDER — HYDROXYZINE HCL 10 MG
1 TABLET ORAL
Qty: 63 | Refills: 0
Start: 2023-08-17 | End: 2023-09-06

## 2023-08-17 RX ORDER — GABAPENTIN 400 MG/1
1 CAPSULE ORAL
Qty: 63 | Refills: 0
Start: 2023-08-17 | End: 2023-09-06

## 2023-08-17 RX ORDER — TRAZODONE HCL 50 MG
1 TABLET ORAL
Qty: 21 | Refills: 0
Start: 2023-08-17 | End: 2023-09-06

## 2023-08-17 RX ORDER — ACETAMINOPHEN 500 MG
650 TABLET ORAL ONCE
Refills: 0 | Status: COMPLETED | OUTPATIENT
Start: 2023-08-17 | End: 2023-08-17

## 2023-08-17 RX ORDER — TRAZODONE HCL 50 MG
0 TABLET ORAL
Refills: 0 | DISCHARGE

## 2023-08-17 RX ADMIN — Medication 1 MILLIGRAM(S): at 05:13

## 2023-08-17 RX ADMIN — CHLORHEXIDINE GLUCONATE 1 APPLICATION(S): 213 SOLUTION TOPICAL at 11:25

## 2023-08-17 RX ADMIN — PREGABALIN 1000 MICROGRAM(S): 225 CAPSULE ORAL at 11:25

## 2023-08-17 RX ADMIN — Medication 1 TABLET(S): at 11:24

## 2023-08-17 RX ADMIN — Medication 100 MILLIGRAM(S): at 11:23

## 2023-08-17 RX ADMIN — PANTOPRAZOLE SODIUM 40 MILLIGRAM(S): 20 TABLET, DELAYED RELEASE ORAL at 05:12

## 2023-08-17 RX ADMIN — Medication 20 MILLIGRAM(S): at 11:24

## 2023-08-17 RX ADMIN — Medication 650 MILLIGRAM(S): at 05:43

## 2023-08-17 RX ADMIN — Medication 1 MILLIGRAM(S): at 11:23

## 2023-08-17 RX ADMIN — GABAPENTIN 400 MILLIGRAM(S): 400 CAPSULE ORAL at 05:31

## 2023-08-17 RX ADMIN — GABAPENTIN 400 MILLIGRAM(S): 400 CAPSULE ORAL at 13:03

## 2023-08-17 RX ADMIN — Medication 650 MILLIGRAM(S): at 04:43

## 2023-08-17 NOTE — DISCHARGE NOTE PROVIDER - NSDCCPCAREPLAN_GEN_ALL_CORE_FT
PRINCIPAL DISCHARGE DIAGNOSIS  Diagnosis: Alcohol dependence with withdrawal, uncomplicated  Assessment and Plan of Treatment: you was admitted and monitored for alcohol withdrawal,  treated with ativan  counselled about quit alcohol drinking  recommended rehab and detox  risks of continue drinking alcohol inluding developing panreatitis, seizure, falls, liver damage was exaplined to you

## 2023-08-17 NOTE — DISCHARGE NOTE NURSING/CASE MANAGEMENT/SOCIAL WORK - PATIENT PORTAL LINK FT
You can access the FollowMyHealth Patient Portal offered by [facility] by registering at the following website: [email or web address]. By joining [employer]’s FollowMyHealth portal, you will also be able to view your health information using other applications (apps) compatible with our system.

## 2023-08-17 NOTE — DISCHARGE NOTE NURSING/CASE MANAGEMENT/SOCIAL WORK - NSDCPEFALRISK_GEN_ALL_CORE
For information on Fall & Injury Prevention, visit: [email or web address] OR  [email or web address] OR  https://www.cdc.gov/steadi/patient.html

## 2023-08-17 NOTE — DISCHARGE NOTE PROVIDER - HOSPITAL COURSE
# Acute Alcohol withdrawal; continuous EtOH abuse,  - has been on ativan but suspected triggered symtpoms to get Benzo, CIWA zero today, doubt real scor from past 24 hrs,   my assessment and resident asseesment for the past 24 hr consistent with ciwa score of zero,   has been admitted for 9 days, completed ativan course, stable for DC   #Thiamine and folate def  # Alcoholic Transaminitis (mild)  # thrombocytopenia is likely direct effect of EtOH  # Anxiety/Depression   # DVT ppx: LMWH  # GI ppx: PPI po q24  # DC home today

## 2023-08-17 NOTE — DISCHARGE NOTE PROVIDER - NSDCMRMEDTOKEN_GEN_ALL_CORE_FT
FLUoxetine 15 mg oral tablet: orally once a day  gabapentin 600 mg oral tablet: 1 tab(s) orally once a day (at bedtime)  traZODone 100 mg oral tablet: orally once a day (at bedtime)

## 2023-08-23 DIAGNOSIS — R25.1 TREMOR, UNSPECIFIED: ICD-10-CM

## 2023-08-23 DIAGNOSIS — E53.8 DEFICIENCY OF OTHER SPECIFIED B GROUP VITAMINS: ICD-10-CM

## 2023-08-23 DIAGNOSIS — R74.01 ELEVATION OF LEVELS OF LIVER TRANSAMINASE LEVELS: ICD-10-CM

## 2023-08-23 DIAGNOSIS — F10.229 ALCOHOL DEPENDENCE WITH INTOXICATION, UNSPECIFIED: ICD-10-CM

## 2023-08-23 DIAGNOSIS — F90.9 ATTENTION-DEFICIT HYPERACTIVITY DISORDER, UNSPECIFIED TYPE: ICD-10-CM

## 2023-08-23 DIAGNOSIS — F41.9 ANXIETY DISORDER, UNSPECIFIED: ICD-10-CM

## 2023-08-23 DIAGNOSIS — F10.239 ALCOHOL DEPENDENCE WITH WITHDRAWAL, UNSPECIFIED: ICD-10-CM

## 2023-08-23 DIAGNOSIS — E51.9 THIAMINE DEFICIENCY, UNSPECIFIED: ICD-10-CM

## 2023-08-23 DIAGNOSIS — E83.42 HYPOMAGNESEMIA: ICD-10-CM

## 2023-08-23 DIAGNOSIS — Z87.898 PERSONAL HISTORY OF OTHER SPECIFIED CONDITIONS: ICD-10-CM

## 2023-08-23 DIAGNOSIS — D69.59 OTHER SECONDARY THROMBOCYTOPENIA: ICD-10-CM

## 2023-08-23 DIAGNOSIS — Z88.1 ALLERGY STATUS TO OTHER ANTIBIOTIC AGENTS STATUS: ICD-10-CM

## 2023-08-23 DIAGNOSIS — F32.9 MAJOR DEPRESSIVE DISORDER, SINGLE EPISODE, UNSPECIFIED: ICD-10-CM
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
## 2023-08-23 DIAGNOSIS — E87.1 HYPO-OSMOLALITY AND HYPONATREMIA: ICD-10-CM

## 2023-09-09 ENCOUNTER — TRANSCRIPTION ENCOUNTER (OUTPATIENT)
Age: 38
End: 2023-09-09

## 2023-09-09 ENCOUNTER — EMERGENCY (EMERGENCY)
Facility: HOSPITAL | Age: 38
LOS: 0 days | Discharge: HOME | End: 2023-09-09
Attending: EMERGENCY MEDICINE
Payer: COMMERCIAL

## 2023-09-09 VITALS
RESPIRATION RATE: 20 BRPM | OXYGEN SATURATION: 99 % | HEIGHT: 66 IN | WEIGHT: 164.91 LBS | SYSTOLIC BLOOD PRESSURE: 145 MMHG | HEART RATE: 99 BPM | DIASTOLIC BLOOD PRESSURE: 87 MMHG

## 2023-09-09 DIAGNOSIS — F10.129 ALCOHOL ABUSE WITH INTOXICATION, UNSPECIFIED: ICD-10-CM

## 2023-09-09 DIAGNOSIS — Z88.1 ALLERGY STATUS TO OTHER ANTIBIOTIC AGENTS STATUS: ICD-10-CM

## 2023-09-09 PROCEDURE — 99283 EMERGENCY DEPT VISIT LOW MDM: CPT

## 2023-09-09 PROCEDURE — 99285 EMERGENCY DEPT VISIT HI MDM: CPT

## 2023-09-09 NOTE — ED ADULT TRIAGE NOTE - CHIEF COMPLAINT QUOTE
was a visitor upstairs and is intoxicated, was trying to get into car to drive,, 911 was called , girlfriend refused to take pt home, pt was aggressive and cursing at staff and police.

## 2023-09-09 NOTE — ED PROVIDER NOTE - PHYSICAL EXAMINATION
Vital Signs: I have reviewed the initial vital signs.  Constitutional: well-nourished, appears stated age, no acute distress  Cardiovascular: well-perfused extremities  Respiratory: unlabored respiratory effort  MSK: normal gait, no visible signs of trauma  Psychiatric: intoxicated, irritated when provoked by [employer] but easily consoled when allowed to sit without being pushed around

## 2023-09-09 NOTE — ED PROVIDER NOTE - OBJECTIVE STATEMENT
Patient is a 38-year-old male with no acute medical complaints.  Was allegedly drinking in the hospital and went to the lobby where staff were concerned he was going to drive so 911 was called.  Police apprehended him and were concerned he was going to drive but decided instead of arresting him for attempted DUI to bring him to the ED.  Patient is in cuffs and requesting officers to remove them but instead is being antagonized by [facility] (Brian [phone], Francesco [phone], Devorah [phone], David [phone], Juanita [phone], Dilshad [phone] at bedside).

## 2023-09-09 NOTE — ED ADULT NURSE NOTE - NSFALLUNIVINTERV_ED_ALL_ED
Bed/Stretcher in lowest position, wheels locked, appropriate side rails in place/Call bell, personal items and telephone in reach/Instruct patient to call for assistance before getting out of bed/chair/stretcher/Non-slip footwear applied when patient is off stretcher/[place] to call system/Physically safe environment - no spills, clutter or unnecessary equipment/Purposeful proactive rounding/Room/bathroom lighting operational, light cord in reach

## 2023-09-09 NOTE — ED PROVIDER NOTE - PATIENT PORTAL LINK FT
You can access the FollowMyHealth Patient Portal offered by [facility] by registering at the following website: [email or web address]. By joining [employer]’s FollowMyHealth portal, you will also be able to view your health information using other applications (apps) compatible with our system.

## 2023-09-09 NOTE — ED ADULT NURSE REASSESSMENT NOTE - NS ED NURSE REASSESS COMMENT FT1
Pt was taken outside with handcuffs by [facility].  Pt stated no medical complaints.

## 2023-12-21 ENCOUNTER — EMERGENCY (EMERGENCY)
Facility: HOSPITAL | Age: 38
LOS: 0 days | Discharge: HOME | End: 2023-12-22
Attending: EMERGENCY MEDICINE
Payer: COMMERCIAL

## 2023-12-21 VITALS
OXYGEN SATURATION: 96 % | HEART RATE: 116 BPM | TEMPERATURE: 99 F | RESPIRATION RATE: 18 BRPM | HEIGHT: 66 IN | DIASTOLIC BLOOD PRESSURE: 78 MMHG | WEIGHT: 145.06 LBS | SYSTOLIC BLOOD PRESSURE: 157 MMHG

## 2023-12-21 VITALS
TEMPERATURE: 99 F | OXYGEN SATURATION: 97 % | SYSTOLIC BLOOD PRESSURE: 132 MMHG | DIASTOLIC BLOOD PRESSURE: 65 MMHG | HEART RATE: 100 BPM | RESPIRATION RATE: 18 BRPM

## 2023-12-21 DIAGNOSIS — Z91.138 PATIENT'S UNINTENTIONAL UNDERDOSING OF MEDICATION REGIMEN FOR OTHER REASON: ICD-10-CM

## 2023-12-21 DIAGNOSIS — R00.2 PALPITATIONS: ICD-10-CM

## 2023-12-21 DIAGNOSIS — F10.10 ALCOHOL ABUSE, UNCOMPLICATED: ICD-10-CM

## 2023-12-21 DIAGNOSIS — T50.7X6A: ICD-10-CM

## 2023-12-21 DIAGNOSIS — Z88.1 ALLERGY STATUS TO OTHER ANTIBIOTIC AGENTS STATUS: ICD-10-CM

## 2023-12-21 DIAGNOSIS — Z87.891 PERSONAL HISTORY OF NICOTINE DEPENDENCE: ICD-10-CM

## 2023-12-21 LAB
ALBUMIN SERPL ELPH-MCNC: 5.1 G/DL — SIGNIFICANT CHANGE UP (ref 3.5–5.2)
ALBUMIN SERPL ELPH-MCNC: 5.1 G/DL — SIGNIFICANT CHANGE UP (ref 3.5–5.2)
ALP SERPL-CCNC: 69 U/L — SIGNIFICANT CHANGE UP (ref 30–115)
ALP SERPL-CCNC: 69 U/L — SIGNIFICANT CHANGE UP (ref 30–115)
ALT FLD-CCNC: 49 U/L — HIGH (ref 0–41)
ALT FLD-CCNC: 49 U/L — HIGH (ref 0–41)
ANION GAP SERPL CALC-SCNC: 24 MMOL/L — HIGH (ref 7–14)
ANION GAP SERPL CALC-SCNC: 24 MMOL/L — HIGH (ref 7–14)
AST SERPL-CCNC: 103 U/L — HIGH (ref 0–41)
AST SERPL-CCNC: 103 U/L — HIGH (ref 0–41)
BASOPHILS # BLD AUTO: 0.08 K/UL — SIGNIFICANT CHANGE UP (ref 0–0.2)
BASOPHILS # BLD AUTO: 0.08 K/UL — SIGNIFICANT CHANGE UP (ref 0–0.2)
BASOPHILS NFR BLD AUTO: 1.6 % — HIGH (ref 0–1)
BASOPHILS NFR BLD AUTO: 1.6 % — HIGH (ref 0–1)
BILIRUB SERPL-MCNC: 0.3 MG/DL — SIGNIFICANT CHANGE UP (ref 0.2–1.2)
BILIRUB SERPL-MCNC: 0.3 MG/DL — SIGNIFICANT CHANGE UP (ref 0.2–1.2)
BUN SERPL-MCNC: 11 MG/DL — SIGNIFICANT CHANGE UP (ref 10–20)
BUN SERPL-MCNC: 11 MG/DL — SIGNIFICANT CHANGE UP (ref 10–20)
CALCIUM SERPL-MCNC: 8.4 MG/DL — SIGNIFICANT CHANGE UP (ref 8.4–10.5)
CALCIUM SERPL-MCNC: 8.4 MG/DL — SIGNIFICANT CHANGE UP (ref 8.4–10.5)
CHLORIDE SERPL-SCNC: 92 MMOL/L — LOW (ref 98–110)
CHLORIDE SERPL-SCNC: 92 MMOL/L — LOW (ref 98–110)
CO2 SERPL-SCNC: 19 MMOL/L — SIGNIFICANT CHANGE UP (ref 17–32)
CO2 SERPL-SCNC: 19 MMOL/L — SIGNIFICANT CHANGE UP (ref 17–32)
CREAT SERPL-MCNC: 0.8 MG/DL — SIGNIFICANT CHANGE UP (ref 0.7–1.5)
CREAT SERPL-MCNC: 0.8 MG/DL — SIGNIFICANT CHANGE UP (ref 0.7–1.5)
EGFR: 117 ML/MIN/1.73M2 — SIGNIFICANT CHANGE UP
EGFR: 117 ML/MIN/1.73M2 — SIGNIFICANT CHANGE UP
EOSINOPHIL # BLD AUTO: 0.02 K/UL — SIGNIFICANT CHANGE UP (ref 0–0.7)
EOSINOPHIL # BLD AUTO: 0.02 K/UL — SIGNIFICANT CHANGE UP (ref 0–0.7)
EOSINOPHIL NFR BLD AUTO: 0.4 % — SIGNIFICANT CHANGE UP (ref 0–8)
EOSINOPHIL NFR BLD AUTO: 0.4 % — SIGNIFICANT CHANGE UP (ref 0–8)
GLUCOSE SERPL-MCNC: 81 MG/DL — SIGNIFICANT CHANGE UP (ref 70–99)
GLUCOSE SERPL-MCNC: 81 MG/DL — SIGNIFICANT CHANGE UP (ref 70–99)
HCT VFR BLD CALC: 46.7 % — SIGNIFICANT CHANGE UP (ref 42–52)
HCT VFR BLD CALC: 46.7 % — SIGNIFICANT CHANGE UP (ref 42–52)
HGB BLD-MCNC: 15.7 G/DL — SIGNIFICANT CHANGE UP (ref 14–18)
HGB BLD-MCNC: 15.7 G/DL — SIGNIFICANT CHANGE UP (ref 14–18)
IMM GRANULOCYTES NFR BLD AUTO: 0.2 % — SIGNIFICANT CHANGE UP (ref 0.1–0.3)
IMM GRANULOCYTES NFR BLD AUTO: 0.2 % — SIGNIFICANT CHANGE UP (ref 0.1–0.3)
LYMPHOCYTES # BLD AUTO: 1.14 K/UL — LOW (ref 1.2–3.4)
LYMPHOCYTES # BLD AUTO: 1.14 K/UL — LOW (ref 1.2–3.4)
LYMPHOCYTES # BLD AUTO: 23.4 % — SIGNIFICANT CHANGE UP (ref 20.5–51.1)
LYMPHOCYTES # BLD AUTO: 23.4 % — SIGNIFICANT CHANGE UP (ref 20.5–51.1)
MCHC RBC-ENTMCNC: 31 PG — SIGNIFICANT CHANGE UP (ref 27–31)
MCHC RBC-ENTMCNC: 31 PG — SIGNIFICANT CHANGE UP (ref 27–31)
MCHC RBC-ENTMCNC: 33.6 G/DL — SIGNIFICANT CHANGE UP (ref 32–37)
MCHC RBC-ENTMCNC: 33.6 G/DL — SIGNIFICANT CHANGE UP (ref 32–37)
MCV RBC AUTO: 92.3 FL — SIGNIFICANT CHANGE UP (ref 80–94)
MCV RBC AUTO: 92.3 FL — SIGNIFICANT CHANGE UP (ref 80–94)
MONOCYTES # BLD AUTO: 0.3 K/UL — SIGNIFICANT CHANGE UP (ref 0.1–0.6)
MONOCYTES # BLD AUTO: 0.3 K/UL — SIGNIFICANT CHANGE UP (ref 0.1–0.6)
MONOCYTES NFR BLD AUTO: 6.1 % — SIGNIFICANT CHANGE UP (ref 1.7–9.3)
MONOCYTES NFR BLD AUTO: 6.1 % — SIGNIFICANT CHANGE UP (ref 1.7–9.3)
NEUTROPHILS # BLD AUTO: 3.33 K/UL — SIGNIFICANT CHANGE UP (ref 1.4–6.5)
NEUTROPHILS # BLD AUTO: 3.33 K/UL — SIGNIFICANT CHANGE UP (ref 1.4–6.5)
NEUTROPHILS NFR BLD AUTO: 68.3 % — SIGNIFICANT CHANGE UP (ref 42.2–75.2)
NEUTROPHILS NFR BLD AUTO: 68.3 % — SIGNIFICANT CHANGE UP (ref 42.2–75.2)
NRBC # BLD: 0 /100 WBCS — SIGNIFICANT CHANGE UP (ref 0–0)
NRBC # BLD: 0 /100 WBCS — SIGNIFICANT CHANGE UP (ref 0–0)
PLATELET # BLD AUTO: 147 K/UL — SIGNIFICANT CHANGE UP (ref 130–400)
PLATELET # BLD AUTO: 147 K/UL — SIGNIFICANT CHANGE UP (ref 130–400)
PMV BLD: 9.8 FL — SIGNIFICANT CHANGE UP (ref 7.4–10.4)
PMV BLD: 9.8 FL — SIGNIFICANT CHANGE UP (ref 7.4–10.4)
POTASSIUM SERPL-MCNC: 4.3 MMOL/L — SIGNIFICANT CHANGE UP (ref 3.5–5)
POTASSIUM SERPL-MCNC: 4.3 MMOL/L — SIGNIFICANT CHANGE UP (ref 3.5–5)
POTASSIUM SERPL-SCNC: 4.3 MMOL/L — SIGNIFICANT CHANGE UP (ref 3.5–5)
POTASSIUM SERPL-SCNC: 4.3 MMOL/L — SIGNIFICANT CHANGE UP (ref 3.5–5)
PROT SERPL-MCNC: 8.2 G/DL — HIGH (ref 6–8)
PROT SERPL-MCNC: 8.2 G/DL — HIGH (ref 6–8)
RBC # BLD: 5.06 M/UL — SIGNIFICANT CHANGE UP (ref 4.7–6.1)
RBC # BLD: 5.06 M/UL — SIGNIFICANT CHANGE UP (ref 4.7–6.1)
RBC # FLD: 13.4 % — SIGNIFICANT CHANGE UP (ref 11.5–14.5)
RBC # FLD: 13.4 % — SIGNIFICANT CHANGE UP (ref 11.5–14.5)
SODIUM SERPL-SCNC: 135 MMOL/L — SIGNIFICANT CHANGE UP (ref 135–146)
SODIUM SERPL-SCNC: 135 MMOL/L — SIGNIFICANT CHANGE UP (ref 135–146)
TROPONIN T, HIGH SENSITIVITY RESULT: 7 NG/L — SIGNIFICANT CHANGE UP (ref 6–21)
TROPONIN T, HIGH SENSITIVITY RESULT: 7 NG/L — SIGNIFICANT CHANGE UP (ref 6–21)
WBC # BLD: 4.88 K/UL — SIGNIFICANT CHANGE UP (ref 4.8–10.8)
WBC # BLD: 4.88 K/UL — SIGNIFICANT CHANGE UP (ref 4.8–10.8)
WBC # FLD AUTO: 4.88 K/UL — SIGNIFICANT CHANGE UP (ref 4.8–10.8)
WBC # FLD AUTO: 4.88 K/UL — SIGNIFICANT CHANGE UP (ref 4.8–10.8)

## 2023-12-21 PROCEDURE — 85025 COMPLETE CBC W/AUTO DIFF WBC: CPT

## 2023-12-21 PROCEDURE — 93010 ELECTROCARDIOGRAM REPORT: CPT

## 2023-12-21 PROCEDURE — 99284 EMERGENCY DEPT VISIT MOD MDM: CPT

## 2023-12-21 PROCEDURE — 93005 ELECTROCARDIOGRAM TRACING: CPT

## 2023-12-21 PROCEDURE — 99283 EMERGENCY DEPT VISIT LOW MDM: CPT

## 2023-12-21 PROCEDURE — 36415 COLL VENOUS BLD VENIPUNCTURE: CPT

## 2023-12-21 PROCEDURE — 80053 COMPREHEN METABOLIC PANEL: CPT

## 2023-12-21 PROCEDURE — 84484 ASSAY OF TROPONIN QUANT: CPT

## 2023-12-21 RX ORDER — SODIUM CHLORIDE 9 MG/ML
2000 INJECTION, SOLUTION INTRAVENOUS ONCE
Refills: 0 | Status: COMPLETED | OUTPATIENT
Start: 2023-12-21 | End: 2023-12-21

## 2023-12-21 RX ADMIN — Medication 25 MILLIGRAM(S): at 21:44

## 2023-12-21 RX ADMIN — SODIUM CHLORIDE 1000 MILLILITER(S): 9 INJECTION, SOLUTION INTRAVENOUS at 21:44

## 2023-12-21 NOTE — ED PROVIDER NOTE - NSFOLLOWUPCLINICS_GEN_ALL_ED_FT
[facility]  Detox Mgmt  [address] [place] [address]  Phone: [phone]  Fax:   Established Patient  Follow Up Time: 4-6 Days     [facility]  Detox Mgmt  [address] [place] [address]  Phone: [phone]  Fax:   Established Patient  Follow Up Time: 4-6 Days

## 2023-12-21 NOTE — ED PROVIDER NOTE - OBJECTIVE STATEMENT
38 yold male to Ed [facility] Alcohol abuse c/o palpitations and is concerned about shakes/withdrawl; last drink today 9am; drinks vodka every day; currently has naltrexone but is non compliant; pt denies prior hx seizures; last detox few months ago;

## 2023-12-21 NOTE — ED PROVIDER NOTE - NS ED ROS FT
Constitutional: No fever, chills.  Eyes: No visual changes.  ENT: No hearing changes.  Neck: No neck pain or stiffness.  Cardiovascular:see hpi  Pulmonary: No SOB, cough. No hemoptysis.  Abdominal:  No nausea, vomiting, diarrhea.  : No dysuria, frequency.  Neuro: No headache, syncope, dizziness.  MS: No back pain. No calf pain/swelling.  Psych: No suicidal ideations.

## 2023-12-21 NOTE — ED PROVIDER NOTE - PROVIDER TOKENS
PROVIDER:[TOKEN:[[id number]],FOLLOWUP:[4-6 Days]] PROVIDER:[TOKEN:[[id number]],FOLLOWUP:[4-6 Days]]

## 2023-12-21 NOTE — ED PROVIDER NOTE - PROGRESS NOTE DETAILS
pt feeling better - repeat Hr98; pt to f/u with detox clinic at [facility](s); complinace with naltrexone emphasized; pt feeling better - repeat Hr98; pt to f/u with detox clinic at [facility](s); complinace with naltrexone emphasized;

## 2023-12-21 NOTE — ED PROVIDER NOTE - CLINICAL SUMMARY MEDICAL DECISION MAKING FREE TEXT BOX
After ED intervention.  Feels better after meds and IVF.  VS improved.  Stable for discharge.  Strict return instructions discussed.

## 2023-12-21 NOTE — ED PROVIDER NOTE - NSFOLLOWUPINSTRUCTIONS_ED_ALL_ED_FT
Alcohol Use Disorder  Alcohol use disorder is when your drinking disrupts your daily life. When you have this condition, you drink too much alcohol and you cannot control your drinking.    Alcohol use disorder can cause serious problems with your physical health. It can affect your brain, heart, liver, pancreas, immune system, stomach, and intestines. Alcohol use disorder can increase your risk for certain cancers and cause problems with your mental health, such as depression, anxiety, psychosis, delirium, and dementia. People with this disorder risk hurting themselves and others.    What are the causes?  This condition is caused by drinking too much alcohol over time. It is not caused by drinking too much alcohol only one or two times. Some people with this condition drink alcohol to cope with or escape from negative life events. Others drink to relieve pain or symptoms of mental illness.    What increases the risk?  You are more likely to develop this condition if:    You have a family history of alcohol use disorder.  Your culture encourages drinking to the point of intoxication, or makes alcohol easy to get.  You had a mood or conduct disorder in childhood.  You have been a victim of abuse.  You are an adolescent and:    You have poor grades or difficulties in school.  Your caregivers do not talk to you about saying no to alcohol, or supervise your activities.  You are impulsive or you have trouble with self-control.      What are the signs or symptoms?  Symptoms of this condition include:    Drinking more than you want to.  Drinking for longer than you want to.  Trying several times to drink less or to control your drinking.  Spending a lot of time getting alcohol, drinking, or recovering from drinking.  Craving alcohol.  Having problems at work, at school, or at home due to drinking.  Having problems in relationships due to drinking.  Drinking when it is dangerous to drink, such as before driving a car.  Continuing to drink even though you know you might have a physical or mental problem related to drinking.  Needing more and more alcohol to get the same effect you want from the alcohol (building up tolerance).  Having symptoms of withdrawal when you stop drinking. Symptoms of withdrawal include:    Fatigue.  Nightmares.  Trouble sleeping.  Depression.  Anxiety.  Fever.  Seizures.  Severe confusion.  Feeling or seeing things that are not there (hallucinations).  Tremors.  Rapid heart rate.  Rapid breathing.  High blood pressure.    Drinking to avoid symptoms of withdrawal.    How is this diagnosed?  This condition is diagnosed with an assessment. Your health care provider may start the assessment by asking three or four questions about your drinking.    Your health care provider may perform a physical exam or do lab tests to see if you have physical problems resulting from alcohol use. She or he may refer you to a mental health professional for evaluation.    How is this treated?  Some people with alcohol use disorder are able to reduce their alcohol use to low-risk levels. Others need to completely quit drinking alcohol. When necessary, mental health professionals with specialized training in substance use treatment can help. Your health care provider can help you decide how severe your alcohol use disorder is and what type of treatment you need. The following forms of treatment are available:    Detoxification. Detoxification involves quitting drinking and using prescription medicines within the first week to help lessen withdrawal symptoms. This treatment is important for people who have had withdrawal symptoms before and for heavy drinkers who are likely to have withdrawal symptoms. Alcohol withdrawal can be dangerous, and in severe cases, it can cause death. Detoxification may be provided in a home, community, or primary care setting, or in a hospital or substance use treatment facility.  Counseling. This treatment is also called talk therapy. It is provided by substance use treatment counselors. A counselor can address the reasons you use alcohol and suggest ways to keep you from drinking again or to prevent problem drinking. The goals of talk therapy are to:    Find healthy activities and ways for you to cope with stress.  Identify and avoid the things that trigger your alcohol use.  Help you learn how to handle cravings.    Medicines. Medicines can help treat alcohol use disorder by:    Decreasing alcohol cravings.  Decreasing the positive feeling you have when you drink alcohol.  Causing an uncomfortable physical reaction when you drink alcohol (aversion therapy).    Support groups. Support groups are led by people who have quit drinking. They provide emotional support, advice, and guidance.    ImageThese forms of treatment are often combined. Some people with this condition benefit from a combination of treatments provided by specialized substance use treatment centers.    Follow these instructions at home:  Take over-the-counter and prescription medicines only as told by your health care provider.  Check with your health care provider before starting any new medicines.  Ask friends and family members not to offer you alcohol.  Avoid situations where alcohol is served, including gatherings where others are drinking alcohol.  Create a plan for what to do when you are tempted to use alcohol.  Find hobbies or activities that you enjoy that do not include alcohol.  Keep all follow-up visits as told by your health care provider. This is important.  How is this prevented?  If you drink, limit alcohol intake to no more than 1 drink a day for nonpregnant women and 2 drinks a day for men. One drink equals 12 oz of beer, 5 oz of wine, or 1½ oz of hard liquor.  If you have a mental health condition, get treatment and support.  Do not give alcohol to adolescents.  If you are an adolescent:    Do not drink alcohol.  Do not be afraid to say no if someone offers you alcohol. Speak up about why you do not want to drink. You can be a positive role model for your friends and set a good example for those around you by not drinking alcohol.  If your friends drink, spend time with others who do not drink alcohol. Make new friends who do not use alcohol.  Find healthy ways to manage stress and emotions, such as meditation or deep breathing, exercise, spending time in nature, listening to music, or talking with a trusted friend or family member.    Contact a health care provider if:  You are not able to take your medicines as told.  Your symptoms get worse.  You return to drinking alcohol (relapse) and your symptoms get worse.  Get help right away if:  You have thoughts about hurting yourself or others.  If you ever feel like you may hurt yourself or others, or have thoughts about taking your own life, get help right away. You can go to your nearest emergency department or call:     Your local emergency services (911 in the [place]   A suicide crisis helpline, such as the National Suicide Prevention Lifeline at 1-555.658.5908. This is open 24 hours a day.     Summary  Alcohol use disorder is when your drinking disrupts your daily life. When you have this condition, you drink too much alcohol and you cannot control your drinking.  Treatment may include detoxification, counseling, medicine, and support groups.  Ask friends and family members not to offer you alcohol. Avoid situations where alcohol is served.  Get help right away if you have thoughts about hurting yourself or others.  This information is not intended to replace advice given to you by your health care provider. Make sure you discuss any questions you have with your health care provider. Alcohol Use Disorder  Alcohol use disorder is when your drinking disrupts your daily life. When you have this condition, you drink too much alcohol and you cannot control your drinking.    Alcohol use disorder can cause serious problems with your physical health. It can affect your brain, heart, liver, pancreas, immune system, stomach, and intestines. Alcohol use disorder can increase your risk for certain cancers and cause problems with your mental health, such as depression, anxiety, psychosis, delirium, and dementia. People with this disorder risk hurting themselves and others.    What are the causes?  This condition is caused by drinking too much alcohol over time. It is not caused by drinking too much alcohol only one or two times. Some people with this condition drink alcohol to cope with or escape from negative life events. Others drink to relieve pain or symptoms of mental illness.    What increases the risk?  You are more likely to develop this condition if:    You have a family history of alcohol use disorder.  Your culture encourages drinking to the point of intoxication, or makes alcohol easy to get.  You had a mood or conduct disorder in childhood.  You have been a victim of abuse.  You are an adolescent and:    You have poor grades or difficulties in school.  Your caregivers do not talk to you about saying no to alcohol, or supervise your activities.  You are impulsive or you have trouble with self-control.      What are the signs or symptoms?  Symptoms of this condition include:    Drinking more than you want to.  Drinking for longer than you want to.  Trying several times to drink less or to control your drinking.  Spending a lot of time getting alcohol, drinking, or recovering from drinking.  Craving alcohol.  Having problems at work, at school, or at home due to drinking.  Having problems in relationships due to drinking.  Drinking when it is dangerous to drink, such as before driving a car.  Continuing to drink even though you know you might have a physical or mental problem related to drinking.  Needing more and more alcohol to get the same effect you want from the alcohol (building up tolerance).  Having symptoms of withdrawal when you stop drinking. Symptoms of withdrawal include:    Fatigue.  Nightmares.  Trouble sleeping.  Depression.  Anxiety.  Fever.  Seizures.  Severe confusion.  Feeling or seeing things that are not there (hallucinations).  Tremors.  Rapid heart rate.  Rapid breathing.  High blood pressure.    Drinking to avoid symptoms of withdrawal.    How is this diagnosed?  This condition is diagnosed with an assessment. Your health care provider may start the assessment by asking three or four questions about your drinking.    Your health care provider may perform a physical exam or do lab tests to see if you have physical problems resulting from alcohol use. She or he may refer you to a mental health professional for evaluation.    How is this treated?  Some people with alcohol use disorder are able to reduce their alcohol use to low-risk levels. Others need to completely quit drinking alcohol. When necessary, mental health professionals with specialized training in substance use treatment can help. Your health care provider can help you decide how severe your alcohol use disorder is and what type of treatment you need. The following forms of treatment are available:    Detoxification. Detoxification involves quitting drinking and using prescription medicines within the first week to help lessen withdrawal symptoms. This treatment is important for people who have had withdrawal symptoms before and for heavy drinkers who are likely to have withdrawal symptoms. Alcohol withdrawal can be dangerous, and in severe cases, it can cause death. Detoxification may be provided in a home, community, or primary care setting, or in a hospital or substance use treatment facility.  Counseling. This treatment is also called talk therapy. It is provided by substance use treatment counselors. A counselor can address the reasons you use alcohol and suggest ways to keep you from drinking again or to prevent problem drinking. The goals of talk therapy are to:    Find healthy activities and ways for you to cope with stress.  Identify and avoid the things that trigger your alcohol use.  Help you learn how to handle cravings.    Medicines. Medicines can help treat alcohol use disorder by:    Decreasing alcohol cravings.  Decreasing the positive feeling you have when you drink alcohol.  Causing an uncomfortable physical reaction when you drink alcohol (aversion therapy).    Support groups. Support groups are led by people who have quit drinking. They provide emotional support, advice, and guidance.    ImageThese forms of treatment are often combined. Some people with this condition benefit from a combination of treatments provided by specialized substance use treatment centers.    Follow these instructions at home:  Take over-the-counter and prescription medicines only as told by your health care provider.  Check with your health care provider before starting any new medicines.  Ask friends and family members not to offer you alcohol.  Avoid situations where alcohol is served, including gatherings where others are drinking alcohol.  Create a plan for what to do when you are tempted to use alcohol.  Find hobbies or activities that you enjoy that do not include alcohol.  Keep all follow-up visits as told by your health care provider. This is important.  How is this prevented?  If you drink, limit alcohol intake to no more than 1 drink a day for nonpregnant women and 2 drinks a day for men. One drink equals 12 oz of beer, 5 oz of wine, or 1½ oz of hard liquor.  If you have a mental health condition, get treatment and support.  Do not give alcohol to adolescents.  If you are an adolescent:    Do not drink alcohol.  Do not be afraid to say no if someone offers you alcohol. Speak up about why you do not want to drink. You can be a positive role model for your friends and set a good example for those around you by not drinking alcohol.  If your friends drink, spend time with others who do not drink alcohol. Make new friends who do not use alcohol.  Find healthy ways to manage stress and emotions, such as meditation or deep breathing, exercise, spending time in nature, listening to music, or talking with a trusted friend or family member.    Contact a health care provider if:  You are not able to take your medicines as told.  Your symptoms get worse.  You return to drinking alcohol (relapse) and your symptoms get worse.  Get help right away if:  You have thoughts about hurting yourself or others.  If you ever feel like you may hurt yourself or others, or have thoughts about taking your own life, get help right away. You can go to your nearest emergency department or call:     Your local emergency services (911 in the [place]   A suicide crisis helpline, such as the National Suicide Prevention Lifeline at 1-151.222.1096. This is open 24 hours a day.     Summary  Alcohol use disorder is when your drinking disrupts your daily life. When you have this condition, you drink too much alcohol and you cannot control your drinking.  Treatment may include detoxification, counseling, medicine, and support groups.  Ask friends and family members not to offer you alcohol. Avoid situations where alcohol is served.  Get help right away if you have thoughts about hurting yourself or others.  This information is not intended to replace advice given to you by your health care provider. Make sure you discuss any questions you have with your health care provider.

## 2023-12-21 NOTE — ED PROVIDER NOTE - PATIENT PORTAL LINK FT
You can access the FollowMyHealth Patient Portal offered by [facility] by registering at the following website: [email or web address]. By joining [employer]’s FollowMyHealth portal, you will also be able to view your health information using other applications (apps) compatible with our system. You can access the FollowMyHealth Patient Portal offered by [facility] by registering at the following website: [email or web address]. By joining [employer]’s FollowMyHealth portal, you will also be able to view your health information using other applications (apps) compatible with our system.

## 2023-12-21 NOTE — ED ADULT NURSE NOTE - OBJECTIVE STATEMENT
patient complaints of palpitations. Patient reports, "Im having a bad break up and I have been drinking a lot lately"   Patient reports last drink was 2 hours ago.

## 2023-12-21 NOTE — ED ADULT NURSE NOTE - NSFALLHARMRISKINTERV_ED_ALL_ED
Assistance OOB with selected safe patient handling equipment if applicable/Assistance with ambulation/Communicate risk of Fall with Harm to all staff, patient, and family/Monitor gait and stability/Monitor for mental status changes and reorient to person, place, and time, as needed/Provide visual cue: red socks, yellow wristband, yellow gown, etc/Reinforce activity limits and safety measures with patient and family/Toileting schedule using arm’s reach rule for commode and bathroom/Use of alarms - bed, stretcher, chair and/or video monitoring/Bed in lowest position, wheels locked, appropriate side rails in place/Call bell, personal items and telephone in reach/Instruct patient to call for assistance before getting out of bed/chair/stretcher/Non-slip footwear applied when patient is off stretcher/[place] to call system/Physically safe environment - no spills, clutter or unnecessary equipment/Purposeful Proactive Rounding/Room/bathroom lighting operational, light cord in reach Assistance OOB with selected safe patient handling equipment if applicable/Assistance with ambulation/Communicate risk of Fall with Harm to all staff, patient, and family/Monitor gait and stability/Monitor for mental status changes and reorient to person, place, and time, as needed/Provide visual cue: red socks, yellow wristband, yellow gown, etc/Reinforce activity limits and safety measures with patient and family/Toileting schedule using arm’s reach rule for commode and bathroom/Use of alarms - bed, stretcher, chair and/or video monitoring/Bed in lowest position, wheels locked, appropriate side rails in place/Call bell, personal items and telephone in reach/Instruct patient to call for assistance before getting out of bed/chair/stretcher/Non-slip footwear applied when patient is off stretcher/[place] to call system/Physically safe environment - no spills, clutter or unnecessary equipment/Purposeful Proactive Rounding/Room/bathroom lighting operational, light cord in reach

## 2023-12-21 NOTE — ED PROVIDER NOTE - CARE PROVIDER_API CALL
Abdi Mcintyre  Internal Medicine  [address] [place] [id number]  Phone: [phone]  Fax: [phone]  Follow Up Time: 4-6 Days   Abdi Mcintyre  Internal Medicine  [address] [place] [id number]  Phone: [phone]  Fax: [phone]  Follow Up Time: 4-6 Days

## 2023-12-24 ENCOUNTER — EMERGENCY (EMERGENCY)
Facility: HOSPITAL | Age: 38
LOS: 0 days | Discharge: LEFT BEFORE DISCHARGE | End: 2023-12-24
Attending: EMERGENCY MEDICINE
Payer: COMMERCIAL

## 2023-12-24 VITALS
RESPIRATION RATE: 20 BRPM | OXYGEN SATURATION: 95 % | SYSTOLIC BLOOD PRESSURE: 154 MMHG | HEIGHT: 66 IN | TEMPERATURE: 98 F | DIASTOLIC BLOOD PRESSURE: 97 MMHG | HEART RATE: 105 BPM

## 2023-12-24 DIAGNOSIS — F17.200 NICOTINE DEPENDENCE, UNSPECIFIED, UNCOMPLICATED: ICD-10-CM

## 2023-12-24 DIAGNOSIS — Z88.1 ALLERGY STATUS TO OTHER ANTIBIOTIC AGENTS STATUS: ICD-10-CM

## 2023-12-24 DIAGNOSIS — F10.120 ALCOHOL ABUSE WITH INTOXICATION, UNCOMPLICATED: ICD-10-CM

## 2023-12-24 DIAGNOSIS — Z53.29 PROCEDURE AND TREATMENT NOT CARRIED OUT BECAUSE OF PATIENT'S DECISION FOR OTHER REASONS: ICD-10-CM

## 2023-12-24 DIAGNOSIS — R00.2 PALPITATIONS: ICD-10-CM

## 2023-12-24 DIAGNOSIS — F10.129 ALCOHOL ABUSE WITH INTOXICATION, UNSPECIFIED: ICD-10-CM

## 2023-12-24 PROCEDURE — 93005 ELECTROCARDIOGRAM TRACING: CPT

## 2023-12-24 PROCEDURE — 82962 GLUCOSE BLOOD TEST: CPT

## 2023-12-24 PROCEDURE — 99284 EMERGENCY DEPT VISIT MOD MDM: CPT

## 2023-12-24 PROCEDURE — 99285 EMERGENCY DEPT VISIT HI MDM: CPT | Mod: 25

## 2023-12-24 PROCEDURE — 93010 ELECTROCARDIOGRAM REPORT: CPT

## 2023-12-24 NOTE — ED PROVIDER NOTE - ATTENDING APP SHARED VISIT CONTRIBUTION OF CARE
38yM alcohol use disorder BIB father who found him in his apartment intoxicated.  Father says he is more intoxicated than usual.  Pt was briefly in alcohol rehab but never completely sober.  No SI/HI.  Pt c/o palpitations and pain all over.

## 2023-12-24 NOTE — ED ADULT NURSE NOTE - OBJECTIVE STATEMENT
pt 39 yo male admits to drinking a whole bottle of vodka; as per father he called pt and pt did not sound right today so he went there and found him very letharghic; pt arousable and talking, and crying when assessing pt ; pt 37 yo male admits to drinking a whole bottle of vodka; as per father he called pt and pt did not sound right today so he went there and found him very letharghic; pt arousable and talking, and crying when assessing pt ;

## 2023-12-24 NOTE — ED ADULT NURSE NOTE - NS ED NURSE ELOPE COMMENTS
pt seen ambulating steadily, a+ox4; accompanied by his father as his ; MAGDALENO Mon made aware. pt stable, seen ambulating steadily, a+ox4; accompanied by his father as his ; MAGDALENO Mon made aware.

## 2023-12-24 NOTE — ED PROVIDER NOTE - CLINICAL SUMMARY MEDICAL DECISION MAKING FREE TEXT BOX
38yM BIB father for alcohol intoxication.  Pt nontoxic appearing, hemodynamically stable though w/ flushed face and c/o palpitations.  EKG w/o ischemia or arrhythmia.  FS reassuring.  D/w pt and father at bedside - father requesting detox for patient, who is not currently amenable to detox at this time.  Pt left the ED with his father prior to receiving dc paperwork.

## 2023-12-24 NOTE — ED PROVIDER NOTE - OBJECTIVE STATEMENT
pt with pmhx etoh abuse, last rehab approx 1 yr ago but not sober since- just "cut down", brought to ED by father who went to visit him and found him intox in his home. father requesting inpt rehab bc it helped him in the past. Denies fever/chill/HA/dizziness/chest pain/palpitation/sob/abd pain/n/v/d/ black stool/bloody stool/urinary sxs

## 2023-12-24 NOTE — ED ADULT TRIAGE NOTE - CHIEF COMPLAINT QUOTE
Pt was found by father in his apartment lethargic, admits to drinking alcohol today, denies drug use, fs in triage 103

## 2023-12-24 NOTE — ED ADULT NURSE NOTE - NSFALLHARMRISKINTERV_ED_ALL_ED
Assistance OOB with selected safe patient handling equipment if applicable/Assistance with ambulation/Communicate risk of Fall with Harm to all staff, patient, and family/Monitor gait and stability/Monitor for mental status changes and reorient to person, place, and time, as needed/Provide visual cue: red socks, yellow wristband, yellow gown, etc/Reinforce activity limits and safety measures with patient and family/Toileting schedule using arm’s reach rule for commode and bathroom/Use of alarms - bed, stretcher, chair and/or video monitoring/Bed in lowest position, wheels locked, appropriate side rails in place/Call bell, personal items and telephone in reach/Instruct patient to call for assistance before getting out of bed/chair/stretcher/Non-slip footwear applied when patient is off stretcher/[place] to call system/Physically safe environment - no spills, clutter or unnecessary equipment/Purposeful Proactive Rounding/Room/bathroom lighting operational, light cord in reach Assistance OOB with selected safe patient handling equipment if applicable/Assistance with ambulation/Communicate risk of Fall with Harm to all staff, patient, and family/Monitor gait and stability/Monitor for mental status changes and reorient to person, place, and time, as needed/Provide visual cue: red socks, yellow wristband, yellow gown, etc/Reinforce activity limits and safety measures with patient and family/Toileting schedule using arm’s reach rule for commode and bathroom/Use of alarms - bed, stretcher, chair and/or video monitoring/Bed in lowest position, wheels locked, appropriate side rails in place/Call bell, personal items and telephone in reach/Instruct patient to call for assistance before getting out of bed/chair/stretcher/Non-slip footwear applied when patient is off stretcher/[place] to call system/Physically safe environment - no spills, clutter or unnecessary equipment/Purposeful Proactive Rounding/Room/bathroom lighting operational, light cord in reach

## 2023-12-26 ENCOUNTER — INPATIENT (INPATIENT)
Facility: HOSPITAL | Age: 38
LOS: 9 days | Discharge: HOME | DRG: 897 | End: 2024-01-05
Attending: HOSPITALIST | Admitting: INTERNAL MEDICINE
Payer: COMMERCIAL

## 2023-12-26 VITALS
OXYGEN SATURATION: 98 % | HEIGHT: 66 IN | TEMPERATURE: 98 F | SYSTOLIC BLOOD PRESSURE: 156 MMHG | DIASTOLIC BLOOD PRESSURE: 86 MMHG | RESPIRATION RATE: 18 BRPM | HEART RATE: 115 BPM

## 2023-12-26 DIAGNOSIS — F10.130 ALCOHOL ABUSE WITH WITHDRAWAL, UNCOMPLICATED: ICD-10-CM

## 2023-12-26 LAB
ALBUMIN SERPL ELPH-MCNC: 4.5 G/DL — SIGNIFICANT CHANGE UP (ref 3.5–5.2)
ALBUMIN SERPL ELPH-MCNC: 4.5 G/DL — SIGNIFICANT CHANGE UP (ref 3.5–5.2)
ALBUMIN SERPL ELPH-MCNC: 4.9 G/DL — SIGNIFICANT CHANGE UP (ref 3.5–5.2)
ALBUMIN SERPL ELPH-MCNC: 4.9 G/DL — SIGNIFICANT CHANGE UP (ref 3.5–5.2)
ALP SERPL-CCNC: 54 U/L — SIGNIFICANT CHANGE UP (ref 30–115)
ALP SERPL-CCNC: 54 U/L — SIGNIFICANT CHANGE UP (ref 30–115)
ALP SERPL-CCNC: 60 U/L — SIGNIFICANT CHANGE UP (ref 30–115)
ALP SERPL-CCNC: 60 U/L — SIGNIFICANT CHANGE UP (ref 30–115)
ALT FLD-CCNC: 43 U/L — HIGH (ref 0–41)
ALT FLD-CCNC: 43 U/L — HIGH (ref 0–41)
ALT FLD-CCNC: 53 U/L — HIGH (ref 0–41)
ALT FLD-CCNC: 53 U/L — HIGH (ref 0–41)
ANION GAP SERPL CALC-SCNC: 14 MMOL/L — SIGNIFICANT CHANGE UP (ref 7–14)
ANION GAP SERPL CALC-SCNC: 14 MMOL/L — SIGNIFICANT CHANGE UP (ref 7–14)
ANION GAP SERPL CALC-SCNC: 16 MMOL/L — HIGH (ref 7–14)
ANION GAP SERPL CALC-SCNC: 16 MMOL/L — HIGH (ref 7–14)
AST SERPL-CCNC: 105 U/L — HIGH (ref 0–41)
AST SERPL-CCNC: 105 U/L — HIGH (ref 0–41)
AST SERPL-CCNC: 70 U/L — HIGH (ref 0–41)
AST SERPL-CCNC: 70 U/L — HIGH (ref 0–41)
B-OH-BUTYR SERPL-SCNC: <0.2 MMOL/L — SIGNIFICANT CHANGE UP
B-OH-BUTYR SERPL-SCNC: <0.2 MMOL/L — SIGNIFICANT CHANGE UP
BASOPHILS # BLD AUTO: 0.08 K/UL — SIGNIFICANT CHANGE UP (ref 0–0.2)
BASOPHILS # BLD AUTO: 0.08 K/UL — SIGNIFICANT CHANGE UP (ref 0–0.2)
BASOPHILS NFR BLD AUTO: 1.5 % — HIGH (ref 0–1)
BASOPHILS NFR BLD AUTO: 1.5 % — HIGH (ref 0–1)
BILIRUB DIRECT SERPL-MCNC: <0.2 MG/DL — SIGNIFICANT CHANGE UP (ref 0–0.3)
BILIRUB DIRECT SERPL-MCNC: <0.2 MG/DL — SIGNIFICANT CHANGE UP (ref 0–0.3)
BILIRUB INDIRECT FLD-MCNC: SIGNIFICANT CHANGE UP MG/DL (ref 0.2–1.2)
BILIRUB INDIRECT FLD-MCNC: SIGNIFICANT CHANGE UP MG/DL (ref 0.2–1.2)
BILIRUB SERPL-MCNC: 0.2 MG/DL — SIGNIFICANT CHANGE UP (ref 0.2–1.2)
BILIRUB SERPL-MCNC: 0.2 MG/DL — SIGNIFICANT CHANGE UP (ref 0.2–1.2)
BILIRUB SERPL-MCNC: 0.4 MG/DL — SIGNIFICANT CHANGE UP (ref 0.2–1.2)
BILIRUB SERPL-MCNC: 0.4 MG/DL — SIGNIFICANT CHANGE UP (ref 0.2–1.2)
BUN SERPL-MCNC: 7 MG/DL — LOW (ref 10–20)
CALCIUM SERPL-MCNC: 8.4 MG/DL — SIGNIFICANT CHANGE UP (ref 8.4–10.5)
CALCIUM SERPL-MCNC: 8.4 MG/DL — SIGNIFICANT CHANGE UP (ref 8.4–10.5)
CALCIUM SERPL-MCNC: 8.5 MG/DL — SIGNIFICANT CHANGE UP (ref 8.4–10.5)
CALCIUM SERPL-MCNC: 8.5 MG/DL — SIGNIFICANT CHANGE UP (ref 8.4–10.5)
CHLORIDE SERPL-SCNC: 93 MMOL/L — LOW (ref 98–110)
CHLORIDE SERPL-SCNC: 93 MMOL/L — LOW (ref 98–110)
CHLORIDE SERPL-SCNC: 94 MMOL/L — LOW (ref 98–110)
CHLORIDE SERPL-SCNC: 94 MMOL/L — LOW (ref 98–110)
CO2 SERPL-SCNC: 27 MMOL/L — SIGNIFICANT CHANGE UP (ref 17–32)
CO2 SERPL-SCNC: 27 MMOL/L — SIGNIFICANT CHANGE UP (ref 17–32)
CO2 SERPL-SCNC: 28 MMOL/L — SIGNIFICANT CHANGE UP (ref 17–32)
CO2 SERPL-SCNC: 28 MMOL/L — SIGNIFICANT CHANGE UP (ref 17–32)
CREAT SERPL-MCNC: 0.8 MG/DL — SIGNIFICANT CHANGE UP (ref 0.7–1.5)
EGFR: 117 ML/MIN/1.73M2 — SIGNIFICANT CHANGE UP
EOSINOPHIL # BLD AUTO: 0.02 K/UL — SIGNIFICANT CHANGE UP (ref 0–0.7)
EOSINOPHIL # BLD AUTO: 0.02 K/UL — SIGNIFICANT CHANGE UP (ref 0–0.7)
EOSINOPHIL NFR BLD AUTO: 0.4 % — SIGNIFICANT CHANGE UP (ref 0–8)
EOSINOPHIL NFR BLD AUTO: 0.4 % — SIGNIFICANT CHANGE UP (ref 0–8)
ETHANOL SERPL-MCNC: 347 MG/DL — HIGH
ETHANOL SERPL-MCNC: 347 MG/DL — HIGH
GLUCOSE BLDC GLUCOMTR-MCNC: 103 MG/DL — HIGH (ref 70–99)
GLUCOSE BLDC GLUCOMTR-MCNC: 103 MG/DL — HIGH (ref 70–99)
GLUCOSE SERPL-MCNC: 110 MG/DL — HIGH (ref 70–99)
GLUCOSE SERPL-MCNC: 110 MG/DL — HIGH (ref 70–99)
GLUCOSE SERPL-MCNC: 225 MG/DL — HIGH (ref 70–99)
GLUCOSE SERPL-MCNC: 225 MG/DL — HIGH (ref 70–99)
HCT VFR BLD CALC: 45.4 % — SIGNIFICANT CHANGE UP (ref 42–52)
HCT VFR BLD CALC: 45.4 % — SIGNIFICANT CHANGE UP (ref 42–52)
HGB BLD-MCNC: 15.8 G/DL — SIGNIFICANT CHANGE UP (ref 14–18)
HGB BLD-MCNC: 15.8 G/DL — SIGNIFICANT CHANGE UP (ref 14–18)
IMM GRANULOCYTES NFR BLD AUTO: 0.4 % — HIGH (ref 0.1–0.3)
IMM GRANULOCYTES NFR BLD AUTO: 0.4 % — HIGH (ref 0.1–0.3)
LIDOCAIN IGE QN: 106 U/L — HIGH (ref 7–60)
LIDOCAIN IGE QN: 106 U/L — HIGH (ref 7–60)
LYMPHOCYTES # BLD AUTO: 1.15 K/UL — LOW (ref 1.2–3.4)
LYMPHOCYTES # BLD AUTO: 1.15 K/UL — LOW (ref 1.2–3.4)
LYMPHOCYTES # BLD AUTO: 22.1 % — SIGNIFICANT CHANGE UP (ref 20.5–51.1)
LYMPHOCYTES # BLD AUTO: 22.1 % — SIGNIFICANT CHANGE UP (ref 20.5–51.1)
MAGNESIUM SERPL-MCNC: 1.3 MG/DL — LOW (ref 1.8–2.4)
MAGNESIUM SERPL-MCNC: 1.3 MG/DL — LOW (ref 1.8–2.4)
MAGNESIUM SERPL-MCNC: 1.9 MG/DL — SIGNIFICANT CHANGE UP (ref 1.8–2.4)
MAGNESIUM SERPL-MCNC: 1.9 MG/DL — SIGNIFICANT CHANGE UP (ref 1.8–2.4)
MCHC RBC-ENTMCNC: 31.4 PG — HIGH (ref 27–31)
MCHC RBC-ENTMCNC: 31.4 PG — HIGH (ref 27–31)
MCHC RBC-ENTMCNC: 34.8 G/DL — SIGNIFICANT CHANGE UP (ref 32–37)
MCHC RBC-ENTMCNC: 34.8 G/DL — SIGNIFICANT CHANGE UP (ref 32–37)
MCV RBC AUTO: 90.3 FL — SIGNIFICANT CHANGE UP (ref 80–94)
MCV RBC AUTO: 90.3 FL — SIGNIFICANT CHANGE UP (ref 80–94)
MONOCYTES # BLD AUTO: 0.7 K/UL — HIGH (ref 0.1–0.6)
MONOCYTES # BLD AUTO: 0.7 K/UL — HIGH (ref 0.1–0.6)
MONOCYTES NFR BLD AUTO: 13.5 % — HIGH (ref 1.7–9.3)
MONOCYTES NFR BLD AUTO: 13.5 % — HIGH (ref 1.7–9.3)
NEUTROPHILS # BLD AUTO: 3.23 K/UL — SIGNIFICANT CHANGE UP (ref 1.4–6.5)
NEUTROPHILS # BLD AUTO: 3.23 K/UL — SIGNIFICANT CHANGE UP (ref 1.4–6.5)
NEUTROPHILS NFR BLD AUTO: 62.1 % — SIGNIFICANT CHANGE UP (ref 42.2–75.2)
NEUTROPHILS NFR BLD AUTO: 62.1 % — SIGNIFICANT CHANGE UP (ref 42.2–75.2)
NRBC # BLD: 0 /100 WBCS — SIGNIFICANT CHANGE UP (ref 0–0)
NRBC # BLD: 0 /100 WBCS — SIGNIFICANT CHANGE UP (ref 0–0)
PHOSPHATE SERPL-MCNC: 2.8 MG/DL — SIGNIFICANT CHANGE UP (ref 2.1–4.9)
PHOSPHATE SERPL-MCNC: 2.8 MG/DL — SIGNIFICANT CHANGE UP (ref 2.1–4.9)
PLATELET # BLD AUTO: 111 K/UL — LOW (ref 130–400)
PLATELET # BLD AUTO: 111 K/UL — LOW (ref 130–400)
PMV BLD: 10.3 FL — SIGNIFICANT CHANGE UP (ref 7.4–10.4)
PMV BLD: 10.3 FL — SIGNIFICANT CHANGE UP (ref 7.4–10.4)
POTASSIUM SERPL-MCNC: 4 MMOL/L — SIGNIFICANT CHANGE UP (ref 3.5–5)
POTASSIUM SERPL-MCNC: 4 MMOL/L — SIGNIFICANT CHANGE UP (ref 3.5–5)
POTASSIUM SERPL-MCNC: 4.9 MMOL/L — SIGNIFICANT CHANGE UP (ref 3.5–5)
POTASSIUM SERPL-MCNC: 4.9 MMOL/L — SIGNIFICANT CHANGE UP (ref 3.5–5)
POTASSIUM SERPL-SCNC: 4 MMOL/L — SIGNIFICANT CHANGE UP (ref 3.5–5)
POTASSIUM SERPL-SCNC: 4 MMOL/L — SIGNIFICANT CHANGE UP (ref 3.5–5)
POTASSIUM SERPL-SCNC: 4.9 MMOL/L — SIGNIFICANT CHANGE UP (ref 3.5–5)
POTASSIUM SERPL-SCNC: 4.9 MMOL/L — SIGNIFICANT CHANGE UP (ref 3.5–5)
PROT SERPL-MCNC: 7.1 G/DL — SIGNIFICANT CHANGE UP (ref 6–8)
PROT SERPL-MCNC: 7.1 G/DL — SIGNIFICANT CHANGE UP (ref 6–8)
PROT SERPL-MCNC: 7.8 G/DL — SIGNIFICANT CHANGE UP (ref 6–8)
PROT SERPL-MCNC: 7.8 G/DL — SIGNIFICANT CHANGE UP (ref 6–8)
RBC # BLD: 5.03 M/UL — SIGNIFICANT CHANGE UP (ref 4.7–6.1)
RBC # BLD: 5.03 M/UL — SIGNIFICANT CHANGE UP (ref 4.7–6.1)
RBC # FLD: 13.7 % — SIGNIFICANT CHANGE UP (ref 11.5–14.5)
RBC # FLD: 13.7 % — SIGNIFICANT CHANGE UP (ref 11.5–14.5)
SODIUM SERPL-SCNC: 136 MMOL/L — SIGNIFICANT CHANGE UP (ref 135–146)
WBC # BLD: 5.2 K/UL — SIGNIFICANT CHANGE UP (ref 4.8–10.8)
WBC # BLD: 5.2 K/UL — SIGNIFICANT CHANGE UP (ref 4.8–10.8)
WBC # FLD AUTO: 5.2 K/UL — SIGNIFICANT CHANGE UP (ref 4.8–10.8)
WBC # FLD AUTO: 5.2 K/UL — SIGNIFICANT CHANGE UP (ref 4.8–10.8)

## 2023-12-26 PROCEDURE — 94760 N-INVAS EAR/PLS OXIMETRY 1: CPT

## 2023-12-26 PROCEDURE — 85610 PROTHROMBIN TIME: CPT

## 2023-12-26 PROCEDURE — 83735 ASSAY OF MAGNESIUM: CPT

## 2023-12-26 PROCEDURE — 80048 BASIC METABOLIC PNL TOTAL CA: CPT

## 2023-12-26 PROCEDURE — 99223 1ST HOSP IP/OBS HIGH 75: CPT

## 2023-12-26 PROCEDURE — 76705 ECHO EXAM OF ABDOMEN: CPT

## 2023-12-26 PROCEDURE — 85730 THROMBOPLASTIN TIME PARTIAL: CPT

## 2023-12-26 PROCEDURE — 85025 COMPLETE CBC W/AUTO DIFF WBC: CPT

## 2023-12-26 PROCEDURE — 87635 SARS-COV-2 COVID-19 AMP PRB: CPT

## 2023-12-26 PROCEDURE — 80354 DRUG SCREENING FENTANYL: CPT

## 2023-12-26 PROCEDURE — 80307 DRUG TEST PRSMV CHEM ANLYZR: CPT

## 2023-12-26 PROCEDURE — 71045 X-RAY EXAM CHEST 1 VIEW: CPT

## 2023-12-26 PROCEDURE — 82962 GLUCOSE BLOOD TEST: CPT

## 2023-12-26 PROCEDURE — 36415 COLL VENOUS BLD VENIPUNCTURE: CPT

## 2023-12-26 PROCEDURE — 80053 COMPREHEN METABOLIC PANEL: CPT

## 2023-12-26 PROCEDURE — 84100 ASSAY OF PHOSPHORUS: CPT

## 2023-12-26 PROCEDURE — 85379 FIBRIN DEGRADATION QUANT: CPT

## 2023-12-26 PROCEDURE — 83690 ASSAY OF LIPASE: CPT

## 2023-12-26 PROCEDURE — 80076 HEPATIC FUNCTION PANEL: CPT

## 2023-12-26 PROCEDURE — 99291 CRITICAL CARE FIRST HOUR: CPT

## 2023-12-26 RX ORDER — DIPHENHYDRAMINE HCL 50 MG
25 CAPSULE ORAL ONCE
Refills: 0 | Status: COMPLETED | OUTPATIENT
Start: 2023-12-26 | End: 2023-12-26

## 2023-12-26 RX ORDER — SODIUM CHLORIDE 9 MG/ML
1000 INJECTION, SOLUTION INTRAVENOUS
Refills: 0 | Status: DISCONTINUED | OUTPATIENT
Start: 2023-12-26 | End: 2023-12-29

## 2023-12-26 RX ORDER — DIPHENHYDRAMINE HCL 50 MG
50 CAPSULE ORAL ONCE
Refills: 0 | Status: COMPLETED | OUTPATIENT
Start: 2023-12-26 | End: 2023-12-26

## 2023-12-26 RX ORDER — FLUOXETINE HCL 10 MG
20 CAPSULE ORAL DAILY
Refills: 0 | Status: DISCONTINUED | OUTPATIENT
Start: 2023-12-26 | End: 2024-01-05

## 2023-12-26 RX ORDER — FOLIC ACID 0.8 MG
1 TABLET ORAL DAILY
Refills: 0 | Status: DISCONTINUED | OUTPATIENT
Start: 2023-12-26 | End: 2024-01-05

## 2023-12-26 RX ORDER — ENOXAPARIN SODIUM 100 MG/ML
40 INJECTION SUBCUTANEOUS EVERY 24 HOURS
Refills: 0 | Status: DISCONTINUED | OUTPATIENT
Start: 2023-12-26 | End: 2024-01-05

## 2023-12-26 RX ORDER — PANTOPRAZOLE SODIUM 20 MG/1
40 TABLET, DELAYED RELEASE ORAL
Refills: 0 | Status: DISCONTINUED | OUTPATIENT
Start: 2023-12-26 | End: 2024-01-05

## 2023-12-26 RX ORDER — SODIUM CHLORIDE 9 MG/ML
1000 INJECTION INTRAMUSCULAR; INTRAVENOUS; SUBCUTANEOUS ONCE
Refills: 0 | Status: COMPLETED | OUTPATIENT
Start: 2023-12-26 | End: 2023-12-26

## 2023-12-26 RX ORDER — THIAMINE MONONITRATE (VIT B1) 100 MG
100 TABLET ORAL DAILY
Refills: 0 | Status: DISCONTINUED | OUTPATIENT
Start: 2023-12-26 | End: 2024-01-05

## 2023-12-26 RX ORDER — THIAMINE MONONITRATE (VIT B1) 100 MG
100 TABLET ORAL ONCE
Refills: 0 | Status: COMPLETED | OUTPATIENT
Start: 2023-12-26 | End: 2023-12-26

## 2023-12-26 RX ORDER — FOLIC ACID 0.8 MG
1 TABLET ORAL ONCE
Refills: 0 | Status: COMPLETED | OUTPATIENT
Start: 2023-12-26 | End: 2023-12-26

## 2023-12-26 RX ORDER — ONDANSETRON 8 MG/1
4 TABLET, FILM COATED ORAL THREE TIMES A DAY
Refills: 0 | Status: DISCONTINUED | OUTPATIENT
Start: 2023-12-26 | End: 2024-01-05

## 2023-12-26 RX ORDER — THIAMINE MONONITRATE (VIT B1) 100 MG
500 TABLET ORAL THREE TIMES A DAY
Refills: 0 | Status: DISCONTINUED | OUTPATIENT
Start: 2023-12-26 | End: 2023-12-26

## 2023-12-26 RX ADMIN — SODIUM CHLORIDE 2000 MILLILITER(S): 9 INJECTION INTRAMUSCULAR; INTRAVENOUS; SUBCUTANEOUS at 11:50

## 2023-12-26 RX ADMIN — Medication 2 MILLIGRAM(S): at 21:51

## 2023-12-26 RX ADMIN — Medication 2 MILLIGRAM(S): at 20:31

## 2023-12-26 RX ADMIN — Medication 2 MILLIGRAM(S): at 22:20

## 2023-12-26 RX ADMIN — ENOXAPARIN SODIUM 40 MILLIGRAM(S): 100 INJECTION SUBCUTANEOUS at 20:30

## 2023-12-26 RX ADMIN — Medication 2 MILLIGRAM(S): at 17:59

## 2023-12-26 RX ADMIN — Medication 2 MILLIGRAM(S): at 11:50

## 2023-12-26 RX ADMIN — Medication 1 MILLIGRAM(S): at 14:29

## 2023-12-26 RX ADMIN — SODIUM CHLORIDE 75 MILLILITER(S): 9 INJECTION, SOLUTION INTRAVENOUS at 20:31

## 2023-12-26 RX ADMIN — Medication 2 MILLIGRAM(S): at 13:17

## 2023-12-26 RX ADMIN — Medication 100 MILLIGRAM(S): at 14:29

## 2023-12-26 RX ADMIN — Medication 25 MILLIGRAM(S): at 22:31

## 2023-12-26 NOTE — ED PROVIDER NOTE - CLINICAL SUMMARY MEDICAL DECISION MAKING FREE TEXT BOX
Patient presented today for evaluation of tremors.  Patient found to have acute alcohol withdrawal.  Patient given Ativan 2 mg IV x 2.  CIWA score noted to be high.  ICU was consulted and patient was admitted for further evaluation and care.    Attending Statement: I have personally provided the amount of critical care time documented below excluding time spent on separate procedures.     Critical Care Time Spent (min) Must be 30 or more minutes to qualify: 35.

## 2023-12-26 NOTE — H&P ADULT - NSHPPHYSICALEXAM_GEN_ALL_CORE
PHYSICAL EXAM:  GENERAL: anxious  ENT: Oral sores  NECK: Supple, No JVD  CHEST/LUNG: Clear to auscultation bilaterally;   HEART: tachycardic regular  ABDOMEN: Bowel sounds present; Soft, Nontender,   EXTREMITIES:  No edema  NERVOUS SYSTEM:  Alert & Oriented X3, speech clear.   MSK: tremors but no deficits  SKIN: No rashes or lesions

## 2023-12-26 NOTE — ED PROVIDER NOTE - OBJECTIVE STATEMENT
38-year-old male with history of alcohol use disorder, anxiety and depression is brought in by mother for tremors.  Patient has been going through a bad break-up and has been drinking more than usual.  Patient has been drinking vodka last drink was this morning at 8 AM.  Patient is diffusely tremulous at presentation.  Patient endorses headache, but denies hallucinations, dizziness, lightheadedness, nausea or vomiting.  CIWA score of 15

## 2023-12-26 NOTE — ED ADULT TRIAGE NOTE - CHIEF COMPLAINT QUOTE
"I'm withdrawing from alcohol. I'm going through a bad breakup and I've been binge drinking for 3 weeks." Last alcohol intake 1 pint of Vodka @ 8am today. "I'm withdrawing from alcohol. I'm going through a bad breakup and I've been binge drinking for 3 weeks." Last alcohol intake 1 pint of Vodka @ 8am today. Triage MP=067. "I'm withdrawing from alcohol. I'm going through a bad breakup and I've been binge drinking for 3 weeks." Last alcohol intake 1 pint of Vodka @ 8am today. Triage VK=780.

## 2023-12-26 NOTE — CONSULT NOTE ADULT - SUBJECTIVE AND OBJECTIVE BOX
Patient is a 38y old  Male who presents with a chief complaint of alcohol withdrawal     HPI:  38 year old with no PMHx presenting with c/c "I'm withdrawing from alcohol. I'm going through a bad breakup and I've been binge drinking for 3 weeks." Last alcohol intake 1 pint of Vodka @ 8am today. Triage NC=130.    PAST MEDICAL & SURGICAL HISTORY:  No pertinent past medical history  anxiety  depression  polysubstance      No significant past surgical history          SOCIAL HX:   Smoking                         ETOH                            Other    FAMILY HISTORY:  :  No known cardiovacular family hisotry     Review Of Systems:     All ROS are negative except per HPI       Allergies    Ceclor (Rash; Hives)    Intolerances          PHYSICAL EXAM    ICU Vital Signs Last 24 Hrs  T(C): 36.6 (26 Dec 2023 10:12), Max: 36.6 (26 Dec 2023 10:12)  T(F): 97.9 (26 Dec 2023 10:12), Max: 97.9 (26 Dec 2023 10:12)  HR: 115 (26 Dec 2023 10:12) (115 - 115)  BP: 156/86 (26 Dec 2023 10:12) (156/86 - 156/86)  BP(mean): --  ABP: --  ABP(mean): --  RR: 18 (26 Dec 2023 10:12) (18 - 18)  SpO2: 98% (26 Dec 2023 10:12) (98% - 98%)    O2 Parameters below as of 26 Dec 2023 10:12  Patient On (Oxygen Delivery Method): room air              ENT: Airway patent,  EYES: Pupils equal, Round and reactive to light.  CARDIAC: Normal rate, Regular rhythm.    RESPIRATORY: No wheezing, Bilateral BS, Not tachypneic, No use of accessory muscles  GASTROINTESTINAL: Abdomen soft, Non-tender, No guarding,   NEUROLOGICAL: Alert and oriented x3. Tremulous and anxious  SKIN: Skin normal color for race, Warm and dry and intact.         LABS:                          15.8   5.20  )-----------( 111      ( 26 Dec 2023 11:50 )             45.4                                               12-26    136  |  94<L>  |  7<L>  ----------------------------<  110<H>  4.9   |  28  |  0.8    Ca    8.5      26 Dec 2023 11:50  Mg     1.9     12-26    TPro  7.8  /  Alb  4.9  /  TBili  0.2  /  DBili  x   /  AST  105<H>  /  ALT  53<H>  /  AlkPhos  60  12-26                                             Urinalysis Basic - ( 26 Dec 2023 11:50 )    Color: x / Appearance: x / SG: x / pH: x  Gluc: 110 mg/dL / Ketone: x  / Bili: x / Urobili: x   Blood: x / Protein: x / Nitrite: x   Leuk Esterase: x / RBC: x / WBC x   Sq Epi: x / Non Sq Epi: x / Bacteria: x                                                  LIVER FUNCTIONS - ( 26 Dec 2023 11:50 )  Alb: 4.9 g/dL / Pro: 7.8 g/dL / ALK PHOS: 60 U/L / ALT: 53 U/L / AST: 105 U/L / GGT: x                                                                                                                                         MEDICATIONS  (STANDING):  folic acid Injectable 1 milliGRAM(s) IV Push Once  thiamine Injectable 100 milliGRAM(s) IV Push Once    MEDICATIONS  (PRN):         Patient is a 38y old  Male who presents with a chief complaint of alcohol withdrawal     HPI:  38 year old with no PMHx presenting with c/c "I'm withdrawing from alcohol. I'm going through a bad breakup and I've been binge drinking for 3 weeks." Last alcohol intake 1 pint of Vodka @ 8am today. Triage EJ=954.    PAST MEDICAL & SURGICAL HISTORY:  No pertinent past medical history  anxiety  depression  polysubstance      No significant past surgical history          SOCIAL HX:   Smoking                         ETOH                            Other    FAMILY HISTORY:  :  No known cardiovacular family hisotry     Review Of Systems:     All ROS are negative except per HPI       Allergies    Ceclor (Rash; Hives)    Intolerances          PHYSICAL EXAM    ICU Vital Signs Last 24 Hrs  T(C): 36.6 (26 Dec 2023 10:12), Max: 36.6 (26 Dec 2023 10:12)  T(F): 97.9 (26 Dec 2023 10:12), Max: 97.9 (26 Dec 2023 10:12)  HR: 115 (26 Dec 2023 10:12) (115 - 115)  BP: 156/86 (26 Dec 2023 10:12) (156/86 - 156/86)  BP(mean): --  ABP: --  ABP(mean): --  RR: 18 (26 Dec 2023 10:12) (18 - 18)  SpO2: 98% (26 Dec 2023 10:12) (98% - 98%)    O2 Parameters below as of 26 Dec 2023 10:12  Patient On (Oxygen Delivery Method): room air              ENT: Airway patent,  EYES: Pupils equal, Round and reactive to light.  CARDIAC: Normal rate, Regular rhythm.    RESPIRATORY: No wheezing, Bilateral BS, Not tachypneic, No use of accessory muscles  GASTROINTESTINAL: Abdomen soft, Non-tender, No guarding,   NEUROLOGICAL: Alert and oriented x3. Tremulous and anxious  SKIN: Skin normal color for race, Warm and dry and intact.         LABS:                          15.8   5.20  )-----------( 111      ( 26 Dec 2023 11:50 )             45.4                                               12-26    136  |  94<L>  |  7<L>  ----------------------------<  110<H>  4.9   |  28  |  0.8    Ca    8.5      26 Dec 2023 11:50  Mg     1.9     12-26    TPro  7.8  /  Alb  4.9  /  TBili  0.2  /  DBili  x   /  AST  105<H>  /  ALT  53<H>  /  AlkPhos  60  12-26                                             Urinalysis Basic - ( 26 Dec 2023 11:50 )    Color: x / Appearance: x / SG: x / pH: x  Gluc: 110 mg/dL / Ketone: x  / Bili: x / Urobili: x   Blood: x / Protein: x / Nitrite: x   Leuk Esterase: x / RBC: x / WBC x   Sq Epi: x / Non Sq Epi: x / Bacteria: x                                                  LIVER FUNCTIONS - ( 26 Dec 2023 11:50 )  Alb: 4.9 g/dL / Pro: 7.8 g/dL / ALK PHOS: 60 U/L / ALT: 53 U/L / AST: 105 U/L / GGT: x                                                                                                                                         MEDICATIONS  (STANDING):  folic acid Injectable 1 milliGRAM(s) IV Push Once  thiamine Injectable 100 milliGRAM(s) IV Push Once    MEDICATIONS  (PRN):

## 2023-12-26 NOTE — ED ADULT NURSE NOTE - OBJECTIVE STATEMENT
Pt with C/O tremors  weakness nausea  from alcohol intoxication , state is been drinking vodka for 2 weeks straight ,pt denies fever chills .

## 2023-12-26 NOTE — SBIRT NOTE ADULT - NSSBIRTBRIEFINTDET_GEN_A_CORE
Provided SBIRT services: Full Screen Positive. Brief Intervention Performed and Referral to Treatment Attempted.   Screening results were reviewed with the patient and patient was provided information about healthy guidelines and potential negative consequences associated with level of risk. Motivation and readiness to reduce or stop use was discussed and goals and activities to make changes were suggested/offered.   Options discussed for further evaluation and treatment, but referral to treatment was not completed because patient requires medical care and is currently in treatment at [facility], [address] [place] [address], [phone]. Upon discharge, patient reported that he plans to return to [facility] and begin attending AA meetings again. Patient provided with phone number for telephonic SBIRT ([phone]) for future follow up.  Provided SBIRT services: Full Screen Positive. Brief Intervention Performed and Referral to Treatment Attempted.   Screening results were reviewed with the patient and patient was provided information about healthy guidelines and potential negative consequences associated with level of risk. Motivation and readiness to reduce or stop use was discussed and goals and activities to make changes were suggested/offered.   Options discussed for further evaluation and treatment, but referral to treatment was not completed because patient requires medical care and is currently in treatment at [facility], [address] [place] [address], [phone]. Upon discharge, patient reported that he plans to return to [facility] and begin attending AA meetings again. Patient provided with phone number for telephonic SBIRT ([phone]) for future follow up.

## 2023-12-26 NOTE — CONSULT NOTE ADULT - ASSESSMENT
IMPRESSION:    Alcohol withdrawal  Alcohol use disorder    PLAN    CNS: CIWA monitoring, Benzo standing for withdrawal, c/w folate, thiamine, MVI, Urine and Serum drug screen. CATCH august    HEENT: Oral care    PULMONARY: HOB @ 45 degrees, aspiration precautions. On room air.    CARDIAC: C/w LR @ 75 cc/hr. MAP is adequate.     GASTROENTEROLOGY: PPI ppx. Feeding: increase as tolerates. Trend LFTs. Check Lipase    RENAL: Monitor CMP & UO. Correct electrolytes as needed    INFECTIOUS: Monitor off Abx    HEMATOLOGY: DVT ppx. Monitor CBC and Coags. Dimer    ENDOCRINOLOGY: Check FS, insulin protocol as needed.    MUSCULOSKELETAL: Bed rest    DISPOSITION: MICU       IMPRESSION:    Alcohol withdrawal  Alcohol use disorder  HO Cocaine and Xanax use disorder     PLAN    CNS: CIWA monitoring, Benzo standing for withdrawal, c/w folate, thiamine, MVI, Urine and Serum drug screen. CATCH team     HEENT: Oral care    PULMONARY: HOB @ 45 degrees, aspiration precautions. On room air.    CARDIAC: C/w D5LR @ 75 cc/hr. MAP is adequate.     GASTROENTEROLOGY: PPI ppx. Feeding: increase as tolerates. Trend LFTs. Check Lipase    RENAL: Monitor CMP & UO. Correct electrolytes as needed    INFECTIOUS: Monitor off Abx    HEMATOLOGY: DVT ppx. Monitor CBC and Coags. Dimer    ENDOCRINOLOGY: Check FS, insulin protocol as needed.    MUSCULOSKELETAL: Bed rest    DISPOSITION: SDU

## 2023-12-26 NOTE — H&P ADULT - NSHPREVIEWOFSYSTEMS_GEN_ALL_CORE
REVIEW OF SYSTEMS:    CONSTITUTIONAL: No fevers or chills  RESPIRATORY: No cough, wheezing, hemoptysis; No shortness of breath  CARDIOVASCULAR: Palpitations +ve  GASTROINTESTINAL: Nausea +ve  GENITOURINARY: No dysuria, frequency or hematuria  NEUROLOGICAL: No numbness or weakness  SKIN: No itching, rashes

## 2023-12-26 NOTE — ED ADULT NURSE REASSESSMENT NOTE - NS ED NURSE REASSESS COMMENT FT1
Pt reassessed A/O times 3 Vs stable on cardiac monitor denies chest pain denies SOB no tremor after lorazepam 2 mg IVP order is given , appears sleeping  comfort provide with ADL ,safety precaution on progress ,pt is seen evaluate by Ed attending admitted to ICU waiting for bed on going  nursing observation .

## 2023-12-26 NOTE — H&P ADULT - ATTENDING COMMENTS
Patient seen and examined at bedside independently of the residents. I read the resident's note and agree with the plan with the additions and corrections as noted below. My note supersedes the resident's note.     REVIEW OF SYSTEMS:  Negative except in HPI.     PMH: Alcohol use disorder, Anxiety/Depression    FHx: Reviewed. No fhx of asthma/copd, No fhx of liver and pulmonary disease. No fhx of hematological disorder.     Physical Exam:  GEN: No acute distress. Awake, Alert and oriented x 3.   Head: Atraumatic, Normocephalic.   Eye: PEERLA. No sclera icterus. EOMI.   ENT: Normal oropharynx, no thyromegaly, no mass, no lymphadenopathy.   LUNGS: Clear to auscultation bilaterally. No wheeze/rales/crackles.   HEART: Normal. S1/S2 present. RRR. No murmur/gallops.   ABD: Soft, non-tender, non-distended. Bowel sounds present.   EXT: No pitting edema. No erythema. No tenderness. + b/l UE tremors.   Integumentary: No rash, No sore, No petechia.   NEURO: CN III-XII intact. Strength: 5/5 b/l ULE. Sensory intact b/l ULE.     Vital Signs Last 24 Hrs  T(C): 37.2 (26 Dec 2023 20:13), Max: 37.2 (26 Dec 2023 20:13)  T(F): 99 (26 Dec 2023 20:13), Max: 99 (26 Dec 2023 20:13)  HR: 135 (26 Dec 2023 20:13) (109 - 135)  BP: 135/86 (26 Dec 2023 20:13) (111/68 - 156/86)  BP(mean): --  RR: 18 (26 Dec 2023 20:13) (18 - 18)  SpO2: 94% (26 Dec 2023 20:13) (94% - 98%)    Parameters below as of 26 Dec 2023 20:13  Patient On (Oxygen Delivery Method): room air        Please see the above notes for Labs and radiology.     Assessment and Plan:     37 yo M with hx of Alcohol use disorder, Anxiety/Depression presents to ED for alcohol withdrawal.     # Alcohol withdrawal  # Suspected Thiamine and Folate deficiency   - [facility] protocol with ativan taper   - thiamine, folic acid and multivitamin   - check Utox   - monitor on Telemetry   - CATCH team consult.     # Anxiety/Depression - c/w home med    DVT ppx: Lovenox SC  GI ppx:  PPI   Diet: Regular diet   Activity: as tolerated.     Date seen by the attendin2023  Total time spent: 75 minutes. Patient seen and examined at bedside independently of the residents. I read the resident's note and agree with the plan with the additions and corrections as noted below. My note supersedes the resident's note.     REVIEW OF SYSTEMS:  Negative except in HPI.     PMH: Alcohol use disorder, Anxiety/Depression    FHx: Reviewed. No fhx of asthma/copd, No fhx of liver and pulmonary disease. No fhx of hematological disorder.     Physical Exam:  GEN: No acute distress. Awake, Alert and oriented x 3.   Head: Atraumatic, Normocephalic.   Eye: PEERLA. No sclera icterus. EOMI.   ENT: Normal oropharynx, no thyromegaly, no mass, no lymphadenopathy.   LUNGS: Clear to auscultation bilaterally. No wheeze/rales/crackles.   HEART: Normal. S1/S2 present. RRR. No murmur/gallops.   ABD: Soft, non-tender, non-distended. Bowel sounds present.   EXT: No pitting edema. No erythema. No tenderness. + b/l UE tremors.   Integumentary: No rash, No sore, No petechia.   NEURO: CN III-XII intact. Strength: 5/5 b/l ULE. Sensory intact b/l ULE.     Vital Signs Last 24 Hrs  T(C): 37.2 (26 Dec 2023 20:13), Max: 37.2 (26 Dec 2023 20:13)  T(F): 99 (26 Dec 2023 20:13), Max: 99 (26 Dec 2023 20:13)  HR: 135 (26 Dec 2023 20:13) (109 - 135)  BP: 135/86 (26 Dec 2023 20:13) (111/68 - 156/86)  BP(mean): --  RR: 18 (26 Dec 2023 20:13) (18 - 18)  SpO2: 94% (26 Dec 2023 20:13) (94% - 98%)    Parameters below as of 26 Dec 2023 20:13  Patient On (Oxygen Delivery Method): room air        Please see the above notes for Labs and radiology.     Assessment and Plan:     37 yo M with hx of Alcohol use disorder, Anxiety/Depression presents to ED for alcohol withdrawal.     # Alcohol withdrawal  # Suspected Thiamine and Folate deficiency   - [facility] protocol with ativan taper   - thiamine, folic acid and multivitamin   - check Utox   - monitor on Telemetry   - CATCH team consult.     # Anxiety/Depression - c/w home med    DVT ppx: Lovenox SC  GI ppx:  PPI   Diet: Regular diet   Activity: as tolerated.     Date seen by the attendin2023  Total time spent: 75 minutes. Patient seen and examined at bedside independently of the residents. I read the resident's note and agree with the plan with the additions and corrections as noted below. My note supersedes the resident's note.     REVIEW OF SYSTEMS:  Negative except in HPI.     PMH: Alcohol use disorder, Anxiety/Depression    FHx: Reviewed. No fhx of asthma/copd, No fhx of liver and pulmonary disease. No fhx of hematological disorder.     Physical Exam:  GEN: No acute distress. Awake, Alert and oriented x 3. Diaphoresis.   Head: Atraumatic, Normocephalic.   Eye: PEERLA. No sclera icterus. EOMI.   ENT: Normal oropharynx, no thyromegaly, no mass, no lymphadenopathy.   LUNGS: Clear to auscultation bilaterally. No wheeze/rales/crackles.   HEART: Normal. S1/S2 present. RRR. No murmur/gallops.   ABD: Soft, non-tender, non-distended. Bowel sounds present.   EXT: No pitting edema. No erythema. No tenderness. + visible b/l UE tremors.   Integumentary: No rash, No sore, No petechia.   NEURO: CN III-XII intact. Strength: 5/5 b/l ULE. Sensory intact b/l ULE.     Vital Signs Last 24 Hrs  T(C): 37.2 (26 Dec 2023 20:13), Max: 37.2 (26 Dec 2023 20:13)  T(F): 99 (26 Dec 2023 20:13), Max: 99 (26 Dec 2023 20:13)  HR: 135 (26 Dec 2023 20:13) (109 - 135)  BP: 135/86 (26 Dec 2023 20:13) (111/68 - 156/86)  BP(mean): --  RR: 18 (26 Dec 2023 20:13) (18 - 18)  SpO2: 94% (26 Dec 2023 20:13) (94% - 98%)    Parameters below as of 26 Dec 2023 20:13  Patient On (Oxygen Delivery Method): room air      Please see the above notes for Labs and radiology.     Assessment and Plan:     39 yo M with hx of Alcohol use disorder, Anxiety/Depression presents to ED for alcohol withdrawal.     # Alcohol withdrawal   # Suspected Thiamine and Folate deficiency   - [facility] protocol with ativan taper   - thiamine, folic acid and multivitamin   - check Utox   - monitor on Telemetry   - CATCH team consult.     # Anxiety/Depression - c/w home med    DVT ppx: Lovenox SC  GI ppx:  PPI   Diet: Regular diet   Activity: as tolerated.   Dispo: Admit to SDU.     Date seen by the attendin2023  Total time spent: 75 minutes.

## 2023-12-26 NOTE — ED PROVIDER NOTE - PHYSICAL EXAMINATION
CONSTITUTIONAL: well nourished, mildly distressed, tremulous  SKIN: Warm dry, normal skin turgor  HEAD: NCAT  MOUTH: Tongue Fasciculations  EYES: EOMI, no scleral icterus  NECK: Supple; Full ROM.   CARD: RRR, no murmurs, rubs or gallops  RESP: clear to ausculation b/l.    ABD: soft, + BS, non-tender, non-distended, no rebound or guarding.   EXT: Full ROM  NEURO: normal motor. normal sensory. Normal gait.  PSYCH: Cooperative, appropriate.

## 2023-12-26 NOTE — H&P ADULT - NSHPLABSRESULTS_GEN_ALL_CORE
15.8   5.20  )-----------( 111      ( 26 Dec 2023 11:50 )             45.4       12-26    136  |  94<L>  |  7<L>  ----------------------------<  110<H>  4.9   |  28  |  0.8    Ca    8.5      26 Dec 2023 11:50  Mg     1.9     12-26    TPro  7.8  /  Alb  4.9  /  TBili  0.2  /  DBili  x   /  AST  105<H>  /  ALT  53<H>  /  AlkPhos  60  12-26              Urinalysis Basic - ( 26 Dec 2023 11:50 )    Color: x / Appearance: x / SG: x / pH: x  Gluc: 110 mg/dL / Ketone: x  / Bili: x / Urobili: x   Blood: x / Protein: x / Nitrite: x   Leuk Esterase: x / RBC: x / WBC x   Sq Epi: x / Non Sq Epi: x / Bacteria: x            Lactate Trend            CAPILLARY BLOOD GLUCOSE      POCT Blood Glucose.: 123 mg/dL (26 Dec 2023 10:19)

## 2023-12-26 NOTE — ED ADULT NURSE NOTE - NS TRANSFER PATIENT BELONGINGS
[FreeTextEntry1] : 89 y/o F with poor functional status and a suspicious ampullary tumor.  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  She declined surgical resection.  We decided to proceed with treatment based on radiographic and clinical suspicion in the absence of definitive pathology. This was  communicated with the patient and her family and they are still in agreement with the plan. \par \par 11/1/2022:  Patient is seen for OTV s/p 1/15 fractions to pancreas\par \par 11/07/22: Patient present today for OTV s/p 3/15 fractions to the pancreas. Patient is doing well. Denies any constipation, pain or changes in appetite. Patient states occasional diarrhea and takes Imodium when needed.   Clothing

## 2023-12-26 NOTE — ED ADULT NURSE NOTE - CHIEF COMPLAINT QUOTE
"I'm withdrawing from alcohol. I'm going through a bad breakup and I've been binge drinking for 3 weeks." Last alcohol intake 1 pint of Vodka @ 8am today. Triage QI=972. "I'm withdrawing from alcohol. I'm going through a bad breakup and I've been binge drinking for 3 weeks." Last alcohol intake 1 pint of Vodka @ 8am today. Triage CV=350.

## 2023-12-26 NOTE — H&P ADULT - ASSESSMENT
38-year-old male with history of alcohol use disorder, anxiety and depression came in for alcohol withdrawal.      #Alcohol withdrawal  - fall & seizure precautions   - banana bag (multivitamin, thiamine 100mg, folic acid 1mg) x 3 days then transition to PO   - CIWA protocol ativan taper   - zofran 4mg IV Q8H PRN nausea and/or vomiting  - monitor electrolytes & replete PRN  - UDS toxicology   - alcohol abuse & cessation provided  -  consult    #Anxiety/Depression  - C/W home meds Trazadone 100 mg PO QHS  - Fluoxetine 20 mg Q HS    DVT PPX: Lovenox   Diet: Regular  Dispo: acute         38-year-old male with history of alcohol use disorder, anxiety and depression came in for alcohol withdrawal.      #Alcohol withdrawal  - fall & seizure precautions   - banana bag (multivitamin, thiamine 100mg, folic acid 1mg) x 3 days then transition to PO   - CIWA protocol ativan taper   - zofran 4mg IV Q8H PRN nausea and/or vomiting  - monitor electrolytes & replete PRN  - UDS toxicology   - alcohol abuse & cessation provided  -  consult    #Anxiety/Depression  - C/W home meds Trazadone 100 mg PO QHS  - Fluoxetine 20 mg Q HS    DVT PPX: Lovenox   Diet: Regular  Dispo: acute      Med rec verified with pt at bedside     38-year-old male with history of alcohol use disorder, anxiety and depression came in for alcohol withdrawal.      #Alcohol withdrawal  - fall & seizure precautions   - banana bag (multivitamin, thiamine 100mg, folic acid 1mg) x 3 days then transition to PO   - CIWA protocol ativan taper   - zofran 4mg IV Q8H PRN nausea and/or vomiting  - monitor electrolytes & replete PRN  - UDS toxicology   - alcohol abuse & cessation provided  -  consult    #Anxiety/Depression  - C/W home meds  Fluoxetine 20 mg Q HS  - Holding Trazodone and gabapentin to avoid oversedation    DVT PPX: Lovenox   Diet: Regular  Dispo: acute      Med rec verified with pt at bedside

## 2023-12-26 NOTE — H&P ADULT - HISTORY OF PRESENT ILLNESS
38-year-old male with history of alcohol use disorder, anxiety and depression came in for alcohol withdrawal. The Pt usually drinks 4 beers a day but for the past 3 weeks he has been drinking around 2 bottles of 750 ml Vodka daily after going through a bad breakup in order to cope. His sx started around 3 days ago when he cut down on his drinking and mostly constitute palpitations anxiety and tremors. Pt self medicated with alcohol shots that improved his sx for short periods of time. Today pt decided to come into the ED for detoxification and his last drink was around 7 am in the morning.  Patient endorses headache, but denies hallucinations, dizziness, lightheadedness, nausea or vomiting.      .  38-year-old male with history of alcohol use disorder, anxiety and depression came in for alcohol withdrawal. The Pt usually drinks 4 beers a day but for the past 3 weeks he has been drinking around 2 bottles of 750 ml Vodka daily after going through a bad breakup in order to cope. His sx started around 3 days ago when he cut down on his drinking and mostly constitute palpitations anxiety and tremors. Pt self medicated with alcohol shots that improved his sx for short periods of time. Today pt decided to come into the ED for detoxification and his last drink was around 7 am in the morning.  Patient endorses headache, but denies hallucinations, dizziness, lightheadedness, nausea or vomiting.    Pt denies cocaine and benzo abuse but prior drug screen positive.   38-year-old male with history of alcohol use disorder, anxiety and depression came in for alcohol withdrawal. The Pt usually drinks 4 beers a day but for the past 3 weeks he has been drinking around 2 bottles of 750 ml Vodka daily after going through a bad breakup in order to cope. His sx started around 3 days ago when he cut down on his drinking and mostly constitute palpitations anxiety and tremors. Pt self medicated with alcohol shots that improved his sx for short periods of time. Today pt decided to come into the ED for detoxification and his last drink was around 7 am in the morning.  Patient endorses headache, but denies hallucinations, dizziness, lightheadedness, nausea or vomiting.    Pt denies cocaine and benzo abuse but prior drug screen is positive for cocaine metabolites.

## 2023-12-26 NOTE — CONSULT NOTE ADULT - ATTENDING COMMENTS
IMPRESSION:    Alcohol withdrawal  Alcohol use disorder  HO Cocaine and Xanax use disorder     Plan as outlined above

## 2023-12-27 LAB
ALBUMIN SERPL ELPH-MCNC: 4.4 G/DL — SIGNIFICANT CHANGE UP (ref 3.5–5.2)
ALBUMIN SERPL ELPH-MCNC: 4.4 G/DL — SIGNIFICANT CHANGE UP (ref 3.5–5.2)
ALP SERPL-CCNC: 52 U/L — SIGNIFICANT CHANGE UP (ref 30–115)
ALP SERPL-CCNC: 52 U/L — SIGNIFICANT CHANGE UP (ref 30–115)
ALT FLD-CCNC: 37 U/L — SIGNIFICANT CHANGE UP (ref 0–41)
ALT FLD-CCNC: 37 U/L — SIGNIFICANT CHANGE UP (ref 0–41)
ANION GAP SERPL CALC-SCNC: 13 MMOL/L — SIGNIFICANT CHANGE UP (ref 7–14)
ANION GAP SERPL CALC-SCNC: 13 MMOL/L — SIGNIFICANT CHANGE UP (ref 7–14)
APTT BLD: 31.3 SEC — SIGNIFICANT CHANGE UP (ref 27–39.2)
APTT BLD: 31.3 SEC — SIGNIFICANT CHANGE UP (ref 27–39.2)
AST SERPL-CCNC: 60 U/L — HIGH (ref 0–41)
AST SERPL-CCNC: 60 U/L — HIGH (ref 0–41)
BASOPHILS # BLD AUTO: 0.06 K/UL — SIGNIFICANT CHANGE UP (ref 0–0.2)
BASOPHILS # BLD AUTO: 0.06 K/UL — SIGNIFICANT CHANGE UP (ref 0–0.2)
BASOPHILS NFR BLD AUTO: 1.6 % — HIGH (ref 0–1)
BASOPHILS NFR BLD AUTO: 1.6 % — HIGH (ref 0–1)
BILIRUB SERPL-MCNC: 0.6 MG/DL — SIGNIFICANT CHANGE UP (ref 0.2–1.2)
BILIRUB SERPL-MCNC: 0.6 MG/DL — SIGNIFICANT CHANGE UP (ref 0.2–1.2)
BUN SERPL-MCNC: 8 MG/DL — LOW (ref 10–20)
BUN SERPL-MCNC: 8 MG/DL — LOW (ref 10–20)
CALCIUM SERPL-MCNC: 8.6 MG/DL — SIGNIFICANT CHANGE UP (ref 8.4–10.5)
CALCIUM SERPL-MCNC: 8.6 MG/DL — SIGNIFICANT CHANGE UP (ref 8.4–10.5)
CHLORIDE SERPL-SCNC: 94 MMOL/L — LOW (ref 98–110)
CHLORIDE SERPL-SCNC: 94 MMOL/L — LOW (ref 98–110)
CO2 SERPL-SCNC: 27 MMOL/L — SIGNIFICANT CHANGE UP (ref 17–32)
CO2 SERPL-SCNC: 27 MMOL/L — SIGNIFICANT CHANGE UP (ref 17–32)
CREAT SERPL-MCNC: 0.7 MG/DL — SIGNIFICANT CHANGE UP (ref 0.7–1.5)
CREAT SERPL-MCNC: 0.7 MG/DL — SIGNIFICANT CHANGE UP (ref 0.7–1.5)
EGFR: 122 ML/MIN/1.73M2 — SIGNIFICANT CHANGE UP
EGFR: 122 ML/MIN/1.73M2 — SIGNIFICANT CHANGE UP
EOSINOPHIL # BLD AUTO: 0.04 K/UL — SIGNIFICANT CHANGE UP (ref 0–0.7)
EOSINOPHIL # BLD AUTO: 0.04 K/UL — SIGNIFICANT CHANGE UP (ref 0–0.7)
EOSINOPHIL NFR BLD AUTO: 1 % — SIGNIFICANT CHANGE UP (ref 0–8)
EOSINOPHIL NFR BLD AUTO: 1 % — SIGNIFICANT CHANGE UP (ref 0–8)
GLUCOSE SERPL-MCNC: 84 MG/DL — SIGNIFICANT CHANGE UP (ref 70–99)
GLUCOSE SERPL-MCNC: 84 MG/DL — SIGNIFICANT CHANGE UP (ref 70–99)
HCT VFR BLD CALC: 41.5 % — LOW (ref 42–52)
HCT VFR BLD CALC: 41.5 % — LOW (ref 42–52)
HGB BLD-MCNC: 14.3 G/DL — SIGNIFICANT CHANGE UP (ref 14–18)
HGB BLD-MCNC: 14.3 G/DL — SIGNIFICANT CHANGE UP (ref 14–18)
IMM GRANULOCYTES NFR BLD AUTO: 0.3 % — SIGNIFICANT CHANGE UP (ref 0.1–0.3)
IMM GRANULOCYTES NFR BLD AUTO: 0.3 % — SIGNIFICANT CHANGE UP (ref 0.1–0.3)
INR BLD: 0.99 RATIO — SIGNIFICANT CHANGE UP (ref 0.65–1.3)
INR BLD: 0.99 RATIO — SIGNIFICANT CHANGE UP (ref 0.65–1.3)
LYMPHOCYTES # BLD AUTO: 0.95 K/UL — LOW (ref 1.2–3.4)
LYMPHOCYTES # BLD AUTO: 0.95 K/UL — LOW (ref 1.2–3.4)
LYMPHOCYTES # BLD AUTO: 24.8 % — SIGNIFICANT CHANGE UP (ref 20.5–51.1)
LYMPHOCYTES # BLD AUTO: 24.8 % — SIGNIFICANT CHANGE UP (ref 20.5–51.1)
MAGNESIUM SERPL-MCNC: 1.5 MG/DL — LOW (ref 1.8–2.4)
MAGNESIUM SERPL-MCNC: 1.5 MG/DL — LOW (ref 1.8–2.4)
MCHC RBC-ENTMCNC: 31.6 PG — HIGH (ref 27–31)
MCHC RBC-ENTMCNC: 31.6 PG — HIGH (ref 27–31)
MCHC RBC-ENTMCNC: 34.5 G/DL — SIGNIFICANT CHANGE UP (ref 32–37)
MCHC RBC-ENTMCNC: 34.5 G/DL — SIGNIFICANT CHANGE UP (ref 32–37)
MCV RBC AUTO: 91.8 FL — SIGNIFICANT CHANGE UP (ref 80–94)
MCV RBC AUTO: 91.8 FL — SIGNIFICANT CHANGE UP (ref 80–94)
MONOCYTES # BLD AUTO: 0.54 K/UL — SIGNIFICANT CHANGE UP (ref 0.1–0.6)
MONOCYTES # BLD AUTO: 0.54 K/UL — SIGNIFICANT CHANGE UP (ref 0.1–0.6)
MONOCYTES NFR BLD AUTO: 14.1 % — HIGH (ref 1.7–9.3)
MONOCYTES NFR BLD AUTO: 14.1 % — HIGH (ref 1.7–9.3)
NEUTROPHILS # BLD AUTO: 2.23 K/UL — SIGNIFICANT CHANGE UP (ref 1.4–6.5)
NEUTROPHILS # BLD AUTO: 2.23 K/UL — SIGNIFICANT CHANGE UP (ref 1.4–6.5)
NEUTROPHILS NFR BLD AUTO: 58.2 % — SIGNIFICANT CHANGE UP (ref 42.2–75.2)
NEUTROPHILS NFR BLD AUTO: 58.2 % — SIGNIFICANT CHANGE UP (ref 42.2–75.2)
NRBC # BLD: 0 /100 WBCS — SIGNIFICANT CHANGE UP (ref 0–0)
NRBC # BLD: 0 /100 WBCS — SIGNIFICANT CHANGE UP (ref 0–0)
PHOSPHATE SERPL-MCNC: 3.1 MG/DL — SIGNIFICANT CHANGE UP (ref 2.1–4.9)
PHOSPHATE SERPL-MCNC: 3.1 MG/DL — SIGNIFICANT CHANGE UP (ref 2.1–4.9)
PLATELET # BLD AUTO: 81 K/UL — LOW (ref 130–400)
PLATELET # BLD AUTO: 81 K/UL — LOW (ref 130–400)
PMV BLD: 10.9 FL — HIGH (ref 7.4–10.4)
PMV BLD: 10.9 FL — HIGH (ref 7.4–10.4)
POTASSIUM SERPL-MCNC: 3.6 MMOL/L — SIGNIFICANT CHANGE UP (ref 3.5–5)
POTASSIUM SERPL-MCNC: 3.6 MMOL/L — SIGNIFICANT CHANGE UP (ref 3.5–5)
POTASSIUM SERPL-SCNC: 3.6 MMOL/L — SIGNIFICANT CHANGE UP (ref 3.5–5)
POTASSIUM SERPL-SCNC: 3.6 MMOL/L — SIGNIFICANT CHANGE UP (ref 3.5–5)
PROT SERPL-MCNC: 6.9 G/DL — SIGNIFICANT CHANGE UP (ref 6–8)
PROT SERPL-MCNC: 6.9 G/DL — SIGNIFICANT CHANGE UP (ref 6–8)
PROTHROM AB SERPL-ACNC: 11.3 SEC — SIGNIFICANT CHANGE UP (ref 9.95–12.87)
PROTHROM AB SERPL-ACNC: 11.3 SEC — SIGNIFICANT CHANGE UP (ref 9.95–12.87)
RBC # BLD: 4.52 M/UL — LOW (ref 4.7–6.1)
RBC # BLD: 4.52 M/UL — LOW (ref 4.7–6.1)
RBC # FLD: 13 % — SIGNIFICANT CHANGE UP (ref 11.5–14.5)
RBC # FLD: 13 % — SIGNIFICANT CHANGE UP (ref 11.5–14.5)
SODIUM SERPL-SCNC: 134 MMOL/L — LOW (ref 135–146)
SODIUM SERPL-SCNC: 134 MMOL/L — LOW (ref 135–146)
WBC # BLD: 3.83 K/UL — LOW (ref 4.8–10.8)
WBC # BLD: 3.83 K/UL — LOW (ref 4.8–10.8)
WBC # FLD AUTO: 3.83 K/UL — LOW (ref 4.8–10.8)
WBC # FLD AUTO: 3.83 K/UL — LOW (ref 4.8–10.8)

## 2023-12-27 PROCEDURE — 99232 SBSQ HOSP IP/OBS MODERATE 35: CPT

## 2023-12-27 RX ORDER — GABAPENTIN 400 MG/1
400 CAPSULE ORAL THREE TIMES A DAY
Refills: 0 | Status: DISCONTINUED | OUTPATIENT
Start: 2023-12-27 | End: 2024-01-05

## 2023-12-27 RX ORDER — NICOTINE POLACRILEX 2 MG
1 GUM BUCCAL DAILY
Refills: 0 | Status: DISCONTINUED | OUTPATIENT
Start: 2023-12-27 | End: 2024-01-05

## 2023-12-27 RX ORDER — MAGNESIUM SULFATE 500 MG/ML
2 VIAL (ML) INJECTION
Refills: 0 | Status: COMPLETED | OUTPATIENT
Start: 2023-12-27 | End: 2023-12-27

## 2023-12-27 RX ADMIN — Medication 2 MILLIGRAM(S): at 10:30

## 2023-12-27 RX ADMIN — Medication 1 TABLET(S): at 11:47

## 2023-12-27 RX ADMIN — Medication 20 MILLIGRAM(S): at 11:45

## 2023-12-27 RX ADMIN — Medication 3 MILLIGRAM(S): at 22:25

## 2023-12-27 RX ADMIN — Medication 2 MILLIGRAM(S): at 21:14

## 2023-12-27 RX ADMIN — Medication 100 MILLIGRAM(S): at 11:47

## 2023-12-27 RX ADMIN — Medication 1 MILLIGRAM(S): at 11:47

## 2023-12-27 RX ADMIN — Medication 2 MILLIGRAM(S): at 02:44

## 2023-12-27 RX ADMIN — PANTOPRAZOLE SODIUM 40 MILLIGRAM(S): 20 TABLET, DELAYED RELEASE ORAL at 11:44

## 2023-12-27 RX ADMIN — SODIUM CHLORIDE 75 MILLILITER(S): 9 INJECTION, SOLUTION INTRAVENOUS at 11:54

## 2023-12-27 RX ADMIN — Medication 2 MILLIGRAM(S): at 13:49

## 2023-12-27 RX ADMIN — Medication 2 MILLIGRAM(S): at 15:25

## 2023-12-27 RX ADMIN — Medication 2 MILLIGRAM(S): at 17:59

## 2023-12-27 RX ADMIN — GABAPENTIN 400 MILLIGRAM(S): 400 CAPSULE ORAL at 15:18

## 2023-12-27 RX ADMIN — ENOXAPARIN SODIUM 40 MILLIGRAM(S): 100 INJECTION SUBCUTANEOUS at 22:24

## 2023-12-27 RX ADMIN — Medication 2 MILLIGRAM(S): at 06:13

## 2023-12-27 RX ADMIN — Medication 25 GRAM(S): at 13:12

## 2023-12-27 RX ADMIN — Medication 2 MILLIGRAM(S): at 08:39

## 2023-12-27 RX ADMIN — Medication 102 MILLIGRAM(S): at 00:45

## 2023-12-27 RX ADMIN — Medication 2 MILLIGRAM(S): at 00:45

## 2023-12-27 RX ADMIN — SODIUM CHLORIDE 75 MILLILITER(S): 9 INJECTION, SOLUTION INTRAVENOUS at 19:46

## 2023-12-27 RX ADMIN — Medication 2 MILLIGRAM(S): at 19:46

## 2023-12-27 RX ADMIN — Medication 1 PATCH: at 22:24

## 2023-12-27 NOTE — PROGRESS NOTE ADULT - SUBJECTIVE AND OBJECTIVE BOX
JUNE SAINI  38y  [facility]      Patient is a 38y old  Male who presents with a chief complaint of Alcohol Withdrawal (26 Dec 2023 18:49)      INTERVAL HPI/OVERNIGHT EVENTS:        REVIEW OF SYSTEMS:        FAMILY HISTORY:    T(C): 36.8 (12-27-23 @ 06:59), Max: 37.3 (12-27-23 @ 01:40)  HR: 104 (12-27-23 @ 06:59) (104 - 135)  BP: 141/91 (12-27-23 @ 06:59) (111/68 - 156/86)  RR: 18 (12-27-23 @ 06:59) (18 - 18)  SpO2: 95% (12-27-23 @ 06:59) (94% - 98%)  Wt(kg): --Vital Signs Last 24 Hrs  T(C): 36.8 (27 Dec 2023 06:59), Max: 37.3 (27 Dec 2023 01:40)  T(F): 98.2 (27 Dec 2023 06:59), Max: 99.1 (27 Dec 2023 01:40)  HR: 104 (27 Dec 2023 06:59) (104 - 135)  BP: 141/91 (27 Dec 2023 06:59) (111/68 - 156/86)  BP(mean): --  RR: 18 (27 Dec 2023 06:59) (18 - 18)  SpO2: 95% (27 Dec 2023 06:59) (94% - 98%)    Parameters below as of 27 Dec 2023 06:59  Patient On (Oxygen Delivery Method): room air        PHYSICAL EXAM:  GENERAL: NAD, well-groomed, well-developed  HEAD:  Atraumatic, Normocephalic  EYES: EOMI, PERRLA, conjunctiva and sclera clear  ENMT: No tonsillar erythema, exudates, or enlargement; Moist mucous membranes, Good dentition, No lesions  NECK: Supple, No JVD, Normal thyroid  NERVOUS SYSTEM:  Alert & Oriented X3, Good concentration; Motor Strength 5/5 B/L upper and lower extremities; DTRs 2+ intact and symmetric  PULM: Clear to auscultation bilaterally  CARDIAC: Regular rate and rhythm; No murmurs, rubs, or gallops  GI: Soft, Nontender, Nondistended; Bowel sounds present  EXTREMITIES:  2+ Peripheral Pulses, No clubbing, cyanosis, or edema  LYMPH: No lymphadenopathy noted  SKIN: No rashes or lesions    Consultant(s) Notes Reviewed:  [x ] YES  [ ] NO  Care Discussed with Consultants/Other Providers [ x] YES  [ ] NO    LABS:                            14.3   3.83  )-----------( 81       ( 27 Dec 2023 06:30 )             41.5   12-27    134<L>  |  94<L>  |  8<L>  ----------------------------<  84  3.6   |  27  |  0.7    Ca    8.6      27 Dec 2023 06:30  Phos  3.1     12-27  Mg     1.5     12-27    TPro  6.9  /  Alb  4.4  /  TBili  0.6  /  DBili  x   /  AST  60<H>  /  ALT  37  /  AlkPhos  52  12-27            dextrose 5% + lactated ringers. 1000 milliLiter(s) IV Continuous <Continuous>  enoxaparin Injectable 40 milliGRAM(s) SubCutaneous every 24 hours  FLUoxetine 20 milliGRAM(s) Oral daily  folic acid 1 milliGRAM(s) Oral daily  LORazepam   Injectable 4 milliGRAM(s) IV Push every 4 hours  LORazepam   Injectable 3 milliGRAM(s) IV Push every 4 hours  LORazepam   Injectable   IV Push   LORazepam   Injectable 2 milliGRAM(s) IV Push every 1 hour PRN  multivitamin 1 Tablet(s) Oral daily  ondansetron Injectable 4 milliGRAM(s) IV Push three times a day PRN  pantoprazole    Tablet 40 milliGRAM(s) Oral before breakfast  thiamine 100 milliGRAM(s) Oral daily    37 yo M with hx of Alcohol use disorder, Anxiety/Depression presents to ED for alcohol withdrawal.     1. Alcohol abuse complicated by Alcohol withdrawal with Suspected Thiamine and Folate deficiency   - Telemetry                     - Urine tox:pending   - [facility] protocol with ativan taper   - thiamine, folic acid and multivitamin   - CATCH team consult.     2. Anxiety/Depression - c/w home med    DVT ppx: Lovenox SC  GI ppx:  PPI   Diet: Regular diet   Activity: as tolerated.   Dispo: Admit to SDU.      JUNE SAINI  38y  [facility]      Patient is a 38y old  Male who presents with a chief complaint of Alcohol Withdrawal (26 Dec 2023 18:49)      INTERVAL HPI/OVERNIGHT EVENTS:        REVIEW OF SYSTEMS:        FAMILY HISTORY:    T(C): 36.8 (12-27-23 @ 06:59), Max: 37.3 (12-27-23 @ 01:40)  HR: 104 (12-27-23 @ 06:59) (104 - 135)  BP: 141/91 (12-27-23 @ 06:59) (111/68 - 156/86)  RR: 18 (12-27-23 @ 06:59) (18 - 18)  SpO2: 95% (12-27-23 @ 06:59) (94% - 98%)  Wt(kg): --Vital Signs Last 24 Hrs  T(C): 36.8 (27 Dec 2023 06:59), Max: 37.3 (27 Dec 2023 01:40)  T(F): 98.2 (27 Dec 2023 06:59), Max: 99.1 (27 Dec 2023 01:40)  HR: 104 (27 Dec 2023 06:59) (104 - 135)  BP: 141/91 (27 Dec 2023 06:59) (111/68 - 156/86)  BP(mean): --  RR: 18 (27 Dec 2023 06:59) (18 - 18)  SpO2: 95% (27 Dec 2023 06:59) (94% - 98%)    Parameters below as of 27 Dec 2023 06:59  Patient On (Oxygen Delivery Method): room air        PHYSICAL EXAM:  GENERAL: NAD, well-groomed, well-developed  HEAD:  Atraumatic, Normocephalic  EYES: EOMI, PERRLA, conjunctiva and sclera clear  ENMT: No tonsillar erythema, exudates, or enlargement; Moist mucous membranes, Good dentition, No lesions  NECK: Supple, No JVD, Normal thyroid  NERVOUS SYSTEM:  Alert & Oriented X3, Good concentration; Motor Strength 5/5 B/L upper and lower extremities; DTRs 2+ intact and symmetric  PULM: Clear to auscultation bilaterally  CARDIAC: Regular rate and rhythm; No murmurs, rubs, or gallops  GI: Soft, Nontender, Nondistended; Bowel sounds present  EXTREMITIES:  2+ Peripheral Pulses, No clubbing, cyanosis, or edema  LYMPH: No lymphadenopathy noted  SKIN: No rashes or lesions    Consultant(s) Notes Reviewed:  [x ] YES  [ ] NO  Care Discussed with Consultants/Other Providers [ x] YES  [ ] NO    LABS:                            14.3   3.83  )-----------( 81       ( 27 Dec 2023 06:30 )             41.5   12-27    134<L>  |  94<L>  |  8<L>  ----------------------------<  84  3.6   |  27  |  0.7    Ca    8.6      27 Dec 2023 06:30  Phos  3.1     12-27  Mg     1.5     12-27    TPro  6.9  /  Alb  4.4  /  TBili  0.6  /  DBili  x   /  AST  60<H>  /  ALT  37  /  AlkPhos  52  12-27            dextrose 5% + lactated ringers. 1000 milliLiter(s) IV Continuous <Continuous>  enoxaparin Injectable 40 milliGRAM(s) SubCutaneous every 24 hours  FLUoxetine 20 milliGRAM(s) Oral daily  folic acid 1 milliGRAM(s) Oral daily  LORazepam   Injectable 4 milliGRAM(s) IV Push every 4 hours  LORazepam   Injectable 3 milliGRAM(s) IV Push every 4 hours  LORazepam   Injectable   IV Push   LORazepam   Injectable 2 milliGRAM(s) IV Push every 1 hour PRN  multivitamin 1 Tablet(s) Oral daily  ondansetron Injectable 4 milliGRAM(s) IV Push three times a day PRN  pantoprazole    Tablet 40 milliGRAM(s) Oral before breakfast  thiamine 100 milliGRAM(s) Oral daily    39 yo M with hx of Alcohol use disorder, Anxiety/Depression presents to ED for alcohol withdrawal.     1. Alcohol abuse complicated by Alcohol withdrawal with Suspected Thiamine and Folate deficiency   - Telemetry                     - Urine tox:pending   - [facility] protocol with ativan taper   - thiamine, folic acid and multivitamin   - CATCH team consult.     2. Anxiety/Depression - c/w home med    DVT ppx: Lovenox SC  GI ppx:  PPI   Diet: Regular diet   Activity: as tolerated.   Dispo: Admit to SDU.      JUNE SAINI  38y  [facility]      Patient is a 38y old  Male who presents with a chief complaint of Alcohol Withdrawal (26 Dec 2023 18:49)      INTERVAL HPI/OVERNIGHT EVENTS:        REVIEW OF SYSTEMS:        FAMILY HISTORY:    T(C): 36.8 (12-27-23 @ 06:59), Max: 37.3 (12-27-23 @ 01:40)  HR: 104 (12-27-23 @ 06:59) (104 - 135)  BP: 141/91 (12-27-23 @ 06:59) (111/68 - 156/86)  RR: 18 (12-27-23 @ 06:59) (18 - 18)  SpO2: 95% (12-27-23 @ 06:59) (94% - 98%)  Wt(kg): --Vital Signs Last 24 Hrs  T(C): 36.8 (27 Dec 2023 06:59), Max: 37.3 (27 Dec 2023 01:40)  T(F): 98.2 (27 Dec 2023 06:59), Max: 99.1 (27 Dec 2023 01:40)  HR: 104 (27 Dec 2023 06:59) (104 - 135)  BP: 141/91 (27 Dec 2023 06:59) (111/68 - 156/86)  BP(mean): --  RR: 18 (27 Dec 2023 06:59) (18 - 18)  SpO2: 95% (27 Dec 2023 06:59) (94% - 98%)    Parameters below as of 27 Dec 2023 06:59  Patient On (Oxygen Delivery Method): room air        PHYSICAL EXAM:  GENERAL: NAD, well-groomed, well-developed  HEAD:  Atraumatic, Normocephalic  EYES: EOMI, PERRLA, conjunctiva and sclera clear  ENMT: No tonsillar erythema, exudates, or enlargement; Moist mucous membranes, Good dentition, No lesions  NECK: Supple, No JVD, Normal thyroid  NERVOUS SYSTEM:  Alert & Oriented X3, Good concentration; Motor Strength 5/5 B/L upper and lower extremities; DTRs 2+ intact and symmetric  PULM: Clear to auscultation bilaterally  CARDIAC: Regular rate and rhythm; No murmurs, rubs, or gallops  GI: Soft, Nontender, Nondistended; Bowel sounds present  EXTREMITIES:  2+ Peripheral Pulses, No clubbing, cyanosis, or edema  LYMPH: No lymphadenopathy noted  SKIN: No rashes or lesions    Consultant(s) Notes Reviewed:  [x ] YES  [ ] NO  Care Discussed with Consultants/Other Providers [ x] YES  [ ] NO    LABS:                            14.3   3.83  )-----------( 81       ( 27 Dec 2023 06:30 )             41.5   12-27    134<L>  |  94<L>  |  8<L>  ----------------------------<  84  3.6   |  27  |  0.7    Ca    8.6      27 Dec 2023 06:30  Phos  3.1     12-27  Mg     1.5     12-27    TPro  6.9  /  Alb  4.4  /  TBili  0.6  /  DBili  x   /  AST  60<H>  /  ALT  37  /  AlkPhos  52  12-27            dextrose 5% + lactated ringers. 1000 milliLiter(s) IV Continuous <Continuous>  enoxaparin Injectable 40 milliGRAM(s) SubCutaneous every 24 hours  FLUoxetine 20 milliGRAM(s) Oral daily  folic acid 1 milliGRAM(s) Oral daily  LORazepam   Injectable 4 milliGRAM(s) IV Push every 4 hours  LORazepam   Injectable 3 milliGRAM(s) IV Push every 4 hours  LORazepam   Injectable   IV Push   LORazepam   Injectable 2 milliGRAM(s) IV Push every 1 hour PRN  multivitamin 1 Tablet(s) Oral daily  ondansetron Injectable 4 milliGRAM(s) IV Push three times a day PRN  pantoprazole    Tablet 40 milliGRAM(s) Oral before breakfast  thiamine 100 milliGRAM(s) Oral daily    39 yo M with hx of Alcohol use disorder, Anxiety/Depression presents to ED for alcohol withdrawal.     1. Alcohol abuse complicated by Alcohol withdrawal with Suspected Thiamine and Folate deficiency   - Telemetry                     - Urine tox:pending   - [facility] protocol with ativan taper   - thiamine, folic acid and multivitamin   - CATCH team consult.     2. Anxiety/Depression   - c/w home med    DVT ppx: Lovenox SC  GI ppx:  PPI   Diet: Regular diet   Activity: as tolerated.   Dispo: Admit to SDU.      JUNE SAINI  38y  [facility]      Patient is a 38y old  Male who presents with a chief complaint of Alcohol Withdrawal (26 Dec 2023 18:49)      INTERVAL HPI/OVERNIGHT EVENTS:        REVIEW OF SYSTEMS:        FAMILY HISTORY:    T(C): 36.8 (12-27-23 @ 06:59), Max: 37.3 (12-27-23 @ 01:40)  HR: 104 (12-27-23 @ 06:59) (104 - 135)  BP: 141/91 (12-27-23 @ 06:59) (111/68 - 156/86)  RR: 18 (12-27-23 @ 06:59) (18 - 18)  SpO2: 95% (12-27-23 @ 06:59) (94% - 98%)  Wt(kg): --Vital Signs Last 24 Hrs  T(C): 36.8 (27 Dec 2023 06:59), Max: 37.3 (27 Dec 2023 01:40)  T(F): 98.2 (27 Dec 2023 06:59), Max: 99.1 (27 Dec 2023 01:40)  HR: 104 (27 Dec 2023 06:59) (104 - 135)  BP: 141/91 (27 Dec 2023 06:59) (111/68 - 156/86)  BP(mean): --  RR: 18 (27 Dec 2023 06:59) (18 - 18)  SpO2: 95% (27 Dec 2023 06:59) (94% - 98%)    Parameters below as of 27 Dec 2023 06:59  Patient On (Oxygen Delivery Method): room air        PHYSICAL EXAM:  GENERAL: NAD, well-groomed, well-developed  HEAD:  Atraumatic, Normocephalic  EYES: EOMI, PERRLA, conjunctiva and sclera clear  ENMT: No tonsillar erythema, exudates, or enlargement; Moist mucous membranes, Good dentition, No lesions  NECK: Supple, No JVD, Normal thyroid  NERVOUS SYSTEM:  Alert & Oriented X3, Good concentration; Motor Strength 5/5 B/L upper and lower extremities; DTRs 2+ intact and symmetric  PULM: Clear to auscultation bilaterally  CARDIAC: Regular rate and rhythm; No murmurs, rubs, or gallops  GI: Soft, Nontender, Nondistended; Bowel sounds present  EXTREMITIES:  2+ Peripheral Pulses, No clubbing, cyanosis, or edema  LYMPH: No lymphadenopathy noted  SKIN: No rashes or lesions    Consultant(s) Notes Reviewed:  [x ] YES  [ ] NO  Care Discussed with Consultants/Other Providers [ x] YES  [ ] NO    LABS:                            14.3   3.83  )-----------( 81       ( 27 Dec 2023 06:30 )             41.5   12-27    134<L>  |  94<L>  |  8<L>  ----------------------------<  84  3.6   |  27  |  0.7    Ca    8.6      27 Dec 2023 06:30  Phos  3.1     12-27  Mg     1.5     12-27    TPro  6.9  /  Alb  4.4  /  TBili  0.6  /  DBili  x   /  AST  60<H>  /  ALT  37  /  AlkPhos  52  12-27            dextrose 5% + lactated ringers. 1000 milliLiter(s) IV Continuous <Continuous>  enoxaparin Injectable 40 milliGRAM(s) SubCutaneous every 24 hours  FLUoxetine 20 milliGRAM(s) Oral daily  folic acid 1 milliGRAM(s) Oral daily  LORazepam   Injectable 4 milliGRAM(s) IV Push every 4 hours  LORazepam   Injectable 3 milliGRAM(s) IV Push every 4 hours  LORazepam   Injectable   IV Push   LORazepam   Injectable 2 milliGRAM(s) IV Push every 1 hour PRN  multivitamin 1 Tablet(s) Oral daily  ondansetron Injectable 4 milliGRAM(s) IV Push three times a day PRN  pantoprazole    Tablet 40 milliGRAM(s) Oral before breakfast  thiamine 100 milliGRAM(s) Oral daily    37 yo M with hx of Alcohol use disorder, Anxiety/Depression presents to ED for alcohol withdrawal.     1. Alcohol abuse complicated by Alcohol withdrawal with Suspected Thiamine and Folate deficiency   - Telemetry                     - Urine tox:pending   - [facility] protocol with ativan taper   - thiamine, folic acid and multivitamin   - CATCH team consult.     2. Anxiety/Depression   - c/w home med    DVT ppx: Lovenox SC  GI ppx:  PPI   Diet: Regular diet   Activity: as tolerated.   Dispo: Admit to SDU.      JUNE SAINI  38y  [facility]      Patient is a 38y old  Male who presents with a chief complaint of Alcohol Withdrawal (26 Dec 2023 18:49)      INTERVAL HPI/OVERNIGHT EVENts:  patient feels well but still in withdrawal   he's agreeable with plan of care  no other events noted         FAMILY HISTORY:    T(C): 36.8 (12-27-23 @ 06:59), Max: 37.3 (12-27-23 @ 01:40)  HR: 104 (12-27-23 @ 06:59) (104 - 135)  BP: 141/91 (12-27-23 @ 06:59) (111/68 - 156/86)  RR: 18 (12-27-23 @ 06:59) (18 - 18)  SpO2: 95% (12-27-23 @ 06:59) (94% - 98%)  Wt(kg): --Vital Signs Last 24 Hrs  T(C): 36.8 (27 Dec 2023 06:59), Max: 37.3 (27 Dec 2023 01:40)  T(F): 98.2 (27 Dec 2023 06:59), Max: 99.1 (27 Dec 2023 01:40)  HR: 104 (27 Dec 2023 06:59) (104 - 135)  BP: 141/91 (27 Dec 2023 06:59) (111/68 - 156/86)  BP(mean): --  RR: 18 (27 Dec 2023 06:59) (18 - 18)  SpO2: 95% (27 Dec 2023 06:59) (94% - 98%)    Parameters below as of 27 Dec 2023 06:59  Patient On (Oxygen Delivery Method): room air        PHYSICAL EXAM:  GENERAL: NAD, well-groomed, well-developed  PULM: Clear to auscultation bilaterally  CARDIAC: Regular rate and rhythm;  GI: Soft, Nontender, Nondistended; Bowel sounds present  EXTREMITIES:  2+ Peripheral Pulses,    Consultant(s) Notes Reviewed:  [x ] YES  [ ] NO  Care Discussed with Consultants/Other Providers [ x] YES  [ ] NO    LABS:                            14.3   3.83  )-----------( 81       ( 27 Dec 2023 06:30 )             41.5   12-27    134<L>  |  94<L>  |  8<L>  ----------------------------<  84  3.6   |  27  |  0.7    Ca    8.6      27 Dec 2023 06:30  Phos  3.1     12-27  Mg     1.5     12-27    TPro  6.9  /  Alb  4.4  /  TBili  0.6  /  DBili  x   /  AST  60<H>  /  ALT  37  /  AlkPhos  52  12-27            dextrose 5% + lactated ringers. 1000 milliLiter(s) IV Continuous <Continuous>  enoxaparin Injectable 40 milliGRAM(s) SubCutaneous every 24 hours  FLUoxetine 20 milliGRAM(s) Oral daily  folic acid 1 milliGRAM(s) Oral daily  LORazepam   Injectable 4 milliGRAM(s) IV Push every 4 hours  LORazepam   Injectable 3 milliGRAM(s) IV Push every 4 hours  LORazepam   Injectable   IV Push   LORazepam   Injectable 2 milliGRAM(s) IV Push every 1 hour PRN  multivitamin 1 Tablet(s) Oral daily  ondansetron Injectable 4 milliGRAM(s) IV Push three times a day PRN  pantoprazole    Tablet 40 milliGRAM(s) Oral before breakfast  thiamine 100 milliGRAM(s) Oral daily    39 yo M with hx of Alcohol use disorder, Anxiety/Depression presents to ED for alcohol withdrawal.     1. Alcohol abuse complicated by Alcohol withdrawal with Suspected Thiamine and Folate deficiency   - Telemetry                     - Urine tox:pending   - [facility] protocol with ativan taper   - thiamine, folic acid and multivitamin   - Start gabapentin 400mg po q8hr for alcohol abuse NNT is 7  ( pt stated that he takes 600mg at night) .   - CATCH team consult.     2. Anxiety/Depression   - c/w home med    3. Thrombocytopenia   - US abdomen: pending     DVT ppx: Lovenox SC  GI ppx:  PPI   Diet: Regular diet   Activity: as tolerated.   Dispo: Admit to SDU.      JUNE SAINI  38y  [facility]      Patient is a 38y old  Male who presents with a chief complaint of Alcohol Withdrawal (26 Dec 2023 18:49)      INTERVAL HPI/OVERNIGHT EVENts:  patient feels well but still in withdrawal   he's agreeable with plan of care  no other events noted         FAMILY HISTORY:    T(C): 36.8 (12-27-23 @ 06:59), Max: 37.3 (12-27-23 @ 01:40)  HR: 104 (12-27-23 @ 06:59) (104 - 135)  BP: 141/91 (12-27-23 @ 06:59) (111/68 - 156/86)  RR: 18 (12-27-23 @ 06:59) (18 - 18)  SpO2: 95% (12-27-23 @ 06:59) (94% - 98%)  Wt(kg): --Vital Signs Last 24 Hrs  T(C): 36.8 (27 Dec 2023 06:59), Max: 37.3 (27 Dec 2023 01:40)  T(F): 98.2 (27 Dec 2023 06:59), Max: 99.1 (27 Dec 2023 01:40)  HR: 104 (27 Dec 2023 06:59) (104 - 135)  BP: 141/91 (27 Dec 2023 06:59) (111/68 - 156/86)  BP(mean): --  RR: 18 (27 Dec 2023 06:59) (18 - 18)  SpO2: 95% (27 Dec 2023 06:59) (94% - 98%)    Parameters below as of 27 Dec 2023 06:59  Patient On (Oxygen Delivery Method): room air        PHYSICAL EXAM:  GENERAL: NAD, well-groomed, well-developed  PULM: Clear to auscultation bilaterally  CARDIAC: Regular rate and rhythm;  GI: Soft, Nontender, Nondistended; Bowel sounds present  EXTREMITIES:  2+ Peripheral Pulses,    Consultant(s) Notes Reviewed:  [x ] YES  [ ] NO  Care Discussed with Consultants/Other Providers [ x] YES  [ ] NO    LABS:                            14.3   3.83  )-----------( 81       ( 27 Dec 2023 06:30 )             41.5   12-27    134<L>  |  94<L>  |  8<L>  ----------------------------<  84  3.6   |  27  |  0.7    Ca    8.6      27 Dec 2023 06:30  Phos  3.1     12-27  Mg     1.5     12-27    TPro  6.9  /  Alb  4.4  /  TBili  0.6  /  DBili  x   /  AST  60<H>  /  ALT  37  /  AlkPhos  52  12-27            dextrose 5% + lactated ringers. 1000 milliLiter(s) IV Continuous <Continuous>  enoxaparin Injectable 40 milliGRAM(s) SubCutaneous every 24 hours  FLUoxetine 20 milliGRAM(s) Oral daily  folic acid 1 milliGRAM(s) Oral daily  LORazepam   Injectable 4 milliGRAM(s) IV Push every 4 hours  LORazepam   Injectable 3 milliGRAM(s) IV Push every 4 hours  LORazepam   Injectable   IV Push   LORazepam   Injectable 2 milliGRAM(s) IV Push every 1 hour PRN  multivitamin 1 Tablet(s) Oral daily  ondansetron Injectable 4 milliGRAM(s) IV Push three times a day PRN  pantoprazole    Tablet 40 milliGRAM(s) Oral before breakfast  thiamine 100 milliGRAM(s) Oral daily    39 yo M with hx of Alcohol use disorder, Anxiety/Depression presents to ED for alcohol withdrawal.     1. Alcohol abuse complicated by Alcohol withdrawal with Suspected Thiamine and Folate deficiency   - Telemetry                     - Urine tox:pending   - [facility] protocol with ativan taper   - thiamine, folic acid and multivitamin   - Start gabapentin 400mg po q8hr for alcohol abuse NNT is 7  ( pt stated that he takes 600mg at night) .   - CATCH team consult.     2. Anxiety/Depression   - c/w home med    3. Thrombocytopenia   - US abdomen: pending     DVT ppx: Lovenox SC  GI ppx:  PPI   Diet: Regular diet   Activity: as tolerated.   Dispo: Admit to SDU.

## 2023-12-28 LAB
ALBUMIN SERPL ELPH-MCNC: 4.3 G/DL — SIGNIFICANT CHANGE UP (ref 3.5–5.2)
ALBUMIN SERPL ELPH-MCNC: 4.3 G/DL — SIGNIFICANT CHANGE UP (ref 3.5–5.2)
ALP SERPL-CCNC: 56 U/L — SIGNIFICANT CHANGE UP (ref 30–115)
ALP SERPL-CCNC: 56 U/L — SIGNIFICANT CHANGE UP (ref 30–115)
ALT FLD-CCNC: 29 U/L — SIGNIFICANT CHANGE UP (ref 0–41)
ALT FLD-CCNC: 29 U/L — SIGNIFICANT CHANGE UP (ref 0–41)
ANION GAP SERPL CALC-SCNC: 11 MMOL/L — SIGNIFICANT CHANGE UP (ref 7–14)
ANION GAP SERPL CALC-SCNC: 11 MMOL/L — SIGNIFICANT CHANGE UP (ref 7–14)
AST SERPL-CCNC: 46 U/L — HIGH (ref 0–41)
AST SERPL-CCNC: 46 U/L — HIGH (ref 0–41)
BASOPHILS # BLD AUTO: 0.07 K/UL — SIGNIFICANT CHANGE UP (ref 0–0.2)
BASOPHILS # BLD AUTO: 0.07 K/UL — SIGNIFICANT CHANGE UP (ref 0–0.2)
BASOPHILS NFR BLD AUTO: 0.8 % — SIGNIFICANT CHANGE UP (ref 0–1)
BASOPHILS NFR BLD AUTO: 0.8 % — SIGNIFICANT CHANGE UP (ref 0–1)
BILIRUB SERPL-MCNC: 0.6 MG/DL — SIGNIFICANT CHANGE UP (ref 0.2–1.2)
BILIRUB SERPL-MCNC: 0.6 MG/DL — SIGNIFICANT CHANGE UP (ref 0.2–1.2)
BUN SERPL-MCNC: 7 MG/DL — LOW (ref 10–20)
BUN SERPL-MCNC: 7 MG/DL — LOW (ref 10–20)
CALCIUM SERPL-MCNC: 8.7 MG/DL — SIGNIFICANT CHANGE UP (ref 8.4–10.5)
CALCIUM SERPL-MCNC: 8.7 MG/DL — SIGNIFICANT CHANGE UP (ref 8.4–10.5)
CHLORIDE SERPL-SCNC: 100 MMOL/L — SIGNIFICANT CHANGE UP (ref 98–110)
CHLORIDE SERPL-SCNC: 100 MMOL/L — SIGNIFICANT CHANGE UP (ref 98–110)
CO2 SERPL-SCNC: 23 MMOL/L — SIGNIFICANT CHANGE UP (ref 17–32)
CO2 SERPL-SCNC: 23 MMOL/L — SIGNIFICANT CHANGE UP (ref 17–32)
CREAT SERPL-MCNC: 0.7 MG/DL — SIGNIFICANT CHANGE UP (ref 0.7–1.5)
CREAT SERPL-MCNC: 0.7 MG/DL — SIGNIFICANT CHANGE UP (ref 0.7–1.5)
EGFR: 122 ML/MIN/1.73M2 — SIGNIFICANT CHANGE UP
EGFR: 122 ML/MIN/1.73M2 — SIGNIFICANT CHANGE UP
EOSINOPHIL # BLD AUTO: 0.03 K/UL — SIGNIFICANT CHANGE UP (ref 0–0.7)
EOSINOPHIL # BLD AUTO: 0.03 K/UL — SIGNIFICANT CHANGE UP (ref 0–0.7)
EOSINOPHIL NFR BLD AUTO: 0.3 % — SIGNIFICANT CHANGE UP (ref 0–8)
EOSINOPHIL NFR BLD AUTO: 0.3 % — SIGNIFICANT CHANGE UP (ref 0–8)
GLUCOSE SERPL-MCNC: 122 MG/DL — HIGH (ref 70–99)
GLUCOSE SERPL-MCNC: 122 MG/DL — HIGH (ref 70–99)
HCT VFR BLD CALC: 40.6 % — LOW (ref 42–52)
HCT VFR BLD CALC: 40.6 % — LOW (ref 42–52)
HGB BLD-MCNC: 13.9 G/DL — LOW (ref 14–18)
HGB BLD-MCNC: 13.9 G/DL — LOW (ref 14–18)
IMM GRANULOCYTES NFR BLD AUTO: 0.5 % — HIGH (ref 0.1–0.3)
IMM GRANULOCYTES NFR BLD AUTO: 0.5 % — HIGH (ref 0.1–0.3)
LYMPHOCYTES # BLD AUTO: 0.51 K/UL — LOW (ref 1.2–3.4)
LYMPHOCYTES # BLD AUTO: 0.51 K/UL — LOW (ref 1.2–3.4)
LYMPHOCYTES # BLD AUTO: 5.9 % — LOW (ref 20.5–51.1)
LYMPHOCYTES # BLD AUTO: 5.9 % — LOW (ref 20.5–51.1)
MAGNESIUM SERPL-MCNC: 1.9 MG/DL — SIGNIFICANT CHANGE UP (ref 1.8–2.4)
MAGNESIUM SERPL-MCNC: 1.9 MG/DL — SIGNIFICANT CHANGE UP (ref 1.8–2.4)
MCHC RBC-ENTMCNC: 31.4 PG — HIGH (ref 27–31)
MCHC RBC-ENTMCNC: 31.4 PG — HIGH (ref 27–31)
MCHC RBC-ENTMCNC: 34.2 G/DL — SIGNIFICANT CHANGE UP (ref 32–37)
MCHC RBC-ENTMCNC: 34.2 G/DL — SIGNIFICANT CHANGE UP (ref 32–37)
MCV RBC AUTO: 91.9 FL — SIGNIFICANT CHANGE UP (ref 80–94)
MCV RBC AUTO: 91.9 FL — SIGNIFICANT CHANGE UP (ref 80–94)
MONOCYTES # BLD AUTO: 0.57 K/UL — SIGNIFICANT CHANGE UP (ref 0.1–0.6)
MONOCYTES # BLD AUTO: 0.57 K/UL — SIGNIFICANT CHANGE UP (ref 0.1–0.6)
MONOCYTES NFR BLD AUTO: 6.6 % — SIGNIFICANT CHANGE UP (ref 1.7–9.3)
MONOCYTES NFR BLD AUTO: 6.6 % — SIGNIFICANT CHANGE UP (ref 1.7–9.3)
NEUTROPHILS # BLD AUTO: 7.36 K/UL — HIGH (ref 1.4–6.5)
NEUTROPHILS # BLD AUTO: 7.36 K/UL — HIGH (ref 1.4–6.5)
NEUTROPHILS NFR BLD AUTO: 85.9 % — HIGH (ref 42.2–75.2)
NEUTROPHILS NFR BLD AUTO: 85.9 % — HIGH (ref 42.2–75.2)
NRBC # BLD: 0 /100 WBCS — SIGNIFICANT CHANGE UP (ref 0–0)
NRBC # BLD: 0 /100 WBCS — SIGNIFICANT CHANGE UP (ref 0–0)
PLATELET # BLD AUTO: 70 K/UL — LOW (ref 130–400)
PLATELET # BLD AUTO: 70 K/UL — LOW (ref 130–400)
PMV BLD: 11.8 FL — HIGH (ref 7.4–10.4)
PMV BLD: 11.8 FL — HIGH (ref 7.4–10.4)
POTASSIUM SERPL-MCNC: 3.9 MMOL/L — SIGNIFICANT CHANGE UP (ref 3.5–5)
POTASSIUM SERPL-MCNC: 3.9 MMOL/L — SIGNIFICANT CHANGE UP (ref 3.5–5)
POTASSIUM SERPL-SCNC: 3.9 MMOL/L — SIGNIFICANT CHANGE UP (ref 3.5–5)
POTASSIUM SERPL-SCNC: 3.9 MMOL/L — SIGNIFICANT CHANGE UP (ref 3.5–5)
PROT SERPL-MCNC: 6.6 G/DL — SIGNIFICANT CHANGE UP (ref 6–8)
PROT SERPL-MCNC: 6.6 G/DL — SIGNIFICANT CHANGE UP (ref 6–8)
RBC # BLD: 4.42 M/UL — LOW (ref 4.7–6.1)
RBC # BLD: 4.42 M/UL — LOW (ref 4.7–6.1)
RBC # FLD: 12.9 % — SIGNIFICANT CHANGE UP (ref 11.5–14.5)
RBC # FLD: 12.9 % — SIGNIFICANT CHANGE UP (ref 11.5–14.5)
SODIUM SERPL-SCNC: 134 MMOL/L — LOW (ref 135–146)
SODIUM SERPL-SCNC: 134 MMOL/L — LOW (ref 135–146)
WBC # BLD: 8.58 K/UL — SIGNIFICANT CHANGE UP (ref 4.8–10.8)
WBC # BLD: 8.58 K/UL — SIGNIFICANT CHANGE UP (ref 4.8–10.8)
WBC # FLD AUTO: 8.58 K/UL — SIGNIFICANT CHANGE UP (ref 4.8–10.8)
WBC # FLD AUTO: 8.58 K/UL — SIGNIFICANT CHANGE UP (ref 4.8–10.8)

## 2023-12-28 PROCEDURE — 99233 SBSQ HOSP IP/OBS HIGH 50: CPT

## 2023-12-28 PROCEDURE — 76705 ECHO EXAM OF ABDOMEN: CPT | Mod: 26

## 2023-12-28 RX ORDER — ACETAMINOPHEN 500 MG
650 TABLET ORAL EVERY 6 HOURS
Refills: 0 | Status: DISCONTINUED | OUTPATIENT
Start: 2023-12-28 | End: 2024-01-05

## 2023-12-28 RX ORDER — CHLORHEXIDINE GLUCONATE 213 G/1000ML
1 SOLUTION TOPICAL DAILY
Refills: 0 | Status: DISCONTINUED | OUTPATIENT
Start: 2023-12-28 | End: 2024-01-05

## 2023-12-28 RX ORDER — INFLUENZA VIRUS VACCINE 15; 15; 15; 15 UG/.5ML; UG/.5ML; UG/.5ML; UG/.5ML
0.5 SUSPENSION INTRAMUSCULAR ONCE
Refills: 0 | Status: COMPLETED | OUTPATIENT
Start: 2023-12-28 | End: 2023-12-28

## 2023-12-28 RX ORDER — DEXMEDETOMIDINE HYDROCHLORIDE IN 0.9% SODIUM CHLORIDE 4 UG/ML
0.5 INJECTION INTRAVENOUS
Qty: 400 | Refills: 0 | Status: DISCONTINUED | OUTPATIENT
Start: 2023-12-28 | End: 2024-01-03

## 2023-12-28 RX ORDER — DEXMEDETOMIDINE HYDROCHLORIDE IN 0.9% SODIUM CHLORIDE 4 UG/ML
0.5 INJECTION INTRAVENOUS
Qty: 200 | Refills: 0 | Status: DISCONTINUED | OUTPATIENT
Start: 2023-12-28 | End: 2023-12-28

## 2023-12-28 RX ADMIN — Medication 650 MILLIGRAM(S): at 05:44

## 2023-12-28 RX ADMIN — Medication 2 MILLIGRAM(S): at 19:47

## 2023-12-28 RX ADMIN — Medication 2 MILLIGRAM(S): at 23:32

## 2023-12-28 RX ADMIN — Medication 3 MILLIGRAM(S): at 05:41

## 2023-12-28 RX ADMIN — Medication 650 MILLIGRAM(S): at 14:00

## 2023-12-28 RX ADMIN — Medication 2 MILLIGRAM(S): at 13:05

## 2023-12-28 RX ADMIN — SODIUM CHLORIDE 75 MILLILITER(S): 9 INJECTION, SOLUTION INTRAVENOUS at 11:28

## 2023-12-28 RX ADMIN — Medication 1 PATCH: at 06:11

## 2023-12-28 RX ADMIN — ENOXAPARIN SODIUM 40 MILLIGRAM(S): 100 INJECTION SUBCUTANEOUS at 19:47

## 2023-12-28 RX ADMIN — GABAPENTIN 400 MILLIGRAM(S): 400 CAPSULE ORAL at 23:31

## 2023-12-28 RX ADMIN — Medication 2 MILLIGRAM(S): at 18:01

## 2023-12-28 RX ADMIN — PANTOPRAZOLE SODIUM 40 MILLIGRAM(S): 20 TABLET, DELAYED RELEASE ORAL at 06:15

## 2023-12-28 RX ADMIN — Medication 1 PATCH: at 22:00

## 2023-12-28 RX ADMIN — DEXMEDETOMIDINE HYDROCHLORIDE IN 0.9% SODIUM CHLORIDE 8.81 MICROGRAM(S)/KG/HR: 4 INJECTION INTRAVENOUS at 19:00

## 2023-12-28 RX ADMIN — CHLORHEXIDINE GLUCONATE 1 APPLICATION(S): 213 SOLUTION TOPICAL at 11:29

## 2023-12-28 RX ADMIN — Medication 2 MILLIGRAM(S): at 07:36

## 2023-12-28 RX ADMIN — Medication 3 MILLIGRAM(S): at 09:49

## 2023-12-28 RX ADMIN — Medication 650 MILLIGRAM(S): at 13:05

## 2023-12-28 RX ADMIN — Medication 650 MILLIGRAM(S): at 05:14

## 2023-12-28 RX ADMIN — DEXMEDETOMIDINE HYDROCHLORIDE IN 0.9% SODIUM CHLORIDE 8.81 MICROGRAM(S)/KG/HR: 4 INJECTION INTRAVENOUS at 13:35

## 2023-12-28 RX ADMIN — Medication 3 MILLIGRAM(S): at 01:45

## 2023-12-28 RX ADMIN — Medication 1 PATCH: at 11:23

## 2023-12-28 RX ADMIN — GABAPENTIN 400 MILLIGRAM(S): 400 CAPSULE ORAL at 08:26

## 2023-12-28 RX ADMIN — Medication 2 MILLIGRAM(S): at 03:53

## 2023-12-28 NOTE — CHART NOTE - NSCHARTNOTEFT_GEN_A_CORE
Transfer Note    Transfer from:  SICU    Transfer to: ICU    HPI:  38-year-old male with history of alcohol use disorder, anxiety and depression came in for alcohol withdrawal. The Pt usually drinks 4 beers a day but for the past 3 weeks he has been drinking around 2 bottles of 750 ml Vodka daily after going through a bad breakup in order to cope. His sx started around 3 days ago when he cut down on his drinking and mostly constitute palpitations anxiety and tremors. Pt self medicated with alcohol shots that improved his sx for short periods of time. Today pt decided to come into the ED for detoxification and his last drink was around 7 am in the morning.  Patient endorses headache, but denies hallucinations, dizziness, lightheadedness, nausea or vomiting.    Pt denies cocaine and benzo abuse but prior drug screen is positive for cocaine metabolites.   (26 Dec 2023 18:49)    SICU Course:  Initially managed with Ativan CIWA protocol, but CIWA increased, therefore started on precedex. The patient was evaluated by crit care and recommended to upgrade to ICU. Thiamine, folic acid and multivitamin was continued. CATCH team consult placed. Serum drug screen collected.       --------------------------------------------------------------------------------------------------------  PMH/PSH:  PAST MEDICAL & SURGICAL HISTORY:  No pertinent past medical history  anxiety  depression  polysubstance    No significant past surgical history        -------------------------------------------------------------------------------------------------------  PHYSICAL EXAM:  General: NAD  HEENT: Atraumatic  Respiratory: CTAB  Cardiac: RRR  Abdomen: soft, non-tender, non-distended  Extremities: warm and well-perfused  Neuro: A+Ox4. No FND    Vital Signs Last 24 Hrs  T(C): 36.8 (28 Dec 2023 12:00), Max: 36.8 (28 Dec 2023 12:00)  T(F): 98.2 (28 Dec 2023 12:00), Max: 98.2 (28 Dec 2023 12:00)  HR: 96 (28 Dec 2023 13:00) (87 - 123)  BP: 136/86 (28 Dec 2023 13:00) (124/78 - 157/86)  BP(mean): 105 (28 Dec 2023 13:00) (103 - 114)  RR: 18 (28 Dec 2023 13:00) (18 - 18)  SpO2: 96% (28 Dec 2023 13:00) (95% - 97%)    Parameters below as of 28 Dec 2023 13:00  Patient On (Oxygen Delivery Method): room air        I&O's Summary    27 Dec 2023 07:01  -  28 Dec 2023 07:00  --------------------------------------------------------  IN: 825 mL / OUT: 900 mL / NET: -75 mL        --------------------------------------------------------------------------------------------------------  LABS:                               13.9   8.58  )-----------( 70       ( 28 Dec 2023 06:26 )             40.6       12-28    134<L>  |  100  |  7<L>  ----------------------------<  122<H>  3.9   |  23  |  0.7    Ca    8.7      28 Dec 2023 06:26  Phos  3.1     12-27  Mg     1.9     12-28    TPro  6.6  /  Alb  4.3  /  TBili  0.6  /  DBili  x   /  AST  46<H>  /  ALT  29  /  AlkPhos  56  12-28      PT/INR - ( 27 Dec 2023 06:30 )   PT: 11.30 sec;   INR: 0.99 ratio         PTT - ( 27 Dec 2023 06:30 )  PTT:31.3 sec            Specimen Source:   Method Type:   Gram Stain:   Culture Results:   Bacteria:       -------------------------------------------------------------------------------------------------  RADIOLOGY:      ---------------------------------------------------------------------------------------------------  ASSESSMENT & PLAN:   39 yo M with hx of Alcohol use disorder, Anxiety/Depression presents to ED for alcohol withdrawal.     1. Alcohol abuse complicated by Alcohol withdrawal with Suspected Thiamine and Folate deficiency   - Telemetry                     - Urine tox:pending   - CIWA protocol >10  - pt was on ativan margot, but CIWA increasing started precedex   - evaluated by CC, recc upgrade to ICU   - thiamine, folic acid and multivitamin   - CATCH team consult.     2. Anxiety/Depression   - c/w home med    3. Thrombocytopenia   - US abdomen: pending     #Progress Note Handoff  Pending (specify):  as above  Family discussion: house staff updated pt family  Disposition: upgrade to ICU       FOR FOLLOW UP:  -US abdomen  -Wean off precedex  -Serum and urine drug screen  -CATCH team f/u

## 2023-12-28 NOTE — PROGRESS NOTE ADULT - SUBJECTIVE AND OBJECTIVE BOX
Patient is a 38y old  Male who presents with a chief complaint of Alcohol Withdrawal (27 Dec 2023 10:01)        Over Night Events: Continues to withdraw.  On increasing Ativan doses.          ROS:     All ROS are negative except HPI         PHYSICAL EXAM    ICU Vital Signs Last 24 Hrs  T(C): 36.7 (28 Dec 2023 08:00), Max: 36.7 (27 Dec 2023 16:14)  T(F): 98 (28 Dec 2023 08:00), Max: 98.1 (27 Dec 2023 16:14)  HR: 118 (28 Dec 2023 11:13) (87 - 118)  BP: 157/86 (28 Dec 2023 11:13) (124/78 - 157/86)  BP(mean): 114 (28 Dec 2023 04:00) (103 - 114)  ABP: --  ABP(mean): --  RR: 18 (28 Dec 2023 11:13) (18 - 18)  SpO2: 96% (28 Dec 2023 11:13) (95% - 97%)    O2 Parameters below as of 28 Dec 2023 11:13  Patient On (Oxygen Delivery Method): room air            CONSTITUTIONAL:  Well nourished.  NAD    ENT:   Airway patent,   Mouth with normal mucosa.   No thrush    EYES:   Pupils equal,   Round and reactive to light.    CARDIAC:   Normal rate,   Regular rhythm.    No edema      Vascular:  Normal systolic impulse  No Carotid bruits    RESPIRATORY:   No wheezing  Bilateral BS  Normal chest expansion  Not tachypneic,  No use of accessory muscles    GASTROINTESTINAL:  Abdomen soft,   Non-tender,   No guarding,   + BS    MUSCULOSKELETAL:   Range of motion is not limited,  No clubbing, cyanosis    NEUROLOGICAL:   Alert and oriented   No motor  deficits.    SKIN:   Skin normal color for race,   No evidence of rash.          12-27-23 @ 07:01  -  12-28-23 @ 07:00  --------------------------------------------------------  IN:    dextrose 5% + lactated ringers: 825 mL  Total IN: 825 mL    OUT:    Voided (mL): 900 mL  Total OUT: 900 mL    Total NET: -75 mL          LABS:                            13.9   8.58  )-----------( 70       ( 28 Dec 2023 06:26 )             40.6                                               12-28    134<L>  |  100  |  7<L>  ----------------------------<  122<H>  3.9   |  23  |  0.7    Ca    8.7      28 Dec 2023 06:26  Phos  3.1     12-27  Mg     1.9     12-28    TPro  6.6  /  Alb  4.3  /  TBili  0.6  /  DBili  x   /  AST  46<H>  /  ALT  29  /  AlkPhos  56  12-28      PT/INR - ( 27 Dec 2023 06:30 )   PT: 11.30 sec;   INR: 0.99 ratio         PTT - ( 27 Dec 2023 06:30 )  PTT:31.3 sec                                       Urinalysis Basic - ( 28 Dec 2023 06:26 )    Color: x / Appearance: x / SG: x / pH: x  Gluc: 122 mg/dL / Ketone: x  / Bili: x / Urobili: x   Blood: x / Protein: x / Nitrite: x   Leuk Esterase: x / RBC: x / WBC x   Sq Epi: x / Non Sq Epi: x / Bacteria: x                                                  LIVER FUNCTIONS - ( 28 Dec 2023 06:26 )  Alb: 4.3 g/dL / Pro: 6.6 g/dL / ALK PHOS: 56 U/L / ALT: 29 U/L / AST: 46 U/L / GGT: x                                                                                                                                       MEDICATIONS  (STANDING):  chlorhexidine 2% Cloths 1 Application(s) Topical daily  dextrose 5% + lactated ringers. 1000 milliLiter(s) (75 mL/Hr) IV Continuous <Continuous>  enoxaparin Injectable 40 milliGRAM(s) SubCutaneous every 24 hours  FLUoxetine 20 milliGRAM(s) Oral daily  folic acid 1 milliGRAM(s) Oral daily  gabapentin 400 milliGRAM(s) Oral three times a day  LORazepam   Injectable 2 milliGRAM(s) IV Push every 4 hours  LORazepam   Injectable   IV Push   LORazepam   Injectable 3 milliGRAM(s) IV Push every 4 hours  multivitamin 1 Tablet(s) Oral daily  nicotine -  14 mG/24Hr(s) Patch 1 Patch Transdermal daily  pantoprazole    Tablet 40 milliGRAM(s) Oral before breakfast  thiamine 100 milliGRAM(s) Oral daily    MEDICATIONS  (PRN):  acetaminophen     Tablet .. 650 milliGRAM(s) Oral every 6 hours PRN Temp greater or equal to 38C (100.4F), Mild Pain (1 - 3)  LORazepam   Injectable 2 milliGRAM(s) IV Push every 1 hour PRN CIWA-Ar score 8 or greater  ondansetron Injectable 4 milliGRAM(s) IV Push three times a day PRN Nausea and/or Vomiting      New X-rays reviewed:                                                                                  ECHO

## 2023-12-28 NOTE — PATIENT PROFILE ADULT - FALL HARM RISK - PATIENT NEEDS ASSISTANCE
Epidural Block    Date/Time: 2/12/2021 6:41 PM  Performed by: Aubrey Mayorga MD  Authorized by: Aubrey Mayorga MD     Patient Location:  L&D  Indication: Labor Pain     Patient Identified, Risks and Benefits Discussed, Monitors and Equipment Checked, Timeout Performed, Pre-op Evaluation Complete and IV Bolus - Crystalloid  Epidural:     Patient Position:  Sitting    Prep:  Chlorhexidine Gluconate    Max Sterile Barrier Technique:  Hand Washing, Cap/Mask, Sterile gloves, Sterile drape and Sterile dressing applied    Monitoring:  Heart rate, continuous pulse oximetry and non-invasive blood pressure    Approach:  Midline    Location:  L3-4  Needle and Epidural Catheter:     Needle Type:  Tuohy    Needle Gauge:  17 G    Needle Length:  3.5 in    FRANCK Depth:  4.5 cm  Catheter Type:  Side hole  Catheter at Skin Depth:  10 cm  Securement:  Stabilization device, Tape and Transparent dressing  Test Dose:  Lidocaine 1.5% with epinephrine 1-to-200,000  Test Dose Volume (mL):  5  Test Dose Result:  Negative  Assessment:     Sensory Level:  T10    Heme from needle:  No    Painful injection:  No    Paresthesia:  No  Staff:     Performed By:  Anesthesiologist    Anesthesiologist:  Aubrey Mayorga MD  End Time:  2/12/2021 6:26 PM  Notes:      PATIENT COVID POSITIVE STATUS        
No assistance needed

## 2023-12-28 NOTE — PATIENT PROFILE ADULT - PATIENT REPRESENTATIVE: ( YOU CAN CHOOSE ANY PERSON THAT CAN ASSIST YOU WITH YOUR HEALTH CARE PREFERENCES, DOES NOT HAVE TO BE A SPOUSE, IMMEDIATE FAMILY OR SIGNIFICANT OTHER/PARTNER)
VSS. Pt is alert and oriented x4, slurred speech and aphasia noted. PT/OT working with patient. Wife updated at bedside. declines

## 2023-12-28 NOTE — PATIENT PROFILE ADULT - NSPROPTRIGHTCAREGIVER_GEN_A_NUR
CC: Fall x2:55AM    HPI: Called by nurse to evaluate 48 year old male with diangois of schizophrenia presenting today for fall. Pt was attempting to use the bathroom and fell on buttocks as per pt. Denies any pain at this time. Denies hitting head, dizziness, change in vision, nausea. According to nurse, pt usually on enhanced supervision in day for assistance in ambulating, and toileting. Pt was not on constant observation overnight and the unsteady gait caused him to fall.     GEN: Awake, alert, non-toxic appearing, NCAT  EYES: full EOMI,  HEAD: atraumatic  CV: rrr,   RESP: cta bl, no tachypnea, no hypoxia, no resp distress,  SKIN: no abrasions, cuts, bruising or swelling noted.     Assessment/Plan:  Pt fell due to unsteady gait. Pt should be provided assistance with staff when ambulating using the walker. Pt to be on spervision overnight when getting out of bed or help with ADLs. No further management needed tonight. Will continue to monitor.
no

## 2023-12-28 NOTE — PROGRESS NOTE ADULT - SUBJECTIVE AND OBJECTIVE BOX
Patient is a 38y old  Male who presents with a chief complaint of Alcohol Withdrawal (23)      Pt seen and examined at bedside. No CP or SOB. Pt agitated at bedside.       PAST MEDICAL & SURGICAL HISTORY:  No pertinent past medical history  anxiety  depression  polysubstance    No significant past surgical history    VITAL SIGNS (Last 24 hrs):  T(C): 36.8 (23 @ 12:00), Max: 36.8 (23 @ 12:00)  HR: 96 (23 @ 13:00) (87 - 123)  BP: 136/86 (23 @ 13:00) (124/78 - 157/86)  RR: 18 (23 @ 13:00) (18 - 18)  SpO2: 96% (23 @ 13:00) (95% - 97%)  Wt(kg): --  Daily     Daily Weight in k.5 (28 Dec 2023 08:00)    I&O's Summary    27 Dec 2023 07:01  -  28 Dec 2023 07:00  --------------------------------------------------------  IN: 825 mL / OUT: 900 mL / NET: -75 mL        PHYSICAL EXAM:  GENERAL: mild distress  HEAD:  Atraumatic, Normocephalic  EYES: EOMI, PERRLA, conjunctiva and sclera clear  NECK: Supple, No JVD  CHEST/LUNG: Clear to auscultation bilaterally; No wheeze  HEART: Regular rate and rhythm; No murmurs, rubs, or gallops  ABDOMEN: Soft, Nontender, Nondistended; Bowel sounds present  EXTREMITIES:  2+ Peripheral Pulses, No clubbing, cyanosis, or edema, tremors  PSYCH: AAOx3  NEUROLOGY: non-focal  SKIN: No rashes or lesions    Labs Reviewed  Spoke to patient in regards to abnormal labs.    CBC Full  -  ( 28 Dec 2023 06:26 )  WBC Count : 8.58 K/uL  Hemoglobin : 13.9 g/dL  Hematocrit : 40.6 %  Platelet Count - Automated : 70 K/uL  Mean Cell Volume : 91.9 fL  Mean Cell Hemoglobin : 31.4 pg  Mean Cell Hemoglobin Concentration : 34.2 g/dL  Auto Neutrophil # : 7.36 K/uL  Auto Lymphocyte # : 0.51 K/uL  Auto Monocyte # : 0.57 K/uL  Auto Eosinophil # : 0.03 K/uL  Auto Basophil # : 0.07 K/uL  Auto Neutrophil % : 85.9 %  Auto Lymphocyte % : 5.9 %  Auto Monocyte % : 6.6 %  Auto Eosinophil % : 0.3 %  Auto Basophil % : 0.8 %    BMP:     @ 06:26    Blood Urea Nitrogen - 7  Calcium - 8.7  Carbond Dioxide - 23  Chloride - 100  Creatinine - 0.7  Glucose - 122  Potassium - 3.9  Sodium - 134      Hemoglobin A1c -   PT/INR - ( 27 Dec 2023 06:30 )   PT: 11.30 sec;   INR: 0.99 ratio         PTT - ( 27 Dec 2023 06:30 )  PTT:31.3 sec  Urine Culture:        COVID Labs  CRP:      D-Dimer:        MEDICATIONS  (STANDING):  chlorhexidine 2% Cloths 1 Application(s) Topical daily  dexMEDEtomidine Infusion 0.5 MICROgram(s)/kG/Hr (8.81 mL/Hr) IV Continuous <Continuous>  dextrose 5% + lactated ringers. 1000 milliLiter(s) (75 mL/Hr) IV Continuous <Continuous>  enoxaparin Injectable 40 milliGRAM(s) SubCutaneous every 24 hours  FLUoxetine 20 milliGRAM(s) Oral daily  folic acid 1 milliGRAM(s) Oral daily  gabapentin 400 milliGRAM(s) Oral three times a day  LORazepam   Injectable 2 milliGRAM(s) IV Push every 2 hours  LORazepam   Injectable 1  IV Push   LORazepam   Injectable 2 milliGRAM(s) IV Push every 4 hours  LORazepam   Injectable 3 milliGRAM(s) IV Push every 4 hours  LORazepam   Injectable   IV Push   multivitamin 1 Tablet(s) Oral daily  nicotine -  14 mG/24Hr(s) Patch 1 Patch Transdermal daily  pantoprazole    Tablet 40 milliGRAM(s) Oral before breakfast  thiamine 100 milliGRAM(s) Oral daily    MEDICATIONS  (PRN):  acetaminophen     Tablet .. 650 milliGRAM(s) Oral every 6 hours PRN Temp greater or equal to 38C (100.4F), Mild Pain (1 - 3)  LORazepam   Injectable 2 milliGRAM(s) IV Push every 1 hour PRN CIWA-Ar score 8 or greater  ondansetron Injectable 4 milliGRAM(s) IV Push three times a day PRN Nausea and/or Vomiting

## 2023-12-28 NOTE — PATIENT PROFILE ADULT - FALL HARM RISK - RISK INTERVENTIONS
Communicate Fall Risk and Risk Factors to all staff, patient, and family/Move patient closer to nurses' station/Reinforce activity limits and safety measures with patient and family/Use of alarms - bed, chair and/or voice tab/Visual Cue: Yellow wristband/Bed in lowest position, wheels locked, appropriate side rails in place/Call bell, personal items and telephone in reach/Instruct patient to call for assistance before getting out of bed or chair/Non-slip footwear when patient is out of bed/[place] to call system/Physically safe environment - no spills, clutter or unnecessary equipment/Purposeful Proactive Rounding/Room/bathroom lighting operational, light cord in reach Communicate Fall Risk and Risk Factors to all staff, patient, and family/Move patient closer to nurses' station/Reinforce activity limits and safety measures with patient and family/Use of alarms - bed, chair and/or voice tab/Visual Cue: Yellow wristband/Bed in lowest position, wheels locked, appropriate side rails in place/Call bell, personal items and telephone in reach/Instruct patient to call for assistance before getting out of bed or chair/Non-slip footwear when patient is out of bed/[place] to call system/Physically safe environment - no spills, clutter or unnecessary equipment/Purposeful Proactive Rounding/Room/bathroom lighting operational, light cord in reach

## 2023-12-29 LAB
GLUCOSE BLDC GLUCOMTR-MCNC: 116 MG/DL — HIGH (ref 70–99)
GLUCOSE BLDC GLUCOMTR-MCNC: 116 MG/DL — HIGH (ref 70–99)
GLUCOSE BLDC GLUCOMTR-MCNC: 125 MG/DL — HIGH (ref 70–99)
GLUCOSE BLDC GLUCOMTR-MCNC: 125 MG/DL — HIGH (ref 70–99)

## 2023-12-29 PROCEDURE — 99233 SBSQ HOSP IP/OBS HIGH 50: CPT

## 2023-12-29 PROCEDURE — 71045 X-RAY EXAM CHEST 1 VIEW: CPT | Mod: 26

## 2023-12-29 RX ORDER — DIPHENHYDRAMINE HCL 50 MG
25 CAPSULE ORAL ONCE
Refills: 0 | Status: COMPLETED | OUTPATIENT
Start: 2023-12-29 | End: 2023-12-29

## 2023-12-29 RX ORDER — LOPERAMIDE HCL 2 MG
2 TABLET ORAL EVERY 12 HOURS
Refills: 0 | Status: DISCONTINUED | OUTPATIENT
Start: 2023-12-29 | End: 2024-01-05

## 2023-12-29 RX ADMIN — Medication 20 MILLIGRAM(S): at 11:27

## 2023-12-29 RX ADMIN — Medication 2 MILLIGRAM(S): at 04:32

## 2023-12-29 RX ADMIN — Medication 1 MILLIGRAM(S): at 13:32

## 2023-12-29 RX ADMIN — Medication 2 MILLIGRAM(S): at 09:34

## 2023-12-29 RX ADMIN — GABAPENTIN 400 MILLIGRAM(S): 400 CAPSULE ORAL at 21:52

## 2023-12-29 RX ADMIN — Medication 1 MILLIGRAM(S): at 11:27

## 2023-12-29 RX ADMIN — Medication 100 MILLIGRAM(S): at 11:27

## 2023-12-29 RX ADMIN — Medication 1 TABLET(S): at 11:28

## 2023-12-29 RX ADMIN — Medication 2 MILLIGRAM(S): at 15:19

## 2023-12-29 RX ADMIN — ENOXAPARIN SODIUM 40 MILLIGRAM(S): 100 INJECTION SUBCUTANEOUS at 21:53

## 2023-12-29 RX ADMIN — Medication 2 MILLIGRAM(S): at 05:41

## 2023-12-29 RX ADMIN — Medication 1 MILLIGRAM(S): at 16:25

## 2023-12-29 RX ADMIN — GABAPENTIN 400 MILLIGRAM(S): 400 CAPSULE ORAL at 06:47

## 2023-12-29 RX ADMIN — Medication 1 MILLIGRAM(S): at 11:29

## 2023-12-29 RX ADMIN — Medication 1 MILLIGRAM(S): at 20:03

## 2023-12-29 RX ADMIN — PANTOPRAZOLE SODIUM 40 MILLIGRAM(S): 20 TABLET, DELAYED RELEASE ORAL at 06:47

## 2023-12-29 RX ADMIN — Medication 2 MILLIGRAM(S): at 03:12

## 2023-12-29 RX ADMIN — GABAPENTIN 400 MILLIGRAM(S): 400 CAPSULE ORAL at 13:33

## 2023-12-29 RX ADMIN — Medication 2 MILLIGRAM(S): at 01:39

## 2023-12-29 RX ADMIN — Medication 1 MILLIGRAM(S): at 23:50

## 2023-12-29 RX ADMIN — Medication 1 MILLIGRAM(S): at 21:53

## 2023-12-29 RX ADMIN — Medication 2 MILLIGRAM(S): at 06:47

## 2023-12-29 RX ADMIN — Medication 25 MILLIGRAM(S): at 23:20

## 2023-12-29 NOTE — PROGRESS NOTE ADULT - SUBJECTIVE AND OBJECTIVE BOX
24H events:    Patient is a 38y old Male who presents with a chief complaint of Alcohol Withdrawal (29 Dec 2023 08:21)    Primary diagnosis of Alcohol withdrawal      Today is hospital day 3d. This morning patient was seen and examined at bedside, resting comfortably in bed.  Otherwise, no acute or major events overnight. Hemodynamically stable, tolerating oral diet, voiding appropriately with appropriate bowel movements.     PAST MEDICAL & SURGICAL HISTORY  No pertinent past medical history  anxiety  depression  polysubstance    No significant past surgical history      SOCIAL HISTORY:  Social History:      ALLERGIES:  Ceclor (Rash; Hives)    MEDICATIONS:  STANDING MEDICATIONS  chlorhexidine 2% Cloths 1 Application(s) Topical daily  dexMEDEtomidine Infusion 0.5 MICROgram(s)/kG/Hr IV Continuous <Continuous>  dextrose 5% + lactated ringers. 1000 milliLiter(s) IV Continuous <Continuous>  enoxaparin Injectable 40 milliGRAM(s) SubCutaneous every 24 hours  FLUoxetine 20 milliGRAM(s) Oral daily  folic acid 1 milliGRAM(s) Oral daily  gabapentin 400 milliGRAM(s) Oral three times a day  LORazepam   Injectable 1 milliGRAM(s) IV Push every 2 hours  LORazepam   Injectable 1  IV Push   multivitamin 1 Tablet(s) Oral daily  nicotine -  14 mG/24Hr(s) Patch 1 Patch Transdermal daily  pantoprazole    Tablet 40 milliGRAM(s) Oral before breakfast  thiamine 100 milliGRAM(s) Oral daily    PRN MEDICATIONS  acetaminophen     Tablet .. 650 milliGRAM(s) Oral every 6 hours PRN  loperamide 2 milliGRAM(s) Oral every 12 hours PRN  LORazepam   Injectable 2 milliGRAM(s) IV Push every 1 hour PRN  ondansetron Injectable 4 milliGRAM(s) IV Push three times a day PRN    VITALS:   T(F): 97.5  HR: 62  BP: 132/93  RR: 44  SpO2: 97%    PHYSICAL EXAM:  GENERAL: NAD, lying in bed comfortably, cooperative,   HEAD: NCAT  NECK: Supple  HEART: Regular rate and rhythm, normal S1/S2, no murmurs  LUNGS: No acute respiratory distress, clear b/l breath sounds   ABDOMEN:  soft, non-tender, non-distended, + BS  EXTREMITIES: no edema  NERVOUS SYSTEM:  A&Ox3, follows commands, answers questions appropriately     LABS:                        13.9   8.58  )-----------( 70       ( 28 Dec 2023 06:26 )             40.6     12-28    134<L>  |  100  |  7<L>  ----------------------------<  122<H>  3.9   |  23  |  0.7    Ca    8.7      28 Dec 2023 06:26  Mg     1.9     12-28    TPro  6.6  /  Alb  4.3  /  TBili  0.6  /  DBili  x   /  AST  46<H>  /  ALT  29  /  AlkPhos  56  12-28      Urinalysis Basic - ( 28 Dec 2023 06:26 )    Color: x / Appearance: x / SG: x / pH: x  Gluc: 122 mg/dL / Ketone: x  / Bili: x / Urobili: x   Blood: x / Protein: x / Nitrite: x   Leuk Esterase: x / RBC: x / WBC x   Sq Epi: x / Non Sq Epi: x / Bacteria: x                RADIOLOGY:    RADIOLOGY     24H events:    Patient is a 38y old Male who presents with a chief complaint of Alcohol Withdrawal (29 Dec 2023 08:21)    Primary diagnosis of Alcohol withdrawal      Today is hospital day 3d. This morning patient was seen and examined at bedside, resting comfortably in bed. Patient continues to endorse tremor and anxiety. Denies pain or other symptoms. No complaints or concerns at this time. No acute or major events overnight. Hemodynamically stable, tolerating oral diet, voiding appropriately with appropriate bowel movements.     PAST MEDICAL & SURGICAL HISTORY  No pertinent past medical history  anxiety  depression  polysubstance    No significant past surgical history      SOCIAL HISTORY:  Social History:      ALLERGIES:  Ceclor (Rash; Hives)    MEDICATIONS:  STANDING MEDICATIONS  chlorhexidine 2% Cloths 1 Application(s) Topical daily  dexMEDEtomidine Infusion 0.5 MICROgram(s)/kG/Hr IV Continuous <Continuous>  dextrose 5% + lactated ringers. 1000 milliLiter(s) IV Continuous <Continuous>  enoxaparin Injectable 40 milliGRAM(s) SubCutaneous every 24 hours  FLUoxetine 20 milliGRAM(s) Oral daily  folic acid 1 milliGRAM(s) Oral daily  gabapentin 400 milliGRAM(s) Oral three times a day  LORazepam   Injectable 1 milliGRAM(s) IV Push every 2 hours  LORazepam   Injectable 1  IV Push   multivitamin 1 Tablet(s) Oral daily  nicotine -  14 mG/24Hr(s) Patch 1 Patch Transdermal daily  pantoprazole    Tablet 40 milliGRAM(s) Oral before breakfast  thiamine 100 milliGRAM(s) Oral daily    PRN MEDICATIONS  acetaminophen     Tablet .. 650 milliGRAM(s) Oral every 6 hours PRN  loperamide 2 milliGRAM(s) Oral every 12 hours PRN  LORazepam   Injectable 2 milliGRAM(s) IV Push every 1 hour PRN  ondansetron Injectable 4 milliGRAM(s) IV Push three times a day PRN    VITALS:   T(F): 97.5  HR: 62  BP: 132/93  RR: 44  SpO2: 97%    PHYSICAL EXAM:  GENERAL: NAD, lying in bed comfortably, cooperative,   HEAD: NCAT  NECK: Supple  HEART: Regular rate and rhythm, normal S1/S2, no murmurs  LUNGS: No acute respiratory distress, clear b/l breath sounds   ABDOMEN:  soft, non-tender, non-distended,   EXTREMITIES: no edema  NERVOUS SYSTEM: b/l hand tremors present. follows commands, answers questions appropriately     LABS:                        13.9   8.58  )-----------( 70       ( 28 Dec 2023 06:26 )             40.6     12-28    134<L>  |  100  |  7<L>  ----------------------------<  122<H>  3.9   |  23  |  0.7    Ca    8.7      28 Dec 2023 06:26  Mg     1.9     12-28    TPro  6.6  /  Alb  4.3  /  TBili  0.6  /  DBili  x   /  AST  46<H>  /  ALT  29  /  AlkPhos  56  12-28      Urinalysis Basic - ( 28 Dec 2023 06:26 )    Color: x / Appearance: x / SG: x / pH: x  Gluc: 122 mg/dL / Ketone: x  / Bili: x / Urobili: x   Blood: x / Protein: x / Nitrite: x   Leuk Esterase: x / RBC: x / WBC x   Sq Epi: x / Non Sq Epi: x / Bacteria: x                RADIOLOGY:    RADIOLOGY

## 2023-12-29 NOTE — CONSULT NOTE ADULT - ASSESSMENT
#Alcohol use disorder  - CIWA 4-5  - UDS PENDING  - etoh level 347 on admission  - LFTs elevated on admission, now trending down  - denies any history of withdrawal seizures  - recommend changing Ativan taper tomorrow to:  Ativan 1.5mg Q4h x6 doses  Ativan 1.0mg Q4h x6 doses  Ativan 0.5mg Q4h x4 doses  - continue Gabapentin  - continue CIWA protocol  - continue folic acid and thiamine  - continue motivational interviewing   - f/u with CATCH team for counseling/dispo recs        Thank you for consulting the Addiction Medicine team. Please do not hesitate to contact us with any comments or concerns. #Alcohol use disorder  - CIWA 4-5  - UDS PENDING  - Etoh level 347 on admission  - LFTs elevated on admission, now trending down  - denies any history of withdrawal seizures  - recommend changing Ativan taper tomorrow to:  Ativan 1.0mg Q4h x6 doses  Ativan 0.5mg Q4h x6 doses  - continue Gabapentin  - continue CIWA protocol  - continue folic acid and thiamine  - continue motivational interviewing   - f/u with CATCH team for counseling/dispo recs        Thank you for consulting the Addiction Medicine team. Please do not hesitate to contact us with any comments or concerns.

## 2023-12-29 NOTE — CONSULT NOTE ADULT - SUBJECTIVE AND OBJECTIVE BOX
Chief Complaint: etoh withdrawals    HPI:   38-year-old male with history of alcohol use disorder, anxiety and depression came in for alcohol withdrawal. The Pt usually drinks 4 beers a day but for the past 3 weeks he has been drinking around 2 bottles of 750 ml Vodka daily after going through a bad breakup in order to cope. His sx started around 3 days ago when he cut down on his drinking and mostly constitute palpitations anxiety and tremors. Pt self medicated with alcohol shots that improved his sx for short periods of time. Today pt decided to come into the ED for detoxification and his last drink was around 7 am in the morning.  Patient endorses headache, but denies hallucinations, dizziness, lightheadedness, nausea or vomiting. Pt denies cocaine and benzo abuse but prior drug screen is positive for cocaine metabolites.        Interval History  12/29/2023  Patient was seen and examined by the Addiction Medicine team. Patient was seen laying in bed, tremulous, but in NAD, AAOx3. Patient complains mostly of tremors, most prominent in the b/l upper extremities. Patient states he was withdrawing more earlier today and yesterday but is feeling much better since he received the Precedex. Patient is also currently on an Ativan taper: 2mg Q2h x6 doses then 1mg Q2h x6 doses.     Patients states he's been drinking a little over 2L of vodka daily for the last month after his breakup with his girlfriend. Last drink was the morning of admission. Patient is prescribed Naltrexone but doesn't take it daily. Patient is interested in Vivitrol and is also currently receiving outpatient services for his ETOH abuse.         CIWA: 4-5 for tremors      Home Meds  hydrOXYzine hydrochloride 50 mg oral tablet: 1 tab(s) orally every 8 hours as needed for Sweating/Anxiety  PROzac 20 mg oral capsule: 1 cap(s) orally once a day  traZODone 100 mg oral tablet: 1 tab(s) orally once a day (at bedtime)  cloNIDine 0.1 mg oral tablet: 1 tab(s) orally once a day (at bedtime)  gabapentin 400 mg oral capsule: 1 cap(s) orally 3 times a day        Past Medical History  alcohol use disorder, anxiety and depression        Allergies  Ceclor: Drug, Rash, Hives      Drug screen: PENDING        REVIEW OF SYSTEMS:  Constitutional: no fatigue, no fever, No Chills,  ENT/Mouth: No Hearing Changes, No Ear Pain, no Nasal Congestion, no Sinus Pain, no Hoarseness, or Rhinorrhea  Eyes: No Eye Pain, No Swelling, No Vision Changes  Cardiovascular: No Chest Pain, No Edema, No Palpitations  Respiratory: No cough, No Sputum, No Wheezing, No Dyspnea  Gastrointestinal: no Nausea, no dysphagia, no abdominal pain, No Vomiting, No Diarrhea, No Constipation, No Anorexia, No Hematochezia, No Melena, No Flatulence  Genitourinary: No Dysuria, No Urinary Frequency, No Hematuria  Musculoskeletal: no myalgias, no Arthralgias, No Joint Swelling, No Joint Stiffness, No Back Pain  Skin: No Skin Lesions, No Pruritus, No Hair Changes, no perspiration and clammy  Neuro: + tremors,  No headache, No Weakness, No Numbness, No Paresthesia, No Loss of Consciousness, No Syncope, No Dizziness  Psych: no anxiety, no depression,  no Insomnia, no suicidal ideation, No Memory Changes  Heme/Lymph: no Bruising, No Bleeding, No Lymphadenopathy  Endocrine: No Polyuria, No Polydipsia, No Temperature Intolerance         PHYSICAL EXAM:   General: quiet, cooperative, laying in bed  Skin: no bruising or other skin lesions  Head: Normocephalic, atraumatic.  Respiratory: clear BL breath sounds, no wheezing, rhonchi or crackles  Cardiovascular: regular rate and rhythm, no murmurs  Gastrointestinal: non-distended, BS(+)in all quadrants. Abdomen is soft. No guarding or rigidity   Neuro: aaox3, b/l UE tremors, CNII-XII intact, strength and sensation intact  Psych: no anxious mood, affect congruent. Denies hallucinations or delusions, no agitation, no SI/HI

## 2023-12-29 NOTE — PROGRESS NOTE ADULT - SUBJECTIVE AND OBJECTIVE BOX
Over Night Events: no acute events overnight, remains on precedex 0.7, D5LR@75cc, comfortable     Vital Signs Last 24 Hrs  T(C): 36.4 (29 Dec 2023 08:00), Max: 36.8 (28 Dec 2023 12:00)  T(F): 97.5 (29 Dec 2023 08:00), Max: 98.3 (28 Dec 2023 20:00)  HR: 56 (29 Dec 2023 08:00) (56 - 123)  BP: 164/104 (29 Dec 2023 08:00) (98/71 - 164/104)  BP(mean): 127 (29 Dec 2023 08:00) (76 - 127)  RR: 20 (29 Dec 2023 08:00) (17 - 32)  SpO2: 97% (29 Dec 2023 08:00) (93% - 100%)    O2 Parameters below as of 29 Dec 2023 08:00  Patient On (Oxygen Delivery Method): room air        General: comfortable  Lungs: dec bs both bases  Cardiovascular: Regular   Abdomen: Soft, Positive BS  Extremities: No clubbing   Neurological: Non focal / tremor      12-28-23 @ 07:01  -  12-29-23 @ 07:00  --------------------------------------------------------  IN:    Dexmedetomidine: 14 mL    Dexmedetomidine: 42.2 mL    dextrose 5% + lactated ringers: 1200 mL    Oral Fluid: 240 mL  Total IN: 1496.2 mL    OUT:    Voided (mL): 3 mL  Total OUT: 3 mL    Total NET: 1493.2 mL          LABS:                          13.9   8.58  )-----------( 70       ( 28 Dec 2023 06:26 )             40.6                                               12-28    134<L>  |  100  |  7<L>  ----------------------------<  122<H>  3.9   |  23  |  0.7    Ca    8.7      28 Dec 2023 06:26  Mg     1.9     12-28    TPro  6.6  /  Alb  4.3  /  TBili  0.6  /  DBili  x   /  AST  46<H>  /  ALT  29  /  AlkPhos  56  12-28                                             Urinalysis Basic - ( 28 Dec 2023 06:26 )    Color: x / Appearance: x / SG: x / pH: x  Gluc: 122 mg/dL / Ketone: x  / Bili: x / Urobili: x   Blood: x / Protein: x / Nitrite: x   Leuk Esterase: x / RBC: x / WBC x   Sq Epi: x / Non Sq Epi: x / Bacteria: x                                                  LIVER FUNCTIONS - ( 28 Dec 2023 06:26 )  Alb: 4.3 g/dL / Pro: 6.6 g/dL / ALK PHOS: 56 U/L / ALT: 29 U/L / AST: 46 U/L / GGT: x                                                                                                                                       MEDICATIONS  (STANDING):  chlorhexidine 2% Cloths 1 Application(s) Topical daily  dexMEDEtomidine Infusion 0.5 MICROgram(s)/kG/Hr (8.81 mL/Hr) IV Continuous <Continuous>  dextrose 5% + lactated ringers. 1000 milliLiter(s) (75 mL/Hr) IV Continuous <Continuous>  enoxaparin Injectable 40 milliGRAM(s) SubCutaneous every 24 hours  FLUoxetine 20 milliGRAM(s) Oral daily  folic acid 1 milliGRAM(s) Oral daily  gabapentin 400 milliGRAM(s) Oral three times a day  LORazepam   Injectable 2 milliGRAM(s) IV Push every 2 hours  LORazepam   Injectable 1 milliGRAM(s) IV Push every 2 hours  LORazepam   Injectable 1  IV Push   multivitamin 1 Tablet(s) Oral daily  nicotine -  14 mG/24Hr(s) Patch 1 Patch Transdermal daily  pantoprazole    Tablet 40 milliGRAM(s) Oral before breakfast  thiamine 100 milliGRAM(s) Oral daily    MEDICATIONS  (PRN):  acetaminophen     Tablet .. 650 milliGRAM(s) Oral every 6 hours PRN Temp greater or equal to 38C (100.4F), Mild Pain (1 - 3)  loperamide 2 milliGRAM(s) Oral every 12 hours PRN Diarrhea  LORazepam   Injectable 2 milliGRAM(s) IV Push every 1 hour PRN CIWA-Ar score 8 or greater  ondansetron Injectable 4 milliGRAM(s) IV Push three times a day PRN Nausea and/or Vomiting  O

## 2023-12-30 LAB
ALBUMIN SERPL ELPH-MCNC: 4.2 G/DL — SIGNIFICANT CHANGE UP (ref 3.5–5.2)
ALBUMIN SERPL ELPH-MCNC: 4.2 G/DL — SIGNIFICANT CHANGE UP (ref 3.5–5.2)
ALP SERPL-CCNC: 52 U/L — SIGNIFICANT CHANGE UP (ref 30–115)
ALP SERPL-CCNC: 52 U/L — SIGNIFICANT CHANGE UP (ref 30–115)
ALT FLD-CCNC: 25 U/L — SIGNIFICANT CHANGE UP (ref 0–41)
ALT FLD-CCNC: 25 U/L — SIGNIFICANT CHANGE UP (ref 0–41)
AMPHET UR-MCNC: NEGATIVE — SIGNIFICANT CHANGE UP
AMPHET UR-MCNC: NEGATIVE — SIGNIFICANT CHANGE UP
ANION GAP SERPL CALC-SCNC: 10 MMOL/L — SIGNIFICANT CHANGE UP (ref 7–14)
ANION GAP SERPL CALC-SCNC: 10 MMOL/L — SIGNIFICANT CHANGE UP (ref 7–14)
AST SERPL-CCNC: 33 U/L — SIGNIFICANT CHANGE UP (ref 0–41)
AST SERPL-CCNC: 33 U/L — SIGNIFICANT CHANGE UP (ref 0–41)
BARBITURATES UR SCN-MCNC: NEGATIVE — SIGNIFICANT CHANGE UP
BARBITURATES UR SCN-MCNC: NEGATIVE — SIGNIFICANT CHANGE UP
BASOPHILS # BLD AUTO: 0.05 K/UL — SIGNIFICANT CHANGE UP (ref 0–0.2)
BASOPHILS # BLD AUTO: 0.05 K/UL — SIGNIFICANT CHANGE UP (ref 0–0.2)
BASOPHILS NFR BLD AUTO: 1.1 % — HIGH (ref 0–1)
BASOPHILS NFR BLD AUTO: 1.1 % — HIGH (ref 0–1)
BENZODIAZ UR-MCNC: NEGATIVE — SIGNIFICANT CHANGE UP
BENZODIAZ UR-MCNC: NEGATIVE — SIGNIFICANT CHANGE UP
BILIRUB SERPL-MCNC: 0.5 MG/DL — SIGNIFICANT CHANGE UP (ref 0.2–1.2)
BILIRUB SERPL-MCNC: 0.5 MG/DL — SIGNIFICANT CHANGE UP (ref 0.2–1.2)
BUN SERPL-MCNC: 10 MG/DL — SIGNIFICANT CHANGE UP (ref 10–20)
BUN SERPL-MCNC: 10 MG/DL — SIGNIFICANT CHANGE UP (ref 10–20)
CALCIUM SERPL-MCNC: 9.2 MG/DL — SIGNIFICANT CHANGE UP (ref 8.4–10.4)
CALCIUM SERPL-MCNC: 9.2 MG/DL — SIGNIFICANT CHANGE UP (ref 8.4–10.4)
CHLORIDE SERPL-SCNC: 101 MMOL/L — SIGNIFICANT CHANGE UP (ref 98–110)
CHLORIDE SERPL-SCNC: 101 MMOL/L — SIGNIFICANT CHANGE UP (ref 98–110)
CO2 SERPL-SCNC: 24 MMOL/L — SIGNIFICANT CHANGE UP (ref 17–32)
CO2 SERPL-SCNC: 24 MMOL/L — SIGNIFICANT CHANGE UP (ref 17–32)
COCAINE METAB.OTHER UR-MCNC: NEGATIVE — SIGNIFICANT CHANGE UP
COCAINE METAB.OTHER UR-MCNC: NEGATIVE — SIGNIFICANT CHANGE UP
CREAT SERPL-MCNC: 0.7 MG/DL — SIGNIFICANT CHANGE UP (ref 0.7–1.5)
CREAT SERPL-MCNC: 0.7 MG/DL — SIGNIFICANT CHANGE UP (ref 0.7–1.5)
D DIMER BLD IA.RAPID-MCNC: 214 NG/ML DDU — SIGNIFICANT CHANGE UP
D DIMER BLD IA.RAPID-MCNC: 214 NG/ML DDU — SIGNIFICANT CHANGE UP
DRUG SCREEN 1, URINE RESULT: SIGNIFICANT CHANGE UP
DRUG SCREEN 1, URINE RESULT: SIGNIFICANT CHANGE UP
EGFR: 122 ML/MIN/1.73M2 — SIGNIFICANT CHANGE UP
EGFR: 122 ML/MIN/1.73M2 — SIGNIFICANT CHANGE UP
EOSINOPHIL # BLD AUTO: 0.15 K/UL — SIGNIFICANT CHANGE UP (ref 0–0.7)
EOSINOPHIL # BLD AUTO: 0.15 K/UL — SIGNIFICANT CHANGE UP (ref 0–0.7)
EOSINOPHIL NFR BLD AUTO: 3.2 % — SIGNIFICANT CHANGE UP (ref 0–8)
EOSINOPHIL NFR BLD AUTO: 3.2 % — SIGNIFICANT CHANGE UP (ref 0–8)
FENTANYL UR QL: NEGATIVE — SIGNIFICANT CHANGE UP
FENTANYL UR QL: NEGATIVE — SIGNIFICANT CHANGE UP
GLUCOSE SERPL-MCNC: 89 MG/DL — SIGNIFICANT CHANGE UP (ref 70–99)
GLUCOSE SERPL-MCNC: 89 MG/DL — SIGNIFICANT CHANGE UP (ref 70–99)
HCT VFR BLD CALC: 42 % — SIGNIFICANT CHANGE UP (ref 42–52)
HCT VFR BLD CALC: 42 % — SIGNIFICANT CHANGE UP (ref 42–52)
HGB BLD-MCNC: 14.3 G/DL — SIGNIFICANT CHANGE UP (ref 14–18)
HGB BLD-MCNC: 14.3 G/DL — SIGNIFICANT CHANGE UP (ref 14–18)
IMM GRANULOCYTES NFR BLD AUTO: 0.6 % — HIGH (ref 0.1–0.3)
IMM GRANULOCYTES NFR BLD AUTO: 0.6 % — HIGH (ref 0.1–0.3)
LYMPHOCYTES # BLD AUTO: 1.32 K/UL — SIGNIFICANT CHANGE UP (ref 1.2–3.4)
LYMPHOCYTES # BLD AUTO: 1.32 K/UL — SIGNIFICANT CHANGE UP (ref 1.2–3.4)
LYMPHOCYTES # BLD AUTO: 28 % — SIGNIFICANT CHANGE UP (ref 20.5–51.1)
LYMPHOCYTES # BLD AUTO: 28 % — SIGNIFICANT CHANGE UP (ref 20.5–51.1)
MCHC RBC-ENTMCNC: 31 PG — SIGNIFICANT CHANGE UP (ref 27–31)
MCHC RBC-ENTMCNC: 31 PG — SIGNIFICANT CHANGE UP (ref 27–31)
MCHC RBC-ENTMCNC: 34 G/DL — SIGNIFICANT CHANGE UP (ref 32–37)
MCHC RBC-ENTMCNC: 34 G/DL — SIGNIFICANT CHANGE UP (ref 32–37)
MCV RBC AUTO: 91.1 FL — SIGNIFICANT CHANGE UP (ref 80–94)
MCV RBC AUTO: 91.1 FL — SIGNIFICANT CHANGE UP (ref 80–94)
METHADONE UR-MCNC: NEGATIVE — SIGNIFICANT CHANGE UP
METHADONE UR-MCNC: NEGATIVE — SIGNIFICANT CHANGE UP
MONOCYTES # BLD AUTO: 0.5 K/UL — SIGNIFICANT CHANGE UP (ref 0.1–0.6)
MONOCYTES # BLD AUTO: 0.5 K/UL — SIGNIFICANT CHANGE UP (ref 0.1–0.6)
MONOCYTES NFR BLD AUTO: 10.6 % — HIGH (ref 1.7–9.3)
MONOCYTES NFR BLD AUTO: 10.6 % — HIGH (ref 1.7–9.3)
NEUTROPHILS # BLD AUTO: 2.66 K/UL — SIGNIFICANT CHANGE UP (ref 1.4–6.5)
NEUTROPHILS # BLD AUTO: 2.66 K/UL — SIGNIFICANT CHANGE UP (ref 1.4–6.5)
NEUTROPHILS NFR BLD AUTO: 56.5 % — SIGNIFICANT CHANGE UP (ref 42.2–75.2)
NEUTROPHILS NFR BLD AUTO: 56.5 % — SIGNIFICANT CHANGE UP (ref 42.2–75.2)
NRBC # BLD: 0 /100 WBCS — SIGNIFICANT CHANGE UP (ref 0–0)
NRBC # BLD: 0 /100 WBCS — SIGNIFICANT CHANGE UP (ref 0–0)
OPIATES UR-MCNC: NEGATIVE — SIGNIFICANT CHANGE UP
OPIATES UR-MCNC: NEGATIVE — SIGNIFICANT CHANGE UP
OXYCODONE UR-MCNC: NEGATIVE — SIGNIFICANT CHANGE UP
OXYCODONE UR-MCNC: NEGATIVE — SIGNIFICANT CHANGE UP
PCP UR-MCNC: NEGATIVE — SIGNIFICANT CHANGE UP
PCP UR-MCNC: NEGATIVE — SIGNIFICANT CHANGE UP
PLATELET # BLD AUTO: 123 K/UL — LOW (ref 130–400)
PLATELET # BLD AUTO: 123 K/UL — LOW (ref 130–400)
PMV BLD: 10.9 FL — HIGH (ref 7.4–10.4)
PMV BLD: 10.9 FL — HIGH (ref 7.4–10.4)
POTASSIUM SERPL-MCNC: 4.3 MMOL/L — SIGNIFICANT CHANGE UP (ref 3.5–5)
POTASSIUM SERPL-MCNC: 4.3 MMOL/L — SIGNIFICANT CHANGE UP (ref 3.5–5)
POTASSIUM SERPL-SCNC: 4.3 MMOL/L — SIGNIFICANT CHANGE UP (ref 3.5–5)
POTASSIUM SERPL-SCNC: 4.3 MMOL/L — SIGNIFICANT CHANGE UP (ref 3.5–5)
PROPOXYPHENE QUALITATIVE URINE RESULT: NEGATIVE — SIGNIFICANT CHANGE UP
PROPOXYPHENE QUALITATIVE URINE RESULT: NEGATIVE — SIGNIFICANT CHANGE UP
PROT SERPL-MCNC: 7 G/DL — SIGNIFICANT CHANGE UP (ref 6–8)
PROT SERPL-MCNC: 7 G/DL — SIGNIFICANT CHANGE UP (ref 6–8)
RBC # BLD: 4.61 M/UL — LOW (ref 4.7–6.1)
RBC # BLD: 4.61 M/UL — LOW (ref 4.7–6.1)
RBC # FLD: 12.7 % — SIGNIFICANT CHANGE UP (ref 11.5–14.5)
RBC # FLD: 12.7 % — SIGNIFICANT CHANGE UP (ref 11.5–14.5)
SODIUM SERPL-SCNC: 135 MMOL/L — SIGNIFICANT CHANGE UP (ref 135–146)
SODIUM SERPL-SCNC: 135 MMOL/L — SIGNIFICANT CHANGE UP (ref 135–146)
THC UR QL: NEGATIVE — SIGNIFICANT CHANGE UP
THC UR QL: NEGATIVE — SIGNIFICANT CHANGE UP
WBC # BLD: 4.71 K/UL — LOW (ref 4.8–10.8)
WBC # BLD: 4.71 K/UL — LOW (ref 4.8–10.8)
WBC # FLD AUTO: 4.71 K/UL — LOW (ref 4.8–10.8)
WBC # FLD AUTO: 4.71 K/UL — LOW (ref 4.8–10.8)

## 2023-12-30 PROCEDURE — 99233 SBSQ HOSP IP/OBS HIGH 50: CPT

## 2023-12-30 RX ORDER — DIPHENHYDRAMINE HCL 50 MG
25 CAPSULE ORAL ONCE
Refills: 0 | Status: COMPLETED | OUTPATIENT
Start: 2023-12-30 | End: 2023-12-30

## 2023-12-30 RX ORDER — DIPHENHYDRAMINE HCL 50 MG
25 CAPSULE ORAL EVERY 6 HOURS
Refills: 0 | Status: DISCONTINUED | OUTPATIENT
Start: 2023-12-30 | End: 2024-01-04

## 2023-12-30 RX ADMIN — Medication 4 MILLIGRAM(S): at 14:02

## 2023-12-30 RX ADMIN — Medication 100 MILLIGRAM(S): at 12:36

## 2023-12-30 RX ADMIN — Medication 650 MILLIGRAM(S): at 08:16

## 2023-12-30 RX ADMIN — GABAPENTIN 400 MILLIGRAM(S): 400 CAPSULE ORAL at 21:24

## 2023-12-30 RX ADMIN — PANTOPRAZOLE SODIUM 40 MILLIGRAM(S): 20 TABLET, DELAYED RELEASE ORAL at 05:46

## 2023-12-30 RX ADMIN — Medication 1 MILLIGRAM(S): at 05:45

## 2023-12-30 RX ADMIN — Medication 20 MILLIGRAM(S): at 12:34

## 2023-12-30 RX ADMIN — Medication 650 MILLIGRAM(S): at 08:41

## 2023-12-30 RX ADMIN — ENOXAPARIN SODIUM 40 MILLIGRAM(S): 100 INJECTION SUBCUTANEOUS at 21:24

## 2023-12-30 RX ADMIN — GABAPENTIN 400 MILLIGRAM(S): 400 CAPSULE ORAL at 05:45

## 2023-12-30 RX ADMIN — Medication 1 MILLIGRAM(S): at 12:34

## 2023-12-30 RX ADMIN — Medication 25 MILLIGRAM(S): at 01:15

## 2023-12-30 RX ADMIN — Medication 1 MILLIGRAM(S): at 02:00

## 2023-12-30 RX ADMIN — GABAPENTIN 400 MILLIGRAM(S): 400 CAPSULE ORAL at 14:02

## 2023-12-30 RX ADMIN — Medication 1 TABLET(S): at 12:35

## 2023-12-30 RX ADMIN — Medication 4 MILLIGRAM(S): at 10:07

## 2023-12-30 RX ADMIN — Medication 4 MILLIGRAM(S): at 18:51

## 2023-12-30 RX ADMIN — Medication 1 PATCH: at 12:36

## 2023-12-30 RX ADMIN — Medication 25 MILLIGRAM(S): at 16:11

## 2023-12-30 RX ADMIN — Medication 2 MILLIGRAM(S): at 12:16

## 2023-12-30 RX ADMIN — Medication 2 MILLIGRAM(S): at 16:11

## 2023-12-30 RX ADMIN — Medication 1 MILLIGRAM(S): at 08:08

## 2023-12-30 RX ADMIN — Medication 4 MILLIGRAM(S): at 21:23

## 2023-12-30 NOTE — PROGRESS NOTE ADULT - SUBJECTIVE AND OBJECTIVE BOX
Over Night Events: events noted, remain on precedex 0.5. Afebrile    PHYSICAL EXAM    ICU Vital Signs Last 24 Hrs  T(C): 36 (30 Dec 2023 04:00), Max: 36.4 (29 Dec 2023 16:00)  T(F): 96.8 (30 Dec 2023 04:00), Max: 97.5 (29 Dec 2023 16:00)  HR: 64 (30 Dec 2023 07:00) (61 - 99)  BP: 133/79 (30 Dec 2023 07:00) (109/70 - 169/82)  BP(mean): 99 (30 Dec 2023 07:00) (82 - 117)  RR: 17 (30 Dec 2023 07:00) (13 - 44)  SpO2: 97% (30 Dec 2023 07:00) (93% - 99%)    O2 Parameters below as of 30 Dec 2023 03:00  Patient On (Oxygen Delivery Method): room air            General: comfortable  tremor   Lungs: Bilateral BS  Cardiovascular: Regular   Abdomen: Soft, Positive BS  Extremities: No clubbing   Neurological: Non focal       12-29-23 @ 07:01  -  12-30-23 @ 07:00  --------------------------------------------------------  IN:    Dexmedetomidine: 174.3 mL    dextrose 5% + lactated ringers: 375 mL    Oral Fluid: 4150 mL  Total IN: 4699.3 mL    OUT:    Voided (mL): 6350 mL  Total OUT: 6350 mL    Total NET: -1650.7 mL          LABS:                          14.3   4.71  )-----------( 123      ( 30 Dec 2023 06:02 )             42.0                                               12-30    135  |  101  |  10  ----------------------------<  89  4.3   |  24  |  0.7    Ca    9.2      30 Dec 2023 06:02    TPro  7.0  /  Alb  4.2  /  TBili  0.5  /  DBili  x   /  AST  33  /  ALT  25  /  AlkPhos  52  12-30                                             Urinalysis Basic - ( 30 Dec 2023 06:02 )    Color: x / Appearance: x / SG: x / pH: x  Gluc: 89 mg/dL / Ketone: x  / Bili: x / Urobili: x   Blood: x / Protein: x / Nitrite: x   Leuk Esterase: x / RBC: x / WBC x   Sq Epi: x / Non Sq Epi: x / Bacteria: x                                                  LIVER FUNCTIONS - ( 30 Dec 2023 06:02 )  Alb: 4.2 g/dL / Pro: 7.0 g/dL / ALK PHOS: 52 U/L / ALT: 25 U/L / AST: 33 U/L / GGT: x                                                                                                                                       MEDICATIONS  (STANDING):  chlorhexidine 2% Cloths 1 Application(s) Topical daily  dexMEDEtomidine Infusion 0.5 MICROgram(s)/kG/Hr (8.81 mL/Hr) IV Continuous <Continuous>  enoxaparin Injectable 40 milliGRAM(s) SubCutaneous every 24 hours  FLUoxetine 20 milliGRAM(s) Oral daily  folic acid 1 milliGRAM(s) Oral daily  gabapentin 400 milliGRAM(s) Oral three times a day  multivitamin 1 Tablet(s) Oral daily  nicotine -  14 mG/24Hr(s) Patch 1 Patch Transdermal daily  pantoprazole    Tablet 40 milliGRAM(s) Oral before breakfast  thiamine 100 milliGRAM(s) Oral daily    MEDICATIONS  (PRN):  acetaminophen     Tablet .. 650 milliGRAM(s) Oral every 6 hours PRN Temp greater or equal to 38C (100.4F), Mild Pain (1 - 3)  loperamide 2 milliGRAM(s) Oral every 12 hours PRN Diarrhea  LORazepam   Injectable 2 milliGRAM(s) IV Push every 1 hour PRN CIWA-Ar score 8 or greater  ondansetron Injectable 4 milliGRAM(s) IV Push three times a day PRN Nausea and/or Vomiting

## 2023-12-30 NOTE — PROGRESS NOTE ADULT - SUBJECTIVE AND OBJECTIVE BOX
24H events:    Patient is a 38y old Male who presents with a chief complaint of Alcohol Withdrawal (30 Dec 2023 08:28)    Primary diagnosis of Alcohol withdrawal      Today is hospital day 4d. This morning patient was seen and examined at bedside, resting comfortably in bed. Patient still requiring all ativan doses. Otherwise, no acute or major events overnight. Hemodynamically stable, tolerating oral diet, voiding appropriately with appropriate bowel movements.     PAST MEDICAL & SURGICAL HISTORY  No pertinent past medical history  anxiety  depression  polysubstance    No significant past surgical history      SOCIAL HISTORY:  Social History:      ALLERGIES:  Ceclor (Rash; Hives)    MEDICATIONS:  STANDING MEDICATIONS  chlorhexidine 2% Cloths 1 Application(s) Topical daily  dexMEDEtomidine Infusion 0.5 MICROgram(s)/kG/Hr IV Continuous <Continuous>  enoxaparin Injectable 40 milliGRAM(s) SubCutaneous every 24 hours  FLUoxetine 20 milliGRAM(s) Oral daily  folic acid 1 milliGRAM(s) Oral daily  gabapentin 400 milliGRAM(s) Oral three times a day  LORazepam   Injectable 4 milliGRAM(s) IV Push every 4 hours  multivitamin 1 Tablet(s) Oral daily  nicotine -  14 mG/24Hr(s) Patch 1 Patch Transdermal daily  pantoprazole    Tablet 40 milliGRAM(s) Oral before breakfast  thiamine 100 milliGRAM(s) Oral daily    PRN MEDICATIONS  acetaminophen     Tablet .. 650 milliGRAM(s) Oral every 6 hours PRN  diphenhydrAMINE 25 milliGRAM(s) Oral every 6 hours PRN  loperamide 2 milliGRAM(s) Oral every 12 hours PRN  LORazepam   Injectable 2 milliGRAM(s) IV Push every 2 hours PRN  ondansetron Injectable 4 milliGRAM(s) IV Push three times a day PRN    VITALS:   T(F): 96  HR: 65  BP: 120/72  RR: 14  SpO2: 96%    PHYSICAL EXAM:  GENERAL: NAD, lying in bed comfortably, cooperative,   HEAD: NCAT  NECK: Supple  HEART: Regular rate and rhythm, normal S1/S2, no murmurs  LUNGS: No acute respiratory distress, clear b/l breath sounds   ABDOMEN:  soft, non-tender, non-distended,   EXTREMITIES: no edema  NERVOUS SYSTEM: tremulous hands, follows commands, answers questions appropriately     LABS:                        14.3   4.71  )-----------( 123      ( 30 Dec 2023 06:02 )             42.0     12-30    135  |  101  |  10  ----------------------------<  89  4.3   |  24  |  0.7    Ca    9.2      30 Dec 2023 06:02    TPro  7.0  /  Alb  4.2  /  TBili  0.5  /  DBili  x   /  AST  33  /  ALT  25  /  AlkPhos  52  12-30      Urinalysis Basic - ( 30 Dec 2023 06:02 )    Color: x / Appearance: x / SG: x / pH: x  Gluc: 89 mg/dL / Ketone: x  / Bili: x / Urobili: x   Blood: x / Protein: x / Nitrite: x   Leuk Esterase: x / RBC: x / WBC x   Sq Epi: x / Non Sq Epi: x / Bacteria: x                RADIOLOGY:    RADIOLOGY

## 2023-12-30 NOTE — PROVIDER CONTACT NOTE (OTHER) - BACKGROUND
pt withdrawing from ETOH. ativan given as scheduled. PRN benedryl given. precedex gtt titrated off for 2 hours then restarted (10am to 12pm).

## 2023-12-31 LAB
ALBUMIN SERPL ELPH-MCNC: 4.3 G/DL — SIGNIFICANT CHANGE UP (ref 3.5–5.2)
ALBUMIN SERPL ELPH-MCNC: 4.3 G/DL — SIGNIFICANT CHANGE UP (ref 3.5–5.2)
ALP SERPL-CCNC: 53 U/L — SIGNIFICANT CHANGE UP (ref 30–115)
ALP SERPL-CCNC: 53 U/L — SIGNIFICANT CHANGE UP (ref 30–115)
ALT FLD-CCNC: 25 U/L — SIGNIFICANT CHANGE UP (ref 0–41)
ALT FLD-CCNC: 25 U/L — SIGNIFICANT CHANGE UP (ref 0–41)
ANION GAP SERPL CALC-SCNC: 11 MMOL/L — SIGNIFICANT CHANGE UP (ref 7–14)
ANION GAP SERPL CALC-SCNC: 11 MMOL/L — SIGNIFICANT CHANGE UP (ref 7–14)
AST SERPL-CCNC: 34 U/L — SIGNIFICANT CHANGE UP (ref 0–41)
AST SERPL-CCNC: 34 U/L — SIGNIFICANT CHANGE UP (ref 0–41)
BASOPHILS # BLD AUTO: 0.1 K/UL — SIGNIFICANT CHANGE UP (ref 0–0.2)
BASOPHILS # BLD AUTO: 0.1 K/UL — SIGNIFICANT CHANGE UP (ref 0–0.2)
BASOPHILS NFR BLD AUTO: 1.7 % — HIGH (ref 0–1)
BASOPHILS NFR BLD AUTO: 1.7 % — HIGH (ref 0–1)
BILIRUB SERPL-MCNC: 0.6 MG/DL — SIGNIFICANT CHANGE UP (ref 0.2–1.2)
BILIRUB SERPL-MCNC: 0.6 MG/DL — SIGNIFICANT CHANGE UP (ref 0.2–1.2)
BUN SERPL-MCNC: 11 MG/DL — SIGNIFICANT CHANGE UP (ref 10–20)
BUN SERPL-MCNC: 11 MG/DL — SIGNIFICANT CHANGE UP (ref 10–20)
CALCIUM SERPL-MCNC: 9.1 MG/DL — SIGNIFICANT CHANGE UP (ref 8.4–10.5)
CALCIUM SERPL-MCNC: 9.1 MG/DL — SIGNIFICANT CHANGE UP (ref 8.4–10.5)
CHLORIDE SERPL-SCNC: 100 MMOL/L — SIGNIFICANT CHANGE UP (ref 98–110)
CHLORIDE SERPL-SCNC: 100 MMOL/L — SIGNIFICANT CHANGE UP (ref 98–110)
CO2 SERPL-SCNC: 25 MMOL/L — SIGNIFICANT CHANGE UP (ref 17–32)
CO2 SERPL-SCNC: 25 MMOL/L — SIGNIFICANT CHANGE UP (ref 17–32)
CREAT SERPL-MCNC: 0.7 MG/DL — SIGNIFICANT CHANGE UP (ref 0.7–1.5)
CREAT SERPL-MCNC: 0.7 MG/DL — SIGNIFICANT CHANGE UP (ref 0.7–1.5)
EGFR: 122 ML/MIN/1.73M2 — SIGNIFICANT CHANGE UP
EGFR: 122 ML/MIN/1.73M2 — SIGNIFICANT CHANGE UP
EOSINOPHIL # BLD AUTO: 0.17 K/UL — SIGNIFICANT CHANGE UP (ref 0–0.7)
EOSINOPHIL # BLD AUTO: 0.17 K/UL — SIGNIFICANT CHANGE UP (ref 0–0.7)
EOSINOPHIL NFR BLD AUTO: 2.8 % — SIGNIFICANT CHANGE UP (ref 0–8)
EOSINOPHIL NFR BLD AUTO: 2.8 % — SIGNIFICANT CHANGE UP (ref 0–8)
GLUCOSE SERPL-MCNC: 111 MG/DL — HIGH (ref 70–99)
GLUCOSE SERPL-MCNC: 111 MG/DL — HIGH (ref 70–99)
HCT VFR BLD CALC: 44.2 % — SIGNIFICANT CHANGE UP (ref 42–52)
HCT VFR BLD CALC: 44.2 % — SIGNIFICANT CHANGE UP (ref 42–52)
HGB BLD-MCNC: 15.1 G/DL — SIGNIFICANT CHANGE UP (ref 14–18)
HGB BLD-MCNC: 15.1 G/DL — SIGNIFICANT CHANGE UP (ref 14–18)
IMM GRANULOCYTES NFR BLD AUTO: 0.8 % — HIGH (ref 0.1–0.3)
IMM GRANULOCYTES NFR BLD AUTO: 0.8 % — HIGH (ref 0.1–0.3)
LYMPHOCYTES # BLD AUTO: 1.61 K/UL — SIGNIFICANT CHANGE UP (ref 1.2–3.4)
LYMPHOCYTES # BLD AUTO: 1.61 K/UL — SIGNIFICANT CHANGE UP (ref 1.2–3.4)
LYMPHOCYTES # BLD AUTO: 26.8 % — SIGNIFICANT CHANGE UP (ref 20.5–51.1)
LYMPHOCYTES # BLD AUTO: 26.8 % — SIGNIFICANT CHANGE UP (ref 20.5–51.1)
MCHC RBC-ENTMCNC: 31.1 PG — HIGH (ref 27–31)
MCHC RBC-ENTMCNC: 31.1 PG — HIGH (ref 27–31)
MCHC RBC-ENTMCNC: 34.2 G/DL — SIGNIFICANT CHANGE UP (ref 32–37)
MCHC RBC-ENTMCNC: 34.2 G/DL — SIGNIFICANT CHANGE UP (ref 32–37)
MCV RBC AUTO: 91.1 FL — SIGNIFICANT CHANGE UP (ref 80–94)
MCV RBC AUTO: 91.1 FL — SIGNIFICANT CHANGE UP (ref 80–94)
MONOCYTES # BLD AUTO: 0.62 K/UL — HIGH (ref 0.1–0.6)
MONOCYTES # BLD AUTO: 0.62 K/UL — HIGH (ref 0.1–0.6)
MONOCYTES NFR BLD AUTO: 10.3 % — HIGH (ref 1.7–9.3)
MONOCYTES NFR BLD AUTO: 10.3 % — HIGH (ref 1.7–9.3)
NEUTROPHILS # BLD AUTO: 3.46 K/UL — SIGNIFICANT CHANGE UP (ref 1.4–6.5)
NEUTROPHILS # BLD AUTO: 3.46 K/UL — SIGNIFICANT CHANGE UP (ref 1.4–6.5)
NEUTROPHILS NFR BLD AUTO: 57.6 % — SIGNIFICANT CHANGE UP (ref 42.2–75.2)
NEUTROPHILS NFR BLD AUTO: 57.6 % — SIGNIFICANT CHANGE UP (ref 42.2–75.2)
NRBC # BLD: 0 /100 WBCS — SIGNIFICANT CHANGE UP (ref 0–0)
NRBC # BLD: 0 /100 WBCS — SIGNIFICANT CHANGE UP (ref 0–0)
PLATELET # BLD AUTO: 152 K/UL — SIGNIFICANT CHANGE UP (ref 130–400)
PLATELET # BLD AUTO: 152 K/UL — SIGNIFICANT CHANGE UP (ref 130–400)
PMV BLD: 10.3 FL — SIGNIFICANT CHANGE UP (ref 7.4–10.4)
PMV BLD: 10.3 FL — SIGNIFICANT CHANGE UP (ref 7.4–10.4)
POTASSIUM SERPL-MCNC: 4.3 MMOL/L — SIGNIFICANT CHANGE UP (ref 3.5–5)
POTASSIUM SERPL-MCNC: 4.3 MMOL/L — SIGNIFICANT CHANGE UP (ref 3.5–5)
POTASSIUM SERPL-SCNC: 4.3 MMOL/L — SIGNIFICANT CHANGE UP (ref 3.5–5)
POTASSIUM SERPL-SCNC: 4.3 MMOL/L — SIGNIFICANT CHANGE UP (ref 3.5–5)
PROT SERPL-MCNC: 7 G/DL — SIGNIFICANT CHANGE UP (ref 6–8)
PROT SERPL-MCNC: 7 G/DL — SIGNIFICANT CHANGE UP (ref 6–8)
RBC # BLD: 4.85 M/UL — SIGNIFICANT CHANGE UP (ref 4.7–6.1)
RBC # BLD: 4.85 M/UL — SIGNIFICANT CHANGE UP (ref 4.7–6.1)
RBC # FLD: 12.7 % — SIGNIFICANT CHANGE UP (ref 11.5–14.5)
RBC # FLD: 12.7 % — SIGNIFICANT CHANGE UP (ref 11.5–14.5)
SODIUM SERPL-SCNC: 136 MMOL/L — SIGNIFICANT CHANGE UP (ref 135–146)
SODIUM SERPL-SCNC: 136 MMOL/L — SIGNIFICANT CHANGE UP (ref 135–146)
WBC # BLD: 6.01 K/UL — SIGNIFICANT CHANGE UP (ref 4.8–10.8)
WBC # BLD: 6.01 K/UL — SIGNIFICANT CHANGE UP (ref 4.8–10.8)
WBC # FLD AUTO: 6.01 K/UL — SIGNIFICANT CHANGE UP (ref 4.8–10.8)
WBC # FLD AUTO: 6.01 K/UL — SIGNIFICANT CHANGE UP (ref 4.8–10.8)

## 2023-12-31 PROCEDURE — 99291 CRITICAL CARE FIRST HOUR: CPT

## 2023-12-31 RX ORDER — TRAZODONE HCL 50 MG
100 TABLET ORAL AT BEDTIME
Refills: 0 | Status: DISCONTINUED | OUTPATIENT
Start: 2023-12-31 | End: 2024-01-05

## 2023-12-31 RX ORDER — TRAZODONE HCL 50 MG
100 TABLET ORAL DAILY
Refills: 0 | Status: DISCONTINUED | OUTPATIENT
Start: 2023-12-31 | End: 2023-12-31

## 2023-12-31 RX ADMIN — Medication 1 TABLET(S): at 12:10

## 2023-12-31 RX ADMIN — Medication 1 PATCH: at 19:00

## 2023-12-31 RX ADMIN — Medication 100 MILLIGRAM(S): at 12:09

## 2023-12-31 RX ADMIN — Medication 4 MILLIGRAM(S): at 21:07

## 2023-12-31 RX ADMIN — GABAPENTIN 400 MILLIGRAM(S): 400 CAPSULE ORAL at 05:32

## 2023-12-31 RX ADMIN — Medication 1 MILLIGRAM(S): at 12:09

## 2023-12-31 RX ADMIN — Medication 650 MILLIGRAM(S): at 01:16

## 2023-12-31 RX ADMIN — GABAPENTIN 400 MILLIGRAM(S): 400 CAPSULE ORAL at 14:29

## 2023-12-31 RX ADMIN — Medication 4 MILLIGRAM(S): at 01:09

## 2023-12-31 RX ADMIN — Medication 4 MILLIGRAM(S): at 14:29

## 2023-12-31 RX ADMIN — Medication 100 MILLIGRAM(S): at 21:06

## 2023-12-31 RX ADMIN — Medication 650 MILLIGRAM(S): at 21:00

## 2023-12-31 RX ADMIN — PANTOPRAZOLE SODIUM 40 MILLIGRAM(S): 20 TABLET, DELAYED RELEASE ORAL at 06:04

## 2023-12-31 RX ADMIN — Medication 4 MILLIGRAM(S): at 05:35

## 2023-12-31 RX ADMIN — Medication 1 PATCH: at 07:00

## 2023-12-31 RX ADMIN — GABAPENTIN 400 MILLIGRAM(S): 400 CAPSULE ORAL at 21:06

## 2023-12-31 RX ADMIN — Medication 4 MILLIGRAM(S): at 17:56

## 2023-12-31 RX ADMIN — Medication 1 PATCH: at 12:10

## 2023-12-31 RX ADMIN — ENOXAPARIN SODIUM 40 MILLIGRAM(S): 100 INJECTION SUBCUTANEOUS at 20:08

## 2023-12-31 RX ADMIN — Medication 2 MILLIGRAM(S): at 08:50

## 2023-12-31 RX ADMIN — Medication 20 MILLIGRAM(S): at 12:09

## 2023-12-31 RX ADMIN — Medication 650 MILLIGRAM(S): at 02:16

## 2023-12-31 RX ADMIN — Medication 4 MILLIGRAM(S): at 10:38

## 2023-12-31 RX ADMIN — Medication 1 PATCH: at 12:00

## 2023-12-31 RX ADMIN — Medication 650 MILLIGRAM(S): at 20:00

## 2023-12-31 NOTE — PROGRESS NOTE ADULT - SUBJECTIVE AND OBJECTIVE BOX
Over Night Events: events noted, on RA, still with tremor, RA    PHYSICAL EXAM    ICU Vital Signs Last 24 Hrs  T(C): 36.3 (31 Dec 2023 04:00), Max: 36.3 (31 Dec 2023 04:00)  T(F): 97.3 (31 Dec 2023 04:00), Max: 97.3 (31 Dec 2023 04:00)  HR: 69 (31 Dec 2023 06:00) (57 - 122)  BP: 122/78 (31 Dec 2023 06:00) (106/64 - 160/83)  BP(mean): 95 (31 Dec 2023 06:00) (78 - 118)  RR: 18 (31 Dec 2023 06:00) (7 - 36)  SpO2: 98% (31 Dec 2023 06:00) (93% - 99%)    O2 Parameters below as of 31 Dec 2023 04:00  Patient On (Oxygen Delivery Method): room air            General: ill looking  Lungs: Bilateral BS  Cardiovascular: Regular   Abdomen: Soft, Positive BS  Extremities: No clubbing   Skin: Warm  Neurological: Non focal       12-30-23 @ 07:01  -  12-31-23 @ 07:00  --------------------------------------------------------  IN:    Dexmedetomidine: 262.1 mL    Oral Fluid: 1800 mL  Total IN: 2062.1 mL    OUT:    Voided (mL): 3700 mL  Total OUT: 3700 mL    Total NET: -1637.9 mL          LABS:                          15.1   6.01  )-----------( 152      ( 31 Dec 2023 04:50 )             44.2                                               12-31    136  |  100  |  11  ----------------------------<  111<H>  4.3   |  25  |  0.7    Ca    9.1      31 Dec 2023 04:50    TPro  7.0  /  Alb  4.3  /  TBili  0.6  /  DBili  x   /  AST  34  /  ALT  25  /  AlkPhos  53  12-31                                             Urinalysis Basic - ( 31 Dec 2023 04:50 )    Color: x / Appearance: x / SG: x / pH: x  Gluc: 111 mg/dL / Ketone: x  / Bili: x / Urobili: x   Blood: x / Protein: x / Nitrite: x   Leuk Esterase: x / RBC: x / WBC x   Sq Epi: x / Non Sq Epi: x / Bacteria: x                                                  LIVER FUNCTIONS - ( 31 Dec 2023 04:50 )  Alb: 4.3 g/dL / Pro: 7.0 g/dL / ALK PHOS: 53 U/L / ALT: 25 U/L / AST: 34 U/L / GGT: x                                                                                                                                       MEDICATIONS  (STANDING):  chlorhexidine 2% Cloths 1 Application(s) Topical daily  dexMEDEtomidine Infusion 0.5 MICROgram(s)/kG/Hr (8.81 mL/Hr) IV Continuous <Continuous>  enoxaparin Injectable 40 milliGRAM(s) SubCutaneous every 24 hours  FLUoxetine 20 milliGRAM(s) Oral daily  folic acid 1 milliGRAM(s) Oral daily  gabapentin 400 milliGRAM(s) Oral three times a day  LORazepam   Injectable 4 milliGRAM(s) IV Push every 4 hours  multivitamin 1 Tablet(s) Oral daily  nicotine -  14 mG/24Hr(s) Patch 1 Patch Transdermal daily  pantoprazole    Tablet 40 milliGRAM(s) Oral before breakfast  thiamine 100 milliGRAM(s) Oral daily    MEDICATIONS  (PRN):  acetaminophen     Tablet .. 650 milliGRAM(s) Oral every 6 hours PRN Temp greater or equal to 38C (100.4F), Mild Pain (1 - 3)  diphenhydrAMINE 25 milliGRAM(s) Oral every 6 hours PRN Rash and/or Itching  loperamide 2 milliGRAM(s) Oral every 12 hours PRN Diarrhea  LORazepam   Injectable 2 milliGRAM(s) IV Push every 2 hours PRN Agitation  ondansetron Injectable 4 milliGRAM(s) IV Push three times a day PRN Nausea and/or Vomiting

## 2024-01-01 LAB
ALBUMIN SERPL ELPH-MCNC: 4.2 G/DL — SIGNIFICANT CHANGE UP (ref 3.5–5.2)
ALBUMIN SERPL ELPH-MCNC: 4.2 G/DL — SIGNIFICANT CHANGE UP (ref 3.5–5.2)
ALP SERPL-CCNC: 56 U/L — SIGNIFICANT CHANGE UP (ref 30–115)
ALP SERPL-CCNC: 56 U/L — SIGNIFICANT CHANGE UP (ref 30–115)
ALT FLD-CCNC: 24 U/L — SIGNIFICANT CHANGE UP (ref 0–41)
ALT FLD-CCNC: 24 U/L — SIGNIFICANT CHANGE UP (ref 0–41)
ANION GAP SERPL CALC-SCNC: 11 MMOL/L — SIGNIFICANT CHANGE UP (ref 7–14)
ANION GAP SERPL CALC-SCNC: 11 MMOL/L — SIGNIFICANT CHANGE UP (ref 7–14)
AST SERPL-CCNC: 29 U/L — SIGNIFICANT CHANGE UP (ref 0–41)
AST SERPL-CCNC: 29 U/L — SIGNIFICANT CHANGE UP (ref 0–41)
BASOPHILS # BLD AUTO: 0.1 K/UL — SIGNIFICANT CHANGE UP (ref 0–0.2)
BASOPHILS # BLD AUTO: 0.1 K/UL — SIGNIFICANT CHANGE UP (ref 0–0.2)
BASOPHILS NFR BLD AUTO: 2 % — HIGH (ref 0–1)
BASOPHILS NFR BLD AUTO: 2 % — HIGH (ref 0–1)
BILIRUB SERPL-MCNC: 0.4 MG/DL — SIGNIFICANT CHANGE UP (ref 0.2–1.2)
BILIRUB SERPL-MCNC: 0.4 MG/DL — SIGNIFICANT CHANGE UP (ref 0.2–1.2)
BUN SERPL-MCNC: 10 MG/DL — SIGNIFICANT CHANGE UP (ref 10–20)
BUN SERPL-MCNC: 10 MG/DL — SIGNIFICANT CHANGE UP (ref 10–20)
CALCIUM SERPL-MCNC: 9.3 MG/DL — SIGNIFICANT CHANGE UP (ref 8.4–10.5)
CALCIUM SERPL-MCNC: 9.3 MG/DL — SIGNIFICANT CHANGE UP (ref 8.4–10.5)
CHLORIDE SERPL-SCNC: 102 MMOL/L — SIGNIFICANT CHANGE UP (ref 98–110)
CHLORIDE SERPL-SCNC: 102 MMOL/L — SIGNIFICANT CHANGE UP (ref 98–110)
CO2 SERPL-SCNC: 24 MMOL/L — SIGNIFICANT CHANGE UP (ref 17–32)
CO2 SERPL-SCNC: 24 MMOL/L — SIGNIFICANT CHANGE UP (ref 17–32)
CREAT SERPL-MCNC: 0.7 MG/DL — SIGNIFICANT CHANGE UP (ref 0.7–1.5)
CREAT SERPL-MCNC: 0.7 MG/DL — SIGNIFICANT CHANGE UP (ref 0.7–1.5)
EGFR: 122 ML/MIN/1.73M2 — SIGNIFICANT CHANGE UP
EGFR: 122 ML/MIN/1.73M2 — SIGNIFICANT CHANGE UP
EOSINOPHIL # BLD AUTO: 0.12 K/UL — SIGNIFICANT CHANGE UP (ref 0–0.7)
EOSINOPHIL # BLD AUTO: 0.12 K/UL — SIGNIFICANT CHANGE UP (ref 0–0.7)
EOSINOPHIL NFR BLD AUTO: 2.4 % — SIGNIFICANT CHANGE UP (ref 0–8)
EOSINOPHIL NFR BLD AUTO: 2.4 % — SIGNIFICANT CHANGE UP (ref 0–8)
GLUCOSE SERPL-MCNC: 108 MG/DL — HIGH (ref 70–99)
GLUCOSE SERPL-MCNC: 108 MG/DL — HIGH (ref 70–99)
HCT VFR BLD CALC: 43.9 % — SIGNIFICANT CHANGE UP (ref 42–52)
HCT VFR BLD CALC: 43.9 % — SIGNIFICANT CHANGE UP (ref 42–52)
HGB BLD-MCNC: 14.9 G/DL — SIGNIFICANT CHANGE UP (ref 14–18)
HGB BLD-MCNC: 14.9 G/DL — SIGNIFICANT CHANGE UP (ref 14–18)
IMM GRANULOCYTES NFR BLD AUTO: 1 % — HIGH (ref 0.1–0.3)
IMM GRANULOCYTES NFR BLD AUTO: 1 % — HIGH (ref 0.1–0.3)
LYMPHOCYTES # BLD AUTO: 1.51 K/UL — SIGNIFICANT CHANGE UP (ref 1.2–3.4)
LYMPHOCYTES # BLD AUTO: 1.51 K/UL — SIGNIFICANT CHANGE UP (ref 1.2–3.4)
LYMPHOCYTES # BLD AUTO: 29.8 % — SIGNIFICANT CHANGE UP (ref 20.5–51.1)
LYMPHOCYTES # BLD AUTO: 29.8 % — SIGNIFICANT CHANGE UP (ref 20.5–51.1)
MAGNESIUM SERPL-MCNC: 2 MG/DL — SIGNIFICANT CHANGE UP (ref 1.8–2.4)
MAGNESIUM SERPL-MCNC: 2 MG/DL — SIGNIFICANT CHANGE UP (ref 1.8–2.4)
MCHC RBC-ENTMCNC: 31.4 PG — HIGH (ref 27–31)
MCHC RBC-ENTMCNC: 31.4 PG — HIGH (ref 27–31)
MCHC RBC-ENTMCNC: 33.9 G/DL — SIGNIFICANT CHANGE UP (ref 32–37)
MCHC RBC-ENTMCNC: 33.9 G/DL — SIGNIFICANT CHANGE UP (ref 32–37)
MCV RBC AUTO: 92.4 FL — SIGNIFICANT CHANGE UP (ref 80–94)
MCV RBC AUTO: 92.4 FL — SIGNIFICANT CHANGE UP (ref 80–94)
MONOCYTES # BLD AUTO: 0.8 K/UL — HIGH (ref 0.1–0.6)
MONOCYTES # BLD AUTO: 0.8 K/UL — HIGH (ref 0.1–0.6)
MONOCYTES NFR BLD AUTO: 15.8 % — HIGH (ref 1.7–9.3)
MONOCYTES NFR BLD AUTO: 15.8 % — HIGH (ref 1.7–9.3)
NEUTROPHILS # BLD AUTO: 2.48 K/UL — SIGNIFICANT CHANGE UP (ref 1.4–6.5)
NEUTROPHILS # BLD AUTO: 2.48 K/UL — SIGNIFICANT CHANGE UP (ref 1.4–6.5)
NEUTROPHILS NFR BLD AUTO: 49 % — SIGNIFICANT CHANGE UP (ref 42.2–75.2)
NEUTROPHILS NFR BLD AUTO: 49 % — SIGNIFICANT CHANGE UP (ref 42.2–75.2)
NRBC # BLD: 0 /100 WBCS — SIGNIFICANT CHANGE UP (ref 0–0)
NRBC # BLD: 0 /100 WBCS — SIGNIFICANT CHANGE UP (ref 0–0)
PLATELET # BLD AUTO: 166 K/UL — SIGNIFICANT CHANGE UP (ref 130–400)
PLATELET # BLD AUTO: 166 K/UL — SIGNIFICANT CHANGE UP (ref 130–400)
PMV BLD: 10 FL — SIGNIFICANT CHANGE UP (ref 7.4–10.4)
PMV BLD: 10 FL — SIGNIFICANT CHANGE UP (ref 7.4–10.4)
POTASSIUM SERPL-MCNC: 4.2 MMOL/L — SIGNIFICANT CHANGE UP (ref 3.5–5)
POTASSIUM SERPL-MCNC: 4.2 MMOL/L — SIGNIFICANT CHANGE UP (ref 3.5–5)
POTASSIUM SERPL-SCNC: 4.2 MMOL/L — SIGNIFICANT CHANGE UP (ref 3.5–5)
POTASSIUM SERPL-SCNC: 4.2 MMOL/L — SIGNIFICANT CHANGE UP (ref 3.5–5)
PROT SERPL-MCNC: 7 G/DL — SIGNIFICANT CHANGE UP (ref 6–8)
PROT SERPL-MCNC: 7 G/DL — SIGNIFICANT CHANGE UP (ref 6–8)
RBC # BLD: 4.75 M/UL — SIGNIFICANT CHANGE UP (ref 4.7–6.1)
RBC # BLD: 4.75 M/UL — SIGNIFICANT CHANGE UP (ref 4.7–6.1)
RBC # FLD: 12.7 % — SIGNIFICANT CHANGE UP (ref 11.5–14.5)
RBC # FLD: 12.7 % — SIGNIFICANT CHANGE UP (ref 11.5–14.5)
SODIUM SERPL-SCNC: 137 MMOL/L — SIGNIFICANT CHANGE UP (ref 135–146)
SODIUM SERPL-SCNC: 137 MMOL/L — SIGNIFICANT CHANGE UP (ref 135–146)
WBC # BLD: 5.06 K/UL — SIGNIFICANT CHANGE UP (ref 4.8–10.8)
WBC # BLD: 5.06 K/UL — SIGNIFICANT CHANGE UP (ref 4.8–10.8)
WBC # FLD AUTO: 5.06 K/UL — SIGNIFICANT CHANGE UP (ref 4.8–10.8)
WBC # FLD AUTO: 5.06 K/UL — SIGNIFICANT CHANGE UP (ref 4.8–10.8)

## 2024-01-01 PROCEDURE — 99233 SBSQ HOSP IP/OBS HIGH 50: CPT

## 2024-01-01 RX ORDER — HYDROXYZINE HCL 10 MG
50 TABLET ORAL EVERY 8 HOURS
Refills: 0 | Status: DISCONTINUED | OUTPATIENT
Start: 2024-01-01 | End: 2024-01-05

## 2024-01-01 RX ADMIN — PANTOPRAZOLE SODIUM 40 MILLIGRAM(S): 20 TABLET, DELAYED RELEASE ORAL at 06:05

## 2024-01-01 RX ADMIN — GABAPENTIN 400 MILLIGRAM(S): 400 CAPSULE ORAL at 21:52

## 2024-01-01 RX ADMIN — Medication 1 PATCH: at 19:27

## 2024-01-01 RX ADMIN — Medication 2 MILLIGRAM(S): at 11:36

## 2024-01-01 RX ADMIN — GABAPENTIN 400 MILLIGRAM(S): 400 CAPSULE ORAL at 13:43

## 2024-01-01 RX ADMIN — Medication 2 MILLIGRAM(S): at 18:45

## 2024-01-01 RX ADMIN — Medication 2 MILLIGRAM(S): at 20:52

## 2024-01-01 RX ADMIN — Medication 650 MILLIGRAM(S): at 22:30

## 2024-01-01 RX ADMIN — Medication 100 MILLIGRAM(S): at 21:52

## 2024-01-01 RX ADMIN — Medication 2 MILLIGRAM(S): at 21:55

## 2024-01-01 RX ADMIN — Medication 2 MILLIGRAM(S): at 13:43

## 2024-01-01 RX ADMIN — Medication 2 MILLIGRAM(S): at 15:45

## 2024-01-01 RX ADMIN — Medication 650 MILLIGRAM(S): at 22:05

## 2024-01-01 RX ADMIN — Medication 1 PATCH: at 07:00

## 2024-01-01 RX ADMIN — Medication 1 PATCH: at 12:23

## 2024-01-01 RX ADMIN — Medication 100 MILLIGRAM(S): at 12:21

## 2024-01-01 RX ADMIN — Medication 1 TABLET(S): at 12:21

## 2024-01-01 RX ADMIN — Medication 4 MILLIGRAM(S): at 01:38

## 2024-01-01 RX ADMIN — Medication 4 MILLIGRAM(S): at 05:19

## 2024-01-01 RX ADMIN — Medication 50 MILLIGRAM(S): at 17:35

## 2024-01-01 RX ADMIN — Medication 50 MILLIGRAM(S): at 08:48

## 2024-01-01 RX ADMIN — Medication 1 PATCH: at 12:00

## 2024-01-01 RX ADMIN — GABAPENTIN 400 MILLIGRAM(S): 400 CAPSULE ORAL at 05:19

## 2024-01-01 RX ADMIN — Medication 2 MILLIGRAM(S): at 09:22

## 2024-01-01 RX ADMIN — ENOXAPARIN SODIUM 40 MILLIGRAM(S): 100 INJECTION SUBCUTANEOUS at 21:51

## 2024-01-01 RX ADMIN — Medication 1 MILLIGRAM(S): at 12:21

## 2024-01-01 RX ADMIN — Medication 20 MILLIGRAM(S): at 12:20

## 2024-01-01 NOTE — CHART NOTE - NSCHARTNOTEFT_GEN_A_CORE
MICU Transfer Note  ---------------------------    Transfer from: MICU  Transfer to: SDU  Approved by Dr. Sebastian Cheng    38-year-old male with history of alcohol use disorder, anxiety and depression came in for alcohol withdrawal. Sx started around 3 days ago when he cut down on his drinking and mostly constitute palpitations anxiety and tremors. Pt self medicated with alcohol shots that improved his sx for short periods of time.    MICU COURSE    # Alcohol abuse complicated by Alcohol withdrawal   12/28  - CIWA protocol >10  - Catch team consulted  - thiamine, folic acid and multivitamin  12/29  - on 0.7 precedex   - CIWA score 7 today  - CATCH team consult.   - Serum and Urine drugs screen pending  12/30  - Addiction medicine: start ativan taper.  - still requiring max rate precedex  - Ativan 4mg Q4H, ativan 2mg Q2H PRN  - Drug screen negative    # Anxiety/Depression   - c/w home med  - fluoxetine 20mg daily  01/01  - Start hydroxyzine    # Thrombocytopenia   12/29  - RUQ US: Patchy areas of increased echogenicity consistent with patchy fatty infiltration  - PLTs ranges from 70s-230s      GI prophylaxis: protonix  DVT prophylaxis: lovenox  Diet: regular  Code Status: full code  Disposition: MICU    Patient is stable for Downgrade to SDU    To-Do:  Taper ativan    OBJECTIVE --  Vital Signs Last 24 Hrs  T(C): 37 (01 Jan 2024 04:00), Max: 37 (01 Jan 2024 04:00)  T(F): 98.6 (01 Jan 2024 04:00), Max: 98.6 (01 Jan 2024 04:00)  HR: 71 (01 Jan 2024 08:00) (52 - 88)  BP: 110/71 (01 Jan 2024 08:00) (102/86 - 152/85)  BP(mean): 85 (01 Jan 2024 08:00) (74 - 111)  RR: 38 (01 Jan 2024 08:00) (6 - 38)  SpO2: 100% (01 Jan 2024 08:00) (96% - 100%)    Parameters below as of 01 Jan 2024 04:00  Patient On (Oxygen Delivery Method): room air        I&O's Summary    31 Dec 2023 07:01  -  01 Jan 2024 07:00  --------------------------------------------------------  IN: 1829.6 mL / OUT: 4100 mL / NET: -2270.4 mL    01 Jan 2024 07:01  -  01 Jan 2024 10:41  --------------------------------------------------------  IN: 10.6 mL / OUT: 0 mL / NET: 10.6 mL        MEDICATIONS  (STANDING):  chlorhexidine 2% Cloths 1 Application(s) Topical daily  dexMEDEtomidine Infusion 0.5 MICROgram(s)/kG/Hr (8.81 mL/Hr) IV Continuous <Continuous>  enoxaparin Injectable 40 milliGRAM(s) SubCutaneous every 24 hours  FLUoxetine 20 milliGRAM(s) Oral daily  folic acid 1 milliGRAM(s) Oral daily  gabapentin 400 milliGRAM(s) Oral three times a day  LORazepam   Injectable 2 milliGRAM(s) IV Push every 4 hours  multivitamin 1 Tablet(s) Oral daily  nicotine -  14 mG/24Hr(s) Patch 1 Patch Transdermal daily  pantoprazole    Tablet 40 milliGRAM(s) Oral before breakfast  thiamine 100 milliGRAM(s) Oral daily  traZODone 100 milliGRAM(s) Oral at bedtime    MEDICATIONS  (PRN):  acetaminophen     Tablet .. 650 milliGRAM(s) Oral every 6 hours PRN Temp greater or equal to 38C (100.4F), Mild Pain (1 - 3)  diphenhydrAMINE 25 milliGRAM(s) Oral every 6 hours PRN Rash and/or Itching  hydrOXYzine hydrochloride 50 milliGRAM(s) Oral every 8 hours PRN Itching  loperamide 2 milliGRAM(s) Oral every 12 hours PRN Diarrhea  LORazepam   Injectable 2 milliGRAM(s) IV Push every 2 hours PRN Agitation  ondansetron Injectable 4 milliGRAM(s) IV Push three times a day PRN Nausea and/or Vomiting        LABS                                            14.9                  Neurophils% (auto):   49.0   (01-01 @ 04:56):    5.06 )-----------(166          Lymphocytes% (auto):  29.8                                          43.9                   Eosinphils% (auto):   2.4      Manual%: Neutrophils x    ; Lymphocytes x    ; Eosinophils x    ; Bands%: x    ; Blasts x                                    137    |  102    |  10                  Calcium: 9.3   / iCa: x      (01-01 @ 04:56)    ----------------------------<  108       Magnesium: 2.0                              4.2     |  24     |  0.7              Phosphorous: x        TPro  7.0    /  Alb  4.2    /  TBili  0.4    /  DBili  x      /  AST  29     /  ALT  24     /  AlkPhos  56     01 Jan 2024 04:56            ASSESSMENT & PLAN:         For Follow-Up: MICU Transfer Note  ---------------------------    Transfer from: MICU  Transfer to: SDU  Approved by Dr. Sebastian Cheng    38-year-old male with history of alcohol use disorder, anxiety and depression came in for alcohol withdrawal. Sx started around 3 days ago when he cut down on his drinking and mostly constitute palpitations anxiety and tremors. Pt self medicated with alcohol shots that improved his sx for short periods of time.    MICU COURSE    # Alcohol abuse complicated by Alcohol withdrawal   12/28  - CIWA protocol >10  - Catch team consulted  - thiamine, folic acid and multivitamin  12/29  - on 0.7 precedex   - CIWA score 7 today  - CATCH team consult.   - Serum and Urine drugs screen pending  12/30  - Addiction medicine: start ativan taper.  - still requiring max rate precedex  - Ativan 4mg Q4H, ativan 2mg Q2H PRN  - Drug screen negative  12/31  - Restarted his hydroxyzine  01/01  - Decreased Ativan to 2mg Q4H PO standing and 2mg Q2H IV prn  - off Precedex    # Anxiety/Depression   - c/w home med  - fluoxetine 20mg daily  01/01  - Start hydroxyzine    # Thrombocytopenia   12/29  - RUQ US: Patchy areas of increased echogenicity consistent with patchy fatty infiltration  - PLTs ranges from 70s-230s      GI prophylaxis: protonix  DVT prophylaxis: lovenox  Diet: regular  Code Status: full code  Disposition: MICU    Patient is stable for Downgrade to SDU    To-Do:  -Taper ativan    OBJECTIVE --  Vital Signs Last 24 Hrs  T(C): 37 (01 Jan 2024 04:00), Max: 37 (01 Jan 2024 04:00)  T(F): 98.6 (01 Jan 2024 04:00), Max: 98.6 (01 Jan 2024 04:00)  HR: 71 (01 Jan 2024 08:00) (52 - 88)  BP: 110/71 (01 Jan 2024 08:00) (102/86 - 152/85)  BP(mean): 85 (01 Jan 2024 08:00) (74 - 111)  RR: 38 (01 Jan 2024 08:00) (6 - 38)  SpO2: 100% (01 Jan 2024 08:00) (96% - 100%)    Parameters below as of 01 Jan 2024 04:00  Patient On (Oxygen Delivery Method): room air        I&O's Summary    31 Dec 2023 07:01  -  01 Jan 2024 07:00  --------------------------------------------------------  IN: 1829.6 mL / OUT: 4100 mL / NET: -2270.4 mL    01 Jan 2024 07:01  -  01 Jan 2024 10:41  --------------------------------------------------------  IN: 10.6 mL / OUT: 0 mL / NET: 10.6 mL        MEDICATIONS  (STANDING):  chlorhexidine 2% Cloths 1 Application(s) Topical daily  dexMEDEtomidine Infusion 0.5 MICROgram(s)/kG/Hr (8.81 mL/Hr) IV Continuous <Continuous>  enoxaparin Injectable 40 milliGRAM(s) SubCutaneous every 24 hours  FLUoxetine 20 milliGRAM(s) Oral daily  folic acid 1 milliGRAM(s) Oral daily  gabapentin 400 milliGRAM(s) Oral three times a day  LORazepam   Injectable 2 milliGRAM(s) IV Push every 4 hours  multivitamin 1 Tablet(s) Oral daily  nicotine -  14 mG/24Hr(s) Patch 1 Patch Transdermal daily  pantoprazole    Tablet 40 milliGRAM(s) Oral before breakfast  thiamine 100 milliGRAM(s) Oral daily  traZODone 100 milliGRAM(s) Oral at bedtime    MEDICATIONS  (PRN):  acetaminophen     Tablet .. 650 milliGRAM(s) Oral every 6 hours PRN Temp greater or equal to 38C (100.4F), Mild Pain (1 - 3)  diphenhydrAMINE 25 milliGRAM(s) Oral every 6 hours PRN Rash and/or Itching  hydrOXYzine hydrochloride 50 milliGRAM(s) Oral every 8 hours PRN Itching  loperamide 2 milliGRAM(s) Oral every 12 hours PRN Diarrhea  LORazepam   Injectable 2 milliGRAM(s) IV Push every 2 hours PRN Agitation  ondansetron Injectable 4 milliGRAM(s) IV Push three times a day PRN Nausea and/or Vomiting        LABS                                            14.9                  Neurophils% (auto):   49.0   (01-01 @ 04:56):    5.06 )-----------(166          Lymphocytes% (auto):  29.8                                          43.9                   Eosinphils% (auto):   2.4      Manual%: Neutrophils x    ; Lymphocytes x    ; Eosinophils x    ; Bands%: x    ; Blasts x                                    137    |  102    |  10                  Calcium: 9.3   / iCa: x      (01-01 @ 04:56)    ----------------------------<  108       Magnesium: 2.0                              4.2     |  24     |  0.7              Phosphorous: x        TPro  7.0    /  Alb  4.2    /  TBili  0.4    /  DBili  x      /  AST  29     /  ALT  24     /  AlkPhos  56     01 Jan 2024 04:56 MICU Transfer Note  ---------------------------    Transfer from: MICU  Transfer to: SDU  Approved by Dr. Sebastian Cheng    38-year-old male with history of alcohol use disorder, anxiety and depression came in for alcohol withdrawal. Sx started around 3 days ago when he cut down on his drinking and mostly constitute palpitations anxiety and tremors. Pt self medicated with alcohol shots that improved his sx for short periods of time.    MICU COURSE    # Alcohol abuse complicated by Alcohol withdrawal   12/28  - CIWA protocol >10  - Catch team consulted  - thiamine, folic acid and multivitamin  12/29  - on 0.7 precedex   - CIWA score 7 today  - CATCH team consult.   - Serum and Urine drugs screen pending  12/30  - Addiction medicine: start ativan taper.  - still requiring max rate precedex  - Ativan 4mg Q4H, ativan 2mg Q2H PRN  - Drug screen negative  12/31  - Restarted his hydroxyzine  01/01  - Decreased Ativan to 2mg Q4H PO standing and 2mg Q2H IV prn  - off Precedex    # Anxiety/Depression   - c/w home med  - fluoxetine 20mg daily  01/01  - Start hydroxyzine    # Thrombocytopenia   12/29  - RUQ US: Patchy areas of increased echogenicity consistent with patchy fatty infiltration  - PLTs ranges from 70s-230s      GI prophylaxis: protonix  DVT prophylaxis: lovenox  Diet: regular  Code Status: full code  Disposition: MICU    Patient is stable for Downgrade to SDU    To-Do:  -Taper ativan    OBJECTIVE --  Vital Signs Last 24 Hrs  T(C): 37 (01 Jan 2024 04:00), Max: 37 (01 Jan 2024 04:00)  T(F): 98.6 (01 Jan 2024 04:00), Max: 98.6 (01 Jan 2024 04:00)  HR: 71 (01 Jan 2024 08:00) (52 - 88)  BP: 110/71 (01 Jan 2024 08:00) (102/86 - 152/85)  BP(mean): 85 (01 Jan 2024 08:00) (74 - 111)  RR: 38 (01 Jan 2024 08:00) (6 - 38)  SpO2: 100% (01 Jan 2024 08:00) (96% - 100%)    Parameters below as of 01 Jan 2024 04:00  Patient On (Oxygen Delivery Method): room air        I&O's Summary    31 Dec 2023 07:01  -  01 Jan 2024 07:00  --------------------------------------------------------  IN: 1829.6 mL / OUT: 4100 mL / NET: -2270.4 mL    01 Jan 2024 07:01  -  01 Jan 2024 10:41  --------------------------------------------------------  IN: 10.6 mL / OUT: 0 mL / NET: 10.6 mL        MEDICATIONS  (STANDING):  chlorhexidine 2% Cloths 1 Application(s) Topical daily  dexMEDEtomidine Infusion 0.5 MICROgram(s)/kG/Hr (8.81 mL/Hr) IV Continuous <Continuous>  enoxaparin Injectable 40 milliGRAM(s) SubCutaneous every 24 hours  FLUoxetine 20 milliGRAM(s) Oral daily  folic acid 1 milliGRAM(s) Oral daily  gabapentin 400 milliGRAM(s) Oral three times a day  LORazepam   Injectable 2 milliGRAM(s) IV Push every 4 hours  multivitamin 1 Tablet(s) Oral daily  nicotine -  14 mG/24Hr(s) Patch 1 Patch Transdermal daily  pantoprazole    Tablet 40 milliGRAM(s) Oral before breakfast  thiamine 100 milliGRAM(s) Oral daily  traZODone 100 milliGRAM(s) Oral at bedtime    MEDICATIONS  (PRN):  acetaminophen     Tablet .. 650 milliGRAM(s) Oral every 6 hours PRN Temp greater or equal to 38C (100.4F), Mild Pain (1 - 3)  diphenhydrAMINE 25 milliGRAM(s) Oral every 6 hours PRN Rash and/or Itching  hydrOXYzine hydrochloride 50 milliGRAM(s) Oral every 8 hours PRN Itching  loperamide 2 milliGRAM(s) Oral every 12 hours PRN Diarrhea  LORazepam   Injectable 2 milliGRAM(s) IV Push every 2 hours PRN Agitation  ondansetron Injectable 4 milliGRAM(s) IV Push three times a day PRN Nausea and/or Vomiting        LABS                                            14.9                  Neurophils% (auto):   49.0   (01-01 @ 04:56):    5.06 )-----------(166          Lymphocytes% (auto):  29.8                                          43.9                   Eosinphils% (auto):   2.4      Manual%: Neutrophils x    ; Lymphocytes x    ; Eosinophils x    ; Bands%: x    ; Blasts x                                    137    |  102    |  10                  Calcium: 9.3   / iCa: x      (01-01 @ 04:56)    ----------------------------<  108       Magnesium: 2.0                              4.2     |  24     |  0.7              Phosphorous: x        TPro  7.0    /  Alb  4.2    /  TBili  0.4    /  DBili  x      /  AST  29     /  ALT  24     /  AlkPhos  56     01 Jan 2024 04:56      Physical exam:  GENERAL: NAD, lying in bed comfortably,  HEAD: NCAT  NECK: Supple     HEART:  normal S1/S2  LUNGS: No acute respiratory distress,   ABDOMEN:  soft, non-tender, non-distended  EXTREMITIES: no edema  NERVOUS SYSTEM:  A&Ox3, follows commands, answers questions appropriately   SKIN: No rashes or lesions

## 2024-01-01 NOTE — PROGRESS NOTE ADULT - SUBJECTIVE AND OBJECTIVE BOX
Over Night Events: events noted, on precedex, afebrile    PHYSICAL EXAM    ICU Vital Signs Last 24 Hrs  T(C): 37 (01 Jan 2024 04:00), Max: 37 (01 Jan 2024 04:00)  T(F): 98.6 (01 Jan 2024 04:00), Max: 98.6 (01 Jan 2024 04:00)  HR: 71 (01 Jan 2024 08:00) (52 - 88)  BP: 110/71 (01 Jan 2024 08:00) (102/86 - 152/85)  BP(mean): 85 (01 Jan 2024 08:00) (74 - 111)-  RR: 38 (01 Jan 2024 08:00) (6 - 38)  SpO2: 100% (01 Jan 2024 08:00) (96% - 100%)    O2 Parameters below as of 01 Jan 2024 04:00  Patient On (Oxygen Delivery Method): room air            General: ill looking  Lungs: Bilateral BS  Cardiovascular: Regular   Abdomen: Soft, Positive BS  Extremities: No clubbing   Skin: Warm  Neurological: Non focal / tremoe      12-31-23 @ 07:01  -  01-01-24 @ 07:00  --------------------------------------------------------  IN:    Dexmedetomidine: 329.6 mL    Oral Fluid: 1500 mL  Total IN: 1829.6 mL    OUT:    Voided (mL): 4100 mL  Total OUT: 4100 mL    Total NET: -2270.4 mL      01-01-24 @ 07:01  -  01-01-24 @ 08:35  --------------------------------------------------------  IN:    Dexmedetomidine: 10.6 mL  Total IN: 10.6 mL    OUT:  Total OUT: 0 mL    Total NET: 10.6 mL          LABS:                          14.9   5.06  )-----------( 166      ( 01 Jan 2024 04:56 )             43.9                                               01-01    137  |  102  |  10  ----------------------------<  108<H>  4.2   |  24  |  0.7    Ca    9.3      01 Jan 2024 04:56  Mg     2.0     01-01    TPro  7.0  /  Alb  4.2  /  TBili  0.4  /  DBili  x   /  AST  29  /  ALT  24  /  AlkPhos  56  01-01                                             Urinalysis Basic - ( 01 Jan 2024 04:56 )    Color: x / Appearance: x / SG: x / pH: x  Gluc: 108 mg/dL / Ketone: x  / Bili: x / Urobili: x   Blood: x / Protein: x / Nitrite: x   Leuk Esterase: x / RBC: x / WBC x   Sq Epi: x / Non Sq Epi: x / Bacteria: x                                                  LIVER FUNCTIONS - ( 01 Jan 2024 04:56 )  Alb: 4.2 g/dL / Pro: 7.0 g/dL / ALK PHOS: 56 U/L / ALT: 24 U/L / AST: 29 U/L / GGT: x                                                                                                                                       MEDICATIONS  (STANDING):  chlorhexidine 2% Cloths 1 Application(s) Topical daily  dexMEDEtomidine Infusion 0.5 MICROgram(s)/kG/Hr (8.81 mL/Hr) IV Continuous <Continuous>  enoxaparin Injectable 40 milliGRAM(s) SubCutaneous every 24 hours  FLUoxetine 20 milliGRAM(s) Oral daily  folic acid 1 milliGRAM(s) Oral daily  gabapentin 400 milliGRAM(s) Oral three times a day  LORazepam   Injectable 4 milliGRAM(s) IV Push every 4 hours  multivitamin 1 Tablet(s) Oral daily  nicotine -  14 mG/24Hr(s) Patch 1 Patch Transdermal daily  pantoprazole    Tablet 40 milliGRAM(s) Oral before breakfast  thiamine 100 milliGRAM(s) Oral daily  traZODone 100 milliGRAM(s) Oral at bedtime    MEDICATIONS  (PRN):  acetaminophen     Tablet .. 650 milliGRAM(s) Oral every 6 hours PRN Temp greater or equal to 38C (100.4F), Mild Pain (1 - 3)  diphenhydrAMINE 25 milliGRAM(s) Oral every 6 hours PRN Rash and/or Itching  loperamide 2 milliGRAM(s) Oral every 12 hours PRN Diarrhea  LORazepam   Injectable 2 milliGRAM(s) IV Push every 2 hours PRN Agitation  ondansetron Injectable 4 milliGRAM(s) IV Push three times a day PRN Nausea and/or Vomiting      CXR noted

## 2024-01-02 LAB
ALBUMIN SERPL ELPH-MCNC: 4.4 G/DL — SIGNIFICANT CHANGE UP (ref 3.5–5.2)
ALBUMIN SERPL ELPH-MCNC: 4.4 G/DL — SIGNIFICANT CHANGE UP (ref 3.5–5.2)
ALP SERPL-CCNC: 52 U/L — SIGNIFICANT CHANGE UP (ref 30–115)
ALP SERPL-CCNC: 52 U/L — SIGNIFICANT CHANGE UP (ref 30–115)
ALT FLD-CCNC: 23 U/L — SIGNIFICANT CHANGE UP (ref 0–41)
ALT FLD-CCNC: 23 U/L — SIGNIFICANT CHANGE UP (ref 0–41)
ANION GAP SERPL CALC-SCNC: 12 MMOL/L — SIGNIFICANT CHANGE UP (ref 7–14)
ANION GAP SERPL CALC-SCNC: 12 MMOL/L — SIGNIFICANT CHANGE UP (ref 7–14)
AST SERPL-CCNC: 29 U/L — SIGNIFICANT CHANGE UP (ref 0–41)
AST SERPL-CCNC: 29 U/L — SIGNIFICANT CHANGE UP (ref 0–41)
BASOPHILS # BLD AUTO: 0.09 K/UL — SIGNIFICANT CHANGE UP (ref 0–0.2)
BASOPHILS # BLD AUTO: 0.09 K/UL — SIGNIFICANT CHANGE UP (ref 0–0.2)
BASOPHILS NFR BLD AUTO: 1.7 % — HIGH (ref 0–1)
BASOPHILS NFR BLD AUTO: 1.7 % — HIGH (ref 0–1)
BILIRUB SERPL-MCNC: 0.3 MG/DL — SIGNIFICANT CHANGE UP (ref 0.2–1.2)
BILIRUB SERPL-MCNC: 0.3 MG/DL — SIGNIFICANT CHANGE UP (ref 0.2–1.2)
BUN SERPL-MCNC: 13 MG/DL — SIGNIFICANT CHANGE UP (ref 10–20)
BUN SERPL-MCNC: 13 MG/DL — SIGNIFICANT CHANGE UP (ref 10–20)
CALCIUM SERPL-MCNC: 9.1 MG/DL — SIGNIFICANT CHANGE UP (ref 8.4–10.4)
CALCIUM SERPL-MCNC: 9.1 MG/DL — SIGNIFICANT CHANGE UP (ref 8.4–10.4)
CHLORIDE SERPL-SCNC: 102 MMOL/L — SIGNIFICANT CHANGE UP (ref 98–110)
CHLORIDE SERPL-SCNC: 102 MMOL/L — SIGNIFICANT CHANGE UP (ref 98–110)
CO2 SERPL-SCNC: 25 MMOL/L — SIGNIFICANT CHANGE UP (ref 17–32)
CO2 SERPL-SCNC: 25 MMOL/L — SIGNIFICANT CHANGE UP (ref 17–32)
CREAT SERPL-MCNC: 1 MG/DL — SIGNIFICANT CHANGE UP (ref 0.7–1.5)
CREAT SERPL-MCNC: 1 MG/DL — SIGNIFICANT CHANGE UP (ref 0.7–1.5)
DRUG SCREEN, SERUM: ABNORMAL
DRUG SCREEN, SERUM: ABNORMAL
EGFR: 99 ML/MIN/1.73M2 — SIGNIFICANT CHANGE UP
EGFR: 99 ML/MIN/1.73M2 — SIGNIFICANT CHANGE UP
EOSINOPHIL # BLD AUTO: 0.09 K/UL — SIGNIFICANT CHANGE UP (ref 0–0.7)
EOSINOPHIL # BLD AUTO: 0.09 K/UL — SIGNIFICANT CHANGE UP (ref 0–0.7)
EOSINOPHIL NFR BLD AUTO: 1.7 % — SIGNIFICANT CHANGE UP (ref 0–8)
EOSINOPHIL NFR BLD AUTO: 1.7 % — SIGNIFICANT CHANGE UP (ref 0–8)
GLUCOSE SERPL-MCNC: 78 MG/DL — SIGNIFICANT CHANGE UP (ref 70–99)
GLUCOSE SERPL-MCNC: 78 MG/DL — SIGNIFICANT CHANGE UP (ref 70–99)
HCT VFR BLD CALC: 43.4 % — SIGNIFICANT CHANGE UP (ref 42–52)
HCT VFR BLD CALC: 43.4 % — SIGNIFICANT CHANGE UP (ref 42–52)
HGB BLD-MCNC: 14.9 G/DL — SIGNIFICANT CHANGE UP (ref 14–18)
HGB BLD-MCNC: 14.9 G/DL — SIGNIFICANT CHANGE UP (ref 14–18)
IMM GRANULOCYTES NFR BLD AUTO: 1.5 % — HIGH (ref 0.1–0.3)
IMM GRANULOCYTES NFR BLD AUTO: 1.5 % — HIGH (ref 0.1–0.3)
LYMPHOCYTES # BLD AUTO: 1.45 K/UL — SIGNIFICANT CHANGE UP (ref 1.2–3.4)
LYMPHOCYTES # BLD AUTO: 1.45 K/UL — SIGNIFICANT CHANGE UP (ref 1.2–3.4)
LYMPHOCYTES # BLD AUTO: 27.1 % — SIGNIFICANT CHANGE UP (ref 20.5–51.1)
LYMPHOCYTES # BLD AUTO: 27.1 % — SIGNIFICANT CHANGE UP (ref 20.5–51.1)
MCHC RBC-ENTMCNC: 31.7 PG — HIGH (ref 27–31)
MCHC RBC-ENTMCNC: 31.7 PG — HIGH (ref 27–31)
MCHC RBC-ENTMCNC: 34.3 G/DL — SIGNIFICANT CHANGE UP (ref 32–37)
MCHC RBC-ENTMCNC: 34.3 G/DL — SIGNIFICANT CHANGE UP (ref 32–37)
MCV RBC AUTO: 92.3 FL — SIGNIFICANT CHANGE UP (ref 80–94)
MCV RBC AUTO: 92.3 FL — SIGNIFICANT CHANGE UP (ref 80–94)
MONOCYTES # BLD AUTO: 0.96 K/UL — HIGH (ref 0.1–0.6)
MONOCYTES # BLD AUTO: 0.96 K/UL — HIGH (ref 0.1–0.6)
MONOCYTES NFR BLD AUTO: 17.9 % — HIGH (ref 1.7–9.3)
MONOCYTES NFR BLD AUTO: 17.9 % — HIGH (ref 1.7–9.3)
NEUTROPHILS # BLD AUTO: 2.69 K/UL — SIGNIFICANT CHANGE UP (ref 1.4–6.5)
NEUTROPHILS # BLD AUTO: 2.69 K/UL — SIGNIFICANT CHANGE UP (ref 1.4–6.5)
NEUTROPHILS NFR BLD AUTO: 50.1 % — SIGNIFICANT CHANGE UP (ref 42.2–75.2)
NEUTROPHILS NFR BLD AUTO: 50.1 % — SIGNIFICANT CHANGE UP (ref 42.2–75.2)
NRBC # BLD: 0 /100 WBCS — SIGNIFICANT CHANGE UP (ref 0–0)
NRBC # BLD: 0 /100 WBCS — SIGNIFICANT CHANGE UP (ref 0–0)
PLATELET # BLD AUTO: 191 K/UL — SIGNIFICANT CHANGE UP (ref 130–400)
PLATELET # BLD AUTO: 191 K/UL — SIGNIFICANT CHANGE UP (ref 130–400)
PMV BLD: 10 FL — SIGNIFICANT CHANGE UP (ref 7.4–10.4)
PMV BLD: 10 FL — SIGNIFICANT CHANGE UP (ref 7.4–10.4)
POTASSIUM SERPL-MCNC: 4.2 MMOL/L — SIGNIFICANT CHANGE UP (ref 3.5–5)
POTASSIUM SERPL-MCNC: 4.2 MMOL/L — SIGNIFICANT CHANGE UP (ref 3.5–5)
POTASSIUM SERPL-SCNC: 4.2 MMOL/L — SIGNIFICANT CHANGE UP (ref 3.5–5)
POTASSIUM SERPL-SCNC: 4.2 MMOL/L — SIGNIFICANT CHANGE UP (ref 3.5–5)
PROT SERPL-MCNC: 7 G/DL — SIGNIFICANT CHANGE UP (ref 6–8)
PROT SERPL-MCNC: 7 G/DL — SIGNIFICANT CHANGE UP (ref 6–8)
RBC # BLD: 4.7 M/UL — SIGNIFICANT CHANGE UP (ref 4.7–6.1)
RBC # BLD: 4.7 M/UL — SIGNIFICANT CHANGE UP (ref 4.7–6.1)
RBC # FLD: 12.6 % — SIGNIFICANT CHANGE UP (ref 11.5–14.5)
RBC # FLD: 12.6 % — SIGNIFICANT CHANGE UP (ref 11.5–14.5)
SODIUM SERPL-SCNC: 139 MMOL/L — SIGNIFICANT CHANGE UP (ref 135–146)
SODIUM SERPL-SCNC: 139 MMOL/L — SIGNIFICANT CHANGE UP (ref 135–146)
WBC # BLD: 5.36 K/UL — SIGNIFICANT CHANGE UP (ref 4.8–10.8)
WBC # BLD: 5.36 K/UL — SIGNIFICANT CHANGE UP (ref 4.8–10.8)
WBC # FLD AUTO: 5.36 K/UL — SIGNIFICANT CHANGE UP (ref 4.8–10.8)
WBC # FLD AUTO: 5.36 K/UL — SIGNIFICANT CHANGE UP (ref 4.8–10.8)

## 2024-01-02 PROCEDURE — 99233 SBSQ HOSP IP/OBS HIGH 50: CPT

## 2024-01-02 RX ADMIN — Medication 2 MILLIGRAM(S): at 09:17

## 2024-01-02 RX ADMIN — Medication 2 MILLIGRAM(S): at 06:21

## 2024-01-02 RX ADMIN — Medication 650 MILLIGRAM(S): at 18:36

## 2024-01-02 RX ADMIN — GABAPENTIN 400 MILLIGRAM(S): 400 CAPSULE ORAL at 06:20

## 2024-01-02 RX ADMIN — GABAPENTIN 400 MILLIGRAM(S): 400 CAPSULE ORAL at 13:45

## 2024-01-02 RX ADMIN — Medication 2 MILLIGRAM(S): at 22:35

## 2024-01-02 RX ADMIN — Medication 50 MILLIGRAM(S): at 23:50

## 2024-01-02 RX ADMIN — Medication 1 PATCH: at 12:17

## 2024-01-02 RX ADMIN — Medication 2 MILLIGRAM(S): at 04:59

## 2024-01-02 RX ADMIN — Medication 20 MILLIGRAM(S): at 12:17

## 2024-01-02 RX ADMIN — PANTOPRAZOLE SODIUM 40 MILLIGRAM(S): 20 TABLET, DELAYED RELEASE ORAL at 06:21

## 2024-01-02 RX ADMIN — Medication 100 MILLIGRAM(S): at 12:55

## 2024-01-02 RX ADMIN — Medication 1 TABLET(S): at 12:17

## 2024-01-02 RX ADMIN — Medication 1 MILLIGRAM(S): at 12:17

## 2024-01-02 RX ADMIN — GABAPENTIN 400 MILLIGRAM(S): 400 CAPSULE ORAL at 22:31

## 2024-01-02 RX ADMIN — Medication 25 MILLIGRAM(S): at 22:37

## 2024-01-02 RX ADMIN — Medication 2 MILLIGRAM(S): at 13:45

## 2024-01-02 RX ADMIN — Medication 2 MILLIGRAM(S): at 23:50

## 2024-01-02 RX ADMIN — Medication 1 PATCH: at 06:19

## 2024-01-02 RX ADMIN — Medication 2 MILLIGRAM(S): at 12:56

## 2024-01-02 RX ADMIN — Medication 1 PATCH: at 12:15

## 2024-01-02 RX ADMIN — Medication 2 MILLIGRAM(S): at 02:00

## 2024-01-02 RX ADMIN — Medication 2 MILLIGRAM(S): at 16:08

## 2024-01-02 RX ADMIN — Medication 2 MILLIGRAM(S): at 17:48

## 2024-01-02 RX ADMIN — Medication 2 MILLIGRAM(S): at 20:35

## 2024-01-02 RX ADMIN — Medication 100 MILLIGRAM(S): at 22:31

## 2024-01-02 NOTE — DIETITIAN INITIAL EVALUATION ADULT - PERTINENT MEDS FT
MEDICATIONS  (STANDING):  chlorhexidine 2% Cloths 1 Application(s) Topical daily  dexMEDEtomidine Infusion 0.5 MICROgram(s)/kG/Hr (8.81 mL/Hr) IV Continuous <Continuous>  enoxaparin Injectable 40 milliGRAM(s) SubCutaneous every 24 hours  FLUoxetine 20 milliGRAM(s) Oral daily  folic acid 1 milliGRAM(s) Oral daily  gabapentin 400 milliGRAM(s) Oral three times a day  LORazepam     Tablet 2 milliGRAM(s) Oral every 4 hours  multivitamin 1 Tablet(s) Oral daily  nicotine -  14 mG/24Hr(s) Patch 1 Patch Transdermal daily  pantoprazole    Tablet 40 milliGRAM(s) Oral before breakfast  thiamine 100 milliGRAM(s) Oral daily  traZODone 100 milliGRAM(s) Oral at bedtime    MEDICATIONS  (PRN):  acetaminophen     Tablet .. 650 milliGRAM(s) Oral every 6 hours PRN Temp greater or equal to 38C (100.4F), Mild Pain (1 - 3)  diphenhydrAMINE 25 milliGRAM(s) Oral every 6 hours PRN Rash and/or Itching  hydrOXYzine hydrochloride 50 milliGRAM(s) Oral every 8 hours PRN Itching  loperamide 2 milliGRAM(s) Oral every 12 hours PRN Diarrhea  LORazepam   Injectable 2 milliGRAM(s) IV Push every 2 hours PRN CIWA increase by 2  ondansetron Injectable 4 milliGRAM(s) IV Push three times a day PRN Nausea and/or Vomiting

## 2024-01-02 NOTE — DIETITIAN INITIAL EVALUATION ADULT - NSFNSPHYEXAMSKINFT_GEN_A_CORE
Impression: Dry eye syndrome of bilateral lacrimal glands: H04.123. Plan: Discussed findings and patient educated on condition. Rx artificial tears QID OU, warm compresses, and lid scrubs. intact

## 2024-01-02 NOTE — DIETITIAN INITIAL EVALUATION ADULT - NAME AND PHONE
RD to monitor: diet order, body composition, energy intake, nutrition focused physical finding: mod risk due 5-7 days  Interventions: meals and snacks, medical nutrition supplements, coordination of care,

## 2024-01-02 NOTE — DIETITIAN INITIAL EVALUATION ADULT - ADD RECOMMEND
1) Continue current diet order  2) Order ensure high protein to optimize energy and protein intake   3) Encourage PO intake and provide assistance prn

## 2024-01-02 NOTE — DIETITIAN INITIAL EVALUATION ADULT - ORAL INTAKE PTA/DIET HISTORY
Patient sleeping at time of RD visit, will need to obtain nutrition hx at f/u. Per staff pt consuming 50-75% of meals

## 2024-01-02 NOTE — DIETITIAN INITIAL EVALUATION ADULT - PERTINENT LABORATORY DATA
01-02    139  |  102  |  13  ----------------------------<  78  4.2   |  25  |  1.0    Ca    9.1      02 Jan 2024 05:12  Mg     2.0     01-01    TPro  7.0  /  Alb  4.4  /  TBili  0.3  /  DBili  x   /  AST  29  /  ALT  23  /  AlkPhos  52  01-02

## 2024-01-02 NOTE — PROGRESS NOTE ADULT - SUBJECTIVE AND OBJECTIVE BOX
Patient is a 38y old  Male who presents with a chief complaint of Alcohol Withdrawal (01 Jan 2024 08:35)        Over Night Events:  Off pressors.  on RA.  no extra Ativan last 24 hours.          ROS:     All ROS are negative except HPI         PHYSICAL EXAM    ICU Vital Signs Last 24 Hrs  T(C): 36.2 (02 Jan 2024 08:00), Max: 36.6 (01 Jan 2024 12:00)  T(F): 97.1 (02 Jan 2024 08:00), Max: 97.9 (01 Jan 2024 12:00)  HR: 102 (02 Jan 2024 08:00) (86 - 132)  BP: 110/72 (02 Jan 2024 08:00) (108/56 - 137/89)  BP(mean): 87 (02 Jan 2024 08:00) (77 - 102)  ABP: --  ABP(mean): --  RR: 18 (02 Jan 2024 08:00) (14 - 30)  SpO2: 99% (02 Jan 2024 08:00) (99% - 99%)    O2 Parameters below as of 02 Jan 2024 08:00  Patient On (Oxygen Delivery Method): room air            CONSTITUTIONAL:  Well nourished.  NAD    ENT:   Airway patent,   Mouth with normal mucosa.   No thrush    EYES:   Pupils equal,   Round and reactive to light.    CARDIAC:   Normal rate,   Regular rhythm.    No edema      RESPIRATORY:   No wheezing  Bilateral BS  Normal chest expansion  Not tachypneic,  No use of accessory muscles    GASTROINTESTINAL:  Abdomen soft,   Non-tender,   No guarding,   + BS    MUSCULOSKELETAL:   Range of motion is not limited,  No clubbing, cyanosis    NEUROLOGICAL:   Alert and oriented   No motor  deficits.    SKIN:   Skin normal color for race,   No evidence of rash.        01-01-24 @ 07:01  -  01-02-24 @ 07:00  --------------------------------------------------------  IN:    Dexmedetomidine: 10.6 mL    Oral Fluid: 1750 mL  Total IN: 1760.6 mL    OUT:    Voided (mL): 2250 mL  Total OUT: 2250 mL    Total NET: -489.4 mL          LABS:                            14.9   5.36  )-----------( 191      ( 02 Jan 2024 05:12 )             43.4                                               01-02    139  |  102  |  13  ----------------------------<  78  4.2   |  25  |  1.0    Ca    9.1      02 Jan 2024 05:12  Mg     2.0     01-01    TPro  7.0  /  Alb  4.4  /  TBili  0.3  /  DBili  x   /  AST  29  /  ALT  23  /  AlkPhos  52  01-02                                             Urinalysis Basic - ( 02 Jan 2024 05:12 )    Color: x / Appearance: x / SG: x / pH: x  Gluc: 78 mg/dL / Ketone: x  / Bili: x / Urobili: x   Blood: x / Protein: x / Nitrite: x   Leuk Esterase: x / RBC: x / WBC x   Sq Epi: x / Non Sq Epi: x / Bacteria: x                                                  LIVER FUNCTIONS - ( 02 Jan 2024 05:12 )  Alb: 4.4 g/dL / Pro: 7.0 g/dL / ALK PHOS: 52 U/L / ALT: 23 U/L / AST: 29 U/L / GGT: x                                                                                                                                       MEDICATIONS  (STANDING):  chlorhexidine 2% Cloths 1 Application(s) Topical daily  dexMEDEtomidine Infusion 0.5 MICROgram(s)/kG/Hr (8.81 mL/Hr) IV Continuous <Continuous>  enoxaparin Injectable 40 milliGRAM(s) SubCutaneous every 24 hours  FLUoxetine 20 milliGRAM(s) Oral daily  folic acid 1 milliGRAM(s) Oral daily  gabapentin 400 milliGRAM(s) Oral three times a day  LORazepam     Tablet 2 milliGRAM(s) Oral every 4 hours  multivitamin 1 Tablet(s) Oral daily  nicotine -  14 mG/24Hr(s) Patch 1 Patch Transdermal daily  pantoprazole    Tablet 40 milliGRAM(s) Oral before breakfast  thiamine 100 milliGRAM(s) Oral daily  traZODone 100 milliGRAM(s) Oral at bedtime    MEDICATIONS  (PRN):  acetaminophen     Tablet .. 650 milliGRAM(s) Oral every 6 hours PRN Temp greater or equal to 38C (100.4F), Mild Pain (1 - 3)  diphenhydrAMINE 25 milliGRAM(s) Oral every 6 hours PRN Rash and/or Itching  hydrOXYzine hydrochloride 50 milliGRAM(s) Oral every 8 hours PRN Itching  loperamide 2 milliGRAM(s) Oral every 12 hours PRN Diarrhea  LORazepam   Injectable 2 milliGRAM(s) IV Push every 2 hours PRN Agitation  ondansetron Injectable 4 milliGRAM(s) IV Push three times a day PRN Nausea and/or Vomiting      New X-rays reviewed:                                                                                  ECHO

## 2024-01-02 NOTE — DIETITIAN INITIAL EVALUATION ADULT - OTHER INFO
Pertinent medical information: Per critical care note:   Alcohol withdrawal improving   Alcohol use disorder  Thrombocytopenia  HO Cocaine and Xanax use

## 2024-01-03 LAB
ALBUMIN SERPL ELPH-MCNC: 4.3 G/DL — SIGNIFICANT CHANGE UP (ref 3.5–5.2)
ALBUMIN SERPL ELPH-MCNC: 4.3 G/DL — SIGNIFICANT CHANGE UP (ref 3.5–5.2)
ALP SERPL-CCNC: 48 U/L — SIGNIFICANT CHANGE UP (ref 30–115)
ALP SERPL-CCNC: 48 U/L — SIGNIFICANT CHANGE UP (ref 30–115)
ALT FLD-CCNC: 22 U/L — SIGNIFICANT CHANGE UP (ref 0–41)
ALT FLD-CCNC: 22 U/L — SIGNIFICANT CHANGE UP (ref 0–41)
ANION GAP SERPL CALC-SCNC: 10 MMOL/L — SIGNIFICANT CHANGE UP (ref 7–14)
ANION GAP SERPL CALC-SCNC: 10 MMOL/L — SIGNIFICANT CHANGE UP (ref 7–14)
AST SERPL-CCNC: 24 U/L — SIGNIFICANT CHANGE UP (ref 0–41)
AST SERPL-CCNC: 24 U/L — SIGNIFICANT CHANGE UP (ref 0–41)
BASOPHILS # BLD AUTO: 0.09 K/UL — SIGNIFICANT CHANGE UP (ref 0–0.2)
BASOPHILS # BLD AUTO: 0.09 K/UL — SIGNIFICANT CHANGE UP (ref 0–0.2)
BASOPHILS NFR BLD AUTO: 2 % — HIGH (ref 0–1)
BASOPHILS NFR BLD AUTO: 2 % — HIGH (ref 0–1)
BILIRUB SERPL-MCNC: 0.3 MG/DL — SIGNIFICANT CHANGE UP (ref 0.2–1.2)
BILIRUB SERPL-MCNC: 0.3 MG/DL — SIGNIFICANT CHANGE UP (ref 0.2–1.2)
BUN SERPL-MCNC: 9 MG/DL — LOW (ref 10–20)
BUN SERPL-MCNC: 9 MG/DL — LOW (ref 10–20)
CALCIUM SERPL-MCNC: 8.9 MG/DL — SIGNIFICANT CHANGE UP (ref 8.4–10.5)
CALCIUM SERPL-MCNC: 8.9 MG/DL — SIGNIFICANT CHANGE UP (ref 8.4–10.5)
CHLORIDE SERPL-SCNC: 102 MMOL/L — SIGNIFICANT CHANGE UP (ref 98–110)
CHLORIDE SERPL-SCNC: 102 MMOL/L — SIGNIFICANT CHANGE UP (ref 98–110)
CO2 SERPL-SCNC: 28 MMOL/L — SIGNIFICANT CHANGE UP (ref 17–32)
CO2 SERPL-SCNC: 28 MMOL/L — SIGNIFICANT CHANGE UP (ref 17–32)
CREAT SERPL-MCNC: 0.7 MG/DL — SIGNIFICANT CHANGE UP (ref 0.7–1.5)
CREAT SERPL-MCNC: 0.7 MG/DL — SIGNIFICANT CHANGE UP (ref 0.7–1.5)
EGFR: 122 ML/MIN/1.73M2 — SIGNIFICANT CHANGE UP
EGFR: 122 ML/MIN/1.73M2 — SIGNIFICANT CHANGE UP
EOSINOPHIL # BLD AUTO: 0.11 K/UL — SIGNIFICANT CHANGE UP (ref 0–0.7)
EOSINOPHIL # BLD AUTO: 0.11 K/UL — SIGNIFICANT CHANGE UP (ref 0–0.7)
EOSINOPHIL NFR BLD AUTO: 2.4 % — SIGNIFICANT CHANGE UP (ref 0–8)
EOSINOPHIL NFR BLD AUTO: 2.4 % — SIGNIFICANT CHANGE UP (ref 0–8)
GLUCOSE SERPL-MCNC: 80 MG/DL — SIGNIFICANT CHANGE UP (ref 70–99)
GLUCOSE SERPL-MCNC: 80 MG/DL — SIGNIFICANT CHANGE UP (ref 70–99)
HCT VFR BLD CALC: 40.7 % — LOW (ref 42–52)
HCT VFR BLD CALC: 40.7 % — LOW (ref 42–52)
HGB BLD-MCNC: 13.9 G/DL — LOW (ref 14–18)
HGB BLD-MCNC: 13.9 G/DL — LOW (ref 14–18)
IMM GRANULOCYTES NFR BLD AUTO: 0.9 % — HIGH (ref 0.1–0.3)
IMM GRANULOCYTES NFR BLD AUTO: 0.9 % — HIGH (ref 0.1–0.3)
LYMPHOCYTES # BLD AUTO: 1.44 K/UL — SIGNIFICANT CHANGE UP (ref 1.2–3.4)
LYMPHOCYTES # BLD AUTO: 1.44 K/UL — SIGNIFICANT CHANGE UP (ref 1.2–3.4)
LYMPHOCYTES # BLD AUTO: 31.2 % — SIGNIFICANT CHANGE UP (ref 20.5–51.1)
LYMPHOCYTES # BLD AUTO: 31.2 % — SIGNIFICANT CHANGE UP (ref 20.5–51.1)
MCHC RBC-ENTMCNC: 32.1 PG — HIGH (ref 27–31)
MCHC RBC-ENTMCNC: 32.1 PG — HIGH (ref 27–31)
MCHC RBC-ENTMCNC: 34.2 G/DL — SIGNIFICANT CHANGE UP (ref 32–37)
MCHC RBC-ENTMCNC: 34.2 G/DL — SIGNIFICANT CHANGE UP (ref 32–37)
MCV RBC AUTO: 94 FL — SIGNIFICANT CHANGE UP (ref 80–94)
MCV RBC AUTO: 94 FL — SIGNIFICANT CHANGE UP (ref 80–94)
MONOCYTES # BLD AUTO: 0.87 K/UL — HIGH (ref 0.1–0.6)
MONOCYTES # BLD AUTO: 0.87 K/UL — HIGH (ref 0.1–0.6)
MONOCYTES NFR BLD AUTO: 18.9 % — HIGH (ref 1.7–9.3)
MONOCYTES NFR BLD AUTO: 18.9 % — HIGH (ref 1.7–9.3)
NEUTROPHILS # BLD AUTO: 2.06 K/UL — SIGNIFICANT CHANGE UP (ref 1.4–6.5)
NEUTROPHILS # BLD AUTO: 2.06 K/UL — SIGNIFICANT CHANGE UP (ref 1.4–6.5)
NEUTROPHILS NFR BLD AUTO: 44.6 % — SIGNIFICANT CHANGE UP (ref 42.2–75.2)
NEUTROPHILS NFR BLD AUTO: 44.6 % — SIGNIFICANT CHANGE UP (ref 42.2–75.2)
NRBC # BLD: 0 /100 WBCS — SIGNIFICANT CHANGE UP (ref 0–0)
NRBC # BLD: 0 /100 WBCS — SIGNIFICANT CHANGE UP (ref 0–0)
PLATELET # BLD AUTO: 198 K/UL — SIGNIFICANT CHANGE UP (ref 130–400)
PLATELET # BLD AUTO: 198 K/UL — SIGNIFICANT CHANGE UP (ref 130–400)
PMV BLD: 10.2 FL — SIGNIFICANT CHANGE UP (ref 7.4–10.4)
PMV BLD: 10.2 FL — SIGNIFICANT CHANGE UP (ref 7.4–10.4)
POTASSIUM SERPL-MCNC: 4.1 MMOL/L — SIGNIFICANT CHANGE UP (ref 3.5–5)
POTASSIUM SERPL-MCNC: 4.1 MMOL/L — SIGNIFICANT CHANGE UP (ref 3.5–5)
POTASSIUM SERPL-SCNC: 4.1 MMOL/L — SIGNIFICANT CHANGE UP (ref 3.5–5)
POTASSIUM SERPL-SCNC: 4.1 MMOL/L — SIGNIFICANT CHANGE UP (ref 3.5–5)
PROT SERPL-MCNC: 6.7 G/DL — SIGNIFICANT CHANGE UP (ref 6–8)
PROT SERPL-MCNC: 6.7 G/DL — SIGNIFICANT CHANGE UP (ref 6–8)
RBC # BLD: 4.33 M/UL — LOW (ref 4.7–6.1)
RBC # BLD: 4.33 M/UL — LOW (ref 4.7–6.1)
RBC # FLD: 12.8 % — SIGNIFICANT CHANGE UP (ref 11.5–14.5)
RBC # FLD: 12.8 % — SIGNIFICANT CHANGE UP (ref 11.5–14.5)
SODIUM SERPL-SCNC: 140 MMOL/L — SIGNIFICANT CHANGE UP (ref 135–146)
SODIUM SERPL-SCNC: 140 MMOL/L — SIGNIFICANT CHANGE UP (ref 135–146)
WBC # BLD: 4.61 K/UL — LOW (ref 4.8–10.8)
WBC # BLD: 4.61 K/UL — LOW (ref 4.8–10.8)
WBC # FLD AUTO: 4.61 K/UL — LOW (ref 4.8–10.8)
WBC # FLD AUTO: 4.61 K/UL — LOW (ref 4.8–10.8)

## 2024-01-03 PROCEDURE — 99232 SBSQ HOSP IP/OBS MODERATE 35: CPT

## 2024-01-03 RX ADMIN — Medication 1.5 MILLIGRAM(S): at 21:21

## 2024-01-03 RX ADMIN — Medication 100 MILLIGRAM(S): at 21:26

## 2024-01-03 RX ADMIN — Medication 100 MILLIGRAM(S): at 11:48

## 2024-01-03 RX ADMIN — Medication 1.5 MILLIGRAM(S): at 17:02

## 2024-01-03 RX ADMIN — CHLORHEXIDINE GLUCONATE 1 APPLICATION(S): 213 SOLUTION TOPICAL at 05:40

## 2024-01-03 RX ADMIN — GABAPENTIN 400 MILLIGRAM(S): 400 CAPSULE ORAL at 05:40

## 2024-01-03 RX ADMIN — Medication 2 MILLIGRAM(S): at 05:42

## 2024-01-03 RX ADMIN — Medication 1 TABLET(S): at 11:48

## 2024-01-03 RX ADMIN — PANTOPRAZOLE SODIUM 40 MILLIGRAM(S): 20 TABLET, DELAYED RELEASE ORAL at 05:40

## 2024-01-03 RX ADMIN — GABAPENTIN 400 MILLIGRAM(S): 400 CAPSULE ORAL at 21:23

## 2024-01-03 RX ADMIN — Medication 1.5 MILLIGRAM(S): at 13:20

## 2024-01-03 RX ADMIN — Medication 2 MILLIGRAM(S): at 03:05

## 2024-01-03 RX ADMIN — CHLORHEXIDINE GLUCONATE 1 APPLICATION(S): 213 SOLUTION TOPICAL at 11:50

## 2024-01-03 RX ADMIN — GABAPENTIN 400 MILLIGRAM(S): 400 CAPSULE ORAL at 13:20

## 2024-01-03 RX ADMIN — Medication 650 MILLIGRAM(S): at 05:48

## 2024-01-03 RX ADMIN — Medication 1 PATCH: at 11:59

## 2024-01-03 RX ADMIN — Medication 20 MILLIGRAM(S): at 11:49

## 2024-01-03 RX ADMIN — Medication 1 MILLIGRAM(S): at 11:48

## 2024-01-03 RX ADMIN — Medication 1 PATCH: at 12:00

## 2024-01-03 RX ADMIN — Medication 2 MILLIGRAM(S): at 08:07

## 2024-01-03 RX ADMIN — Medication 1 PATCH: at 08:54

## 2024-01-03 RX ADMIN — Medication 1 PATCH: at 19:00

## 2024-01-03 RX ADMIN — Medication 1.5 MILLIGRAM(S): at 10:17

## 2024-01-03 RX ADMIN — Medication 50 MILLIGRAM(S): at 21:21

## 2024-01-03 NOTE — PROGRESS NOTE ADULT - SUBJECTIVE AND OBJECTIVE BOX
24H events:    Patient is a 38y old Male who presents with a chief complaint of Alcohol abuse with withdrawal without complication     (02 Jan 2024 12:00)    Primary diagnosis of Alcohol withdrawal      Day 1:  Day 2:  Day 3:     Today is hospital day 8d. This morning patient was seen and examined at bedside, resting comfortably in bed.    No acute or major events overnight.    Code Status:    Family communication:  Contact date:  Name of person contacted:  Relationship to patient:  Communication details:  What matters most:    PAST MEDICAL & SURGICAL HISTORY  No pertinent past medical history  anxiety  depression  polysubstance    No significant past surgical history      SOCIAL HISTORY:  Social History:      ALLERGIES:  Ceclor (Rash; Hives)    MEDICATIONS:  STANDING MEDICATIONS  chlorhexidine 2% Cloths 1 Application(s) Topical daily  enoxaparin Injectable 40 milliGRAM(s) SubCutaneous every 24 hours  FLUoxetine 20 milliGRAM(s) Oral daily  folic acid 1 milliGRAM(s) Oral daily  gabapentin 400 milliGRAM(s) Oral three times a day  LORazepam     Tablet 1.5 milliGRAM(s) Oral every 4 hours  multivitamin 1 Tablet(s) Oral daily  nicotine -  14 mG/24Hr(s) Patch 1 Patch Transdermal daily  pantoprazole    Tablet 40 milliGRAM(s) Oral before breakfast  thiamine 100 milliGRAM(s) Oral daily  traZODone 100 milliGRAM(s) Oral at bedtime    PRN MEDICATIONS  acetaminophen     Tablet .. 650 milliGRAM(s) Oral every 6 hours PRN  diphenhydrAMINE 25 milliGRAM(s) Oral every 6 hours PRN  hydrOXYzine hydrochloride 50 milliGRAM(s) Oral every 8 hours PRN  loperamide 2 milliGRAM(s) Oral every 12 hours PRN  LORazepam   Injectable 2 milliGRAM(s) IV Push every 2 hours PRN  ondansetron Injectable 4 milliGRAM(s) IV Push three times a day PRN    VITALS:   T(F): 97.9  HR: 101  BP: 114/71  RR: 18  SpO2: 99%    PHYSICAL EXAM:  GENERAL: NAD, lying in bed comfortably       HEART: normal rate     regular   normal s1s2     LUNGS: Unlabored respirations  CTA  ABDOMEN: Soft    nondistended   nontender     EXTREMITIES: Normal     NERVOUS SYSTEM:  A&Ox3   SKIN:  No rashes or lesions          LABS:                        13.9   4.61  )-----------( 198      ( 03 Jan 2024 06:04 )             40.7     01-03    140  |  102  |  9<L>  ----------------------------<  80  4.1   |  28  |  0.7    Ca    8.9      03 Jan 2024 06:04    TPro  6.7  /  Alb  4.3  /  TBili  0.3  /  DBili  x   /  AST  24  /  ALT  22  /  AlkPhos  48  01-03      Urinalysis Basic - ( 03 Jan 2024 06:04 )    Color: x / Appearance: x / SG: x / pH: x  Gluc: 80 mg/dL / Ketone: x  / Bili: x / Urobili: x   Blood: x / Protein: x / Nitrite: x   Leuk Esterase: x / RBC: x / WBC x   Sq Epi: x / Non Sq Epi: x / Bacteria: x                RADIOLOGY:

## 2024-01-03 NOTE — PROGRESS NOTE ADULT - ATTENDING COMMENTS
Patient seen at bedside. Changes made within note as well. Discussed with medical team that includes resident(s), medical student(s), nursing staff, case management.     as per initial admission summary     38-year-old male with history of alcohol use disorder, anxiety and depression came in for alcohol withdrawal. Sx started around 3 days ago when he cut down on his drinking and mostly constitute palpitations anxiety and tremors. Pt self medicated with alcohol shots that improved his sx for short periods of time.      # Alcohol abuse complicated by Alcohol withdrawal   12/28  - CIWA protocol >10  - Catch team consulted  - thiamine, folic acid and multivitamin  12/29  - on 0.7 precedex   - CATCH team consult.   - Serum and Urine drugs screen pending  12/30  - Addiction medicine: start ativan taper.  - still requiring max rate precedex  - Ativan 4mg Q4H, ativan 2mg Q2H PRN  - Drug screen negative  12/31  - Restarted his hydroxyzine  01/03  - Decreased Ativan to 2mg Q4H PO standing and 2mg Q2H IV prn  - off Precedex  -Addiction medicine following - rec Ativan taper - Ativan 1.5mg q4 standing X1 day, then 1mg q4 X1 day, 0.5mg q4 X1 day  -Monitor CIWA score     # Anxiety/Depression   - c/w home med  - fluoxetine 20mg daily  01/01  - on hydroxyzine    # Thrombocytopenia , improving   12/29  - RUQ US: Patchy areas of increased echogenicity consistent with patchy fatty infiltration  -Resolved - platelets 198 today       GI prophylaxis: protonix  DVT prophylaxis: lovenox  Diet: regular  Code Status: full code  Disposition: acute - floor     follow up: acute , downgrade from ICU, on ativan taper. follow addiction. catch team.

## 2024-01-04 LAB
ALBUMIN SERPL ELPH-MCNC: 4.2 G/DL — SIGNIFICANT CHANGE UP (ref 3.5–5.2)
ALBUMIN SERPL ELPH-MCNC: 4.2 G/DL — SIGNIFICANT CHANGE UP (ref 3.5–5.2)
ALP SERPL-CCNC: 49 U/L — SIGNIFICANT CHANGE UP (ref 30–115)
ALP SERPL-CCNC: 49 U/L — SIGNIFICANT CHANGE UP (ref 30–115)
ALT FLD-CCNC: 22 U/L — SIGNIFICANT CHANGE UP (ref 0–41)
ALT FLD-CCNC: 22 U/L — SIGNIFICANT CHANGE UP (ref 0–41)
ANION GAP SERPL CALC-SCNC: 12 MMOL/L — SIGNIFICANT CHANGE UP (ref 7–14)
ANION GAP SERPL CALC-SCNC: 12 MMOL/L — SIGNIFICANT CHANGE UP (ref 7–14)
AST SERPL-CCNC: 25 U/L — SIGNIFICANT CHANGE UP (ref 0–41)
AST SERPL-CCNC: 25 U/L — SIGNIFICANT CHANGE UP (ref 0–41)
BASOPHILS # BLD AUTO: 0.1 K/UL — SIGNIFICANT CHANGE UP (ref 0–0.2)
BASOPHILS # BLD AUTO: 0.1 K/UL — SIGNIFICANT CHANGE UP (ref 0–0.2)
BASOPHILS NFR BLD AUTO: 1.7 % — HIGH (ref 0–1)
BASOPHILS NFR BLD AUTO: 1.7 % — HIGH (ref 0–1)
BILIRUB SERPL-MCNC: <0.2 MG/DL — SIGNIFICANT CHANGE UP (ref 0.2–1.2)
BILIRUB SERPL-MCNC: <0.2 MG/DL — SIGNIFICANT CHANGE UP (ref 0.2–1.2)
BUN SERPL-MCNC: 9 MG/DL — LOW (ref 10–20)
BUN SERPL-MCNC: 9 MG/DL — LOW (ref 10–20)
CALCIUM SERPL-MCNC: 8.9 MG/DL — SIGNIFICANT CHANGE UP (ref 8.4–10.5)
CALCIUM SERPL-MCNC: 8.9 MG/DL — SIGNIFICANT CHANGE UP (ref 8.4–10.5)
CHLORIDE SERPL-SCNC: 104 MMOL/L — SIGNIFICANT CHANGE UP (ref 98–110)
CHLORIDE SERPL-SCNC: 104 MMOL/L — SIGNIFICANT CHANGE UP (ref 98–110)
CO2 SERPL-SCNC: 25 MMOL/L — SIGNIFICANT CHANGE UP (ref 17–32)
CO2 SERPL-SCNC: 25 MMOL/L — SIGNIFICANT CHANGE UP (ref 17–32)
CREAT SERPL-MCNC: 0.9 MG/DL — SIGNIFICANT CHANGE UP (ref 0.7–1.5)
CREAT SERPL-MCNC: 0.9 MG/DL — SIGNIFICANT CHANGE UP (ref 0.7–1.5)
EGFR: 113 ML/MIN/1.73M2 — SIGNIFICANT CHANGE UP
EGFR: 113 ML/MIN/1.73M2 — SIGNIFICANT CHANGE UP
EOSINOPHIL # BLD AUTO: 0.12 K/UL — SIGNIFICANT CHANGE UP (ref 0–0.7)
EOSINOPHIL # BLD AUTO: 0.12 K/UL — SIGNIFICANT CHANGE UP (ref 0–0.7)
EOSINOPHIL NFR BLD AUTO: 2.1 % — SIGNIFICANT CHANGE UP (ref 0–8)
EOSINOPHIL NFR BLD AUTO: 2.1 % — SIGNIFICANT CHANGE UP (ref 0–8)
GLUCOSE SERPL-MCNC: 76 MG/DL — SIGNIFICANT CHANGE UP (ref 70–99)
GLUCOSE SERPL-MCNC: 76 MG/DL — SIGNIFICANT CHANGE UP (ref 70–99)
HCT VFR BLD CALC: 39.7 % — LOW (ref 42–52)
HCT VFR BLD CALC: 39.7 % — LOW (ref 42–52)
HGB BLD-MCNC: 13.4 G/DL — LOW (ref 14–18)
HGB BLD-MCNC: 13.4 G/DL — LOW (ref 14–18)
IMM GRANULOCYTES NFR BLD AUTO: 0.5 % — HIGH (ref 0.1–0.3)
IMM GRANULOCYTES NFR BLD AUTO: 0.5 % — HIGH (ref 0.1–0.3)
LYMPHOCYTES # BLD AUTO: 1.93 K/UL — SIGNIFICANT CHANGE UP (ref 1.2–3.4)
LYMPHOCYTES # BLD AUTO: 1.93 K/UL — SIGNIFICANT CHANGE UP (ref 1.2–3.4)
LYMPHOCYTES # BLD AUTO: 33.4 % — SIGNIFICANT CHANGE UP (ref 20.5–51.1)
LYMPHOCYTES # BLD AUTO: 33.4 % — SIGNIFICANT CHANGE UP (ref 20.5–51.1)
MAGNESIUM SERPL-MCNC: 2.1 MG/DL — SIGNIFICANT CHANGE UP (ref 1.8–2.4)
MAGNESIUM SERPL-MCNC: 2.1 MG/DL — SIGNIFICANT CHANGE UP (ref 1.8–2.4)
MCHC RBC-ENTMCNC: 31.8 PG — HIGH (ref 27–31)
MCHC RBC-ENTMCNC: 31.8 PG — HIGH (ref 27–31)
MCHC RBC-ENTMCNC: 33.8 G/DL — SIGNIFICANT CHANGE UP (ref 32–37)
MCHC RBC-ENTMCNC: 33.8 G/DL — SIGNIFICANT CHANGE UP (ref 32–37)
MCV RBC AUTO: 94.1 FL — HIGH (ref 80–94)
MCV RBC AUTO: 94.1 FL — HIGH (ref 80–94)
MONOCYTES # BLD AUTO: 1.06 K/UL — HIGH (ref 0.1–0.6)
MONOCYTES # BLD AUTO: 1.06 K/UL — HIGH (ref 0.1–0.6)
MONOCYTES NFR BLD AUTO: 18.4 % — HIGH (ref 1.7–9.3)
MONOCYTES NFR BLD AUTO: 18.4 % — HIGH (ref 1.7–9.3)
NEUTROPHILS # BLD AUTO: 2.53 K/UL — SIGNIFICANT CHANGE UP (ref 1.4–6.5)
NEUTROPHILS # BLD AUTO: 2.53 K/UL — SIGNIFICANT CHANGE UP (ref 1.4–6.5)
NEUTROPHILS NFR BLD AUTO: 43.9 % — SIGNIFICANT CHANGE UP (ref 42.2–75.2)
NEUTROPHILS NFR BLD AUTO: 43.9 % — SIGNIFICANT CHANGE UP (ref 42.2–75.2)
NRBC # BLD: 0 /100 WBCS — SIGNIFICANT CHANGE UP (ref 0–0)
NRBC # BLD: 0 /100 WBCS — SIGNIFICANT CHANGE UP (ref 0–0)
PLATELET # BLD AUTO: 235 K/UL — SIGNIFICANT CHANGE UP (ref 130–400)
PLATELET # BLD AUTO: 235 K/UL — SIGNIFICANT CHANGE UP (ref 130–400)
PMV BLD: 10.4 FL — SIGNIFICANT CHANGE UP (ref 7.4–10.4)
PMV BLD: 10.4 FL — SIGNIFICANT CHANGE UP (ref 7.4–10.4)
POTASSIUM SERPL-MCNC: 4 MMOL/L — SIGNIFICANT CHANGE UP (ref 3.5–5)
POTASSIUM SERPL-MCNC: 4 MMOL/L — SIGNIFICANT CHANGE UP (ref 3.5–5)
POTASSIUM SERPL-SCNC: 4 MMOL/L — SIGNIFICANT CHANGE UP (ref 3.5–5)
POTASSIUM SERPL-SCNC: 4 MMOL/L — SIGNIFICANT CHANGE UP (ref 3.5–5)
PROT SERPL-MCNC: 6.7 G/DL — SIGNIFICANT CHANGE UP (ref 6–8)
PROT SERPL-MCNC: 6.7 G/DL — SIGNIFICANT CHANGE UP (ref 6–8)
RBC # BLD: 4.22 M/UL — LOW (ref 4.7–6.1)
RBC # BLD: 4.22 M/UL — LOW (ref 4.7–6.1)
RBC # FLD: 12.7 % — SIGNIFICANT CHANGE UP (ref 11.5–14.5)
RBC # FLD: 12.7 % — SIGNIFICANT CHANGE UP (ref 11.5–14.5)
SARS-COV-2 RNA SPEC QL NAA+PROBE: SIGNIFICANT CHANGE UP
SARS-COV-2 RNA SPEC QL NAA+PROBE: SIGNIFICANT CHANGE UP
SODIUM SERPL-SCNC: 141 MMOL/L — SIGNIFICANT CHANGE UP (ref 135–146)
SODIUM SERPL-SCNC: 141 MMOL/L — SIGNIFICANT CHANGE UP (ref 135–146)
WBC # BLD: 5.77 K/UL — SIGNIFICANT CHANGE UP (ref 4.8–10.8)
WBC # BLD: 5.77 K/UL — SIGNIFICANT CHANGE UP (ref 4.8–10.8)
WBC # FLD AUTO: 5.77 K/UL — SIGNIFICANT CHANGE UP (ref 4.8–10.8)
WBC # FLD AUTO: 5.77 K/UL — SIGNIFICANT CHANGE UP (ref 4.8–10.8)

## 2024-01-04 PROCEDURE — 99232 SBSQ HOSP IP/OBS MODERATE 35: CPT

## 2024-01-04 RX ORDER — DIPHENHYDRAMINE HCL 50 MG
25 CAPSULE ORAL
Refills: 0 | Status: DISCONTINUED | OUTPATIENT
Start: 2024-01-04 | End: 2024-01-05

## 2024-01-04 RX ADMIN — Medication 20 MILLIGRAM(S): at 11:33

## 2024-01-04 RX ADMIN — Medication 1 MILLIGRAM(S): at 17:09

## 2024-01-04 RX ADMIN — Medication 1 MILLIGRAM(S): at 10:04

## 2024-01-04 RX ADMIN — Medication 1 MILLIGRAM(S): at 14:40

## 2024-01-04 RX ADMIN — Medication 650 MILLIGRAM(S): at 21:07

## 2024-01-04 RX ADMIN — PANTOPRAZOLE SODIUM 40 MILLIGRAM(S): 20 TABLET, DELAYED RELEASE ORAL at 05:52

## 2024-01-04 RX ADMIN — Medication 100 MILLIGRAM(S): at 11:32

## 2024-01-04 RX ADMIN — Medication 1 MILLIGRAM(S): at 03:05

## 2024-01-04 RX ADMIN — Medication 50 MILLIGRAM(S): at 05:53

## 2024-01-04 RX ADMIN — CHLORHEXIDINE GLUCONATE 1 APPLICATION(S): 213 SOLUTION TOPICAL at 11:34

## 2024-01-04 RX ADMIN — Medication 100 MILLIGRAM(S): at 21:05

## 2024-01-04 RX ADMIN — Medication 1 PATCH: at 07:13

## 2024-01-04 RX ADMIN — Medication 50 MILLIGRAM(S): at 21:07

## 2024-01-04 RX ADMIN — Medication 1 TABLET(S): at 11:33

## 2024-01-04 RX ADMIN — Medication 1 MILLIGRAM(S): at 21:05

## 2024-01-04 RX ADMIN — Medication 1 MILLIGRAM(S): at 11:33

## 2024-01-04 RX ADMIN — GABAPENTIN 400 MILLIGRAM(S): 400 CAPSULE ORAL at 21:05

## 2024-01-04 RX ADMIN — GABAPENTIN 400 MILLIGRAM(S): 400 CAPSULE ORAL at 05:53

## 2024-01-04 RX ADMIN — Medication 650 MILLIGRAM(S): at 05:52

## 2024-01-04 RX ADMIN — Medication 1 MILLIGRAM(S): at 05:52

## 2024-01-04 RX ADMIN — Medication 1 PATCH: at 11:32

## 2024-01-04 RX ADMIN — Medication 1 PATCH: at 12:00

## 2024-01-04 RX ADMIN — GABAPENTIN 400 MILLIGRAM(S): 400 CAPSULE ORAL at 14:40

## 2024-01-04 NOTE — PROGRESS NOTE ADULT - PROVIDER SPECIALTY LIST ADULT
Critical Care
Hospitalist
Internal Medicine
Critical Care
Critical Care
Hospitalist
Critical Care
Internal Medicine
Critical Care
Critical Care
MICU
MICU

## 2024-01-04 NOTE — PROGRESS NOTE ADULT - SUBJECTIVE AND OBJECTIVE BOX
24H events:    Patient is a 38y old Male who presents with a chief complaint of Alcohol Withdrawal (03 Jan 2024 09:29)    Primary diagnosis of Alcohol withdrawal      Day 1:  Day 2:  Day 3:     Today is hospital day 9d. This morning patient was seen and examined at bedside, resting comfortably in bed.    No acute or major events overnight.    Code Status:    Family communication:  Contact date:  Name of person contacted:  Relationship to patient:  Communication details:  What matters most:    PAST MEDICAL & SURGICAL HISTORY  No pertinent past medical history  anxiety  depression  polysubstance    No significant past surgical history      SOCIAL HISTORY:  Social History:      ALLERGIES:  Ceclor (Rash; Hives)    MEDICATIONS:  STANDING MEDICATIONS  chlorhexidine 2% Cloths 1 Application(s) Topical daily  enoxaparin Injectable 40 milliGRAM(s) SubCutaneous every 24 hours  FLUoxetine 20 milliGRAM(s) Oral daily  folic acid 1 milliGRAM(s) Oral daily  gabapentin 400 milliGRAM(s) Oral three times a day  LORazepam     Tablet 1 milliGRAM(s) Oral every 4 hours  multivitamin 1 Tablet(s) Oral daily  nicotine -  14 mG/24Hr(s) Patch 1 Patch Transdermal daily  pantoprazole    Tablet 40 milliGRAM(s) Oral before breakfast  thiamine 100 milliGRAM(s) Oral daily  traZODone 100 milliGRAM(s) Oral at bedtime    PRN MEDICATIONS  acetaminophen     Tablet .. 650 milliGRAM(s) Oral every 6 hours PRN  diphenhydrAMINE 25 milliGRAM(s) Oral every 6 hours PRN  hydrOXYzine hydrochloride 50 milliGRAM(s) Oral every 8 hours PRN  loperamide 2 milliGRAM(s) Oral every 12 hours PRN  LORazepam   Injectable 2 milliGRAM(s) IV Push every 2 hours PRN  ondansetron Injectable 4 milliGRAM(s) IV Push three times a day PRN    VITALS:   T(F): 97.4  HR: 92  BP: 104/63  RR: 18  SpO2: --    PHYSICAL EXAM:  GENERAL: NAD, lying in bed comfortably       HEART: normal rate     regular   normal s1s2     LUNGS: Unlabored respirations  CTA  ABDOMEN: Soft    nondistended   nontender     EXTREMITIES: Normal     NERVOUS SYSTEM:  A&Ox3   SKIN:  No rashes or lesions        LABS:                        13.4   5.77  )-----------( 235      ( 04 Jan 2024 05:43 )             39.7     01-04    141  |  104  |  9<L>  ----------------------------<  76  4.0   |  25  |  0.9    Ca    8.9      04 Jan 2024 05:43  Mg     2.1     01-04    TPro  6.7  /  Alb  4.2  /  TBili  <0.2  /  DBili  x   /  AST  25  /  ALT  22  /  AlkPhos  49  01-04      Urinalysis Basic - ( 04 Jan 2024 05:43 )    Color: x / Appearance: x / SG: x / pH: x  Gluc: 76 mg/dL / Ketone: x  / Bili: x / Urobili: x   Blood: x / Protein: x / Nitrite: x   Leuk Esterase: x / RBC: x / WBC x   Sq Epi: x / Non Sq Epi: x / Bacteria: x                RADIOLOGY:

## 2024-01-04 NOTE — PROGRESS NOTE ADULT - ASSESSMENT
38-year-old male with history of alcohol use disorder, anxiety and depression came in for alcohol withdrawal.    # Alcohol abuse complicated by Alcohol withdrawal   12/28  - Urine tox:pending   - CIWA protocol >10  - Catch team consulted  - thiamine, folic acid and multivitamin  12/29  - on 0.7 precedex   - CIWA score 7 today  - CATCH team consult.   - Serum and Urine drugs screen pending  12/30  - Addiction medicine: start ativan taper.  - still requiring max rate precedex  - Ativan 4mg Q4H, ativan 2mg Q2H PRN  - Drug screen negative    # Anxiety/Depression   - c/w home med  - fluoxetine 20mg daily    # Thrombocytopenia   12/29  - RUQ US: Patchy areas of increased echogenicity consistent with patchy fatty infiltration  - PLTs ranges from 70s-230s      GI prophylaxis: protonix  DVT prophylaxis: lovenox  Diet: regular  Code Status: full code  Disposition: MICU    FOR FOLLOW UP:  - Wean off precedex  - taper ativan   
38-year-old male with history of alcohol use disorder, anxiety and depression came in for alcohol withdrawal. Sx started around 3 days ago when he cut down on his drinking and mostly constitute palpitations anxiety and tremors. Pt self medicated with alcohol shots that improved his sx for short periods of time.    MICU COURSE    # Alcohol abuse complicated by Alcohol withdrawal   12/28  - CIWA protocol >10  - Catch team consulted  - thiamine, folic acid and multivitamin  12/29  - on 0.7 precedex   - CIWA score 7 today  - CATCH team consult.   - Serum and Urine drugs screen pending  12/30  - Addiction medicine: start ativan taper.  - still requiring max rate precedex  - Ativan 4mg Q4H, ativan 2mg Q2H PRN  - Drug screen negative  12/31  - Restarted his hydroxyzine  01/01  - Decreased Ativan to 2mg Q4H PO standing and 2mg Q2H IV prn  - off Precedex  -Addiction medicine following - rec Ativan taper - Ativan 1.5mg q4 standing X1 day, then 1mg q4 X1 day, 0.5mg q4 X1 day  -Monitor CIWA score     # Anxiety/Depression   - c/w home med  - fluoxetine 20mg daily  01/01  - Start hydroxyzine    # Thrombocytopenia   12/29  - RUQ US: Patchy areas of increased echogenicity consistent with patchy fatty infiltration  -Resolved - platelets 198 today       GI prophylaxis: protonix  DVT prophylaxis: lovenox  Diet: regular  Code Status: full code  Disposition: acute - floor   
IMPRESSION:    Alcohol withdrawal on precedex  Alcohol use disorder  Thrombocytopenia  HO Cocaine and Xanax use disorder     PLAN    CNS: CIWA monitoring, Benzo standing for withdrawal, c/w folate, thiamine, MVI, dc precedex    HEENT: Oral care    PULMONARY: HOB @ 45 degrees, aspiration precautions. On room air.  CXR     CARDIAC: Avoid overload  ,    GASTROENTEROLOGY: PPI ppx. Feeding.      RENAL: Monitor CMP & UO. Correct electrolytes as needed    INFECTIOUS: Monitor off Abx    HEMATOLOGY: DVT ppx. Monitor CBC and Coags. Dimer    ENDOCRINOLOGY: Check FS, insulin protocol as needed.    MUSCULOSKELETAL: OOB to chair  as tolerated    DISPOSITION: SDU when off precedex  
IMPRESSION:    Alcohol withdrawal on precedex  Alcohol use disorder  Thrombocytopenia  HO Cocaine and Xanax use disorder     PLAN    CNS: CIWA monitoring, Benzo standing for withdrawal, c/w folate, thiamine, MVI, dc precedex, restart trazodone    HEENT: Oral care    PULMONARY: HOB @ 45 degrees, aspiration precautions. On room air.     CARDIAC: Avoid overload  ,    GASTROENTEROLOGY: PPI ppx. Feeding.      RENAL: Monitor CMP & UO. Correct electrolytes as needed    INFECTIOUS: Monitor off Abx    HEMATOLOGY: DVT ppx. Monitor CBC and Coags. Dimer    ENDOCRINOLOGY: Check FS, insulin protocol as needed.    MUSCULOSKELETAL: OOB to chair  as tolerated    DISPOSITION: SDU when off precedex  
IMPRESSION:    Alcohol withdrawal on precedex  Alcohol use disorder  Thrombocytopenia  HO Cocaine and Xanax use disorder     PLAN    CNS: CIWA monitoring, Benzo standing for withdrawal, c/w folate, thiamine, MVI, hydoxyzine    HEENT: Oral care    PULMONARY: HOB @ 45 degrees, aspiration precautions. On room air.     CARDIAC: Avoid overload  ,    GASTROENTEROLOGY: PPI ppx. Feeding.      RENAL: Monitor CMP & UO. Correct electrolytes as needed    INFECTIOUS: Monitor off Abx    HEMATOLOGY: DVT ppx. Monitor CBC and Coags. Dimer    ENDOCRINOLOGY: Check FS, insulin protocol as needed.    MUSCULOSKELETAL: OOB to chair  as tolerated    DISPOSITION: SDU 
38-year-old male with history of alcohol use disorder, anxiety and depression came in for alcohol withdrawal.    # Alcohol abuse complicated by Alcohol withdrawal   12/28  - Urine tox:pending   - CIWA protocol >10  - Catch team consulted  - thiamine, folic acid and multivitamin  12/29  - on 0.7 precedex   - CIWA score 7 today  - CATCH team consult.   - Serum and Urine drugs screen pending    # Anxiety/Depression   - c/w home med  - fluoxetine 20mg daily    # Thrombocytopenia   12/29  - RUQ US: Patchy areas of increased echogenicity consistent with patchy fatty infiltration  - PLTs ranges from 70s-230s      GI prophylaxis: protonix  DVT prophylaxis: lovenox  Diet: regular  Code Status: full code  Disposition: MICU    FOR FOLLOW UP:  -Wean off precedex  -Serum and urine drug screen  -CATCH team f/u.  
38-year-old male with history of alcohol use disorder, anxiety and depression came in for alcohol withdrawal. Sx started around 3 days ago when he cut down on his drinking and mostly constitute palpitations anxiety and tremors. Pt self medicated with alcohol shots that improved his sx for short periods of time.    MICU COURSE    # Alcohol abuse complicated by Alcohol withdrawal   12/28  - CIWA protocol >10  - Catch team consulted  - thiamine, folic acid and multivitamin  12/29  - on 0.7 precedex   - CIWA score 7 today  - CATCH team consult.   - Serum and Urine drugs screen pending  12/30  - Addiction medicine: start ativan taper.  - still requiring max rate precedex  - Ativan 4mg Q4H, ativan 2mg Q2H PRN  - Drug screen negative  12/31  - Restarted his hydroxyzine  01/01  - Decreased Ativan to 2mg Q4H PO standing and 2mg Q2H IV prn  - off Precedex  -Addiction medicine following - rec Ativan taper - Ativan 1.5mg q4 standing X1 day, then 1mg q4 X1 day, 0.5mg q4 X1 day  -Currently on 1mg q4 today> o.5 mg q 4 tomorrow   -Monitor CIWA score     # Anxiety/Depression   - c/w home med  - fluoxetine 20mg daily  01/01  - Start hydroxyzine    # Thrombocytopenia   12/29  - RUQ US: Patchy areas of increased echogenicity consistent with patchy fatty infiltration  -Resolved - platelets 198 today       GI prophylaxis: protonix  DVT prophylaxis: lovenox  Diet: regular  Code Status: full code  Disposition: acute - floor     
IMPRESSION:    Alcohol withdrawal  Alcohol use disorder  HO Cocaine and Xanax use disorder     PLAN    CNS: CIWA monitoring, Benzo standing for withdrawal, c/w folate, thiamine, MVI, FU Urine and Serum drug screen. CATCH team.  Might need Precedex     HEENT: Oral care    PULMONARY: HOB @ 45 degrees, aspiration precautions. On room air.  CXR     CARDIAC: Avoid overload      GASTROENTEROLOGY: PPI ppx. Feeding.  Trend LFTs.  Lipase   noted    RENAL: Monitor CMP & UO. Correct electrolytes as needed    INFECTIOUS: Monitor off Abx    HEMATOLOGY: DVT ppx. Monitor CBC and Coags. Dimer    ENDOCRINOLOGY: Check FS, insulin protocol as needed.    MUSCULOSKELETAL: OOB to chair  as tolerated    DISPOSITION: MICU       
IMPRESSION:    Alcohol withdrawal on precedex  Alcohol use disorder  Thrombocytopenia  HO Cocaine and Xanax use disorder     PLAN    CNS: CIWA monitoring, Benzo standing for withdrawal, c/w folate, thiamine, MVI, precede u to noted    HEENT: Oral care    PULMONARY: HOB @ 45 degrees, aspiration precautions. On room air.  CXR     CARDIAC: Avoid overload  , IVF    GASTROENTEROLOGY: PPI ppx. Feeding.      RENAL: Monitor CMP & UO. Correct electrolytes as needed    INFECTIOUS: Monitor off Abx    HEMATOLOGY: DVT ppx. Monitor CBC and Coags. Dimer    ENDOCRINOLOGY: Check FS, insulin protocol as needed.    MUSCULOSKELETAL: OOB to chair  as tolerated    DISPOSITION: MICU   
IMPRESSION:    Alcohol withdrawal improving   Alcohol use disorder  Thrombocytopenia  HO Cocaine and Xanax use disorder     PLAN    CNS: CIWA monitoring, Benzo standing for withdrawal, c/w folate, thiamine, MVI, hydoxyzine    HEENT: Oral care    PULMONARY: HOB @ 45 degrees, aspiration precautions. On room air.     CARDIAC: Avoid overload.      GASTROENTEROLOGY: PPI ppx. Feeding.      RENAL: Monitor CMP & UO. Correct electrolytes as needed    INFECTIOUS: Monitor off Abx    HEMATOLOGY: DVT ppx. Monitor CBC and Coags. Dimer noted.      ENDOCRINOLOGY: Check FS, insulin protocol as needed.    MUSCULOSKELETAL: OOB to chair  as tolerated    DISPOSITION: DGTF 
39 yo M with hx of Alcohol use disorder, Anxiety/Depression presents to ED for alcohol withdrawal.     1. Alcohol abuse complicated by Alcohol withdrawal with Suspected Thiamine and Folate deficiency   - Telemetry                     - Urine tox:pending   - CIWA protocol >10  - pt was on ativan margot, but CIWA increasing started precedex   - evaluated by CC, recc upgrade to ICU   - thiamine, folic acid and multivitamin   - CATCH team consult.     2. Anxiety/Depression   - c/w home med    3. Thrombocytopenia   - US abdomen: pending     #Progress Note Handoff  Pending (specify):  as above  Family discussion: house staff updated pt family  Disposition: upgrade to ICU   Decision to admit the pt is based on acuity as above

## 2024-01-04 NOTE — PROGRESS NOTE ADULT - REASON FOR ADMISSION
Alcohol Withdrawal

## 2024-01-04 NOTE — PROGRESS NOTE ADULT - ATTENDING COMMENTS
My note supersedes the resident's note in the event of a discrepancy -    Patient seen and examined this morning  lying in bed  in nad   anticipated for dc in am  no tremors  asking for ativan and atarax for discharge  walking around independently    Vital Signs Last 24 Hrs  T(C): 36.3 (04 Jan 2024 07:35), Max: 36.8 (03 Jan 2024 16:08)  T(F): 97.4 (04 Jan 2024 07:35), Max: 98.3 (03 Jan 2024 16:08)  HR: 92 (04 Jan 2024 07:35) (92 - 103)  BP: 104/63 (04 Jan 2024 07:35) (104/63 - 119/74)  BP(mean): --  RR: 18 (04 Jan 2024 07:35) (18 - 18)  SpO2: --    38-year-old male with history of alcohol use disorder, anxiety and depression came in for alcohol withdrawal. Sx started around 3 days ago when he cut down on his drinking and mostly constitute palpitations anxiety and tremors. Pt self medicated with alcohol shots that improved his sx for short periods of time.    # Continuous Alcohol abuse with Alcohol withdrawal on admission   - continue thiamine, folic acid and multivitamin  - CATCH team following - will follow up outpatient at [facility]  - Addiction medicine recommended ativan taper which will end on 1/5  - continue hydroxyzine  - decrease benadryl dosing due to lethargy     # Anxiety/Depression   - c/w home med  - patient follows with mental health outpatient    # Thrombocytopenia - resolved     Progress Note Handoff  Pending Consults: none  Pending Tests: none  Pending Results: none  Family Discussion: Discussed labs, meds, ambulation and dc home in am with pt and medical staff. All questions answered. PFD placed for 24hrs  Disposition: Home_x____/SNF______/Other_____/Unknown at this time_____  Spent over 55 min reviewing chart, speaking with patient/family and on coordinating patient care during interdisciplinary rounds My note supersedes the resident's note in the event of a discrepancy -    Patient seen and examined this morning  lying in bed  in nad   anticipated for dc in am  no tremors  asking for ativan and atarax for discharge  walking around independently    Vital Signs Last 24 Hrs  T(C): 36.3 (04 Jan 2024 07:35), Max: 36.8 (03 Jan 2024 16:08)  T(F): 97.4 (04 Jan 2024 07:35), Max: 98.3 (03 Jan 2024 16:08)  HR: 92 (04 Jan 2024 07:35) (92 - 103)  BP: 104/63 (04 Jan 2024 07:35) (104/63 - 119/74)  BP(mean): --  RR: 18 (04 Jan 2024 07:35) (18 - 18)  SpO2: --    38-year-old male with history of alcohol use disorder, anxiety and depression came in for alcohol withdrawal. Sx started around 3 days ago when he cut down on his drinking and mostly constitute palpitations anxiety and tremors. Pt self medicated with alcohol shots that improved his sx for short periods of time.    # Continuous Alcohol abuse with Alcohol withdrawal on admission   - continue thiamine, folic acid and multivitamin  - CATCH team following - will follow up outpatient at [facility]  - Addiction medicine recommended ativan taper which will end on 1/5  - continue hydroxyzine  - decrease benadryl dosing due to lethargy     # Anxiety/Depression   - c/w home med  - patient follows with mental health outpatient    # Thrombocytopenia - resolved     Progress Note Handoff  Pending Consults: none  Pending Tests: none  Pending Results: none  Family Discussion: Discussed labs, meds, ambulation and dc home in am with pt and medical staff. All questions answered. PFD placed for 24hrs  Disposition: Home_x____/SNF______/Other_____/Unknown at this time_____  Spent over 55 min reviewing chart, speaking with patient/family and on coordinating patient care during interdisciplinary rounds

## 2024-01-05 ENCOUNTER — TRANSCRIPTION ENCOUNTER (OUTPATIENT)
Age: 39
End: 2024-01-05

## 2024-01-05 VITALS
RESPIRATION RATE: 18 BRPM | DIASTOLIC BLOOD PRESSURE: 84 MMHG | HEART RATE: 97 BPM | SYSTOLIC BLOOD PRESSURE: 123 MMHG | TEMPERATURE: 98 F

## 2024-01-05 PROCEDURE — 99239 HOSP IP/OBS DSCHRG MGMT >30: CPT

## 2024-01-05 RX ORDER — GABAPENTIN 400 MG/1
1 CAPSULE ORAL
Refills: 0 | DISCHARGE

## 2024-01-05 RX ORDER — HYDROXYZINE HCL 10 MG
1 TABLET ORAL
Refills: 0 | DISCHARGE

## 2024-01-05 RX ORDER — THIAMINE MONONITRATE (VIT B1) 100 MG
1 TABLET ORAL
Qty: 30 | Refills: 0
Start: 2024-01-05 | End: 2024-02-03

## 2024-01-05 RX ORDER — FOLIC ACID 0.8 MG
1 TABLET ORAL
Qty: 30 | Refills: 0
Start: 2024-01-05 | End: 2024-02-03

## 2024-01-05 RX ORDER — GABAPENTIN 400 MG/1
1 CAPSULE ORAL
Qty: 90 | Refills: 0
Start: 2024-01-05 | End: 2024-02-03

## 2024-01-05 RX ORDER — HYDROXYZINE HCL 10 MG
1 TABLET ORAL
Qty: 90 | Refills: 0
Start: 2024-01-05 | End: 2024-02-03

## 2024-01-05 RX ORDER — PANTOPRAZOLE SODIUM 20 MG/1
1 TABLET, DELAYED RELEASE ORAL
Qty: 30 | Refills: 0
Start: 2024-01-05

## 2024-01-05 RX ADMIN — Medication 650 MILLIGRAM(S): at 05:26

## 2024-01-05 RX ADMIN — Medication 0.5 MILLIGRAM(S): at 02:12

## 2024-01-05 RX ADMIN — Medication 0.5 MILLIGRAM(S): at 05:26

## 2024-01-05 RX ADMIN — Medication 1 PATCH: at 06:19

## 2024-01-05 RX ADMIN — GABAPENTIN 400 MILLIGRAM(S): 400 CAPSULE ORAL at 05:26

## 2024-01-05 RX ADMIN — Medication 50 MILLIGRAM(S): at 05:26

## 2024-01-05 RX ADMIN — PANTOPRAZOLE SODIUM 40 MILLIGRAM(S): 20 TABLET, DELAYED RELEASE ORAL at 06:19

## 2024-01-05 RX ADMIN — Medication 0.5 MILLIGRAM(S): at 09:21

## 2024-01-05 RX ADMIN — Medication 1 PATCH: at 07:21

## 2024-01-05 NOTE — DISCHARGE NOTE PROVIDER - NSDCFUADDAPPT_GEN_ALL_CORE_FT
APPTS ARE READY TO BE MADE: [ ] YES    Best Family or Patient Contact (if needed):    Additional Information about above appointments (if needed):    1: Has appt on 2/16 8:30am at [facility]      APPTS ARE READY TO BE MADE: [ ] YES    Best Family or Patient Contact (if needed):    Additional Information about above appointments (if needed):    1: Has appt on 2/16 8:30am at [facility]      APPTS ARE READY TO BE MADE: [ ] YES    Best Family or Patient Contact (if needed):    Additional Information about above appointments (if needed):    1: Has appt on 2/16 8:30am at [address]     APPTS ARE READY TO BE MADE: [ ] YES    Best Family or Patient Contact (if needed):    Additional Information about above appointments (if needed):    1: Has appt on 2/16 8:30am at [address]

## 2024-01-05 NOTE — DISCHARGE NOTE PROVIDER - PROVIDER TOKENS
FREE:[LAST:[Medicine clinic],PHONE:[(   )    -],FAX:[(   )    -],SCHEDULEDAPPT:[02/16/2024],SCHEDULEDAPPTTIME:[08:30 AM]]

## 2024-01-05 NOTE — DISCHARGE NOTE PROVIDER - HOSPITAL COURSE
38-year-old male with history of alcohol use disorder, anxiety and depression came in for alcohol withdrawal. The Pt usually drinks 4 beers a day but for the past 3 weeks he has been drinking around 2 bottles of 750 ml Vodka daily after going through a bad breakup in order to cope. His sx started around 3 days ago when he cut down on his drinking and mostly constitute palpitations anxiety and tremors. Pt self medicated with alcohol shots that improved his sx for short periods of time. Today pt decided to come into the ED for detoxification and his last drink was around 7 am in the morning.  Patient endorses headache, but denies hallucinations, dizziness, lightheadedness, nausea or vomiting.    Pt denies cocaine and benzo abuse but prior drug screen is positive for cocaine metabolites.    8-year-old male with history of alcohol use disorder, anxiety and depression came in for alcohol withdrawal. Sx started around 3 days ago when he cut down on his drinking and mostly constitute palpitations anxiety and tremors. Pt self medicated with alcohol shots that improved his sx for short periods of time.    MICU COURSE    # Alcohol abuse complicated by Alcohol withdrawal   12/28  - Catch team following   - thiamine, folic acid and multivitamin  -s/p precedex in ICU  - Addiction medicine: start ativan taper.  - Drug screen [id number]  - Restarted his hydroxyzine 01/01  -Addiction medicine following - rec Ativan taper - Ativan 1.5mg q4 standing X1 day, then 1mg q4 X1 day, 0.5mg q4 X1 day  -Finished taper    # Anxiety/Depression   - c/w home med  - fluoxetine 20mg daily  01/01  - Restart home hydroxyzine    # Thrombocytopenia   12/29  - RUQ US: Patchy areas of increased echogenicity consistent with patchy fatty infiltration  -Resolved       Pt stable for DC. MAP appt 12/16 38-year-old male with history of alcohol use disorder, anxiety and depression came in for alcohol withdrawal. The Pt usually drinks 4 beers a day but for the past 3 weeks he has been drinking around 2 bottles of 750 ml Vodka daily after going through a bad breakup in order to cope. His sx started around 3 days ago when he cut down on his drinking and mostly constitute palpitations anxiety and tremors. Pt self medicated with alcohol shots that improved his sx for short periods of time. Today pt decided to come into the ED for detoxification and his last drink was around 7 am in the morning.  Patient endorses headache, but denies hallucinations, dizziness, lightheadedness, nausea or vomiting.    Pt denies cocaine and benzo abuse but prior drug screen is positive for cocaine metabolites.    8-year-old male with history of alcohol use disorder, anxiety and depression came in for alcohol withdrawal. Sx started around 3 days ago when he cut down on his drinking and mostly constitute palpitations anxiety and tremors. Pt self medicated with alcohol shots that improved his sx for short periods of time.    MICU COURSE    # Alcohol abuse complicated by Alcohol withdrawal   12/28  - Catch team following   - thiamine, folic acid and multivitamin  -s/p precedex in ICU  - Addiction medicine: start ativan taper.  - Drug screen [id number]  - Restarted his hydroxyzine 01/01  -Addiction medicine following - rec Ativan taper - Ativan 1.5mg q4 standing X1 day, then 1mg q4 X1 day, 0.5mg q4 X1 day  -Finished taper    # Anxiety/Depression   - c/w home med  - fluoxetine 20mg daily  01/01  - Restart home hydroxyzine    # Thrombocytopenia   12/29  - RUQ US: Patchy areas of increased echogenicity consistent with patchy fatty infiltration  -Resolved       Pt stable for DC. MAP appt 12/16 38-year-old male with history of alcohol use disorder, anxiety and depression came in for alcohol withdrawal. The Pt usually drinks 4 beers a day but for the past 3 weeks he has been drinking around 2 bottles of 750 ml Vodka daily after going through a bad breakup in order to cope. His sx started around 3 days ago when he cut down on his drinking and mostly constitute palpitations anxiety and tremors. Pt self medicated with alcohol shots that improved his sx for short periods of time. Today pt decided to come into the ED for detoxification and his last drink was around 7 am in the morning.  Patient endorses headache, but denies hallucinations, dizziness, lightheadedness, nausea or vomiting.    Pt denies cocaine and benzo abuse but prior drug screen is positive for cocaine metabolites.    38-year-old male with history of alcohol use disorder, anxiety and depression came in for alcohol withdrawal. Sx started around 3 days ago when he cut down on his drinking and mostly constitute palpitations anxiety and tremors. Pt self medicated with alcohol shots that improved his sx for short periods of time.    # Alcohol abuse complicated by Alcohol withdrawal   12/28  - Catch team following   - thiamine, folic acid and multivitamin  -s/p precedex in ICU  - Addiction medicine: start ativan taper.  - Drug screen [id number]  - Restarted his hydroxyzine 01/01  -Addiction medicine following - rec Ativan taper - Ativan 1.5mg q4 standing X1 day, then 1mg q4 X1 day, 0.5mg q4 X1 day  -Finished taper    # Anxiety/Depression   - c/w home med  - fluoxetine 20mg daily  01/01  - Restart home hydroxyzine    # Thrombocytopenia   12/29  - RUQ US: Patchy areas of increased echogenicity consistent with patchy fatty infiltration  -Resolved       Pt stable for DC. MAP appt 12/16 38-year-old male with history of alcohol use disorder, anxiety and depression came in for alcohol withdrawal. The Pt usually drinks 4 beers a day but for the past 3 weeks he has been drinking around 2 bottles of 750 ml Vodka daily after going through a bad breakup in order to cope. His sx started around 3 days ago when he cut down on his drinking and mostly constitute palpitations anxiety and tremors. Pt self medicated with alcohol shots that improved his sx for short periods of time. Today pt decided to come into the ED for detoxification and his last drink was around 7 am in the morning.  Patient endorses headache, but denies hallucinations, dizziness, lightheadedness, nausea or vomiting.    Pt denies cocaine and benzo abuse but prior drug screen is positive for cocaine metabolites.    38-year-old male with history of alcohol use disorder, anxiety and depression came in for alcohol withdrawal. Sx started around 3 days ago when he cut down on his drinking and mostly constitute palpitations anxiety and tremors. Pt self medicated with alcohol shots that improved his sx for short periods of time.    # Alcohol abuse complicated by Alcohol withdrawal   12/28  - Catch team following   - thiamine, folic acid and multivitamin  -s/p precedex in ICU  - Addiction medicine: start ativan taper.  - Drug screen [id number]  - Restarted his hydroxyzine 01/01  -Addiction medicine following - rec Ativan taper - Ativan 1.5mg q4 standing X1 day, then 1mg q4 X1 day, 0.5mg q4 X1 day  -Finished taper    # Anxiety/Depression   - c/w home med  - fluoxetine 20mg daily  01/01  - Restart home hydroxyzine    # Thrombocytopenia   12/29  - RUQ US: Patchy areas of increased echogenicity consistent with patchy fatty infiltration  -Resolved       Pt stable for DC. MAP appt 12/16 Medicine attending-    Patient seen and examined this morning  ambulating around the unit  in [facility]  no complaints  eager to go home today    Vital Signs Last 24 Hrs  T(C): 36.9 (05 Jan 2024 07:14), Max: 37.3 (04 Jan 2024 15:37)  T(F): 98.4 (05 Jan 2024 07:14), Max: 99.2 (04 Jan 2024 15:37)  HR: 97 (05 Jan 2024 07:14) (79 - 106)  BP: 123/84 (05 Jan 2024 07:14) (105/59 - 132/72)  BP(mean): --  RR: 18 (05 Jan 2024 07:14) (18 - 18)  SpO2: --    38-year-old male with history of alcohol use disorder, anxiety and depression came in for alcohol withdrawal. The Pt usually drinks 4 beers a day but for the past 3 weeks he has been drinking around 2 bottles of 750 ml Vodka daily after going through a bad breakup in order to cope. His sx started around 3 days ago when he cut down on his drinking and mostly constitute palpitations anxiety and tremors. Pt self medicated with alcohol shots that improved his sx for short periods of time. Today pt decided to come into the ED for detoxification and his last drink was around 7 am in the morning.  Patient endorses headache, but denies hallucinations, dizziness, lightheadedness, nausea or vomiting.  Pt denies cocaine and benzo abuse but prior drug screen is positive for cocaine metabolites.      38-year-old male with history of alcohol use disorder, anxiety and depression came in for alcohol withdrawal. Sx started around 3 days ago when he cut down on his drinking and mostly constitute palpitations anxiety and tremors. Pt self medicated with alcohol shots that improved his sx for short periods of time.    # Continuous Alcohol abuse with Alcohol withdrawal on admission   - continue thiamine, folic acid and multivitamin  - CATCH team following - will follow up outpatient at [facility]  - Addiction medicine recommended ativan taper which will end on 1/5  - continue hydroxyzine  - decrease benadryl dosing due to lethargy     # Anxiety/Depression   - c/w home med  - patient follows with mental health outpatient    # Thrombocytopenia - resolved     Pt stable for DC. MAP appt 2/16 at 8:30am  D/C today; d/c planning took over 75 min  d/c papers edited by me  discussed d/c plan and all instructions in detail Medicine attending-    Patient seen and examined this morning  ambulating around the unit  in [facility]  no complaints  eager to go home today    Vital Signs Last 24 Hrs  T(C): 36.9 (05 Jan 2024 07:14), Max: 37.3 (04 Jan 2024 15:37)  T(F): 98.4 (05 Jan 2024 07:14), Max: 99.2 (04 Jan 2024 15:37)  HR: 97 (05 Jan 2024 07:14) (79 - 106)  BP: 123/84 (05 Jan 2024 07:14) (105/59 - 132/72)  BP(mean): --  RR: 18 (05 Jan 2024 07:14) (18 - 18)  SpO2: --    38-year-old male with history of alcohol use disorder, anxiety and depression came in for alcohol withdrawal. The Pt usually drinks 4 beers a day but for the past 3 weeks he has been drinking around 2 bottles of 750 ml Vodka daily after going through a bad breakup in order to cope. His sx started around 3 days ago when he cut down on his drinking and mostly constitute palpitations anxiety and tremors. Pt self medicated with alcohol shots that improved his sx for short periods of time. Today pt decided to come into the ED for detoxification and his last drink was around 7 am in the morning.  Patient endorses headache, but denies hallucinations, dizziness, lightheadedness, nausea or vomiting.  Pt denies cocaine and benzo abuse but prior drug screen is positive for cocaine metabolites.      38-year-old male with history of alcohol use disorder, anxiety and depression came in for alcohol withdrawal. Sx started around 3 days ago when he cut down on his drinking and mostly constitute palpitations anxiety and tremors. Pt self medicated with alcohol shots that improved his sx for short periods of time.    # Continuous Alcohol abuse with Alcohol withdrawal on admission   - continue thiamine, folic acid and multivitamin  - CATCH team following - will follow up outpatient at [facility]  - Addiction medicine recommended ativan taper which will end on 1/5  - continue hydroxyzine  - decrease benadryl dosing due to lethargy     # Anxiety/Depression   - c/w home med  - patient follows with mental health outpatient    # Thrombocytopenia - resolved     Pt stable for DC. MAP appt 2/16 at 8:30am  D/C today; d/c planning took over 75 min  d/c papers edited by me  discussed d/c plan and all instructions in detail

## 2024-01-05 NOTE — DISCHARGE NOTE NURSING/CASE MANAGEMENT/SOCIAL WORK - PATIENT PORTAL LINK FT
You can access the FollowMyHealth Patient Portal offered by [facility] by registering at the following website: [email or web address]. By joining [facility]’s FollowMyHealth portal, you will also be able to view your health information using other applications (apps) compatible with our system. You can access the FollowMyHealth Patient Portal offered by [facility] by registering at the following website: [email or web address]. By joining [facility]’s FollowMyHealth portal, you will also be able to view your health information using other applications (apps) compatible with our system.

## 2024-01-05 NOTE — DISCHARGE NOTE PROVIDER - NSDCMRMEDTOKEN_GEN_ALL_CORE_FT
cloNIDine 0.1 mg oral tablet: 1 tab(s) orally once a day (at bedtime)  FLUoxetine (Eqv-Sarafem) 20 mg oral tablet: 1 tab(s) orally once a day  folic acid 1 mg oral tablet: 1 tab(s) orally once a day  gabapentin 600 mg oral tablet: 1 tab(s) orally once a day  thiamine 100 mg oral tablet: 1 tab(s) orally once a day  traZODone 100 mg oral tablet: 1 tab(s) orally once a day (at bedtime)   cloNIDine 0.1 mg oral tablet: 1 tab(s) orally once a day (at bedtime)  FLUoxetine (Eqv-Sarafem) 20 mg oral tablet: 1 tab(s) orally once a day  folic acid 1 mg oral tablet: 1 tab(s) orally once a day  gabapentin 600 mg oral tablet: 1 tab(s) orally once a day  hydrOXYzine hydrochloride 50 mg oral tablet: 1 tab(s) orally every 8 hours as needed for  itching  thiamine 100 mg oral tablet: 1 tab(s) orally once a day  traZODone 100 mg oral tablet: 1 tab(s) orally once a day (at bedtime)   cloNIDine 0.1 mg oral tablet: 1 tab(s) orally once a day (at bedtime)  FLUoxetine (Eqv-Sarafem) 20 mg oral tablet: 1 tab(s) orally once a day  folic acid 1 mg oral tablet: 1 tab(s) orally once a day  gabapentin 400 mg oral capsule: 1 cap(s) orally 3 times a day  hydrOXYzine hydrochloride 50 mg oral tablet: 1 tab(s) orally every 8 hours as needed for  itching  pantoprazole 40 mg oral delayed release tablet: 1 tab(s) orally once a day (before a meal)  thiamine 100 mg oral tablet: 1 tab(s) orally once a day  traZODone 100 mg oral tablet: 1 tab(s) orally once a day (at bedtime)

## 2024-01-05 NOTE — DISCHARGE NOTE PROVIDER - NSDCCPCAREPLAN_GEN_ALL_CORE_FT
PRINCIPAL DISCHARGE DIAGNOSIS  Diagnosis: Alcohol withdrawal  Assessment and Plan of Treatment: You came to the hospital for alcohol withdrawl. You were requiring sedation in the ICU, and once you were stable enough, you were detoxed slowly on a regular floor. You are now stable for discharge. You ahve an appt at [facility] on 2/16 at 8:30 am. Please take sure you follow up outpatient.       PRINCIPAL DISCHARGE DIAGNOSIS  Diagnosis: Alcohol withdrawal  Assessment and Plan of Treatment: You came to the hospital for alcohol withdrawl. You were requiring sedation in the ICU, and once you were stable enough, you were detoxed slowly on a regular floor. You are now stable for discharge. You have a new medical doctor with an appointment scheduled for you in the medicine clinic at [address] [place] [address] on 2/16 at 8:30 am. Stop drinking alcohol. Follow up at the [facility].       PRINCIPAL DISCHARGE DIAGNOSIS  Diagnosis: Alcohol withdrawal  Assessment and Plan of Treatment: You came to the hospital for alcohol withdrawl. You were requiring sedation in the ICU, and once you were stable enough, you were detoxed slowly on a regular floor. You are now stable for discharge. You have a new medical doctor with an appointment scheduled for you in the medicine clinic at [address] [place] [address] on 2/16 at 8:30 am. Stop drinking alcohol. Follow up at the [facility].

## 2024-01-05 NOTE — DISCHARGE NOTE NURSING/CASE MANAGEMENT/SOCIAL WORK - NSDCPEFALRISK_GEN_ALL_CORE
For information on Fall & Injury Prevention, visit: [email or web address] OR  [email or web address] OR  https://www.cdc.gov/steadi/patient.html For information on Fall & Injury Prevention, visit: [email or web address] OR  [email or web address] OR  https://www.cdc.gov/steadi/patient.html

## 2024-01-05 NOTE — DISCHARGE NOTE PROVIDER - NSDCFUSCHEDAPPT_GEN_ALL_CORE_FT
Matt Summers  [facility] PreAdmits  Scheduled Appointment: 02/16/2024    [facility]  INT[facility] SI [address] [place], [place] [address]on   Scheduled Appointment: 02/16/2024     Matt Summers  [facility] PreAdmits  Scheduled Appointment: 02/16/2024    [facility]  [facility] SI [address] [place], [place] [address]on   Scheduled Appointment: 02/16/2024

## 2024-01-10 DIAGNOSIS — F32.A DEPRESSION, UNSPECIFIED: ICD-10-CM

## 2024-01-10 DIAGNOSIS — F41.9 ANXIETY DISORDER, UNSPECIFIED: ICD-10-CM

## 2024-01-10 DIAGNOSIS — E53.8 DEFICIENCY OF OTHER SPECIFIED B GROUP VITAMINS: ICD-10-CM

## 2024-01-10 DIAGNOSIS — Z91.81 HISTORY OF FALLING: ICD-10-CM

## 2024-01-10 DIAGNOSIS — F41.8 OTHER SPECIFIED ANXIETY DISORDERS: ICD-10-CM

## 2024-01-10 DIAGNOSIS — E51.9 THIAMINE DEFICIENCY, UNSPECIFIED: ICD-10-CM

## 2024-01-10 DIAGNOSIS — F10.239 ALCOHOL DEPENDENCE WITH WITHDRAWAL, UNSPECIFIED: ICD-10-CM

## 2024-01-10 DIAGNOSIS — D69.6 THROMBOCYTOPENIA, UNSPECIFIED: ICD-10-CM

## 2024-01-18 ENCOUNTER — INPATIENT (INPATIENT)
Facility: HOSPITAL | Age: 39
LOS: 12 days | Discharge: HOME | DRG: 897 | End: 2024-01-31
Attending: INTERNAL MEDICINE | Admitting: INTERNAL MEDICINE
Payer: COMMERCIAL

## 2024-01-18 VITALS
TEMPERATURE: 99 F | SYSTOLIC BLOOD PRESSURE: 161 MMHG | OXYGEN SATURATION: 100 % | HEIGHT: 66 IN | RESPIRATION RATE: 20 BRPM | WEIGHT: 199.96 LBS | DIASTOLIC BLOOD PRESSURE: 118 MMHG | HEART RATE: 118 BPM

## 2024-01-18 DIAGNOSIS — F10.139 ALCOHOL ABUSE WITH WITHDRAWAL, UNSPECIFIED: ICD-10-CM

## 2024-01-18 LAB
ALBUMIN SERPL ELPH-MCNC: 4.4 G/DL — SIGNIFICANT CHANGE UP (ref 3.5–5.2)
ALP SERPL-CCNC: 73 U/L — SIGNIFICANT CHANGE UP (ref 30–115)
ALT FLD-CCNC: 79 U/L — HIGH (ref 0–41)
ANION GAP SERPL CALC-SCNC: 32 MMOL/L — HIGH (ref 7–14)
AST SERPL-CCNC: 159 U/L — HIGH (ref 0–41)
BASE EXCESS BLDV CALC-SCNC: -5.5 MMOL/L — LOW (ref -2–3)
BASOPHILS # BLD AUTO: 0.06 K/UL — SIGNIFICANT CHANGE UP (ref 0–0.2)
BASOPHILS NFR BLD AUTO: 0.4 % — SIGNIFICANT CHANGE UP (ref 0–1)
BILIRUB SERPL-MCNC: 0.7 MG/DL — SIGNIFICANT CHANGE UP (ref 0.2–1.2)
BUN SERPL-MCNC: 10 MG/DL — SIGNIFICANT CHANGE UP (ref 10–20)
CALCIUM SERPL-MCNC: 8.1 MG/DL — LOW (ref 8.4–10.5)
CHLORIDE SERPL-SCNC: 84 MMOL/L — LOW (ref 98–110)
CO2 SERPL-SCNC: 14 MMOL/L — LOW (ref 17–32)
CREAT SERPL-MCNC: 0.8 MG/DL — SIGNIFICANT CHANGE UP (ref 0.7–1.5)
EGFR: 117 ML/MIN/1.73M2 — SIGNIFICANT CHANGE UP
EOSINOPHIL # BLD AUTO: 0.01 K/UL — SIGNIFICANT CHANGE UP (ref 0–0.7)
EOSINOPHIL NFR BLD AUTO: 0.1 % — SIGNIFICANT CHANGE UP (ref 0–8)
GAS PNL BLDV: SIGNIFICANT CHANGE UP
GLUCOSE BLDC GLUCOMTR-MCNC: 181 MG/DL — HIGH (ref 70–99)
GLUCOSE SERPL-MCNC: 135 MG/DL — HIGH (ref 70–99)
HCO3 BLDV-SCNC: 18 MMOL/L — LOW (ref 22–29)
HCT VFR BLD CALC: 46.8 % — SIGNIFICANT CHANGE UP (ref 42–52)
HGB BLD-MCNC: 16.4 G/DL — SIGNIFICANT CHANGE UP (ref 14–18)
IMM GRANULOCYTES NFR BLD AUTO: 0.5 % — HIGH (ref 0.1–0.3)
LACTATE BLDV-MCNC: 6.7 MMOL/L — CRITICAL HIGH (ref 0.5–2)
LACTATE SERPL-SCNC: 10.6 MMOL/L — CRITICAL HIGH (ref 0.7–2)
LIDOCAIN IGE QN: 163 U/L — HIGH (ref 7–60)
LYMPHOCYTES # BLD AUTO: 1.09 K/UL — LOW (ref 1.2–3.4)
LYMPHOCYTES # BLD AUTO: 7.8 % — LOW (ref 20.5–51.1)
MCHC RBC-ENTMCNC: 31.1 PG — HIGH (ref 27–31)
MCHC RBC-ENTMCNC: 35 G/DL — SIGNIFICANT CHANGE UP (ref 32–37)
MCV RBC AUTO: 88.8 FL — SIGNIFICANT CHANGE UP (ref 80–94)
MONOCYTES # BLD AUTO: 0.36 K/UL — SIGNIFICANT CHANGE UP (ref 0.1–0.6)
MONOCYTES NFR BLD AUTO: 2.6 % — SIGNIFICANT CHANGE UP (ref 1.7–9.3)
NEUTROPHILS # BLD AUTO: 12.38 K/UL — HIGH (ref 1.4–6.5)
NEUTROPHILS NFR BLD AUTO: 88.6 % — HIGH (ref 42.2–75.2)
NRBC # BLD: 0 /100 WBCS — SIGNIFICANT CHANGE UP (ref 0–0)
PCO2 BLDV: 28 MMHG — LOW (ref 42–55)
PH BLDV: 7.41 — SIGNIFICANT CHANGE UP (ref 7.32–7.43)
PLATELET # BLD AUTO: 191 K/UL — SIGNIFICANT CHANGE UP (ref 130–400)
PMV BLD: 10.8 FL — HIGH (ref 7.4–10.4)
PO2 BLDV: 74 MMHG — HIGH (ref 25–45)
POTASSIUM SERPL-MCNC: 4.4 MMOL/L — SIGNIFICANT CHANGE UP (ref 3.5–5)
POTASSIUM SERPL-SCNC: 4.4 MMOL/L — SIGNIFICANT CHANGE UP (ref 3.5–5)
PROT SERPL-MCNC: 7.3 G/DL — SIGNIFICANT CHANGE UP (ref 6–8)
RBC # BLD: 5.27 M/UL — SIGNIFICANT CHANGE UP (ref 4.7–6.1)
RBC # FLD: 12.9 % — SIGNIFICANT CHANGE UP (ref 11.5–14.5)
SAO2 % BLDV: 94.6 % — HIGH (ref 67–88)
SODIUM SERPL-SCNC: 130 MMOL/L — LOW (ref 135–146)
WBC # BLD: 13.97 K/UL — HIGH (ref 4.8–10.8)
WBC # FLD AUTO: 13.97 K/UL — HIGH (ref 4.8–10.8)

## 2024-01-18 PROCEDURE — 99291 CRITICAL CARE FIRST HOUR: CPT

## 2024-01-18 PROCEDURE — 80053 COMPREHEN METABOLIC PANEL: CPT

## 2024-01-18 PROCEDURE — 80307 DRUG TEST PRSMV CHEM ANLYZR: CPT

## 2024-01-18 PROCEDURE — 82607 VITAMIN B-12: CPT

## 2024-01-18 PROCEDURE — 85027 COMPLETE CBC AUTOMATED: CPT

## 2024-01-18 PROCEDURE — 71045 X-RAY EXAM CHEST 1 VIEW: CPT

## 2024-01-18 PROCEDURE — 87040 BLOOD CULTURE FOR BACTERIA: CPT

## 2024-01-18 PROCEDURE — 85025 COMPLETE CBC W/AUTO DIFF WBC: CPT

## 2024-01-18 PROCEDURE — 83690 ASSAY OF LIPASE: CPT

## 2024-01-18 PROCEDURE — 83735 ASSAY OF MAGNESIUM: CPT

## 2024-01-18 PROCEDURE — 0225U NFCT DS DNA&RNA 21 SARSCOV2: CPT

## 2024-01-18 PROCEDURE — 82962 GLUCOSE BLOOD TEST: CPT

## 2024-01-18 PROCEDURE — 93306 TTE W/DOPPLER COMPLETE: CPT

## 2024-01-18 PROCEDURE — 84100 ASSAY OF PHOSPHORUS: CPT

## 2024-01-18 PROCEDURE — 36415 COLL VENOUS BLD VENIPUNCTURE: CPT

## 2024-01-18 PROCEDURE — 84145 PROCALCITONIN (PCT): CPT

## 2024-01-18 PROCEDURE — 74177 CT ABD & PELVIS W/CONTRAST: CPT | Mod: MA

## 2024-01-18 PROCEDURE — 80061 LIPID PANEL: CPT

## 2024-01-18 PROCEDURE — 80354 DRUG SCREENING FENTANYL: CPT

## 2024-01-18 PROCEDURE — 71045 X-RAY EXAM CHEST 1 VIEW: CPT | Mod: 26

## 2024-01-18 PROCEDURE — 83605 ASSAY OF LACTIC ACID: CPT

## 2024-01-18 PROCEDURE — 80048 BASIC METABOLIC PNL TOTAL CA: CPT

## 2024-01-18 PROCEDURE — 93010 ELECTROCARDIOGRAM REPORT: CPT

## 2024-01-18 PROCEDURE — 80076 HEPATIC FUNCTION PANEL: CPT

## 2024-01-18 PROCEDURE — 82746 ASSAY OF FOLIC ACID SERUM: CPT

## 2024-01-18 PROCEDURE — 74177 CT ABD & PELVIS W/CONTRAST: CPT | Mod: 26

## 2024-01-18 RX ORDER — FOLIC ACID 0.8 MG
1 TABLET ORAL DAILY
Refills: 0 | Status: DISCONTINUED | OUTPATIENT
Start: 2024-01-18 | End: 2024-01-31

## 2024-01-18 RX ORDER — DIAZEPAM 5 MG
10 TABLET ORAL ONCE
Refills: 0 | Status: DISCONTINUED | OUTPATIENT
Start: 2024-01-18 | End: 2024-01-18

## 2024-01-18 RX ORDER — HYDROXYZINE HCL 10 MG
25 TABLET ORAL AT BEDTIME
Refills: 0 | Status: DISCONTINUED | OUTPATIENT
Start: 2024-01-18 | End: 2024-01-31

## 2024-01-18 RX ORDER — THIAMINE MONONITRATE (VIT B1) 100 MG
100 TABLET ORAL ONCE
Refills: 0 | Status: COMPLETED | OUTPATIENT
Start: 2024-01-18 | End: 2024-01-19

## 2024-01-18 RX ORDER — SODIUM CHLORIDE 9 MG/ML
1000 INJECTION, SOLUTION INTRAVENOUS ONCE
Refills: 0 | Status: COMPLETED | OUTPATIENT
Start: 2024-01-18 | End: 2024-01-18

## 2024-01-18 RX ORDER — DEXMEDETOMIDINE HYDROCHLORIDE IN 0.9% SODIUM CHLORIDE 4 UG/ML
0.2 INJECTION INTRAVENOUS
Qty: 200 | Refills: 0 | Status: DISCONTINUED | OUTPATIENT
Start: 2024-01-18 | End: 2024-01-20

## 2024-01-18 RX ORDER — THIAMINE MONONITRATE (VIT B1) 100 MG
100 TABLET ORAL DAILY
Refills: 0 | Status: DISCONTINUED | OUTPATIENT
Start: 2024-01-19 | End: 2024-01-19

## 2024-01-18 RX ORDER — SODIUM CHLORIDE 9 MG/ML
1000 INJECTION, SOLUTION INTRAVENOUS
Refills: 0 | Status: DISCONTINUED | OUTPATIENT
Start: 2024-01-18 | End: 2024-01-19

## 2024-01-18 RX ORDER — THIAMINE MONONITRATE (VIT B1) 100 MG
100 TABLET ORAL ONCE
Refills: 0 | Status: COMPLETED | OUTPATIENT
Start: 2024-01-18 | End: 2024-01-18

## 2024-01-18 RX ADMIN — Medication 25 MILLIGRAM(S): at 22:27

## 2024-01-18 RX ADMIN — Medication 10 MILLIGRAM(S): at 15:56

## 2024-01-18 RX ADMIN — Medication 4 MILLIGRAM(S): at 18:21

## 2024-01-18 RX ADMIN — Medication 2 MILLIGRAM(S): at 20:21

## 2024-01-18 RX ADMIN — Medication 4 MILLIGRAM(S): at 22:26

## 2024-01-18 RX ADMIN — Medication 100 MILLIGRAM(S): at 16:58

## 2024-01-18 RX ADMIN — DEXMEDETOMIDINE HYDROCHLORIDE IN 0.9% SODIUM CHLORIDE 4.54 MICROGRAM(S)/KG/HR: 4 INJECTION INTRAVENOUS at 17:02

## 2024-01-18 RX ADMIN — SODIUM CHLORIDE 1000 MILLILITER(S): 9 INJECTION, SOLUTION INTRAVENOUS at 15:56

## 2024-01-18 RX ADMIN — Medication 10 MILLIGRAM(S): at 13:47

## 2024-01-18 RX ADMIN — Medication 10 MILLIGRAM(S): at 16:45

## 2024-01-18 RX ADMIN — Medication 1 MILLIGRAM(S): at 16:58

## 2024-01-18 RX ADMIN — SODIUM CHLORIDE 100 MILLILITER(S): 9 INJECTION, SOLUTION INTRAVENOUS at 16:58

## 2024-01-18 RX ADMIN — SODIUM CHLORIDE 1000 MILLILITER(S): 9 INJECTION, SOLUTION INTRAVENOUS at 13:47

## 2024-01-18 NOTE — H&P ADULT - ASSESSMENT
Alcohol withdrawal on precedex  Alcohol use disorder  Suspected Pancreatitis  Alcoholic Ketoacidosis   HO Cocaine and Xanax use disorder     PLAN    CNS: Wean precedex. CIWA monitoring, Benzo standing for withdrawal, c/w folate, thiamine, MVI, hydoxyzine. UDS/ SDS    HEENT: Oral care    PULMONARY: HOB @ 45 degrees, aspiration precautions. On room air.     CARDIAC: D5W+NS @150. TTE , CE x3. Repeat lactate     GASTROENTEROLOGY: Feeding when tolerating. Lipase noted. CT abd    RENAL: Monitor CMP & UO. Correct electrolytes as needed    INFECTIOUS: Monitor off Abx    HEMATOLOGY: DVT ppx. Monitor CBC and Coags.     ENDOCRINOLOGY: Check FS, insulin protocol as needed.    MUSCULOSKELETAL: OOB to chair  as tolerated    DISPOSITION: MICU.

## 2024-01-18 NOTE — H&P ADULT - NSHPLABSRESULTS_GEN_ALL_CORE
LABS/RADIOLOGY RESULTS:                          16.4   13.97 )-----------( 191      ( 18 Jan 2024 13:37 )             46.8   01-18    130<L>  |  84<L>  |  10  ----------------------------<  135<H>  4.4   |  14<L>  |  0.8    Ca    8.1<L>      18 Jan 2024 13:37    TPro  7.3  /  Alb  4.4  /  TBili  0.7  /  DBili  x   /  AST  159<H>  /  ALT  79<H>  /  AlkPhos  73  01-18  Blood Cultures    Urinalysis Basic - ( 18 Jan 2024 13:37 )    Color:  / Appearance:  / SG:  / pH:   Gluc: 135 mg/dL / Ketone:   / Bili:  / Urobili:    Blood:  / Protein:  / Nitrite:    Leuk Esterase:  / RBC:  / WBC    Sq Epi:  / Non Sq Epi:  / Bacteria:

## 2024-01-18 NOTE — CHART NOTE - NSCHARTNOTEFT_GEN_A_CORE
IMPRESSION:    Alcohol withdrawal on precedex  Alcohol use disorder  Suspected Pancreatitis  Alcoholic Ketoacidosis   HO Cocaine and Xanax use disorder     PLAN    CNS: Wean precedex. CIWA monitoring, Benzo standing for withdrawal, c/w folate, thiamine, MVI, hydoxyzine. UDS/ SDS    HEENT: Oral care    PULMONARY: HOB @ 45 degrees, aspiration precautions. On room air.     CARDIAC: D5W+NS @150. TTE , CE x3     GASTROENTEROLOGY: Feeding when tolerating. Lipase noted. CT abd    RENAL: Monitor CMP & UO. Correct electrolytes as needed    INFECTIOUS: Monitor off Abx    HEMATOLOGY: DVT ppx. Monitor CBC and Coags. Dimer    ENDOCRINOLOGY: Check FS, insulin protocol as needed.    MUSCULOSKELETAL: OOB to chair  as tolerated    DISPOSITION: MICU

## 2024-01-18 NOTE — ED ADULT NURSE NOTE - EXTENSIONS OF SELF_ADULT
None Patient called stating she needs an appointment to see Dr. Holcomb for possible gallbladder pain. I offered Tuesday at 10:45. She states she would rather someone call her to see if she could be seen today.

## 2024-01-18 NOTE — PATIENT PROFILE ADULT - FALL HARM RISK - HARM RISK INTERVENTIONS
Assistance with ambulation/Assistance OOB with selected safe patient handling equipment/Communicate Risk of Fall with Harm to all staff/Monitor for mental status changes/Monitor gait and stability/Reinforce activity limits and safety measures with patient and family/Tailored Fall Risk Interventions/Toileting schedule using arm’s reach rule for commode and bathroom/Use of alarms - bed, chair and/or voice tab/Visual Cue: Yellow wristband and red socks/Bed in lowest position, wheels locked, appropriate side rails in place/Call bell, personal items and telephone in reach/Instruct patient to call for assistance before getting out of bed or chair/Non-slip footwear when patient is out of bed/[place] to call system/Physically safe environment - no spills, clutter or unnecessary equipment/Purposeful Proactive Rounding/Room/bathroom lighting operational, light cord in reach

## 2024-01-18 NOTE — ED PROVIDER NOTE - PHYSICAL EXAMINATION
Vital Signs: I have reviewed the initial vital signs.  Constitutional: agitated  HEENT: Airway patent, moist MM, EOMI,   CV: tachycardic well-perfused extremities,   Lungs: Clear to ascultation bilaterally, no increased work of breathing.  ABD: mild tenderness  MSK: Neck supple, nontender, normal range of motion,   INTEG: Skin warm, dry, no rash.  NEURO: A&Ox3, moving all extremities, normal speech  PSYCH: agitated, CIWA 19

## 2024-01-18 NOTE — H&P ADULT - NSHPPHYSICALEXAM_GEN_ALL_CORE
VITALS:   T(C): 37 (01-18-24 @ 11:58), Max: 37 (01-18-24 @ 11:58)  HR: 114 (01-18-24 @ 17:00) (114 - 118)  BP: 132/66 (01-18-24 @ 17:00) (132/66 - 161/118)  RR: 20 (01-18-24 @ 17:00) (20 - 20)  SpO2: 94% (01-18-24 @ 17:00) (94% - 100%)    GENERAL: NAD, lying in bed comfortably  HEAD:  Atraumatic, normocephalic  EYES: EOMI, PERRLA, conjunctiva and sclera clear  ENT: Moist mucous membranes  NECK: Supple, no JVD  HEART: Regular rate and rhythm, no murmurs, rubs, or gallops  LUNGS: Unlabored respirations.  Clear to auscultation bilaterally, no crackles, wheezing, or rhonchi  ABDOMEN: Soft, nontender, nondistended, +BS  EXTREMITIES: 2+ peripheral pulses bilaterally. No clubbing, cyanosis, or edema  NERVOUS SYSTEM:  A&Ox3, no focal deficits   SKIN: No rashes or lesions

## 2024-01-18 NOTE — ED PROVIDER NOTE - OBJECTIVE STATEMENT
38 M hx of alcohol use disorder, anxiety and depression came in for alcohol withdrawal. The Pt usually drinks 4 beers a day but for the past 3 weeks he has been drinking around 2 bottles of 750 ml Vodka daily after going through a bad breakup in order to cope. Last drink yesterday

## 2024-01-18 NOTE — ED PROVIDER NOTE - ATTENDING CONTRIBUTION TO CARE
38 yr old male  history of alcohol use and depression presents for evaluation of alcohol withdrawl.  Patient reports that usually drinks everyday but the past 3 weeks has been drinking heavily.  However patient reports feeling in withdrawal/ tremulous.  Here on exam patient tachycardic tremulous abdomen soft nontender.  Patient is given Valium with initial improvement symptoms but then with return of symptoms.  Patient given 2 additional doses of Valium admitted started on Precedex drip.  Of note labs demonstrate acidosis with initial lactate of 10 repeat 6.7.Ct abd negative, done as pt with mild pancreatitis.

## 2024-01-18 NOTE — PATIENT PROFILE ADULT - LIVING ENVIRONMENT
Patient:   CALEB JONES            MRN: [id number]            FIN: [id number]              Age:   42 years     Sex:  MALE     :  79   Associated Diagnoses:   None   Author:   LAVERN VAIL, GERMAIN JOHNSON     Chief Complaint:  Abdominal pain.  History of present illness:  Patient is a 41 yo M with PMH of DM, HTN, HLD that presented to the ER with a 2 day history of epigastric and left upper quadrant abdominal pain.  He states that the pain was initially tolerable but had worsened prior to arrival. Sharp, non radiating and located in the LUQ. Also endorsed nausea but no emesis. No fever or chills. No other constitutional symptoms. He endorses having a previous episode of pancreatitis and is unsure of the cause but states that he does not drink alcohol.  Patient noted to have elevated lipase upon arrival and also triglycerides as high as 7550 with normal LFTs. Therefore working etiology for pancreatitis considered to be hypertriglyceridemia. A RUQ was also obtained which revealed cholelithiasis and mild pericholecystic fluid but no wall thickenning or other sigs of acute cholecystitis. Patient denies any prior episodes of RUQ pain.  Review of Systems:  Constitutional: No fatigue  Skin: No rash  Eyes: No recent vision problems or eye pain    ENT: No congestion, ear pain, or sore throat  Endocrine: No thyroid problems    Cardiovascular: No chest pain  Respiratory: No cough, shortness of breath, congestion, or wheezing  Gastrointestinal: Nausea, no vomiting or diarrhea  Musculoskeletal: No joint swelling    Neurologic: No headache  Hematologic: No unusual bruising or bleeding  Psychiatric: No psychiatric problems, hallucinations or depression  Past medical history:  Diabetes mellitus  HTN  HLD  Pancreatitis  Past surgical history:  No qualifying data available.  Family history:   Mother with pancreatitis.  Social history:   Denied x3.  Home Medications (4) Active  aspirin oral 81 mg chewable tablet  81 mg = 1 tab, Oral, Daily  Fish Oil 1200 mg oral capsule 1,200 mg = 1 cap, Oral, Daily  metFORMIN oral 500 mg ER tablet 500 mg = 1 tab, Oral, Daily  Vitamin D3 oral 1,000 unit tablet 1,000 unit = 1 tab, Oral, Daily  Medications (10) Active  Scheduled: (4)  Enoxaparin 40 mg/0.4 mL syringe  40 mg 0.4 mL, Subcutaneous, Q Bedtime  Influenza virus vaccine, inactivated PF (latex free) quadrivalent 0.5 mL IM inj SDV  0.5 mL, IM, On Call  insulin glargine  10 unit 0.1 mL, Subcutaneous, Daily  Insulin human lispro 1 unit/0.01 mL inj  2-10 unit, Subcutaneous, Q6H  Continuous: (1)  Lactated Ringers 1,000 mL  1,000 mL, IV, 200 mL/hr  PRN: (5)  Dextrose (glucose) 40% 15 gm/37.5 gm oral gel UD  15 gm, Oral, As Directed PRN  Dextrose (glucose) 50% 25 gm/50 mL syringe  12.5 gm 25 mL, IV Push, As Directed PRN  Glucagon 1 mg/1 mL emergency kit SDV  1 mg 1 mL, IM, As Directed PRN  Morphine PF 2 mg/1 mL inj SDV  2 mg 1 mL, IV Push, Q2H  Ondansetron 4 mg/2 mL inj SDV  4 mg 2 mL, Slow IV Push, Q6H  Allergies (1) Active Reaction  NKA None Documented  Vitals between:   19-JAN-2020 14:43:15   TO   20-JAN-2020 14:43:15                   LAST RESULT MINIMUM MAXIMUM  Temperature 37.1 36.1 37.7  Heart Rate 88 78 88  Respiratory Rate 16 16 16  NISBP           107 105 130  NIDBP           66 64 83  NIMBP           80 80 97  SpO2                    94 94 99  Physical exam:  General: AOX4, NAD.  Head: Normocephalic.  Eyes: Normal conjuctiva, no scleral icterus.  Neck: Supple.  Chest: CTAB  Cardiac: RRR.  Abdomen: Soft, moderately tender in the LUQ > epigastrium, no RUQ tenderness to palpation. Negative Kearney sign.  Labs:     Labs between:  19-JAN-2020 14:43 to 20-JAN-2020 14:43  CBC:                 WBC  HgB  Hct  Plt  MCV  RDW   20-JAN-2020 (H) 12.3  (L) 11.4  (L) 33.9  294  83.5  13.9   19-JAN-2020 (H) 14.9  13.3  39.6  345  87.0  14.1   DIFF:                 Seg  Neutroph//ABS  Lymph//ABS  Mono//ABS  EOS/ABS  20-JAN-2020 NOT APPLICABLE  76 //  (H) 9.4  12 // 1.4 11 // (H) 1.3  0 // (L) 0.0  19-JAN-2020 74  // (H) 11.5  // 2.1 // (H) 1.0  // 0.1  BMP:                 Na  Cl  BUN  Glu   20-JAN-2020 137  105  (L) 5  (H) 238                              K  CO2  Cr  Ca                              3.7  26  (L) 0.57  (L) 8.1   BMP:                 Na  Cl  BUN  Glu   19-JAN-2020 (L) 133  101  9  (H) 327                              K  CO2  Cr  Ca                              3.7  27  (L) 0.63  8.8   CMP:                 AST  ALT  AlkPhos  Bili  Albumin   19-JAN-2020 24  53  90  0.4  3.9   Other Chem:             Mg  Phos  Triglycerides  GGTP  DirectBili                           1.8  2.9         POC GLU:                 Latest Result  Latest Date  Minimum  Min Date  Maximum  Max Date                             (H) 206  20-JAN-2020 (H) 206  20-JAN-2020 (H) 254  20-JAN-2020                 Radiology:     Result title:  US GALLBLADDER  Result status:  Final  Verified by:  PACO, TRINY SUNSHINE on 01/20/2020 0:42  FINDINGS:Mobile echogenic foci within the gallbladder suggestive of small stones.  No gallbladder wall thickening.  Trace amount of pericholecystic fluid is seen.  The sonographic Kearney sign is negative per the technologist.  The common bile duct measures 0.3 cm which is within normal limits  IMPRESSION: Cholelithiasis and mild pericholecystic fluid.   Assessment/Plan:   Patient is a 41 yo M with PMH of DM, HTN, HLD that presented with abdominal pain.  On exam patient afebrile and non-tachycardic. Abdomen soft and tender predominantly in the LUQ without peritoneal signs.  Found to have hypertriglyceridemia and pancreatitis but also noted to have cholelithiasis. Surgery asked to evaluate as to r/o gallstone pancreatitis.  Given that patient has normal LFTs and elevated triglycerides, pancreatitis most likely atributable to hypertriglyceridemia.  -Will obtain MRCP to r/o choledocholithiasis.  -Keep NPO.  -IVF hydration.  -PRN pain control.  -No need for  antibiotics from a surgical standpoint.  -Further recs to follow after MRCP.  -Continue management as per primary and GI.  Discussed with Dr. Lyn.  Caesar Osborne  PGY-3 Surgery  Pt seen and examined  Chart/studies reviewed  Discussed w/ residents and staff  Agree w/ below   no

## 2024-01-18 NOTE — ED PROVIDER NOTE - CLINICAL SUMMARY MEDICAL DECISION MAKING FREE TEXT BOX
38 yr old male  history of alcohol use and depression presents for evaluation of alcohol withdrawl.  Patient reports that usually drinks everyday but the past 3 weeks has been drinking heavily.  However patient reports feeling in withdrawal/ tremulous.  Here on exam patient tachycardic tremulous abdomen soft nontender.  Patient is given Valium with initial improvement symptoms but then with return of symptoms.  Patient given 2 additional doses of Valium admitted started on Precedex drip.  Of note labs demonstrate acidosis with initial lactate of 10 repeat 6.7.Ct abd negative, done as pt with mild pancreatitis.  Independent interpretation of the Xray film(s) performed by: Dr. Kiet Orozco: napd  Interpretation by Dr. Kiet Orozco: Sinus tachycardia with a rate of 114 normal intervals no ischemic changes

## 2024-01-18 NOTE — ED ADULT NURSE NOTE - NSFALLUNIVINTERV_ED_ALL_ED
Bed/Stretcher in lowest position, wheels locked, appropriate side rails in place/Call bell, personal items and telephone in reach/Instruct patient to call for assistance before getting out of bed/chair/stretcher/Non-slip footwear applied when patient is off stretcher/[place] to call system/Physically safe environment - no spills, clutter or unnecessary equipment/Purposeful proactive rounding/Room/bathroom lighting operational, light cord in reach

## 2024-01-18 NOTE — H&P ADULT - HISTORY OF PRESENT ILLNESS
38 M hx of alcohol use disorder, anxiety and depression came in for alcohol withdrawal. The Pt usually drinks 4 beers a day but for the past 3 weeks he has been drinking around 2 bottles of 750 ml Vodka daily after going through a bad breakup in order to cope. Last drink yesterday. Of note patient was here 3 weeks ago with the same presentation. Denies HI/SI.     ICU Vital Signs Last 24 Hrs  T(F): 98.6 (18 Jan 2024 11:58), Max: 98.6 (18 Jan 2024 11:58)  HR: 114 (18 Jan 2024 17:00) (114 - 118)  BP: 132/66 (18 Jan 2024 17:00) (132/66 - 161/118)  RR: 20 (18 Jan 2024 17:00) (20 - 20)  SpO2: 94% (18 Jan 2024 17:00) (94% - 100%)    Labs show WBC 13k, Lactate 10  Given 30mg diazepam and started on precedex ggt and 2L NS bolus.

## 2024-01-18 NOTE — H&P ADULT - NSHPREVIEWOFSYSTEMS_GEN_ALL_CORE
REVIEW OF SYSTEMS:    CONSTITUTIONAL: No fever, weight loss, or fatigue  EYES: No eye pain, visual disturbances, or discharge  ENMT:  No difficulty hearing, tinnitus, vertigo; No sinus or throat pain  NECK: No pain or stiffness  BREASTS: No pain, masses, or nipple discharge  RESPIRATORY: No cough, wheezing, chills or hemoptysis; No shortness of breath  CARDIOVASCULAR: No chest pain, palpitations, dizziness, or leg swelling  GASTROINTESTINAL: No abdominal or epigastric pain. No nausea, vomiting, or hematemesis; No diarrhea or constipation. No melena or hematochezia.  GENITOURINARY: No dysuria, frequency, hematuria, or incontinence  NEUROLOGICAL: see hpi   SKIN: No itching, burning, rashes, or lesions   LYMPH NODES: No enlarged glands  ENDOCRINE: No heat or cold intolerance; No hair loss  MUSCULOSKELETAL: No joint pain or swelling; No muscle, back, or extremity pain  PSYCHIATRIC: see hpi   HEME/LYMPH: No easy bruising, or bleeding gums  ALLERGY AND IMMUNOLOGIC: No hives or eczema

## 2024-01-19 LAB
ALBUMIN SERPL ELPH-MCNC: 3.4 G/DL — LOW (ref 3.5–5.2)
ALP SERPL-CCNC: 48 U/L — SIGNIFICANT CHANGE UP (ref 30–115)
ALT FLD-CCNC: 50 U/L — HIGH (ref 0–41)
ANION GAP SERPL CALC-SCNC: 12 MMOL/L — SIGNIFICANT CHANGE UP (ref 7–14)
ANION GAP SERPL CALC-SCNC: 7 MMOL/L — SIGNIFICANT CHANGE UP (ref 7–14)
ANION GAP SERPL CALC-SCNC: 9 MMOL/L — SIGNIFICANT CHANGE UP (ref 7–14)
AST SERPL-CCNC: 80 U/L — HIGH (ref 0–41)
BILIRUB DIRECT SERPL-MCNC: 0.2 MG/DL — SIGNIFICANT CHANGE UP (ref 0–0.3)
BILIRUB INDIRECT FLD-MCNC: 0.7 MG/DL — SIGNIFICANT CHANGE UP (ref 0.2–1.2)
BILIRUB SERPL-MCNC: 0.9 MG/DL — SIGNIFICANT CHANGE UP (ref 0.2–1.2)
BUN SERPL-MCNC: 5 MG/DL — LOW (ref 10–20)
BUN SERPL-MCNC: 8 MG/DL — LOW (ref 10–20)
BUN SERPL-MCNC: 8 MG/DL — LOW (ref 10–20)
CALCIUM SERPL-MCNC: 7.2 MG/DL — LOW (ref 8.4–10.5)
CALCIUM SERPL-MCNC: 7.5 MG/DL — LOW (ref 8.4–10.5)
CALCIUM SERPL-MCNC: 7.7 MG/DL — LOW (ref 8.4–10.4)
CHLORIDE SERPL-SCNC: 94 MMOL/L — LOW (ref 98–110)
CHLORIDE SERPL-SCNC: 96 MMOL/L — LOW (ref 98–110)
CHLORIDE SERPL-SCNC: 97 MMOL/L — LOW (ref 98–110)
CHOLEST SERPL-MCNC: 151 MG/DL — SIGNIFICANT CHANGE UP
CO2 SERPL-SCNC: 29 MMOL/L — SIGNIFICANT CHANGE UP (ref 17–32)
CO2 SERPL-SCNC: 29 MMOL/L — SIGNIFICANT CHANGE UP (ref 17–32)
CO2 SERPL-SCNC: 30 MMOL/L — SIGNIFICANT CHANGE UP (ref 17–32)
CREAT SERPL-MCNC: 0.6 MG/DL — LOW (ref 0.7–1.5)
CREAT SERPL-MCNC: 0.6 MG/DL — LOW (ref 0.7–1.5)
CREAT SERPL-MCNC: 0.7 MG/DL — SIGNIFICANT CHANGE UP (ref 0.7–1.5)
EGFR: 122 ML/MIN/1.73M2 — SIGNIFICANT CHANGE UP
EGFR: 128 ML/MIN/1.73M2 — SIGNIFICANT CHANGE UP
EGFR: 128 ML/MIN/1.73M2 — SIGNIFICANT CHANGE UP
GLUCOSE SERPL-MCNC: 126 MG/DL — HIGH (ref 70–99)
GLUCOSE SERPL-MCNC: 135 MG/DL — HIGH (ref 70–99)
GLUCOSE SERPL-MCNC: 141 MG/DL — HIGH (ref 70–99)
HDLC SERPL-MCNC: 48 MG/DL — SIGNIFICANT CHANGE UP
LACTATE SERPL-SCNC: 1.2 MMOL/L — SIGNIFICANT CHANGE UP (ref 0.7–2)
LIDOCAIN IGE QN: 197 U/L — HIGH (ref 7–60)
LIPID PNL WITH DIRECT LDL SERPL: 82 MG/DL — SIGNIFICANT CHANGE UP
MAGNESIUM SERPL-MCNC: 1.3 MG/DL — LOW (ref 1.8–2.4)
MAGNESIUM SERPL-MCNC: 1.3 MG/DL — LOW (ref 1.8–2.4)
MAGNESIUM SERPL-MCNC: 2.2 MG/DL — SIGNIFICANT CHANGE UP (ref 1.8–2.4)
NON HDL CHOLESTEROL: 103 MG/DL — SIGNIFICANT CHANGE UP
PHOSPHATE SERPL-MCNC: 1 MG/DL — LOW (ref 2.1–4.9)
PHOSPHATE SERPL-MCNC: 1 MG/DL — LOW (ref 2.1–4.9)
POTASSIUM SERPL-MCNC: 3.1 MMOL/L — LOW (ref 3.5–5)
POTASSIUM SERPL-MCNC: 3.5 MMOL/L — SIGNIFICANT CHANGE UP (ref 3.5–5)
POTASSIUM SERPL-MCNC: 3.5 MMOL/L — SIGNIFICANT CHANGE UP (ref 3.5–5)
POTASSIUM SERPL-SCNC: 3.1 MMOL/L — LOW (ref 3.5–5)
POTASSIUM SERPL-SCNC: 3.5 MMOL/L — SIGNIFICANT CHANGE UP (ref 3.5–5)
POTASSIUM SERPL-SCNC: 3.5 MMOL/L — SIGNIFICANT CHANGE UP (ref 3.5–5)
PROT SERPL-MCNC: 5.2 G/DL — LOW (ref 6–8)
RAPID RVP RESULT: SIGNIFICANT CHANGE UP
SARS-COV-2 RNA SPEC QL NAA+PROBE: SIGNIFICANT CHANGE UP
SODIUM SERPL-SCNC: 134 MMOL/L — LOW (ref 135–146)
SODIUM SERPL-SCNC: 134 MMOL/L — LOW (ref 135–146)
SODIUM SERPL-SCNC: 135 MMOL/L — SIGNIFICANT CHANGE UP (ref 135–146)
TRIGL SERPL-MCNC: 103 MG/DL — SIGNIFICANT CHANGE UP

## 2024-01-19 PROCEDURE — 99223 1ST HOSP IP/OBS HIGH 75: CPT

## 2024-01-19 PROCEDURE — 93306 TTE W/DOPPLER COMPLETE: CPT | Mod: 26

## 2024-01-19 RX ORDER — POTASSIUM CHLORIDE 20 MEQ
20 PACKET (EA) ORAL
Refills: 0 | Status: COMPLETED | OUTPATIENT
Start: 2024-01-19 | End: 2024-01-20

## 2024-01-19 RX ORDER — SODIUM CHLORIDE 9 MG/ML
1000 INJECTION, SOLUTION INTRAVENOUS
Refills: 0 | Status: DISCONTINUED | OUTPATIENT
Start: 2024-01-19 | End: 2024-01-22

## 2024-01-19 RX ORDER — THIAMINE MONONITRATE (VIT B1) 100 MG
100 TABLET ORAL DAILY
Refills: 0 | Status: DISCONTINUED | OUTPATIENT
Start: 2024-01-20 | End: 2024-01-31

## 2024-01-19 RX ORDER — POTASSIUM CHLORIDE 20 MEQ
20 PACKET (EA) ORAL
Refills: 0 | Status: DISCONTINUED | OUTPATIENT
Start: 2024-01-19 | End: 2024-01-19

## 2024-01-19 RX ORDER — THIAMINE MONONITRATE (VIT B1) 100 MG
500 TABLET ORAL ONCE
Refills: 0 | Status: COMPLETED | OUTPATIENT
Start: 2024-01-19 | End: 2024-01-19

## 2024-01-19 RX ORDER — SODIUM CHLORIDE 9 MG/ML
1000 INJECTION, SOLUTION INTRAVENOUS
Refills: 0 | Status: DISCONTINUED | OUTPATIENT
Start: 2024-01-19 | End: 2024-01-19

## 2024-01-19 RX ORDER — METHYLPREDNISOLONE 4 MG
500 TABLET ORAL
Refills: 0 | Status: COMPLETED | OUTPATIENT
Start: 2024-01-19 | End: 2024-01-24

## 2024-01-19 RX ORDER — MAGNESIUM SULFATE 500 MG/ML
2 VIAL (ML) INJECTION
Refills: 0 | Status: COMPLETED | OUTPATIENT
Start: 2024-01-19 | End: 2024-01-19

## 2024-01-19 RX ADMIN — SODIUM CHLORIDE 100 MILLILITER(S): 9 INJECTION, SOLUTION INTRAVENOUS at 02:17

## 2024-01-19 RX ADMIN — Medication 2 MILLIGRAM(S): at 12:30

## 2024-01-19 RX ADMIN — Medication 2 MILLIGRAM(S): at 20:47

## 2024-01-19 RX ADMIN — Medication 2 MILLIGRAM(S): at 09:08

## 2024-01-19 RX ADMIN — Medication 2 MILLIGRAM(S): at 19:35

## 2024-01-19 RX ADMIN — Medication 100 MILLIGRAM(S): at 05:00

## 2024-01-19 RX ADMIN — Medication 2 MILLIGRAM(S): at 14:46

## 2024-01-19 RX ADMIN — DEXMEDETOMIDINE HYDROCHLORIDE IN 0.9% SODIUM CHLORIDE 4.54 MICROGRAM(S)/KG/HR: 4 INJECTION INTRAVENOUS at 05:00

## 2024-01-19 RX ADMIN — Medication 25 GRAM(S): at 05:00

## 2024-01-19 RX ADMIN — Medication 50 MILLIEQUIVALENT(S): at 22:35

## 2024-01-19 RX ADMIN — Medication 1 TABLET(S): at 12:25

## 2024-01-19 RX ADMIN — Medication 3 MILLIGRAM(S): at 22:39

## 2024-01-19 RX ADMIN — Medication 1 MILLIGRAM(S): at 12:25

## 2024-01-19 RX ADMIN — Medication 2 MILLIGRAM(S): at 07:34

## 2024-01-19 RX ADMIN — Medication 4 MILLIGRAM(S): at 06:33

## 2024-01-19 RX ADMIN — Medication 25 GRAM(S): at 10:48

## 2024-01-19 RX ADMIN — DEXMEDETOMIDINE HYDROCHLORIDE IN 0.9% SODIUM CHLORIDE 4.54 MICROGRAM(S)/KG/HR: 4 INJECTION INTRAVENOUS at 01:23

## 2024-01-19 RX ADMIN — Medication 3 MILLIGRAM(S): at 17:55

## 2024-01-19 RX ADMIN — Medication 85 MILLIMOLE(S): at 12:25

## 2024-01-19 RX ADMIN — Medication 4 MILLIGRAM(S): at 10:54

## 2024-01-19 RX ADMIN — Medication 4 MILLIGRAM(S): at 01:54

## 2024-01-19 RX ADMIN — Medication 500 MILLIGRAM(S): at 17:54

## 2024-01-19 RX ADMIN — Medication 25 GRAM(S): at 06:55

## 2024-01-19 RX ADMIN — Medication 4 MILLIGRAM(S): at 16:17

## 2024-01-19 RX ADMIN — Medication 2 MILLIGRAM(S): at 00:09

## 2024-01-19 RX ADMIN — Medication 2 MILLIGRAM(S): at 04:57

## 2024-01-19 RX ADMIN — Medication 105 MILLIGRAM(S): at 10:47

## 2024-01-19 RX ADMIN — Medication 25 GRAM(S): at 09:08

## 2024-01-19 RX ADMIN — Medication 25 MILLIGRAM(S): at 22:40

## 2024-01-19 RX ADMIN — SODIUM CHLORIDE 125 MILLILITER(S): 9 INJECTION, SOLUTION INTRAVENOUS at 19:52

## 2024-01-19 NOTE — CONSULT NOTE ADULT - SUBJECTIVE AND OBJECTIVE BOX
Patient is a 38y old  Male who presents with a chief complaint of ETOH withdrawal (18 Jan 2024 17:41)      HPI:  38 M hx of alcohol use disorder, anxiety and depression came in for alcohol withdrawal. The Pt usually drinks 4 beers a day but for the past 3 weeks he has been drinking around 2 bottles of 750 ml Vodka daily after going through a bad breakup in order to cope. Last drink yesterday. Of note patient was here 3 weeks ago with the same presentation. Denies HI/SI.     ICU Vital Signs Last 24 Hrs  T(F): 98.6 (18 Jan 2024 11:58), Max: 98.6 (18 Jan 2024 11:58)  HR: 114 (18 Jan 2024 17:00) (114 - 118)  BP: 132/66 (18 Jan 2024 17:00) (132/66 - 161/118)  RR: 20 (18 Jan 2024 17:00) (20 - 20)  SpO2: 94% (18 Jan 2024 17:00) (94% - 100%)    Labs show WBC 13k, Lactate 10  Given 30mg diazepam and started on precedex ggt and 2L NS bolus.      (18 Jan 2024 17:41)  now on precedex drip   PAST MEDICAL & SURGICAL HISTORY:  No pertinent past medical history  anxiety  depression  polysubstance      No significant past surgical history        Allergies    Ceclor (Rash; Hives)    Intolerances      Family history : no cardiovscular family history   Home Medications:  FLUoxetine (Eqv-Sarafem) 20 mg oral tablet: 1 tab(s) orally once a day (26 Dec 2023 19:20)    Occupation:  Alochol: Denied  Smoking: Denied  Drug Use: Denied  Marital Status:         ROS: as in HPI; All other systems reviewed are negative    ICU Vital Signs Last 24 Hrs  T(C): 36.6 (19 Jan 2024 04:00), Max: 37.1 (19 Jan 2024 00:00)  T(F): 97.8 (19 Jan 2024 04:00), Max: 98.8 (19 Jan 2024 00:00)  HR: 72 (19 Jan 2024 07:00) (72 - 121)  BP: 87/51 (19 Jan 2024 07:00) (87/51 - 161/118)  BP(mean): 65 (19 Jan 2024 07:00) (65 - 86)  ABP: --  ABP(mean): --  RR: 14 (19 Jan 2024 07:00) (14 - 23)  SpO2: 99% (19 Jan 2024 07:00) (92% - 100%)    O2 Parameters below as of 19 Jan 2024 07:00  Patient On (Oxygen Delivery Method): nasal cannula  O2 Flow (L/min): 2            Physical Examination:    General: No acute distress.  Alert, oriented, interactive, nonfocal    HEENT: Pupils equal, reactive to light.  Symmetric.    PULM: Clear to auscultation bilaterally, no significant sputum production    CVS: Regular rate and rhythm, no murmurs, rubs, or gallops    ABD: Soft, nondistended, nontender, normoactive bowel sounds, no masses    EXT: No edema, nontender, no clubbing     SKIN: Warm and well perfused, no rashes noted.    Neurology : no motor or sensory deficit , tremor     Musculoskeletal : move all extremity     Lymphatic system: no Palpable node               I&O's Detail    18 Jan 2024 07:01  -  19 Jan 2024 07:00  --------------------------------------------------------  IN:    Dexmedetomidine: 82 mL    dextrose 5% + sodium chloride 0.9%: 1200 mL    IV PiggyBack: 100 mL    Oral Fluid: 700 mL  Total IN: 2082 mL    OUT:  Total OUT: 0 mL    Total NET: 2082 mL            LABS:                        16.4   13.97 )-----------( 191      ( 18 Jan 2024 13:37 )             46.8     19 Jan 2024 04:15    134    |  97     |  8      ----------------------------<  135    3.5     |  30     |  0.7      Ca    7.7        19 Jan 2024 04:15  Phos  1.0       19 Jan 2024 04:15  Mg     1.3       19 Jan 2024 04:15    TPro  7.3    /  Alb  4.4    /  TBili  0.7    /  DBili  x      /  AST  159    /  ALT  79     /  AlkPhos  73     18 Jan 2024 13:37  Amylase x     lipase 163            CAPILLARY BLOOD GLUCOSE      POCT Blood Glucose.: 181 mg/dL (18 Jan 2024 19:47)      Urinalysis Basic - ( 19 Jan 2024 04:15 )    Color: x / Appearance: x / SG: x / pH: x  Gluc: 135 mg/dL / Ketone: x  / Bili: x / Urobili: x   Blood: x / Protein: x / Nitrite: x   Leuk Esterase: x / RBC: x / WBC x   Sq Epi: x / Non Sq Epi: x / Bacteria: x      Culture    Lactate, Blood: 10.6 mmol/L (01-18-24 @ 13:37)      MEDICATIONS  (STANDING):  dexMEDEtomidine Infusion 0.2 MICROgram(s)/kG/Hr (4.54 mL/Hr) IV Continuous <Continuous>  dextrose 5% + sodium chloride 0.9%. 1000 milliLiter(s) (100 mL/Hr) IV Continuous <Continuous>  folic acid 1 milliGRAM(s) Oral daily  hydrOXYzine hydrochloride 25 milliGRAM(s) Oral at bedtime  LORazepam   Injectable 4 milliGRAM(s) IV Push every 4 hours  LORazepam   Injectable 3 milliGRAM(s) IV Push every 4 hours  LORazepam   Injectable   IV Push   magnesium sulfate  IVPB 2 Gram(s) IV Intermittent every 2 hours  multivitamin 1 Tablet(s) Oral daily  thiamine 100 milliGRAM(s) Oral daily    MEDICATIONS  (PRN):  LORazepam   Injectable 2 milliGRAM(s) IV Push every 1 hour PRN Symptom-triggered: each CIWA -Ar score 8 or GREATER        RADIOLOGY: ***     CXR: no infiltrate   TLC:  OG:  ET tube:        CAM ICU:  ECHO:      *

## 2024-01-19 NOTE — PROGRESS NOTE ADULT - SUBJECTIVE AND OBJECTIVE BOX
Patient is a 38y old  Male who presents with a chief complaint of ETOH withdrawal (2024 07:52)    HPI:  38 M hx of alcohol use disorder, anxiety and depression came in for alcohol withdrawal. The Pt usually drinks 4 beers a day but for the past 3 weeks he has been drinking around 2 bottles of 750 ml Vodka daily after going through a bad breakup in order to cope. Last drink yesterday. Of note patient was here 3 weeks ago with the same presentation. Denies HI/SI.     ICU Vital Signs Last 24 Hrs  T(F): 98.6 (2024 11:58), Max: 98.6 (2024 11:58)  HR: 114 (2024 17:00) (114 - 118)  BP: 132/66 (2024 17:00) (132/66 - 161/118)  RR: 20 (2024 17:00) (20 - 20)  SpO2: 94% (2024 17:00) (94% - 100%)    Labs show WBC 13k, Lactate 10  Given 30mg diazepam and started on precedex ggt and 2L NS bolus.      (2024 17:41)       INTERVAL HPI/OVERNIGHT EVENTS:   CIWA 8 this AM and received 2 ativan, significant tremors  Afebrile, hemodynamically stable     Subjective: feeling ok, drinks due to breakup    ICU Vital Signs Last 24 Hrs  T(C): 36.6 (2024 04:00), Max: 37.1 (2024 00:00)  T(F): 97.8 (2024 04:00), Max: 98.8 (2024 00:00)  HR: 77 (2024 11:00) (72 - 121)  BP: 96/58 (2024 11:00) (86/52 - 132/66)  BP(mean): 72 (2024 11:00) (64 - 86)  ABP: --  ABP(mean): --  RR: 14 (2024 11:00) (14 - 23)  SpO2: 95% (2024 11:00) (92% - 99%)    O2 Parameters below as of 2024 11:00  Patient On (Oxygen Delivery Method): nasal cannula  O2 Flow (L/min): 2        I&O's Summary    2024 07:01  -  2024 07:00  --------------------------------------------------------  IN: 2082 mL / OUT: 0 mL / NET: 2082 mL          Daily Height in cm: 170.18 (2024 20:00)    Daily Weight in k (2024 06:00)    EKG/Telemetry Events:    MEDICATIONS  (STANDING):  dexMEDEtomidine Infusion 0.2 MICROgram(s)/kG/Hr (4.54 mL/Hr) IV Continuous <Continuous>  folic acid 1 milliGRAM(s) Oral daily  hydrOXYzine hydrochloride 25 milliGRAM(s) Oral at bedtime  lactated ringers. 1000 milliLiter(s) (100 mL/Hr) IV Continuous <Continuous>  LORazepam   Injectable   IV Push   LORazepam   Injectable 4 milliGRAM(s) IV Push every 4 hours  LORazepam   Injectable 3 milliGRAM(s) IV Push every 4 hours  magnesium gluconate 500 milliGRAM(s) Oral two times a day  multivitamin 1 Tablet(s) Oral daily    MEDICATIONS  (PRN):  LORazepam   Injectable 2 milliGRAM(s) IV Push every 1 hour PRN Symptom-triggered: each CIWA -Ar score 8 or GREATER      PHYSICAL EXAM:  GENERAL: awake and significant tremors  HEAD:  Atraumatic, Normocephalic  EYES: EOMI, PERRLA, conjunctiva and sclera clear  NECK: Supple, No JVD, Normal thyroid, no enlarged nodes  NERVOUS SYSTEM:  Alert & Awake.   CHEST/LUNG: B/L good air entry; No rales, rhonchi, or wheezing  HEART: S1S2 normal, no S3, Regular rate and rhythm; No murmurs  ABDOMEN: Soft, Nontender, Nondistended; Bowel sounds present  EXTREMITIES:  2+ Peripheral Pulses, No clubbing, cyanosis, or edema  LYMPH: No lymphadenopathy noted  SKIN: No rashes or lesions    LABS:                        16.4   13.97 )-----------( 191      ( 2024 13:37 )             46.8         134<L>  |  97<L>  |  8<L>  ----------------------------<  135<H>  3.5   |  30  |  0.7    Ca    7.7<L>      2024 04:15  Phos  1.0       Mg     1.3         TPro  7.3  /  Alb  4.4  /  TBili  0.7  /  DBili  x   /  AST  159<H>  /  ALT  79<H>  /  AlkPhos  73      LIVER FUNCTIONS - ( 2024 13:37 )  Alb: 4.4 g/dL / Pro: 7.3 g/dL / ALK PHOS: 73 U/L / ALT: 79 U/L / AST: 159 U/L / GGT: x             CAPILLARY BLOOD GLUCOSE      POCT Blood Glucose.: 181 mg/dL (2024 19:47)            Urinalysis Basic - ( 2024 04:15 )    Color: x / Appearance: x / SG: x / pH: x  Gluc: 135 mg/dL / Ketone: x  / Bili: x / Urobili: x   Blood: x / Protein: x / Nitrite: x   Leuk Esterase: x / RBC: x / WBC x   Sq Epi: x / Non Sq Epi: x / Bacteria: x          RADIOLOGY & ADDITIONAL TESTS:  CXR: Cardiac/mediastinum/hilum: Unchanged.    Lung parenchyma/Pleura: Within normal limits.    Skeleton/soft tissues: Unchanged.    Impression:    No radiographicevidence of acute cardiopulmonary disease.    CTAP - No acute intra-abdominal pathology.    Hepatic steatosis.            Care Discussed with Consultants/Other Providers [ x] YES  [ ] NO

## 2024-01-19 NOTE — PROGRESS NOTE ADULT - ASSESSMENT
IMPRESSION:    Alcohol withdrawal on precedex  Alcohol use disorder  hypomagnesemia   HO Cocaine and Xanax use disorder     PLAN    CNS: Wean precedex, currently at 0.4. CIWA monitoring, Benzo standing for withdrawal, c/w folate, thiamine, MVI, hydoxyzine. UDS/ SDS; CIWA 8 this AM.    HEENT: Oral care    PULMONARY: HOB @ 45 degrees, aspiration precautions. On room air.     CARDIAC: D5W+NS @150>>. TTE read pending, trop high sens 7    GASTROENTEROLOGY: Feeding when tolerating. Lipase elevated, no abd pain. CT abd unremarkable except hepatic steatosis, follow LFTs, repeat lipase and follow lipids    RENAL: Monitor CMP & UO. Correct electrolytes as needed. Mag and phos low    INFECTIOUS: Monitor off Abx, procal and blood Cultures    HEMATOLOGY: DVT ppx. Monitor CBC and Coags.     ENDOCRINOLOGY: Check FS, insulin protocol as needed.    MUSCULOSKELETAL: OOB to chair  as tolerated    DISPOSITION: CCU

## 2024-01-19 NOTE — CONSULT NOTE ADULT - ASSESSMENT
IMPRESSION:  alcohol withdrawal with some DT   hypomagnesemia  hypokalemia   lactic acidosis        PLAN:    CNS:  thimiane , folate ,ciwa protocol , precedex as needed     HEENT: oral care     PULMONARY:keep po x>92%       CARDIOVASCULAR: echo   IV fluid  cc/ hr   lipid profile TG   GI: GI prophylaxis.  Feeding , repeat lipase   LFT   LA repeat     RENAL:follow is and os   repalce K, ip, mg   INFECTIOUS DISEASE: procal , bld cx follow wbc     HEMATOLOGICAL:  DVT prophylaxis. follow h/h     ENDOCRINE:  Follow up FS.  Insulin protocol if needed.    MUSCULOSKELETAL:    MICU     CRITICAL CARE TIME SPENT: **

## 2024-01-20 LAB
ALBUMIN SERPL ELPH-MCNC: 3.3 G/DL — LOW (ref 3.5–5.2)
ALP SERPL-CCNC: 54 U/L — SIGNIFICANT CHANGE UP (ref 30–115)
ALT FLD-CCNC: 57 U/L — HIGH (ref 0–41)
ANION GAP SERPL CALC-SCNC: 9 MMOL/L — SIGNIFICANT CHANGE UP (ref 7–14)
AST SERPL-CCNC: 114 U/L — HIGH (ref 0–41)
BASOPHILS # BLD AUTO: 0.05 K/UL — SIGNIFICANT CHANGE UP (ref 0–0.2)
BASOPHILS NFR BLD AUTO: 1.1 % — HIGH (ref 0–1)
BILIRUB SERPL-MCNC: 0.8 MG/DL — SIGNIFICANT CHANGE UP (ref 0.2–1.2)
BUN SERPL-MCNC: 4 MG/DL — LOW (ref 10–20)
CALCIUM SERPL-MCNC: 7.9 MG/DL — LOW (ref 8.4–10.4)
CHLORIDE SERPL-SCNC: 104 MMOL/L — SIGNIFICANT CHANGE UP (ref 98–110)
CO2 SERPL-SCNC: 25 MMOL/L — SIGNIFICANT CHANGE UP (ref 17–32)
CREAT SERPL-MCNC: 0.5 MG/DL — LOW (ref 0.7–1.5)
DRUG SCREEN 1, URINE RESULT: SIGNIFICANT CHANGE UP
EGFR: 135 ML/MIN/1.73M2 — SIGNIFICANT CHANGE UP
EOSINOPHIL # BLD AUTO: 0.23 K/UL — SIGNIFICANT CHANGE UP (ref 0–0.7)
EOSINOPHIL NFR BLD AUTO: 4.9 % — SIGNIFICANT CHANGE UP (ref 0–8)
GLUCOSE SERPL-MCNC: 104 MG/DL — HIGH (ref 70–99)
HCT VFR BLD CALC: 36.2 % — LOW (ref 42–52)
HCT VFR BLD CALC: 37 % — LOW (ref 42–52)
HCT VFR BLD CALC: 37.8 % — LOW (ref 42–52)
HGB BLD-MCNC: 12.4 G/DL — LOW (ref 14–18)
HGB BLD-MCNC: 12.5 G/DL — LOW (ref 14–18)
HGB BLD-MCNC: 13 G/DL — LOW (ref 14–18)
IMM GRANULOCYTES NFR BLD AUTO: 0.4 % — HIGH (ref 0.1–0.3)
LIDOCAIN IGE QN: 108 U/L — HIGH (ref 7–60)
LYMPHOCYTES # BLD AUTO: 1.16 K/UL — LOW (ref 1.2–3.4)
LYMPHOCYTES # BLD AUTO: 24.7 % — SIGNIFICANT CHANGE UP (ref 20.5–51.1)
MAGNESIUM SERPL-MCNC: 1.9 MG/DL — SIGNIFICANT CHANGE UP (ref 1.8–2.4)
MCHC RBC-ENTMCNC: 31.1 PG — HIGH (ref 27–31)
MCHC RBC-ENTMCNC: 31.3 PG — HIGH (ref 27–31)
MCHC RBC-ENTMCNC: 31.4 PG — HIGH (ref 27–31)
MCHC RBC-ENTMCNC: 33.8 G/DL — SIGNIFICANT CHANGE UP (ref 32–37)
MCHC RBC-ENTMCNC: 34.3 G/DL — SIGNIFICANT CHANGE UP (ref 32–37)
MCHC RBC-ENTMCNC: 34.4 G/DL — SIGNIFICANT CHANGE UP (ref 32–37)
MCV RBC AUTO: 90.9 FL — SIGNIFICANT CHANGE UP (ref 80–94)
MCV RBC AUTO: 91.6 FL — SIGNIFICANT CHANGE UP (ref 80–94)
MCV RBC AUTO: 92 FL — SIGNIFICANT CHANGE UP (ref 80–94)
MONOCYTES # BLD AUTO: 0.21 K/UL — SIGNIFICANT CHANGE UP (ref 0.1–0.6)
MONOCYTES NFR BLD AUTO: 4.5 % — SIGNIFICANT CHANGE UP (ref 1.7–9.3)
NEUTROPHILS # BLD AUTO: 3.03 K/UL — SIGNIFICANT CHANGE UP (ref 1.4–6.5)
NEUTROPHILS NFR BLD AUTO: 64.4 % — SIGNIFICANT CHANGE UP (ref 42.2–75.2)
NRBC # BLD: 0 /100 WBCS — SIGNIFICANT CHANGE UP (ref 0–0)
PHOSPHATE SERPL-MCNC: 1.1 MG/DL — LOW (ref 2.1–4.9)
PLATELET # BLD AUTO: 48 K/UL — LOW (ref 130–400)
PLATELET # BLD AUTO: 49 K/UL — LOW (ref 130–400)
PLATELET # BLD AUTO: 53 K/UL — LOW (ref 130–400)
PMV BLD: 11.4 FL — HIGH (ref 7.4–10.4)
PMV BLD: 11.7 FL — HIGH (ref 7.4–10.4)
PMV BLD: 11.8 FL — HIGH (ref 7.4–10.4)
POTASSIUM SERPL-MCNC: 4.2 MMOL/L — SIGNIFICANT CHANGE UP (ref 3.5–5)
POTASSIUM SERPL-SCNC: 4.2 MMOL/L — SIGNIFICANT CHANGE UP (ref 3.5–5)
PROCALCITONIN SERPL-MCNC: 0.17 NG/ML — HIGH (ref 0.02–0.1)
PROT SERPL-MCNC: 5.3 G/DL — LOW (ref 6–8)
RBC # BLD: 3.95 M/UL — LOW (ref 4.7–6.1)
RBC # BLD: 4.02 M/UL — LOW (ref 4.7–6.1)
RBC # BLD: 4.16 M/UL — LOW (ref 4.7–6.1)
RBC # FLD: 12.5 % — SIGNIFICANT CHANGE UP (ref 11.5–14.5)
RBC # FLD: 12.5 % — SIGNIFICANT CHANGE UP (ref 11.5–14.5)
RBC # FLD: 12.7 % — SIGNIFICANT CHANGE UP (ref 11.5–14.5)
SODIUM SERPL-SCNC: 138 MMOL/L — SIGNIFICANT CHANGE UP (ref 135–146)
WBC # BLD: 4.28 K/UL — LOW (ref 4.8–10.8)
WBC # BLD: 4.6 K/UL — LOW (ref 4.8–10.8)
WBC # BLD: 4.7 K/UL — LOW (ref 4.8–10.8)
WBC # FLD AUTO: 4.28 K/UL — LOW (ref 4.8–10.8)
WBC # FLD AUTO: 4.6 K/UL — LOW (ref 4.8–10.8)
WBC # FLD AUTO: 4.7 K/UL — LOW (ref 4.8–10.8)

## 2024-01-20 PROCEDURE — 99233 SBSQ HOSP IP/OBS HIGH 50: CPT

## 2024-01-20 PROCEDURE — 71045 X-RAY EXAM CHEST 1 VIEW: CPT | Mod: 26

## 2024-01-20 RX ORDER — NICOTINE POLACRILEX 2 MG
1 GUM BUCCAL ONCE
Refills: 0 | Status: COMPLETED | OUTPATIENT
Start: 2024-01-20 | End: 2024-01-20

## 2024-01-20 RX ORDER — SODIUM CHLORIDE 9 MG/ML
4 INJECTION INTRAMUSCULAR; INTRAVENOUS; SUBCUTANEOUS ONCE
Refills: 0 | Status: DISCONTINUED | OUTPATIENT
Start: 2024-01-20 | End: 2024-01-31

## 2024-01-20 RX ORDER — CHLORHEXIDINE GLUCONATE 213 G/1000ML
1 SOLUTION TOPICAL
Refills: 0 | Status: DISCONTINUED | OUTPATIENT
Start: 2024-01-20 | End: 2024-01-31

## 2024-01-20 RX ORDER — ENOXAPARIN SODIUM 100 MG/ML
40 INJECTION SUBCUTANEOUS EVERY 24 HOURS
Refills: 0 | Status: DISCONTINUED | OUTPATIENT
Start: 2024-01-20 | End: 2024-01-20

## 2024-01-20 RX ORDER — DEXMEDETOMIDINE HYDROCHLORIDE IN 0.9% SODIUM CHLORIDE 4 UG/ML
0.2 INJECTION INTRAVENOUS
Qty: 400 | Refills: 0 | Status: DISCONTINUED | OUTPATIENT
Start: 2024-01-20 | End: 2024-01-22

## 2024-01-20 RX ADMIN — Medication 3 MILLIGRAM(S): at 06:19

## 2024-01-20 RX ADMIN — Medication 2 MILLIGRAM(S): at 04:07

## 2024-01-20 RX ADMIN — Medication 500 MILLIGRAM(S): at 06:19

## 2024-01-20 RX ADMIN — Medication 1 TABLET(S): at 11:35

## 2024-01-20 RX ADMIN — Medication 2 MILLIGRAM(S): at 17:25

## 2024-01-20 RX ADMIN — Medication 500 MILLIGRAM(S): at 17:00

## 2024-01-20 RX ADMIN — Medication 100 MILLIGRAM(S): at 11:35

## 2024-01-20 RX ADMIN — ENOXAPARIN SODIUM 40 MILLIGRAM(S): 100 INJECTION SUBCUTANEOUS at 06:19

## 2024-01-20 RX ADMIN — Medication 2 MILLIGRAM(S): at 21:09

## 2024-01-20 RX ADMIN — Medication 1 MILLIGRAM(S): at 11:35

## 2024-01-20 RX ADMIN — Medication 50 MILLIEQUIVALENT(S): at 02:04

## 2024-01-20 RX ADMIN — Medication 2 MILLIGRAM(S): at 23:04

## 2024-01-20 RX ADMIN — Medication 2 MILLIGRAM(S): at 12:29

## 2024-01-20 RX ADMIN — Medication 3 MILLIGRAM(S): at 13:57

## 2024-01-20 RX ADMIN — Medication 3 MILLIGRAM(S): at 02:04

## 2024-01-20 RX ADMIN — Medication 15 MILLILITER(S): at 06:20

## 2024-01-20 RX ADMIN — Medication 2 MILLIGRAM(S): at 08:54

## 2024-01-20 RX ADMIN — CHLORHEXIDINE GLUCONATE 1 APPLICATION(S): 213 SOLUTION TOPICAL at 06:21

## 2024-01-20 RX ADMIN — Medication 25 MILLIGRAM(S): at 21:09

## 2024-01-20 RX ADMIN — SODIUM CHLORIDE 125 MILLILITER(S): 9 INJECTION, SOLUTION INTRAVENOUS at 04:20

## 2024-01-20 RX ADMIN — Medication 2 MILLIGRAM(S): at 00:21

## 2024-01-20 RX ADMIN — Medication 50 MILLIEQUIVALENT(S): at 00:42

## 2024-01-20 RX ADMIN — Medication 2 MILLIGRAM(S): at 17:00

## 2024-01-20 RX ADMIN — Medication 3 MILLIGRAM(S): at 10:11

## 2024-01-20 NOTE — PROGRESS NOTE ADULT - SUBJECTIVE AND OBJECTIVE BOX
SUBJECTIVE:  HPI:  38 M hx of alcohol use disorder, anxiety and depression came in for alcohol withdrawal. The Pt usually drinks 4 beers a day but for the past 3 weeks he has been drinking around 2 bottles of 750 ml Vodka daily after going through a bad breakup in order to cope. Last drink yesterday. Of note patient was here 3 weeks ago with the same presentation. Denies HI/SI.     ICU Vital Signs Last 24 Hrs  T(F): 98.6 (18 Jan 2024 11:58), Max: 98.6 (18 Jan 2024 11:58)  HR: 114 (18 Jan 2024 17:00) (114 - 118)  BP: 132/66 (18 Jan 2024 17:00) (132/66 - 161/118)  RR: 20 (18 Jan 2024 17:00) (20 - 20)  SpO2: 94% (18 Jan 2024 17:00) (94% - 100%)    Labs show WBC 13k, Lactate 10  Given 30mg diazepam and started on precedex ggt and 2L NS bolus.      (18 Jan 2024 17:41)      Patient is a 38y old Male who presents with a chief complaint of ETOH withdrawal (19 Jan 2024 12:35)    Currently admitted to medicine with the primary diagnosis of Alcohol use with withdrawal       Today is hospital day 2d.     PAST MEDICAL & SURGICAL HISTORY  No pertinent past medical history  anxiety  depression  polysubstance    No significant past surgical history        ALLERGIES:  Ceclor (Rash; Hives)    MEDICATIONS:  ACTIVE MEDICATIONS  chlorhexidine 2% Cloths 1 Application(s) Topical <User Schedule>  dexMEDEtomidine Infusion 0.2 MICROgram(s)/kG/Hr IV Continuous <Continuous>  enoxaparin Injectable 40 milliGRAM(s) SubCutaneous every 24 hours  folic acid 1 milliGRAM(s) Oral daily  hydrOXYzine hydrochloride 25 milliGRAM(s) Oral at bedtime  lactated ringers 1000 milliLiter(s) IV Continuous <Continuous>  LORazepam   Injectable 2 milliGRAM(s) IV Push every 4 hours  LORazepam   Injectable 2 milliGRAM(s) IV Push every 1 hour PRN  LORazepam   Injectable 3 milliGRAM(s) IV Push every 4 hours  LORazepam   Injectable   IV Push   magnesium gluconate 500 milliGRAM(s) Oral two times a day  multivitamin 1 Tablet(s) Oral daily  thiamine 100 milliGRAM(s) Oral daily      VITALS:   T(F): 97  HR: 72  BP: 141/67  RR: 17  SpO2: 96%    LABS:                        12.4   4.70  )-----------( 53       ( 20 Jan 2024 04:35 )             36.2     01-20    138  |  104  |  4<L>  ----------------------------<  104<H>  4.2   |  25  |  0.5<L>    Ca    7.9<L>      20 Jan 2024 04:35  Phos  1.1     01-20  Mg     1.9     01-20    TPro  5.3<L>  /  Alb  3.3<L>  /  TBili  0.8  /  DBili  x   /  AST  114<H>  /  ALT  57<H>  /  AlkPhos  54  01-20      Urinalysis Basic - ( 20 Jan 2024 04:35 )    Color: x / Appearance: x / SG: x / pH: x  Gluc: 104 mg/dL / Ketone: x  / Bili: x / Urobili: x   Blood: x / Protein: x / Nitrite: x   Leuk Esterase: x / RBC: x / WBC x   Sq Epi: x / Non Sq Epi: x / Bacteria: x        Lactate, Blood: 1.2 mmol/L (01-19-24 @ 11:44)              PHYSICAL EXAM:  GEN: No acute distress  LUNGS: Clear to auscultation bilaterally   HEART: S1/S2 present.    ABD: Soft, non-tender, non-distended.   EXT: No pedal edema  NEURO: AAOX3

## 2024-01-20 NOTE — PROGRESS NOTE ADULT - ASSESSMENT
Alcohol withdrawal on precedex  Alcohol use disorder  hypomagnesemia   HO Cocaine and Xanax use disorder     PLAN    CNS: Wean precedex, currently at 0.4. CIWA monitoring, Benzo standing for withdrawal, c/w folate, thiamine, MVI, hydoxyzine. UDS/ SDS; CIWA 8 this AM.    HEENT: Oral care    PULMONARY: HOB @ 45 degrees, aspiration precautions. On room air.     CARDIAC: D5W+NS @150>>. TTE read pending, trop high sens 7    GASTROENTEROLOGY: Feeding when tolerating. Lipase elevated, no abd pain. CT abd unremarkable except hepatic steatosis, follow LFTs, repeat lipase and follow lipids    RENAL: Monitor CMP & UO. Correct electrolytes as needed. Mag and phos low    INFECTIOUS: Monitor off Abx, procal and blood Cultures    HEMATOLOGY: DVT ppx. Monitor CBC and Coags.     ENDOCRINOLOGY: Check FS, insulin protocol as needed.    MUSCULOSKELETAL: OOB to chair  as tolerated    DISPOSITION: CCU

## 2024-01-20 NOTE — PROGRESS NOTE ADULT - SUBJECTIVE AND OBJECTIVE BOX
Patient is a 38y old  Male who presents with a chief complaint of ETOH withdrawal (20 Jan 2024 09:44)        HPI:  38 M hx of alcohol use disorder, anxiety and depression came in for alcohol withdrawal. The Pt usually drinks 4 beers a day but for the past 3 weeks he has been drinking around 2 bottles of 750 ml Vodka daily after going through a bad breakup in order to cope. Last drink yesterday. Of note patient was here 3 weeks ago with the same presentation. Denies HI/SI.     ICU Vital Signs Last 24 Hrs  T(F): 98.6 (18 Jan 2024 11:58), Max: 98.6 (18 Jan 2024 11:58)  HR: 114 (18 Jan 2024 17:00) (114 - 118)  BP: 132/66 (18 Jan 2024 17:00) (132/66 - 161/118)  RR: 20 (18 Jan 2024 17:00) (20 - 20)  SpO2: 94% (18 Jan 2024 17:00) (94% - 100%)    Labs show WBC 13k, Lactate 10  Given 30mg diazepam and started on precedex ggt and 2L NS bolus.      (18 Jan 2024 17:41)      Pt evaluated on rounds.  I reviewed the radiology tests and hospital record.    I reviewed previous notes on this patient.    Interval Events: Agitated overnight requiring boluses.        REVIEW OF SYSTEMS:   see HPI      OBJECTIVE:  ICU Vital Signs Last 24 Hrs  T(C): 36.1 (20 Jan 2024 08:00), Max: 36.4 (19 Jan 2024 16:00)  T(F): 97 (20 Jan 2024 08:00), Max: 97.6 (19 Jan 2024 16:00)  HR: 72 (20 Jan 2024 08:00) (60 - 88)  BP: 141/67 (20 Jan 2024 08:00) (82/44 - 141/80)  BP(mean): 105 (20 Jan 2024 06:00) (57 - 108)  RR: 17 (20 Jan 2024 08:00) (12 - 25)  SpO2: 96% (20 Jan 2024 08:00) (91% - 99%)    O2 Parameters below as of 20 Jan 2024 08:00  Patient On (Oxygen Delivery Method): room air              01-19 @ 07:01  -  01-20 @ 07:00  --------------------------------------------------------  IN: 2740.1 mL / OUT: 2850 mL / NET: -109.9 mL      CAPILLARY BLOOD GLUCOSE      POCT Blood Glucose.: 181 mg/dL (18 Jan 2024 19:47)        PHYSICAL EXAM:       · ENMT:   Airway patent,   Nasal mucosa clear.  Mouth with normal mucosa.   No thrush    · EYES:   Clear bilaterally,   pupils equal,   round and reactive to light.    · CARDIAC:   Normal rate,   regular rhythm.    Heart sounds S1, S2.   No murmurs, no rubs or gallops on auscultation  no edema        CAROTID:   normal systolic impulse  no bruits    · RESPIRATORY:   rales  normal chest expansion  no retractions or use of accessory muscles  percussion of chest demonstrates no hyperresonance or dullness    · GASTROINTESTINAL:  Abdomen soft,   non-tender,   + BS  liver/spleen not palpable    · MUSCULOSKELETAL:   no clubbing, cyanosis      · SKIN:   Skin normal color for race,   warm, dry   No evidence of rash.        · HEME LYMPH:   no splenomegaly.  No cervical  lymphadenopathy.  no inguinal lymphadenopathy    HOSPITAL MEDICATIONS:  MEDICATIONS  (STANDING):  chlorhexidine 2% Cloths 1 Application(s) Topical <User Schedule>  dexMEDEtomidine Infusion 0.2 MICROgram(s)/kG/Hr (3.27 mL/Hr) IV Continuous <Continuous>  enoxaparin Injectable 40 milliGRAM(s) SubCutaneous every 24 hours  folic acid 1 milliGRAM(s) Oral daily  hydrOXYzine hydrochloride 25 milliGRAM(s) Oral at bedtime  lactated ringers 1000 milliLiter(s) (125 mL/Hr) IV Continuous <Continuous>  LORazepam   Injectable 3 milliGRAM(s) IV Push every 4 hours  LORazepam   Injectable   IV Push   LORazepam   Injectable 2 milliGRAM(s) IV Push every 4 hours  magnesium gluconate 500 milliGRAM(s) Oral two times a day  multivitamin 1 Tablet(s) Oral daily  thiamine 100 milliGRAM(s) Oral daily    MEDICATIONS  (PRN):  LORazepam   Injectable 2 milliGRAM(s) IV Push every 1 hour PRN Symptom-triggered: each CIWA -Ar score 8 or GREATER    lactated ringers: 1000 milliLiter(s)      with potassium chloride additive 20 milliEquivalent(s), infuse at 125 mL/Hr  lactated ringers.: Solution, 1000 milliLiter(s) infuse at 100 mL/Hr  lactated ringers Bolus:   1000 milliLiter(s), IV Bolus, once, infuse over 60 Minute(s), Stop After 1 Doses  lactated ringers Bolus:   1000 milliLiter(s), IV Bolus, once, infuse over 60 Minute(s), Stop After 1 Doses      LABS:                        12.4   4.70  )-----------( 53       ( 20 Jan 2024 04:35 )             36.2     01-20    138  |  104  |  4<L>  ----------------------------<  104<H>  4.2   |  25  |  0.5<L>    Ca    7.9<L>      20 Jan 2024 04:35  Phos  1.1     01-20  Mg     1.9     01-20    TPro  5.3<L>  /  Alb  3.3<L>  /  TBili  0.8  /  DBili  x   /  AST  114<H>  /  ALT  57<H>  /  AlkPhos  54  01-20      Urinalysis Basic - ( 20 Jan 2024 04:35 )    Color: x / Appearance: x / SG: x / pH: x  Gluc: 104 mg/dL / Ketone: x  / Bili: x / Urobili: x   Blood: x / Protein: x / Nitrite: x   Leuk Esterase: x / RBC: x / WBC x   Sq Epi: x / Non Sq Epi: x / Bacteria: x        Venous Blood Gas:  01-18 @ 15:38  7.41/28/74/18/94.6  VBG Lactate: 6.7          SARS-CoV-2: NotDetec (18 Jan 2024 23:05)  COVID-19 PCR: NotDetec (04 Jan 2024 14:56)            RADIOLOGY: Today I personally interpreted the latest CXR and other pertinent films.

## 2024-01-20 NOTE — PROGRESS NOTE ADULT - ASSESSMENT
IMPRESSION:  alcohol withdrawal with DT   hypomagnesemia  hypokalemia   lactic acidosis        PLAN:    CNS:  thiamine , folate ,  ciwa protocol , precedex as needed     HEENT: oral care     PULMONARY: keep po x>92%       CARDIOVASCULAR: echo   IV fluid  cc/ hr when NPO  lipid profile TG     GI: GI prophylaxis.    Feeding , repeat lipase   LFT   LA repeat     RENAL:follow is and os   repalce K, ip, mg     INFECTIOUS DISEASE: trend procal ,   bld cx follow wbc     HEMATOLOGICAL:  DVT prophylaxis. follow h/h     ENDOCRINE:  Follow up FS.  Insulin protocol if needed.    MUSCULOSKELETAL:    MICU

## 2024-01-21 PROCEDURE — 99233 SBSQ HOSP IP/OBS HIGH 50: CPT

## 2024-01-21 RX ORDER — LORATADINE 10 MG/1
10 TABLET ORAL ONCE
Refills: 0 | Status: COMPLETED | OUTPATIENT
Start: 2024-01-21 | End: 2024-01-21

## 2024-01-21 RX ORDER — OXYCODONE HYDROCHLORIDE 5 MG/1
5 TABLET ORAL ONCE
Refills: 0 | Status: DISCONTINUED | OUTPATIENT
Start: 2024-01-21 | End: 2024-01-21

## 2024-01-21 RX ORDER — ACETAMINOPHEN 500 MG
650 TABLET ORAL ONCE
Refills: 0 | Status: COMPLETED | OUTPATIENT
Start: 2024-01-21 | End: 2024-01-21

## 2024-01-21 RX ORDER — TRAZODONE HCL 50 MG
100 TABLET ORAL AT BEDTIME
Refills: 0 | Status: DISCONTINUED | OUTPATIENT
Start: 2024-01-21 | End: 2024-01-31

## 2024-01-21 RX ORDER — SODIUM CHLORIDE 0.65 %
1 AEROSOL, SPRAY (ML) NASAL
Refills: 0 | Status: DISCONTINUED | OUTPATIENT
Start: 2024-01-21 | End: 2024-01-31

## 2024-01-21 RX ORDER — ACETAMINOPHEN 500 MG
325 TABLET ORAL ONCE
Refills: 0 | Status: COMPLETED | OUTPATIENT
Start: 2024-01-21 | End: 2024-01-21

## 2024-01-21 RX ORDER — FLUOXETINE HCL 10 MG
20 CAPSULE ORAL DAILY
Refills: 0 | Status: DISCONTINUED | OUTPATIENT
Start: 2024-01-21 | End: 2024-01-31

## 2024-01-21 RX ORDER — GABAPENTIN 400 MG/1
400 CAPSULE ORAL THREE TIMES A DAY
Refills: 0 | Status: DISCONTINUED | OUTPATIENT
Start: 2024-01-21 | End: 2024-01-31

## 2024-01-21 RX ADMIN — Medication 325 MILLIGRAM(S): at 15:17

## 2024-01-21 RX ADMIN — Medication 2 MILLIGRAM(S): at 14:12

## 2024-01-21 RX ADMIN — Medication 2 MILLIGRAM(S): at 10:13

## 2024-01-21 RX ADMIN — Medication 1 PATCH: at 00:37

## 2024-01-21 RX ADMIN — Medication 500 MILLIGRAM(S): at 05:37

## 2024-01-21 RX ADMIN — Medication 20 MILLIGRAM(S): at 15:45

## 2024-01-21 RX ADMIN — Medication 325 MILLIGRAM(S): at 15:47

## 2024-01-21 RX ADMIN — Medication 2 MILLIGRAM(S): at 08:06

## 2024-01-21 RX ADMIN — DEXMEDETOMIDINE HYDROCHLORIDE IN 0.9% SODIUM CHLORIDE 3.27 MICROGRAM(S)/KG/HR: 4 INJECTION INTRAVENOUS at 17:44

## 2024-01-21 RX ADMIN — Medication 1 MILLIGRAM(S): at 13:23

## 2024-01-21 RX ADMIN — GABAPENTIN 400 MILLIGRAM(S): 400 CAPSULE ORAL at 15:15

## 2024-01-21 RX ADMIN — Medication 100 MILLIGRAM(S): at 13:23

## 2024-01-21 RX ADMIN — CHLORHEXIDINE GLUCONATE 1 APPLICATION(S): 213 SOLUTION TOPICAL at 05:37

## 2024-01-21 RX ADMIN — Medication 2 MILLIGRAM(S): at 03:48

## 2024-01-21 RX ADMIN — Medication 2 MILLIGRAM(S): at 18:26

## 2024-01-21 RX ADMIN — SODIUM CHLORIDE 125 MILLILITER(S): 9 INJECTION, SOLUTION INTRAVENOUS at 05:37

## 2024-01-21 RX ADMIN — Medication 2 MILLIGRAM(S): at 01:13

## 2024-01-21 RX ADMIN — Medication 25 MILLIGRAM(S): at 22:20

## 2024-01-21 RX ADMIN — Medication 2 MILLIGRAM(S): at 13:26

## 2024-01-21 RX ADMIN — Medication 1 SPRAY(S): at 14:53

## 2024-01-21 RX ADMIN — Medication 2 MILLIGRAM(S): at 15:06

## 2024-01-21 RX ADMIN — DEXMEDETOMIDINE HYDROCHLORIDE IN 0.9% SODIUM CHLORIDE 3.27 MICROGRAM(S)/KG/HR: 4 INJECTION INTRAVENOUS at 05:37

## 2024-01-21 RX ADMIN — DEXMEDETOMIDINE HYDROCHLORIDE IN 0.9% SODIUM CHLORIDE 3.27 MICROGRAM(S)/KG/HR: 4 INJECTION INTRAVENOUS at 00:37

## 2024-01-21 RX ADMIN — Medication 1 PATCH: at 06:13

## 2024-01-21 RX ADMIN — DEXMEDETOMIDINE HYDROCHLORIDE IN 0.9% SODIUM CHLORIDE 3.27 MICROGRAM(S)/KG/HR: 4 INJECTION INTRAVENOUS at 22:21

## 2024-01-21 RX ADMIN — Medication 650 MILLIGRAM(S): at 23:05

## 2024-01-21 RX ADMIN — OXYCODONE HYDROCHLORIDE 5 MILLIGRAM(S): 5 TABLET ORAL at 23:30

## 2024-01-21 RX ADMIN — Medication 500 MILLIGRAM(S): at 17:11

## 2024-01-21 RX ADMIN — Medication 1 PATCH: at 20:01

## 2024-01-21 RX ADMIN — Medication 2 MILLIGRAM(S): at 20:23

## 2024-01-21 RX ADMIN — SODIUM CHLORIDE 125 MILLILITER(S): 9 INJECTION, SOLUTION INTRAVENOUS at 22:21

## 2024-01-21 RX ADMIN — Medication 1 MILLIGRAM(S): at 22:20

## 2024-01-21 RX ADMIN — Medication 1 TABLET(S): at 13:23

## 2024-01-21 RX ADMIN — Medication 2 MILLIGRAM(S): at 05:37

## 2024-01-21 RX ADMIN — Medication 650 MILLIGRAM(S): at 22:20

## 2024-01-21 RX ADMIN — LORATADINE 10 MILLIGRAM(S): 10 TABLET ORAL at 14:53

## 2024-01-21 RX ADMIN — DEXMEDETOMIDINE HYDROCHLORIDE IN 0.9% SODIUM CHLORIDE 3.27 MICROGRAM(S)/KG/HR: 4 INJECTION INTRAVENOUS at 13:26

## 2024-01-21 RX ADMIN — Medication 1 MILLIGRAM(S): at 17:11

## 2024-01-21 RX ADMIN — Medication 100 MILLIGRAM(S): at 23:30

## 2024-01-21 RX ADMIN — GABAPENTIN 400 MILLIGRAM(S): 400 CAPSULE ORAL at 22:20

## 2024-01-21 RX ADMIN — Medication 2 MILLIGRAM(S): at 23:30

## 2024-01-21 RX ADMIN — SODIUM CHLORIDE 125 MILLILITER(S): 9 INJECTION, SOLUTION INTRAVENOUS at 13:21

## 2024-01-21 NOTE — PROGRESS NOTE ADULT - SUBJECTIVE AND OBJECTIVE BOX
24H events:    Patient is a 38y old Male who presents with a chief complaint of ETOH withdrawal (20 Jan 2024 09:52)    Primary diagnosis of Alcohol use with withdrawal    Today is hospital day 3d. This morning patient was seen and examined at bedside, resting comfortably in bed.    No acute or major events overnight.    PAST MEDICAL & SURGICAL HISTORY  No pertinent past medical history  anxiety  depression  polysubstance    No significant past surgical history      SOCIAL HISTORY:  Social History:      ALLERGIES:  Ceclor (Rash; Hives)    MEDICATIONS:  STANDING MEDICATIONS  chlorhexidine 2% Cloths 1 Application(s) Topical <User Schedule>  dexMEDEtomidine Infusion 0.2 MICROgram(s)/kG/Hr IV Continuous <Continuous>  folic acid 1 milliGRAM(s) Oral daily  hydrOXYzine hydrochloride 25 milliGRAM(s) Oral at bedtime  lactated ringers 1000 milliLiter(s) IV Continuous <Continuous>  LORazepam   Injectable 2 milliGRAM(s) IV Push every 4 hours  LORazepam   Injectable   IV Push   LORazepam   Injectable 1 milliGRAM(s) IV Push every 4 hours  magnesium gluconate 500 milliGRAM(s) Oral two times a day  multivitamin 1 Tablet(s) Oral daily  nicotine -   7 mG/24Hr(s) Patch 1 Patch Transdermal once  sodium chloride 3%  Inhalation 4 milliLiter(s) Inhalation once  thiamine 100 milliGRAM(s) Oral daily    PRN MEDICATIONS  LORazepam   Injectable 2 milliGRAM(s) IV Push every 1 hour PRN    VITALS:   T(F): 97.5  HR: 68  BP: 116/56  RR: 14  SpO2: 98%    PHYSICAL EXAM:  GENERAL:   (x) NAD, lying in bed comfortably     (  ) obtunded     (  ) lethargic     (  ) somnolent    HEAD:   (  x) Atraumatic         HEART:  Rate -->     (x  ) normal rate     (  ) bradycardic     (  ) tachycardic  Rhythm -->     (x  ) regular     (  ) regularly irregular     (  ) irregularly irregular  Murmurs -->     ( x ) normal s1s2     (  ) systolic murmur     (  ) diastolic murmur     (  ) continuous murmur      (  ) S3 present     (  ) S4 present    LUNGS:   ( x )Unlabored respirations     (  ) tachypnea  ( x ) B/L air entry     (  ) decreased breath sounds in:  (location     )    ( x ) no adventitious sound     (  ) crackles     (  ) wheezing      (  ) rhonchi      (specify location:       )  (  ) chest wall tenderness (specify location:       )    ABDOMEN:   ( x ) Soft     (  ) tense   |   (  x) nondistended     (  ) distended   |   (  ) +BS     (  ) hypoactive bowel sounds     (  ) hyperactive bowel sounds  (x  ) nontender     (  ) RUQ tenderness     (  ) RLQ tenderness     (  ) LLQ tenderness     (  ) epigastric tenderness     (  ) diffuse tenderness  (  ) Splenomegaly      (  ) Hepatomegaly      (  ) Jaundice     (  ) ecchymosis     EXTREMITIES:  ( x ) Normal     (  ) Rash     (  ) ecchymosis     (  ) varicose veins      (  ) pitting edema     (  ) non-pitting edema   (  ) ulceration     (  ) gangrene:     (location:     )    NERVOUS SYSTEM:    ( x ) A&Ox3       LABS:                        12.5   4.60  )-----------( 49       ( 20 Jan 2024 17:35 )             37.0     01-20    138  |  104  |  4<L>  ----------------------------<  104<H>  4.2   |  25  |  0.5<L>    Ca    7.9<L>      20 Jan 2024 04:35  Phos  1.1     01-20  Mg     1.9     01-20    TPro  5.3<L>  /  Alb  3.3<L>  /  TBili  0.8  /  DBili  x   /  AST  114<H>  /  ALT  57<H>  /  AlkPhos  54  01-20      Urinalysis Basic - ( 20 Jan 2024 04:35 )    Color: x / Appearance: x / SG: x / pH: x  Gluc: 104 mg/dL / Ketone: x  / Bili: x / Urobili: x   Blood: x / Protein: x / Nitrite: x   Leuk Esterase: x / RBC: x / WBC x   Sq Epi: x / Non Sq Epi: x / Bacteria: x                RADIOLOGY:

## 2024-01-21 NOTE — PROGRESS NOTE ADULT - ASSESSMENT
IMPRESSION:  alcohol withdrawal with DT   hypomagnesemia  hypokalemia   lactic acidosis    thrombocytopenia      PLAN:    CNS:  thiamine , folate ,  ciwa protocol , precedex as needed     HEENT: oral care     PULMONARY: keep po x>92%       CARDIOVASCULAR: echo   IV fluid  cc/ hr when NPO  lipid profile TG     GI: GI prophylaxis.    Feeding , repeat lipase   LFT   LA repeat     RENAL:follow is and os   repalce K, ip, mg     INFECTIOUS DISEASE: trend procal ,   bld cx follow wbc     HEMATOLOGICAL:  DVT prophylaxis. follow h/h, plats  Heme eval    ENDOCRINE:  Follow up FS.  Insulin protocol if needed.    MUSCULOSKELETAL:    MICU

## 2024-01-21 NOTE — PROGRESS NOTE ADULT - SUBJECTIVE AND OBJECTIVE BOX
78 year old male patient with history of Dementia, Depression, OA, Gout, HTN, Liver Cirrhosis 2ry to Likely Hepatitis, and HFpEF who was brought to the ED on 11/22 following an episode of presyncope in setting of recent watery diarrhea on lactulose, found to have positive orthostatic vitals in ED, s/p IV fluids, to be admitted for further investigations, management, and monitoring. Currently hemodynamically stable.    #Presyncope likely 2/2 orthostatic hypotension in setting of lactulose, recent increase in metformin, and antihypertensives  - Orthostatic Vitals on admission (126/57mmHg; 84bpm) -> (120/56mmHg; 82bpm) -> (97/52mmHg; 81bpm)  - In setting of positive orthostasis, recent diarrhea with lactulose while on increased metformin dose and antihypertensives, will hold lactulose, metformin, and quinapril  - Monitor diarrhea and consider sending studies if persists off lactulose  - PT/OT evaluation  - f/u TTE  - Repeat orthostatic vitals on 11/24 143/70 supine -> 113/60 sit -> 84/43 stand  - c/w additional 1L IV fluid hydration with LR at 75mL/hour  - Repeat orthostatic vitals on 11/25, if not improved, consider adding midodrine 5  - joey stocking, abd binder    #Hypomagnesemia in Setting of Diarrhea on Lactulose   - 11/24 Mg 1.4 - repleted    #Chronically Elevated LFTs - follows Dr. 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 MELD score 18pts, 6% 3-month mortality  - hepatitis panel negative  - Will order Ultrasound abdomen (aware that patient is s/p CCY)  - Check serum NH3 although no signs of hepatic encephalopathy  - Will hold lactulose and monitor BMs    #History of Hypertension  #Current Orthostatic Hypotension  - Orthostatic Vitals on admission (126/57mmHg; 84bpm) -> (120/56mmHg; 82bpm) -> (97/52mmHg; 81bpm)  - orthostatic vitals on 11/24 143/70 supine -> 113/60 sit -> 84/43 stand  - Monitor BP  - Hold home quinapril 20mg QD for now    #HFpEF- Not in Exacerbation  - Last TTE 10/15/2022 normal EF, DD I, mild MR  - IV fluid hydration as above (not on diuretics at home)  - Monitor telemetry   - Trend electrolytes and keep K>4 and Mg>2  - f/u TTE    #Dementia  #Depression  - c/w Donepezil 10mg QD, Memantine 10mg QD, Duloxetine 60mg QD    #DM II  - Last Hba1c 6.1 10/2022  - Recently increased metformin from 1g BID to 1.5g BID  - Monitor FS  - moderate insulin ss, adjust as needed    #Osteoarthritis/Gout  #Chronic Rib Fractures of Left posterior T11 and T12 and recent Left 1st 4-6th and 8-10th rib fractures  - PT/OT    DVT Prophylaxis: Lovenox 40mg Subcutaneously daily  GI Prophylaxis: Pantoprazole 40mg PO QD  Diet: DASH/ TLC  Code Status: Full   Patient is a 38y old  Male who presents with a chief complaint of ETOH withdrawal (21 Jan 2024 00:12)        HPI:  38 M hx of alcohol use disorder, anxiety and depression came in for alcohol withdrawal. The Pt usually drinks 4 beers a day but for the past 3 weeks he has been drinking around 2 bottles of 750 ml Vodka daily after going through a bad breakup in order to cope. Last drink yesterday. Of note patient was here 3 weeks ago with the same presentation. Denies HI/SI.          (18 Jan 2024 17:41)      Pt evaluated on rounds.  I reviewed the radiology tests and hospital record.    I reviewed previous notes on this patient.    Interval Events: Agitated overnight .    :      REVIEW OF SYSTEMS:   see HPI      OBJECTIVE:  ICU Vital Signs Last 24 Hrs  T(C): 36.3 (21 Jan 2024 08:00), Max: 36.7 (20 Jan 2024 12:00)  T(F): 97.4 (21 Jan 2024 08:00), Max: 98.1 (21 Jan 2024 04:00)  HR: 66 (21 Jan 2024 09:00) (57 - 78)  BP: 140/83 (21 Jan 2024 09:00) (109/72 - 167/89)  BP(mean): 104 (21 Jan 2024 09:00) (81 - 118)  ABP: --  ABP(mean): --  RR: 16 (21 Jan 2024 09:00) (14 - 32)  SpO2: 99% (21 Jan 2024 09:00) (94% - 99%)    O2 Parameters below as of 21 Jan 2024 10:00  Patient On (Oxygen Delivery Method): room air              01-20 @ 07:01 - 01-21 @ 07:00  --------------------------------------------------------  IN: 6226 mL / OUT: 2980 mL / NET: 3246 mL    01-21 @ 07:01 - 01-21 @ 10:02  --------------------------------------------------------  IN: 448.5 mL / OUT: 1150 mL / NET: -701.5 mL      CAPILLARY BLOOD GLUCOSE            PHYSICAL EXAM:       · ENMT:   Airway patent,   Nasal mucosa clear.  Mouth with normal mucosa.   No thrush    · EYES:   Clear bilaterally,   pupils equal,   round and reactive to light.    · CARDIAC:   Normal rate,   regular rhythm.    Heart sounds S1, S2.   No murmurs, no rubs or gallops on auscultation  no edema        CAROTID:   normal systolic impulse  no bruits    · RESPIRATORY:   rales  normal chest expansion  no retractions or use of accessory muscles  percussion of chest demonstrates no hyperresonance or dullness    · GASTROINTESTINAL:  Abdomen soft,   non-tender,   + BS  liver/spleen not palpable    · MUSCULOSKELETAL:   no clubbing, cyanosis      · SKIN:   Skin normal color for race,   warm, dry   No evidence of rash.        · HEME LYMPH:   no splenomegaly.  No cervical  lymphadenopathy.  no inguinal lymphadenopathy    HOSPITAL MEDICATIONS:  MEDICATIONS  (STANDING):  chlorhexidine 2% Cloths 1 Application(s) Topical <User Schedule>  dexMEDEtomidine Infusion 0.2 MICROgram(s)/kG/Hr (3.27 mL/Hr) IV Continuous <Continuous>  folic acid 1 milliGRAM(s) Oral daily  hydrOXYzine hydrochloride 25 milliGRAM(s) Oral at bedtime  lactated ringers 1000 milliLiter(s) (125 mL/Hr) IV Continuous <Continuous>  LORazepam   Injectable 1 milliGRAM(s) IV Push every 4 hours  LORazepam   Injectable 2 milliGRAM(s) IV Push every 4 hours  LORazepam   Injectable   IV Push   magnesium gluconate 500 milliGRAM(s) Oral two times a day  multivitamin 1 Tablet(s) Oral daily  sodium chloride 3%  Inhalation 4 milliLiter(s) Inhalation once  thiamine 100 milliGRAM(s) Oral daily    MEDICATIONS  (PRN):  LORazepam   Injectable 2 milliGRAM(s) IV Push every 1 hour PRN Symptom-triggered: each CIWA -Ar score 8 or GREATER    lactated ringers: 1000 milliLiter(s)      with potassium chloride additive 20 milliEquivalent(s), infuse at 125 mL/Hr  lactated ringers.: Solution, 1000 milliLiter(s) infuse at 100 mL/Hr  lactated ringers Bolus:   1000 milliLiter(s), IV Bolus, once, infuse over 60 Minute(s), Stop After 1 Doses  lactated ringers Bolus:   1000 milliLiter(s), IV Bolus, once, infuse over 60 Minute(s), Stop After 1 Doses      LABS:                        12.5   4.60  )-----------( 49       ( 20 Jan 2024 17:35 )             37.0     01-20    138  |  104  |  4<L>  ----------------------------<  104<H>  4.2   |  25  |  0.5<L>    Ca    7.9<L>      20 Jan 2024 04:35  Phos  1.1     01-20  Mg     1.9     01-20    TPro  5.3<L>  /  Alb  3.3<L>  /  TBili  0.8  /  DBili  x   /  AST  114<H>  /  ALT  57<H>  /  AlkPhos  54  01-20      Urinalysis Basic - ( 20 Jan 2024 04:35 )    Color: x / Appearance: x / SG: x / pH: x  Gluc: 104 mg/dL / Ketone: x  / Bili: x / Urobili: x   Blood: x / Protein: x / Nitrite: x   Leuk Esterase: x / RBC: x / WBC x   Sq Epi: x / Non Sq Epi: x / Bacteria: x      SARS-CoV-2: NotDetec (18 Jan 2024 23:05)  COVID-19 PCR: NotDetec (04 Jan 2024 14:56)            RADIOLOGY: Today I personally interpreted the latest CXR and other pertinent films.

## 2024-01-21 NOTE — PROGRESS NOTE ADULT - ASSESSMENT
Alcohol withdrawal on precedex  Alcohol use disorder  hypomagnesemia   HO Cocaine and Xanax use disorder     PLAN    CNS: Wean precedex, currently at 0.4. CIWA monitoring, Benzo standing for withdrawal, c/w folate, thiamine, MVI, hydoxyzine. UDS/ SDS; CIWA 8 this AM.    HEENT: Oral care    PULMONARY: HOB @ 45 degrees, aspiration precautions. On room air.     CARDIAC: D5W+NS @150>>. TTE read pending, trop high sens 7    GASTROENTEROLOGY: Feeding when tolerating. Lipase elevated, no abd pain. CT abd unremarkable except hepatic steatosis, follow LFTs, repeat lipase and follow lipids    RENAL: Monitor CMP & UO. Correct electrolytes as needed. Mag and phos low    INFECTIOUS: Monitor off Abx, procal and blood Cultures    HEMATOLOGY: DVT ppx. Monitor CBC and Coags.     ENDOCRINOLOGY: Check FS, insulin protocol as needed.    MUSCULOSKELETAL: OOB to chair  as tolerated    DISPOSITION: CCU     Alcohol withdrawal on precedex  Alcohol use disorder  hypomagnesemia   HO Cocaine and Xanax use disorder     PLAN    CNS: Wean precedex, currently at 0.4. CIWA monitoring, Benzo standing for withdrawal, c/w folate, thiamine, MVI, hydoxyzine. UDS/ SDS    HEENT: Oral care    PULMONARY: HOB @ 45 degrees, aspiration precautions. On room air.     CARDIAC: D5W+NS @150>>. TTE read pending, trop high sens 7    GASTROENTEROLOGY: Feeding when tolerating. Lipase elevated, no abd pain. CT abd unremarkable except hepatic steatosis, follow LFTs, repeat lipase and follow lipids    RENAL: Monitor CMP & UO. Correct electrolytes as needed. Mag and phos low    INFECTIOUS: Monitor off Abx, procal and blood Cultures    HEMATOLOGY: B12 and folate ordered given thrombocytopenia. DVT ppx. Monitor CBC and Coags.     ENDOCRINOLOGY: Check FS, insulin protocol as needed.    MUSCULOSKELETAL: OOB to chair  as tolerated    DISPOSITION: CCU

## 2024-01-22 LAB
ALBUMIN SERPL ELPH-MCNC: 3.6 G/DL — SIGNIFICANT CHANGE UP (ref 3.5–5.2)
ALP SERPL-CCNC: 64 U/L — SIGNIFICANT CHANGE UP (ref 30–115)
ALT FLD-CCNC: 50 U/L — HIGH (ref 0–41)
ANION GAP SERPL CALC-SCNC: 8 MMOL/L — SIGNIFICANT CHANGE UP (ref 7–14)
AST SERPL-CCNC: 63 U/L — HIGH (ref 0–41)
BASOPHILS # BLD AUTO: 0.06 K/UL — SIGNIFICANT CHANGE UP (ref 0–0.2)
BASOPHILS NFR BLD AUTO: 0.8 % — SIGNIFICANT CHANGE UP (ref 0–1)
BILIRUB SERPL-MCNC: 0.4 MG/DL — SIGNIFICANT CHANGE UP (ref 0.2–1.2)
BUN SERPL-MCNC: 4 MG/DL — LOW (ref 10–20)
CALCIUM SERPL-MCNC: 9.2 MG/DL — SIGNIFICANT CHANGE UP (ref 8.4–10.5)
CHLORIDE SERPL-SCNC: 97 MMOL/L — LOW (ref 98–110)
CO2 SERPL-SCNC: 28 MMOL/L — SIGNIFICANT CHANGE UP (ref 17–32)
CREAT SERPL-MCNC: 0.7 MG/DL — SIGNIFICANT CHANGE UP (ref 0.7–1.5)
EGFR: 122 ML/MIN/1.73M2 — SIGNIFICANT CHANGE UP
EOSINOPHIL # BLD AUTO: 0.36 K/UL — SIGNIFICANT CHANGE UP (ref 0–0.7)
EOSINOPHIL NFR BLD AUTO: 4.9 % — SIGNIFICANT CHANGE UP (ref 0–8)
FOLATE SERPL-MCNC: 10.1 NG/ML — SIGNIFICANT CHANGE UP
GLUCOSE SERPL-MCNC: 142 MG/DL — HIGH (ref 70–99)
HCT VFR BLD CALC: 40.3 % — LOW (ref 42–52)
HGB BLD-MCNC: 13.6 G/DL — LOW (ref 14–18)
IMM GRANULOCYTES NFR BLD AUTO: 0.8 % — HIGH (ref 0.1–0.3)
LYMPHOCYTES # BLD AUTO: 1.68 K/UL — SIGNIFICANT CHANGE UP (ref 1.2–3.4)
LYMPHOCYTES # BLD AUTO: 22.9 % — SIGNIFICANT CHANGE UP (ref 20.5–51.1)
MAGNESIUM SERPL-MCNC: 1.5 MG/DL — LOW (ref 1.8–2.4)
MCHC RBC-ENTMCNC: 31.6 PG — HIGH (ref 27–31)
MCHC RBC-ENTMCNC: 33.7 G/DL — SIGNIFICANT CHANGE UP (ref 32–37)
MCV RBC AUTO: 93.5 FL — SIGNIFICANT CHANGE UP (ref 80–94)
MONOCYTES # BLD AUTO: 0.59 K/UL — SIGNIFICANT CHANGE UP (ref 0.1–0.6)
MONOCYTES NFR BLD AUTO: 8 % — SIGNIFICANT CHANGE UP (ref 1.7–9.3)
NEUTROPHILS # BLD AUTO: 4.6 K/UL — SIGNIFICANT CHANGE UP (ref 1.4–6.5)
NEUTROPHILS NFR BLD AUTO: 62.6 % — SIGNIFICANT CHANGE UP (ref 42.2–75.2)
NRBC # BLD: 0 /100 WBCS — SIGNIFICANT CHANGE UP (ref 0–0)
PLATELET # BLD AUTO: 73 K/UL — LOW (ref 130–400)
PMV BLD: 11.6 FL — HIGH (ref 7.4–10.4)
POTASSIUM SERPL-MCNC: 4.3 MMOL/L — SIGNIFICANT CHANGE UP (ref 3.5–5)
POTASSIUM SERPL-SCNC: 4.3 MMOL/L — SIGNIFICANT CHANGE UP (ref 3.5–5)
PROT SERPL-MCNC: 6 G/DL — SIGNIFICANT CHANGE UP (ref 6–8)
RBC # BLD: 4.31 M/UL — LOW (ref 4.7–6.1)
RBC # FLD: 12.2 % — SIGNIFICANT CHANGE UP (ref 11.5–14.5)
SODIUM SERPL-SCNC: 133 MMOL/L — LOW (ref 135–146)
VIT B12 SERPL-MCNC: 604 PG/ML — SIGNIFICANT CHANGE UP (ref 232–1245)
WBC # BLD: 7.35 K/UL — SIGNIFICANT CHANGE UP (ref 4.8–10.8)
WBC # FLD AUTO: 7.35 K/UL — SIGNIFICANT CHANGE UP (ref 4.8–10.8)

## 2024-01-22 PROCEDURE — 99233 SBSQ HOSP IP/OBS HIGH 50: CPT

## 2024-01-22 RX ORDER — DEXMEDETOMIDINE HYDROCHLORIDE IN 0.9% SODIUM CHLORIDE 4 UG/ML
0.2 INJECTION INTRAVENOUS
Qty: 400 | Refills: 0 | Status: DISCONTINUED | OUTPATIENT
Start: 2024-01-22 | End: 2024-01-24

## 2024-01-22 RX ORDER — DEXMEDETOMIDINE HYDROCHLORIDE IN 0.9% SODIUM CHLORIDE 4 UG/ML
0.2 INJECTION INTRAVENOUS
Qty: 200 | Refills: 0 | Status: DISCONTINUED | OUTPATIENT
Start: 2024-01-22 | End: 2024-01-22

## 2024-01-22 RX ORDER — THIAMINE MONONITRATE (VIT B1) 100 MG
500 TABLET ORAL EVERY 8 HOURS
Refills: 0 | Status: COMPLETED | OUTPATIENT
Start: 2024-01-22 | End: 2024-01-25

## 2024-01-22 RX ORDER — MAGNESIUM SULFATE 500 MG/ML
2 VIAL (ML) INJECTION
Refills: 0 | Status: COMPLETED | OUTPATIENT
Start: 2024-01-22 | End: 2024-01-22

## 2024-01-22 RX ADMIN — Medication 1 MILLIGRAM(S): at 13:50

## 2024-01-22 RX ADMIN — GABAPENTIN 400 MILLIGRAM(S): 400 CAPSULE ORAL at 21:37

## 2024-01-22 RX ADMIN — GABAPENTIN 400 MILLIGRAM(S): 400 CAPSULE ORAL at 05:29

## 2024-01-22 RX ADMIN — Medication 2 MILLIGRAM(S): at 21:36

## 2024-01-22 RX ADMIN — Medication 20 MILLIGRAM(S): at 11:51

## 2024-01-22 RX ADMIN — Medication 2 MILLIGRAM(S): at 23:00

## 2024-01-22 RX ADMIN — Medication 100 MILLIGRAM(S): at 21:37

## 2024-01-22 RX ADMIN — Medication 25 GRAM(S): at 06:38

## 2024-01-22 RX ADMIN — Medication 100 MILLIGRAM(S): at 11:50

## 2024-01-22 RX ADMIN — Medication 1 MILLIGRAM(S): at 11:51

## 2024-01-22 RX ADMIN — Medication 25 GRAM(S): at 07:52

## 2024-01-22 RX ADMIN — Medication 2 MILLIGRAM(S): at 16:29

## 2024-01-22 RX ADMIN — Medication 2 MILLIGRAM(S): at 17:57

## 2024-01-22 RX ADMIN — Medication 500 MILLIGRAM(S): at 17:23

## 2024-01-22 RX ADMIN — Medication 500 MILLIGRAM(S): at 05:29

## 2024-01-22 RX ADMIN — CHLORHEXIDINE GLUCONATE 1 APPLICATION(S): 213 SOLUTION TOPICAL at 05:40

## 2024-01-22 RX ADMIN — Medication 30 MILLILITER(S): at 23:00

## 2024-01-22 RX ADMIN — Medication 1 TABLET(S): at 11:50

## 2024-01-22 RX ADMIN — Medication 1 MILLIGRAM(S): at 02:27

## 2024-01-22 RX ADMIN — Medication 2 MILLIGRAM(S): at 19:40

## 2024-01-22 RX ADMIN — Medication 25 MILLIGRAM(S): at 21:37

## 2024-01-22 RX ADMIN — Medication 1 MILLIGRAM(S): at 10:03

## 2024-01-22 RX ADMIN — GABAPENTIN 400 MILLIGRAM(S): 400 CAPSULE ORAL at 13:50

## 2024-01-22 RX ADMIN — Medication 1 MILLIGRAM(S): at 05:29

## 2024-01-22 RX ADMIN — Medication 105 MILLIGRAM(S): at 21:36

## 2024-01-22 RX ADMIN — Medication 1 PATCH: at 00:45

## 2024-01-22 RX ADMIN — DEXMEDETOMIDINE HYDROCHLORIDE IN 0.9% SODIUM CHLORIDE 3.27 MICROGRAM(S)/KG/HR: 4 INJECTION INTRAVENOUS at 05:30

## 2024-01-22 RX ADMIN — Medication 2 MILLIGRAM(S): at 07:53

## 2024-01-22 RX ADMIN — DEXMEDETOMIDINE HYDROCHLORIDE IN 0.9% SODIUM CHLORIDE 3.27 MICROGRAM(S)/KG/HR: 4 INJECTION INTRAVENOUS at 12:47

## 2024-01-22 RX ADMIN — Medication 105 MILLIGRAM(S): at 13:51

## 2024-01-22 RX ADMIN — SODIUM CHLORIDE 125 MILLILITER(S): 9 INJECTION, SOLUTION INTRAVENOUS at 05:29

## 2024-01-22 RX ADMIN — OXYCODONE HYDROCHLORIDE 5 MILLIGRAM(S): 5 TABLET ORAL at 00:45

## 2024-01-22 RX ADMIN — DEXMEDETOMIDINE HYDROCHLORIDE IN 0.9% SODIUM CHLORIDE 3.27 MICROGRAM(S)/KG/HR: 4 INJECTION INTRAVENOUS at 07:52

## 2024-01-22 NOTE — PROGRESS NOTE ADULT - SUBJECTIVE AND OBJECTIVE BOX
24H events:    Patient is a 38y old Male who presents with a chief complaint of ETOH withdrawal (22 Jan 2024 08:13)    Primary diagnosis of Alcohol use with withdrawal    Today is hospital day 4d. This morning patient was seen and examined at bedside, resting comfortably in bed.    No acute or major events overnight.      PAST MEDICAL & SURGICAL HISTORY  No pertinent past medical history  anxiety  depression  polysubstance    No significant past surgical history      SOCIAL HISTORY:  Social History:      ALLERGIES:  Ceclor (Rash; Hives)    MEDICATIONS:  STANDING MEDICATIONS  chlorhexidine 2% Cloths 1 Application(s) Topical <User Schedule>  dexMEDEtomidine Infusion 0.2 MICROgram(s)/kG/Hr IV Continuous <Continuous>  FLUoxetine 20 milliGRAM(s) Oral daily  folic acid 1 milliGRAM(s) Oral daily  gabapentin 400 milliGRAM(s) Oral three times a day  hydrOXYzine hydrochloride 25 milliGRAM(s) Oral at bedtime  LORazepam   Injectable   IV Push   magnesium gluconate 500 milliGRAM(s) Oral two times a day  multivitamin 1 Tablet(s) Oral daily  sodium chloride 3%  Inhalation 4 milliLiter(s) Inhalation once  thiamine 100 milliGRAM(s) Oral daily  thiamine IVPB 500 milliGRAM(s) IV Intermittent every 8 hours  traZODone 100 milliGRAM(s) Oral at bedtime    PRN MEDICATIONS  LORazepam   Injectable 2 milliGRAM(s) IV Push every 1 hour PRN  sodium chloride 0.65% Nasal 1 Spray(s) Both Nostrils two times a day PRN    VITALS:   T(F): 97.1  HR: 73  BP: 93/51  RR: 23  SpO2: 93%    PHYSICAL EXAM:    CONSTITUTIONAL: NAD , agitated   EYES: Normal   ENMT: Oral mucosa with moist membranes.  NECK: Supple, symmetric and without tracheal deviation   RESP: No respiratory distress, no use of accessory muscles; CTA b/l,  CV: RRR, +S1S2,  GI: Soft, Non tender   MSK: Normal  SKIN: No rashes or ulcers noted; no subcutaneous nodules or induration palpable  NEURO: CN II-XII intact; normal reflexes in upper and lower extremities, sensation intact in upper and lower extremities b/l to light touch     LABS:                        13.6   7.35  )-----------( 73       ( 22 Jan 2024 04:26 )             40.3     01-22    133<L>  |  97<L>  |  4<L>  ----------------------------<  142<H>  4.3   |  28  |  0.7    Ca    9.2      22 Jan 2024 04:26  Mg     1.5     01-22    TPro  6.0  /  Alb  3.6  /  TBili  0.4  /  DBili  x   /  AST  63<H>  /  ALT  50<H>  /  AlkPhos  64  01-22      Urinalysis Basic - ( 22 Jan 2024 04:26 )    Color: x / Appearance: x / SG: x / pH: x  Gluc: 142 mg/dL / Ketone: x  / Bili: x / Urobili: x   Blood: x / Protein: x / Nitrite: x   Leuk Esterase: x / RBC: x / WBC x   Sq Epi: x / Non Sq Epi: x / Bacteria: x

## 2024-01-22 NOTE — PROGRESS NOTE ADULT - SUBJECTIVE AND OBJECTIVE BOX
Patient is a 38y old  Male who presents with a chief complaint of ETOH withdrawal (21 Jan 2024 10:02)        Over Night Events:    On precedex drip   On Ativan as needed         ROS:  See HPI    PHYSICAL EXAM    ICU Vital Signs Last 24 Hrs  T(C): 36.2 (22 Jan 2024 05:00), Max: 36.6 (21 Jan 2024 12:00)  T(F): 97.1 (22 Jan 2024 05:00), Max: 97.9 (21 Jan 2024 12:00)  HR: 67 (22 Jan 2024 07:00) (53 - 74)  BP: 99/60 (22 Jan 2024 07:00) (99/60 - 163/85)  BP(mean): 70 (22 Jan 2024 07:00) (70 - 116)  ABP: --  ABP(mean): --  RR: 28 (22 Jan 2024 07:00) (11 - 28)  SpO2: 97% (22 Jan 2024 07:00) (93% - 100%)    O2 Parameters below as of 22 Jan 2024 08:00  Patient On (Oxygen Delivery Method): room air            General: NAD   HEENT: HERMINIA             Lymphatic system: No cervical LN   Lungs: Bilateral BS, clear   Cardiovascular: Regular   Gastrointestinal: Soft, Positive BS  Extremities: No clubbing.  Moves extremities.  Full Range of motion   Skin: Warm, intact  Neurological: No motor or sensory deficit; lethargic       01-21-24 @ 07:01  -  01-22-24 @ 07:00  --------------------------------------------------------  IN:    Dexmedetomidine: 588.2 mL    IV PiggyBack: 50 mL    Lactated Ringers w/ Additives: 3000 mL    Oral Fluid: 1560 mL  Total IN: 5198.2 mL    OUT:    Voided (mL): 3850 mL  Total OUT: 3850 mL    Total NET: 1348.2 mL      01-22-24 @ 07:01  -  01-22-24 @ 08:13  --------------------------------------------------------  IN:    Dexmedetomidine: 24.5 mL    Lactated Ringers w/ Additives: 125 mL  Total IN: 149.5 mL    OUT:  Total OUT: 0 mL    Total NET: 149.5 mL          LABS:                            13.6   7.35  )-----------( 73       ( 22 Jan 2024 04:26 )             40.3                                               01-22    133<L>  |  97<L>  |  4<L>  ----------------------------<  142<H>  4.3   |  28  |  0.7    Ca    9.2      22 Jan 2024 04:26  Mg     1.5     01-22    TPro  6.0  /  Alb  3.6  /  TBili  0.4  /  DBili  x   /  AST  63<H>  /  ALT  50<H>  /  AlkPhos  64  01-22                                             Urinalysis Basic - ( 22 Jan 2024 04:26 )    Color: x / Appearance: x / SG: x / pH: x  Gluc: 142 mg/dL / Ketone: x  / Bili: x / Urobili: x   Blood: x / Protein: x / Nitrite: x   Leuk Esterase: x / RBC: x / WBC x   Sq Epi: x / Non Sq Epi: x / Bacteria: x                                                  LIVER FUNCTIONS - ( 22 Jan 2024 04:26 )  Alb: 3.6 g/dL / Pro: 6.0 g/dL / ALK PHOS: 64 U/L / ALT: 50 U/L / AST: 63 U/L / GGT: x                                                  Culture - Blood (collected 19 Jan 2024 11:44)  Source: .Blood None  Preliminary Report (22 Jan 2024 01:02):    No growth at 48 Hours                                                                                           MEDICATIONS  (STANDING):  chlorhexidine 2% Cloths 1 Application(s) Topical <User Schedule>  dexMEDEtomidine Infusion 0.2 MICROgram(s)/kG/Hr (3.27 mL/Hr) IV Continuous <Continuous>  FLUoxetine 20 milliGRAM(s) Oral daily  folic acid 1 milliGRAM(s) Oral daily  gabapentin 400 milliGRAM(s) Oral three times a day  hydrOXYzine hydrochloride 25 milliGRAM(s) Oral at bedtime  lactated ringers 1000 milliLiter(s) (125 mL/Hr) IV Continuous <Continuous>  LORazepam   Injectable 1 milliGRAM(s) IV Push every 4 hours  LORazepam   Injectable   IV Push   magnesium gluconate 500 milliGRAM(s) Oral two times a day  multivitamin 1 Tablet(s) Oral daily  sodium chloride 3%  Inhalation 4 milliLiter(s) Inhalation once  thiamine 100 milliGRAM(s) Oral daily  traZODone 100 milliGRAM(s) Oral at bedtime    MEDICATIONS  (PRN):  LORazepam   Injectable 2 milliGRAM(s) IV Push every 1 hour PRN Symptom-triggered: each CIWA -Ar score 8 or GREATER  sodium chloride 0.65% Nasal 1 Spray(s) Both Nostrils two times a day PRN Nasal Congestion      Xray is clear

## 2024-01-22 NOTE — PROGRESS NOTE ADULT - ASSESSMENT
IMPRESSION:    alcohol withdrawal with DT   hypomagnesemia  hypokalemia   lactic acidosis    thrombocytopenia      PLAN:    CNS: Decrease precedex drip. Ativan PRN symptom driven. Thiamine and folate. CIWA protocol. CATCH team.     HEENT: oral care     PULMONARY: keep po x>92%. CXR clear.     CARDIOVASCULAR: DC IV fluids.     GI: Oral diet. LFTs stable.     RENAL: Replete K, Mg. Iph. No liz.     INFECTIOUS DISEASE: No abx. No fevers.     HEMATOLOGICAL:  DVT prophylaxis. Platelets low from alcohol use.     ENDOCRINE:  Follow up FS.  Insulin protocol if needed.    MUSCULOSKELETAL: bed in chair.     MICU

## 2024-01-22 NOTE — PROGRESS NOTE ADULT - ASSESSMENT
#Alcohol withdrawal with DT   - On CIWA symptoms triggered protocol   - Ativan 1 mg q2 hours and ativan 2 mg q 6 hours PRN   - On Precedex 1 mg drip   - Monitor CIWA and watch for worsening status   -Continue Thiamine and Folate   - CATCH team consult     #hypomagnesemia  - cause might be alcohol intake   - Supplemented     #hypokalemia - Resolved     #Thrombocytopenia- getting Better       MISC :   DVT: SCD   GI : Pantoprazole   ACtivity : ambulate as tolerated    PEnding :   Clinical improvement

## 2024-01-23 LAB
ALBUMIN SERPL ELPH-MCNC: 3.9 G/DL — SIGNIFICANT CHANGE UP (ref 3.5–5.2)
ALP SERPL-CCNC: 66 U/L — SIGNIFICANT CHANGE UP (ref 30–115)
ALT FLD-CCNC: 49 U/L — HIGH (ref 0–41)
ANION GAP SERPL CALC-SCNC: 12 MMOL/L — SIGNIFICANT CHANGE UP (ref 7–14)
AST SERPL-CCNC: 48 U/L — HIGH (ref 0–41)
BASOPHILS # BLD AUTO: 0.06 K/UL — SIGNIFICANT CHANGE UP (ref 0–0.2)
BASOPHILS NFR BLD AUTO: 0.7 % — SIGNIFICANT CHANGE UP (ref 0–1)
BILIRUB SERPL-MCNC: 0.3 MG/DL — SIGNIFICANT CHANGE UP (ref 0.2–1.2)
BUN SERPL-MCNC: 9 MG/DL — LOW (ref 10–20)
CALCIUM SERPL-MCNC: 9.3 MG/DL — SIGNIFICANT CHANGE UP (ref 8.4–10.5)
CHLORIDE SERPL-SCNC: 99 MMOL/L — SIGNIFICANT CHANGE UP (ref 98–110)
CO2 SERPL-SCNC: 26 MMOL/L — SIGNIFICANT CHANGE UP (ref 17–32)
CREAT SERPL-MCNC: 0.9 MG/DL — SIGNIFICANT CHANGE UP (ref 0.7–1.5)
EGFR: 113 ML/MIN/1.73M2 — SIGNIFICANT CHANGE UP
EOSINOPHIL # BLD AUTO: 0.08 K/UL — SIGNIFICANT CHANGE UP (ref 0–0.7)
EOSINOPHIL NFR BLD AUTO: 1 % — SIGNIFICANT CHANGE UP (ref 0–8)
GLUCOSE SERPL-MCNC: 100 MG/DL — HIGH (ref 70–99)
HCT VFR BLD CALC: 41.8 % — LOW (ref 42–52)
HGB BLD-MCNC: 14.1 G/DL — SIGNIFICANT CHANGE UP (ref 14–18)
IMM GRANULOCYTES NFR BLD AUTO: 0.7 % — HIGH (ref 0.1–0.3)
LYMPHOCYTES # BLD AUTO: 1.45 K/UL — SIGNIFICANT CHANGE UP (ref 1.2–3.4)
LYMPHOCYTES # BLD AUTO: 17.8 % — LOW (ref 20.5–51.1)
MAGNESIUM SERPL-MCNC: 1.7 MG/DL — LOW (ref 1.8–2.4)
MCHC RBC-ENTMCNC: 31.2 PG — HIGH (ref 27–31)
MCHC RBC-ENTMCNC: 33.7 G/DL — SIGNIFICANT CHANGE UP (ref 32–37)
MCV RBC AUTO: 92.5 FL — SIGNIFICANT CHANGE UP (ref 80–94)
MONOCYTES # BLD AUTO: 0.75 K/UL — HIGH (ref 0.1–0.6)
MONOCYTES NFR BLD AUTO: 9.2 % — SIGNIFICANT CHANGE UP (ref 1.7–9.3)
NEUTROPHILS # BLD AUTO: 5.74 K/UL — SIGNIFICANT CHANGE UP (ref 1.4–6.5)
NEUTROPHILS NFR BLD AUTO: 70.6 % — SIGNIFICANT CHANGE UP (ref 42.2–75.2)
NRBC # BLD: 0 /100 WBCS — SIGNIFICANT CHANGE UP (ref 0–0)
PHOSPHATE SERPL-MCNC: 5.1 MG/DL — HIGH (ref 2.1–4.9)
PLATELET # BLD AUTO: 101 K/UL — LOW (ref 130–400)
PMV BLD: 11 FL — HIGH (ref 7.4–10.4)
POTASSIUM SERPL-MCNC: 4.3 MMOL/L — SIGNIFICANT CHANGE UP (ref 3.5–5)
POTASSIUM SERPL-SCNC: 4.3 MMOL/L — SIGNIFICANT CHANGE UP (ref 3.5–5)
PROT SERPL-MCNC: 6.5 G/DL — SIGNIFICANT CHANGE UP (ref 6–8)
RBC # BLD: 4.52 M/UL — LOW (ref 4.7–6.1)
RBC # FLD: 12.8 % — SIGNIFICANT CHANGE UP (ref 11.5–14.5)
SODIUM SERPL-SCNC: 137 MMOL/L — SIGNIFICANT CHANGE UP (ref 135–146)
WBC # BLD: 8.14 K/UL — SIGNIFICANT CHANGE UP (ref 4.8–10.8)
WBC # FLD AUTO: 8.14 K/UL — SIGNIFICANT CHANGE UP (ref 4.8–10.8)

## 2024-01-23 PROCEDURE — 99232 SBSQ HOSP IP/OBS MODERATE 35: CPT

## 2024-01-23 RX ORDER — NICOTINE POLACRILEX 2 MG
1 GUM BUCCAL DAILY
Refills: 0 | Status: DISCONTINUED | OUTPATIENT
Start: 2024-01-23 | End: 2024-01-23

## 2024-01-23 RX ORDER — NICOTINE POLACRILEX 2 MG
1 GUM BUCCAL DAILY
Refills: 0 | Status: DISCONTINUED | OUTPATIENT
Start: 2024-01-23 | End: 2024-01-31

## 2024-01-23 RX ORDER — MAGNESIUM SULFATE 500 MG/ML
2 VIAL (ML) INJECTION ONCE
Refills: 0 | Status: COMPLETED | OUTPATIENT
Start: 2024-01-23 | End: 2024-01-23

## 2024-01-23 RX ORDER — ENOXAPARIN SODIUM 100 MG/ML
40 INJECTION SUBCUTANEOUS EVERY 24 HOURS
Refills: 0 | Status: DISCONTINUED | OUTPATIENT
Start: 2024-01-23 | End: 2024-01-28

## 2024-01-23 RX ADMIN — Medication 2 MILLIGRAM(S): at 22:10

## 2024-01-23 RX ADMIN — GABAPENTIN 400 MILLIGRAM(S): 400 CAPSULE ORAL at 22:02

## 2024-01-23 RX ADMIN — Medication 2 MILLIGRAM(S): at 13:09

## 2024-01-23 RX ADMIN — Medication 2 MILLIGRAM(S): at 08:02

## 2024-01-23 RX ADMIN — Medication 100 MILLIGRAM(S): at 22:01

## 2024-01-23 RX ADMIN — Medication 1 PATCH: at 16:34

## 2024-01-23 RX ADMIN — GABAPENTIN 400 MILLIGRAM(S): 400 CAPSULE ORAL at 06:04

## 2024-01-23 RX ADMIN — Medication 1 MILLIGRAM(S): at 06:03

## 2024-01-23 RX ADMIN — GABAPENTIN 400 MILLIGRAM(S): 400 CAPSULE ORAL at 13:09

## 2024-01-23 RX ADMIN — Medication 105 MILLIGRAM(S): at 22:03

## 2024-01-23 RX ADMIN — Medication 2 MILLIGRAM(S): at 00:09

## 2024-01-23 RX ADMIN — Medication 25 GRAM(S): at 12:31

## 2024-01-23 RX ADMIN — Medication 500 MILLIGRAM(S): at 06:03

## 2024-01-23 RX ADMIN — Medication 105 MILLIGRAM(S): at 06:04

## 2024-01-23 RX ADMIN — Medication 2 MILLIGRAM(S): at 15:19

## 2024-01-23 RX ADMIN — Medication 2 MILLIGRAM(S): at 10:53

## 2024-01-23 RX ADMIN — CHLORHEXIDINE GLUCONATE 1 APPLICATION(S): 213 SOLUTION TOPICAL at 06:04

## 2024-01-23 RX ADMIN — Medication 1 MILLIGRAM(S): at 12:19

## 2024-01-23 RX ADMIN — Medication 1 PATCH: at 17:59

## 2024-01-23 RX ADMIN — Medication 20 MILLIGRAM(S): at 12:19

## 2024-01-23 RX ADMIN — Medication 105 MILLIGRAM(S): at 13:46

## 2024-01-23 RX ADMIN — Medication 1 PATCH: at 09:02

## 2024-01-23 RX ADMIN — Medication 100 MILLIGRAM(S): at 12:19

## 2024-01-23 RX ADMIN — Medication 1 PATCH: at 06:59

## 2024-01-23 RX ADMIN — Medication 2 MILLIGRAM(S): at 08:59

## 2024-01-23 RX ADMIN — Medication 2 MILLIGRAM(S): at 20:07

## 2024-01-23 RX ADMIN — Medication 1 TABLET(S): at 12:19

## 2024-01-23 RX ADMIN — ENOXAPARIN SODIUM 40 MILLIGRAM(S): 100 INJECTION SUBCUTANEOUS at 12:20

## 2024-01-23 RX ADMIN — Medication 25 MILLIGRAM(S): at 22:02

## 2024-01-23 RX ADMIN — Medication 500 MILLIGRAM(S): at 17:57

## 2024-01-23 RX ADMIN — Medication 2 MILLIGRAM(S): at 17:56

## 2024-01-23 NOTE — PROGRESS NOTE ADULT - SUBJECTIVE AND OBJECTIVE BOX
Patient is a 38y old  Male who presents with a chief complaint of ETOH withdrawal (22 Jan 2024 17:31)        Over Night Events:    None   Afebrile         ROS:  See HPI    PHYSICAL EXAM    ICU Vital Signs Last 24 Hrs  T(C): 36.7 (23 Jan 2024 16:00), Max: 36.7 (23 Jan 2024 12:00)  T(F): 98 (23 Jan 2024 16:00), Max: 98.1 (23 Jan 2024 12:00)  HR: 108 (23 Jan 2024 18:00) (99 - 141)  BP: 123/75 (23 Jan 2024 18:00) (100/67 - 148/87)  BP(mean): 92 (23 Jan 2024 18:00) (77 - 113)  ABP: --  ABP(mean): --  RR: 25 (23 Jan 2024 18:00) (14 - 25)  SpO2: 98% (23 Jan 2024 08:00) (95% - 98%)    O2 Parameters below as of 23 Jan 2024 18:00  Patient On (Oxygen Delivery Method): room air            General: NAD, anxious   HEENT: HERMINIA             Lymphatic system: No cervical LN   Lungs: Bilateral BS  Cardiovascular: Regular   Gastrointestinal: Soft, Positive BS  Extremities: No clubbing.  Moves extremities.  Full Range of motion   Skin: Warm, intact  Neurological: No motor or sensory deficit       01-22-24 @ 07:01  -  01-23-24 @ 07:00  --------------------------------------------------------  IN:    Dexmedetomidine: 24.5 mL    Dexmedetomidine: 81.5 mL    Dexmedetomidine: 40.6 mL    IV PiggyBack: 250 mL    Lactated Ringers w/ Additives: 125 mL    Oral Fluid: 1200 mL  Total IN: 1721.6 mL    OUT:    Voided (mL): 2000 mL  Total OUT: 2000 mL    Total NET: -278.4 mL          LABS:                            14.1   8.14  )-----------( 101      ( 23 Jan 2024 04:32 )             41.8                                               01-23    137  |  99  |  9<L>  ----------------------------<  100<H>  4.3   |  26  |  0.9    Ca    9.3      23 Jan 2024 04:32  Phos  5.1     01-23  Mg     1.7     01-23    TPro  6.5  /  Alb  3.9  /  TBili  0.3  /  DBili  x   /  AST  48<H>  /  ALT  49<H>  /  AlkPhos  66  01-23                                             Urinalysis Basic - ( 23 Jan 2024 04:32 )    Color: x / Appearance: x / SG: x / pH: x  Gluc: 100 mg/dL / Ketone: x  / Bili: x / Urobili: x   Blood: x / Protein: x / Nitrite: x   Leuk Esterase: x / RBC: x / WBC x   Sq Epi: x / Non Sq Epi: x / Bacteria: x                                                  LIVER FUNCTIONS - ( 23 Jan 2024 04:32 )  Alb: 3.9 g/dL / Pro: 6.5 g/dL / ALK PHOS: 66 U/L / ALT: 49 U/L / AST: 48 U/L / GGT: x                                                                                                                                       MEDICATIONS  (STANDING):  chlorhexidine 2% Cloths 1 Application(s) Topical <User Schedule>  dexMEDEtomidine Infusion 0.2 MICROgram(s)/kG/Hr (3.27 mL/Hr) IV Continuous <Continuous>  enoxaparin Injectable 40 milliGRAM(s) SubCutaneous every 24 hours  FLUoxetine 20 milliGRAM(s) Oral daily  folic acid 1 milliGRAM(s) Oral daily  gabapentin 400 milliGRAM(s) Oral three times a day  hydrOXYzine hydrochloride 25 milliGRAM(s) Oral at bedtime  magnesium gluconate 500 milliGRAM(s) Oral two times a day  multivitamin 1 Tablet(s) Oral daily  nicotine - 21 mG/24Hr(s) Patch 1 Patch Transdermal daily  sodium chloride 3%  Inhalation 4 milliLiter(s) Inhalation once  thiamine 100 milliGRAM(s) Oral daily  thiamine IVPB 500 milliGRAM(s) IV Intermittent every 8 hours  traZODone 100 milliGRAM(s) Oral at bedtime    MEDICATIONS  (PRN):  LORazepam   Injectable 2 milliGRAM(s) IV Push every 2 hours PRN CIWA-Ar score 8 or greater  sodium chloride 0.65% Nasal 1 Spray(s) Both Nostrils two times a day PRN Nasal Congestion      Xrays:                                                                                     ECHO

## 2024-01-23 NOTE — CHART NOTE - NSCHARTNOTEFT_GEN_A_CORE
Transfer Note    Transfer from: CCU     Transfer to: (  ) Medicine    (  ) Telemetry     (  ) RCU       ( x ) CEU    (  ) VENT                        (  ) Palliative    (  ) Stroke Unit    (  ) MICU    (  ) CCU    Signout given to:     ----------------------------------------------------------------------------------------------------------  HPI / ICU COURSE:    HPI:  38 M hx of alcohol use disorder, anxiety and depression came in for alcohol withdrawal. The Pt usually drinks 4 beers a day but for the past 3 weeks he has been drinking around 2 bottles of 750 ml Vodka daily after going through a bad breakup in order to cope. Last drink yesterday. Of note patient was here 3 weeks ago with the same presentation. Denies HI/SI.     ICU Vital Signs Last 24 Hrs  T(F): 98.6 (18 Jan 2024 11:58), Max: 98.6 (18 Jan 2024 11:58)  HR: 114 (18 Jan 2024 17:00) (114 - 118)  BP: 132/66 (18 Jan 2024 17:00) (132/66 - 161/118)  RR: 20 (18 Jan 2024 17:00) (20 - 20)  SpO2: 94% (18 Jan 2024 17:00) (94% - 100%)    Labs show WBC 13k, Lactate 10  Given 30mg diazepam and started on precedex ggt and 2L NS bolus.     CCU Course:   - Patient was treated for alcohol withdrawal.   - Patient was having hallucination but vitals was always stable during the CCU course( Was admitted to CCU for possible Delirium tremens)   - Started on precedex and weaned down , stopped precedex on 01/23  - On PRN lorazepam tapering dose symptom triggered  - NOw is on 2 mg q 2 hours PRN symptoms triggered   - Supplemented with folate and thiamine   - Was on IV fluids but tolerating feed and dced IV fluids on 1/23.     Patient is alert and oriented , stable to get downgraded.       --------------------------------------------------------------------------------------------------------  PMH/PSH:  PAST MEDICAL & SURGICAL HISTORY:  No pertinent past medical history  anxiety  depression  polysubstance    No significant past surgical history        -------------------------------------------------------------------------------------------------------  PHYSICAL EXAM:  General: NAD, laying comfortabel   HEENT: Atraumatic  Respiratory: Bilateral equal air entry, normal vesicular breath sounds  Cardiac: Tachycardic, sinus rhythm   Abdomen: soft, non-tender, non-distended  Extremities: warm and well-perfused  Neuro: A+Ox4.     Vital Signs Last 24 Hrs  T(C): 36.6 (23 Jan 2024 08:00), Max: 36.6 (23 Jan 2024 04:00)  T(F): 97.8 (23 Jan 2024 08:00), Max: 97.8 (23 Jan 2024 04:00)  HR: 116 (23 Jan 2024 08:00) (58 - 141)  BP: 131/82 (23 Jan 2024 08:00) (81/51 - 148/87)  BP(mean): 99 (23 Jan 2024 08:00) (61 - 113)  RR: 15 (23 Jan 2024 08:00) (13 - 23)  SpO2: 98% (23 Jan 2024 08:00) (93% - 99%)    Parameters below as of 23 Jan 2024 08:00  Patient On (Oxygen Delivery Method): room air        I&O's Summary    22 Jan 2024 07:01  - 23 Jan 2024 07:00  --------------------------------------------------------  IN: 1721.6 mL / OUT: 2000 mL / NET: -278.4 mL        --------------------------------------------------------------------------------------------------------  LABS:                               14.1   8.14  )-----------( 101      ( 23 Jan 2024 04:32 )             41.8       01-23    137  |  99  |  9<L>  ----------------------------<  100<H>  4.3   |  26  |  0.9    Ca    9.3      23 Jan 2024 04:32  Phos  5.1     01-23  Mg     1.7     01-23    TPro  6.5  /  Alb  3.9  /  TBili  0.3  /  DBili  x   /  AST  48<H>  /  ALT  49<H>  /  AlkPhos  66  01-23              CULTURE RESULTS:                01-19-24 @ 11:44  Specimen Source: --  Method Type: --  Gram Stain - RRL: --  Gram Stain - Wound: --  Bacteria: --  Culture Results:   No growth at 72 Hours      Specimen Source:   Method Type:   Gram Stain:   Culture Results: Culture Results:   No growth at 72 Hours (01-19-24 @ 11:44)    Bacteria:       -------------------------------------------------------------------------------------------------  RADIOLOGY:  < from: Xray Chest 1 View- PORTABLE-Routine (01.20.24 @ 06:26) >    FINDINGS:    SUPPORT DEVICES: None.    CARDIAC/MEDIASTINUM/HILUM: Unchanged cardiac silhouette.    LUNG PARENCHYMA/PLEURA: No focal consolidation or pleural effusion. No   pneumothorax.    SKELETON/SOFT TISSUES: Unchanged.      IMPRESSION:    No radiographic evidence of acute cardiopulmonary disease.    --- End of Report ---    < end of copied text >    < from: CT Abdomen and Pelvis w/ IV Cont (01.18.24 @ 16:55) >    FINDINGS:    LOWER CHEST: Unremarkable.    HEPATOBILIARY: Hepatic steatosis. Gallbladder unremarkable.    SPLEEN: Unremarkable.    PANCREAS: Unremarkable.    ADRENAL GLANDS: Unremarkable.    KIDNEYS: Unremarkable.    ABDOMINOPELVIC NODES: Unremarkable.    PELVIC ORGANS: Unremarkable.    PERITONEUM/MESENTERY/BOWEL: No pneumoperitoneum, bowel obstruction or   ascites. Normal appendix.    BONES/SOFT TISSUES: Unremarkable.    OTHER: Normal caliber aorta.      IMPRESSION:    No acute intra-abdominal pathology.    Hepatic steatosis.    < end of copied text >    < from: US Abdomen Upper Quadrant Right (12.28.23 @ 22:21) >    FINDINGS:  Liver: Patchy areas of increased echogenicity  Bile ducts: Normal caliber. Common bile duct measures 5 mm..  Gallbladder: Within normal limits..  Pancreas: Visualized portions are within normal limits..  Right kidney: 12.7 cm. No hydronephrosis..  Ascites: None.  IVC: Visualized portions are within normal limits.    IMPRESSION:    Patchy areas of increased echogenicity consistent with patchy fatty   infiltration    < end of copied text >        ---------------------------------------------------------------------------------------------------  ASSESSMENT & PLAN:       #Alcohol withdrawal with DT   - On CIWA symptoms triggered protocol   - Ativan 2 mg q 2 hours PRN (Symptoms triggered)   - Off the precedex drip  - Monitor CIWA and watch for worsening status   -Continue Thiamine and Folate   - CATCH team consult     #hypomagnesemia  - cause might be alcohol intake   - Supplemented     #hypokalemia - Resolved     #Thrombocytopenia- getting Better     #TRansaminitis- Getting better   - Will order hepatitis profile   - most likely alcohol induced       MISC :   DVT: Lovenox   GI : Pantoprazole   ACtivity : ambulate as tolerated    FOR FOLLOW UP:  - CATCH team   - HEIDI protocol   - monitor electrolytes

## 2024-01-23 NOTE — PROGRESS NOTE ADULT - ASSESSMENT
IMPRESSION:    alcohol withdrawal with DT   hypomagnesemia  hypokalemia   lactic acidosis    thrombocytopenia      PLAN:    CNS: DCprecedex drip. Ativan PRN symptom driven. Trazodone. Thiamine and folate. CIWA protocol. CATCH team.     HEENT: oral care     PULMONARY: keep po x>92%. CXR clear.     CARDIOVASCULAR: DC IV fluids.     GI: Oral diet. LFTs stable.     RENAL: Replete K, Mg. No liz.     INFECTIOUS DISEASE: No abx. No fevers.     HEMATOLOGICAL:  DVT prophylaxis. Platelets low from alcohol use.     ENDOCRINE:  Follow up FS.  Insulin protocol if needed.    MUSCULOSKELETAL: OOB    SDU.

## 2024-01-24 LAB
ALBUMIN SERPL ELPH-MCNC: 4.4 G/DL — SIGNIFICANT CHANGE UP (ref 3.5–5.2)
ALP SERPL-CCNC: 66 U/L — SIGNIFICANT CHANGE UP (ref 30–115)
ALT FLD-CCNC: 50 U/L — HIGH (ref 0–41)
ANION GAP SERPL CALC-SCNC: 13 MMOL/L — SIGNIFICANT CHANGE UP (ref 7–14)
AST SERPL-CCNC: 44 U/L — HIGH (ref 0–41)
BASOPHILS # BLD AUTO: 0.07 K/UL — SIGNIFICANT CHANGE UP (ref 0–0.2)
BASOPHILS NFR BLD AUTO: 1 % — SIGNIFICANT CHANGE UP (ref 0–1)
BILIRUB SERPL-MCNC: 0.2 MG/DL — SIGNIFICANT CHANGE UP (ref 0.2–1.2)
BUN SERPL-MCNC: 4 MG/DL — LOW (ref 10–20)
CALCIUM SERPL-MCNC: 9 MG/DL — SIGNIFICANT CHANGE UP (ref 8.4–10.5)
CHLORIDE SERPL-SCNC: 99 MMOL/L — SIGNIFICANT CHANGE UP (ref 98–110)
CO2 SERPL-SCNC: 25 MMOL/L — SIGNIFICANT CHANGE UP (ref 17–32)
CREAT SERPL-MCNC: 0.8 MG/DL — SIGNIFICANT CHANGE UP (ref 0.7–1.5)
EGFR: 117 ML/MIN/1.73M2 — SIGNIFICANT CHANGE UP
EOSINOPHIL # BLD AUTO: 0.14 K/UL — SIGNIFICANT CHANGE UP (ref 0–0.7)
EOSINOPHIL NFR BLD AUTO: 1.9 % — SIGNIFICANT CHANGE UP (ref 0–8)
GLUCOSE SERPL-MCNC: 78 MG/DL — SIGNIFICANT CHANGE UP (ref 70–99)
HCT VFR BLD CALC: 43 % — SIGNIFICANT CHANGE UP (ref 42–52)
HGB BLD-MCNC: 14.1 G/DL — SIGNIFICANT CHANGE UP (ref 14–18)
IMM GRANULOCYTES NFR BLD AUTO: 0.8 % — HIGH (ref 0.1–0.3)
LYMPHOCYTES # BLD AUTO: 2.25 K/UL — SIGNIFICANT CHANGE UP (ref 1.2–3.4)
LYMPHOCYTES # BLD AUTO: 31.3 % — SIGNIFICANT CHANGE UP (ref 20.5–51.1)
MAGNESIUM SERPL-MCNC: 2 MG/DL — SIGNIFICANT CHANGE UP (ref 1.8–2.4)
MCHC RBC-ENTMCNC: 31.3 PG — HIGH (ref 27–31)
MCHC RBC-ENTMCNC: 32.8 G/DL — SIGNIFICANT CHANGE UP (ref 32–37)
MCV RBC AUTO: 95.3 FL — HIGH (ref 80–94)
MONOCYTES # BLD AUTO: 0.93 K/UL — HIGH (ref 0.1–0.6)
MONOCYTES NFR BLD AUTO: 12.9 % — HIGH (ref 1.7–9.3)
NEUTROPHILS # BLD AUTO: 3.75 K/UL — SIGNIFICANT CHANGE UP (ref 1.4–6.5)
NEUTROPHILS NFR BLD AUTO: 52.1 % — SIGNIFICANT CHANGE UP (ref 42.2–75.2)
NRBC # BLD: 0 /100 WBCS — SIGNIFICANT CHANGE UP (ref 0–0)
PLATELET # BLD AUTO: 155 K/UL — SIGNIFICANT CHANGE UP (ref 130–400)
PMV BLD: 10.4 FL — SIGNIFICANT CHANGE UP (ref 7.4–10.4)
POTASSIUM SERPL-MCNC: 4 MMOL/L — SIGNIFICANT CHANGE UP (ref 3.5–5)
POTASSIUM SERPL-SCNC: 4 MMOL/L — SIGNIFICANT CHANGE UP (ref 3.5–5)
PROT SERPL-MCNC: 7.1 G/DL — SIGNIFICANT CHANGE UP (ref 6–8)
RBC # BLD: 4.51 M/UL — LOW (ref 4.7–6.1)
RBC # FLD: 13.1 % — SIGNIFICANT CHANGE UP (ref 11.5–14.5)
SODIUM SERPL-SCNC: 137 MMOL/L — SIGNIFICANT CHANGE UP (ref 135–146)
WBC # BLD: 7.2 K/UL — SIGNIFICANT CHANGE UP (ref 4.8–10.8)
WBC # FLD AUTO: 7.2 K/UL — SIGNIFICANT CHANGE UP (ref 4.8–10.8)

## 2024-01-24 PROCEDURE — 99233 SBSQ HOSP IP/OBS HIGH 50: CPT

## 2024-01-24 RX ORDER — ACETAMINOPHEN 500 MG
650 TABLET ORAL EVERY 6 HOURS
Refills: 0 | Status: DISCONTINUED | OUTPATIENT
Start: 2024-01-24 | End: 2024-01-31

## 2024-01-24 RX ADMIN — Medication 105 MILLIGRAM(S): at 21:20

## 2024-01-24 RX ADMIN — Medication 2 MILLIGRAM(S): at 08:36

## 2024-01-24 RX ADMIN — Medication 2 MILLIGRAM(S): at 06:18

## 2024-01-24 RX ADMIN — Medication 2 MILLIGRAM(S): at 02:11

## 2024-01-24 RX ADMIN — Medication 25 MILLIGRAM(S): at 21:20

## 2024-01-24 RX ADMIN — Medication 500 MILLIGRAM(S): at 05:20

## 2024-01-24 RX ADMIN — GABAPENTIN 400 MILLIGRAM(S): 400 CAPSULE ORAL at 21:20

## 2024-01-24 RX ADMIN — Medication 2 MILLIGRAM(S): at 00:11

## 2024-01-24 RX ADMIN — Medication 2 MILLIGRAM(S): at 04:18

## 2024-01-24 RX ADMIN — Medication 20 MILLIGRAM(S): at 11:16

## 2024-01-24 RX ADMIN — Medication 1 PATCH: at 11:17

## 2024-01-24 RX ADMIN — Medication 105 MILLIGRAM(S): at 14:35

## 2024-01-24 RX ADMIN — CHLORHEXIDINE GLUCONATE 1 APPLICATION(S): 213 SOLUTION TOPICAL at 05:21

## 2024-01-24 RX ADMIN — Medication 105 MILLIGRAM(S): at 05:40

## 2024-01-24 RX ADMIN — GABAPENTIN 400 MILLIGRAM(S): 400 CAPSULE ORAL at 05:20

## 2024-01-24 RX ADMIN — GABAPENTIN 400 MILLIGRAM(S): 400 CAPSULE ORAL at 14:35

## 2024-01-24 RX ADMIN — Medication 2 MILLIGRAM(S): at 23:09

## 2024-01-24 RX ADMIN — Medication 1 MILLIGRAM(S): at 11:18

## 2024-01-24 RX ADMIN — Medication 1 TABLET(S): at 11:18

## 2024-01-24 RX ADMIN — Medication 100 MILLIGRAM(S): at 11:18

## 2024-01-24 RX ADMIN — Medication 2 MILLIGRAM(S): at 17:17

## 2024-01-24 RX ADMIN — ENOXAPARIN SODIUM 40 MILLIGRAM(S): 100 INJECTION SUBCUTANEOUS at 11:16

## 2024-01-24 RX ADMIN — Medication 2 MILLIGRAM(S): at 10:46

## 2024-01-24 RX ADMIN — Medication 100 MILLIGRAM(S): at 21:20

## 2024-01-24 NOTE — PROGRESS NOTE ADULT - SUBJECTIVE AND OBJECTIVE BOX
JUNE SAINI  38y Male    CHIEF COMPLAINT:    Patient is a 38y old  Male who presents with a chief complaint of ETOH withdrawal (24 Jan 2024 10:43)    INTERVAL HPI/OVERNIGHT EVENTS:    Patient seen and examined. No acute events overnight. CIWA 8-10    ROS: All other systems are negative.    Vital Signs:    T(F): 97.6 (01-24-24 @ 11:27), Max: 98.4 (01-23-24 @ 20:00)  HR: 106 (01-24-24 @ 11:27) (94 - 123)  BP: 124/70 (01-24-24 @ 11:27) (106/62 - 139/76)  RR: 20 (01-24-24 @ 11:27) (14 - 25)  SpO2: 96% (01-24-24 @ 11:27) (96% - 97%)    24 Jan 2024 07:01  -  24 Jan 2024 13:02  --------------------------------------------------------  IN: 0 mL / OUT: 3 mL / NET: -3 mL    PHYSICAL EXAM:    GENERAL:  NAD anxious at times   SKIN: No rashes or lesions  HEENT: Atraumatic. Normocephalic.    NECK: Supple, No JVD.    PULMONARY: CTA B/L. No wheezing.    CVS: Normal S1, S2. Rate and Rhythm are regular   ABDOMEN/GI: Soft, Nontender, Nondistended   MSK:  No clubbing or cyanosis   NEUROLOGIC: moves all extremities   PSYCH: Awake and alert     Consultant(s) Notes Reviewed:  [x ] YES  [ ] NO  Care Discussed with Consultants/Other Providers [ x] YES  [ ] NO    LABS:                        14.1   7.20  )-----------( 155      ( 24 Jan 2024 05:36 )             43.0   137  |  99  |  4<L>  ----------------------------<  78  4.0   |  25  |  0.8    Ca    9.0      24 Jan 2024 05:36  Phos  5.1     01-23  Mg     2.0     01-24    TPro  7.1  /  Alb  4.4  /  TBili  0.2  /  DBili  x   /  AST  44<H>  /  ALT  50<H>  /  AlkPhos  66  01-24    RADIOLOGY & ADDITIONAL TESTS:  Imaging or report Personally Reviewed:  [x] YES  [ ] NO  EKG reviewed: [x] YES  [ ] NO    Medications:  Standing  chlorhexidine 2% Cloths 1 Application(s) Topical <User Schedule>  enoxaparin Injectable 40 milliGRAM(s) SubCutaneous every 24 hours  FLUoxetine 20 milliGRAM(s) Oral daily  folic acid 1 milliGRAM(s) Oral daily  gabapentin 400 milliGRAM(s) Oral three times a day  hydrOXYzine hydrochloride 25 milliGRAM(s) Oral at bedtime  LORazepam     Tablet 2 milliGRAM(s) Oral every 6 hours  multivitamin 1 Tablet(s) Oral daily  nicotine - 21 mG/24Hr(s) Patch 1 Patch Transdermal daily  sodium chloride 3%  Inhalation 4 milliLiter(s) Inhalation once  thiamine 100 milliGRAM(s) Oral daily  thiamine IVPB 500 milliGRAM(s) IV Intermittent every 8 hours  traZODone 100 milliGRAM(s) Oral at bedtime    PRN Meds  acetaminophen     Tablet .. 650 milliGRAM(s) Oral every 6 hours PRN  sodium chloride 0.65% Nasal 1 Spray(s) Both Nostrils two times a day PRN

## 2024-01-24 NOTE — PROGRESS NOTE ADULT - SUBJECTIVE AND OBJECTIVE BOX
Patient is a 38y old  Male who presents with a chief complaint of ETOH withdrawal (23 Jan 2024 19:22)        Over Night Events:    Off precedex  Requiring q2 hour ativans      ROS:     All ROS are negative except HPI         PHYSICAL EXAM    ICU Vital Signs Last 24 Hrs  T(C): 36.1 (24 Jan 2024 02:46), Max: 36.9 (23 Jan 2024 20:00)  T(F): 97 (24 Jan 2024 02:46), Max: 98.4 (23 Jan 2024 20:00)  HR: 94 (24 Jan 2024 04:00) (94 - 123)  BP: 118/63 (24 Jan 2024 04:00) (106/62 - 139/76)  BP(mean): 83 (24 Jan 2024 04:00) (78 - 106)  RR: 20 (24 Jan 2024 04:00) (14 - 25)  SpO2: 97% (24 Jan 2024 04:00) (97% - 97%)    O2 Parameters below as of 24 Jan 2024 04:00  Patient On (Oxygen Delivery Method): room air          ENT: Airway patent,  EYES: Pupils equal, Round and reactive to light.  CARDIAC: Normal rate, Regular rhythm.    RESPIRATORY: No wheezing, Bilateral BS, Not tachypneic, No use of accessory muscles  GASTROINTESTINAL: Abdomen soft, Non-tender, No guarding,   NEUROLOGICAL: Alert and oriented   SKIN: Skin normal color for race, Warm and dry and intact.           LABS:                            14.1   7.20  )-----------( 155      ( 24 Jan 2024 05:36 )             43.0                                               01-24    137  |  99  |  4<L>  ----------------------------<  78  4.0   |  25  |  0.8    Ca    9.0      24 Jan 2024 05:36  Phos  5.1     01-23  Mg     2.0     01-24    TPro  7.1  /  Alb  4.4  /  TBili  0.2  /  DBili  x   /  AST  44<H>  /  ALT  50<H>  /  AlkPhos  66  01-24                                             Urinalysis Basic - ( 24 Jan 2024 05:36 )    Color: x / Appearance: x / SG: x / pH: x  Gluc: 78 mg/dL / Ketone: x  / Bili: x / Urobili: x   Blood: x / Protein: x / Nitrite: x   Leuk Esterase: x / RBC: x / WBC x   Sq Epi: x / Non Sq Epi: x / Bacteria: x                                                  LIVER FUNCTIONS - ( 24 Jan 2024 05:36 )  Alb: 4.4 g/dL / Pro: 7.1 g/dL / ALK PHOS: 66 U/L / ALT: 50 U/L / AST: 44 U/L / GGT: x                                                                                                                                  MEDICATIONS  (STANDING):  chlorhexidine 2% Cloths 1 Application(s) Topical <User Schedule>  enoxaparin Injectable 40 milliGRAM(s) SubCutaneous every 24 hours  FLUoxetine 20 milliGRAM(s) Oral daily  folic acid 1 milliGRAM(s) Oral daily  gabapentin 400 milliGRAM(s) Oral three times a day  hydrOXYzine hydrochloride 25 milliGRAM(s) Oral at bedtime  multivitamin 1 Tablet(s) Oral daily  nicotine - 21 mG/24Hr(s) Patch 1 Patch Transdermal daily  sodium chloride 3%  Inhalation 4 milliLiter(s) Inhalation once  thiamine 100 milliGRAM(s) Oral daily  thiamine IVPB 500 milliGRAM(s) IV Intermittent every 8 hours  traZODone 100 milliGRAM(s) Oral at bedtime    MEDICATIONS  (PRN):  LORazepam   Injectable 2 milliGRAM(s) IV Push every 2 hours PRN CIWA-Ar score 8 or greater  sodium chloride 0.65% Nasal 1 Spray(s) Both Nostrils two times a day PRN Nasal Congestion         Patient is a 38y old  Male who presents with a chief complaint of ETOH withdrawal (23 Jan 2024 19:22)        Over Night Events:  Off Precedex.  Requiring q2 hour Ativan      ROS:     All ROS are negative except HPI         PHYSICAL EXAM    ICU Vital Signs Last 24 Hrs  T(C): 36.1 (24 Jan 2024 02:46), Max: 36.9 (23 Jan 2024 20:00)  T(F): 97 (24 Jan 2024 02:46), Max: 98.4 (23 Jan 2024 20:00)  HR: 94 (24 Jan 2024 04:00) (94 - 123)  BP: 118/63 (24 Jan 2024 04:00) (106/62 - 139/76)  BP(mean): 83 (24 Jan 2024 04:00) (78 - 106)  RR: 20 (24 Jan 2024 04:00) (14 - 25)  SpO2: 97% (24 Jan 2024 04:00) (97% - 97%)    O2 Parameters below as of 24 Jan 2024 04:00  Patient On (Oxygen Delivery Method): room air          ENT: Airway patent,  EYES: Pupils equal, Round and reactive to light.  CARDIAC: Normal rate, Regular rhythm.    RESPIRATORY: No wheezing, Bilateral BS, Not tachypneic, No use of accessory muscles  GASTROINTESTINAL: Abdomen soft, Non-tender, No guarding,   NEUROLOGICAL: Alert and oriented   SKIN: Skin normal color for race, Warm and dry and intact.           LABS:                            14.1   7.20  )-----------( 155      ( 24 Jan 2024 05:36 )             43.0                                               01-24    137  |  99  |  4<L>  ----------------------------<  78  4.0   |  25  |  0.8    Ca    9.0      24 Jan 2024 05:36  Phos  5.1     01-23  Mg     2.0     01-24    TPro  7.1  /  Alb  4.4  /  TBili  0.2  /  DBili  x   /  AST  44<H>  /  ALT  50<H>  /  AlkPhos  66  01-24                                             Urinalysis Basic - ( 24 Jan 2024 05:36 )    Color: x / Appearance: x / SG: x / pH: x  Gluc: 78 mg/dL / Ketone: x  / Bili: x / Urobili: x   Blood: x / Protein: x / Nitrite: x   Leuk Esterase: x / RBC: x / WBC x   Sq Epi: x / Non Sq Epi: x / Bacteria: x                                                  LIVER FUNCTIONS - ( 24 Jan 2024 05:36 )  Alb: 4.4 g/dL / Pro: 7.1 g/dL / ALK PHOS: 66 U/L / ALT: 50 U/L / AST: 44 U/L / GGT: x                                                                                                                                  MEDICATIONS  (STANDING):  chlorhexidine 2% Cloths 1 Application(s) Topical <User Schedule>  enoxaparin Injectable 40 milliGRAM(s) SubCutaneous every 24 hours  FLUoxetine 20 milliGRAM(s) Oral daily  folic acid 1 milliGRAM(s) Oral daily  gabapentin 400 milliGRAM(s) Oral three times a day  hydrOXYzine hydrochloride 25 milliGRAM(s) Oral at bedtime  multivitamin 1 Tablet(s) Oral daily  nicotine - 21 mG/24Hr(s) Patch 1 Patch Transdermal daily  sodium chloride 3%  Inhalation 4 milliLiter(s) Inhalation once  thiamine 100 milliGRAM(s) Oral daily  thiamine IVPB 500 milliGRAM(s) IV Intermittent every 8 hours  traZODone 100 milliGRAM(s) Oral at bedtime    MEDICATIONS  (PRN):  LORazepam   Injectable 2 milliGRAM(s) IV Push every 2 hours PRN CIWA-Ar score 8 or greater  sodium chloride 0.65% Nasal 1 Spray(s) Both Nostrils two times a day PRN Nasal Congestion

## 2024-01-24 NOTE — PROGRESS NOTE ADULT - SUBJECTIVE AND OBJECTIVE BOX
24H events:    Patient is a 38y old Male who presents with a chief complaint of ETOH withdrawal (24 Jan 2024 08:14)    Primary diagnosis of Alcohol use with withdrawal      Day 1:  Day 2:  Day 3:     Today is hospital day 6d. This morning patient was seen and examined at bedside, resting comfortably in bed.    No acute or major events overnight.    Code Status:    Family communication:  Contact date:  Name of person contacted:  Relationship to patient:  Communication details:  What matters most:    PAST MEDICAL & SURGICAL HISTORY  No pertinent past medical history  anxiety  depression  polysubstance    No significant past surgical history      SOCIAL HISTORY:  Social History:      ALLERGIES:  Ceclor (Rash; Hives)    MEDICATIONS:  STANDING MEDICATIONS  chlorhexidine 2% Cloths 1 Application(s) Topical <User Schedule>  enoxaparin Injectable 40 milliGRAM(s) SubCutaneous every 24 hours  FLUoxetine 20 milliGRAM(s) Oral daily  folic acid 1 milliGRAM(s) Oral daily  gabapentin 400 milliGRAM(s) Oral three times a day  hydrOXYzine hydrochloride 25 milliGRAM(s) Oral at bedtime  multivitamin 1 Tablet(s) Oral daily  nicotine - 21 mG/24Hr(s) Patch 1 Patch Transdermal daily  sodium chloride 3%  Inhalation 4 milliLiter(s) Inhalation once  thiamine 100 milliGRAM(s) Oral daily  thiamine IVPB 500 milliGRAM(s) IV Intermittent every 8 hours  traZODone 100 milliGRAM(s) Oral at bedtime    PRN MEDICATIONS  LORazepam   Injectable 2 milliGRAM(s) IV Push every 2 hours PRN  sodium chloride 0.65% Nasal 1 Spray(s) Both Nostrils two times a day PRN    VITALS:   T(F): 97.8  HR: 121  BP: 106/66  RR: 20  SpO2: 97%    PHYSICAL EXAM:  GENERAL: NAD, walking around   HEAD:  Atraumatic HEART: normal rate regular     normal s1s2  LUNGS: Unlabored respirations   ABDOMEN: Soft     nondistended    nontender      EXTREMITIES: Normal       NERVOUS SYSTEM:  A&Ox3     SKIN:  No rashes or lesions       LABS:                        14.1   7.20  )-----------( 155      ( 24 Jan 2024 05:36 )             43.0     01-24    137  |  99  |  4<L>  ----------------------------<  78  4.0   |  25  |  0.8    Ca    9.0      24 Jan 2024 05:36  Phos  5.1     01-23  Mg     2.0     01-24    TPro  7.1  /  Alb  4.4  /  TBili  0.2  /  DBili  x   /  AST  44<H>  /  ALT  50<H>  /  AlkPhos  66  01-24      Urinalysis Basic - ( 24 Jan 2024 05:36 )    Color: x / Appearance: x / SG: x / pH: x  Gluc: 78 mg/dL / Ketone: x  / Bili: x / Urobili: x   Blood: x / Protein: x / Nitrite: x   Leuk Esterase: x / RBC: x / WBC x   Sq Epi: x / Non Sq Epi: x / Bacteria: x                RADIOLOGY:

## 2024-01-24 NOTE — PROGRESS NOTE ADULT - ASSESSMENT
38 M hx of alcohol use disorder, anxiety and depression came in for alcohol withdrawal. The Pt usually drinks 4 beers a day but for the past 3 weeks he has been drinking around 2 bottles of 750 ml Vodka daily after going through a bad breakup in order to cope. Last drink yesterday. Of note patient was here 3 weeks ago with the same presentation.     #Alcohol withdrawal  - Patient was having hallucination but vitals during the CCU course  -Pt was in CCU on precedex - downgraded to SDU 1/24   -CIWA protocol prn ativan- still requiring q2  -C/w folate and thiamine  -Catch consult , addiction medicine consult     Electrolyte imbalances  -supplemented Mg and K     Transaminitis  -LFTs downtrending  -likely 2/2 alcohol use     #Lactic acidosis  -Resolved  -Vitals remained stable. Afebrile.  -Bcx neg  -1.2 now     #DVT ppx  - Lovenox     #Diet  -dash/tlc   #Activity  -IAT    #Dispo  -SDU monitoring

## 2024-01-24 NOTE — PROGRESS NOTE ADULT - ASSESSMENT
IMPRESSION:    Alcohol withdrawal with DT   hypomagnesemia - improved  hypokalemia   lactic acidosis    thrombocytopenia      PLAN:    CNS: Off precedex drip. Ativan PRN symptom driven. Trazodone. Thiamine and folate. CIWA protocol. CATCH team.     HEENT: oral care     PULMONARY: keep po x>92%. CXR clear.     CARDIOVASCULAR: Still tachy. ECHO noted EF 40%-45%.    GI: Oral diet. LFTs stable.     RENAL: Replete K, Mg. No liz.     INFECTIOUS DISEASE: No abx. No fevers.     HEMATOLOGICAL:  DVT prophylaxis. Platelets low from alcohol use.     ENDOCRINE:  Follow up FS.  Insulin protocol if needed.    MUSCULOSKELETAL: OOB    SDU.    IMPRESSION:    Alcohol withdrawal with DT   Hypomagnesemia - improved  Hypokalemia   Lactic acidosis    Thrombocytopenia    PLAN:    CNS: Off Precedex Drip.  Ativan PRN symptom driven. Trazodone. Thiamine and folate. CIWA protocol. CATCH team.     HEENT: Oral care     PULMONARY: keep po x>92%.  CXR clear.     CARDIOVASCULAR: Still tachy. ECHO noted EF 40%-45%.    GI: Oral diet. LFTs stable.     RENAL: Replete K, Mg. No liz.     INFECTIOUS DISEASE: No abx. No fevers.     HEMATOLOGICAL:  DVT prophylaxis. Platelets low from alcohol use.     ENDOCRINE:  Follow up FS.  Insulin protocol if needed.    MUSCULOSKELETAL: OOB    SDU.

## 2024-01-24 NOTE — PROGRESS NOTE ADULT - ASSESSMENT
38 M hx of alcohol use disorder, anxiety and depression came in for alcohol withdrawal. The Pt usually drinks 4 beers a day but for the past 3 weeks he has been drinking around 2 bottles of 750 ml Vodka daily after going through a bad breakup in order to cope. Last drink day PTP. Of note patient was here 3 weeks ago with the same presentation. Denies HI/SI. Admitted to MICU on precedex drip due to concert for Dts, now downgraded to SDU    Alcohol withdrawal with DTs s/p precedex drip  Alcohol Use disorder  Suspected folate and thiamine deficiency  Hypomagnesemia - resolved  Hypokalemia - resolved   - UDS 1/19 negative   - s/p precedex drip, still requiring Ativan as CIWA 8-10  - c/w CIWA monitoring  - addiction med and CATCH team eval  - folate, thiamine and MVI  - monitor electrolytes and replete as needed    Thrombocytopenia likely related to ETOH use   TRansaminitis  - improving, RUQ 12/28/23- with fatty liver  - c/w monitoring    HFrEF - likely related to alcohol abuse  echo 1/19 with EF 40-45%, GIIDD, bicuspid AV  - given echo findings, cardio eval requested     Actve Smoker - 1/5 PPD, c/w nicotine patch     Overall prognosis is guarded, continue monitoring in SDU      Patient seen at bedside, total time spent to evaluate and treat the patient's acute illness and chronic medical conditions as well as time spent reviewing labs, radiology, discussing medical plan with covering medical team was more than 55 minutes with >50% of time spent face to face with patient, discussing with patient/family as well as coordination of care

## 2024-01-25 LAB
ALBUMIN SERPL ELPH-MCNC: 4.6 G/DL — SIGNIFICANT CHANGE UP (ref 3.5–5.2)
ALP SERPL-CCNC: 65 U/L — SIGNIFICANT CHANGE UP (ref 30–115)
ALT FLD-CCNC: 45 U/L — HIGH (ref 0–41)
ANION GAP SERPL CALC-SCNC: 13 MMOL/L — SIGNIFICANT CHANGE UP (ref 7–14)
AST SERPL-CCNC: 36 U/L — SIGNIFICANT CHANGE UP (ref 0–41)
BASOPHILS # BLD AUTO: 0.05 K/UL — SIGNIFICANT CHANGE UP (ref 0–0.2)
BASOPHILS NFR BLD AUTO: 0.8 % — SIGNIFICANT CHANGE UP (ref 0–1)
BILIRUB SERPL-MCNC: <0.2 MG/DL — SIGNIFICANT CHANGE UP (ref 0.2–1.2)
BUN SERPL-MCNC: 6 MG/DL — LOW (ref 10–20)
CALCIUM SERPL-MCNC: 10 MG/DL — SIGNIFICANT CHANGE UP (ref 8.4–10.5)
CHLORIDE SERPL-SCNC: 101 MMOL/L — SIGNIFICANT CHANGE UP (ref 98–110)
CO2 SERPL-SCNC: 29 MMOL/L — SIGNIFICANT CHANGE UP (ref 17–32)
CREAT SERPL-MCNC: 1.1 MG/DL — SIGNIFICANT CHANGE UP (ref 0.7–1.5)
CULTURE RESULTS: SIGNIFICANT CHANGE UP
DRUG SCREEN, SERUM: ABNORMAL
EGFR: 89 ML/MIN/1.73M2 — SIGNIFICANT CHANGE UP
EOSINOPHIL # BLD AUTO: 0.1 K/UL — SIGNIFICANT CHANGE UP (ref 0–0.7)
EOSINOPHIL NFR BLD AUTO: 1.7 % — SIGNIFICANT CHANGE UP (ref 0–8)
GLUCOSE SERPL-MCNC: 91 MG/DL — SIGNIFICANT CHANGE UP (ref 70–99)
HCT VFR BLD CALC: 44.5 % — SIGNIFICANT CHANGE UP (ref 42–52)
HGB BLD-MCNC: 14.7 G/DL — SIGNIFICANT CHANGE UP (ref 14–18)
IMM GRANULOCYTES NFR BLD AUTO: 0.8 % — HIGH (ref 0.1–0.3)
LYMPHOCYTES # BLD AUTO: 2.51 K/UL — SIGNIFICANT CHANGE UP (ref 1.2–3.4)
LYMPHOCYTES # BLD AUTO: 42.3 % — SIGNIFICANT CHANGE UP (ref 20.5–51.1)
MAGNESIUM SERPL-MCNC: 2.1 MG/DL — SIGNIFICANT CHANGE UP (ref 1.8–2.4)
MCHC RBC-ENTMCNC: 31.3 PG — HIGH (ref 27–31)
MCHC RBC-ENTMCNC: 33 G/DL — SIGNIFICANT CHANGE UP (ref 32–37)
MCV RBC AUTO: 94.7 FL — HIGH (ref 80–94)
MONOCYTES # BLD AUTO: 0.77 K/UL — HIGH (ref 0.1–0.6)
MONOCYTES NFR BLD AUTO: 13 % — HIGH (ref 1.7–9.3)
NEUTROPHILS # BLD AUTO: 2.45 K/UL — SIGNIFICANT CHANGE UP (ref 1.4–6.5)
NEUTROPHILS NFR BLD AUTO: 41.4 % — LOW (ref 42.2–75.2)
NRBC # BLD: 0 /100 WBCS — SIGNIFICANT CHANGE UP (ref 0–0)
PLATELET # BLD AUTO: 205 K/UL — SIGNIFICANT CHANGE UP (ref 130–400)
PMV BLD: 10 FL — SIGNIFICANT CHANGE UP (ref 7.4–10.4)
POTASSIUM SERPL-MCNC: 4.4 MMOL/L — SIGNIFICANT CHANGE UP (ref 3.5–5)
POTASSIUM SERPL-SCNC: 4.4 MMOL/L — SIGNIFICANT CHANGE UP (ref 3.5–5)
PROT SERPL-MCNC: 7.7 G/DL — SIGNIFICANT CHANGE UP (ref 6–8)
RBC # BLD: 4.7 M/UL — SIGNIFICANT CHANGE UP (ref 4.7–6.1)
RBC # FLD: 12.9 % — SIGNIFICANT CHANGE UP (ref 11.5–14.5)
SODIUM SERPL-SCNC: 143 MMOL/L — SIGNIFICANT CHANGE UP (ref 135–146)
SPECIMEN SOURCE: SIGNIFICANT CHANGE UP
WBC # BLD: 5.93 K/UL — SIGNIFICANT CHANGE UP (ref 4.8–10.8)
WBC # FLD AUTO: 5.93 K/UL — SIGNIFICANT CHANGE UP (ref 4.8–10.8)

## 2024-01-25 PROCEDURE — 99233 SBSQ HOSP IP/OBS HIGH 50: CPT

## 2024-01-25 PROCEDURE — 99222 1ST HOSP IP/OBS MODERATE 55: CPT

## 2024-01-25 RX ORDER — SACUBITRIL AND VALSARTAN 24; 26 MG/1; MG/1
1 TABLET, FILM COATED ORAL
Refills: 0 | Status: DISCONTINUED | OUTPATIENT
Start: 2024-01-25 | End: 2024-01-31

## 2024-01-25 RX ORDER — METOPROLOL TARTRATE 50 MG
50 TABLET ORAL DAILY
Refills: 0 | Status: DISCONTINUED | OUTPATIENT
Start: 2024-01-25 | End: 2024-01-31

## 2024-01-25 RX ADMIN — SACUBITRIL AND VALSARTAN 1 TABLET(S): 24; 26 TABLET, FILM COATED ORAL at 17:19

## 2024-01-25 RX ADMIN — Medication 650 MILLIGRAM(S): at 17:22

## 2024-01-25 RX ADMIN — Medication 20 MILLIGRAM(S): at 11:06

## 2024-01-25 RX ADMIN — SACUBITRIL AND VALSARTAN 1 TABLET(S): 24; 26 TABLET, FILM COATED ORAL at 12:55

## 2024-01-25 RX ADMIN — Medication 100 MILLIGRAM(S): at 11:05

## 2024-01-25 RX ADMIN — Medication 2 MILLIGRAM(S): at 11:09

## 2024-01-25 RX ADMIN — CHLORHEXIDINE GLUCONATE 1 APPLICATION(S): 213 SOLUTION TOPICAL at 05:35

## 2024-01-25 RX ADMIN — Medication 1 MILLIGRAM(S): at 11:05

## 2024-01-25 RX ADMIN — Medication 100 MILLIGRAM(S): at 21:08

## 2024-01-25 RX ADMIN — GABAPENTIN 400 MILLIGRAM(S): 400 CAPSULE ORAL at 21:08

## 2024-01-25 RX ADMIN — Medication 105 MILLIGRAM(S): at 05:34

## 2024-01-25 RX ADMIN — Medication 1 TABLET(S): at 11:05

## 2024-01-25 RX ADMIN — Medication 650 MILLIGRAM(S): at 17:55

## 2024-01-25 RX ADMIN — Medication 2 MILLIGRAM(S): at 17:19

## 2024-01-25 RX ADMIN — Medication 2 MILLIGRAM(S): at 23:02

## 2024-01-25 RX ADMIN — Medication 1 PATCH: at 19:07

## 2024-01-25 RX ADMIN — Medication 2 MILLIGRAM(S): at 05:36

## 2024-01-25 RX ADMIN — GABAPENTIN 400 MILLIGRAM(S): 400 CAPSULE ORAL at 05:33

## 2024-01-25 RX ADMIN — Medication 25 MILLIGRAM(S): at 21:09

## 2024-01-25 RX ADMIN — Medication 50 MILLIGRAM(S): at 12:55

## 2024-01-25 RX ADMIN — Medication 650 MILLIGRAM(S): at 04:31

## 2024-01-25 NOTE — PROGRESS NOTE ADULT - ASSESSMENT
IMPRESSION:    Alcohol withdrawal with DT   Hypomagnesemia - improved  Hypokalemia   Lactic acidosis    Thrombocytopenia    PLAN:    CNS: Off Precedex Drip.  Ativan PRN symptom driven. Trazodone. Thiamine and folate. CIWA protocol. CATCH team.     HEENT: Oral care     PULMONARY: keep po x>92%.  CXR clear.     CARDIOVASCULAR: Still tachy. ECHO noted EF 40%-45%. Cardio eval.     GI: Oral diet. LFTs stable.     RENAL: Replete K, Mg. No liz.     INFECTIOUS DISEASE: No abx. No fevers.     HEMATOLOGICAL:  DVT prophylaxis. Platelets low from alcohol use.     ENDOCRINE:  Follow up FS.  Insulin protocol if needed.    MUSCULOSKELETAL: OOB    DGTF

## 2024-01-25 NOTE — PROGRESS NOTE ADULT - SUBJECTIVE AND OBJECTIVE BOX
JUNE SAINI  38y Male    CHIEF COMPLAINT:    Patient is a 38y old  Male who presents with a chief complaint of ETOH withdrawal (24 Jan 2024 13:01)    INTERVAL HPI/OVERNIGHT EVENTS:    Patient seen and examined. No acute events overnight. Overall CIWA improving, 4-5 overnight     ROS: All other systems are negative.    Vital Signs:    T(F): 98.2 (01-25-24 @ 08:00), Max: 98.2 (01-25-24 @ 08:00)  HR: 117 (01-25-24 @ 08:00) (103 - 117)  BP: 122/81 (01-25-24 @ 08:00) (108/63 - 124/70)  RR: 18 (01-25-24 @ 08:00) (18 - 20)  SpO2: 100% (01-25-24 @ 08:00) (96% - 100%)    24 Jan 2024 07:01  -  25 Jan 2024 07:00  --------------------------------------------------------  IN: 0 mL / OUT: 3 mL / NET: -3 mL    PHYSICAL EXAM:    GENERAL:  NAD  SKIN: No rashes or lesions  HEENT: Atraumatic. Normocephalic.   NECK: Supple, No JVD.   PULMONARY: CTA B/L. No wheezing. No rales  CVS: Normal S1, S2. Rate and Rhythm are regular.   ABDOMEN/GI: Soft, Nontender, Nondistended  MSK:  No clubbing or cyanosis   NEUROLOGIC: moves all extremities.  PSYCH: Alert & oriented x 3    Consultant(s) Notes Reviewed:  [x ] YES  [ ] NO  Care Discussed with Consultants/Other Providers [ x] YES  [ ] NO    LABS:                        14.7   5.93  )-----------( 205      ( 25 Jan 2024 04:49 )             44.5     143  |  101  |  6<L>  ----------------------------<  91  4.4   |  29  |  1.1    Ca    10.0      25 Jan 2024 04:49  Mg     2.1     01-25    TPro  7.7  /  Alb  4.6  /  TBili  <0.2  /  DBili  x   /  AST  36  /  ALT  45<H>  /  AlkPhos  65  01-25    RADIOLOGY & ADDITIONAL TESTS:  Imaging or report Personally Reviewed:  [x] YES  [ ] NO  EKG reviewed: [x] YES  [ ] NO    Medications:  Standing  chlorhexidine 2% Cloths 1 Application(s) Topical <User Schedule>  enoxaparin Injectable 40 milliGRAM(s) SubCutaneous every 24 hours  FLUoxetine 20 milliGRAM(s) Oral daily  folic acid 1 milliGRAM(s) Oral daily  gabapentin 400 milliGRAM(s) Oral three times a day  hydrOXYzine hydrochloride 25 milliGRAM(s) Oral at bedtime  LORazepam     Tablet 2 milliGRAM(s) Oral every 6 hours  multivitamin 1 Tablet(s) Oral daily  nicotine - 21 mG/24Hr(s) Patch 1 Patch Transdermal daily  sodium chloride 3%  Inhalation 4 milliLiter(s) Inhalation once  thiamine 100 milliGRAM(s) Oral daily  traZODone 100 milliGRAM(s) Oral at bedtime    PRN Meds  acetaminophen     Tablet .. 650 milliGRAM(s) Oral every 6 hours PRN  sodium chloride 0.65% Nasal 1 Spray(s) Both Nostrils two times a day PRN

## 2024-01-25 NOTE — CHART NOTE - NSCHARTNOTEFT_GEN_A_CORE
From CEU  To floor From CEU  To floor    38 M hx of alcohol use disorder, anxiety and depression came in for alcohol withdrawal. The Pt usually drinks 4 beers a day but for the past 3 weeks he has been drinking around 2 bottles of 750 ml Vodka daily after going through a bad breakup in order to cope. Last drink yesterday. Of note patient was here 3 weeks ago with the same presentation.     #Alcohol withdrawal  - Patient was having hallucination but vitals during the CCU course  -Pt was in CCU on precedex - downgraded to SDU 1/24   -CIWA protocol prn ativan  -C/w folate and thiamine  -Catch consult , addiction medicine consult   -Spoke to catch team-pt is interested in inpatient rehab but wants to go home first   -C/w Ativan 2mg q6> taper tomorrow q8> q12> q24    #HFrEF (40 to 45% ) , Bicuspid valve   -Cardio following  - most likely dilated CM from alcohol   -Started on Metorpolol and entresto per cardio  -will start Spironolactone if BP tolerates      # Recs:  - keep on telemetry   - start metoprolol ER 50 mg po od  - start entresto 24/26 mg PO BID  - consider adding spironolactone 25 mg po od in case   - looks euvolemic on lung exam, no need for IV diuresis for now  - get CCTA + CTA ascending aorta to check for ascending aortic dilation or aneurysm   - patient will be referred for alcohol rehab program   - Counseled about necessity of alcohol abstinence       #Electrolyte imbalances  -supplemented Mg and K     Transaminitis  -LFTs downtrending, resolving   -likely 2/2 alcohol use     #Lactic acidosis  -Resolved  -Vitals remained stable. Afebrile.  -Bcx neg  -1.2 now     #DVT ppx  - Lovenox     #Diet  -dash/tlc   #Activity  -IAT    DGTF

## 2024-01-25 NOTE — CONSULT NOTE ADULT - SUBJECTIVE AND OBJECTIVE BOX
HPI:  38 M hx of alcohol use disorder, anxiety and depression came in for alcohol withdrawal. The Pt usually drinks 4 beers a day but for the past 3 weeks he has been drinking around 2 bottles of 750 ml Vodka daily after going through a bad breakup in order to cope. Last drink yesterday. Of note patient was here 3 weeks ago with the same presentation. Denies HI/SI. )    Labs show WBC 13k, Lactate 10  Given 30mg diazepam and started on precedex ggt and 2L NS bolus.     cardiology consulted for Hfr EF in the setting of alcohol use disorder.       PAST MEDICAL & SURGICAL HISTORY  No pertinent past medical history  anxiety  depression  polysubstance    No significant past surgical history        FAMILY HISTORY:  FAMILY HISTORY:      SOCIAL HISTORY:  []smoker  []Alcohol  []Drug    ALLERGIES:  Ceclor (Rash; Hives)      MEDICATIONS:  MEDICATIONS  (STANDING):  chlorhexidine 2% Cloths 1 Application(s) Topical <User Schedule>  enoxaparin Injectable 40 milliGRAM(s) SubCutaneous every 24 hours  FLUoxetine 20 milliGRAM(s) Oral daily  folic acid 1 milliGRAM(s) Oral daily  gabapentin 400 milliGRAM(s) Oral three times a day  hydrOXYzine hydrochloride 25 milliGRAM(s) Oral at bedtime  LORazepam     Tablet 2 milliGRAM(s) Oral every 8 hours  multivitamin 1 Tablet(s) Oral daily  nicotine - 21 mG/24Hr(s) Patch 1 Patch Transdermal daily  sodium chloride 3%  Inhalation 4 milliLiter(s) Inhalation once  thiamine 100 milliGRAM(s) Oral daily  traZODone 100 milliGRAM(s) Oral at bedtime    MEDICATIONS  (PRN):  acetaminophen     Tablet .. 650 milliGRAM(s) Oral every 6 hours PRN Moderate Pain (4 - 6)  sodium chloride 0.65% Nasal 1 Spray(s) Both Nostrils two times a day PRN Nasal Congestion      HOME MEDICATIONS:  Home Medications:  FLUoxetine (Eqv-Sarafem) 20 mg oral tablet: 1 tab(s) orally once a day (26 Dec 2023 19:20)      VITALS:   T(F): 98.2 (01-25 @ 08:00), Max: 98.4 (01-23 @ 20:00)  HR: 117 (01-25 @ 08:00) (54 - 141)  BP: 122/81 (01-25 @ 08:00) (81/51 - 148/87)  BP(mean): 98 (01-25 @ 08:00) (61 - 113)  RR: 18 (01-25 @ 08:00) (11 - 28)  SpO2: 100% (01-25 @ 08:00) (93% - 100%)    I&O's Summary    24 Jan 2024 07:01  -  25 Jan 2024 07:00  --------------------------------------------------------  IN: 0 mL / OUT: 3 mL / NET: -3 mL        REVIEW OF SYSTEMS:  CONSTITUTIONAL: No weakness, fevers or chills  EYES: No visual changes  ENT: No vertigo or throat pain   NECK: No pain or stiffness  RESPIRATORY: No cough, wheezing, hemoptysis; No shortness of breath  CARDIOVASCULAR: No chest pain or palpitations  GASTROINTESTINAL: No abdominal or epigastric pain. No nausea, vomiting, or hematemesis; No diarrhea or constipation. No melena or hematochezia.  GENITOURINARY: No dysuria, frequency or hematuria  NEUROLOGICAL: No numbness or weakness  SKIN: No itching, no rashes  MSK: No pain    PHYSICAL EXAM:  NEURO: patient is awake , alert and oriented  GEN: Not in acute distress  NECK: no thyroid enlargement, no JVD  LUNGS: Clear to auscultation bilaterally   CARDIOVASCULAR: S1/S2 present, RRR , no murmurs or rubs, no carotid bruits,  + PP bilaterally  ABD: Soft, non-tender, non-distended, +BS  EXT: No EDUARDO  SKIN: Intact    LABS:                        14.7   5.93  )-----------( 205      ( 25 Jan 2024 04:49 )             44.5     01-25    143  |  101  |  6<L>  ----------------------------<  91  4.4   |  29  |  1.1    Ca    10.0      25 Jan 2024 04:49  Mg     2.1     01-25    TPro  7.7  /  Alb  4.6  /  TBili  <0.2  /  DBili  x   /  AST  36  /  ALT  45<H>  /  AlkPhos  65  01-25              Troponin trend:      01-19 Chol 151 LDL -- HDL 48 Trig 103      RADIOLOGY:  -CXR:  -TTE:  -CCTA:  -STRESS TEST:  -CATHETERIZATION:    ECG:    TELEMETRY EVENTS:       HPI:  38 M hx of alcohol use disorder, anxiety and depression came in for alcohol withdrawal. The Pt usually drinks 4 beers a day but for the past 3 weeks he has been drinking around 2 bottles of 750 ml Vodka daily after going through a bad breakup in order to cope. Last drink yesterday. Of note patient was here 3 weeks ago with the same presentation. Denies HI/SI. )    Labs show WBC 13k, Lactate 10  Given 30mg diazepam and started on precedex ggt and 2L NS bolus.     cardiology consulted for Hfr EF in the setting of alcohol use disorder.       PAST MEDICAL & SURGICAL HISTORY  No pertinent past medical history  anxiety  depression  polysubstance    No significant past surgical history        FAMILY HISTORY:  FAMILY HISTORY:      SOCIAL HISTORY:  []smoker  []Alcohol  []Drug    ALLERGIES:  Ceclor (Rash; Hives)      MEDICATIONS:  MEDICATIONS  (STANDING):  chlorhexidine 2% Cloths 1 Application(s) Topical <User Schedule>  enoxaparin Injectable 40 milliGRAM(s) SubCutaneous every 24 hours  FLUoxetine 20 milliGRAM(s) Oral daily  folic acid 1 milliGRAM(s) Oral daily  gabapentin 400 milliGRAM(s) Oral three times a day  hydrOXYzine hydrochloride 25 milliGRAM(s) Oral at bedtime  LORazepam     Tablet 2 milliGRAM(s) Oral every 8 hours  multivitamin 1 Tablet(s) Oral daily  nicotine - 21 mG/24Hr(s) Patch 1 Patch Transdermal daily  sodium chloride 3%  Inhalation 4 milliLiter(s) Inhalation once  thiamine 100 milliGRAM(s) Oral daily  traZODone 100 milliGRAM(s) Oral at bedtime    MEDICATIONS  (PRN):  acetaminophen     Tablet .. 650 milliGRAM(s) Oral every 6 hours PRN Moderate Pain (4 - 6)  sodium chloride 0.65% Nasal 1 Spray(s) Both Nostrils two times a day PRN Nasal Congestion      HOME MEDICATIONS:  Home Medications:  FLUoxetine (Eqv-Sarafem) 20 mg oral tablet: 1 tab(s) orally once a day (26 Dec 2023 19:20)      VITALS:   T(F): 98.2 (01-25 @ 08:00), Max: 98.4 (01-23 @ 20:00)  HR: 117 (01-25 @ 08:00) (54 - 141)  BP: 122/81 (01-25 @ 08:00) (81/51 - 148/87)  BP(mean): 98 (01-25 @ 08:00) (61 - 113)  RR: 18 (01-25 @ 08:00) (11 - 28)  SpO2: 100% (01-25 @ 08:00) (93% - 100%)    I&O's Summary    24 Jan 2024 07:01  -  25 Jan 2024 07:00  --------------------------------------------------------  IN: 0 mL / OUT: 3 mL / NET: -3 mL        REVIEW OF SYSTEMS:  CONSTITUTIONAL: has some tremor   EYES: No visual changes  ENT: No vertigo or throat pain   NECK: No pain or stiffness  RESPIRATORY: No cough, wheezing, hemoptysis; No shortness of breath  CARDIOVASCULAR: No chest pain or palpitations  GASTROINTESTINAL: No abdominal or epigastric pain. No nausea, vomiting, or hematemesis; No diarrhea or constipation. No melena or hematochezia.  GENITOURINARY: No dysuria, frequency or hematuria  NEUROLOGICAL: No numbness or weakness      PHYSICAL EXAM:  NEURO: patient is awake , alert and oriented  GEN: Not in acute distress  NECK: no thyroid enlargement, no JVD  LUNGS: Clear to auscultation bilaterally   CARDIOVASCULAR: S1/S2 present, RRR , no murmurs or rubs, no carotid bruits,  + PP bilaterally, tachycardic  ABD: Soft, non-tender, non-distended, +BS  EXT: No EDUARDO  SKIN: Intact    LABS:                        14.7   5.93  )-----------( 205      ( 25 Jan 2024 04:49 )             44.5     01-25    143  |  101  |  6<L>  ----------------------------<  91  4.4   |  29  |  1.1    Ca    10.0      25 Jan 2024 04:49  Mg     2.1     01-25    TPro  7.7  /  Alb  4.6  /  TBili  <0.2  /  DBili  x   /  AST  36  /  ALT  45<H>  /  AlkPhos  65  01-25              Troponin trend:      01-19 Chol 151 LDL -- HDL 48 Trig 103      RADIOLOGY:  -CXR:  -TTE:   1. Normal left ventricular internal cavity size. Mildly decreased global   left ventricular systolic function. Left ventricular ejection fraction,   by visual estimation, is 40 to 45%. Mild concentric left ventricular   hypertrophy. Spectral Doppler shows pseudonormal pattern of left   ventricular myocardial filling (Grade II diastolic dysfunction).   2. Normal right ventricular size and function.   3. Prolapse of the anterior mitral valve leaflet.   4. Aortic valve is bicuspid. Mild aortic regurgitation.   5. Mild pulmonic valve regurgitation.   6. Dilated aortic root with linear diameter 3.8 cm (indexed to BSA 2.2   cm/m2). Normal ascending aorta.    -CCTA:  -STRESS TEST:  -CATHETERIZATION:    ECG: NSR, sinus tachycardia    TELEMETRY EVENTS:       HPI:  38 M hx of alcohol use disorder, anxiety and depression came in for alcohol withdrawal. The Pt usually drinks 4 beers a day but for the past 3 weeks he has been drinking around 2 bottles of 750 ml Vodka daily after going through a bad breakup in order to cope. Last drink yesterday. Of note patient was here 3 weeks ago with the same presentation. Denies HI/SI. )    Labs show WBC 13k, Lactate 10  Given 30mg diazepam and started on precedex ggt and 2L NS bolus.     cardiology consulted for Hfr EF in the setting of alcohol use disorder.       PAST MEDICAL & SURGICAL HISTORY  No pertinent past medical history  anxiety  depression  polysubstance    No significant past surgical history        FAMILY HISTORY:  FAMILY HISTORY:      SOCIAL HISTORY:  []smoker  []Alcohol  []Drug    ALLERGIES:  Ceclor (Rash; Hives)      MEDICATIONS:  MEDICATIONS  (STANDING):  chlorhexidine 2% Cloths 1 Application(s) Topical <User Schedule>  enoxaparin Injectable 40 milliGRAM(s) SubCutaneous every 24 hours  FLUoxetine 20 milliGRAM(s) Oral daily  folic acid 1 milliGRAM(s) Oral daily  gabapentin 400 milliGRAM(s) Oral three times a day  hydrOXYzine hydrochloride 25 milliGRAM(s) Oral at bedtime  LORazepam     Tablet 2 milliGRAM(s) Oral every 8 hours  multivitamin 1 Tablet(s) Oral daily  nicotine - 21 mG/24Hr(s) Patch 1 Patch Transdermal daily  sodium chloride 3%  Inhalation 4 milliLiter(s) Inhalation once  thiamine 100 milliGRAM(s) Oral daily  traZODone 100 milliGRAM(s) Oral at bedtime    MEDICATIONS  (PRN):  acetaminophen     Tablet .. 650 milliGRAM(s) Oral every 6 hours PRN Moderate Pain (4 - 6)  sodium chloride 0.65% Nasal 1 Spray(s) Both Nostrils two times a day PRN Nasal Congestion      HOME MEDICATIONS:  Home Medications:  FLUoxetine (Eqv-Sarafem) 20 mg oral tablet: 1 tab(s) orally once a day (26 Dec 2023 19:20)      VITALS:   T(F): 98.2 (01-25 @ 08:00), Max: 98.4 (01-23 @ 20:00)  HR: 117 (01-25 @ 08:00) (54 - 141)  BP: 122/81 (01-25 @ 08:00) (81/51 - 148/87)  BP(mean): 98 (01-25 @ 08:00) (61 - 113)  RR: 18 (01-25 @ 08:00) (11 - 28)  SpO2: 100% (01-25 @ 08:00) (93% - 100%)    I&O's Summary    24 Jan 2024 07:01  -  25 Jan 2024 07:00  --------------------------------------------------------  IN: 0 mL / OUT: 3 mL / NET: -3 mL        REVIEW OF SYSTEMS:  CONSTITUTIONAL: has some tremor   EYES: No visual changes  ENT: No vertigo or throat pain   NECK: No pain or stiffness  RESPIRATORY: No cough, wheezing, hemoptysis; No shortness of breath  CARDIOVASCULAR: No chest pain or palpitations  GASTROINTESTINAL: No abdominal or epigastric pain. No nausea, vomiting, or hematemesis; No diarrhea or constipation. No melena or hematochezia.  GENITOURINARY: No dysuria, frequency or hematuria  NEUROLOGICAL: No numbness or weakness  10 point ROS completed; negative except as stated in HPI      PHYSICAL EXAM:  NEURO: patient is awake , alert and oriented  GEN: Not in acute distress, pleasant  NECK: no thyroid enlargement, no JVD  LUNGS: Clear to auscultation bilaterally   CARDIOVASCULAR: S1/S2 present, RRR , no murmurs or rubs, no carotid bruits,  + PP bilaterally, tachycardic  ABD: Soft, non-tender, non-distended, +BS  EXT: No EDUARDO, warm  SKIN: Intact, no rash    LABS:                        14.7   5.93  )-----------( 205      ( 25 Jan 2024 04:49 )             44.5     01-25    143  |  101  |  6<L>  ----------------------------<  91  4.4   |  29  |  1.1    Ca    10.0      25 Jan 2024 04:49  Mg     2.1     01-25    TPro  7.7  /  Alb  4.6  /  TBili  <0.2  /  DBili  x   /  AST  36  /  ALT  45<H>  /  AlkPhos  65  01-25              Troponin trend:      01-19 Chol 151 LDL -- HDL 48 Trig 103      RADIOLOGY:  -CXR:  -TTE:   1. Normal left ventricular internal cavity size. Mildly decreased global   left ventricular systolic function. Left ventricular ejection fraction,   by visual estimation, is 40 to 45%. Mild concentric left ventricular   hypertrophy. Spectral Doppler shows pseudonormal pattern of left   ventricular myocardial filling (Grade II diastolic dysfunction).   2. Normal right ventricular size and function.   3. Prolapse of the anterior mitral valve leaflet.   4. Aortic valve is bicuspid. Mild aortic regurgitation.   5. Mild pulmonic valve regurgitation.   6. Dilated aortic root with linear diameter 3.8 cm (indexed to BSA 2.2   cm/m2). Normal ascending aorta.    -CCTA:  -STRESS TEST:  -CATHETERIZATION:    ECG: NSR, sinus tachycardia    TELEMETRY EVENTS:

## 2024-01-25 NOTE — CONSULT NOTE ADULT - ASSESSMENT
38 M hx of alcohol use disorder, anxiety and depression came in for alcohol withdrawal. The Pt usually drinks 4 beers a day but for the past 3 weeks he has been drinking around 2 bottles of 750 ml Vodka daily after going through a bad breakup in order to cope. Last drink yesterday. Of note patient was here 3 weeks ago with the same presentation. Denies HI/SI. )    Labs show WBC 13k, Lactate 10  Given 30mg diazepam and started on precedex ggt and 2L NS bolus.     Cardiology consulted for Hfr EF in the setting of alcohol use disorder. denies any chest pain, no sob, no orthopnea, no PND, no EDUARDO. no family hx of premature CAD, no SCD    # Impression:  - Global CM (HFrEF 40 to 45% ) most likely dilated CM from alcohol   - Alcohol withdrawal   - Bicuspid AV  - remote hx of IV drug abuse and cocaine     # Recs:  - keep on telemetry   - start metoprolol ER 50 mg po od  - start entresto 24/26 mg PO BID  - consider adding spironolactone 25 mg po od in case   - looks euvolemic on lung exam, no need for IV diuresis for now  - get CCTA + CTA ascending aorta to check for ascending aortic dilation or aneurysm   - patient will be referred for alcohol rehab program   - Counseled about necessity of alcohol abstinence    38 M hx of alcohol use disorder, anxiety and depression came in for alcohol withdrawal. The Pt usually drinks 4 beers a day but for the past 3 weeks he has been drinking around 2 bottles of 750 ml Vodka daily after going through a bad breakup in order to cope. Last drink yesterday. Of note patient was here 3 weeks ago with the same presentation. Denies HI/SI. )    Labs show WBC 13k, Lactate 10  Given 30mg diazepam and started on precedex ggt and 2L NS bolus.     Cardiology consulted for Hfr EF in the setting of alcohol use disorder. denies any chest pain, no sob, no orthopnea, no PND, no EDUARDO. no family hx of premature CAD, no SCD    # Impression:  - Global CM (HFrEF 40 to 45% ) most likely dilated CM from alcohol   - Alcohol withdrawal   - Bicuspid AV  - remote hx of IV drug abuse and cocaine     # Recs:  - keep on telemetry   - start metoprolol ER 25 mg po od  - start losartan 25 mg po od  - consider adding spironolactone 25 mg po od as OP  - looks euvolemic on lung exam, no need for IV diuresis for now  - get CCTA + CTA ascending aorta to check for ascending aortic dilation or aneurysm as OP  - patient will be referred for alcohol rehab program   - Counseled about necessity of alcohol abstinence

## 2024-01-25 NOTE — PROGRESS NOTE ADULT - SUBJECTIVE AND OBJECTIVE BOX
Patient is a 38y old  Male who presents with a chief complaint of ETOH withdrawal (24 Jan 2024 13:01)        Over Night Events:    Off precedex. On Benzo taper. CIWA is much better    ROS:     All ROS are negative except HPI         PHYSICAL EXAM    ICU Vital Signs Last 24 Hrs  T(C): 36.8 (25 Jan 2024 08:00), Max: 36.8 (25 Jan 2024 08:00)  T(F): 98.2 (25 Jan 2024 08:00), Max: 98.2 (25 Jan 2024 08:00)  HR: 117 (25 Jan 2024 08:00) (103 - 117)  BP: 122/81 (25 Jan 2024 08:00) (108/63 - 124/70)  BP(mean): 98 (25 Jan 2024 08:00) (81 - 98)  ABP: --  ABP(mean): --  RR: 18 (25 Jan 2024 08:00) (18 - 20)  SpO2: 100% (25 Jan 2024 08:00) (96% - 100%)    O2 Parameters below as of 25 Jan 2024 08:00  Patient On (Oxygen Delivery Method): room air        ENT: Airway patent,  EYES: Pupils equal, Round and reactive to light.  CARDIAC: Normal rate, Regular rhythm.    RESPIRATORY: No wheezing, Bilateral BS, Not tachypneic, No use of accessory muscles  GASTROINTESTINAL: Abdomen soft, Non-tender, No guarding,   NEUROLOGICAL: Alert and oriented   SKIN: Skin normal color for race, Warm and dry and intact.         01-24-24 @ 07:01  -  01-25-24 @ 07:00  --------------------------------------------------------  IN:  Total IN: 0 mL    OUT:    Voided (mL): 3 mL  Total OUT: 3 mL    Total NET: -3 mL          LABS:                            14.7   5.93  )-----------( 205      ( 25 Jan 2024 04:49 )             44.5                                               01-25    143  |  101  |  6<L>  ----------------------------<  91  4.4   |  29  |  1.1    Ca    10.0      25 Jan 2024 04:49  Mg     2.1     01-25    TPro  7.7  /  Alb  4.6  /  TBili  <0.2  /  DBili  x   /  AST  36  /  ALT  45<H>  /  AlkPhos  65  01-25                                             Urinalysis Basic - ( 25 Jan 2024 04:49 )    Color: x / Appearance: x / SG: x / pH: x  Gluc: 91 mg/dL / Ketone: x  / Bili: x / Urobili: x   Blood: x / Protein: x / Nitrite: x   Leuk Esterase: x / RBC: x / WBC x   Sq Epi: x / Non Sq Epi: x / Bacteria: x                                                  LIVER FUNCTIONS - ( 25 Jan 2024 04:49 )  Alb: 4.6 g/dL / Pro: 7.7 g/dL / ALK PHOS: 65 U/L / ALT: 45 U/L / AST: 36 U/L / GGT: x                                                                                                                                     MEDICATIONS  (STANDING):  chlorhexidine 2% Cloths 1 Application(s) Topical <User Schedule>  enoxaparin Injectable 40 milliGRAM(s) SubCutaneous every 24 hours  FLUoxetine 20 milliGRAM(s) Oral daily  folic acid 1 milliGRAM(s) Oral daily  gabapentin 400 milliGRAM(s) Oral three times a day  hydrOXYzine hydrochloride 25 milliGRAM(s) Oral at bedtime  LORazepam     Tablet 2 milliGRAM(s) Oral every 6 hours  multivitamin 1 Tablet(s) Oral daily  nicotine - 21 mG/24Hr(s) Patch 1 Patch Transdermal daily  sodium chloride 3%  Inhalation 4 milliLiter(s) Inhalation once  thiamine 100 milliGRAM(s) Oral daily  traZOD) Oral at bedtime    MEDICATIONS  (PRN):  acetaminophen     Tablet .. 650 milliGRAM(s) Oral every 6 hours PRN Moderate Pain (4 - 6)  sodium chloride 0.65% Nasal 1 Spray(s) Both Nostrils two times a day PRN Nasal Congestion

## 2024-01-25 NOTE — CONSULT NOTE ADULT - ATTENDING COMMENTS
Briefly, 38 year old man for whom cardiology has been consulted for newly diagnosed cardiomyopathy, most likely from alcohol use. Echocardiogram personally reviewed - there is mild global hypokinesis. Patient will need outpatient workup to rule out other etiologies for cardiomyopathy but currently, he is not having any anginal symptoms and is euvolemic on exam. Start GDMT as above. He should follow up with cardiology on discharge.     Thank you for this consult. Please call/MS teams with questions.

## 2024-01-25 NOTE — PROGRESS NOTE ADULT - ASSESSMENT
38 M hx of alcohol use disorder, anxiety and depression came in for alcohol withdrawal. The Pt usually drinks 4 beers a day but for the past 3 weeks he has been drinking around 2 bottles of 750 ml Vodka daily after going through a bad breakup in order to cope. Last drink day PTP. Of note patient was here 3 weeks ago with the same presentation. Denies HI/SI. Admitted to MICU on precedex drip due to concert for Dts, now downgraded to SDU    Alcohol withdrawal with DTs s/p precedex drip  Alcohol Use disorder  Suspected folate and thiamine deficiency  Hypomagnesemia - resolved  Hypokalemia - resolved   - UDS 1/19 negative   - s/p precedex drip, CIWA improving 4-5 overnight   - c/w CIWA monitoring and ativan prtotocol   - addiction med eval pending and [facility] team following   - folate, thiamine and MVI  - monitor electrolytes and replete as needed    Transaminitis likely related to ETOH use   Thrombocytopenia resolved   - improving, RUQ 12/28/23- with fatty liver  - c/w monitoring    HFrEF - likely related to alcohol abuse  echo 1/19 with EF 40-45%, GIIDD, bicuspid AV  - given echo findings, cardio eval requested. ISchemic workup??    Active Smoker - 1/5 PPD, c/w nicotine patch     Pending: labs, ativan taper, addiction medicine, cardio eval     Patient seen at bedside, total time spent to evaluate and treat the patient's acute illness and chronic medical conditions as well as time spent reviewing labs, radiology, discussing medical plan with covering medical team was more than 55 minutes with >50% of time spent face to face with patient, discussing with patient/family as well as coordination of care

## 2024-01-26 LAB
ALBUMIN SERPL ELPH-MCNC: 3.9 G/DL — SIGNIFICANT CHANGE UP (ref 3.5–5.2)
ALP SERPL-CCNC: 53 U/L — SIGNIFICANT CHANGE UP (ref 30–115)
ALT FLD-CCNC: 32 U/L — SIGNIFICANT CHANGE UP (ref 0–41)
ANION GAP SERPL CALC-SCNC: 13 MMOL/L — SIGNIFICANT CHANGE UP (ref 7–14)
AST SERPL-CCNC: 28 U/L — SIGNIFICANT CHANGE UP (ref 0–41)
BASOPHILS # BLD AUTO: 0.1 K/UL — SIGNIFICANT CHANGE UP (ref 0–0.2)
BASOPHILS NFR BLD AUTO: 1.8 % — HIGH (ref 0–1)
BILIRUB SERPL-MCNC: 0.2 MG/DL — SIGNIFICANT CHANGE UP (ref 0.2–1.2)
BUN SERPL-MCNC: 10 MG/DL — SIGNIFICANT CHANGE UP (ref 10–20)
CALCIUM SERPL-MCNC: 9 MG/DL — SIGNIFICANT CHANGE UP (ref 8.4–10.4)
CHLORIDE SERPL-SCNC: 101 MMOL/L — SIGNIFICANT CHANGE UP (ref 98–110)
CO2 SERPL-SCNC: 22 MMOL/L — SIGNIFICANT CHANGE UP (ref 17–32)
CREAT SERPL-MCNC: 0.8 MG/DL — SIGNIFICANT CHANGE UP (ref 0.7–1.5)
EGFR: 117 ML/MIN/1.73M2 — SIGNIFICANT CHANGE UP
EOSINOPHIL # BLD AUTO: 0.05 K/UL — SIGNIFICANT CHANGE UP (ref 0–0.7)
EOSINOPHIL NFR BLD AUTO: 0.9 % — SIGNIFICANT CHANGE UP (ref 0–8)
GLUCOSE SERPL-MCNC: 93 MG/DL — SIGNIFICANT CHANGE UP (ref 70–99)
HCT VFR BLD CALC: 40.2 % — LOW (ref 42–52)
HGB BLD-MCNC: 13.3 G/DL — LOW (ref 14–18)
LYMPHOCYTES # BLD AUTO: 1.67 K/UL — SIGNIFICANT CHANGE UP (ref 1.2–3.4)
LYMPHOCYTES # BLD AUTO: 30.6 % — SIGNIFICANT CHANGE UP (ref 20.5–51.1)
MAGNESIUM SERPL-MCNC: 1.8 MG/DL — SIGNIFICANT CHANGE UP (ref 1.8–2.4)
MCHC RBC-ENTMCNC: 31.3 PG — HIGH (ref 27–31)
MCHC RBC-ENTMCNC: 33.1 G/DL — SIGNIFICANT CHANGE UP (ref 32–37)
MCV RBC AUTO: 94.6 FL — HIGH (ref 80–94)
MONOCYTES # BLD AUTO: 1.37 K/UL — HIGH (ref 0.1–0.6)
MONOCYTES NFR BLD AUTO: 25 % — HIGH (ref 1.7–9.3)
NEUTROPHILS # BLD AUTO: 2.07 K/UL — SIGNIFICANT CHANGE UP (ref 1.4–6.5)
NEUTROPHILS NFR BLD AUTO: 38 % — LOW (ref 42.2–75.2)
PLATELET # BLD AUTO: 225 K/UL — SIGNIFICANT CHANGE UP (ref 130–400)
PMV BLD: 10.5 FL — HIGH (ref 7.4–10.4)
POTASSIUM SERPL-MCNC: 4.5 MMOL/L — SIGNIFICANT CHANGE UP (ref 3.5–5)
POTASSIUM SERPL-SCNC: 4.5 MMOL/L — SIGNIFICANT CHANGE UP (ref 3.5–5)
PROT SERPL-MCNC: 6.4 G/DL — SIGNIFICANT CHANGE UP (ref 6–8)
RBC # BLD: 4.25 M/UL — LOW (ref 4.7–6.1)
RBC # FLD: 12.7 % — SIGNIFICANT CHANGE UP (ref 11.5–14.5)
SODIUM SERPL-SCNC: 136 MMOL/L — SIGNIFICANT CHANGE UP (ref 135–146)
WBC # BLD: 5.46 K/UL — SIGNIFICANT CHANGE UP (ref 4.8–10.8)
WBC # FLD AUTO: 5.46 K/UL — SIGNIFICANT CHANGE UP (ref 4.8–10.8)

## 2024-01-26 PROCEDURE — 99232 SBSQ HOSP IP/OBS MODERATE 35: CPT

## 2024-01-26 RX ORDER — SPIRONOLACTONE 25 MG/1
25 TABLET, FILM COATED ORAL DAILY
Refills: 0 | Status: DISCONTINUED | OUTPATIENT
Start: 2024-01-26 | End: 2024-01-31

## 2024-01-26 RX ADMIN — GABAPENTIN 400 MILLIGRAM(S): 400 CAPSULE ORAL at 05:10

## 2024-01-26 RX ADMIN — SACUBITRIL AND VALSARTAN 1 TABLET(S): 24; 26 TABLET, FILM COATED ORAL at 17:08

## 2024-01-26 RX ADMIN — Medication 25 MILLIGRAM(S): at 21:05

## 2024-01-26 RX ADMIN — GABAPENTIN 400 MILLIGRAM(S): 400 CAPSULE ORAL at 13:31

## 2024-01-26 RX ADMIN — Medication 650 MILLIGRAM(S): at 18:40

## 2024-01-26 RX ADMIN — Medication 1 PATCH: at 05:44

## 2024-01-26 RX ADMIN — Medication 100 MILLIGRAM(S): at 21:27

## 2024-01-26 RX ADMIN — Medication 1 TABLET(S): at 11:04

## 2024-01-26 RX ADMIN — Medication 1 PATCH: at 11:00

## 2024-01-26 RX ADMIN — Medication 2 MILLIGRAM(S): at 05:10

## 2024-01-26 RX ADMIN — CHLORHEXIDINE GLUCONATE 1 APPLICATION(S): 213 SOLUTION TOPICAL at 05:26

## 2024-01-26 RX ADMIN — Medication 2 MILLIGRAM(S): at 11:18

## 2024-01-26 RX ADMIN — Medication 1 MILLIGRAM(S): at 11:04

## 2024-01-26 RX ADMIN — Medication 100 MILLIGRAM(S): at 11:04

## 2024-01-26 RX ADMIN — Medication 2 MILLIGRAM(S): at 21:05

## 2024-01-26 RX ADMIN — Medication 50 MILLIGRAM(S): at 05:10

## 2024-01-26 RX ADMIN — GABAPENTIN 400 MILLIGRAM(S): 400 CAPSULE ORAL at 21:05

## 2024-01-26 RX ADMIN — Medication 20 MILLIGRAM(S): at 11:05

## 2024-01-26 RX ADMIN — SACUBITRIL AND VALSARTAN 1 TABLET(S): 24; 26 TABLET, FILM COATED ORAL at 05:10

## 2024-01-26 RX ADMIN — Medication 650 MILLIGRAM(S): at 20:42

## 2024-01-26 RX ADMIN — Medication 1 PATCH: at 11:04

## 2024-01-26 RX ADMIN — Medication 2 MILLIGRAM(S): at 17:09

## 2024-01-26 NOTE — PROGRESS NOTE ADULT - SUBJECTIVE AND OBJECTIVE BOX
24H events:    Patient is a 38y old Male who presents with a chief complaint of ETOH withdrawal (25 Jan 2024 09:12)    Primary diagnosis of Alcohol use with withdrawal    Today is 8d of hospitalization. This morning patient was seen and examined at bedside, resting comfortably in bed.    No acute or major events overnight.    Code Status: Full    Family communication:  Contact date:  Name of person contacted:  Relationship to patient:  Communication details:  What matters most:    PAST MEDICAL & SURGICAL HISTORY  No pertinent past medical history  anxiety  depression  polysubstance    No significant past surgical history      ALLERGIES:  Ceclor (Rash; Hives)    MEDICATIONS:  STANDING MEDICATIONS  chlorhexidine 2% Cloths 1 Application(s) Topical <User Schedule>  enoxaparin Injectable 40 milliGRAM(s) SubCutaneous every 24 hours  FLUoxetine 20 milliGRAM(s) Oral daily  folic acid 1 milliGRAM(s) Oral daily  gabapentin 400 milliGRAM(s) Oral three times a day  hydrOXYzine hydrochloride 25 milliGRAM(s) Oral at bedtime  metoprolol succinate ER 50 milliGRAM(s) Oral daily  multivitamin 1 Tablet(s) Oral daily  nicotine - 21 mG/24Hr(s) Patch 1 Patch Transdermal daily  sacubitril 24 mG/valsartan 26 mG 1 Tablet(s) Oral two times a day  sodium chloride 3%  Inhalation 4 milliLiter(s) Inhalation once  spironolactone 25 milliGRAM(s) Oral daily  thiamine 100 milliGRAM(s) Oral daily  traZODone 100 milliGRAM(s) Oral at bedtime    PRN MEDICATIONS  acetaminophen     Tablet .. 650 milliGRAM(s) Oral every 6 hours PRN  LORazepam     Tablet 2 milliGRAM(s) Oral every 4 hours PRN  sodium chloride 0.65% Nasal 1 Spray(s) Both Nostrils two times a day PRN    VITALS:   T(F): 98.1  HR: 70  BP: 124/61  RR: 18  SpO2: 98%    PHYSICAL EXAM:  GENERAL:   ( x) NAD, lying in bed comfortably     (  ) obtunded     (  ) lethargic     (  ) somnolent    HEAD:   ( x) Atraumatic     (  ) hematoma     (  ) laceration (specify location:       )     NECK:  (x) Supple     (  ) neck stiffness     (  ) nuchal rigidity     (  )  no JVD     (  ) JVD present ( -- cm)    HEART:  Rate -->     (x) normal rate     (  ) bradycardic     (  ) tachycardic  Rhythm -->     (x) regular     (  ) regularly irregular     (  ) irregularly irregular  Murmurs -->     (x) normal s1s2     (  ) systolic murmur     (  ) diastolic murmur     (  ) continuous murmur      (  ) S3 present     (  ) S4 present    LUNGS:   ( x)Unlabored respirations     (  ) tachypnea  ( x) B/L air entry     (  ) decreased breath sounds in:  (location     )    ( x) no adventitious sound     (  ) crackles     (  ) wheezing      (  ) rhonchi      (specify location:       )  (  ) chest wall tenderness (specify location:       )    ABDOMEN:   ( x) Soft     (  ) tense   |   (  ) nondistended     (  ) distended   |   (  ) +BS     (  ) hypoactive bowel sounds     (  ) hyperactive bowel sounds  ( x) nontender     (  ) RUQ tenderness     (  ) RLQ tenderness     (  ) LLQ tenderness     (  ) epigastric tenderness     (  ) diffuse tenderness  (  ) Splenomegaly      (  ) Hepatomegaly      (  ) Jaundice     (  ) ecchymosis     EXTREMITIES:  ( x) Normal     (  ) Rash     (  ) ecchymosis     (  ) varicose veins      (  ) pitting edema     (  ) non-pitting edema   (  ) ulceration     (  ) gangrene:     (location:     )    NERVOUS SYSTEM:    ( x) A&Ox3     (  ) confused     (  ) lethargic  CN II-XII:     ( x) Intact     (  ) deficits found     (Specify:     )         LABS:                        13.3   5.46  )-----------( 225      ( 26 Jan 2024 07:25 )             40.2     01-25    143  |  101  |  6<L>  ----------------------------<  91  4.4   |  29  |  1.1    Ca    10.0      25 Jan 2024 04:49  Mg     2.1     01-25    TPro  7.7  /  Alb  4.6  /  TBili  <0.2  /  DBili  x   /  AST  36  /  ALT  45<H>  /  AlkPhos  65  01-25      Urinalysis Basic - ( 25 Jan 2024 04:49 )    Color: x / Appearance: x / SG: x / pH: x  Gluc: 91 mg/dL / Ketone: x  / Bili: x / Urobili: x   Blood: x / Protein: x / Nitrite: x   Leuk Esterase: x / RBC: x / WBC x   Sq Epi: x / Non Sq Epi: x / Bacteria: x

## 2024-01-26 NOTE — PROGRESS NOTE ADULT - ASSESSMENT
#Alcohol withdrawal  - Patient was having hallucination but vitals during the CCU course  -Pt was in CCU on precedex - downgraded to SDU 1/24   -CIWA protocol prn ativan  -C/w folate and thiamine  -Catch consult , addiction medicine consult   -Spoke to catch team-pt is interested in inpatient rehab but wants to go home first   -Make ativan 2 q 4 prn. monitor the dose needed     #HFrEF (40 to 45% ) , Bicuspid valve   -Cardio following  - most likely dilated CM from alcohol   -Started on Metorpolol and entresto per cardio  -start Spironolactone if BP tolerates    # Recs:  - started on metoprolol ER 50 mg po od  - started on entresto 24/26 mg PO BID  - consider adding spironolactone 25 mg po od in case   - looks euvolemic on lung exam, no need for IV diuresis for now  - get CCTA + CTA ascending aorta to check for ascending aortic dilation or aneurysm   - patient will be referred for alcohol rehab program   - Counseled about necessity of alcohol abstinence     #Electrolyte imbalances  -supplemented Mg and K     Transaminitis  -LFTs downtrending, resolving   -likely 2/2 alcohol use     #Lactic acidosis  -Resolved  -Vitals remained stable. Afebrile.  -Bcx neg  -1.2 now     #DVT ppx  - Lovenox     #Diet  -dash/tlc   #Activity  -IAT

## 2024-01-27 LAB
ALBUMIN SERPL ELPH-MCNC: 4.5 G/DL — SIGNIFICANT CHANGE UP (ref 3.5–5.2)
ALP SERPL-CCNC: 54 U/L — SIGNIFICANT CHANGE UP (ref 30–115)
ALT FLD-CCNC: 32 U/L — SIGNIFICANT CHANGE UP (ref 0–41)
ANION GAP SERPL CALC-SCNC: 12 MMOL/L — SIGNIFICANT CHANGE UP (ref 7–14)
AST SERPL-CCNC: 27 U/L — SIGNIFICANT CHANGE UP (ref 0–41)
BILIRUB SERPL-MCNC: 0.2 MG/DL — SIGNIFICANT CHANGE UP (ref 0.2–1.2)
BUN SERPL-MCNC: 10 MG/DL — SIGNIFICANT CHANGE UP (ref 10–20)
CALCIUM SERPL-MCNC: 9.6 MG/DL — SIGNIFICANT CHANGE UP (ref 8.4–10.5)
CHLORIDE SERPL-SCNC: 101 MMOL/L — SIGNIFICANT CHANGE UP (ref 98–110)
CO2 SERPL-SCNC: 25 MMOL/L — SIGNIFICANT CHANGE UP (ref 17–32)
CREAT SERPL-MCNC: 0.9 MG/DL — SIGNIFICANT CHANGE UP (ref 0.7–1.5)
EGFR: 113 ML/MIN/1.73M2 — SIGNIFICANT CHANGE UP
GLUCOSE SERPL-MCNC: 85 MG/DL — SIGNIFICANT CHANGE UP (ref 70–99)
HCT VFR BLD CALC: 44.4 % — SIGNIFICANT CHANGE UP (ref 42–52)
HGB BLD-MCNC: 14.7 G/DL — SIGNIFICANT CHANGE UP (ref 14–18)
MCHC RBC-ENTMCNC: 31.4 PG — HIGH (ref 27–31)
MCHC RBC-ENTMCNC: 33.1 G/DL — SIGNIFICANT CHANGE UP (ref 32–37)
MCV RBC AUTO: 94.9 FL — HIGH (ref 80–94)
NRBC # BLD: 0 /100 WBCS — SIGNIFICANT CHANGE UP (ref 0–0)
PLATELET # BLD AUTO: 276 K/UL — SIGNIFICANT CHANGE UP (ref 130–400)
PMV BLD: 10.5 FL — HIGH (ref 7.4–10.4)
POTASSIUM SERPL-MCNC: 4.4 MMOL/L — SIGNIFICANT CHANGE UP (ref 3.5–5)
POTASSIUM SERPL-SCNC: 4.4 MMOL/L — SIGNIFICANT CHANGE UP (ref 3.5–5)
PROT SERPL-MCNC: 7.1 G/DL — SIGNIFICANT CHANGE UP (ref 6–8)
RBC # BLD: 4.68 M/UL — LOW (ref 4.7–6.1)
RBC # FLD: 12.8 % — SIGNIFICANT CHANGE UP (ref 11.5–14.5)
SODIUM SERPL-SCNC: 138 MMOL/L — SIGNIFICANT CHANGE UP (ref 135–146)
WBC # BLD: 6.54 K/UL — SIGNIFICANT CHANGE UP (ref 4.8–10.8)
WBC # FLD AUTO: 6.54 K/UL — SIGNIFICANT CHANGE UP (ref 4.8–10.8)

## 2024-01-27 PROCEDURE — 99232 SBSQ HOSP IP/OBS MODERATE 35: CPT

## 2024-01-27 RX ADMIN — Medication 1 TABLET(S): at 11:10

## 2024-01-27 RX ADMIN — Medication 1 PATCH: at 11:10

## 2024-01-27 RX ADMIN — Medication 2 MILLIGRAM(S): at 21:31

## 2024-01-27 RX ADMIN — Medication 2 MILLIGRAM(S): at 14:56

## 2024-01-27 RX ADMIN — CHLORHEXIDINE GLUCONATE 1 APPLICATION(S): 213 SOLUTION TOPICAL at 05:33

## 2024-01-27 RX ADMIN — GABAPENTIN 400 MILLIGRAM(S): 400 CAPSULE ORAL at 14:15

## 2024-01-27 RX ADMIN — Medication 25 MILLIGRAM(S): at 21:32

## 2024-01-27 RX ADMIN — Medication 100 MILLIGRAM(S): at 11:10

## 2024-01-27 RX ADMIN — Medication 50 MILLIGRAM(S): at 05:34

## 2024-01-27 RX ADMIN — Medication 100 MILLIGRAM(S): at 21:31

## 2024-01-27 RX ADMIN — Medication 20 MILLIGRAM(S): at 11:10

## 2024-01-27 RX ADMIN — Medication 650 MILLIGRAM(S): at 15:55

## 2024-01-27 RX ADMIN — SACUBITRIL AND VALSARTAN 1 TABLET(S): 24; 26 TABLET, FILM COATED ORAL at 05:34

## 2024-01-27 RX ADMIN — Medication 1 MILLIGRAM(S): at 11:10

## 2024-01-27 RX ADMIN — SACUBITRIL AND VALSARTAN 1 TABLET(S): 24; 26 TABLET, FILM COATED ORAL at 17:32

## 2024-01-27 RX ADMIN — Medication 2 MILLIGRAM(S): at 02:01

## 2024-01-27 RX ADMIN — Medication 1 PATCH: at 19:00

## 2024-01-27 RX ADMIN — GABAPENTIN 400 MILLIGRAM(S): 400 CAPSULE ORAL at 05:34

## 2024-01-27 RX ADMIN — Medication 650 MILLIGRAM(S): at 14:55

## 2024-01-27 RX ADMIN — Medication 2 MILLIGRAM(S): at 07:16

## 2024-01-27 RX ADMIN — GABAPENTIN 400 MILLIGRAM(S): 400 CAPSULE ORAL at 21:31

## 2024-01-27 RX ADMIN — SPIRONOLACTONE 25 MILLIGRAM(S): 25 TABLET, FILM COATED ORAL at 05:34

## 2024-01-27 RX ADMIN — Medication 1 PATCH: at 08:32

## 2024-01-27 NOTE — PROGRESS NOTE ADULT - SUBJECTIVE AND OBJECTIVE BOX
pt seen and examined.     My notes supersede resident's notes in case of discrepancy       ROS: no cp, no sob, no n/v, no fever    Vital Signs Last 24 Hrs  T(C): 36.7 (27 Jan 2024 05:26), Max: 36.7 (27 Jan 2024 05:26)  T(F): 98.1 (27 Jan 2024 05:26), Max: 98.1 (27 Jan 2024 05:26)  HR: 69 (27 Jan 2024 05:26) (69 - 88)  BP: 96/56 (27 Jan 2024 05:26) (96/56 - 114/72)  BP(mean): --  RR: 18 (27 Jan 2024 05:26) (18 - 18)  SpO2: 99% (27 Jan 2024 05:26) (99% - 99%)    Parameters below as of 26 Jan 2024 20:30  Patient On (Oxygen Delivery Method): room air    physical exam  constitutional NAD, AAOX3, Respiratory  lungs CTA, CVS heart RRR, GI: abdomen Soft NT, ND, BS+, skin: intact  neuro exam no focal deficit     MEDICATIONS  (STANDING):  chlorhexidine 2% Cloths 1 Application(s) Topical <User Schedule>  enoxaparin Injectable 40 milliGRAM(s) SubCutaneous every 24 hours  FLUoxetine 20 milliGRAM(s) Oral daily  folic acid 1 milliGRAM(s) Oral daily  gabapentin 400 milliGRAM(s) Oral three times a day  hydrOXYzine hydrochloride 25 milliGRAM(s) Oral at bedtime  metoprolol succinate ER 50 milliGRAM(s) Oral daily  multivitamin 1 Tablet(s) Oral daily  nicotine - 21 mG/24Hr(s) Patch 1 Patch Transdermal daily  sacubitril 24 mG/valsartan 26 mG 1 Tablet(s) Oral two times a day  sodium chloride 3%  Inhalation 4 milliLiter(s) Inhalation once  spironolactone 25 milliGRAM(s) Oral daily  thiamine 100 milliGRAM(s) Oral daily  traZODone 100 milliGRAM(s) Oral at bedtime    MEDICATIONS  (PRN):  acetaminophen     Tablet .. 650 milliGRAM(s) Oral every 6 hours PRN Moderate Pain (4 - 6)  LORazepam     Tablet 2 milliGRAM(s) Oral every 4 hours PRN CIWA>7, Agitation  sodium chloride 0.65% Nasal 1 Spray(s) Both Nostrils two times a day PRN Nasal Congestion                        14.7   6.54  )-----------( 276      ( 27 Jan 2024 07:19 )             44.4     01-27    138  |  101  |  10  ----------------------------<  85  4.4   |  25  |  0.9    Ca    9.6      27 Jan 2024 07:19  Mg     1.8     01-26    TPro  7.1  /  Alb  4.5  /  TBili  0.2  /  DBili  x   /  AST  27  /  ALT  32  /  AlkPhos  54  01-27    Procalcitonin, Serum: 0.17 ng/mL [0.02 - 0.10] (01-19-24 @ 11:44)  D-Dimer Assay, Quantitative: 214 ng/mL DDU (12-30-23 @ 06:02)    COVID-19 PCR: Sharon (01-04-24 @ 14:56)                             pt seen and examined.     My notes supersede resident's notes in case of discrepancy       ROS: no cp, no sob, no n/v, no fever    Vital Signs Last 24 Hrs  T(C): 36.7 (27 Jan 2024 05:26), Max: 36.7 (27 Jan 2024 05:26)  T(F): 98.1 (27 Jan 2024 05:26), Max: 98.1 (27 Jan 2024 05:26)  HR: 69 (27 Jan 2024 05:26) (69 - 88)  BP: 96/56 (27 Jan 2024 05:26) (96/56 - 114/72)  BP(mean): --  RR: 18 (27 Jan 2024 05:26) (18 - 18)  SpO2: 99% (27 Jan 2024 05:26) (99% - 99%)    Parameters below as of 26 Jan 2024 20:30  Patient On (Oxygen Delivery Method): room air    physical exam  constitutional NAD, AAOX3, Respiratory  lungs CTA, CVS heart RRR, GI: abdomen Soft NT, ND, BS+, skin: intact  neuro exam no focal deficit     MEDICATIONS  (STANDING):  chlorhexidine 2% Cloths 1 Application(s) Topical <User Schedule>  enoxaparin Injectable 40 milliGRAM(s) SubCutaneous every 24 hours  FLUoxetine 20 milliGRAM(s) Oral daily  folic acid 1 milliGRAM(s) Oral daily  gabapentin 400 milliGRAM(s) Oral three times a day  hydrOXYzine hydrochloride 25 milliGRAM(s) Oral at bedtime  metoprolol succinate ER 50 milliGRAM(s) Oral daily  multivitamin 1 Tablet(s) Oral daily  nicotine - 21 mG/24Hr(s) Patch 1 Patch Transdermal daily  sacubitril 24 mG/valsartan 26 mG 1 Tablet(s) Oral two times a day  sodium chloride 3%  Inhalation 4 milliLiter(s) Inhalation once  spironolactone 25 milliGRAM(s) Oral daily  thiamine 100 milliGRAM(s) Oral daily  traZODone 100 milliGRAM(s) Oral at bedtime    MEDICATIONS  (PRN):  acetaminophen     Tablet .. 650 milliGRAM(s) Oral every 6 hours PRN Moderate Pain (4 - 6)  LORazepam     Tablet 2 milliGRAM(s) Oral every 4 hours PRN CIWA>7, Agitation  sodium chloride 0.65% Nasal 1 Spray(s) Both Nostrils two times a day PRN Nasal Congestion                        14.7   6.54  )-----------( 276      ( 27 Jan 2024 07:19 )             44.4     01-27    138  |  101  |  10  ----------------------------<  85  4.4   |  25  |  0.9    Ca    9.6      27 Jan 2024 07:19  Mg     1.8     01-26    TPro  7.1  /  Alb  4.5  /  TBili  0.2  /  DBili  x   /  AST  27  /  ALT  32  /  AlkPhos  54  01-27    Procalcitonin, Serum: 0.17 ng/mL [0.02 - 0.10] (01-19-24 @ 11:44)  D-Dimer Assay, Quantitative: 214 ng/mL DDU (12-30-23 @ 06:02)    COVID-19 PCR: NotDetec (01-04-24 @ 14:56)    a/p    38 M hx of alcohol use disorder, anxiety and depression came in for alcohol withdrawal. The Pt usually drinks 4 beers a day but for the past 3 weeks he has been drinking around 2 bottles of 750 ml Vodka daily after going through a bad breakup in order to cope. Last drink day PTP. Of note patient was here 3 weeks ago with the same presentation. Denies HI/SI. Admitted to MICU on precedex drip due to concert for Dts, now downgraded to floor    # Alcohol withdrawal with DTs s/p precedex drip  Alcohol Use disorder  Suspected folate and thiamine deficiency  Hypomagnesemia - resolved Magnesium: 1.8 mg/dL (01.26.24 @ 07:25)  keep mag>2   Hypokalemia - resolved   Potassium: 4.5 mmol/L (01.26.24 @ 07:25)    - UDS 1/19 negative   - c/w CIWA monitoring and ativan prn   - addiction med eval pending and CATCH team following   - folate, thiamine and MVI supplement   - monitor electrolytes and replete as needed    # Transaminitis likely related to ETOH use , mild resolved   # Thrombocytopenia resolved   -  RUQ 12/28/23:  fatty liver    # 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 Ischemic workup  cardio notes appreciated,   cont losartan, spironolactone, metoprolol  will dw cardio if they want pt still on tele, if so transfer to tele    #Progress Note Handoff    Pending: clinical improvement   Family discussion: dw pt   Disposition: home when stable   code status: full code     Active Smoker - 1/5 PPD, c/w nicotine patch

## 2024-01-28 ENCOUNTER — TRANSCRIPTION ENCOUNTER (OUTPATIENT)
Age: 39
End: 2024-01-28

## 2024-01-28 LAB
ALBUMIN SERPL ELPH-MCNC: 4.3 G/DL — SIGNIFICANT CHANGE UP (ref 3.5–5.2)
ALP SERPL-CCNC: 55 U/L — SIGNIFICANT CHANGE UP (ref 30–115)
ALT FLD-CCNC: 31 U/L — SIGNIFICANT CHANGE UP (ref 0–41)
ANION GAP SERPL CALC-SCNC: 14 MMOL/L — SIGNIFICANT CHANGE UP (ref 7–14)
AST SERPL-CCNC: 27 U/L — SIGNIFICANT CHANGE UP (ref 0–41)
BASOPHILS # BLD AUTO: 0.07 K/UL — SIGNIFICANT CHANGE UP (ref 0–0.2)
BASOPHILS NFR BLD AUTO: 1.1 % — HIGH (ref 0–1)
BILIRUB SERPL-MCNC: <0.2 MG/DL — SIGNIFICANT CHANGE UP (ref 0.2–1.2)
BUN SERPL-MCNC: 8 MG/DL — LOW (ref 10–20)
CALCIUM SERPL-MCNC: 9.2 MG/DL — SIGNIFICANT CHANGE UP (ref 8.4–10.5)
CHLORIDE SERPL-SCNC: 99 MMOL/L — SIGNIFICANT CHANGE UP (ref 98–110)
CO2 SERPL-SCNC: 22 MMOL/L — SIGNIFICANT CHANGE UP (ref 17–32)
CREAT SERPL-MCNC: 0.6 MG/DL — LOW (ref 0.7–1.5)
EGFR: 128 ML/MIN/1.73M2 — SIGNIFICANT CHANGE UP
EOSINOPHIL # BLD AUTO: 0.09 K/UL — SIGNIFICANT CHANGE UP (ref 0–0.7)
EOSINOPHIL NFR BLD AUTO: 1.4 % — SIGNIFICANT CHANGE UP (ref 0–8)
GLUCOSE SERPL-MCNC: 136 MG/DL — HIGH (ref 70–99)
HCT VFR BLD CALC: 40.8 % — LOW (ref 42–52)
HGB BLD-MCNC: 13.4 G/DL — LOW (ref 14–18)
IMM GRANULOCYTES NFR BLD AUTO: 1.1 % — HIGH (ref 0.1–0.3)
LYMPHOCYTES # BLD AUTO: 2.12 K/UL — SIGNIFICANT CHANGE UP (ref 1.2–3.4)
LYMPHOCYTES # BLD AUTO: 32.6 % — SIGNIFICANT CHANGE UP (ref 20.5–51.1)
MAGNESIUM SERPL-MCNC: 1.9 MG/DL — SIGNIFICANT CHANGE UP (ref 1.8–2.4)
MCHC RBC-ENTMCNC: 31.5 PG — HIGH (ref 27–31)
MCHC RBC-ENTMCNC: 32.8 G/DL — SIGNIFICANT CHANGE UP (ref 32–37)
MCV RBC AUTO: 95.8 FL — HIGH (ref 80–94)
MONOCYTES # BLD AUTO: 0.97 K/UL — HIGH (ref 0.1–0.6)
MONOCYTES NFR BLD AUTO: 14.9 % — HIGH (ref 1.7–9.3)
NEUTROPHILS # BLD AUTO: 3.19 K/UL — SIGNIFICANT CHANGE UP (ref 1.4–6.5)
NEUTROPHILS NFR BLD AUTO: 48.9 % — SIGNIFICANT CHANGE UP (ref 42.2–75.2)
NRBC # BLD: 0 /100 WBCS — SIGNIFICANT CHANGE UP (ref 0–0)
PLATELET # BLD AUTO: 323 K/UL — SIGNIFICANT CHANGE UP (ref 130–400)
PMV BLD: 10.6 FL — HIGH (ref 7.4–10.4)
POTASSIUM SERPL-MCNC: 3.8 MMOL/L — SIGNIFICANT CHANGE UP (ref 3.5–5)
POTASSIUM SERPL-SCNC: 3.8 MMOL/L — SIGNIFICANT CHANGE UP (ref 3.5–5)
PROT SERPL-MCNC: 6.8 G/DL — SIGNIFICANT CHANGE UP (ref 6–8)
RBC # BLD: 4.26 M/UL — LOW (ref 4.7–6.1)
RBC # FLD: 12.6 % — SIGNIFICANT CHANGE UP (ref 11.5–14.5)
SODIUM SERPL-SCNC: 135 MMOL/L — SIGNIFICANT CHANGE UP (ref 135–146)
WBC # BLD: 6.51 K/UL — SIGNIFICANT CHANGE UP (ref 4.8–10.8)
WBC # FLD AUTO: 6.51 K/UL — SIGNIFICANT CHANGE UP (ref 4.8–10.8)

## 2024-01-28 PROCEDURE — 99232 SBSQ HOSP IP/OBS MODERATE 35: CPT

## 2024-01-28 RX ORDER — FLUOXETINE HCL 10 MG
1 CAPSULE ORAL
Qty: 30 | Refills: 0
Start: 2024-01-28 | End: 2024-02-26

## 2024-01-28 RX ORDER — GABAPENTIN 400 MG/1
1 CAPSULE ORAL
Qty: 90 | Refills: 0
Start: 2024-01-28 | End: 2024-02-26

## 2024-01-28 RX ORDER — HYDROXYZINE HCL 10 MG
1 TABLET ORAL
Qty: 90 | Refills: 0
Start: 2024-01-28 | End: 2024-02-26

## 2024-01-28 RX ORDER — METOPROLOL TARTRATE 50 MG
1 TABLET ORAL
Qty: 30 | Refills: 0
Start: 2024-01-28 | End: 2024-02-26

## 2024-01-28 RX ORDER — SACUBITRIL AND VALSARTAN 24; 26 MG/1; MG/1
1 TABLET, FILM COATED ORAL
Qty: 60 | Refills: 0
Start: 2024-01-28 | End: 2024-02-26

## 2024-01-28 RX ORDER — SPIRONOLACTONE 25 MG/1
1 TABLET, FILM COATED ORAL
Qty: 30 | Refills: 0
Start: 2024-01-28 | End: 2024-02-26

## 2024-01-28 RX ORDER — TRAMADOL HYDROCHLORIDE 50 MG/1
25 TABLET ORAL ONCE
Refills: 0 | Status: DISCONTINUED | OUTPATIENT
Start: 2024-01-28 | End: 2024-01-28

## 2024-01-28 RX ORDER — FLUOXETINE HCL 10 MG
1 CAPSULE ORAL
Refills: 0 | DISCHARGE

## 2024-01-28 RX ORDER — TRAZODONE HCL 50 MG
1 TABLET ORAL
Qty: 21 | Refills: 0
Start: 2024-01-28 | End: 2024-02-17

## 2024-01-28 RX ADMIN — Medication 50 MILLIGRAM(S): at 05:33

## 2024-01-28 RX ADMIN — SPIRONOLACTONE 25 MILLIGRAM(S): 25 TABLET, FILM COATED ORAL at 05:48

## 2024-01-28 RX ADMIN — Medication 1.5 MILLIGRAM(S): at 20:32

## 2024-01-28 RX ADMIN — GABAPENTIN 400 MILLIGRAM(S): 400 CAPSULE ORAL at 05:33

## 2024-01-28 RX ADMIN — CHLORHEXIDINE GLUCONATE 1 APPLICATION(S): 213 SOLUTION TOPICAL at 05:34

## 2024-01-28 RX ADMIN — Medication 2 MILLIGRAM(S): at 02:35

## 2024-01-28 RX ADMIN — Medication 1 PATCH: at 19:00

## 2024-01-28 RX ADMIN — Medication 1 PATCH: at 11:05

## 2024-01-28 RX ADMIN — Medication 1 MILLIGRAM(S): at 12:17

## 2024-01-28 RX ADMIN — Medication 1.5 MILLIGRAM(S): at 18:45

## 2024-01-28 RX ADMIN — SACUBITRIL AND VALSARTAN 1 TABLET(S): 24; 26 TABLET, FILM COATED ORAL at 05:32

## 2024-01-28 RX ADMIN — Medication 100 MILLIGRAM(S): at 21:11

## 2024-01-28 RX ADMIN — Medication 650 MILLIGRAM(S): at 13:36

## 2024-01-28 RX ADMIN — SACUBITRIL AND VALSARTAN 1 TABLET(S): 24; 26 TABLET, FILM COATED ORAL at 17:04

## 2024-01-28 RX ADMIN — Medication 20 MILLIGRAM(S): at 12:17

## 2024-01-28 RX ADMIN — Medication 650 MILLIGRAM(S): at 12:36

## 2024-01-28 RX ADMIN — Medication 100 MILLIGRAM(S): at 12:16

## 2024-01-28 RX ADMIN — Medication 1 PATCH: at 12:17

## 2024-01-28 RX ADMIN — Medication 1 TABLET(S): at 12:17

## 2024-01-28 RX ADMIN — GABAPENTIN 400 MILLIGRAM(S): 400 CAPSULE ORAL at 17:04

## 2024-01-28 RX ADMIN — GABAPENTIN 400 MILLIGRAM(S): 400 CAPSULE ORAL at 21:11

## 2024-01-28 RX ADMIN — Medication 1 PATCH: at 08:23

## 2024-01-28 RX ADMIN — Medication 1.5 MILLIGRAM(S): at 12:36

## 2024-01-28 RX ADMIN — Medication 2 MILLIGRAM(S): at 07:28

## 2024-01-28 RX ADMIN — Medication 25 MILLIGRAM(S): at 21:11

## 2024-01-28 NOTE — DISCHARGE NOTE PROVIDER - NSDCFUADDAPPT_GEN_ALL_CORE_FT
APPTS ARE READY TO BE MADE: [ X] YES    Best Family or Patient Contact (if needed):    Additional Information about above appointments (if needed):    1: Behuria, Supreeti  2:   3:     Other comments or requests:

## 2024-01-28 NOTE — DISCHARGE NOTE PROVIDER - HOSPITAL COURSE
38 M hx of alcohol use disorder, anxiety and depression came in for alcohol withdrawal. The Pt usually drinks 4 beers a day but for the past 3 weeks he has been drinking around 2 bottles of 750 ml Vodka daily after going through a bad breakup in order to cope. Last drink day PTP. Of note patient was here 3 weeks ago with the same presentation. Denies HI/SI. Admitted to MICU on precedex drip due to concert for Dts, now downgraded to floor    # Alcohol withdrawal with DTs s/p precedex drip  Alcohol Use disorder  Suspected folate and thiamine deficiency  Hypomagnesemia - resolved Magnesium: 1.8 mg/dL (01.26.24 @ 07:25)  keep mag>2   Hypokalemia - resolved   Potassium: 4.5 mmol/L (01.26.24 @ 07:25)    - UDS 1/19 negative   - c/w CIWA monitoring and ativan prn   - addiction med eval pending and CATCH team following   - folate, thiamine and MVI supplement   - monitor electrolytes and replete as needed    # Transaminitis likely related to ETOH use , mild resolved   # Thrombocytopenia resolved   -  RUQ 12/28/23:  fatty liver    # 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 Ischemic workup  cardio notes appreciated,   cont enteresto , spironolactone, metoprolol  will dw cardio if they want pt still on tele, if so transfer to tele     38 M hx of alcohol use disorder, anxiety and depression came in for alcohol withdrawal. The Pt usually drinks 4 beers a day but for the past 3 weeks he has been drinking around 2 bottles of 750 ml Vodka daily after going through a bad breakup in order to cope. Last drink day PTP. Of note patient was here 3 weeks ago with the same presentation. Denies HI/SI. Admitted to MICU on precedex drip due to concert for Dts, now downgraded to floor    # Alcohol withdrawal with DTs s/p precedex drip  Alcohol Use disorder  Suspected folate and thiamine deficiency  Hypomagnesemia - resolved Magnesium: 1.8 mg/dL (01.26.24 @ 07:25)  keep mag>2   Hypokalemia - resolved   Potassium: 4.5 mmol/L (01.26.24 @ 07:25)    - UDS 1/19 negative   - c/w CIWA monitoring and ativan prn   - addiction med eval pending and CATCH team following   - folate, thiamine and MVI supplement   - monitor electrolytes and replete as needed    # Transaminitis likely related to ETOH use , mild resolved   # Thrombocytopenia resolved   -  RUQ 12/28/23:  fatty liver    # Left arm possible Phlebitis >> advised warm compress, will add Keflex x 5 days     # 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 Ischemic workup  cardio notes appreciated,   cont enteresto , spironolactone, metoprolol  will dw cardio if they want pt still on tele, if so transfer to tele

## 2024-01-28 NOTE — DISCHARGE NOTE PROVIDER - CARE PROVIDER_API CALL
Behuria, [facility]  Cardiology  [address] [place] [address], [address]  [place] [address]  Phone: [phone]  Fax: [phone]  Follow Up Time: 2 weeks

## 2024-01-28 NOTE — PROGRESS NOTE ADULT - SUBJECTIVE AND OBJECTIVE BOX
pt seen and examined.     My notes supersede resident's notes in case of discrepancy       ROS: no cp, no sob, no n/v, no fever    Vital Signs Last 24 Hrs  T(C): 36.4 (28 Jan 2024 05:05), Max: 36.4 (27 Jan 2024 19:58)  T(F): 97.6 (28 Jan 2024 05:05), Max: 97.6 (27 Jan 2024 19:58)  HR: 89 (28 Jan 2024 05:05) (78 - 89)  BP: 115/77 (28 Jan 2024 05:05) (110/58 - 115/77)  BP(mean): --  RR: 18 (28 Jan 2024 05:05) (18 - 18)  SpO2: 98% (28 Jan 2024 05:05) (98% - 98%)        physical exam  constitutional NAD, AAOX3, Respiratory  lungs CTA, CVS heart RRR, GI: abdomen Soft NT, ND, BS+, skin: intact  neuro exam no focal deficit     MEDICATIONS  (STANDING):  chlorhexidine 2% Cloths 1 Application(s) Topical <User Schedule>  FLUoxetine 20 milliGRAM(s) Oral daily  folic acid 1 milliGRAM(s) Oral daily  gabapentin 400 milliGRAM(s) Oral three times a day  hydrOXYzine hydrochloride 25 milliGRAM(s) Oral at bedtime  metoprolol succinate ER 50 milliGRAM(s) Oral daily  multivitamin 1 Tablet(s) Oral daily  nicotine - 21 mG/24Hr(s) Patch 1 Patch Transdermal daily  sacubitril 24 mG/valsartan 26 mG 1 Tablet(s) Oral two times a day  sodium chloride 3%  Inhalation 4 milliLiter(s) Inhalation once  spironolactone 25 milliGRAM(s) Oral daily  thiamine 100 milliGRAM(s) Oral daily  traZODone 100 milliGRAM(s) Oral at bedtime    MEDICATIONS  (PRN):  acetaminophen     Tablet .. 650 milliGRAM(s) Oral every 6 hours PRN Moderate Pain (4 - 6)  LORazepam     Tablet 1.5 milliGRAM(s) Oral every 4 hours PRN CIWA>7, Agitation  sodium chloride 0.65% Nasal 1 Spray(s) Both Nostrils two times a day PRN Nasal Congestion                            13.4   6.51  )-----------( 323      ( 28 Jan 2024 07:27 )             40.8     01-28    135  |  99  |  8<L>  ----------------------------<  136<H>  3.8   |  22  |  0.6<L>    Ca    9.2      28 Jan 2024 07:27  Mg     1.9     01-28    TPro  6.8  /  Alb  4.3  /  TBili  <0.2  /  DBili  x   /  AST  27  /  ALT  31  /  AlkPhos  55  01-28    Procalcitonin, Serum: 0.17 ng/mL [0.02 - 0.10] (01-19-24 @ 11:44)  D-Dimer Assay, Quantitative: 214 ng/mL DDU (12-30-23 @ 06:02)    COVID-19 PCR: NotDetec (01-04-24 @ 14:56)    a/p    38 M hx of alcohol use disorder, anxiety and depression came in for alcohol withdrawal. The Pt usually drinks 4 beers a day but for the past 3 weeks he has been drinking around 2 bottles of 750 ml Vodka daily after going through a bad breakup in order to cope. Last drink day PTP. Of note patient was here 3 weeks ago with the same presentation. Denies HI/SI. Admitted to MICU on precedex drip due to concert for Dts, now downgraded to floor    # Alcohol withdrawal with DTs s/p precedex drip  cont ativan per ciwa   Alcohol Use disorder  Suspected folate and thiamine deficiency  Hypomagnesemia -Magnesium: 1.9 mg/dL (01.28.24 @ 07:27)    keep mag>2   Hypokalemia - resolved  Potassium: 3.8 mmol/L (01.28.24 @ 07:27)    - UDS 1/19 negative   - c/w CIWA monitoring and ativan prn   - addiction med eval pending and CATCH team following   - folate, thiamine and MVI supplement   - monitor electrolytes and replete as needed    # Transaminitis likely related to ETOH use , mild resolved   # Thrombocytopenia resolved   -  RUQ 12/28/23:  fatty liver    # 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 Ischemic workup  cardio notes appreciated,   cont losartan, spironolactone, metoprolol  will dw cardio if they want pt still on tele, if so transfer to tele    #Progress Note Handoff    Pending: clinical improvement   Family discussion: dw pt   Disposition: home when stable   code status: full code

## 2024-01-28 NOTE — CONSULT NOTE ADULT - SUBJECTIVE AND OBJECTIVE BOX
Addiction medicine consult placed for Cuong Morton. Per resident, no additional detox recommendations necessary at this time, patient's symptoms are currently well-controlled, though patient does require referral for services.    CATCH team social workers to be made aware, will follow patient.

## 2024-01-28 NOTE — DISCHARGE NOTE PROVIDER - NSDCFUSCHEDAPPT_GEN_ALL_CORE_FT
Matt Summers  [facility] PreAdmits  Scheduled Appointment: 02/16/2024    [facility]  INT[facility] SI [address] [place], [place] [address]   Scheduled Appointment: 02/16/2024

## 2024-01-28 NOTE — DISCHARGE NOTE PROVIDER - NSDCCAREPROVSEEN_GEN_ALL_CORE_FT
[facility] internal medicine  [facility] Cardiology   [facility] pulmonary

## 2024-01-28 NOTE — DISCHARGE NOTE PROVIDER - NSDCCPCAREPLAN_GEN_ALL_CORE_FT
PRINCIPAL DISCHARGE DIAGNOSIS  Diagnosis: Alcohol use with withdrawal  Assessment and Plan of Treatment: You came to the hospital for alcohol withdrawl. You were requiring sedation in the ICU, and once you were stable enough, you were detoxed slowly on a regular floor. You are now stable for discharge. You have a new medical doctor with an appointment scheduled for you in the medicine clinic at [address] [place] [address] on 2/16 at 8:30 am. heFollow up at the [facility].      SECONDARY DISCHARGE DIAGNOSES  Diagnosis: Heart failure  Assessment and Plan of Treatment: your echo showed reduced power of the heart muscle which  is likely secondary to chronic alcohol use which can cause something called dilated cardiomyopathy in the heart muscle   you were started on medications that will decrease the progression of the heart failure and will prevent remodelling of the heart muscle  you will need to follow with heart doctor DR Behuria

## 2024-01-28 NOTE — DISCHARGE NOTE PROVIDER - NSDCMRMEDTOKEN_GEN_ALL_CORE_FT
cloNIDine 0.1 mg oral tablet: 1 tab(s) orally once a day (at bedtime)  FLUoxetine (Eqv-Sarafem) 20 mg oral tablet: 1 tab(s) orally once a day  folic acid 1 mg oral tablet: 1 tab(s) orally once a day  gabapentin 400 mg oral capsule: 1 cap(s) orally 3 times a day  hydrOXYzine hydrochloride 50 mg oral tablet: 1 tab(s) orally every 8 hours as needed for  itching  pantoprazole 40 mg oral delayed release tablet: 1 tab(s) orally once a day (before a meal)  thiamine 100 mg oral tablet: 1 tab(s) orally once a day  traZODone 100 mg oral tablet: 1 tab(s) orally once a day (at bedtime)   FLUoxetine (Eqv-Sarafem) 20 mg oral tablet: 1 tab(s) orally once a day  folic acid 1 mg oral tablet: 1 tab(s) orally once a day  gabapentin 400 mg oral capsule: 1 cap(s) orally 3 times a day  hydrOXYzine hydrochloride 50 mg oral tablet: 1 tab(s) orally every 8 hours as needed for  itching  metoprolol succinate 50 mg oral tablet, extended release: 1 tab(s) orally once a day  Multiple Vitamins oral tablet: 1 tab(s) orally once a day  pantoprazole 40 mg oral delayed release tablet: 1 tab(s) orally once a day (before a meal)  sacubitril-valsartan 24 mg-26 mg oral tablet: 1 tab(s) orally 2 times a day  spironolactone 25 mg oral tablet: 1 tab(s) orally once a day  thiamine 100 mg oral tablet: 1 tab(s) orally once a day  traZODone 100 mg oral tablet: 1 tab(s) orally once a day (at bedtime)

## 2024-01-29 LAB
ALBUMIN SERPL ELPH-MCNC: 4.3 G/DL — SIGNIFICANT CHANGE UP (ref 3.5–5.2)
ALP SERPL-CCNC: 48 U/L — SIGNIFICANT CHANGE UP (ref 30–115)
ALT FLD-CCNC: 38 U/L — SIGNIFICANT CHANGE UP (ref 0–41)
ANION GAP SERPL CALC-SCNC: 12 MMOL/L — SIGNIFICANT CHANGE UP (ref 7–14)
AST SERPL-CCNC: 38 U/L — SIGNIFICANT CHANGE UP (ref 0–41)
BASOPHILS # BLD AUTO: 0.15 K/UL — SIGNIFICANT CHANGE UP (ref 0–0.2)
BASOPHILS NFR BLD AUTO: 1.8 % — HIGH (ref 0–1)
BILIRUB SERPL-MCNC: 0.2 MG/DL — SIGNIFICANT CHANGE UP (ref 0.2–1.2)
BUN SERPL-MCNC: 8 MG/DL — LOW (ref 10–20)
CALCIUM SERPL-MCNC: 9.3 MG/DL — SIGNIFICANT CHANGE UP (ref 8.4–10.5)
CHLORIDE SERPL-SCNC: 104 MMOL/L — SIGNIFICANT CHANGE UP (ref 98–110)
CO2 SERPL-SCNC: 24 MMOL/L — SIGNIFICANT CHANGE UP (ref 17–32)
CREAT SERPL-MCNC: 0.9 MG/DL — SIGNIFICANT CHANGE UP (ref 0.7–1.5)
EGFR: 113 ML/MIN/1.73M2 — SIGNIFICANT CHANGE UP
EOSINOPHIL # BLD AUTO: 0.12 K/UL — SIGNIFICANT CHANGE UP (ref 0–0.7)
EOSINOPHIL NFR BLD AUTO: 1.4 % — SIGNIFICANT CHANGE UP (ref 0–8)
GLUCOSE SERPL-MCNC: 93 MG/DL — SIGNIFICANT CHANGE UP (ref 70–99)
HCT VFR BLD CALC: 39.3 % — LOW (ref 42–52)
HGB BLD-MCNC: 12.9 G/DL — LOW (ref 14–18)
IMM GRANULOCYTES NFR BLD AUTO: 0.9 % — HIGH (ref 0.1–0.3)
LYMPHOCYTES # BLD AUTO: 2.35 K/UL — SIGNIFICANT CHANGE UP (ref 1.2–3.4)
LYMPHOCYTES # BLD AUTO: 27.5 % — SIGNIFICANT CHANGE UP (ref 20.5–51.1)
MAGNESIUM SERPL-MCNC: 1.8 MG/DL — SIGNIFICANT CHANGE UP (ref 1.8–2.4)
MCHC RBC-ENTMCNC: 31.5 PG — HIGH (ref 27–31)
MCHC RBC-ENTMCNC: 32.8 G/DL — SIGNIFICANT CHANGE UP (ref 32–37)
MCV RBC AUTO: 95.9 FL — HIGH (ref 80–94)
MONOCYTES # BLD AUTO: 1.16 K/UL — HIGH (ref 0.1–0.6)
MONOCYTES NFR BLD AUTO: 13.6 % — HIGH (ref 1.7–9.3)
NEUTROPHILS # BLD AUTO: 4.69 K/UL — SIGNIFICANT CHANGE UP (ref 1.4–6.5)
NEUTROPHILS NFR BLD AUTO: 54.8 % — SIGNIFICANT CHANGE UP (ref 42.2–75.2)
NRBC # BLD: 0 /100 WBCS — SIGNIFICANT CHANGE UP (ref 0–0)
PHOSPHATE SERPL-MCNC: 3.6 MG/DL — SIGNIFICANT CHANGE UP (ref 2.1–4.9)
PLATELET # BLD AUTO: 319 K/UL — SIGNIFICANT CHANGE UP (ref 130–400)
PMV BLD: 10.5 FL — HIGH (ref 7.4–10.4)
POTASSIUM SERPL-MCNC: 4.6 MMOL/L — SIGNIFICANT CHANGE UP (ref 3.5–5)
POTASSIUM SERPL-SCNC: 4.6 MMOL/L — SIGNIFICANT CHANGE UP (ref 3.5–5)
PROT SERPL-MCNC: 6.6 G/DL — SIGNIFICANT CHANGE UP (ref 6–8)
RBC # BLD: 4.1 M/UL — LOW (ref 4.7–6.1)
RBC # FLD: 12.6 % — SIGNIFICANT CHANGE UP (ref 11.5–14.5)
SODIUM SERPL-SCNC: 140 MMOL/L — SIGNIFICANT CHANGE UP (ref 135–146)
WBC # BLD: 8.55 K/UL — SIGNIFICANT CHANGE UP (ref 4.8–10.8)
WBC # FLD AUTO: 8.55 K/UL — SIGNIFICANT CHANGE UP (ref 4.8–10.8)

## 2024-01-29 PROCEDURE — 99232 SBSQ HOSP IP/OBS MODERATE 35: CPT

## 2024-01-29 RX ORDER — CALCIUM CARBONATE 500(1250)
1 TABLET ORAL ONCE
Refills: 0 | Status: COMPLETED | OUTPATIENT
Start: 2024-01-29 | End: 2024-01-29

## 2024-01-29 RX ADMIN — Medication 1 PATCH: at 19:00

## 2024-01-29 RX ADMIN — Medication 1 MILLIGRAM(S): at 21:17

## 2024-01-29 RX ADMIN — SACUBITRIL AND VALSARTAN 1 TABLET(S): 24; 26 TABLET, FILM COATED ORAL at 05:34

## 2024-01-29 RX ADMIN — Medication 1.5 MILLIGRAM(S): at 06:21

## 2024-01-29 RX ADMIN — Medication 1 PATCH: at 07:11

## 2024-01-29 RX ADMIN — SPIRONOLACTONE 25 MILLIGRAM(S): 25 TABLET, FILM COATED ORAL at 05:34

## 2024-01-29 RX ADMIN — Medication 25 MILLIGRAM(S): at 21:18

## 2024-01-29 RX ADMIN — Medication 650 MILLIGRAM(S): at 20:44

## 2024-01-29 RX ADMIN — Medication 100 MILLIGRAM(S): at 21:18

## 2024-01-29 RX ADMIN — Medication 1.5 MILLIGRAM(S): at 01:58

## 2024-01-29 RX ADMIN — Medication 650 MILLIGRAM(S): at 21:16

## 2024-01-29 RX ADMIN — Medication 1 MILLIGRAM(S): at 13:42

## 2024-01-29 RX ADMIN — Medication 100 MILLIGRAM(S): at 11:08

## 2024-01-29 RX ADMIN — Medication 650 MILLIGRAM(S): at 04:21

## 2024-01-29 RX ADMIN — GABAPENTIN 400 MILLIGRAM(S): 400 CAPSULE ORAL at 13:42

## 2024-01-29 RX ADMIN — CHLORHEXIDINE GLUCONATE 1 APPLICATION(S): 213 SOLUTION TOPICAL at 05:35

## 2024-01-29 RX ADMIN — Medication 1 TABLET(S): at 11:08

## 2024-01-29 RX ADMIN — Medication 1 TABLET(S): at 17:55

## 2024-01-29 RX ADMIN — Medication 1 MILLIGRAM(S): at 11:08

## 2024-01-29 RX ADMIN — Medication 1 PATCH: at 12:14

## 2024-01-29 RX ADMIN — Medication 50 MILLIGRAM(S): at 05:34

## 2024-01-29 RX ADMIN — Medication 1 PATCH: at 11:07

## 2024-01-29 RX ADMIN — GABAPENTIN 400 MILLIGRAM(S): 400 CAPSULE ORAL at 21:18

## 2024-01-29 RX ADMIN — GABAPENTIN 400 MILLIGRAM(S): 400 CAPSULE ORAL at 05:34

## 2024-01-29 RX ADMIN — Medication 650 MILLIGRAM(S): at 05:00

## 2024-01-29 RX ADMIN — Medication 1 MILLIGRAM(S): at 17:55

## 2024-01-29 RX ADMIN — SACUBITRIL AND VALSARTAN 1 TABLET(S): 24; 26 TABLET, FILM COATED ORAL at 17:55

## 2024-01-29 RX ADMIN — Medication 1 MILLIGRAM(S): at 11:07

## 2024-01-29 RX ADMIN — Medication 20 MILLIGRAM(S): at 11:08

## 2024-01-29 NOTE — ASU DISCHARGE PLAN (ADULT/PEDIATRIC) - POST OP PHONE #
Abdomen , soft, nontender, nondistended , no guarding or rigidity , no masses palpable , normal bowel sounds , Liver and Spleen,  no hepatosplenomegaly , liver nontender
[phone]

## 2024-01-29 NOTE — PROGRESS NOTE ADULT - ASSESSMENT
38 M hx of alcohol use disorder, anxiety and depression came in for alcohol withdrawal. The Pt usually drinks 4 beers a day but for the past 3 weeks he has been drinking around 2 bottles of 750 ml Vodka daily after going through a bad breakup in order to cope. Last drink day PTP. Of note patient was here 3 weeks ago with the same presentation. Denies HI/SI. 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 Ischemic workup  -cardio notes appreciated,   -cont Entresto spironolactone, metoprolol    #Misc   - dvt ppx ; no need patient walking around   - GI ppx ; no need  - Diet ; DASH  - Activity ; IAT      Pending: ativan taper

## 2024-01-29 NOTE — CHART NOTE - NSCHARTNOTEFT_GEN_A_CORE
To Whom May It Concern:    Mr. Cuong [birth year]  1985, was admitted on 2024 for alcohol withdrawal symptoms to the ICU for which he received treatment , now he is in the medical floor and still undergoing treatment and is being also treated for alcoholic dilated cardiomyopathy which caused some decrease in heart muscle function ( has an EF of 40-45%) , he needs to continue his detox as outpatient with addiction medicine.     The mentioned patient will be going to rehab @ [facility] in [facility].

## 2024-01-29 NOTE — PROGRESS NOTE ADULT - SUBJECTIVE AND OBJECTIVE BOX
SUBJECTIVE:  HPI:  38 M hx of alcohol use disorder, anxiety and depression came in for alcohol withdrawal. The Pt usually drinks 4 beers a day but for the past 3 weeks he has been drinking around 2 bottles of 750 ml Vodka daily after going through a bad breakup in order to cope. Last drink yesterday. Of note patient was here 3 weeks ago with the same presentation. Denies HI/SI.     ICU Vital Signs Last 24 Hrs  T(F): 98.6 (18 Jan 2024 11:58), Max: 98.6 (18 Jan 2024 11:58)  HR: 114 (18 Jan 2024 17:00) (114 - 118)  BP: 132/66 (18 Jan 2024 17:00) (132/66 - 161/118)  RR: 20 (18 Jan 2024 17:00) (20 - 20)  SpO2: 94% (18 Jan 2024 17:00) (94% - 100%)    Labs show WBC 13k, Lactate 10  Given 30mg diazepam and started on precedex ggt and 2L NS bolus.      (18 Jan 2024 17:41)      Patient is a 38y old Male who presents with a chief complaint of ETOH withdrawal (28 Jan 2024 14:50)    Currently admitted to medicine with the primary diagnosis of Alcohol use with withdrawal       Today is hospital day 11d.     PAST MEDICAL & SURGICAL HISTORY  No pertinent past medical history  anxiety  depression  polysubstance    No significant past surgical history        ALLERGIES:  Ceclor (Rash; Hives)    MEDICATIONS:  ACTIVE MEDICATIONS  acetaminophen     Tablet .. 650 milliGRAM(s) Oral every 6 hours PRN  chlorhexidine 2% Cloths 1 Application(s) Topical <User Schedule>  FLUoxetine 20 milliGRAM(s) Oral daily  folic acid 1 milliGRAM(s) Oral daily  gabapentin 400 milliGRAM(s) Oral three times a day  hydrOXYzine hydrochloride 25 milliGRAM(s) Oral at bedtime  LORazepam     Tablet 1 milliGRAM(s) Oral every 4 hours  LORazepam     Tablet   Oral   metoprolol succinate ER 50 milliGRAM(s) Oral daily  multivitamin 1 Tablet(s) Oral daily  nicotine - 21 mG/24Hr(s) Patch 1 Patch Transdermal daily  sacubitril 24 mG/valsartan 26 mG 1 Tablet(s) Oral two times a day  sodium chloride 0.65% Nasal 1 Spray(s) Both Nostrils two times a day PRN  sodium chloride 3%  Inhalation 4 milliLiter(s) Inhalation once  spironolactone 25 milliGRAM(s) Oral daily  thiamine 100 milliGRAM(s) Oral daily  traZODone 100 milliGRAM(s) Oral at bedtime      VITALS:   T(F): 99.8  HR: 87  BP: 108/61  RR: 18  SpO2: 97%    LABS:                        12.9   8.55  )-----------( 319      ( 29 Jan 2024 08:10 )             39.3     01-29    140  |  104  |  8<L>  ----------------------------<  93  4.6   |  24  |  0.9    Ca    9.3      29 Jan 2024 08:10  Phos  3.6     01-29  Mg     1.8     01-29    TPro  6.6  /  Alb  4.3  /  TBili  0.2  /  DBili  x   /  AST  38  /  ALT  38  /  AlkPhos  48  01-29      Urinalysis Basic - ( 29 Jan 2024 08:10 )    Color: x / Appearance: x / SG: x / pH: x  Gluc: 93 mg/dL / Ketone: x  / Bili: x / Urobili: x   Blood: x / Protein: x / Nitrite: x   Leuk Esterase: x / RBC: x / WBC x   Sq Epi: x / Non Sq Epi: x / Bacteria: x                    PHYSICAL EXAM:  GENERAL: NAD, lying in bed comfortably       HEART: normal rate     regular   normal s1s2     LUNGS: Unlabored respirations  CTA  ABDOMEN: Soft    nondistended   nontender     EXTREMITIES: Normal     NERVOUS SYSTEM:  A&Ox3   SKIN:  No rashes or lesions             SUBJECTIVE:  HPI:  38 M hx of alcohol use disorder, anxiety and depression came in for alcohol withdrawal. The Pt usually drinks 4 beers a day but for the past 3 weeks he has been drinking around 2 bottles of 750 ml Vodka daily after going through a bad breakup in order to cope. Last drink the day before admission,  Of note patient was here 3 weeks ago with the same presentation. Denies HI/SI.     ICU Vital Signs Last 24 Hrs  T(F): 98.6 (18 Jan 2024 11:58), Max: 98.6 (18 Jan 2024 11:58)  HR: 114 (18 Jan 2024 17:00) (114 - 118)  BP: 132/66 (18 Jan 2024 17:00) (132/66 - 161/118)  RR: 20 (18 Jan 2024 17:00) (20 - 20)  SpO2: 94% (18 Jan 2024 17:00) (94% - 100%)    Labs show WBC 13k, Lactate 10  Given 30mg diazepam and started on precedex ggt and 2L NS bolus.      (18 Jan 2024 17:41)      Patient is a 38y old Male who presents with a chief complaint of ETOH withdrawal (28 Jan 2024 14:50)    Currently admitted to medicine with the primary diagnosis of Alcohol use with withdrawal       Today is hospital day 11d.     PAST MEDICAL & SURGICAL HISTORY  No pertinent past medical history  anxiety  depression  polysubstance    No significant past surgical history        ALLERGIES:  Ceclor (Rash; Hives)    MEDICATIONS:  ACTIVE MEDICATIONS  acetaminophen     Tablet .. 650 milliGRAM(s) Oral every 6 hours PRN  chlorhexidine 2% Cloths 1 Application(s) Topical <User Schedule>  FLUoxetine 20 milliGRAM(s) Oral daily  folic acid 1 milliGRAM(s) Oral daily  gabapentin 400 milliGRAM(s) Oral three times a day  hydrOXYzine hydrochloride 25 milliGRAM(s) Oral at bedtime  LORazepam     Tablet 1 milliGRAM(s) Oral every 4 hours  LORazepam     Tablet   Oral   metoprolol succinate ER 50 milliGRAM(s) Oral daily  multivitamin 1 Tablet(s) Oral daily  nicotine - 21 mG/24Hr(s) Patch 1 Patch Transdermal daily  sacubitril 24 mG/valsartan 26 mG 1 Tablet(s) Oral two times a day  sodium chloride 0.65% Nasal 1 Spray(s) Both Nostrils two times a day PRN  sodium chloride 3%  Inhalation 4 milliLiter(s) Inhalation once  spironolactone 25 milliGRAM(s) Oral daily  thiamine 100 milliGRAM(s) Oral daily  traZODone 100 milliGRAM(s) Oral at bedtime      VITALS:   T(F): 99.8  HR: 87  BP: 108/61  RR: 18  SpO2: 97%    LABS:                        12.9   8.55  )-----------( 319      ( 29 Jan 2024 08:10 )             39.3     01-29    140  |  104  |  8<L>  ----------------------------<  93  4.6   |  24  |  0.9    Ca    9.3      29 Jan 2024 08:10  Phos  3.6     01-29  Mg     1.8     01-29    TPro  6.6  /  Alb  4.3  /  TBili  0.2  /  DBili  x   /  AST  38  /  ALT  38  /  AlkPhos  48  01-29      Urinalysis Basic - ( 29 Jan 2024 08:10 )    Color: x / Appearance: x / SG: x / pH: x  Gluc: 93 mg/dL / Ketone: x  / Bili: x / Urobili: x   Blood: x / Protein: x / Nitrite: x   Leuk Esterase: x / RBC: x / WBC x   Sq Epi: x / Non Sq Epi: x / Bacteria: x                    PHYSICAL EXAM:  GENERAL: NAD, lying in bed comfortably       HEART: normal rate     regular   normal s1s2     LUNGS: Unlabored respirations  CTA  ABDOMEN: Soft    nondistended   nontender     EXTREMITIES: Normal     NERVOUS SYSTEM:  A&Ox3   SKIN:  No rashes or lesions

## 2024-01-30 LAB
ALBUMIN SERPL ELPH-MCNC: 4.7 G/DL — SIGNIFICANT CHANGE UP (ref 3.5–5.2)
ALP SERPL-CCNC: 56 U/L — SIGNIFICANT CHANGE UP (ref 30–115)
ALT FLD-CCNC: 42 U/L — HIGH (ref 0–41)
ANION GAP SERPL CALC-SCNC: 16 MMOL/L — HIGH (ref 7–14)
AST SERPL-CCNC: 40 U/L — SIGNIFICANT CHANGE UP (ref 0–41)
BASOPHILS # BLD AUTO: 0.17 K/UL — SIGNIFICANT CHANGE UP (ref 0–0.2)
BASOPHILS NFR BLD AUTO: 1.5 % — HIGH (ref 0–1)
BILIRUB SERPL-MCNC: 0.3 MG/DL — SIGNIFICANT CHANGE UP (ref 0.2–1.2)
BUN SERPL-MCNC: 10 MG/DL — SIGNIFICANT CHANGE UP (ref 10–20)
CALCIUM SERPL-MCNC: 9.9 MG/DL — SIGNIFICANT CHANGE UP (ref 8.4–10.5)
CHLORIDE SERPL-SCNC: 99 MMOL/L — SIGNIFICANT CHANGE UP (ref 98–110)
CO2 SERPL-SCNC: 24 MMOL/L — SIGNIFICANT CHANGE UP (ref 17–32)
CREAT SERPL-MCNC: 0.9 MG/DL — SIGNIFICANT CHANGE UP (ref 0.7–1.5)
EGFR: 113 ML/MIN/1.73M2 — SIGNIFICANT CHANGE UP
EOSINOPHIL # BLD AUTO: 0.18 K/UL — SIGNIFICANT CHANGE UP (ref 0–0.7)
EOSINOPHIL NFR BLD AUTO: 1.6 % — SIGNIFICANT CHANGE UP (ref 0–8)
GLUCOSE SERPL-MCNC: 84 MG/DL — SIGNIFICANT CHANGE UP (ref 70–99)
HCT VFR BLD CALC: 42.4 % — SIGNIFICANT CHANGE UP (ref 42–52)
HGB BLD-MCNC: 13.7 G/DL — LOW (ref 14–18)
IMM GRANULOCYTES NFR BLD AUTO: 0.9 % — HIGH (ref 0.1–0.3)
LYMPHOCYTES # BLD AUTO: 27.5 % — SIGNIFICANT CHANGE UP (ref 20.5–51.1)
LYMPHOCYTES # BLD AUTO: 3.09 K/UL — SIGNIFICANT CHANGE UP (ref 1.2–3.4)
MAGNESIUM SERPL-MCNC: 1.7 MG/DL — LOW (ref 1.8–2.4)
MCHC RBC-ENTMCNC: 30.9 PG — SIGNIFICANT CHANGE UP (ref 27–31)
MCHC RBC-ENTMCNC: 32.3 G/DL — SIGNIFICANT CHANGE UP (ref 32–37)
MCV RBC AUTO: 95.7 FL — HIGH (ref 80–94)
MONOCYTES # BLD AUTO: 1.12 K/UL — HIGH (ref 0.1–0.6)
MONOCYTES NFR BLD AUTO: 10 % — HIGH (ref 1.7–9.3)
NEUTROPHILS # BLD AUTO: 6.57 K/UL — HIGH (ref 1.4–6.5)
NEUTROPHILS NFR BLD AUTO: 58.5 % — SIGNIFICANT CHANGE UP (ref 42.2–75.2)
NRBC # BLD: 0 /100 WBCS — SIGNIFICANT CHANGE UP (ref 0–0)
PHOSPHATE SERPL-MCNC: 4.9 MG/DL — SIGNIFICANT CHANGE UP (ref 2.1–4.9)
PLATELET # BLD AUTO: 392 K/UL — SIGNIFICANT CHANGE UP (ref 130–400)
PMV BLD: 10.4 FL — SIGNIFICANT CHANGE UP (ref 7.4–10.4)
POTASSIUM SERPL-MCNC: 4.5 MMOL/L — SIGNIFICANT CHANGE UP (ref 3.5–5)
POTASSIUM SERPL-SCNC: 4.5 MMOL/L — SIGNIFICANT CHANGE UP (ref 3.5–5)
PROT SERPL-MCNC: 7.4 G/DL — SIGNIFICANT CHANGE UP (ref 6–8)
RBC # BLD: 4.43 M/UL — LOW (ref 4.7–6.1)
RBC # FLD: 12.7 % — SIGNIFICANT CHANGE UP (ref 11.5–14.5)
SODIUM SERPL-SCNC: 139 MMOL/L — SIGNIFICANT CHANGE UP (ref 135–146)
WBC # BLD: 11.23 K/UL — HIGH (ref 4.8–10.8)
WBC # FLD AUTO: 11.23 K/UL — HIGH (ref 4.8–10.8)

## 2024-01-30 PROCEDURE — 99232 SBSQ HOSP IP/OBS MODERATE 35: CPT

## 2024-01-30 RX ORDER — MAGNESIUM SULFATE 500 MG/ML
2 VIAL (ML) INJECTION ONCE
Refills: 0 | Status: COMPLETED | OUTPATIENT
Start: 2024-01-30 | End: 2024-01-30

## 2024-01-30 RX ADMIN — Medication 1 MILLIGRAM(S): at 05:36

## 2024-01-30 RX ADMIN — Medication 1 PATCH: at 12:04

## 2024-01-30 RX ADMIN — Medication 1 MILLIGRAM(S): at 01:14

## 2024-01-30 RX ADMIN — Medication 650 MILLIGRAM(S): at 13:51

## 2024-01-30 RX ADMIN — Medication 650 MILLIGRAM(S): at 14:30

## 2024-01-30 RX ADMIN — GABAPENTIN 400 MILLIGRAM(S): 400 CAPSULE ORAL at 13:21

## 2024-01-30 RX ADMIN — Medication 1 PATCH: at 19:02

## 2024-01-30 RX ADMIN — Medication 100 MILLIGRAM(S): at 21:19

## 2024-01-30 RX ADMIN — GABAPENTIN 400 MILLIGRAM(S): 400 CAPSULE ORAL at 05:36

## 2024-01-30 RX ADMIN — Medication 25 MILLIGRAM(S): at 21:19

## 2024-01-30 RX ADMIN — SACUBITRIL AND VALSARTAN 1 TABLET(S): 24; 26 TABLET, FILM COATED ORAL at 17:05

## 2024-01-30 RX ADMIN — Medication 100 MILLIGRAM(S): at 12:04

## 2024-01-30 RX ADMIN — Medication 650 MILLIGRAM(S): at 08:30

## 2024-01-30 RX ADMIN — GABAPENTIN 400 MILLIGRAM(S): 400 CAPSULE ORAL at 21:19

## 2024-01-30 RX ADMIN — Medication 0.5 MILLIGRAM(S): at 17:50

## 2024-01-30 RX ADMIN — Medication 1 PATCH: at 07:29

## 2024-01-30 RX ADMIN — Medication 1 TABLET(S): at 12:04

## 2024-01-30 RX ADMIN — Medication 1 MILLIGRAM(S): at 09:34

## 2024-01-30 RX ADMIN — Medication 0.5 MILLIGRAM(S): at 21:19

## 2024-01-30 RX ADMIN — Medication 20 MILLIGRAM(S): at 12:03

## 2024-01-30 RX ADMIN — Medication 25 GRAM(S): at 12:02

## 2024-01-30 RX ADMIN — Medication 0.5 MILLIGRAM(S): at 13:45

## 2024-01-30 RX ADMIN — CHLORHEXIDINE GLUCONATE 1 APPLICATION(S): 213 SOLUTION TOPICAL at 05:38

## 2024-01-30 RX ADMIN — Medication 1 MILLIGRAM(S): at 12:04

## 2024-01-30 RX ADMIN — Medication 50 MILLIGRAM(S): at 05:36

## 2024-01-30 RX ADMIN — SPIRONOLACTONE 25 MILLIGRAM(S): 25 TABLET, FILM COATED ORAL at 05:37

## 2024-01-30 RX ADMIN — SACUBITRIL AND VALSARTAN 1 TABLET(S): 24; 26 TABLET, FILM COATED ORAL at 05:37

## 2024-01-30 RX ADMIN — Medication 650 MILLIGRAM(S): at 07:43

## 2024-01-30 NOTE — PROGRESS NOTE ADULT - SUBJECTIVE AND OBJECTIVE BOX
SUBJECTIVE:  HPI:  38 M hx of alcohol use disorder, anxiety and depression came in for alcohol withdrawal. The Pt usually drinks 4 beers a day but for the past 3 weeks he has been drinking around 2 bottles of 750 ml Vodka daily after going through a bad breakup in order to cope. Last drink yesterday. Of note patient was here 3 weeks ago with the same presentation. Denies HI/SI.     ICU Vital Signs Last 24 Hrs  T(F): 98.6 (18 Jan 2024 11:58), Max: 98.6 (18 Jan 2024 11:58)  HR: 114 (18 Jan 2024 17:00) (114 - 118)  BP: 132/66 (18 Jan 2024 17:00) (132/66 - 161/118)  RR: 20 (18 Jan 2024 17:00) (20 - 20)  SpO2: 94% (18 Jan 2024 17:00) (94% - 100%)    Labs show WBC 13k, Lactate 10  Given 30mg diazepam and started on precedex ggt and 2L NS bolus.      (18 Jan 2024 17:41)      Patient is a 38y old Male who presents with a chief complaint of alcohol withdrawal (29 Jan 2024 13:11)    Currently admitted to medicine with the primary diagnosis of Alcohol use with withdrawal       Today is hospital day 12d.     PAST MEDICAL & SURGICAL HISTORY  No pertinent past medical history  anxiety  depression  polysubstance    No significant past surgical history        ALLERGIES:  Ceclor (Rash; Hives)    MEDICATIONS:  ACTIVE MEDICATIONS  acetaminophen     Tablet .. 650 milliGRAM(s) Oral every 6 hours PRN  chlorhexidine 2% Cloths 1 Application(s) Topical <User Schedule>  FLUoxetine 20 milliGRAM(s) Oral daily  folic acid 1 milliGRAM(s) Oral daily  gabapentin 400 milliGRAM(s) Oral three times a day  hydrOXYzine hydrochloride 25 milliGRAM(s) Oral at bedtime  LORazepam     Tablet   Oral   LORazepam     Tablet 1 milliGRAM(s) Oral every 4 hours  LORazepam     Tablet 0.5 milliGRAM(s) Oral every 4 hours  metoprolol succinate ER 50 milliGRAM(s) Oral daily  multivitamin 1 Tablet(s) Oral daily  nicotine - 21 mG/24Hr(s) Patch 1 Patch Transdermal daily  sacubitril 24 mG/valsartan 26 mG 1 Tablet(s) Oral two times a day  sodium chloride 0.65% Nasal 1 Spray(s) Both Nostrils two times a day PRN  sodium chloride 3%  Inhalation 4 milliLiter(s) Inhalation once  spironolactone 25 milliGRAM(s) Oral daily  thiamine 100 milliGRAM(s) Oral daily  traZODone 100 milliGRAM(s) Oral at bedtime      VITALS:   T(F): 97.1  HR: 91  BP: 94/56  RR: 18  SpO2: 95%    LABS:                        12.9   8.55  )-----------( 319      ( 29 Jan 2024 08:10 )             39.3     01-29    140  |  104  |  8<L>  ----------------------------<  93  4.6   |  24  |  0.9    Ca    9.3      29 Jan 2024 08:10  Phos  3.6     01-29  Mg     1.8     01-29    TPro  6.6  /  Alb  4.3  /  TBili  0.2  /  DBili  x   /  AST  38  /  ALT  38  /  AlkPhos  48  01-29      Urinalysis Basic - ( 29 Jan 2024 08:10 )    Color: x / Appearance: x / SG: x / pH: x  Gluc: 93 mg/dL / Ketone: x  / Bili: x / Urobili: x   Blood: x / Protein: x / Nitrite: x   Leuk Esterase: x / RBC: x / WBC x   Sq Epi: x / Non Sq Epi: x / Bacteria: x                    PHYSICAL EXAM:  GEN: No acute distress  LUNGS: Clear to auscultation bilaterally   HEART: S1/S2 present.    ABD: Soft, non-tender, non-distended.   EXT: No pedal edema  NEURO: AAOX3

## 2024-01-31 ENCOUNTER — TRANSCRIPTION ENCOUNTER (OUTPATIENT)
Age: 39
End: 2024-01-31

## 2024-01-31 VITALS
DIASTOLIC BLOOD PRESSURE: 63 MMHG | SYSTOLIC BLOOD PRESSURE: 111 MMHG | HEART RATE: 78 BPM | RESPIRATION RATE: 19 BRPM | TEMPERATURE: 97 F

## 2024-01-31 LAB
MAGNESIUM SERPL-MCNC: 1.9 MG/DL — SIGNIFICANT CHANGE UP (ref 1.8–2.4)
PHOSPHATE SERPL-MCNC: 3.4 MG/DL — SIGNIFICANT CHANGE UP (ref 2.1–4.9)

## 2024-01-31 PROCEDURE — 99232 SBSQ HOSP IP/OBS MODERATE 35: CPT

## 2024-01-31 RX ADMIN — Medication 100 MILLIGRAM(S): at 11:36

## 2024-01-31 RX ADMIN — Medication 1 PATCH: at 07:59

## 2024-01-31 RX ADMIN — Medication 1 PATCH: at 11:36

## 2024-01-31 RX ADMIN — Medication 0.5 MILLIGRAM(S): at 05:06

## 2024-01-31 RX ADMIN — GABAPENTIN 400 MILLIGRAM(S): 400 CAPSULE ORAL at 05:06

## 2024-01-31 RX ADMIN — Medication 650 MILLIGRAM(S): at 10:30

## 2024-01-31 RX ADMIN — Medication 0.25 MILLIGRAM(S): at 13:57

## 2024-01-31 RX ADMIN — Medication 1 MILLIGRAM(S): at 11:36

## 2024-01-31 RX ADMIN — Medication 20 MILLIGRAM(S): at 11:36

## 2024-01-31 RX ADMIN — GABAPENTIN 400 MILLIGRAM(S): 400 CAPSULE ORAL at 13:07

## 2024-01-31 RX ADMIN — SACUBITRIL AND VALSARTAN 1 TABLET(S): 24; 26 TABLET, FILM COATED ORAL at 05:06

## 2024-01-31 RX ADMIN — Medication 50 MILLIGRAM(S): at 05:07

## 2024-01-31 RX ADMIN — SPIRONOLACTONE 25 MILLIGRAM(S): 25 TABLET, FILM COATED ORAL at 05:06

## 2024-01-31 RX ADMIN — Medication 650 MILLIGRAM(S): at 09:46

## 2024-01-31 RX ADMIN — Medication 0.5 MILLIGRAM(S): at 01:10

## 2024-01-31 RX ADMIN — Medication 1 TABLET(S): at 11:36

## 2024-01-31 RX ADMIN — Medication 0.5 MILLIGRAM(S): at 09:46

## 2024-01-31 RX ADMIN — CHLORHEXIDINE GLUCONATE 1 APPLICATION(S): 213 SOLUTION TOPICAL at 05:07

## 2024-01-31 NOTE — PROGRESS NOTE ADULT - SUBJECTIVE AND OBJECTIVE BOX
SUBJECTIVE:  HPI:  38 M hx of alcohol use disorder, anxiety and depression came in for alcohol withdrawal. The Pt usually drinks 4 beers a day but for the past 3 weeks he has been drinking around 2 bottles of 750 ml Vodka daily after going through a bad breakup in order to cope. Last drink yesterday. Of note patient was here 3 weeks ago with the same presentation. Denies HI/SI.     ICU Vital Signs Last 24 Hrs  T(F): 98.6 (18 Jan 2024 11:58), Max: 98.6 (18 Jan 2024 11:58)  HR: 114 (18 Jan 2024 17:00) (114 - 118)  BP: 132/66 (18 Jan 2024 17:00) (132/66 - 161/118)  RR: 20 (18 Jan 2024 17:00) (20 - 20)  SpO2: 94% (18 Jan 2024 17:00) (94% - 100%)    Labs show WBC 13k, Lactate 10  Given 30mg diazepam and started on precedex ggt and 2L NS bolus.      (18 Jan 2024 17:41)      Patient is a 38y old Male who presents with a chief complaint of ETOH withdrawal (30 Jan 2024 08:03)    Currently admitted to medicine with the primary diagnosis of Alcohol use with withdrawal       Today is hospital day 13d.     PAST MEDICAL & SURGICAL HISTORY  No pertinent past medical history  anxiety  depression  polysubstance    No significant past surgical history        ALLERGIES:  Ceclor (Rash; Hives)    MEDICATIONS:  ACTIVE MEDICATIONS  acetaminophen     Tablet .. 650 milliGRAM(s) Oral every 6 hours PRN  chlorhexidine 2% Cloths 1 Application(s) Topical <User Schedule>  FLUoxetine 20 milliGRAM(s) Oral daily  folic acid 1 milliGRAM(s) Oral daily  gabapentin 400 milliGRAM(s) Oral three times a day  hydrOXYzine hydrochloride 25 milliGRAM(s) Oral at bedtime  LORazepam     Tablet 0.5 milliGRAM(s) Oral every 4 hours  LORazepam     Tablet 0.25 milliGRAM(s) Oral every 4 hours  LORazepam     Tablet   Oral   metoprolol succinate ER 50 milliGRAM(s) Oral daily  multivitamin 1 Tablet(s) Oral daily  nicotine - 21 mG/24Hr(s) Patch 1 Patch Transdermal daily  sacubitril 24 mG/valsartan 26 mG 1 Tablet(s) Oral two times a day  sodium chloride 0.65% Nasal 1 Spray(s) Both Nostrils two times a day PRN  sodium chloride 3%  Inhalation 4 milliLiter(s) Inhalation once  spironolactone 25 milliGRAM(s) Oral daily  thiamine 100 milliGRAM(s) Oral daily  traZODone 100 milliGRAM(s) Oral at bedtime      VITALS:   T(F): 98.1  HR: 82  BP: 104/51  RR: 16  SpO2: 98%    LABS:                        13.7   11.23 )-----------( 392      ( 30 Jan 2024 07:31 )             42.4     01-30    139  |  99  |  10  ----------------------------<  84  4.5   |  24  |  0.9    Ca    9.9      30 Jan 2024 07:31  Phos  4.9     01-30  Mg     1.7     01-30    TPro  7.4  /  Alb  4.7  /  TBili  0.3  /  DBili  x   /  AST  40  /  ALT  42<H>  /  AlkPhos  56  01-30      Urinalysis Basic - ( 30 Jan 2024 07:31 )    Color: x / Appearance: x / SG: x / pH: x  Gluc: 84 mg/dL / Ketone: x  / Bili: x / Urobili: x   Blood: x / Protein: x / Nitrite: x   Leuk Esterase: x / RBC: x / WBC x   Sq Epi: x / Non Sq Epi: x / Bacteria: x                    PHYSICAL EXAM:  GEN: No acute distress  LUNGS: Clear to auscultation bilaterally   HEART: S1/S2 present.    ABD: Soft, non-tender, non-distended.   EXT: No pedal edema  NEURO: AAOX3

## 2024-01-31 NOTE — PROGRESS NOTE ADULT - ASSESSMENT
38 M hx of alcohol use disorder, anxiety and depression came in for alcohol withdrawal. The Pt usually drinks 4 beers a day but for the past 3 weeks he has been drinking around 2 bottles of 750 ml Vodka daily after going through a bad breakup in order to cope. Last drink day PTP. Of note patient was here 3 weeks ago with the same presentation. Denies HI/SI. 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 Ischemic workup  -cardio notes appreciated,   -cont Entresto spironolactone, metoprolol    #Misc   - dvt ppx ; no need patient walking around   - GI ppx ; no need  - Diet ; DASH  - Activity ; IAT      Pending: dc today  Plan d/w patient at bedside.

## 2024-01-31 NOTE — PROGRESS NOTE ADULT - SUBJECTIVE AND OBJECTIVE BOX
JUNE SAINI  38y  Male      Patient is a 38y old  Male who presents with a chief complaint of ETOH withdrawal      INTERVAL HPI/OVERNIGHT EVENTS:      ******************************* REVIEW OF SYSTEMS:**********************************************    All other review of systems negative    *********************** VITALS ******************************************    T(F): 97.2 (01-31-24 @ 13:24)  HR: 78 (01-31-24 @ 13:24) (78 - 85)  BP: 111/63 (01-31-24 @ 13:24) (86/51 - 111/63)  RR: 19 (01-31-24 @ 13:24) (16 - 19)  SpO2: 98% (01-31-24 @ 07:18) (96% - 98%)            ******************************** PHYSICAL EXAM:**************************************************  GENERAL: NAD    PSYCH: no agitation, baseline mentation  HEENT:     NERVOUS SYSTEM:  Alert & Oriented X3,   PULMONARY: HIRO, CTA    CARDIOVASCULAR: S1S2 RRR    GI: Soft, NT, ND; BS present.    EXTREMITIES:  2+ Peripheral Pulses, No clubbing, cyanosis, or edema    LYMPH: No lymphadenopathy noted    SKIN: No rashes or lesions      **************************** LABS *******************************************************                          13.7   11.23 )-----------( 392      ( 30 Jan 2024 07:31 )             42.4     01-30    139  |  99  |  10  ----------------------------<  84  4.5   |  24  |  0.9    Ca    9.9      30 Jan 2024 07:31  Phos  3.4     01-31  Mg     1.9     01-31    TPro  7.4  /  Alb  4.7  /  TBili  0.3  /  DBili  x   /  AST  40  /  ALT  42<H>  /  AlkPhos  56  01-30      Urinalysis Basic - ( 30 Jan 2024 07:31 )    Color: x / Appearance: x / SG: x / pH: x  Gluc: 84 mg/dL / Ketone: x  / Bili: x / Urobili: x   Blood: x / Protein: x / Nitrite: x   Leuk Esterase: x / RBC: x / WBC x   Sq Epi: x / Non Sq Epi: x / Bacteria: x        Lactate Trend        CAPILLARY BLOOD GLUCOSE              **************************Active Medications *******************************************  Ceclor (Rash; Hives)      acetaminophen     Tablet .. 650 milliGRAM(s) Oral every 6 hours PRN  chlorhexidine 2% Cloths 1 Application(s) Topical <User Schedule>  FLUoxetine 20 milliGRAM(s) Oral daily  folic acid 1 milliGRAM(s) Oral daily  gabapentin 400 milliGRAM(s) Oral three times a day  hydrOXYzine hydrochloride 25 milliGRAM(s) Oral at bedtime  LORazepam     Tablet 0.25 milliGRAM(s) Oral every 4 hours  LORazepam     Tablet   Oral   metoprolol succinate ER 50 milliGRAM(s) Oral daily  multivitamin 1 Tablet(s) Oral daily  nicotine - 21 mG/24Hr(s) Patch 1 Patch Transdermal daily  sacubitril 24 mG/valsartan 26 mG 1 Tablet(s) Oral two times a day  sodium chloride 0.65% Nasal 1 Spray(s) Both Nostrils two times a day PRN  sodium chloride 3%  Inhalation 4 milliLiter(s) Inhalation once  spironolactone 25 milliGRAM(s) Oral daily  thiamine 100 milliGRAM(s) Oral daily  traZODone 100 milliGRAM(s) Oral at bedtime      ***************************************************  RADIOLOGY & ADDITIONAL TESTS:    Imaging Personally Reviewed:  [ ] YES  [ ] NO    HEALTH ISSUES - PROBLEM Dx:

## 2024-01-31 NOTE — DISCHARGE NOTE NURSING/CASE MANAGEMENT/SOCIAL WORK - PATIENT PORTAL LINK FT
You can access the FollowMyHealth Patient Portal offered by [facility] by registering at the following website: [email or web address]. By joining [facility]’s FollowMyHealth portal, you will also be able to view your health information using other applications (apps) compatible with our system.

## 2024-01-31 NOTE — PROGRESS NOTE ADULT - ASSESSMENT
38 M hx of alcohol use disorder, anxiety and depression came in for alcohol withdrawal. The Pt usually drinks 4 beers a day but for the past 3 weeks he has been drinking around 2 bottles of 750 ml Vodka daily after going through a bad breakup in order to cope. Last drink day PTP. Of note patient was here 3 weeks ago with the same presentation. Denies HI/SI. 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 Ischemic workup  -cardio notes appreciated,   -cont Entresto spironolactone, metoprolol    #Misc   - dvt ppx ; no need patient walking around   - GI ppx ; no need  - Diet ; DASH  - Activity ; IAT      Pending: dc today

## 2024-01-31 NOTE — PROGRESS NOTE ADULT - REASON FOR ADMISSION
ETOH withdrawal
alcohol withdrawal
ETOH withdrawal

## 2024-01-31 NOTE — PROGRESS NOTE ADULT - PROVIDER SPECIALTY LIST ADULT
Critical Care
Internal Medicine
Internal Medicine
Pulmonology
CCU
Critical Care
Hospitalist
Hospitalist
Pulmonology
CCU
CCU
Critical Care
Critical Care
Hospitalist
Internal Medicine
CCU
Internal Medicine

## 2024-02-01 ENCOUNTER — NON-APPOINTMENT (OUTPATIENT)
Age: 39
End: 2024-02-01

## 2024-02-12 NOTE — CHART NOTE - NSCHARTNOTEFT_GEN_A_CORE
[facility] MRN [id number] sent to cardio Chat 2/1- AC / Update inquired 2/6 - JL/ As per Bola, TASKED TO DR BEHURIA'S M.A TO [facility] F/U 2/8- AC / Appointment made - [facility]Y / Pamela Jimenez     Specialty: cardiology  Date: February 22, 2024  Time: 8:15 AM  Location: [address] [place] [address]

## 2024-02-13 DIAGNOSIS — E87.6 HYPOKALEMIA: ICD-10-CM

## 2024-02-13 DIAGNOSIS — F10.131 ALCOHOL ABUSE WITH WITHDRAWAL DELIRIUM: ICD-10-CM

## 2024-02-13 DIAGNOSIS — F32.A DEPRESSION, UNSPECIFIED: ICD-10-CM

## 2024-02-13 DIAGNOSIS — I80.8 PHLEBITIS AND THROMBOPHLEBITIS OF OTHER SITES: ICD-10-CM

## 2024-02-13 DIAGNOSIS — D69.6 THROMBOCYTOPENIA, UNSPECIFIED: ICD-10-CM

## 2024-02-13 DIAGNOSIS — E83.42 HYPOMAGNESEMIA: ICD-10-CM

## 2024-02-13 DIAGNOSIS — E87.29 OTHER ACIDOSIS: ICD-10-CM

## 2024-02-13 DIAGNOSIS — K76.0 FATTY (CHANGE OF) LIVER, NOT ELSEWHERE CLASSIFIED: ICD-10-CM

## 2024-02-13 DIAGNOSIS — I42.6 ALCOHOLIC CARDIOMYOPATHY: ICD-10-CM

## 2024-02-13 DIAGNOSIS — Z88.1 ALLERGY STATUS TO OTHER ANTIBIOTIC AGENTS STATUS: ICD-10-CM

## 2024-02-13 DIAGNOSIS — F41.9 ANXIETY DISORDER, UNSPECIFIED: ICD-10-CM

## 2024-02-13 DIAGNOSIS — F10.231 ALCOHOL DEPENDENCE WITH WITHDRAWAL DELIRIUM: ICD-10-CM

## 2024-02-16 ENCOUNTER — APPOINTMENT (OUTPATIENT)
Dept: INTERNAL MEDICINE | Facility: CLINIC | Age: 39
End: 2024-02-16

## 2024-02-22 ENCOUNTER — APPOINTMENT (OUTPATIENT)
Dept: CARDIOLOGY | Facility: CLINIC | Age: 39
End: 2024-02-22
Payer: COMMERCIAL

## 2024-02-22 VITALS
OXYGEN SATURATION: 97 % | SYSTOLIC BLOOD PRESSURE: 99 MMHG | WEIGHT: 156 LBS | DIASTOLIC BLOOD PRESSURE: 60 MMHG | BODY MASS INDEX: 25.07 KG/M2 | HEIGHT: 66 IN | HEART RATE: 91 BPM

## 2024-02-22 DIAGNOSIS — F10.11 ALCOHOL ABUSE, IN REMISSION: ICD-10-CM

## 2024-02-22 DIAGNOSIS — F17.290 NICOTINE DEPENDENCE, OTHER TOBACCO PRODUCT, UNCOMPLICATED: ICD-10-CM

## 2024-02-22 DIAGNOSIS — Z78.9 OTHER SPECIFIED HEALTH STATUS: ICD-10-CM

## 2024-02-22 LAB
ANION GAP SERPL CALC-SCNC: 13 MMOL/L
BUN SERPL-MCNC: 14 MG/DL
CALCIUM SERPL-MCNC: 9 MG/DL
CHLORIDE SERPL-SCNC: 99 MMOL/L
CO2 SERPL-SCNC: 25 MMOL/L
CREAT SERPL-MCNC: 1 MG/DL
EGFR: 99 ML/MIN/1.73M2
GLUCOSE SERPL-MCNC: 88 MG/DL
POTASSIUM SERPL-SCNC: 4.2 MMOL/L
SODIUM SERPL-SCNC: 137 MMOL/L

## 2024-02-22 PROCEDURE — 93000 ELECTROCARDIOGRAM COMPLETE: CPT

## 2024-02-22 PROCEDURE — 99215 OFFICE O/P EST HI 40 MIN: CPT

## 2024-02-22 PROCEDURE — 99204 OFFICE O/P NEW MOD 45 MIN: CPT | Mod: 25

## 2024-02-22 RX ORDER — MELOXICAM 15 MG/1
15 TABLET ORAL
Qty: 30 | Refills: 1 | Status: DISCONTINUED | COMMUNITY
Start: 2023-07-07 | End: 2024-02-22

## 2024-02-22 RX ORDER — TRAZODONE HYDROCHLORIDE 150 MG/1
150 TABLET ORAL
Refills: 0 | Status: ACTIVE | COMMUNITY

## 2024-02-22 RX ORDER — BUPROPION HYDROCHLORIDE 150 MG/1
150 TABLET, EXTENDED RELEASE ORAL DAILY
Refills: 0 | Status: ACTIVE | COMMUNITY

## 2024-02-22 RX ORDER — DEXTROAMPHETAMINE SACCHARATE, AMPHETAMINE ASPARTATE, DEXTROAMPHETAMINE SULFATE, AND AMPHETAMINE SULFATE 5; 5; 5; 5 MG/1; MG/1; MG/1; MG/1
20 TABLET ORAL DAILY
Refills: 0 | Status: ACTIVE | COMMUNITY

## 2024-02-22 RX ORDER — SPIRONOLACTONE 25 MG/1
25 TABLET ORAL
Qty: 45 | Refills: 2 | Status: ACTIVE | COMMUNITY
Start: 1900-01-01 | End: 1900-01-01

## 2024-02-22 RX ORDER — FLUOXETINE HYDROCHLORIDE 20 MG/1
20 TABLET ORAL DAILY
Refills: 0 | Status: ACTIVE | COMMUNITY

## 2024-02-22 RX ORDER — GABAPENTIN 400 MG/1
400 CAPSULE ORAL 3 TIMES DAILY
Refills: 0 | Status: ACTIVE | COMMUNITY

## 2024-02-22 NOTE — ASSESSMENT
[FreeTextEntry1] : 38 year old man with Cardiomyopathy who presents to [facility] care.   1. Cardiomyopathy: most likely from alcohol. Now he has completed his rehab program and is sober. Congratulated him for the same. He is euvolemic on exam. He is having severe itchiness from the medications but does not know which one.  - Repeat echocardiogram - Check blood work today and if creatinine is normal, do CCTA to rule out obstructive CAD as a cause of his cardiomyopathy; will probably need to stop entresto and spironolactone prior to CCTA and double the metorpolol for heart rate control - We will do a trial and error test to see which medication is causing the itchiness  2. Bicuspid aortic valve: Will repeat echo. No stenosis on previous echo. His aortic root is also dilated but he is having a CCTA. Will need to screen family members.   The prevention of heart disease was discussed in detail with the patient, including adhering to a heart healthy, plant based, or Mediterranean diet, and the importance of 30 minutes of moderate intensity activity for 30 minutes, 5 times a week. All the patient's questions were answered.  RTC in 4 weeks.

## 2024-02-22 NOTE — CARDIOLOGY SUMMARY
[de-identified] : 2/22/24: normal sinus rhythm [de-identified] : 1/19/24:  1. Normal left ventricular internal cavity size. Mildly decreased global left ventricular systolic function. Left ventricular ejection fraction, by visual estimation, is 40 to 45%. Mild concentric left ventricular hypertrophy. Spectral Doppler shows pseudonormal pattern of left ventricular myocardial filling (Grade II diastolic dysfunction).  2. Normal right ventricular size and function.  3. Prolapse of the anterior mitral valve leaflet.  4. Aortic valve is bicuspid. Mild aortic regurgitation.  5. Mild pulmonic valve regurgitation.  6. Dilated aortic root with linear diameter 3.8 cm (indexed to BSA 2.2 cm/m2). Normal ascending aorta.

## 2024-02-22 NOTE — HISTORY OF PRESENT ILLNESS
[FreeTextEntry1] : JUNE SAINI is a 38 year old man with Cardiomyopathy who presents to [facility].   He presented to the hospital in January 2024 with alcohol intoxication and was admitted for alcohol withdrawal. He had an echocardiogram which showed an LVEF of 40-45%. He was not in heart failure. He was discharged to alcohol rehab and is here now to follow up.   The patient denies chest pain, shortness of breath, palpitations and syncope. No leg swelling, orthopnea and PND.  For his cardiomyopathy, he is on metoprolol succinate, spironolactone and entresto. He is getting severe itchiness when he takes the medications but does not know which medication is causing this.

## 2024-02-29 RX ORDER — SACUBITRIL AND VALSARTAN 24; 26 MG/1; MG/1
24-26 TABLET, FILM COATED ORAL TWICE DAILY
Refills: 0 | Status: DISCONTINUED | COMMUNITY
End: 2024-02-29

## 2024-03-13 ENCOUNTER — APPOINTMENT (OUTPATIENT)
Dept: CARDIOLOGY | Facility: CLINIC | Age: 39
End: 2024-03-13
Payer: COMMERCIAL

## 2024-03-13 PROCEDURE — 93306 TTE W/DOPPLER COMPLETE: CPT

## 2024-03-26 DIAGNOSIS — Z01.818 ENCOUNTER FOR OTHER PREPROCEDURAL EXAMINATION: ICD-10-CM

## 2024-03-29 ENCOUNTER — OUTPATIENT (OUTPATIENT)
Dept: OUTPATIENT SERVICES | Facility: HOSPITAL | Age: 39
LOS: 1 days | End: 2024-03-29
Payer: COMMERCIAL

## 2024-03-29 ENCOUNTER — RESULT REVIEW (OUTPATIENT)
Age: 39
End: 2024-03-29

## 2024-03-29 DIAGNOSIS — Z00.8 ENCOUNTER FOR OTHER GENERAL EXAMINATION: ICD-10-CM

## 2024-03-29 DIAGNOSIS — I42.6 ALCOHOLIC CARDIOMYOPATHY: ICD-10-CM

## 2024-03-29 PROCEDURE — 75574 CT ANGIO HRT W/3D IMAGE: CPT

## 2024-03-29 PROCEDURE — 75574 CT ANGIO HRT W/3D IMAGE: CPT | Mod: 26

## 2024-03-30 DIAGNOSIS — I42.6 ALCOHOLIC CARDIOMYOPATHY: ICD-10-CM

## 2024-04-04 ENCOUNTER — APPOINTMENT (OUTPATIENT)
Dept: CARDIOLOGY | Facility: CLINIC | Age: 39
End: 2024-04-04
Payer: COMMERCIAL

## 2024-04-04 VITALS
WEIGHT: 161 LBS | BODY MASS INDEX: 25.88 KG/M2 | DIASTOLIC BLOOD PRESSURE: 79 MMHG | HEIGHT: 66 IN | SYSTOLIC BLOOD PRESSURE: 122 MMHG | HEART RATE: 96 BPM

## 2024-04-04 DIAGNOSIS — I42.6 ALCOHOLIC CARDIOMYOPATHY: ICD-10-CM

## 2024-04-04 DIAGNOSIS — Z87.74 PERSONAL HISTORY OF (CORRECTED) CONGENITAL MALFORMATIONS OF HEART AND CIRCULATORY SYSTEM: ICD-10-CM

## 2024-04-04 PROCEDURE — G2211 COMPLEX E/M VISIT ADD ON: CPT

## 2024-04-04 PROCEDURE — 99213 OFFICE O/P EST LOW 20 MIN: CPT

## 2024-04-04 RX ORDER — METOPROLOL SUCCINATE 25 MG/1
25 TABLET, EXTENDED RELEASE ORAL DAILY
Qty: 90 | Refills: 3 | Status: ACTIVE | COMMUNITY
Start: 1900-01-01 | End: 1900-01-01

## 2024-04-04 RX ORDER — METOPROLOL TARTRATE 50 MG/1
50 TABLET, FILM COATED ORAL
Qty: 2 | Refills: 0 | Status: DISCONTINUED | COMMUNITY
Start: 2024-03-26 | End: 2024-04-04

## 2024-04-04 NOTE — HISTORY OF PRESENT ILLNESS
[FreeTextEntry1] : JUNE SAINI is a 39 year old man with history of Cardiomyopathy presents for follow up today.   He presented to the hospital in January 2024 with alcohol intoxication and was admitted for alcohol withdrawal. He had an echocardiogram which showed an LVEF of 40-45%. He was not in heart failure. He was discharged to alcohol rehab and is here now to follow up.   The patient denies chest pain, shortness of breath, palpitations and syncope. No leg swelling, orthopnea and PND.  For his history of cardiomyopathy, he is on metoprolol and spironolactone and tolerating these well. He had itchiness with Entresto.

## 2024-04-04 NOTE — CARDIOLOGY SUMMARY
[de-identified] : 2/22/24: normal sinus rhythm [de-identified] : 3/13/24: 1. Left ventricular cavity is normal in size. Left ventricular systolic function is normal with an ejection fraction of 64 % by Myers's method of disks. 2. Normal right ventricular cavity size and normal systolic function. 3. Mild aortic regurgitation. 4. No prior echocardiogram is available for comparison.  1/19/24:  1. Normal left ventricular internal cavity size. Mildly decreased global left ventricular systolic function. Left ventricular ejection fraction, by visual estimation, is 40 to 45%. Mild concentric left ventricular hypertrophy. Spectral Doppler shows pseudonormal pattern of left ventricular myocardial filling (Grade II diastolic dysfunction).  2. Normal right ventricular size and function.  3. Prolapse of the anterior mitral valve leaflet.  4. Aortic valve is bicuspid. Mild aortic regurgitation.  5. Mild pulmonic valve regurgitation.  6. Dilated aortic root with linear diameter 3.8 cm (indexed to BSA 2.2 cm/m2). Normal ascending aorta. [de-identified] : CCTA: 3/29/24: -Patent coronary arteries, without significant plaque or stenosis. -The total Agatston coronary artery calcium score equals 0. -CAD-RADS 0.

## 2024-04-04 NOTE — ASSESSMENT
[FreeTextEntry1] : 39 year old man with history of Cardiomyopathy presents for follow up today.   1. History of Cardiomyopathy: most likely from alcohol abuse. The patient is now sober and EF has normalized. His CCTA showed CADRADS 0. He can continue metoprolol succinate and spironolactone for now. He is tolerating them well.   The prevention of heart disease was discussed in detail with the patient, including adhering to a heart healthy, plant based, or Mediterranean diet, and the importance of 30 minutes of moderate intensity activity for 30 minutes, 5 times a week. All the patient's questions were answered.  RTC in 6 months.

## 2024-04-15 NOTE — BH CONSULTATION LIAISON PROGRESS NOTE - CURRENT MEDICATION
home
MEDICATIONS  (STANDING):  chlorhexidine 4% Liquid 1 Application(s) Topical <User Schedule>  FLUoxetine 80 milliGRAM(s) Oral daily  folic acid 1 milliGRAM(s) Oral daily  gabapentin 600 milliGRAM(s) Oral at bedtime  LORazepam   Injectable 1 milliGRAM(s) IV Push once  LORazepam   Injectable 1 milliGRAM(s) IV Push every 4 hours  magnesium oxide 400 milliGRAM(s) Oral three times a day with meals  nicotine - 21 mG/24Hr(s) Patch 1 patch Transdermal daily  pantoprazole    Tablet 40 milliGRAM(s) Oral before breakfast  thiamine 100 milliGRAM(s) Oral daily    MEDICATIONS  (PRN):  LORazepam   Injectable 1 milliGRAM(s) IV Push every 1 hour PRN CIWA-Ar score 8 or greater  traZODone 50 milliGRAM(s) Oral at bedtime PRN SLEEP/ INSOMNIA

## 2024-05-16 ENCOUNTER — INPATIENT (INPATIENT)
Facility: HOSPITAL | Age: 39
LOS: 1 days | Discharge: HOME | DRG: 884 | End: 2024-05-18
Attending: HOSPITALIST | Admitting: INTERNAL MEDICINE
Payer: COMMERCIAL

## 2024-05-16 VITALS
DIASTOLIC BLOOD PRESSURE: 71 MMHG | OXYGEN SATURATION: 98 % | HEART RATE: 106 BPM | SYSTOLIC BLOOD PRESSURE: 149 MMHG | RESPIRATION RATE: 20 BRPM | TEMPERATURE: 98 F

## 2024-05-16 DIAGNOSIS — R45.1 RESTLESSNESS AND AGITATION: ICD-10-CM

## 2024-05-16 LAB
ANION GAP SERPL CALC-SCNC: 28 MMOL/L — HIGH (ref 7–14)
APAP SERPL-MCNC: 7 UG/ML — LOW (ref 10–30)
APPEARANCE UR: CLEAR — SIGNIFICANT CHANGE UP
B-OH-BUTYR SERPL-SCNC: 0.3 MMOL/L — SIGNIFICANT CHANGE UP
BASE EXCESS BLDV CALC-SCNC: 2.8 MMOL/L — SIGNIFICANT CHANGE UP (ref -2–3)
BASOPHILS # BLD AUTO: 0.09 K/UL — SIGNIFICANT CHANGE UP (ref 0–0.2)
BASOPHILS NFR BLD AUTO: 0.7 % — SIGNIFICANT CHANGE UP (ref 0–1)
BILIRUB UR-MCNC: NEGATIVE — SIGNIFICANT CHANGE UP
BUN SERPL-MCNC: 18 MG/DL — SIGNIFICANT CHANGE UP (ref 10–20)
CALCIUM SERPL-MCNC: 9.9 MG/DL — SIGNIFICANT CHANGE UP (ref 8.4–10.4)
CHLORIDE SERPL-SCNC: 87 MMOL/L — LOW (ref 98–110)
CK SERPL-CCNC: 1530 U/L — HIGH (ref 0–225)
CO2 SERPL-SCNC: 14 MMOL/L — LOW (ref 17–32)
COLOR SPEC: YELLOW — SIGNIFICANT CHANGE UP
CREAT SERPL-MCNC: 1.3 MG/DL — SIGNIFICANT CHANGE UP (ref 0.7–1.5)
DIFF PNL FLD: ABNORMAL
EGFR: 72 ML/MIN/1.73M2 — SIGNIFICANT CHANGE UP
EOSINOPHIL # BLD AUTO: 0.01 K/UL — SIGNIFICANT CHANGE UP (ref 0–0.7)
EOSINOPHIL NFR BLD AUTO: 0.1 % — SIGNIFICANT CHANGE UP (ref 0–8)
ETHANOL SERPL-MCNC: <10 MG/DL — SIGNIFICANT CHANGE UP
FLUAV AG NPH QL: SIGNIFICANT CHANGE UP
FLUBV AG NPH QL: SIGNIFICANT CHANGE UP
GAS PNL BLDV: SIGNIFICANT CHANGE UP
GLUCOSE SERPL-MCNC: 195 MG/DL — HIGH (ref 70–99)
GLUCOSE UR QL: NEGATIVE MG/DL — SIGNIFICANT CHANGE UP
HCO3 BLDV-SCNC: 27 MMOL/L — SIGNIFICANT CHANGE UP (ref 22–29)
HCT VFR BLD CALC: 45.1 % — SIGNIFICANT CHANGE UP (ref 42–52)
HGB BLD-MCNC: 14.8 G/DL — SIGNIFICANT CHANGE UP (ref 14–18)
IMM GRANULOCYTES NFR BLD AUTO: 0.7 % — HIGH (ref 0.1–0.3)
KETONES UR-MCNC: ABNORMAL MG/DL
LACTATE SERPL-SCNC: 14.5 MMOL/L — CRITICAL HIGH (ref 0.7–2)
LEUKOCYTE ESTERASE UR-ACNC: NEGATIVE — SIGNIFICANT CHANGE UP
LYMPHOCYTES # BLD AUTO: 17.3 % — LOW (ref 20.5–51.1)
LYMPHOCYTES # BLD AUTO: 2.39 K/UL — SIGNIFICANT CHANGE UP (ref 1.2–3.4)
MCHC RBC-ENTMCNC: 28.2 PG — SIGNIFICANT CHANGE UP (ref 27–31)
MCHC RBC-ENTMCNC: 32.8 G/DL — SIGNIFICANT CHANGE UP (ref 32–37)
MCV RBC AUTO: 86.1 FL — SIGNIFICANT CHANGE UP (ref 80–94)
MONOCYTES # BLD AUTO: 0.73 K/UL — HIGH (ref 0.1–0.6)
MONOCYTES NFR BLD AUTO: 5.3 % — SIGNIFICANT CHANGE UP (ref 1.7–9.3)
NEUTROPHILS # BLD AUTO: 10.52 K/UL — HIGH (ref 1.4–6.5)
NEUTROPHILS NFR BLD AUTO: 75.9 % — HIGH (ref 42.2–75.2)
NITRITE UR-MCNC: NEGATIVE — SIGNIFICANT CHANGE UP
NRBC # BLD: 0 /100 WBCS — SIGNIFICANT CHANGE UP (ref 0–0)
OSMOLALITY SERPL: 279 MOS/KG — SIGNIFICANT CHANGE UP (ref 275–300)
PCO2 BLDV: 40 MMHG — LOW (ref 42–55)
PH BLDV: 7.44 — HIGH (ref 7.32–7.43)
PH UR: 6.5 — SIGNIFICANT CHANGE UP (ref 5–8)
PLATELET # BLD AUTO: 235 K/UL — SIGNIFICANT CHANGE UP (ref 130–400)
PMV BLD: 11 FL — HIGH (ref 7.4–10.4)
PO2 BLDV: 44 MMHG — SIGNIFICANT CHANGE UP (ref 25–45)
POTASSIUM SERPL-MCNC: 3.9 MMOL/L — SIGNIFICANT CHANGE UP (ref 3.5–5)
POTASSIUM SERPL-SCNC: 3.9 MMOL/L — SIGNIFICANT CHANGE UP (ref 3.5–5)
PROT UR-MCNC: 30 MG/DL
RBC # BLD: 5.24 M/UL — SIGNIFICANT CHANGE UP (ref 4.7–6.1)
RBC # FLD: 12.2 % — SIGNIFICANT CHANGE UP (ref 11.5–14.5)
RSV RNA NPH QL NAA+NON-PROBE: SIGNIFICANT CHANGE UP
SALICYLATES SERPL-MCNC: <0.3 MG/DL — LOW (ref 4–30)
SAO2 % BLDV: 75.9 % — SIGNIFICANT CHANGE UP (ref 67–88)
SARS-COV-2 RNA SPEC QL NAA+PROBE: SIGNIFICANT CHANGE UP
SODIUM SERPL-SCNC: 129 MMOL/L — LOW (ref 135–146)
SP GR SPEC: 1.01 — SIGNIFICANT CHANGE UP (ref 1–1.03)
UROBILINOGEN FLD QL: 0.2 MG/DL — SIGNIFICANT CHANGE UP (ref 0.2–1)
WBC # BLD: 13.83 K/UL — HIGH (ref 4.8–10.8)
WBC # FLD AUTO: 13.83 K/UL — HIGH (ref 4.8–10.8)

## 2024-05-16 PROCEDURE — 83735 ASSAY OF MAGNESIUM: CPT

## 2024-05-16 PROCEDURE — 80053 COMPREHEN METABOLIC PANEL: CPT

## 2024-05-16 PROCEDURE — 85610 PROTHROMBIN TIME: CPT

## 2024-05-16 PROCEDURE — 93005 ELECTROCARDIOGRAM TRACING: CPT

## 2024-05-16 PROCEDURE — 80354 DRUG SCREENING FENTANYL: CPT

## 2024-05-16 PROCEDURE — 80324 DRUG SCREEN AMPHETAMINES 1/2: CPT

## 2024-05-16 PROCEDURE — 80307 DRUG TEST PRSMV CHEM ANLYZR: CPT

## 2024-05-16 PROCEDURE — 85025 COMPLETE CBC W/AUTO DIFF WBC: CPT

## 2024-05-16 PROCEDURE — 93010 ELECTROCARDIOGRAM REPORT: CPT

## 2024-05-16 PROCEDURE — 84443 ASSAY THYROID STIM HORMONE: CPT

## 2024-05-16 PROCEDURE — 95819 EEG AWAKE AND ASLEEP: CPT

## 2024-05-16 PROCEDURE — 83605 ASSAY OF LACTIC ACID: CPT

## 2024-05-16 PROCEDURE — 80326 AMPHETAMINES 5 OR MORE: CPT

## 2024-05-16 PROCEDURE — 36415 COLL VENOUS BLD VENIPUNCTURE: CPT

## 2024-05-16 PROCEDURE — 90715 TDAP VACCINE 7 YRS/> IM: CPT

## 2024-05-16 PROCEDURE — 71045 X-RAY EXAM CHEST 1 VIEW: CPT | Mod: 26

## 2024-05-16 PROCEDURE — 80349 CANNABINOIDS NATURAL: CPT

## 2024-05-16 PROCEDURE — 99285 EMERGENCY DEPT VISIT HI MDM: CPT

## 2024-05-16 PROCEDURE — 80048 BASIC METABOLIC PNL TOTAL CA: CPT

## 2024-05-16 PROCEDURE — G0378: CPT

## 2024-05-16 PROCEDURE — 82550 ASSAY OF CK (CPK): CPT

## 2024-05-16 PROCEDURE — 80346 BENZODIAZEPINES1-12: CPT

## 2024-05-16 PROCEDURE — 70450 CT HEAD/BRAIN W/O DYE: CPT | Mod: 26,MC

## 2024-05-16 RX ORDER — THIAMINE MONONITRATE (VIT B1) 100 MG
500 TABLET ORAL ONCE
Refills: 0 | Status: COMPLETED | OUTPATIENT
Start: 2024-05-16 | End: 2024-05-16

## 2024-05-16 RX ORDER — MIDAZOLAM HYDROCHLORIDE 1 MG/ML
2 INJECTION, SOLUTION INTRAMUSCULAR; INTRAVENOUS ONCE
Refills: 0 | Status: DISCONTINUED | OUTPATIENT
Start: 2024-05-16 | End: 2024-05-16

## 2024-05-16 RX ORDER — FOLIC ACID 0.8 MG
1 TABLET ORAL ONCE
Refills: 0 | Status: COMPLETED | OUTPATIENT
Start: 2024-05-16 | End: 2024-05-16

## 2024-05-16 RX ORDER — DIPHENHYDRAMINE HCL 50 MG
50 CAPSULE ORAL ONCE
Refills: 0 | Status: COMPLETED | OUTPATIENT
Start: 2024-05-16 | End: 2024-05-16

## 2024-05-16 RX ORDER — SODIUM CHLORIDE 9 MG/ML
2000 INJECTION, SOLUTION INTRAVENOUS ONCE
Refills: 0 | Status: COMPLETED | OUTPATIENT
Start: 2024-05-16 | End: 2024-05-16

## 2024-05-16 RX ORDER — TETANUS TOXOID, REDUCED DIPHTHERIA TOXOID AND ACELLULAR PERTUSSIS VACCINE, ADSORBED 5; 2.5; 8; 8; 2.5 [IU]/.5ML; [IU]/.5ML; UG/.5ML; UG/.5ML; UG/.5ML
0.5 SUSPENSION INTRAMUSCULAR ONCE
Refills: 0 | Status: COMPLETED | OUTPATIENT
Start: 2024-05-16 | End: 2024-05-16

## 2024-05-16 RX ADMIN — MIDAZOLAM HYDROCHLORIDE 2 MILLIGRAM(S): 1 INJECTION, SOLUTION INTRAMUSCULAR; INTRAVENOUS at 19:25

## 2024-05-16 RX ADMIN — Medication 1 MILLIGRAM(S): at 20:02

## 2024-05-16 RX ADMIN — SODIUM CHLORIDE 2000 MILLILITER(S): 9 INJECTION, SOLUTION INTRAVENOUS at 20:01

## 2024-05-16 RX ADMIN — MIDAZOLAM HYDROCHLORIDE 2 MILLIGRAM(S): 1 INJECTION, SOLUTION INTRAMUSCULAR; INTRAVENOUS at 19:43

## 2024-05-16 RX ADMIN — Medication 25 MILLIGRAM(S): at 20:28

## 2024-05-16 RX ADMIN — Medication 105 MILLIGRAM(S): at 20:29

## 2024-05-16 RX ADMIN — Medication 50 MILLIGRAM(S): at 19:43

## 2024-05-16 NOTE — CONSULT NOTE ADULT - ATTENDING COMMENTS
Patient seen and examined and agree with above except as noted.  Patients history, notes, labs, imaging, vitals and meds reviewed personally.  Patient with likely seizure possibly provoked from drugs vs etoh use    Plan as above

## 2024-05-16 NOTE — ED PROVIDER NOTE - CARE PLAN
1 Principal Discharge DX:	Agitation  Secondary Diagnosis:	Polysubstance use disorder  Secondary Diagnosis:	Ingestion of substance  Secondary Diagnosis:	Seizure

## 2024-05-16 NOTE — ED PROVIDER NOTE - OBJECTIVE STATEMENT
39-year-old male with past medical history of alcohol abuse, hypertension, anxiety, depression who presents for altered mental status.  Per EMS, patient reportedly took 60 mg of Adderall this morning, consumed mushrooms, smoked marijuana.  Patient has been agitated, endorsing visual hallucinations.  Possible seizure today per girlfriend, had urinary incontinence.

## 2024-05-16 NOTE — ED ADULT TRIAGE NOTE - NS ED NURSE AMBULANCES
[facility] Methotrexate Counseling:  Patient counseled regarding adverse effects of methotrexate including but not limited to nausea, vomiting, abnormalities in liver function tests. Patients may develop mouth sores, rash, diarrhea, and abnormalities in blood counts. The patient understands that monitoring is required including LFT's and blood counts.  There is a rare possibility of scarring of the liver and lung problems that can occur when taking methotrexate. Persistent nausea, loss of appetite, pale stools, dark urine, cough, and shortness of breath should be reported immediately. Patient advised to discontinue methotrexate treatment at least three months before attempting to become pregnant.  I discussed the need for folate supplements while taking methotrexate.  These supplements can decrease side effects during methotrexate treatment. The patient verbalized understanding of the proper use and possible adverse effects of methotrexate.  All of the patient's questions and concerns were addressed.

## 2024-05-16 NOTE — CONSULT NOTE ADULT - SUBJECTIVE AND OBJECTIVE BOX
NEUROLOGY CONSULT    HPI: Patient is a 38 y/o M with PMH of depression who presents are having a seizure like activity. Patient states that he was at his friends house today when he took mushrooms and weed. He soon became agitated and paranoid, next thing he knows is that he was in the ambulance and was told by the EMS that he has had a seizure. Patient does not remember falling but acknowledged the bruises on his forearm and knee. He denies any confusion after regaining consciousness, denies any tongue bite or episode of incontinence. Patient states that he works as a  and has been going through lot of stress in life. He recently broke up with his girlfriend and has been consuming weed and mushroom all most everyday however, today was more than what he normally takes. Patient use to abuse alcohol in the past but recently quit in february after being admitted for withdrawal. Patient denies any recent infection, change in medication, numbness, weakness, double or blurry vision, bowel or bladder difficulty. He mentions that his appetite is good, sleeps 4-5 hrs per night, has been feeling 'down the weather' recently but denies any SI. He is currently employed as a , lives alone and denies any difficulty with his activities of daily living. Neurology is consulted for seizures in the setting of drug abuse.      MEDICATIONS  Home Medications:    MEDICATIONS  (STANDING):  folic acid 1 milliGRAM(s) Oral Once    MEDICATIONS  (PRN):      FAMILY HISTORY:    SOCIAL HISTORY: negative for tobacco, alcohol, or ilicit drug use.    Allergies    Ceclor (Rash; Hives)    Intolerances        GEN: NAD, pleasant, cooperative    NEURO:   MENTAL STATUS: AAOx3  LANG/SPEECH: Fluent, intact naming, repetition & comprehension  CRANIAL NERVES:  II: Pupils equal and reactive, no RAPD, normal visual field and fundus  III, IV, VI: EOM intact, no gaze preference or deviation  V: normal  VII: no facial asymmetry  VIII: normal hearing to speech  MOTOR: 5/5 in both upper and lower extremities  REFLEXES: 2/4 throughout, bilateral flexor plantars, 2 beats of clonus rt LE   SENSORY: Normal to touch, temperature & pin prick in all extremiteis  COORD: Normal finger to nose and heel to shin, no tremor, no dysmetria  Gait: deferred    Laceration over left elbow and bilateral knees     LABS:                        14.8   13.83 )-----------( 235      ( 16 May 2024 19:19 )             45.1     05-16    129<L>  |  87<L>  |  18  ----------------------------<  195<H>  3.9   |  14<L>  |  1.3    Ca    9.9      16 May 2024 19:19      Hemoglobin A1C:   Vitamin B12     CAPILLARY BLOOD GLUCOSE          Urinalysis Basic - ( 16 May 2024 19:19 )    Color: x / Appearance: x / SG: x / pH: x  Gluc: 195 mg/dL / Ketone: x  / Bili: x / Urobili: x   Blood: x / Protein: x / Nitrite: x   Leuk Esterase: x / RBC: x / WBC x   Sq Epi: x / Non Sq Epi: x / Bacteria: x            Microbiology:      RADIOLOGY, EKG AND ADDITIONAL TESTS: Reviewed.

## 2024-05-16 NOTE — ED PROVIDER NOTE - CLINICAL SUMMARY MEDICAL DECISION MAKING FREE TEXT BOX
39-year-old male history of EtOH use disorder, high blood pressure, anxiety, depression presents to the ED with altered mental status secondary to recreational ingestions.  Patient states he took 60 mg of Adderall, smoked marijuana, and then "ate 1 bar" of mushrooms.  History initially limited secondary to patient's intoxication.  Per EMS the girlfriend called EMS because they were worried about how he is acting and thinks he might of had a seizure.  Girlfriend not available for further history at this time.  I spoke with patient's mother who was not aware patient was in the [phone] (neil).     On exam, patient was tachy, appears to be hallucinating, normal pupils, moving all extremities symmetrically.  Not diaphoretic, abdomen soft nontender, heart tacky no murmurs rubs or gallops, lungs clear to auscultation bilaterally.  No rash.  Head normocephalic and atraumatic, no tongue biting.  He appears to have urinated on himself.  He has abrasions to the bilateral elbows and bilateral knees, full range of motion, nontender joints.    Patient was unable to be verbally de-escalated.  For patient and staff safety he required IM treatment of his agitation. His labs notable for mild leukocytosis likely stress demargination, he has an anion gap likely from his elevated lactate which most likely resulted from a seizure.  He does not appear to be in alcohol withdrawal, so his seizure was unlikely related to that. Pending CT head, chest x-ray. Given fluids, thiamine, folic acid.

## 2024-05-16 NOTE — ED PROVIDER NOTE - PHYSICAL EXAMINATION
VITAL SIGNS: I have reviewed nursing notes and confirm.  CONSTITUTIONAL: well-appearing, agitated.   SKIN: Warm dry, normal skin turgor, old bruise present on left anterior thigh, superficial abrasions present on left elbow, right side of chest, bilateral anterior knees.   HEAD: NCAT  EYES: EOMI, PERRLA, no scleral icterus, normal conjunctiva  ENT: Dry mucous membranes, OR clear. Normal pharynx with no erythema.  NECK: Supple; non tender. Full ROM. No cervical LAD  CARD: tachycardic, RR, no murmurs, rubs or gallops  RESP: clear to ausculation b/l.  No rales, rhonchi, or wheezing.  ABD: soft, + BS, non-tender, non-distended, no rebound or guarding. No CVA tenderness  EXT: Full ROM, no bony tenderness, no pedal edema, no calf tenderness  NEURO: normal motor. normal sensory. CN II-XII intact. Cerebellar testing normal. Normal gait.  PSYCH: Agitated, restless, redirectable briefly. Endorses visual hallucinations. No SI/HI. AxOx2

## 2024-05-16 NOTE — CONSULT NOTE ADULT - ASSESSMENT
Patient is a 38 y/o M with PMH of depression who presents are having a seizure like activity. Patient states that he was at his friends house today when he took mushrooms and weed. He soon became agitated and paranoid, next thing he knows is that he was in the ambulance and was told by the EMS that he has had a seizure. Neurology is consulted for seizures in the setting of drug abuse. Neuroimaging is negative for any acute intracranial pathology.  Patient is a 38 y/o M with PMH of depression who presents are having a seizure like activity. Patient states that he was at his friends house today when he took mushrooms and weed. He soon became agitated and paranoid, next thing he knows is that he was in the ambulance and was told by the EMS that he has had a seizure. Neurology is consulted for seizures in the setting of drug abuse. Neuroimaging is negative for any acute intracranial pathology. Patient likely had a seizure in the setting of drug abuse but getting an EEG to establish a baseline is warranted.    Recs:   - rEEG   - Utox and routine labs including TSH, Mg and Phos, lactate.  - Seizure & Fall precautions  - Ativan 2mg IVP for seizures > 2mins  - Management per primary team.     Please call us if any questions or neurological changes.

## 2024-05-17 LAB
ALBUMIN SERPL ELPH-MCNC: 4 G/DL — SIGNIFICANT CHANGE UP (ref 3.5–5.2)
ALP SERPL-CCNC: 47 U/L — SIGNIFICANT CHANGE UP (ref 30–115)
ALT FLD-CCNC: 19 U/L — SIGNIFICANT CHANGE UP (ref 0–41)
AMPHET UR-MCNC: POSITIVE
ANION GAP SERPL CALC-SCNC: 13 MMOL/L — SIGNIFICANT CHANGE UP (ref 7–14)
ANION GAP SERPL CALC-SCNC: 13 MMOL/L — SIGNIFICANT CHANGE UP (ref 7–14)
AST SERPL-CCNC: 45 U/L — HIGH (ref 0–41)
BACTERIA # UR AUTO: NEGATIVE /HPF — SIGNIFICANT CHANGE UP
BARBITURATES UR SCN-MCNC: NEGATIVE — SIGNIFICANT CHANGE UP
BASOPHILS # BLD AUTO: 0.05 K/UL — SIGNIFICANT CHANGE UP (ref 0–0.2)
BASOPHILS NFR BLD AUTO: 0.5 % — SIGNIFICANT CHANGE UP (ref 0–1)
BENZODIAZ UR-MCNC: POSITIVE
BILIRUB SERPL-MCNC: 0.5 MG/DL — SIGNIFICANT CHANGE UP (ref 0.2–1.2)
BUN SERPL-MCNC: 10 MG/DL — SIGNIFICANT CHANGE UP (ref 10–20)
BUN SERPL-MCNC: 8 MG/DL — LOW (ref 10–20)
CALCIUM SERPL-MCNC: 8.3 MG/DL — LOW (ref 8.4–10.5)
CALCIUM SERPL-MCNC: 8.9 MG/DL — SIGNIFICANT CHANGE UP (ref 8.4–10.5)
CAST: 1 /LPF — SIGNIFICANT CHANGE UP (ref 0–4)
CHLORIDE SERPL-SCNC: 100 MMOL/L — SIGNIFICANT CHANGE UP (ref 98–110)
CHLORIDE SERPL-SCNC: 103 MMOL/L — SIGNIFICANT CHANGE UP (ref 98–110)
CK SERPL-CCNC: 2861 U/L — HIGH (ref 0–225)
CO2 SERPL-SCNC: 23 MMOL/L — SIGNIFICANT CHANGE UP (ref 17–32)
CO2 SERPL-SCNC: 24 MMOL/L — SIGNIFICANT CHANGE UP (ref 17–32)
COCAINE METAB.OTHER UR-MCNC: NEGATIVE — SIGNIFICANT CHANGE UP
CREAT SERPL-MCNC: 0.8 MG/DL — SIGNIFICANT CHANGE UP (ref 0.7–1.5)
CREAT SERPL-MCNC: 0.8 MG/DL — SIGNIFICANT CHANGE UP (ref 0.7–1.5)
DRUG SCREEN 1, URINE RESULT: SIGNIFICANT CHANGE UP
EGFR: 116 ML/MIN/1.73M2 — SIGNIFICANT CHANGE UP
EGFR: 116 ML/MIN/1.73M2 — SIGNIFICANT CHANGE UP
EOSINOPHIL # BLD AUTO: 0.03 K/UL — SIGNIFICANT CHANGE UP (ref 0–0.7)
EOSINOPHIL NFR BLD AUTO: 0.3 % — SIGNIFICANT CHANGE UP (ref 0–8)
FENTANYL UR QL: NEGATIVE — SIGNIFICANT CHANGE UP
GLUCOSE SERPL-MCNC: 177 MG/DL — HIGH (ref 70–99)
GLUCOSE SERPL-MCNC: 85 MG/DL — SIGNIFICANT CHANGE UP (ref 70–99)
HCT VFR BLD CALC: 40.5 % — LOW (ref 42–52)
HGB BLD-MCNC: 13.6 G/DL — LOW (ref 14–18)
IMM GRANULOCYTES NFR BLD AUTO: 0.4 % — HIGH (ref 0.1–0.3)
LACTATE SERPL-SCNC: 0.6 MMOL/L — LOW (ref 0.7–2)
LACTATE SERPL-SCNC: 4.9 MMOL/L — CRITICAL HIGH (ref 0.7–2)
LYMPHOCYTES # BLD AUTO: 1.04 K/UL — LOW (ref 1.2–3.4)
LYMPHOCYTES # BLD AUTO: 10.3 % — LOW (ref 20.5–51.1)
MAGNESIUM SERPL-MCNC: 1.7 MG/DL — LOW (ref 1.8–2.4)
MCHC RBC-ENTMCNC: 28.3 PG — SIGNIFICANT CHANGE UP (ref 27–31)
MCHC RBC-ENTMCNC: 33.6 G/DL — SIGNIFICANT CHANGE UP (ref 32–37)
MCV RBC AUTO: 84.2 FL — SIGNIFICANT CHANGE UP (ref 80–94)
METHADONE UR-MCNC: NEGATIVE — SIGNIFICANT CHANGE UP
MONOCYTES # BLD AUTO: 0.43 K/UL — SIGNIFICANT CHANGE UP (ref 0.1–0.6)
MONOCYTES NFR BLD AUTO: 4.3 % — SIGNIFICANT CHANGE UP (ref 1.7–9.3)
NEUTROPHILS # BLD AUTO: 8.48 K/UL — HIGH (ref 1.4–6.5)
NEUTROPHILS NFR BLD AUTO: 84.2 % — HIGH (ref 42.2–75.2)
NRBC # BLD: 0 /100 WBCS — SIGNIFICANT CHANGE UP (ref 0–0)
OPIATES UR-MCNC: NEGATIVE — SIGNIFICANT CHANGE UP
OXYCODONE UR-MCNC: NEGATIVE — SIGNIFICANT CHANGE UP
PCP UR-MCNC: NEGATIVE — SIGNIFICANT CHANGE UP
PLATELET # BLD AUTO: 165 K/UL — SIGNIFICANT CHANGE UP (ref 130–400)
PMV BLD: 10.9 FL — HIGH (ref 7.4–10.4)
POTASSIUM SERPL-MCNC: 3.5 MMOL/L — SIGNIFICANT CHANGE UP (ref 3.5–5)
POTASSIUM SERPL-MCNC: 3.9 MMOL/L — SIGNIFICANT CHANGE UP (ref 3.5–5)
POTASSIUM SERPL-SCNC: 3.5 MMOL/L — SIGNIFICANT CHANGE UP (ref 3.5–5)
POTASSIUM SERPL-SCNC: 3.9 MMOL/L — SIGNIFICANT CHANGE UP (ref 3.5–5)
PROPOXYPHENE QUALITATIVE URINE RESULT: NEGATIVE — SIGNIFICANT CHANGE UP
PROT SERPL-MCNC: 5.9 G/DL — LOW (ref 6–8)
RBC # BLD: 4.81 M/UL — SIGNIFICANT CHANGE UP (ref 4.7–6.1)
RBC # FLD: 12.3 % — SIGNIFICANT CHANGE UP (ref 11.5–14.5)
RBC CASTS # UR COMP ASSIST: 2 /HPF — SIGNIFICANT CHANGE UP (ref 0–4)
SODIUM SERPL-SCNC: 136 MMOL/L — SIGNIFICANT CHANGE UP (ref 135–146)
SODIUM SERPL-SCNC: 140 MMOL/L — SIGNIFICANT CHANGE UP (ref 135–146)
SQUAMOUS # UR AUTO: 0 /HPF — SIGNIFICANT CHANGE UP (ref 0–5)
THC UR QL: POSITIVE
TSH SERPL-MCNC: 1.61 UIU/ML — SIGNIFICANT CHANGE UP (ref 0.27–4.2)
WBC # BLD: 10.07 K/UL — SIGNIFICANT CHANGE UP (ref 4.8–10.8)
WBC # FLD AUTO: 10.07 K/UL — SIGNIFICANT CHANGE UP (ref 4.8–10.8)
WBC UR QL: 0 /HPF — SIGNIFICANT CHANGE UP (ref 0–5)

## 2024-05-17 PROCEDURE — 99222 1ST HOSP IP/OBS MODERATE 55: CPT

## 2024-05-17 PROCEDURE — 93010 ELECTROCARDIOGRAM REPORT: CPT

## 2024-05-17 PROCEDURE — 99223 1ST HOSP IP/OBS HIGH 75: CPT

## 2024-05-17 RX ORDER — ACETAMINOPHEN 500 MG
650 TABLET ORAL EVERY 6 HOURS
Refills: 0 | Status: DISCONTINUED | OUTPATIENT
Start: 2024-05-17 | End: 2024-05-18

## 2024-05-17 RX ORDER — ONDANSETRON 8 MG/1
4 TABLET, FILM COATED ORAL EVERY 8 HOURS
Refills: 0 | Status: DISCONTINUED | OUTPATIENT
Start: 2024-05-17 | End: 2024-05-18

## 2024-05-17 RX ORDER — FLUOXETINE HCL 10 MG
20 CAPSULE ORAL DAILY
Refills: 0 | Status: DISCONTINUED | OUTPATIENT
Start: 2024-05-17 | End: 2024-05-18

## 2024-05-17 RX ORDER — PANTOPRAZOLE SODIUM 20 MG/1
40 TABLET, DELAYED RELEASE ORAL
Refills: 0 | Status: DISCONTINUED | OUTPATIENT
Start: 2024-05-17 | End: 2024-05-18

## 2024-05-17 RX ORDER — GABAPENTIN 400 MG/1
400 CAPSULE ORAL THREE TIMES A DAY
Refills: 0 | Status: DISCONTINUED | OUTPATIENT
Start: 2024-05-17 | End: 2024-05-18

## 2024-05-17 RX ORDER — SODIUM CHLORIDE 9 MG/ML
1000 INJECTION INTRAMUSCULAR; INTRAVENOUS; SUBCUTANEOUS
Refills: 0 | Status: DISCONTINUED | OUTPATIENT
Start: 2024-05-17 | End: 2024-05-18

## 2024-05-17 RX ORDER — ENOXAPARIN SODIUM 100 MG/ML
40 INJECTION SUBCUTANEOUS EVERY 24 HOURS
Refills: 0 | Status: DISCONTINUED | OUTPATIENT
Start: 2024-05-17 | End: 2024-05-18

## 2024-05-17 RX ORDER — FOLIC ACID 0.8 MG
1 TABLET ORAL DAILY
Refills: 0 | Status: DISCONTINUED | OUTPATIENT
Start: 2024-05-17 | End: 2024-05-18

## 2024-05-17 RX ORDER — LANOLIN ALCOHOL/MO/W.PET/CERES
3 CREAM (GRAM) TOPICAL AT BEDTIME
Refills: 0 | Status: DISCONTINUED | OUTPATIENT
Start: 2024-05-17 | End: 2024-05-18

## 2024-05-17 RX ORDER — METOPROLOL TARTRATE 50 MG
50 TABLET ORAL DAILY
Refills: 0 | Status: DISCONTINUED | OUTPATIENT
Start: 2024-05-17 | End: 2024-05-18

## 2024-05-17 RX ORDER — TRAZODONE HCL 50 MG
100 TABLET ORAL AT BEDTIME
Refills: 0 | Status: DISCONTINUED | OUTPATIENT
Start: 2024-05-17 | End: 2024-05-18

## 2024-05-17 RX ORDER — THIAMINE MONONITRATE (VIT B1) 100 MG
100 TABLET ORAL DAILY
Refills: 0 | Status: DISCONTINUED | OUTPATIENT
Start: 2024-05-17 | End: 2024-05-18

## 2024-05-17 RX ORDER — NICOTINE POLACRILEX 2 MG
1 GUM BUCCAL ONCE
Refills: 0 | Status: COMPLETED | OUTPATIENT
Start: 2024-05-17 | End: 2024-05-17

## 2024-05-17 RX ADMIN — Medication 100 MILLIGRAM(S): at 22:09

## 2024-05-17 RX ADMIN — TETANUS TOXOID, REDUCED DIPHTHERIA TOXOID AND ACELLULAR PERTUSSIS VACCINE, ADSORBED 0.5 MILLILITER(S): 5; 2.5; 8; 8; 2.5 SUSPENSION INTRAMUSCULAR at 00:58

## 2024-05-17 RX ADMIN — Medication 1 PATCH: at 17:46

## 2024-05-17 RX ADMIN — Medication 20 MILLIGRAM(S): at 11:22

## 2024-05-17 RX ADMIN — Medication 100 MILLIGRAM(S): at 11:21

## 2024-05-17 RX ADMIN — GABAPENTIN 400 MILLIGRAM(S): 400 CAPSULE ORAL at 22:09

## 2024-05-17 RX ADMIN — SODIUM CHLORIDE 75 MILLILITER(S): 9 INJECTION INTRAMUSCULAR; INTRAVENOUS; SUBCUTANEOUS at 11:22

## 2024-05-17 RX ADMIN — Medication 1 TABLET(S): at 11:22

## 2024-05-17 RX ADMIN — Medication 1 MILLIGRAM(S): at 11:22

## 2024-05-17 RX ADMIN — Medication 2 MILLIGRAM(S): at 07:52

## 2024-05-17 RX ADMIN — GABAPENTIN 400 MILLIGRAM(S): 400 CAPSULE ORAL at 16:03

## 2024-05-17 NOTE — PATIENT PROFILE ADULT - FALL HARM RISK - HARM RISK INTERVENTIONS
Assistance with ambulation/Assistance OOB with selected safe patient handling equipment/Communicate Risk of Fall with Harm to all staff/Monitor for mental status changes/Monitor gait and stability/Reinforce activity limits and safety measures with patient and family/Tailored Fall Risk Interventions/Toileting schedule using arm’s reach rule for commode and bathroom/Use of alarms - bed, chair and/or voice tab/Visual Cue: Yellow wristband and red socks/Bed in lowest position, wheels locked, appropriate side rails in place/Call bell, personal items and telephone in reach/Instruct patient to call for assistance before getting out of bed or chair/Non-slip footwear when patient is out of bed/[place] to call system/Physically safe environment - no spills, clutter or unnecessary equipment/Purposeful Proactive Rounding/Room/bathroom lighting operational, light cord in reach

## 2024-05-17 NOTE — SBIRT NOTE ADULT - NSSBIRTDRGBRIEFINTDET_GEN_A_CORE
Provided SBIRT services: Full Screen Positive. Brief Intervention Performed and Referral to Treatment Attempted.   Screening results were reviewed with the patient and patient was provided information about healthy guidelines and potential negative consequences associated with level of risk. Motivation and readiness to reduce or stop use was discussed and goals and activities to make changes were suggested/offered.   Options discussed for further evaluation and treatment, but referral to treatment was not completed because patient is currently in treatment at [facility]. Pt expressed motivation to discontinue marijuana and hallucinogenic mushroom use due to impact on his health. Pt offered but declined a referral for alternate chemical dependency services.

## 2024-05-17 NOTE — H&P ADULT - NSHPPHYSICALEXAM_GEN_ALL_CORE
T(C): 36.6 (05-17-24 @ 07:22), Max: 36.9 (05-16-24 @ 18:43)  HR: 88 (05-17-24 @ 07:22) (73 - 106)  BP: 153/80 (05-17-24 @ 07:22) (116/58 - 153/80)  RR: 18 (05-17-24 @ 04:25) (18 - 20)  SpO2: 100% (05-17-24 @ 07:22) (98% - 100%)    CONSTITUTIONAL: Sleepy easily rousable  EYES: PERRLA and symmetric, EOMI, No conjunctival or scleral injection, non-icteric  ENMT: Oral mucosa with moist membranes. Normal dentition; no pharyngeal injection or exudates             NECK: Supple, symmetric and without tracheal deviation   RESP: No respiratory distress, no use of accessory muscles; CTA b/l, no WRR  CV: RRR, +S1S2, mild apical systolic murmur radiating to apex  GI: Soft, NT, ND, no rebound, no guarding; no palpable masses; no hepatosplenomegaly; no hernia palpated  MSK: normal tone  SKIN: No rashes or ulcers noted; no subcutaneous nodules or induration palpable  NEURO: CN II-XII intact; normal reflexes in upper and lower extremities, sensation intact in upper and lower extremities b/l to light touch   PSYCH: Appropriate insight/judgment; A+O x 3, mood and affect appropriate, recent/remote memory intact sleepy easily rousable fully coherent able to maintain conversation

## 2024-05-17 NOTE — H&P ADULT - ATTENDING COMMENTS
38 yo M PMHx  EtOH use disorder, HTN, anxiety, and depression presented to the ED for evaluation of altered mental status likely secondary to intoxication from adderall, marijuana, psychadelic mushrooms. Per patient states he took 60 mg of Adderall, smoked marijuana, and then "ate 1 bar" of mushrooms.  He soon became agitated and paranoid, next thing he knows is that he was in the ambulance and was told by the EMS that he has had a seizure. Patient does not remember falling but acknowledged the bruises on his forearm and knee. He denies any confusion after regaining consciousness, denies any tongue bite or episode of incontinence. Denies prodromal symptoms. Patient states that he works as a  and has been going through lot of stress in life. He recently broke up with his girlfriend and has been consuming marijuana and microdosing mushrooms for anxiety. Pt states he was told he once had a seizure in his sleep but never sought medical attention for this.       To note had admission 1/2024 for etoh withdrawal, admitted to MICU on Precedex, complicated by alcohol hepatitis and thrombocytopenia and had chest pain 3/2024 with subsequent negative CCTA (CAD-RAD0)       Acute intoxication causing altered mental status   Seizure like activity   -Typical dose of adderall is 40 mg/day but doses of 60 mg can be tolerated.He normally takes 40 mg (20 mg BID) but took an extra 20 as he was feeling tired and wanted to stay awake  -Treatment for psyicibin and marijuana intoxication is typically supportive care   -pt aaox3, back to baseline  -CTH normal   -Neuro recs appreciated:- rEEG, Seizure & Fall precaution,- Ativan 2mg IVP for seizures > 2mins   - Utox pending     Leukocytosis   -unclear etiology   -could be reactive to seizure  -monitor   -no signs of active infection     Hyponatremia   -possibly hypotonic?   -pt reports that he has not been eating and drinking much lately as he is tyring to lose weight  -serum osm normal   -could also be from adderral although levels not typically high enough to cause hyponatremia          Elevated lactate   -was 14 on admission   -later downtrended to 4   -repeat LA   -could be elevated from possible seizure       Elevated CK   -doubt rhabdo as value only 1400   - could also be from Adderall vs seizure  - repeat CK          DVT px   From home   Full code

## 2024-05-17 NOTE — H&P ADULT - HISTORY OF PRESENT ILLNESS
Patient is a 38 y/o M with PMH of depression who presents are having a seizure like activity. Patient states that he was at his friends house today when he took mushrooms and weed. He soon became agitated and paranoid, next thing he knows is that he was in the ambulance and was told by the EMS that he has had a seizure. Patient does not remember falling but acknowledged the bruises on his forearm and knee. He denies any confusion after regaining consciousness, denies any tongue bite or episode of incontinence. Patient states that he works as a  and has been going through lot of stress in life. He recently broke up with his girlfriend and has been consuming weed and mushroom all most everyday however, today was more than what he normally takes. Patient use to abuse alcohol in the past but recently quit in february after being admitted for withdrawal. Patient denies any recent infection, change in medication, numbness, weakness, double or blurry vision, bowel or bladder difficulty. He mentions that his appetite is good, sleeps 4-5 hrs per night, has been feeling 'down the weather' recently but denies any SI. He is currently employed as a , lives alone and denies any difficulty with his activities of daily living.    Seen by neuro in ED, recommended Ativen 2mg PRN for seziure activity lasting more than 2 mins  Lactate 14.5, decreased to 4.7  Hypochloriemia 87  pH 7.44 Hco3 27 pCO2 40  CK 1530  Normal vitals /80 HR 70 on NC 2lit 100% SaO2    Echo in 1/2024  EF 45% mod reduced, Prolapse of the anterior mitral valve leaflet. Aortic valve is bicuspid. Mild aortic regurgitation.  Dilated aortic root with linear diameter 3.8 cm      Patient is a 40 y/o M with PMH of depression who presents are having a seizure like activity. Patient states that he was at his friends house today when he took mushrooms and weed. He soon became agitated and paranoid, next thing he knows is that he was in the ambulance and was told by the EMS that he has had a seizure. Patient does not remember falling but acknowledged the bruises on his forearm and knee. He denies any confusion after regaining consciousness, denies any tongue bite or episode of incontinence. Patient states that he works as a  and has been going through lot of stress in life. He recently broke up with his girlfriend and has been consuming weed and mushroom all most everyday however, today was more than what he normally takes. Patient use to abuse alcohol in the past but recently quit in february after being admitted for withdrawal. Patient denies any recent infection, change in medication, numbness, weakness, double or blurry vision, bowel or bladder difficulty. He mentions that his appetite is good, sleeps 4-5 hrs per night, has been feeling 'down the weather' recently but denies any SI. He is currently employed as a , lives alone and denies any difficulty with his activities of daily living.    Was admitted twice with alcohol withdrawal, last in Jan 2024, Stopped alcohol since and started doing Weed daily    Seen by neuro in ED, recommended Ativen 2mg PRN for seziure activity lasting more than 2 mins  Lactate 14.5, decreased to 4.7  Hypochloriemia 87  pH 7.44 Hco3 27 pCO2 40  CK 1530  Normal vitals /80 HR 70 on NC 2lit 100% SaO2    Echo in 1/2024  EF 45% mod reduced, Prolapse of the anterior mitral valve leaflet. Aortic valve is bicuspid. Mild aortic regurgitation.  Dilated aortic root with linear diameter 3.8 cm

## 2024-05-17 NOTE — H&P ADULT - NSHPLABSRESULTS_GEN_ALL_CORE
1. Normal left ventricular internal cavity size. Mildly decreased global   left ventricular systolic function. Left ventricular ejection fraction,   by visual estimation, is 40 to 45%. Mild concentric left ventricular   hypertrophy. Spectral Doppler shows pseudonormal pattern of left   ventricular myocardial filling (Grade II diastolic dysfunction).   2. Normal right ventricular size and function.   3. Prolapse of the anterior mitral valve leaflet.   4. Aortic valve is bicuspid. Mild aortic regurgitation.   5. Mild pulmonic valve regurgitation.   6. Dilated aortic root with linear diameter 3.8 cm (indexed to BSA 2.2   cm/m2). Normal ascending aorta. 1. Normal left ventricular internal cavity size. Mildly decreased global   left ventricular systolic function. Left ventricular ejection fraction,   by visual estimation, is 40 to 45%. Mild concentric left ventricular   hypertrophy. Spectral Doppler shows pseudonormal pattern of left   ventricular myocardial filling (Grade II diastolic dysfunction).   2. Normal right ventricular size and function.   3. Prolapse of the anterior mitral valve leaflet.   4. Aortic valve is bicuspid. Mild aortic regurgitation.   5. Mild pulmonic valve regurgitation.   6. Dilated aortic root with linear diameter 3.8 cm (indexed to BSA 2.2   cm/m2). Normal ascending aorta.    < from: CT Head No Cont (05.16.24 @ 21:10) >    IMPRESSION:    No acute intracranial pathology.    < end of copied text >

## 2024-05-17 NOTE — H&P ADULT - ASSESSMENT
Patient is a 40 y/o M with PMH of depression who presents are having a seizure like activity post mushroom and weed abuse.    #Drug Abuse  - Uses weed throughout the day since Feb 2024, Stopped alcohol since last DC from hospital with abuse  - Brought in by EMS, was found to be in seizure activity  - Was noted to be agitated in hospital required Ativan and Librium in ED  - Lact 14.5 improved to 4.5 on fluids  - Hypochloremic  - Neuro consult appt rec EEG, Ativen 2mg PRN for seizure more than 2 mins  - CT head: normal  - Will keep on fluid yet needs periodic assessment for fluid overload  - was in rehab @ [facility] in [facility].  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 Mg, EEG, Gentle hydration, PRN ativan Patient is a 40 y/o M with PMH of depression who presents are having a seizure like activity post mushroom and weed abuse.    #Drug Abuse  - Uses weed throughout the day since Feb 2024, Stopped alcohol since last DC from hospital with abuse  - Brought in by EMS, was found to be in seizure activity  - Was noted to be agitated in hospital required Ativan and Librium in ED  - Lact 14.5 improved to 4.5 on fluids  - Hypochloremic  - Neuro consult appt rec EEG, Ativan 2mg PRN for seizure more than 2 mins  - 75ml NS  - CT head: normal  - Will keep on fluid yet needs periodic assessment for fluid overload  - was in rehab @ [facility] in [facility].  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 Mg, EEG, Gentle hydration, PRN ativan Patient is a 38 y/o M with PMH of depression who presents are having a seizure like activity post mushroom and weed abuse.    #Drug Abuse  - Uses weed throughout the day since Feb 2024, Stopped alcohol since last DC from hospital with abuse  - Brought in by EMS, was found to be in seizure activity  - Was noted to be agitated in hospital required Ativan and Librium in ED  - Lact 14.5 improved to 4.5 on fluids  - Hypochloremic  - Neuro consult appt rec EEG, Ativan 2mg PRN for seizure more than 2 mins  - 75ml NS  - CT head: normal  - Will keep on fluid yet needs periodic assessment for fluid overload  - was in rehab @ [facility] in [facility].  - follow EKG for QTc in setting of mushrooms, wnl, on ondansetron prn for vomiting   - was on gabapentin on DC and still takes it will continue    #Attention disorder  - On home prescribed Adderall held for now as the patient took extra dose before the seizure event       #HFmrEF, Euvolemic, EF 45%, NYHA I, Alcoholic CM, Stage B, MVP and Mild AR on BAV  - Was prescribed Metoprolol 50mg ans spironolactone 25mg Entresto low dose  - Patient stopped Entresto for allergy reaction in the form of itchiness and hives and stopped Spironolactone as he was told he does not need it  - Taking metoprolol  - Cardiologist: Supreeti Behuria, MD   - CCTA CO RADs 0  - cw home dosage  - gentle fluid hydration  - OP FU  - Patient would benefit from Farxiga (class I per HF updated GL) Outpatient    #DVT Lovenox 40 od  #Gi Pantoprazole 40 qhs  #Full code    Follow: TSH. Mg, EEG, Gentle hydration, PRN ativan

## 2024-05-17 NOTE — EEG REPORT - NS EEG TEXT BOX
Brief Clinical History:  JUNE SAINI is a 39 year old Male; study performed to investigate for seizures or markers of epilepsy.   Diagnosis Code: R40.4 Transient alteration of awareness  CPT:  01910 (awake/sleep)     Patient Medication:  desyrel    tylenol    zofran    lovenox    prozac    folic acid    neurontin    ativan    toprol    protonix      Acquisition Details:  Electroencephalography was acquired using a minimum of 21 channels on an [employer] [facility] [facility] system v 9.3.1 with electrode placement according to the standard International 10-20 system following ACNS (American Clinical Neurophysiology Society) guidelines.  Anterior temporal T1 and T2 electrodes were utilized whenever possible.   The [employer] automated spike & seizure detections were all reviewed in detail, in addition to the entire raw EEG.    Findings:  Background:  continuous  Voltage:  Low (most <20uV LBP Peak-Trough)  Organization:  Appropriate anterior-posterior gradient  Posterior Dominant Rhythm:  8-8.5 Hz , symmetrical and superimposed by low voltage fast activity.not well modulated  Variability:  Yes	  Reactivity:  Yes  Sleep:  Symmetric, synchronous spindles and K complexes.  Focal abnormalities:  No persistent asymmetries of voltage or frequency.  Interictal Activity:  none  Location:    Focal Slowing:  None  Generalized Slowing:  borderline  Events:  1)	No electrographic seizures or significant clinical events.  Provocations:  1)	Hyperventilation: was not performed.  2)	Photic stimulation: was not performed.    Impression:  Abnormal due to generalized slowing as above    Clinical Correlation:  Findings consistent with borderline diffuse electrocerebral dysfunction secondary to structural/metabolic/nonspecific etiology    Fadi Hernandez MD  Attending Neurologist, Division of Epilepsy

## 2024-05-18 ENCOUNTER — TRANSCRIPTION ENCOUNTER (OUTPATIENT)
Age: 39
End: 2024-05-18

## 2024-05-18 VITALS
OXYGEN SATURATION: 98 % | DIASTOLIC BLOOD PRESSURE: 69 MMHG | TEMPERATURE: 98 F | RESPIRATION RATE: 18 BRPM | HEART RATE: 82 BPM | SYSTOLIC BLOOD PRESSURE: 115 MMHG

## 2024-05-18 LAB
ALBUMIN SERPL ELPH-MCNC: 4.3 G/DL — SIGNIFICANT CHANGE UP (ref 3.5–5.2)
ALP SERPL-CCNC: 53 U/L — SIGNIFICANT CHANGE UP (ref 30–115)
ALT FLD-CCNC: 18 U/L — SIGNIFICANT CHANGE UP (ref 0–41)
ANION GAP SERPL CALC-SCNC: 11 MMOL/L — SIGNIFICANT CHANGE UP (ref 7–14)
AST SERPL-CCNC: 37 U/L — SIGNIFICANT CHANGE UP (ref 0–41)
BASOPHILS # BLD AUTO: 0.06 K/UL — SIGNIFICANT CHANGE UP (ref 0–0.2)
BASOPHILS NFR BLD AUTO: 0.6 % — SIGNIFICANT CHANGE UP (ref 0–1)
BILIRUB SERPL-MCNC: 0.4 MG/DL — SIGNIFICANT CHANGE UP (ref 0.2–1.2)
BUN SERPL-MCNC: 8 MG/DL — LOW (ref 10–20)
CALCIUM SERPL-MCNC: 8.7 MG/DL — SIGNIFICANT CHANGE UP (ref 8.4–10.5)
CHLORIDE SERPL-SCNC: 102 MMOL/L — SIGNIFICANT CHANGE UP (ref 98–110)
CK SERPL-CCNC: 1635 U/L — HIGH (ref 0–225)
CO2 SERPL-SCNC: 25 MMOL/L — SIGNIFICANT CHANGE UP (ref 17–32)
CREAT SERPL-MCNC: 0.8 MG/DL — SIGNIFICANT CHANGE UP (ref 0.7–1.5)
EGFR: 116 ML/MIN/1.73M2 — SIGNIFICANT CHANGE UP
EOSINOPHIL # BLD AUTO: 0.13 K/UL — SIGNIFICANT CHANGE UP (ref 0–0.7)
EOSINOPHIL NFR BLD AUTO: 1.4 % — SIGNIFICANT CHANGE UP (ref 0–8)
GLUCOSE SERPL-MCNC: 81 MG/DL — SIGNIFICANT CHANGE UP (ref 70–99)
HCT VFR BLD CALC: 43.3 % — SIGNIFICANT CHANGE UP (ref 42–52)
HGB BLD-MCNC: 14 G/DL — SIGNIFICANT CHANGE UP (ref 14–18)
IMM GRANULOCYTES NFR BLD AUTO: 0.4 % — HIGH (ref 0.1–0.3)
INR BLD: 1.03 RATIO — SIGNIFICANT CHANGE UP (ref 0.65–1.3)
LYMPHOCYTES # BLD AUTO: 1.7 K/UL — SIGNIFICANT CHANGE UP (ref 1.2–3.4)
LYMPHOCYTES # BLD AUTO: 18.2 % — LOW (ref 20.5–51.1)
MCHC RBC-ENTMCNC: 28 PG — SIGNIFICANT CHANGE UP (ref 27–31)
MCHC RBC-ENTMCNC: 32.3 G/DL — SIGNIFICANT CHANGE UP (ref 32–37)
MCV RBC AUTO: 86.6 FL — SIGNIFICANT CHANGE UP (ref 80–94)
MONOCYTES # BLD AUTO: 0.58 K/UL — SIGNIFICANT CHANGE UP (ref 0.1–0.6)
MONOCYTES NFR BLD AUTO: 6.2 % — SIGNIFICANT CHANGE UP (ref 1.7–9.3)
NEUTROPHILS # BLD AUTO: 6.85 K/UL — HIGH (ref 1.4–6.5)
NEUTROPHILS NFR BLD AUTO: 73.2 % — SIGNIFICANT CHANGE UP (ref 42.2–75.2)
NRBC # BLD: 0 /100 WBCS — SIGNIFICANT CHANGE UP (ref 0–0)
PLATELET # BLD AUTO: 172 K/UL — SIGNIFICANT CHANGE UP (ref 130–400)
PMV BLD: 10.8 FL — HIGH (ref 7.4–10.4)
POTASSIUM SERPL-MCNC: 3.7 MMOL/L — SIGNIFICANT CHANGE UP (ref 3.5–5)
POTASSIUM SERPL-SCNC: 3.7 MMOL/L — SIGNIFICANT CHANGE UP (ref 3.5–5)
PROT SERPL-MCNC: 6.5 G/DL — SIGNIFICANT CHANGE UP (ref 6–8)
PROTHROM AB SERPL-ACNC: 11.7 SEC — SIGNIFICANT CHANGE UP (ref 9.95–12.87)
RBC # BLD: 5 M/UL — SIGNIFICANT CHANGE UP (ref 4.7–6.1)
RBC # FLD: 12.7 % — SIGNIFICANT CHANGE UP (ref 11.5–14.5)
SODIUM SERPL-SCNC: 138 MMOL/L — SIGNIFICANT CHANGE UP (ref 135–146)
WBC # BLD: 9.36 K/UL — SIGNIFICANT CHANGE UP (ref 4.8–10.8)
WBC # FLD AUTO: 9.36 K/UL — SIGNIFICANT CHANGE UP (ref 4.8–10.8)

## 2024-05-18 PROCEDURE — 95819 EEG AWAKE AND ASLEEP: CPT | Mod: 26

## 2024-05-18 PROCEDURE — 99239 HOSP IP/OBS DSCHRG MGMT >30: CPT

## 2024-05-18 RX ADMIN — Medication 100 MILLIGRAM(S): at 11:30

## 2024-05-18 RX ADMIN — Medication 20 MILLIGRAM(S): at 11:31

## 2024-05-18 RX ADMIN — Medication 1 MILLIGRAM(S): at 11:31

## 2024-05-18 RX ADMIN — PANTOPRAZOLE SODIUM 40 MILLIGRAM(S): 20 TABLET, DELAYED RELEASE ORAL at 06:56

## 2024-05-18 RX ADMIN — GABAPENTIN 400 MILLIGRAM(S): 400 CAPSULE ORAL at 06:56

## 2024-05-18 RX ADMIN — Medication 1 TABLET(S): at 11:31

## 2024-05-18 RX ADMIN — Medication 50 MILLIGRAM(S): at 06:56

## 2024-05-18 NOTE — DISCHARGE NOTE PROVIDER - NSDCMRMEDTOKEN_GEN_ALL_CORE_FT
FLUoxetine (Eqv-Sarafem) 20 mg oral tablet: 1 tab(s) orally once a day  folic acid 1 mg oral tablet: 1 tab(s) orally once a day  gabapentin 400 mg oral capsule: 1 cap(s) orally 3 times a day  metoprolol succinate 50 mg oral tablet, extended release: 1 tab(s) orally once a day  Multiple Vitamins oral tablet: 1 tab(s) orally once a day  pantoprazole 40 mg oral delayed release tablet: 1 tab(s) orally once a day (before a meal)  thiamine 100 mg oral tablet: 1 tab(s) orally once a day  traZODone 100 mg oral tablet: 1 tab(s) orally once a day (at bedtime)

## 2024-05-18 NOTE — DISCHARGE NOTE NURSING/CASE MANAGEMENT/SOCIAL WORK - PATIENT PORTAL LINK FT
You can access the FollowMyHealth Patient Portal offered by [facility] by registering at the following website: [email or web address]. By joining [facility]’s FollowMyHealth portal, you will also be able to view your health information using other applications (apps) compatible with our system.

## 2024-05-18 NOTE — DISCHARGE NOTE PROVIDER - HOSPITAL COURSE
Patient is a 38 y/o M with PMH of depression who presents are having a seizure like activity. Patient states that he was at his friends house today when he took mushrooms and weed. He soon became agitated and paranoid, next thing he knows is that he was in the ambulance and was told by the EMS that he has had a seizure. Patient does not remember falling but acknowledged the bruises on his forearm and knee. He denies any confusion after regaining consciousness, denies any tongue bite or episode of incontinence. Patient states that he works as a  and has been going through lot of stress in life. He recently broke up with his girlfriend and has been consuming weed and mushroom all most everyday however, today was more than what he normally takes. Patient use to abuse alcohol in the past but recently quit in february after being admitted for withdrawal. Patient denies any recent infection, change in medication, numbness, weakness, double or blurry vision, bowel or bladder difficulty. He mentions that his appetite is good, sleeps 4-5 hrs per night, has been feeling 'down the weather' recently but denies any SI. He is currently employed as a , lives alone and denies any difficulty with his activities of daily living.    Was admitted twice with alcohol withdrawal, last in Jan 2024, Stopped alcohol since and started doing Weed daily    Seen by neuro in ED, recommended Ativen 2mg PRN for seziure activity lasting more than 2 mins  Lactate 14.5, decreased to 4.7  Hypochloriemia 87  pH 7.44 Hco3 27 pCO2 40  CK 1530  Normal vitals /80 HR 70 on NC 2lit 100% SaO2    Echo in 1/2024  EF 45% mod reduced, Prolapse of the anterior mitral valve leaflet. Aortic valve is bicuspid. Mild aortic regurgitation.  Dilated aortic root with linear diameter 3.8 cm     #Drug Abuse  - Uses weed throughout the day since Feb 2024, Stopped alcohol since last DC from hospital with abuse  - Brought in by EMS, was found to be in seizure activity  - Was noted to be agitated in hospital required Ativan and Librium in ED  - Lact 14.5 improved to 4.5 on fluids  - Hypochloremic  - Neuro consult appt rec EEG, Ativan 2mg PRN for seizure more than 2 mins  - CT head: normal  - was in rehab @ [facility] in [facility].  - follow EKG for QTc in setting of mushrooms, wnl, on ondansetron prn for vomiting   - was on gabapentin on DC and still takes it will continue  - rEEG resulted no seizure.   - No AED meds recommended at this time.     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 Patient is a 38 y/o M with PMH of depression who presents are having a seizure like activity. Patient states that he was at his friends house today when he took mushrooms and weed. He soon became agitated and paranoid, next thing he knows is that he was in the ambulance and was told by the EMS that he has had a seizure. Patient does not remember falling but acknowledged the bruises on his forearm and knee. He denies any confusion after regaining consciousness, denies any tongue bite or episode of incontinence. Patient states that he works as a  and has been going through lot of stress in life. He recently broke up with his girlfriend and has been consuming weed and mushroom all most everyday however, today was more than what he normally takes. Patient use to abuse alcohol in the past but recently quit in february after being admitted for withdrawal. Patient denies any recent infection, change in medication, numbness, weakness, double or blurry vision, bowel or bladder difficulty. He mentions that his appetite is good, sleeps 4-5 hrs per night, has been feeling 'down the weather' recently but denies any SI. He is currently employed as a , lives alone and denies any difficulty with his activities of daily living.    Was admitted twice with alcohol withdrawal, last in Jan 2024, Stopped alcohol since and started doing Weed daily    Seen by neuro in ED, recommended Ativen 2mg PRN for seziure activity lasting more than 2 mins  Lactate 14.5, decreased to 4.7  Hypochloriemia 87  pH 7.44 Hco3 27 pCO2 40  CK 1530  Normal vitals /80 HR 70 on NC 2lit 100% SaO2    Echo in 1/2024  EF 45% mod reduced, Prolapse of the anterior mitral valve leaflet. Aortic valve is bicuspid. Mild aortic regurgitation.  Dilated aortic root with linear diameter 3.8 cm     #Drug Abuse  - Uses weed throughout the day since Feb 2024, Stopped alcohol since last DC from hospital with abuse  - Brought in by EMS, was found to be in seizure activity  - Was noted to be agitated in hospital required Ativan and Librium in ED  - Lact 14.5 improved to 4.5 on fluids  - Hypochloremic  - Neuro consult appt rec EEG, Ativan 2mg PRN for seizure more than 2 mins  - CT head: normal  - was in rehab @ [facility] in [facility].  - follow EKG for QTc in setting of mushrooms, wnl, on ondansetron prn for vomiting   - was on gabapentin on DC and still takes it will continue  - rEEG resulted no seizure.   - No AED meds recommended at this time.     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  Pt advisded with continue hydration. Recck CK outpt Patient is a 38 y/o M with PMH of depression who presents are having a seizure like activity. Patient states that he was at his friends house today when he took mushrooms and weed. He soon became agitated and paranoid, next thing he knows is that he was in the ambulance and was told by the EMS that he has had a seizure. Patient does not remember falling but acknowledged the bruises on his forearm and knee. He denies any confusion after regaining consciousness, denies any tongue bite or episode of incontinence. Patient states that he works as a  and has been going through lot of stress in life. He recently broke up with his girlfriend and has been consuming weed and mushroom all most everyday however, today was more than what he normally takes. Patient use to abuse alcohol in the past but recently quit in february after being admitted for withdrawal. Patient denies any recent infection, change in medication, numbness, weakness, double or blurry vision, bowel or bladder difficulty. He mentions that his appetite is good, sleeps 4-5 hrs per night, has been feeling 'down the weather' recently but denies any SI. He is currently employed as a , lives alone and denies any difficulty with his activities of daily living.    Was admitted twice with alcohol withdrawal, last in Jan 2024, Stopped alcohol since and started doing Weed daily    Seen by neuro in ED, recommended Ativen 2mg PRN for seziure activity lasting more than 2 mins  Lactate 14.5, decreased to 4.7  Hypochloriemia 87  pH 7.44 Hco3 27 pCO2 40  CK 1530  Normal vitals /80 HR 70 on NC 2lit 100% SaO2    Echo in 1/2024  EF 45% mod reduced, Prolapse of the anterior mitral valve leaflet. Aortic valve is bicuspid. Mild aortic regurgitation.  Dilated aortic root with linear diameter 3.8 cm     #Drug Abuse  - Uses weed throughout the day since Feb 2024, Stopped alcohol since last DC from hospital with abuse  - Brought in by EMS, was found to be in seizure activity  - Was noted to be agitated in hospital required Ativan and Librium in ED  - Lact 14.5 improved to 4.5 on fluids  - Hypochloremic  - Neuro consult appt rec EEG, Ativan 2mg PRN for seizure more than 2 mins  - CT head: normal  - was in rehab @ [facility] in [facility].  - follow EKG for QTc in setting of mushrooms, wnl, on ondansetron prn for vomiting   - was on gabapentin on DC and still takes it will continue  - rEEG resulted no seizure.   - No AED meds recommended at this time.     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  Pt advisded with continue hydration. Recheck CK outpt

## 2024-05-18 NOTE — DISCHARGE NOTE NURSING/CASE MANAGEMENT/SOCIAL WORK - NSDCVIVACCINE_GEN_ALL_CORE_FT
Tdap; 17-May-2024 00:58; Radha Casanova (RN); [employer]; [id number]   (Exp. Date: 30-Nov-2025); IntraMuscular; Deltoid Right.; 0.5 milliLiter(s); VIS (VIS Published: 09-May-2013, VIS Presented: 17-May-2024);

## 2024-05-18 NOTE — DISCHARGE NOTE PROVIDER - ATTENDING DISCHARGE PHYSICAL EXAMINATION:
Attending attestation  Attending DC note  Pt seen and examined at bedside. No cp or sob. CK trending down   vitals, labs, exam stable  Hospital course as above.  Plan dw pt and agreed to plan  Medically cleared for DC. Med recc completed.  WERNER resident. Spent 32 mins on case   Attending attestation  Attending DC note  Pt seen and examined at bedside. No cp or sob. CK trending down. I called the pt and explained to drink plenty of fluid and to recheck his CK level and 48 hrs. Pt agreed to follow up with primary care physician   vitals, labs, exam stable  Hospital course as above.  Plan dw pt and agreed to plan  Medically cleared for DC. Med recc completed.  WERNER resident. Spent 32 mins on case

## 2024-05-18 NOTE — DISCHARGE NOTE PROVIDER - CARE PROVIDER_API CALL
Behuria, [facility]  Cardiology  [address] [place] [address], [address]  [place] [address]  Phone: [phone]  Fax: [phone]  Follow Up Time: 1 month

## 2024-05-18 NOTE — DISCHARGE NOTE PROVIDER - NSDCFUADDAPPT_GEN_ALL_CORE_FT
Do you need a primary care doctor or follow-up with a specialist? Our care coordinators will help you find providers near you and schedule any follow-up care visits.    Monday-Friday: 9am-5pm    Call our CareBridge team: [phone]    APPTS ARE READY TO BE MADE: [ ] YES    Best Family or Patient Contact (if needed):    Additional Information about above appointments (if needed):    1: primary care physician [facility]  2:   3:     Other comments or requests:

## 2024-05-18 NOTE — DISCHARGE NOTE PROVIDER - NSDCCPCAREPLAN_GEN_ALL_CORE_FT
PRINCIPAL DISCHARGE DIAGNOSIS  Diagnosis: Agitation  Assessment and Plan of Treatment: You presented to hospital with c/o seizure like activity likely due to drugs overdose, neurology was consulted, EEG, CT head and all work up was negative. You are vitally and clinically stable to be discharged.   SEEK IMMEDIATE MEDICAL CARE IF YOU HAVE ANY OF THE FOLLOWING SYMPTOMS: seizures, vomiting blood, blood in your stool, lightheadedness/dizziness, or becoming shaky to tremulous when you stop drinking.        SECONDARY DISCHARGE DIAGNOSES  Diagnosis: Polysubstance use disorder  Assessment and Plan of Treatment:     Diagnosis: Ingestion of substance  Assessment and Plan of Treatment:     Diagnosis: Seizure  Assessment and Plan of Treatment:

## 2024-05-19 ENCOUNTER — NON-APPOINTMENT (OUTPATIENT)
Age: 39
End: 2024-05-19

## 2024-05-20 NOTE — CHART NOTE - NSCHARTNOTEFT_GEN_A_CORE
rEEG resulted no seizure.   No AED meds recommended at this time.   Thank you for the courtesy of this consult, we sign off the case. please contact us if any neurological changes or questions.   Hernán Magdaleno M.D  PGY4 Neurology Resident   [phone]
[facility] MRN [id number] PCP & Cardio / Pt already has upcoming appt and established care 5/20 - JL    Specialty: PCP & Cardio

## 2024-05-21 LAB
7AMINOCLONAZEPAM UR CFM-MCNC: NEGATIVE NG/ML — SIGNIFICANT CHANGE UP
ALPHA-HYDROXYALPRAZOLAM, UR RESULT: NEGATIVE NG/ML — SIGNIFICANT CHANGE UP
ALPRAZ UR CFM-MCNC: NEGATIVE NG/ML — SIGNIFICANT CHANGE UP
CLONAZEPAM UR CFM-MCNC: NEGATIVE NG/ML — SIGNIFICANT CHANGE UP
DIAZEPAM UR CFM-MCNC: NEGATIVE NG/ML — SIGNIFICANT CHANGE UP
FLUNITRAZEPAM UR CFM-MCNC: NEGATIVE NG/ML — SIGNIFICANT CHANGE UP
FLURAZEPAM UR CFM-MCNC: NEGATIVE NG/ML — SIGNIFICANT CHANGE UP
MDA UR QL SCN: NEGATIVE NG/ML — SIGNIFICANT CHANGE UP
MDA, UR RESULT: NEGATIVE NG/ML — SIGNIFICANT CHANGE UP
MDEA, UR RESULT: NEGATIVE NG/ML — SIGNIFICANT CHANGE UP
MDMA UR QL SCN: NEGATIVE NG/ML — SIGNIFICANT CHANGE UP
MDMA, UR RESULT: NEGATIVE NG/ML — SIGNIFICANT CHANGE UP
METHAMPHET UR QL SCN: NEGATIVE NG/ML — SIGNIFICANT CHANGE UP
METHAMPHETAMINE, UR RESULT: NEGATIVE NG/ML — SIGNIFICANT CHANGE UP
MIDAZOLAM UR CFM-MCNC: NEGATIVE NG/ML — SIGNIFICANT CHANGE UP
NORDIAZEPAM, UR RESULT: NEGATIVE NG/ML — SIGNIFICANT CHANGE UP
OXAZEPAM UR QL SCN: NEGATIVE NG/ML — SIGNIFICANT CHANGE UP
OXAZEPAM, UR RESULT: NEGATIVE NG/ML — SIGNIFICANT CHANGE UP
TEMAZEPAM UR CFM-MCNC: NEGATIVE NG/ML — SIGNIFICANT CHANGE UP

## 2024-05-23 DIAGNOSIS — R45.1 RESTLESSNESS AND AGITATION: ICD-10-CM

## 2024-05-23 DIAGNOSIS — F19.10 OTHER PSYCHOACTIVE SUBSTANCE ABUSE, UNCOMPLICATED: ICD-10-CM

## 2024-05-23 DIAGNOSIS — I50.22 CHRONIC SYSTOLIC (CONGESTIVE) HEART FAILURE: ICD-10-CM

## 2024-05-23 DIAGNOSIS — E87.1 HYPO-OSMOLALITY AND HYPONATREMIA: ICD-10-CM

## 2024-05-23 DIAGNOSIS — F32.A DEPRESSION, UNSPECIFIED: ICD-10-CM

## 2024-05-23 DIAGNOSIS — E87.20 ACIDOSIS, UNSPECIFIED: ICD-10-CM

## 2024-05-23 DIAGNOSIS — R56.9 UNSPECIFIED CONVULSIONS: ICD-10-CM

## 2024-05-23 DIAGNOSIS — I11.0 HYPERTENSIVE HEART DISEASE WITH HEART FAILURE: ICD-10-CM

## 2024-05-23 DIAGNOSIS — Z88.1 ALLERGY STATUS TO OTHER ANTIBIOTIC AGENTS STATUS: ICD-10-CM

## 2024-05-23 DIAGNOSIS — E87.8 OTHER DISORDERS OF ELECTROLYTE AND FLUID BALANCE, NOT ELSEWHERE CLASSIFIED: ICD-10-CM

## 2024-05-23 DIAGNOSIS — F98.8 OTHER SPECIFIED BEHAVIORAL AND EMOTIONAL DISORDERS WITH ONSET USUALLY OCCURRING IN CHILDHOOD AND ADOLESCENCE: ICD-10-CM

## 2024-05-23 DIAGNOSIS — F41.9 ANXIETY DISORDER, UNSPECIFIED: ICD-10-CM

## 2024-05-23 DIAGNOSIS — D72.829 ELEVATED WHITE BLOOD CELL COUNT, UNSPECIFIED: ICD-10-CM

## 2024-05-23 DIAGNOSIS — R79.89 OTHER SPECIFIED ABNORMAL FINDINGS OF BLOOD CHEMISTRY: ICD-10-CM

## 2024-05-23 LAB
1OH-MIDAZOLAM UR CFM-MCNC: 1341 NG/ML — HIGH
AMPHET UR QL SCN: 2197 NG/ML — HIGH
AMPHET UR QL SCN: POSITIVE
AMPHETAMINE IN-HOUSE INTERPRETATION: POSITIVE
AMPHETAMINE, UR RESULT: 2197 NG/ML — HIGH
BENZODIAZ UR QL SCN: POSITIVE
BENZODIAZEPINES IN-HOUSE INTERPRETATION: POSITIVE
LORAZEPAM UR CFM-MCNC: 236 NG/ML — HIGH

## 2024-05-24 LAB
ALPRAZOLAM RESULT, UR: NEGATIVE — SIGNIFICANT CHANGE UP
AMPHET UR CFM-MCNC: 1833 NG/ML — SIGNIFICANT CHANGE UP
AMPHET UR QL SCN: POSITIVE
AMPHET UR QL SCN: POSITIVE
AMPHET UR-MCNC: SIGNIFICANT CHANGE UP NG/ML
AMPHETAMINE RESULT, UR: POSITIVE
AMPHETAMINES RESULT, UR: POSITIVE
BARBITURATES UR QL SCN: NEGATIVE NG/ML — SIGNIFICANT CHANGE UP
BARBITURATES UR-MCNC: NEGATIVE NG/ML — SIGNIFICANT CHANGE UP
BENZODIAZ UR QL SCN: POSITIVE NG/ML
BENZODIAZ UR-MCNC: SIGNIFICANT CHANGE UP NG/ML
BENZODIAZEPINES RESULT, UR: POSITIVE NG/ML
CANNABINOIDS UR QL SCN: POSITIVE
CARBOXYTHC UR-MCNC: 266 NG/ML — SIGNIFICANT CHANGE UP
CLONAZEPAM RESULT, UR: NEGATIVE — SIGNIFICANT CHANGE UP
COCAINE METAB.OTHER UR-MCNC: NEGATIVE NG/ML — SIGNIFICANT CHANGE UP
CREATININE, URINE THERAPEUTIC: 85.8 MG/DL — SIGNIFICANT CHANGE UP (ref 20–300)
FENTANYL RESULT, UR: NEGATIVE NG/ML — SIGNIFICANT CHANGE UP
FENTANYL UR QL SCN: NEGATIVE NG/ML — SIGNIFICANT CHANGE UP
FLURAZEPAM RESULT, UR: NEGATIVE — SIGNIFICANT CHANGE UP
LORAZEPAM RESULT, UR: POSITIVE
LORAZEPAM UR CFM-MCNC: 360 NG/ML — SIGNIFICANT CHANGE UP
LORAZEPAM UR CFM-MCNC: POSITIVE
METHADONE UR QL SCN: NEGATIVE NG/ML — SIGNIFICANT CHANGE UP
METHAMPHET UR QL SCN: NEGATIVE — SIGNIFICANT CHANGE UP
METHAMPHETAMINE RESULT, UR: NEGATIVE — SIGNIFICANT CHANGE UP
MIDAZOLAM CONFIRMATION, MS RESULT, UR: 1078 NG/ML — SIGNIFICANT CHANGE UP
MIDAZOLAM RESULT, UR: POSITIVE
NORDIAZEPAM RESULT, UR: NEGATIVE — SIGNIFICANT CHANGE UP
OPIATES UR-MCNC: NEGATIVE NG/ML — SIGNIFICANT CHANGE UP
OXAZEPAM RESULT, UR: NEGATIVE — SIGNIFICANT CHANGE UP
OXAZEPAM UR QL SCN: NEGATIVE — SIGNIFICANT CHANGE UP
OXYCODONE UR QL SCN: NEGATIVE NG/ML — SIGNIFICANT CHANGE UP
PCP UR-MCNC: NEGATIVE NG/ML — SIGNIFICANT CHANGE UP
PH, URINE RESULT: 6.4 — SIGNIFICANT CHANGE UP (ref 4.5–8.9)
TEMAZEPAM RESULT, UR: NEGATIVE — SIGNIFICANT CHANGE UP
THC UR QL: SIGNIFICANT CHANGE UP NG/ML
TRIAZOLAM RESULT, UR: NEGATIVE — SIGNIFICANT CHANGE UP

## 2024-05-25 LAB — DRUG SCREEN, SERUM: ABNORMAL

## 2024-05-30 LAB
CARBOXYTHC UR CFM-MCNC: 840 NG/ML — HIGH
TOXICOLOGIST REVIEW: POSITIVE — SIGNIFICANT CHANGE UP

## 2024-06-04 NOTE — ASU PREOP CHECKLIST - TEMPERATURE IN FAHRENHEIT (DEGREES F)
Patient called trying to get a hold of her endocrinologist.  Gave her the number to [facility] in [place]  
[phone]

## 2024-08-21 NOTE — BH CONSULTATION LIAISON ASSESSMENT NOTE - GENERAL APPEARANCE
Using Afrin as labeled on the package insert for your symptoms.  If this helps your sinuses are likely the cause.  I would contact an ENT doctor for further evaluation.  Further testing or possibly imaging may be necessary.    Return to the ER for any new, concerning, or worsening issues.  
No deformities present

## 2024-09-10 NOTE — ED PROVIDER NOTE - EMPLOYMENT
Counseled on need for cessation, provide nicotine patch while admitted  Recent CT chest for lung cancer screening 7/30/2024 negative for nodules or masses   N/A

## 2024-09-11 NOTE — ASU DISCHARGE PLAN (ADULT/PEDIATRIC) - HAVE YOU HAD A FIRST COVID-19 BOOSTER?
Nuclear Stress Test:  No evidence of ischemia. Large fixed inferior wall defect with hypokinesis. LVEF:  63 %.   Sade CHANDLER  
No

## 2024-10-02 NOTE — PATIENT PROFILE ADULT - HAVE YOU RECENTLY LOST WEIGHT WITHOUT TRYING?
Patient has appt with PCP 10/28 with labs prior      Metformin Antihyperglycemics Refill Protocol - 12 Month Protocol Gbitza33/02/2024 01:31 PM   Protocol Details Hgb A1c less than 8 within last 6 months looking at last value -- IF CRITERIA FAILED REFER TO PROTOCOL DETAILS      No (0)

## 2024-10-03 ENCOUNTER — NON-APPOINTMENT (OUTPATIENT)
Age: 39
End: 2024-10-03

## 2024-10-03 ENCOUNTER — APPOINTMENT (OUTPATIENT)
Dept: CARDIOLOGY | Facility: CLINIC | Age: 39
End: 2024-10-03

## 2024-10-16 ENCOUNTER — APPOINTMENT (OUTPATIENT)
Dept: CARDIOLOGY | Facility: CLINIC | Age: 39
End: 2024-10-16

## 2024-11-06 ENCOUNTER — APPOINTMENT (OUTPATIENT)
Dept: CARDIOLOGY | Facility: CLINIC | Age: 39
End: 2024-11-06
Payer: COMMERCIAL

## 2024-11-06 VITALS
WEIGHT: 152 LBS | HEART RATE: 105 BPM | SYSTOLIC BLOOD PRESSURE: 140 MMHG | BODY MASS INDEX: 24.43 KG/M2 | HEIGHT: 66 IN | DIASTOLIC BLOOD PRESSURE: 78 MMHG

## 2024-11-06 DIAGNOSIS — I10 ESSENTIAL (PRIMARY) HYPERTENSION: ICD-10-CM

## 2024-11-06 DIAGNOSIS — I42.6 ALCOHOLIC CARDIOMYOPATHY: ICD-10-CM

## 2024-11-06 PROCEDURE — 93000 ELECTROCARDIOGRAM COMPLETE: CPT

## 2024-11-06 PROCEDURE — G2211 COMPLEX E/M VISIT ADD ON: CPT | Mod: NC

## 2024-11-06 PROCEDURE — 99214 OFFICE O/P EST MOD 30 MIN: CPT

## 2024-11-16 ENCOUNTER — NON-APPOINTMENT (OUTPATIENT)
Age: 39
End: 2024-11-16

## 2024-11-20 ENCOUNTER — APPOINTMENT (OUTPATIENT)
Dept: CARDIOLOGY | Facility: CLINIC | Age: 39
End: 2024-11-20
Payer: COMMERCIAL

## 2024-11-20 PROCEDURE — 93306 TTE W/DOPPLER COMPLETE: CPT

## 2024-12-26 ENCOUNTER — INPATIENT (INPATIENT)
Facility: HOSPITAL | Age: 39
LOS: 5 days | Discharge: HOME | DRG: 897 | End: 2025-01-01
Attending: STUDENT IN AN ORGANIZED HEALTH CARE EDUCATION/TRAINING PROGRAM | Admitting: INTERNAL MEDICINE
Payer: COMMERCIAL

## 2024-12-26 VITALS
OXYGEN SATURATION: 99 % | DIASTOLIC BLOOD PRESSURE: 87 MMHG | HEART RATE: 125 BPM | SYSTOLIC BLOOD PRESSURE: 127 MMHG | TEMPERATURE: 98 F | RESPIRATION RATE: 22 BRPM | HEIGHT: 66 IN | WEIGHT: 154.98 LBS

## 2024-12-26 DIAGNOSIS — F10.929 ALCOHOL USE, UNSPECIFIED WITH INTOXICATION, UNSPECIFIED: ICD-10-CM

## 2024-12-26 LAB
ALBUMIN SERPL ELPH-MCNC: 3.1 G/DL — LOW (ref 3.5–5.2)
ALBUMIN SERPL ELPH-MCNC: 4 G/DL — SIGNIFICANT CHANGE UP (ref 3.5–5.2)
ALP SERPL-CCNC: 42 U/L — SIGNIFICANT CHANGE UP (ref 30–115)
ALP SERPL-CCNC: 59 U/L — SIGNIFICANT CHANGE UP (ref 30–115)
ALT FLD-CCNC: 58 U/L — HIGH (ref 0–41)
ALT FLD-CCNC: 83 U/L — HIGH (ref 0–41)
ANION GAP SERPL CALC-SCNC: 21 MMOL/L — HIGH (ref 7–14)
ANION GAP SERPL CALC-SCNC: 9 MMOL/L — SIGNIFICANT CHANGE UP (ref 7–14)
AST SERPL-CCNC: 160 U/L — HIGH (ref 0–41)
AST SERPL-CCNC: 241 U/L — HIGH (ref 0–41)
BASE EXCESS BLDV CALC-SCNC: 2.5 MMOL/L — SIGNIFICANT CHANGE UP (ref -2–3)
BASOPHILS # BLD AUTO: 0.05 K/UL — SIGNIFICANT CHANGE UP (ref 0–0.2)
BASOPHILS NFR BLD AUTO: 0.6 % — SIGNIFICANT CHANGE UP (ref 0–1)
BILIRUB DIRECT SERPL-MCNC: 0.4 MG/DL — HIGH (ref 0–0.3)
BILIRUB INDIRECT FLD-MCNC: 0.5 MG/DL — SIGNIFICANT CHANGE UP (ref 0.2–1.2)
BILIRUB SERPL-MCNC: 0.9 MG/DL — SIGNIFICANT CHANGE UP (ref 0.2–1.2)
BILIRUB SERPL-MCNC: 0.9 MG/DL — SIGNIFICANT CHANGE UP (ref 0.2–1.2)
BUN SERPL-MCNC: 10 MG/DL — SIGNIFICANT CHANGE UP (ref 10–20)
BUN SERPL-MCNC: 12 MG/DL — SIGNIFICANT CHANGE UP (ref 10–20)
CA-I SERPL-SCNC: 0.88 MMOL/L — LOW (ref 1.15–1.33)
CALCIUM SERPL-MCNC: 7 MG/DL — LOW (ref 8.4–10.5)
CALCIUM SERPL-MCNC: 7.5 MG/DL — LOW (ref 8.4–10.5)
CHLORIDE SERPL-SCNC: 84 MMOL/L — LOW (ref 98–110)
CHLORIDE SERPL-SCNC: 92 MMOL/L — LOW (ref 98–110)
CO2 SERPL-SCNC: 24 MMOL/L — SIGNIFICANT CHANGE UP (ref 17–32)
CO2 SERPL-SCNC: 30 MMOL/L — SIGNIFICANT CHANGE UP (ref 17–32)
CREAT SERPL-MCNC: 0.6 MG/DL — LOW (ref 0.7–1.5)
CREAT SERPL-MCNC: 0.7 MG/DL — SIGNIFICANT CHANGE UP (ref 0.7–1.5)
EGFR: 121 ML/MIN/1.73M2 — SIGNIFICANT CHANGE UP
EGFR: 127 ML/MIN/1.73M2 — SIGNIFICANT CHANGE UP
EOSINOPHIL # BLD AUTO: 0.01 K/UL — SIGNIFICANT CHANGE UP (ref 0–0.7)
EOSINOPHIL NFR BLD AUTO: 0.1 % — SIGNIFICANT CHANGE UP (ref 0–8)
GAS PNL BLDV: 123 MMOL/L — LOW (ref 136–145)
GAS PNL BLDV: SIGNIFICANT CHANGE UP
GAS PNL BLDV: SIGNIFICANT CHANGE UP
GLUCOSE SERPL-MCNC: 156 MG/DL — HIGH (ref 70–99)
GLUCOSE SERPL-MCNC: 220 MG/DL — HIGH (ref 70–99)
HCO3 BLDV-SCNC: 26 MMOL/L — SIGNIFICANT CHANGE UP (ref 22–29)
HCT VFR BLD CALC: 43.2 % — SIGNIFICANT CHANGE UP (ref 42–52)
HCT VFR BLDA CALC: 55 % — HIGH (ref 39–51)
HGB BLD CALC-MCNC: 18.4 G/DL — HIGH (ref 12.6–17.4)
HGB BLD-MCNC: 15.6 G/DL — SIGNIFICANT CHANGE UP (ref 14–18)
IMM GRANULOCYTES NFR BLD AUTO: 0.5 % — HIGH (ref 0.1–0.3)
LACTATE BLDV-MCNC: 6.5 MMOL/L — CRITICAL HIGH (ref 0.5–2)
LACTATE SERPL-SCNC: 5.2 MMOL/L — CRITICAL HIGH (ref 0.7–2)
LYMPHOCYTES # BLD AUTO: 0.85 K/UL — LOW (ref 1.2–3.4)
LYMPHOCYTES # BLD AUTO: 10.5 % — LOW (ref 20.5–51.1)
MAGNESIUM SERPL-MCNC: 1.3 MG/DL — LOW (ref 1.8–2.4)
MCHC RBC-ENTMCNC: 32 PG — HIGH (ref 27–31)
MCHC RBC-ENTMCNC: 36.1 G/DL — SIGNIFICANT CHANGE UP (ref 32–37)
MCV RBC AUTO: 88.5 FL — SIGNIFICANT CHANGE UP (ref 80–94)
MONOCYTES # BLD AUTO: 0.53 K/UL — SIGNIFICANT CHANGE UP (ref 0.1–0.6)
MONOCYTES NFR BLD AUTO: 6.6 % — SIGNIFICANT CHANGE UP (ref 1.7–9.3)
NEUTROPHILS # BLD AUTO: 6.59 K/UL — HIGH (ref 1.4–6.5)
NEUTROPHILS NFR BLD AUTO: 81.7 % — HIGH (ref 42.2–75.2)
NRBC # BLD: 0 /100 WBCS — SIGNIFICANT CHANGE UP (ref 0–0)
PCO2 BLDV: 38 MMHG — LOW (ref 42–55)
PH BLDV: 7.45 — HIGH (ref 7.32–7.43)
PHOSPHATE SERPL-MCNC: 1 MG/DL — LOW (ref 2.1–4.9)
PLATELET # BLD AUTO: 39 K/UL — LOW (ref 130–400)
PMV BLD: 11.3 FL — HIGH (ref 7.4–10.4)
PO2 BLDV: 62 MMHG — HIGH (ref 25–45)
POTASSIUM BLDV-SCNC: 3.8 MMOL/L — SIGNIFICANT CHANGE UP (ref 3.5–5.1)
POTASSIUM SERPL-MCNC: 3.4 MMOL/L — LOW (ref 3.5–5)
POTASSIUM SERPL-MCNC: 4.3 MMOL/L — SIGNIFICANT CHANGE UP (ref 3.5–5)
POTASSIUM SERPL-SCNC: 3.4 MMOL/L — LOW (ref 3.5–5)
POTASSIUM SERPL-SCNC: 4.3 MMOL/L — SIGNIFICANT CHANGE UP (ref 3.5–5)
PROT SERPL-MCNC: 4.5 G/DL — LOW (ref 6–8)
PROT SERPL-MCNC: 6.3 G/DL — SIGNIFICANT CHANGE UP (ref 6–8)
RBC # BLD: 4.88 M/UL — SIGNIFICANT CHANGE UP (ref 4.7–6.1)
RBC # FLD: 12.6 % — SIGNIFICANT CHANGE UP (ref 11.5–14.5)
SAO2 % BLDV: 89.7 % — HIGH (ref 67–88)
SODIUM SERPL-SCNC: 129 MMOL/L — LOW (ref 135–146)
SODIUM SERPL-SCNC: 131 MMOL/L — LOW (ref 135–146)
WBC # BLD: 8.07 K/UL — SIGNIFICANT CHANGE UP (ref 4.8–10.8)
WBC # FLD AUTO: 8.07 K/UL — SIGNIFICANT CHANGE UP (ref 4.8–10.8)

## 2024-12-26 PROCEDURE — 76705 ECHO EXAM OF ABDOMEN: CPT

## 2024-12-26 PROCEDURE — 83010 ASSAY OF HAPTOGLOBIN QUANT: CPT

## 2024-12-26 PROCEDURE — 83615 LACTATE (LD) (LDH) ENZYME: CPT

## 2024-12-26 PROCEDURE — 99291 CRITICAL CARE FIRST HOUR: CPT

## 2024-12-26 PROCEDURE — 99223 1ST HOSP IP/OBS HIGH 75: CPT

## 2024-12-26 PROCEDURE — 85384 FIBRINOGEN ACTIVITY: CPT

## 2024-12-26 PROCEDURE — 83735 ASSAY OF MAGNESIUM: CPT

## 2024-12-26 PROCEDURE — 85025 COMPLETE CBC W/AUTO DIFF WBC: CPT

## 2024-12-26 PROCEDURE — 87507 IADNA-DNA/RNA PROBE TQ 12-25: CPT

## 2024-12-26 PROCEDURE — 93010 ELECTROCARDIOGRAM REPORT: CPT

## 2024-12-26 PROCEDURE — 80061 LIPID PANEL: CPT

## 2024-12-26 PROCEDURE — 84100 ASSAY OF PHOSPHORUS: CPT

## 2024-12-26 PROCEDURE — 80053 COMPREHEN METABOLIC PANEL: CPT

## 2024-12-26 PROCEDURE — 85730 THROMBOPLASTIN TIME PARTIAL: CPT

## 2024-12-26 PROCEDURE — 97162 PT EVAL MOD COMPLEX 30 MIN: CPT | Mod: GP

## 2024-12-26 PROCEDURE — 87521 HEPATITIS C PROBE&RVRS TRNSC: CPT

## 2024-12-26 PROCEDURE — 86901 BLOOD TYPING SEROLOGIC RH(D): CPT

## 2024-12-26 PROCEDURE — 83605 ASSAY OF LACTIC ACID: CPT

## 2024-12-26 PROCEDURE — 85379 FIBRIN DEGRADATION QUANT: CPT

## 2024-12-26 PROCEDURE — 92610 EVALUATE SWALLOWING FUNCTION: CPT | Mod: GN

## 2024-12-26 PROCEDURE — 85610 PROTHROMBIN TIME: CPT

## 2024-12-26 PROCEDURE — 86850 RBC ANTIBODY SCREEN: CPT

## 2024-12-26 PROCEDURE — 85045 AUTOMATED RETICULOCYTE COUNT: CPT

## 2024-12-26 PROCEDURE — 86803 HEPATITIS C AB TEST: CPT

## 2024-12-26 PROCEDURE — 87389 HIV-1 AG W/HIV-1&-2 AB AG IA: CPT

## 2024-12-26 PROCEDURE — 93970 EXTREMITY STUDY: CPT

## 2024-12-26 PROCEDURE — 36415 COLL VENOUS BLD VENIPUNCTURE: CPT

## 2024-12-26 PROCEDURE — 80048 BASIC METABOLIC PNL TOTAL CA: CPT

## 2024-12-26 PROCEDURE — 80076 HEPATIC FUNCTION PANEL: CPT

## 2024-12-26 PROCEDURE — 86900 BLOOD TYPING SEROLOGIC ABO: CPT

## 2024-12-26 RX ORDER — SODIUM CHLORIDE 9 MG/ML
1000 INJECTION, SOLUTION INTRAVENOUS ONCE
Refills: 0 | Status: COMPLETED | OUTPATIENT
Start: 2024-12-26 | End: 2024-12-26

## 2024-12-26 RX ORDER — ENOXAPARIN SODIUM 60 MG/.6ML
40 INJECTION INTRAVENOUS; SUBCUTANEOUS EVERY 24 HOURS
Refills: 0 | Status: DISCONTINUED | OUTPATIENT
Start: 2024-12-26 | End: 2024-12-27

## 2024-12-26 RX ORDER — IBUPROFEN 200 MG
600 TABLET ORAL ONCE
Refills: 0 | Status: COMPLETED | OUTPATIENT
Start: 2024-12-26 | End: 2024-12-26

## 2024-12-26 RX ORDER — THIAMINE HYDROCHLORIDE 100 MG/ML
100 INJECTION, SOLUTION INTRAMUSCULAR; INTRAVENOUS ONCE
Refills: 0 | Status: COMPLETED | OUTPATIENT
Start: 2024-12-26 | End: 2024-12-26

## 2024-12-26 RX ORDER — SODIUM CHLORIDE 9 MG/ML
1000 INJECTION, SOLUTION INTRAVENOUS
Refills: 0 | Status: DISCONTINUED | OUTPATIENT
Start: 2024-12-26 | End: 2025-01-01

## 2024-12-26 RX ORDER — VITAMIN A 10000 UNIT
1 TABLET ORAL DAILY
Refills: 0 | Status: DISCONTINUED | OUTPATIENT
Start: 2024-12-26 | End: 2025-01-01

## 2024-12-26 RX ORDER — LORAZEPAM 1 MG/1
1 TABLET ORAL
Refills: 0 | Status: DISCONTINUED | OUTPATIENT
Start: 2024-12-26 | End: 2024-12-27

## 2024-12-26 RX ORDER — LORAZEPAM 1 MG/1
2 TABLET ORAL ONCE
Refills: 0 | Status: DISCONTINUED | OUTPATIENT
Start: 2024-12-26 | End: 2024-12-26

## 2024-12-26 RX ORDER — THIAMINE HYDROCHLORIDE 100 MG/ML
500 INJECTION, SOLUTION INTRAMUSCULAR; INTRAVENOUS EVERY 8 HOURS
Refills: 0 | Status: COMPLETED | OUTPATIENT
Start: 2024-12-26 | End: 2024-12-30

## 2024-12-26 RX ORDER — LORAZEPAM 1 MG/1
1 TABLET ORAL ONCE
Refills: 0 | Status: DISCONTINUED | OUTPATIENT
Start: 2024-12-26 | End: 2024-12-26

## 2024-12-26 RX ORDER — THIAMINE HYDROCHLORIDE 100 MG/ML
500 INJECTION, SOLUTION INTRAMUSCULAR; INTRAVENOUS EVERY 8 HOURS
Refills: 0 | Status: DISCONTINUED | OUTPATIENT
Start: 2024-12-26 | End: 2024-12-26

## 2024-12-26 RX ORDER — DEXMEDETOMIDINE HYDROCHLORIDE 4 UG/ML
0.5 INJECTION, SOLUTION INTRAVENOUS
Qty: 200 | Refills: 0 | Status: DISCONTINUED | OUTPATIENT
Start: 2024-12-26 | End: 2024-12-27

## 2024-12-26 RX ORDER — CHLORDIAZEPOXIDE HCL 5 MG
50 CAPSULE ORAL ONCE
Refills: 0 | Status: DISCONTINUED | OUTPATIENT
Start: 2024-12-26 | End: 2024-12-26

## 2024-12-26 RX ORDER — PANTOPRAZOLE 40 MG/1
40 TABLET, DELAYED RELEASE ORAL
Refills: 0 | Status: DISCONTINUED | OUTPATIENT
Start: 2024-12-26 | End: 2025-01-01

## 2024-12-26 RX ORDER — VITAMIN A 10000 UNIT
1 TABLET ORAL ONCE
Refills: 0 | Status: COMPLETED | OUTPATIENT
Start: 2024-12-26 | End: 2024-12-26

## 2024-12-26 RX ADMIN — DEXMEDETOMIDINE HYDROCHLORIDE 8.79 MICROGRAM(S)/KG/HR: 4 INJECTION, SOLUTION INTRAVENOUS at 18:46

## 2024-12-26 RX ADMIN — SODIUM CHLORIDE 100 MILLILITER(S): 9 INJECTION, SOLUTION INTRAVENOUS at 18:14

## 2024-12-26 RX ADMIN — LORAZEPAM 1 MILLIGRAM(S): 1 TABLET ORAL at 18:48

## 2024-12-26 RX ADMIN — SODIUM CHLORIDE 1000 MILLILITER(S): 9 INJECTION, SOLUTION INTRAVENOUS at 17:02

## 2024-12-26 RX ADMIN — SODIUM CHLORIDE 1000 MILLILITER(S): 9 INJECTION, SOLUTION INTRAVENOUS at 15:06

## 2024-12-26 RX ADMIN — SODIUM CHLORIDE 1000 MILLILITER(S): 9 INJECTION, SOLUTION INTRAVENOUS at 12:36

## 2024-12-26 RX ADMIN — THIAMINE HYDROCHLORIDE 100 MILLIGRAM(S): 100 INJECTION, SOLUTION INTRAMUSCULAR; INTRAVENOUS at 14:06

## 2024-12-26 RX ADMIN — LORAZEPAM 1 MILLIGRAM(S): 1 TABLET ORAL at 13:52

## 2024-12-26 RX ADMIN — DEXMEDETOMIDINE HYDROCHLORIDE 8.79 MICROGRAM(S)/KG/HR: 4 INJECTION, SOLUTION INTRAVENOUS at 16:05

## 2024-12-26 RX ADMIN — Medication 600 MILLIGRAM(S): at 12:08

## 2024-12-26 RX ADMIN — Medication 1 MILLIGRAM(S): at 14:06

## 2024-12-26 RX ADMIN — THIAMINE HYDROCHLORIDE 105 MILLIGRAM(S): 100 INJECTION, SOLUTION INTRAMUSCULAR; INTRAVENOUS at 17:02

## 2024-12-26 RX ADMIN — THIAMINE HYDROCHLORIDE 105 MILLIGRAM(S): 100 INJECTION, SOLUTION INTRAMUSCULAR; INTRAVENOUS at 21:19

## 2024-12-26 RX ADMIN — Medication 50 MILLIGRAM(S): at 12:08

## 2024-12-26 RX ADMIN — LORAZEPAM 2 MILLIGRAM(S): 1 TABLET ORAL at 17:11

## 2024-12-26 NOTE — H&P ADULT - NSHPLABSRESULTS_GEN_ALL_CORE
.  LABS:                         15.6   8.07  )-----------( 39       ( 26 Dec 2024 12:30 )             43.2     12-26    129[L]  |  84[L]  |  12  ----------------------------<  220[H]  4.3   |  24  |  0.7    Ca    7.5[L]      26 Dec 2024 12:30    TPro  6.3  /  Alb  4.0  /  TBili  0.9  /  DBili  x   /  AST  241[H]  /  ALT  83[H]  /  AlkPhos  59  12-26      Urinalysis Basic - ( 26 Dec 2024 12:30 )    Color: x / Appearance: x / SG: x / pH: x  Gluc: 220 mg/dL / Ketone: x  / Bili: x / Urobili: x   Blood: x / Protein: x / Nitrite: x   Leuk Esterase: x / RBC: x / WBC x   Sq Epi: x / Non Sq Epi: x / Bacteria: x                RADIOLOGY, EKG & ADDITIONAL TESTS: Reviewed.

## 2024-12-26 NOTE — ED ADULT TRIAGE NOTE - CHIEF COMPLAINT QUOTE
"I'm having alcohol withdrawals." Last ETOH intake ~ midnight, drank 1-2 liters Vodka. Pt states he feels depressed but denies suicidal thoughts. Pt tachycardic in Triage @ 125 bpm, GU=244. "I'm having alcohol withdrawals." Last ETOH intake ~ midnight, drank 1-2 liters Vodka. Pt states he feels depressed but denies suicidal thoughts. Pt tachycardic in Triage @ 125 bpm, YK=540. CIWA=7 (1=Tremors, 1=Anxiety, 1=Agitation, 1=Visual Disturbance, 3=Headache)

## 2024-12-26 NOTE — ED ADULT NURSE NOTE - CHIEF COMPLAINT QUOTE
"I'm having alcohol withdrawals." Last ETOH intake ~ midnight, drank 1-2 liters Vodka. Pt states he feels depressed but denies suicidal thoughts. Pt tachycardic in Triage @ 125 bpm, LE=660. CIWA=7 (1=Tremors, 1=Anxiety, 1=Agitation, 1=Visual Disturbance, 3=Headache)

## 2024-12-26 NOTE — H&P ADULT - ASSESSMENT
Severe alcohol withdrawal  History of withdrawal seizures   Thrombocytopenia   Hyponatremia     PLAN:    CNS: Precedex drip; Ativan symptoms driven PRN. CIWA score. Thiamine and Folic acid. Serum and urine drug screen. Addiction team pina. [facility] pina.     HEENT: Oral care    PULMONARY:  HOB @ 45 degrees. Aspiration precautions On room air. Etco2 monitor.     CARDIOVASCULAR: D5LR at 100cc per hour for now.     GI: GI prophylaxis.  Feeding: NPO, aspiration precautions. Daily LFTs.     RENAL:  Follow up lytes.  Correct as needed. No liz. Hyponatremia noted; likely hypovolemia. Check Mg and Ph levels.     INFECTIOUS DISEASE: Follow up cultures; observe off abc for now.    HEMATOLOGICAL:  DVT prophylaxis.    ENDOCRINE:  Follow up FS.  Insulin protocol if needed.    MUSCULOSKELETAL: bedrest with fall precautions.     CIWA elevated; admit to ICU for precedex drip and Ativan protocol.

## 2024-12-26 NOTE — ED PROVIDER NOTE - PHYSICAL EXAMINATION
PHYSICAL EXAM: I have reviewed current vital signs.  GENERAL: NAD, well-nourished; well-developed.  HEAD:  Normocephalic, atraumatic.  EYES: Conjunctiva and sclera clear.  ENT: MMM, no erythema/exudates.  CHEST/LUNG: Clear to auscultation bilaterally; no wheezes, rales, or rhonchi.  HEART: Regular rate and rhythm, normal S1 and S2; no murmurs, rubs, or gallops.  ABDOMEN: Soft, nontender, nondistended.  EXTREMITIES:  2+ peripheral pulses; no clubbing, cyanosis, or edema.  PSYCH: Mildly anxious.  NEUROLOGY: A&O x 3. Upper extremity tremulousness.  No tongue fasciculations.  SKIN: Warm and dry.

## 2024-12-26 NOTE — ED PROVIDER NOTE - CLINICAL SUMMARY MEDICAL DECISION MAKING FREE TEXT BOX
39-year-old male eating alcohol withdrawal.  Vital signs reviewed.  Patient was given benzos for symptom control.  Was given fluids, folate and thiamine.  Labs were reviewed, found to have hyponatremia.  He was evaluated by critical care team, accepted for admission to MICU. Prior records were reviewed.

## 2024-12-26 NOTE — ED PROVIDER NOTE - PROGRESS NOTE DETAILS
AE: Case discussed in detail with critical care fellow who approved patient for MICU.  Would like patient started on a Precedex drip. Attending Statement:  I have personally seen the patient.  Dr Lara and I provided critical care for  a total of 35 minutes. I provided a substantive portion of the care and the majority of the critical care time.

## 2024-12-26 NOTE — ED PROVIDER NOTE - ATTENDING CONTRIBUTION TO CARE
39-year-old male past med history patient history of alcohol withdrawal presenting to ED complaining of headache and not feeling well after binge drinking for the past 5 days after his girlfriend broke up with him denies SI/HI..  Patient states that he drank 2 L of vodka. He is awake and alert, appears tremulous and anxious, PERRL, supple neck, lungs CTA, equal entry, nontender abdomen, tachycardia, well-perfused extremities, awake and alert, follows all directions appropriately, no gross neuro deficits. Plan: Hydration, labs, benzos, anticipate admission.

## 2024-12-26 NOTE — ED ADULT NURSE NOTE - NSFALLRISKINTERV_ED_ALL_ED
Assistance OOB with selected safe patient handling equipment if applicable/Assistance with ambulation/Communicate fall risk and risk factors to all staff, patient, and family/Monitor gait and stability/Monitor for mental status changes and reorient to person, place, and time, as needed/Provide visual cue: yellow wristband, yellow gown, etc/Reinforce activity limits and safety measures with patient and family/Toileting schedule using arm’s reach rule for commode and bathroom/Use of alarms - bed, stretcher, chair and/or video monitoring/Call bell, personal items and telephone in reach/Instruct patient to call for assistance before getting out of bed/chair/stretcher/Non-slip footwear applied when patient is off stretcher/[place] to call system/Physically safe environment - no spills, clutter or unnecessary equipment/Purposeful Proactive Rounding/Room/bathroom lighting operational, light cord in reach

## 2024-12-26 NOTE — H&P ADULT - NSHPPHYSICALEXAM_GEN_ALL_CORE
LOS:     VITALS:   T(C): 36.8 (12-26-24 @ 11:28), Max: 36.8 (12-26-24 @ 11:28)  HR: 125 (12-26-24 @ 11:28) (125 - 125)  BP: 127/87 (12-26-24 @ 11:28) (127/87 - 127/87)  RR: 22 (12-26-24 @ 11:28) (22 - 22)  SpO2: 99% (12-26-24 @ 11:28) (99% - 99%)    GENERAL:tremulus  HEAD:  Atraumatic, Normocephalic  EYES: EOMI, PERRLA, conjunctiva and sclera clear  ENT: Moist mucous membranes  NECK: Supple, No JVD  CHEST/LUNG: Clear to auscultation bilaterally; No rales, rhonchi, wheezing, or rubs. Unlabored respirations  HEART: Regular rate and rhythm; No murmurs, rubs, or gallops  ABDOMEN: BSx4; Soft, nontender, nondistended  EXTREMITIES:  2+ Peripheral Pulses, brisk capillary refill. No clubbing, cyanosis, or edema  NERVOUS SYSTEM:  A&Ox3, no focal deficits   SKIN: No rashes or lesions

## 2024-12-26 NOTE — ED PROVIDER NOTE - OBJECTIVE STATEMENT
39-year-old male with past medical history of depression presents to the ED for evaluation of tremulousness.  Patient states he has been binge drinking 2 L of vodka over the last 5 days and was ruptured; last drink 2 AM this morning.  Patient is tremulous, feels anxious, and headache.  Patient has not had any recent nausea, vomiting, or hallucinations.

## 2024-12-26 NOTE — ED PROVIDER NOTE - TOBACCO USE
Continue current medications  Continue to work on diet and activity  Labs before follow-up.  
Unknown if ever smoked

## 2024-12-26 NOTE — H&P ADULT - NSHPREVIEWOFSYSTEMS_GEN_ALL_CORE
REVIEW OF SYSTEMS:    CONSTITUTIONAL: No weakness, fevers or chills  EYES/ENT: No visual changes;  No vertigo or throat pain   NECK: No pain or stiffness  RESPIRATORY: No cough, wheezing, hemoptysis; No shortness of breath  CARDIOVASCULAR: No chest pain or palpitations  GASTROINTESTINAL: No abdominal or epigastric pain. No nausea, vomiting, or hematemesis; No diarrhea or constipation. No melena or hematochezia.  GENITOURINARY: No dysuria, frequency or hematuria  NEUROLOGICAL: tremulous worsening  SKIN: No itching, rashes

## 2024-12-26 NOTE — H&P ADULT - HISTORY OF PRESENT ILLNESS
39-year-old male with past medical history of depression presents to the ED for evaluation of tremulousness.  Patient states he has been binge drinking 2 L of vodka over the last 5 days and was ruptured; last drink 2 AM this morning.  Patient is tremulous, feels anxious, and headache.  Patient has not had any recent nausea, vomiting, or hallucinations.  SigLabs Sodium 129 AG 21 glucose 220 calcium 7.5 ast 241 alt 83 13:31 - VBG - pH: 7.45  | pCO2: 38    | pO2: 62    | Lactate: 6.5

## 2024-12-26 NOTE — SBIRT NOTE ADULT - NSSBIRTBRIEFINTDET_GEN_A_CORE
Screening results were reviewed with the patient. Patient was provided educational materials on low-risk guidelines and substance use and health. Motivation and goals were discussed. Patient was not provided with a referral to substance use treatment because patient requires medical care. Patient's substance use will be addressed during their inpatient admission.  CATCH team engaged.

## 2024-12-26 NOTE — ED ADULT NURSE NOTE - OBJECTIVE STATEMENT
"I'm having alcohol withdrawals." Last ETOH intake ~ midnight, drank 1-2 liters Vodka. Pt states he feels depressed but denies suicidal thoughts. Pt tachycardic in Triage @ 125 bpm, RN=161. CIWA=7 (1=Tremors, 1=Anxiety, 1=Agitation, 1=Visual Disturbance, 3=Headache)

## 2024-12-27 DIAGNOSIS — E51.2 WERNICKE'S ENCEPHALOPATHY: ICD-10-CM

## 2024-12-27 DIAGNOSIS — F17.200 NICOTINE DEPENDENCE, UNSPECIFIED, UNCOMPLICATED: ICD-10-CM

## 2024-12-27 DIAGNOSIS — Z87.898 PERSONAL HISTORY OF OTHER SPECIFIED CONDITIONS: ICD-10-CM

## 2024-12-27 DIAGNOSIS — F10.239 ALCOHOL DEPENDENCE WITH WITHDRAWAL, UNSPECIFIED: ICD-10-CM

## 2024-12-27 LAB
ALBUMIN SERPL ELPH-MCNC: 3.2 G/DL — LOW (ref 3.5–5.2)
ALBUMIN SERPL ELPH-MCNC: 3.2 G/DL — LOW (ref 3.5–5.2)
ALP SERPL-CCNC: 45 U/L — SIGNIFICANT CHANGE UP (ref 30–115)
ALP SERPL-CCNC: 49 U/L — SIGNIFICANT CHANGE UP (ref 30–115)
ALT FLD-CCNC: 54 U/L — HIGH (ref 0–41)
ALT FLD-CCNC: 61 U/L — HIGH (ref 0–41)
ANION GAP SERPL CALC-SCNC: 8 MMOL/L — SIGNIFICANT CHANGE UP (ref 7–14)
ANION GAP SERPL CALC-SCNC: 8 MMOL/L — SIGNIFICANT CHANGE UP (ref 7–14)
ANION GAP SERPL CALC-SCNC: 9 MMOL/L — SIGNIFICANT CHANGE UP (ref 7–14)
APTT BLD: 26.7 SEC — LOW (ref 27–39.2)
AST SERPL-CCNC: 141 U/L — HIGH (ref 0–41)
AST SERPL-CCNC: 165 U/L — HIGH (ref 0–41)
BASOPHILS # BLD AUTO: 0.02 K/UL — SIGNIFICANT CHANGE UP (ref 0–0.2)
BASOPHILS # BLD AUTO: 0.04 K/UL — SIGNIFICANT CHANGE UP (ref 0–0.2)
BASOPHILS NFR BLD AUTO: 0.4 % — SIGNIFICANT CHANGE UP (ref 0–1)
BASOPHILS NFR BLD AUTO: 1.1 % — HIGH (ref 0–1)
BILIRUB DIRECT SERPL-MCNC: 0.3 MG/DL — SIGNIFICANT CHANGE UP (ref 0–0.3)
BILIRUB INDIRECT FLD-MCNC: 0.4 MG/DL — SIGNIFICANT CHANGE UP (ref 0.2–1.2)
BILIRUB SERPL-MCNC: 0.7 MG/DL — SIGNIFICANT CHANGE UP (ref 0.2–1.2)
BILIRUB SERPL-MCNC: 0.8 MG/DL — SIGNIFICANT CHANGE UP (ref 0.2–1.2)
BLD GP AB SCN SERPL QL: SIGNIFICANT CHANGE UP
BUN SERPL-MCNC: 10 MG/DL — SIGNIFICANT CHANGE UP (ref 10–20)
BUN SERPL-MCNC: 7 MG/DL — LOW (ref 10–20)
BUN SERPL-MCNC: 9 MG/DL — LOW (ref 10–20)
CALCIUM SERPL-MCNC: 7.1 MG/DL — LOW (ref 8.4–10.4)
CALCIUM SERPL-MCNC: 7.3 MG/DL — LOW (ref 8.4–10.4)
CALCIUM SERPL-MCNC: 7.3 MG/DL — LOW (ref 8.4–10.5)
CHLORIDE SERPL-SCNC: 92 MMOL/L — LOW (ref 98–110)
CHLORIDE SERPL-SCNC: 95 MMOL/L — LOW (ref 98–110)
CHLORIDE SERPL-SCNC: 97 MMOL/L — LOW (ref 98–110)
CO2 SERPL-SCNC: 25 MMOL/L — SIGNIFICANT CHANGE UP (ref 17–32)
CO2 SERPL-SCNC: 27 MMOL/L — SIGNIFICANT CHANGE UP (ref 17–32)
CO2 SERPL-SCNC: 30 MMOL/L — SIGNIFICANT CHANGE UP (ref 17–32)
CREAT SERPL-MCNC: 0.6 MG/DL — LOW (ref 0.7–1.5)
D DIMER BLD IA.RAPID-MCNC: 282 NG/ML DDU — HIGH
EGFR: 127 ML/MIN/1.73M2 — SIGNIFICANT CHANGE UP
EOSINOPHIL # BLD AUTO: 0.03 K/UL — SIGNIFICANT CHANGE UP (ref 0–0.7)
EOSINOPHIL # BLD AUTO: 0.06 K/UL — SIGNIFICANT CHANGE UP (ref 0–0.7)
EOSINOPHIL NFR BLD AUTO: 0.8 % — SIGNIFICANT CHANGE UP (ref 0–8)
EOSINOPHIL NFR BLD AUTO: 1.3 % — SIGNIFICANT CHANGE UP (ref 0–8)
FIBRINOGEN PPP-MCNC: 269 MG/DL — SIGNIFICANT CHANGE UP (ref 200–435)
GLUCOSE SERPL-MCNC: 101 MG/DL — HIGH (ref 70–99)
GLUCOSE SERPL-MCNC: 127 MG/DL — HIGH (ref 70–99)
GLUCOSE SERPL-MCNC: 67 MG/DL — LOW (ref 70–99)
HCT VFR BLD CALC: 37.5 % — LOW (ref 42–52)
HCT VFR BLD CALC: 39.1 % — LOW (ref 42–52)
HGB BLD-MCNC: 12.8 G/DL — LOW (ref 14–18)
HGB BLD-MCNC: 13.2 G/DL — LOW (ref 14–18)
IMM GRANULOCYTES NFR BLD AUTO: 0.2 % — SIGNIFICANT CHANGE UP (ref 0.1–0.3)
IMM GRANULOCYTES NFR BLD AUTO: 0.5 % — HIGH (ref 0.1–0.3)
INR BLD: 0.82 RATIO — SIGNIFICANT CHANGE UP (ref 0.65–1.3)
INR BLD: 0.87 RATIO — SIGNIFICANT CHANGE UP (ref 0.65–1.3)
LACTATE SERPL-SCNC: 1.1 MMOL/L — SIGNIFICANT CHANGE UP (ref 0.7–2)
LDH SERPL L TO P-CCNC: 382 U/L — HIGH (ref 50–242)
LYMPHOCYTES # BLD AUTO: 0.73 K/UL — LOW (ref 1.2–3.4)
LYMPHOCYTES # BLD AUTO: 0.8 K/UL — LOW (ref 1.2–3.4)
LYMPHOCYTES # BLD AUTO: 16.9 % — LOW (ref 20.5–51.1)
LYMPHOCYTES # BLD AUTO: 19.2 % — LOW (ref 20.5–51.1)
MAGNESIUM SERPL-MCNC: 1.5 MG/DL — LOW (ref 1.8–2.4)
MAGNESIUM SERPL-MCNC: 2 MG/DL — SIGNIFICANT CHANGE UP (ref 1.8–2.4)
MAGNESIUM SERPL-MCNC: 2.7 MG/DL — HIGH (ref 1.8–2.4)
MCHC RBC-ENTMCNC: 31.4 PG — HIGH (ref 27–31)
MCHC RBC-ENTMCNC: 31.4 PG — HIGH (ref 27–31)
MCHC RBC-ENTMCNC: 33.8 G/DL — SIGNIFICANT CHANGE UP (ref 32–37)
MCHC RBC-ENTMCNC: 34.1 G/DL — SIGNIFICANT CHANGE UP (ref 32–37)
MCV RBC AUTO: 92.1 FL — SIGNIFICANT CHANGE UP (ref 80–94)
MCV RBC AUTO: 93.1 FL — SIGNIFICANT CHANGE UP (ref 80–94)
MONOCYTES # BLD AUTO: 0.3 K/UL — SIGNIFICANT CHANGE UP (ref 0.1–0.6)
MONOCYTES # BLD AUTO: 0.42 K/UL — SIGNIFICANT CHANGE UP (ref 0.1–0.6)
MONOCYTES NFR BLD AUTO: 7.9 % — SIGNIFICANT CHANGE UP (ref 1.7–9.3)
MONOCYTES NFR BLD AUTO: 8.9 % — SIGNIFICANT CHANGE UP (ref 1.7–9.3)
NEUTROPHILS # BLD AUTO: 2.68 K/UL — SIGNIFICANT CHANGE UP (ref 1.4–6.5)
NEUTROPHILS # BLD AUTO: 3.41 K/UL — SIGNIFICANT CHANGE UP (ref 1.4–6.5)
NEUTROPHILS NFR BLD AUTO: 70.5 % — SIGNIFICANT CHANGE UP (ref 42.2–75.2)
NEUTROPHILS NFR BLD AUTO: 72.3 % — SIGNIFICANT CHANGE UP (ref 42.2–75.2)
NRBC # BLD: 0 /100 WBCS — SIGNIFICANT CHANGE UP (ref 0–0)
NRBC # BLD: 0 /100 WBCS — SIGNIFICANT CHANGE UP (ref 0–0)
PHOSPHATE SERPL-MCNC: 0.6 MG/DL — LOW (ref 2.1–4.9)
PHOSPHATE SERPL-MCNC: 1.1 MG/DL — LOW (ref 2.1–4.9)
PHOSPHATE SERPL-MCNC: 2 MG/DL — LOW (ref 2.1–4.9)
PLATELET # BLD AUTO: 21 K/UL — LOW (ref 130–400)
PLATELET # BLD AUTO: 21 K/UL — LOW (ref 130–400)
PMV BLD: 13 FL — HIGH (ref 7.4–10.4)
PMV BLD: 13.2 FL — HIGH (ref 7.4–10.4)
POTASSIUM SERPL-MCNC: 3.4 MMOL/L — LOW (ref 3.5–5)
POTASSIUM SERPL-MCNC: 3.7 MMOL/L — SIGNIFICANT CHANGE UP (ref 3.5–5)
POTASSIUM SERPL-MCNC: 3.7 MMOL/L — SIGNIFICANT CHANGE UP (ref 3.5–5)
POTASSIUM SERPL-SCNC: 3.4 MMOL/L — LOW (ref 3.5–5)
POTASSIUM SERPL-SCNC: 3.7 MMOL/L — SIGNIFICANT CHANGE UP (ref 3.5–5)
POTASSIUM SERPL-SCNC: 3.7 MMOL/L — SIGNIFICANT CHANGE UP (ref 3.5–5)
PROT SERPL-MCNC: 4.9 G/DL — LOW (ref 6–8)
PROT SERPL-MCNC: 5.1 G/DL — LOW (ref 6–8)
PROTHROM AB SERPL-ACNC: 10.2 SEC — SIGNIFICANT CHANGE UP (ref 9.95–12.87)
PROTHROM AB SERPL-ACNC: 9.6 SEC — LOW (ref 9.95–12.87)
RBC # BLD: 4.07 M/UL — LOW (ref 4.7–6.1)
RBC # BLD: 4.2 M/UL — LOW (ref 4.7–6.1)
RBC # BLD: 4.2 M/UL — LOW (ref 4.7–6.1)
RBC # FLD: 12.4 % — SIGNIFICANT CHANGE UP (ref 11.5–14.5)
RBC # FLD: 12.5 % — SIGNIFICANT CHANGE UP (ref 11.5–14.5)
RETICS #: 42.8 K/UL — SIGNIFICANT CHANGE UP (ref 25–125)
RETICS/RBC NFR: 1 % — SIGNIFICANT CHANGE UP (ref 0.5–1.5)
SODIUM SERPL-SCNC: 130 MMOL/L — LOW (ref 135–146)
SODIUM SERPL-SCNC: 130 MMOL/L — LOW (ref 135–146)
SODIUM SERPL-SCNC: 131 MMOL/L — LOW (ref 135–146)
WBC # BLD: 3.8 K/UL — LOW (ref 4.8–10.8)
WBC # BLD: 4.72 K/UL — LOW (ref 4.8–10.8)
WBC # FLD AUTO: 3.8 K/UL — LOW (ref 4.8–10.8)
WBC # FLD AUTO: 4.72 K/UL — LOW (ref 4.8–10.8)

## 2024-12-27 PROCEDURE — 99233 SBSQ HOSP IP/OBS HIGH 50: CPT

## 2024-12-27 PROCEDURE — 90792 PSYCH DIAG EVAL W/MED SRVCS: CPT

## 2024-12-27 PROCEDURE — 99222 1ST HOSP IP/OBS MODERATE 55: CPT

## 2024-12-27 RX ORDER — TRAZODONE HYDROCHLORIDE 150 MG/1
150 TABLET ORAL AT BEDTIME
Refills: 0 | Status: DISCONTINUED | OUTPATIENT
Start: 2024-12-27 | End: 2025-01-01

## 2024-12-27 RX ORDER — CHLORDIAZEPOXIDE HCL 5 MG
10 CAPSULE ORAL EVERY 4 HOURS
Refills: 0 | Status: DISCONTINUED | OUTPATIENT
Start: 2024-12-30 | End: 2024-12-31

## 2024-12-27 RX ORDER — POTASSIUM PHOSPHATE, MONOBASIC AND POTASSIUM PHOSPHATE, DIBASIC 224; 236 MG/ML; MG/ML
30 INJECTION, SOLUTION INTRAVENOUS ONCE
Refills: 0 | Status: DISCONTINUED | OUTPATIENT
Start: 2024-12-27 | End: 2024-12-28

## 2024-12-27 RX ORDER — GABAPENTIN 300 MG/1
400 CAPSULE ORAL THREE TIMES A DAY
Refills: 0 | Status: DISCONTINUED | OUTPATIENT
Start: 2024-12-27 | End: 2025-01-01

## 2024-12-27 RX ORDER — POTASSIUM PHOSPHATE, MONOBASIC AND POTASSIUM PHOSPHATE, DIBASIC 224; 236 MG/ML; MG/ML
30 INJECTION, SOLUTION INTRAVENOUS ONCE
Refills: 0 | Status: COMPLETED | OUTPATIENT
Start: 2024-12-27 | End: 2024-12-27

## 2024-12-27 RX ORDER — CHLORDIAZEPOXIDE HCL 5 MG
50 CAPSULE ORAL EVERY 4 HOURS
Refills: 0 | Status: DISCONTINUED | OUTPATIENT
Start: 2024-12-27 | End: 2024-12-28

## 2024-12-27 RX ORDER — NICOTINE POLACRILEX 4 MG/1
1 LOZENGE ORAL DAILY
Refills: 0 | Status: DISCONTINUED | OUTPATIENT
Start: 2024-12-27 | End: 2025-01-01

## 2024-12-27 RX ORDER — MAGNESIUM SULFATE 500 MG/ML
2 INJECTION, SOLUTION INTRAMUSCULAR; INTRAVENOUS
Refills: 0 | Status: COMPLETED | OUTPATIENT
Start: 2024-12-27 | End: 2024-12-27

## 2024-12-27 RX ORDER — LORAZEPAM 1 MG/1
2 TABLET ORAL
Refills: 0 | Status: DISCONTINUED | OUTPATIENT
Start: 2024-12-27 | End: 2024-12-31

## 2024-12-27 RX ORDER — ONDANSETRON 4 MG/1
4 TABLET ORAL ONCE
Refills: 0 | Status: COMPLETED | OUTPATIENT
Start: 2024-12-27 | End: 2024-12-27

## 2024-12-27 RX ORDER — CHLORDIAZEPOXIDE HCL 5 MG
25 CAPSULE ORAL EVERY 4 HOURS
Refills: 0 | Status: DISCONTINUED | OUTPATIENT
Start: 2024-12-28 | End: 2024-12-29

## 2024-12-27 RX ORDER — CHLORDIAZEPOXIDE HCL 5 MG
15 CAPSULE ORAL EVERY 4 HOURS
Refills: 0 | Status: DISCONTINUED | OUTPATIENT
Start: 2024-12-29 | End: 2024-12-30

## 2024-12-27 RX ORDER — CHLORDIAZEPOXIDE HCL 5 MG
CAPSULE ORAL
Refills: 0 | Status: COMPLETED | OUTPATIENT
Start: 2024-12-29 | End: 2024-12-31

## 2024-12-27 RX ADMIN — PANTOPRAZOLE 40 MILLIGRAM(S): 40 TABLET, DELAYED RELEASE ORAL at 07:48

## 2024-12-27 RX ADMIN — Medication 50 MILLIGRAM(S): at 16:19

## 2024-12-27 RX ADMIN — MAGNESIUM SULFATE 25 GRAM(S): 500 INJECTION, SOLUTION INTRAMUSCULAR; INTRAVENOUS at 00:52

## 2024-12-27 RX ADMIN — LORAZEPAM 1 MILLIGRAM(S): 1 TABLET ORAL at 04:45

## 2024-12-27 RX ADMIN — SODIUM CHLORIDE 100 MILLILITER(S): 9 INJECTION, SOLUTION INTRAVENOUS at 05:03

## 2024-12-27 RX ADMIN — THIAMINE HYDROCHLORIDE 105 MILLIGRAM(S): 100 INJECTION, SOLUTION INTRAMUSCULAR; INTRAVENOUS at 06:15

## 2024-12-27 RX ADMIN — ONDANSETRON 4 MILLIGRAM(S): 4 TABLET ORAL at 23:59

## 2024-12-27 RX ADMIN — LORAZEPAM 1 MILLIGRAM(S): 1 TABLET ORAL at 07:48

## 2024-12-27 RX ADMIN — LORAZEPAM 2 MILLIGRAM(S): 1 TABLET ORAL at 22:39

## 2024-12-27 RX ADMIN — LORAZEPAM 2 MILLIGRAM(S): 1 TABLET ORAL at 19:16

## 2024-12-27 RX ADMIN — Medication 50 MILLIGRAM(S): at 21:47

## 2024-12-27 RX ADMIN — LORAZEPAM 1 MILLIGRAM(S): 1 TABLET ORAL at 11:14

## 2024-12-27 RX ADMIN — LORAZEPAM 1 MILLIGRAM(S): 1 TABLET ORAL at 00:56

## 2024-12-27 RX ADMIN — LORAZEPAM 2 MILLIGRAM(S): 1 TABLET ORAL at 16:19

## 2024-12-27 RX ADMIN — LORAZEPAM 1 MILLIGRAM(S): 1 TABLET ORAL at 09:18

## 2024-12-27 RX ADMIN — THIAMINE HYDROCHLORIDE 105 MILLIGRAM(S): 100 INJECTION, SOLUTION INTRAMUSCULAR; INTRAVENOUS at 13:22

## 2024-12-27 RX ADMIN — Medication 1 MILLIGRAM(S): at 12:53

## 2024-12-27 RX ADMIN — TRAZODONE HYDROCHLORIDE 150 MILLIGRAM(S): 150 TABLET ORAL at 22:38

## 2024-12-27 RX ADMIN — THIAMINE HYDROCHLORIDE 105 MILLIGRAM(S): 100 INJECTION, SOLUTION INTRAMUSCULAR; INTRAVENOUS at 22:49

## 2024-12-27 RX ADMIN — Medication 50 MILLIGRAM(S): at 12:53

## 2024-12-27 RX ADMIN — MAGNESIUM SULFATE 25 GRAM(S): 500 INJECTION, SOLUTION INTRAMUSCULAR; INTRAVENOUS at 06:14

## 2024-12-27 RX ADMIN — LORAZEPAM 2 MILLIGRAM(S): 1 TABLET ORAL at 12:53

## 2024-12-27 RX ADMIN — GABAPENTIN 400 MILLIGRAM(S): 300 CAPSULE ORAL at 21:48

## 2024-12-27 RX ADMIN — POTASSIUM PHOSPHATE, MONOBASIC AND POTASSIUM PHOSPHATE, DIBASIC 83.33 MILLIMOLE(S): 224; 236 INJECTION, SOLUTION INTRAVENOUS at 01:43

## 2024-12-27 RX ADMIN — SODIUM CHLORIDE 100 MILLILITER(S): 9 INJECTION, SOLUTION INTRAVENOUS at 20:00

## 2024-12-27 RX ADMIN — MAGNESIUM SULFATE 25 GRAM(S): 500 INJECTION, SOLUTION INTRAMUSCULAR; INTRAVENOUS at 05:02

## 2024-12-27 NOTE — PROGRESS NOTE ADULT - SUBJECTIVE AND OBJECTIVE BOX
Over Night Events: events noted, on RA, precedex 0.3, Afebrile    PHYSICAL EXAM    ICU Vital Signs Last 24 Hrs  T(C): 36.8 (26 Dec 2024 23:36), Max: 36.9 (26 Dec 2024 16:09)  T(F): 98.2 (26 Dec 2024 23:36), Max: 98.5 (26 Dec 2024 16:09)  HR: 85 (26 Dec 2024 23:36) (85 - 125)  BP: 122/65 (26 Dec 2024 23:36) (107/61 - 130/63)  RR: 18 (26 Dec 2024 23:36) (18 - 22)  SpO2: 97% (26 Dec 2024 23:36) (96% - 99%)    O2 Parameters below as of 26 Dec 2024 20:16  Patient On (Oxygen Delivery Method): room air            General: comfortable/ snoring   Lungs: dec bs both bases  Cardiovascular: Regular   Abdomen: Soft, Positive BS  Extremities: No clubbing   Neurological: Non focal         LABS:                          15.6   8.07  )-----------( 39       ( 26 Dec 2024 12:30 )             43.2                                               12-27    130[L]  |  92[L]  |  10  ----------------------------<  127[H]  3.4[L]   |  30  |  0.6[L]    Ca    7.3[L]      27 Dec 2024 01:25  Phos  0.6     12-27  Mg     1.5     12-27    TPro  4.5[L]  /  Alb  3.1[L]  /  TBili  0.9  /  DBili  0.4[H]  /  AST  160[H]  /  ALT  58[H]  /  AlkPhos  42  12-26                                             Urinalysis Basic - ( 27 Dec 2024 01:25 )    Color: x / Appearance: x / SG: x / pH: x  Gluc: 127 mg/dL / Ketone: x  / Bili: x / Urobili: x   Blood: x / Protein: x / Nitrite: x   Leuk Esterase: x / RBC: x / WBC x   Sq Epi: x / Non Sq Epi: x / Bacteria: x                                                  LIVER FUNCTIONS - ( 26 Dec 2024 22:24 )  Alb: 3.1 g/dL / Pro: 4.5 g/dL / ALK PHOS: 42 U/L / ALT: 58 U/L / AST: 160 U/L / GGT: x                                                                                                                                       MEDICATIONS  (STANDING):  dexMEDEtomidine Infusion 0.5 MICROgram(s)/kG/Hr (8.79 mL/Hr) IV Continuous <Continuous>  dextrose 5% + lactated ringers. 1000 milliLiter(s) (100 mL/Hr) IV Continuous <Continuous>  enoxaparin Injectable 40 milliGRAM(s) SubCutaneous every 24 hours  folic acid 1 milliGRAM(s) Oral daily  magnesium sulfate  IVPB 2 Gram(s) IV Intermittent every 2 hours  pantoprazole    Tablet 40 milliGRAM(s) Oral before breakfast  thiamine IVPB 500 milliGRAM(s) IV Intermittent every 8 hours    MEDICATIONS  (PRN):  LORazepam   Injectable 1 milliGRAM(s) IV Push every 1 hour PRN CIWA-Ar score 8 or greater

## 2024-12-27 NOTE — BH CONSULTATION LIAISON ASSESSMENT NOTE - NSBHCONSULTRECOMMENDOTHER_PSY_A_CORE FT
-consult addiction medicine for management of alcohol withdrawal and AUD  -hold home medication of wellbutrin abd xanax  -restart home medication of Trazadone 150mg qhs   -restart home medication of Fluoxetine 15mg qd  - restart home medication of gabapentin 400mg tid  -order nicotine patch  -consult addiction medicine for management of alcohol withdrawal and AUD  -Hold home medication of wellbutrin and xanax  -restart home medication of Trazadone 150mg qhs for sleep  -restart home medication of Fluoxetine 15mg qd  -restart home medication of gabapentin --can increase to 400mg tid with help with anxiety/withdrawal symptoms  -Recommend nicotine patch

## 2024-12-27 NOTE — BH CONSULTATION LIAISON ASSESSMENT NOTE - SUMMARY
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 Psychiatry consulted for depressed mood. On exam, patient is very tremulous and c/o withdrawal symptoms. His affect is anxious and constricted. His thought process is linear with clear associations.    Most likely diagnosis is substance induced mood d/o vs. MDD. Although patient's symptoms are characteristic of MDD these symptoms are seen in the setting of alcohol use/ alcohol withdrawal. His depressed mood most likely contributed to his heavy drinking. In addition it appears that he had a psychosocial stressor ("bad breakup") that lead him to drink alcohol heavily and the alcohol use fully or partially contributed to the depressed mood and symptoms.    We recommend addiction medicine consult. Treatment of withdrawal symptoms and AUD will most likely improve patient's mood. We also recommend restarting some of the patient's home psych meds (fluoxetine, gabapentin, and Trazadone. 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 Psychiatry consulted for depressed mood. On exam, patient is very tremulous and c/o withdrawal symptoms. His affect is anxious and constricted. His thought process is linear with clear associations.    Most likely diagnosis is substance induced mood d/o vs. MDD. Although patient's symptoms are characteristic of MDD these symptoms are seen in the setting of alcohol use/ alcohol withdrawal. On the other hand, his depressed mood could contribute to heavy drinking as a maladaptive coping strategy. In addition it appears that he had a psychosocial stressor ("bad breakup") that lead him to drink alcohol heavily and the alcohol use fully or partially contributed to the depressed mood and symptoms.    We recommend addiction medicine consult. Treatment of withdrawal symptoms and AUD will most likely improve patient's mood. We also recommend restarting some of the patient's home psych meds (fluoxetine, gabapentin, and Trazadone. see below plan for more details.)

## 2024-12-27 NOTE — PROGRESS NOTE ADULT - ASSESSMENT
Impression :    Severe alcohol withdrawal/ DT  History of withdrawal seizures   Thrombocytopenia   Hyponatremia   HO AUD    PLAN:    CNS: Precedex drip taper and dc; Ativan symptoms driven PRN. CIWA score. Thiamine and Folic acid. Serum and urine drug screen. Addiction team pina. [facility] pina.     HEENT: Oral care    PULMONARY:  HOB @ 45 degrees. Aspiration precautions On room air. Etco2 monitor. CXR    CARDIOVASCULAR: D5LR at 100cc per hour    GI: GI prophylaxis.  Feeding: NPO, aspiration precautions. Daily LFTs.     RENAL:  Follow up lytes.  Correct as needed.     INFECTIOUS DISEASE: Follow up cultures; procal    HEMATOLOGICAL:  DVT prophylaxis. trend CBC    ENDOCRINE:  Follow up FS.  Insulin protocol if needed.    MUSCULOSKELETAL: bedrest with fall precautions.     Possible SDU on pm if off precedex

## 2024-12-27 NOTE — ED ADULT NURSE REASSESSMENT NOTE - NS ED NURSE REASSESS COMMENT FT1
pt asleep in bed, easy to arouse with tactile stimulation, monitor in place, Precedex D/C. Safety/comfort maintained. Call bell within reach. CIWA protocol maintained

## 2024-12-27 NOTE — BH CONSULTATION LIAISON ASSESSMENT NOTE - RISK ASSESSMENT
Patient is at moderate acute risk of suicidality due to substance use and depressed mood. However, he denies any SIs, and has a supportive family, and is treatment seeking which lowers his acute risk. In addition, treatment for AUD and treatment with antidepressants will further reduce this risk.    Patient will remain at elevated chronic risk of suicidality due to h/o mood d/o and CHOCO, and this risk can be mitigated by OP tx for depression and CHOCO.

## 2024-12-27 NOTE — BH CONSULTATION LIAISON ASSESSMENT NOTE - NSBHMSETHTASSOC_PSY_A_CORE
CARDIOVASCULAR - ADULT    • Maintains optimal cardiac output and hemodynamic stability Progressing    • Absence of cardiac arrhythmias or at baseline Progressing        Diabetes/Glucose Control    • Glucose maintained within prescribed range Progressing Normal

## 2024-12-27 NOTE — BH CONSULTATION LIAISON ASSESSMENT NOTE - NSBHCHARTREVIEWLAB_PSY_A_CORE FT
12.8   3.80  )-----------( 21       ( 27 Dec 2024 08:44 )             37.5   12-27    130[L]  |  95[L]  |  9[L]  ----------------------------<  101[H]  3.7   |  27  |  0.6[L]    Ca    7.1[L]      27 Dec 2024 08:44  Phos  2.0     12-27  Mg     2.7     12-27    TPro  4.9[L]  /  Alb  3.2[L]  /  TBili  0.8  /  DBili  x   /  AST  141[H]  /  ALT  54[H]  /  AlkPhos  45  12-27

## 2024-12-27 NOTE — BH CONSULTATION LIAISON ASSESSMENT NOTE - CURRENT MEDICATION
MEDICATIONS  (STANDING):  chlordiazePOXIDE   Oral   chlordiazePOXIDE 50 milliGRAM(s) Oral every 4 hours  dextrose 5% + lactated ringers. 1000 milliLiter(s) (100 mL/Hr) IV Continuous <Continuous>  enoxaparin Injectable 40 milliGRAM(s) SubCutaneous every 24 hours  folic acid 1 milliGRAM(s) Oral daily  pantoprazole    Tablet 40 milliGRAM(s) Oral before breakfast  thiamine IVPB 500 milliGRAM(s) IV Intermittent every 8 hours    MEDICATIONS  (PRN):  LORazepam   Injectable 2 milliGRAM(s) IV Push every 2 hours PRN CIWA-Ar score 8 or greater

## 2024-12-27 NOTE — CONSULT NOTE ADULT - ASSESSMENT
38yo domiciled Male with PMHx of withdrawal seizures and PPHx of depression, AUD and nicotine use disorder presenting to the ER after binging for 5 days admitted to medicine for alcohol withdrawal. Addiction Medicine consulted for alcohol withdrawal.     #Alcohol withdrawal:  #Hx of withdrawal seizures:  - patient with increased quantity of vodka consumption in the setting of hx of withdrawal seizures, now with elevated CIWAs despite prn doses of ativan, would recommend treatment with long acting fixed dose benzodiazepine taper as followed:   -  Day 1 Librium 50 mg q4h x24 hrs, Day 2 librium 25 mg q4h  x24 hrs, Day 3 librium 15 mg q4hr x24 hr, day 4 librium 10 mg q4h x 24 hr --> discontinue   - please continue CIWA symptom triggered CIWA >7 q2h prn for breakthrough withdrawal treatment in addition to fixed dose taper as needed  - hold benzodiazepines for sedation or respiratory depression  - keep Mg>2, K>4    -continue thiamine, folate and multivitamin   - please offer PRN medications for supportive management of withdrawal including:  - patient has prolonged QT, please use antiemetics that have a lower QT prolonging risk if needed for nausea  - tylenol 650 mg q6h prn mild/moderate pain  - trazodone 25 mg nightly prn for insomnia  - CATCH team to assist with substance use aftercare options including inpatient vs outpatient rehab and will have ongoing discussions regarding medications for AUD such as naltrexone as withdrawals improve    #Concern for Wernicke's Encephalopathy:   - given concerns for  ophthalmoplegia and hx of gait instability and recent nutritional deficiencies, clinical concern for Wernicke's encephalopathy.   - Would agree with IV thiamine 500 mg q 8h for 3 days, followed by 250 mg IV daily up to additional 5 days per [employer] recommendations. Can also opt to switch to oral thiamine 100 mg daily after 3 days of IV repletion.   - keep Mg >2, K>4    #Nicotine use disorder:  - recommend 21 mg nicotine patch    #Anxiety and depression:  - please confirm psychiatric medications with patient (patient did not want to repeat all his medications to writer on assessment)  - patient takes xanax 1 mg qd prn anxiety at home, can reorder 1 mg xanax qd prn anxiety    #ADHD:  - hold home Adderall 20 mg TID, does not need it in the hospital at this time     Thank you for this consult. Rest of care per primary. Addiction medicine will continue to follow. Please reach out with any questions or concerns via TEAMS.     
Impression :    Severe alcohol withdrawal  History of withdrawal seizures   Thrombocytopenia   Hyponatremia     PLAN:    CNS: Precedex drip; Ativan symptoms driven PRN. CIWA score. Thiamine and Folic acid. Serum and urine drug screen. Addiction team pina. [facility] pina.     HEENT: Oral care    PULMONARY:  HOB @ 45 degrees. Aspiration precautions On room air. Etco2 monitor.     CARDIOVASCULAR: D5LR at 100cc per hour for now.     GI: GI prophylaxis.  Feeding: NPO, aspiration precautions. Daily LFTs.     RENAL:  Follow up lytes.  Correct as needed. No liz. Hyponatremia noted; likely hypovolemia. Check Mg and Ph levels.     INFECTIOUS DISEASE: Follow up cultures; observe off abc for now.    HEMATOLOGICAL:  DVT prophylaxis.    ENDOCRINE:  Follow up FS.  Insulin protocol if needed.    MUSCULOSKELETAL: bedrest with fall precautions.     CIWA elevated; admit to ICU for precedex drip and Ativan protocol.

## 2024-12-27 NOTE — CONSULT NOTE ADULT - SUBJECTIVE AND OBJECTIVE BOX
Patient is a 39y old  Male who presents with a chief complaint of     HPI:      39-year-old male with past medical history of depression presents to the ED for evaluation of tremulousness.  Patient states he has been binge drinking 2 L of vodka over the last 5 days and was ruptured; last drink 2 AM this morning.  Patient is tremulous, feels anxious, and headache.  Patient has not had any recent nausea, vomiting, or hallucinations.      PAST MEDICAL & SURGICAL HISTORY:  No pertinent past medical history  anxiety  depression  polysubstance      No significant past surgical history          ETOH abuse     FAMILY HISTORY:  No pertinent family history in first degree relatives    :  No known cardiovacular family hisotry     ROS:  See HPI     Allergies    Ceclor (Rash; Hives)    Intolerances          PHYSICAL EXAM    ICU Vital Signs Last 24 Hrs  T(C): 36.8 (26 Dec 2024 11:28), Max: 36.8 (26 Dec 2024 11:28)  T(F): 98.2 (26 Dec 2024 11:28), Max: 98.2 (26 Dec 2024 11:28)  HR: 125 (26 Dec 2024 11:28) (125 - 125)  BP: 127/87 (26 Dec 2024 11:28) (127/87 - 127/87)  BP(mean): --  ABP: --  ABP(mean): --  RR: 22 (26 Dec 2024 11:28) (22 - 22)  SpO2: 99% (26 Dec 2024 11:28) (99% - 99%)    O2 Parameters below as of 26 Dec 2024 11:28  Patient On (Oxygen Delivery Method): room air            General: In NAD   HEENT:  HERMINIA              Lymphatic system: No cervical LN   Lungs: Bilateral BS  Cardiovascular: Regular  Gastrointestinal: Soft, Positive BS  Musculoskeletal: No clubbing.  Moves all extremities.  Full range of motion   Skin: Warm.  Intact  Neurological: Severe tremors; restless         LABS:                          15.6   8.07  )-----------( 39       ( 26 Dec 2024 12:30 )             43.2                                               12-26    129[L]  |  84[L]  |  12  ----------------------------<  220[H]  4.3   |  24  |  0.7    Ca    7.5[L]      26 Dec 2024 12:30    TPro  6.3  /  Alb  4.0  /  TBili  0.9  /  DBili  x   /  AST  241[H]  /  ALT  83[H]  /  AlkPhos  59  12-26                                             Urinalysis Basic - ( 26 Dec 2024 12:30 )    Color: x / Appearance: x / SG: x / pH: x  Gluc: 220 mg/dL / Ketone: x  / Bili: x / Urobili: x   Blood: x / Protein: x / Nitrite: x   Leuk Esterase: x / RBC: x / WBC x   Sq Epi: x / Non Sq Epi: x / Bacteria: x                                                  LIVER FUNCTIONS - ( 26 Dec 2024 12:30 )  Alb: 4.0 g/dL / Pro: 6.3 g/dL / ALK PHOS: 59 U/L / ALT: 83 U/L / AST: 241 U/L / GGT: x                                                                                                                                       X-Rays                                                                                     ECHO    MEDICATIONS  (STANDING):  dexMEDEtomidine Infusion 0.5 MICROgram(s)/kG/Hr (8.79 mL/Hr) IV Continuous <Continuous>    MEDICATIONS  (PRN):        
  Pt interviewed, examined and EMR chart reviewed.    40yo domiciled Male with PMHx of withdrawal seizures and PPHx of depression, AUD and nicotine use disorder presenting to the ER after binging for 5 days admitted to medicine for alcohol withdrawal. Addiction Medicine consulted for alcohol withdrawal.     Patient was seen in the ER, sitting upright in bed, visibly tremulous. Patient states that he broke up with his girlfriend two weeks ago and starting drinking double what he normally drinks for the past one week, drinking up to 2 Liters of vodka daily, hard for him to quantity his use since he has been buying gallon sized vodka. Patient states his alcohol use started increasing in severity in 2020 when he was drinking 1/2 liter vodka daily. Patient states that his last drink was less than 12 hours ago before admission. Patient admits to history of withdrawal seizures, per chart review appears to have had an admission for withdrawal seizures back in May 2024. Patient admits to ICU hospitalizations for severe withdrawals, admits to blackouts and gait unsteadiness despite periods of not being intoxicated. Patient has been to rehabs before for alcohol, most recently at [facility] in [place] earlier in Jan 2024.     Patient also admits to vaping 5% Juuls cartridge, going through one a week.     Patient denies illicit benzodiazepines but gets xanax prescribed for anxiety, denies cocaine and other amphetamines. Patient admits to smoking a joint of cannabis 1-2 times a week.     Patient admits to history of anxiety and depression, states that he was seeing a private psychiatrist virtually every 3 months, but he doesnt want that doctor anymore since he has not been seen in person. Patient denies history of psychiatric hosiptalizations, previous SA or NSSIB. States that he is on a few medications prescribed by the psychiatrist, "I already went through it with other doctors, I dont want to repeat them again." Patient denies SI/HI, AH/VH currently.     In the past 24 hours since ER, patient has received 6 mg IV Ativan and 50 mg oral librium total.   Patient admits to headaches, nausea, diaphoresis, restlessness, and tremors, CIWA 11, AAOx2, disoriented to date by 2 days.       [facility] ISTOP:   This report was requested by: April Sullivan | Reference #: [id number]    Practitioner Count: 2  Pharmacy Count: 3  Current Opioid Prescriptions: 0  Current Benzodiazepine Prescriptions: 1  Current Stimulant Prescriptions: 0      Patient Demographic Information (PDI)       PDI	First Name	Last Name	Birth Date	Gender	Street Address	[facility]	Zip Code  LILI Morton	1985	Male	[address] [place] [phone]    Prescription Information      PDI Filter:    PDI	My Rx	Current Rx	Drug Type	Rx Written	Rx Dispensed	Drug	Quantity	Days Supply	Prescriber Name	Prescriber VLAD #	Payment Method	Dispenser  A	N	Y	B	12/16/2024	12/17/2024	alprazolam 1 mg tablet	30	30	Tudda, Sarah	[id number]	Insurance	[facility] #[id number]  A	N	Y		12/16/2024	12/17/2024	testosterone cyp 2000 mg/10 ml	10ml	28	TuddaSarah	[id number]	[facility] #[id number]  A	N	N	S	11/22/2024	11/26/2024	dextroamp-amphetamin 20 mg tab	60	30	Minniti, Carla	[id number]	Insurance	[facility] #[id number]  A	N	N	B	11/18/2024	11/18/2024	alprazolam 1 mg tablet	30	30	Minniti, Carla	[id number]	Insurance	[facility] #[id number]  A	N	N		10/28/2024	11/18/2024	testosterone cyp 2000 mg/10 ml	10ml	28	Minniti, Carla	[id number]	[facility] #[id number]  A	N	N	S	10/28/2024	10/28/2024	dextroamp-amphetamin 20 mg tab	60	30	Minniti, Carla	[id number]	Insurance	[facility]  A	N	N	B	10/28/2024	10/28/2024	alprazolam 1 mg tablet	7	7	Minniti, Carla	[id number]	Insurance	[facility] #[id number]  A	N	N		09/25/2024	09/28/2024	testosterone cyp 200 mg/ml	4ml	28	Ana Quinn	[id number]	[facility] #[id number]  A	N	N	S	09/25/2024	09/25/2024	dextroamp-amphetamin 20 mg tab	60	30	Ana Quinn	[id number]	Insurance	[facility]  A	N	N		08/23/2024	08/29/2024	testosterone cyp 2000 mg/10 ml	10ml	35	Carla Bethea	[id number]	Insurance	[facility] #[id number]  A	N	N	S	08/23/2024	08/27/2024	dextroamp-amphetamin 20 mg tab	60	30	Carla Bethea	[id number]	Insurance	[facility]  A	N	N	S	07/24/2024	07/29/2024	dextroamp-amphetamin 20 mg tab	60	30	Angelina Lobo C	[id number]	Insurance	[facility]  A	N	N	S	06/28/2024	06/28/2024	dextroamp-amphetamin 20 mg tab	60	30	Angelina Lobo C	[id number]	Insurance	[facility]  A	N	N		06/18/2024	06/24/2024	testosterone cyp 2000 mg/10 ml	10ml	70	Ana Quinn	[id number]	Insurance	[facility] #[id number]  A	N	N	S	05/21/2024	05/29/2024	dextroamp-amphetamin 20 mg tab	60	30	Angelina Lobo C	[id number]	Insurance	[facility]  A	N	N	S	04/30/2024	04/30/2024	dextroamp-amphetamin 20 mg tab	60	30	Angelina Lobo C	[id number]	Insurance	[facility]  A	N	N		03/26/2024	04/10/2024	testosterone cyp 2000 mg/10 ml	10ml	70	Angelina Lobo C	[id number]	Insurance	[facility] #[id number]  A	N	N	S	03/26/2024	03/26/2024	dextroamp-amphetamin 20 mg tab	60	30	Angelina Lobo C	[id number]	Insurance	[facility]  A	N	N		03/12/2024	03/12/2024	testosterone cyp 200 mg/ml	4ml	28	Angelina Lobo C	[id number]	Insurance	[facility] #[id number]  A	N	N	S	02/27/2024	02/27/2024	dextroamp-amphetamin 20 mg tab	60	30	Angelina Lobo C	[id number]	Insurance	[facility]  A	N	N		02/06/2024	02/21/2024	testosterone cyp 200 mg/ml	2ml	28	Sarah Velasco	[id number]	Insurance	[facility] #[id number]  A	N	N	S	01/08/2024	01/08/2024	dextroamp-amphetamin 20 mg tab	60	30	Sarah Velasco	[id number]	Insurance	[facility]      SOCIAL HISTORY:      REVIEW OF SYSTEMS:per HPI     MEDICATIONS  (STANDING):  dextrose 5% + lactated ringers. 1000 milliLiter(s) (100 mL/Hr) IV Continuous <Continuous>  enoxaparin Injectable 40 milliGRAM(s) SubCutaneous every 24 hours  folic acid 1 milliGRAM(s) Oral daily  pantoprazole    Tablet 40 milliGRAM(s) Oral before breakfast  thiamine IVPB 500 milliGRAM(s) IV Intermittent every 8 hours    MEDICATIONS  (PRN):    Vital Signs Last 24 Hrs  T(C): 36.9 (27 Dec 2024 07:41), Max: 36.9 (26 Dec 2024 16:09)  T(F): 98.4 (27 Dec 2024 07:41), Max: 98.5 (26 Dec 2024 16:09)  HR: 68 (27 Dec 2024 07:41) (68 - 120)  BP: 107/58 (27 Dec 2024 07:41) (107/58 - 132/68)  BP(mean): --  RR: 18 (27 Dec 2024 07:41) (17 - 22)  SpO2: 98% (27 Dec 2024 07:41) (96% - 98%)    Parameters below as of 27 Dec 2024 07:02  Patient On (Oxygen Delivery Method): room air        PHYSICAL EXAM:    Constitutional: NAD, well-groomed, well-developed  HEENT: PERRLA, EOMI, + horizontal nystagmus b/l   Respiratory: no increased work of breathing  Neurological: A/O x 3, no focal deficits, moderate b/l UE tremors     MENTAL STATUS EXAM:  Appearance: anxious, in hospital attire   Appearance in relation to age: looks stated age      Hygiene/Grooming: disheveled   Attitude toward examiner: fully cooperative  Alertness: alert  Orientation: oriented to place and person, off by date of 2 days   Posture: lying in bed  Gait: not evaluated  Behavior and psychomotor activity: mild psychomotor retardation   Mood: "not good"   Affect: constricted range  Speech: fluent and spontaneous; normal rate, rhythm, volume and tone   Perceptions: denies hallucinations  Thought process: logical, linear and goal-directed    Thought content: no delusions or particular preoccupation, denies SI/HI  Associations: no loosening of associations   Impulse Control: aware of socially acceptable behavior  Insight: limited, has previously sought treatment with continued frequent relapses   Judgment: poor    LABS:                        12.8   3.80  )-----------( 21       ( 27 Dec 2024 08:44 )             37.5     12-27    130[L]  |  95[L]  |  9[L]  ----------------------------<  101[H]  3.7   |  27  |  0.6[L]    Ca    7.1[L]      27 Dec 2024 08:44  Phos  2.0     12-27  Mg     2.7     12-27    TPro  4.9[L]  /  Alb  3.2[L]  /  TBili  0.8  /  DBili  x   /  AST  141[H]  /  ALT  54[H]  /  AlkPhos  45  12-27    PT/INR - ( 27 Dec 2024 08:44 )   PT: 10.20 sec;   INR: 0.87 ratio         Urinalysis Basic - ( 27 Dec 2024 08:44 )    Color: x / Appearance: x / SG: x / pH: x  Gluc: 101 mg/dL / Ketone: x  / Bili: x / Urobili: x   Blood: x / Protein: x / Nitrite: x   Leuk Esterase: x / RBC: x / WBC x   Sq Epi: x / Non Sq Epi: x / Bacteria: x      Drug Screen Urine: none  Alcohol Level: none        RADIOLOGY & ADDITIONAL STUDIES:  < from: 12 Lead ECG (12.26.24 @ 11:36) >    Ventricular Rate 119 BPM    Atrial Rate 119 BPM    P-R Interval 122 ms    QRS Duration 70 ms    Q-T Interval 362 ms    QTC Calculation(Bazett) 509 ms    P Axis 58 degrees    R Axis 66 degrees    T Axis 43 degrees    Diagnosis Line Sinus tachycardia  Nonspecific ST abnormality  Abnormal ECG  Confirmed by Genaro Cordova ([id number]) on 12/26/2024 7:05:35 PM    < end of copied text >

## 2024-12-27 NOTE — BH CONSULTATION LIAISON ASSESSMENT NOTE - HPI (INCLUDE ILLNESS QUALITY, SEVERITY, DURATION, TIMING, CONTEXT, MODIFYING FACTORS, ASSOCIATED SIGNS AND SYMPTOMS)
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 Psychiatry consulted for depressed mood.     Patient reports that he has been dealing with depression for "a long time" with his first episode being in his late 20s. His most recent episode is characterized by depressed mood, feeling of guilt/ shame, difficulty sleeping, decreased interest in activities previously enjoyed, decreased energy and impaired concentration. Patient does not know if his depressed mood or symptoms are caused by the alcohol use, but does suspect the alcohol use is contributing to it. He has a long h/o alcohol use and states that he was on naltrexone at some point, but stopped taking it. In Dec of 2023 he had a bad breakup with his gf of 7 yrs. At that time he began drinking alcohol more heavily and as time went by and the separation became more official he began drinking even more. Sometimes he drinks a 6 pack a day, sometimes he drink two 6-packs in a day, and in the past few months he began drinking IL of vodka a day. He has been treated for depression in the oupt setting and reports that he was taking wellbutrin, trazadone, fluoxetine, gabapentin, and xanax.   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 Psychiatry consulted for depressed mood.     Patient reports that he has been dealing with depression for "a long time" with his first episode being in his late 20s. His most recent episode is characterized by depressed mood, feeling of guilt/ shame, difficulty sleeping, decreased interest in activities previously enjoyed, decreased energy and impaired concentration. Patient does not know if his depressed mood or symptoms are caused by the alcohol use, but does suspect the alcohol use is contributing to it. He has a long h/o alcohol use and states that he was on naltrexone at some point, but stopped taking it. In Dec of 2023 he had a bad breakup with his gf of 7 yrs. At that time he began drinking alcohol more heavily and as time went by and the separation became more official he began drinking even more. Sometimes he drinks a 6 pack a day, sometimes he drink two 6-packs in a day, and in the past few months he began drinking IL of vodka a day. He has been treated for depression in the oupt setting and reports that he was taking Wellbutrin, Trazadone, fluoxetine, gabapentin, and xanax, however he stopped it the last time he came to ED because "they took it away". He denies any SI and any past SAs.     He reports using cocaine, ectasy, opioids, benzos, and "you name it" in the past, however he has not used those substances in a while. He uses nicotine through vaping, and smoke a half a joint of cannabis once a week. He started drinking at the age of 19. He was arrested and incarcerated in the past for DUI.     On ROS he denies any symptoms of PTSD, no past episodes of mindy/ hypomania, no HI. He reports that he was once admitted to IPP for psychosis after "smoking something", however aside from that he has never experienced hallucinations. He reports some mild paranoia of sometimes thinking people are talking about him. He denies any FH of any psychiatric conditions and denies any personal psych history aside from depression.

## 2024-12-27 NOTE — BH CONSULTATION LIAISON ASSESSMENT NOTE - NSBHATTESTCOMMENTATTENDFT_PSY_A_CORE
I saw and evaluated the patient today 12-27-24 with resident during key/critical portions of the visit.  Nursing/primary team/consultant records reviewed. I agree with the resident's note including past psych history, home medications, social history, allergies, surgical history, family history, and review of system. I have reviewed relevant vitals, laboratory values, imaging studies, and microbiology.  I agree with the trainee's findings and assessment and plan as documented in the trainee's note.     - Above resident's note was edited by me     - agree with rest of A/P as per detailed resident note, unless noted below.

## 2024-12-27 NOTE — CONSULT NOTE ADULT - TIME BILLING
Chart review of current notes, labs, imaging and past admission documentation, counseling, patient education and harm reduction counseling, coordination of care and documentation of treatment plan.

## 2024-12-27 NOTE — BH CONSULTATION LIAISON ASSESSMENT NOTE - PAST PSYCHOTROPIC MEDICATION
-Wellbutrin 150mg qd  -gabapentin 400mg bid  -Trazadone 150mg qhs  -fluoxetine 15mg qd  -xanax 1mg qd PRN for anxiety

## 2024-12-27 NOTE — BH CONSULTATION LIAISON ASSESSMENT NOTE - NSBHCHARTREVIEWVS_PSY_A_CORE FT
Vital Signs Last 24 Hrs  T(C): 36.9 (27 Dec 2024 07:41), Max: 36.9 (26 Dec 2024 16:09)  T(F): 98.4 (27 Dec 2024 07:41), Max: 98.5 (26 Dec 2024 16:09)  HR: 68 (27 Dec 2024 07:41) (68 - 120)  BP: 107/58 (27 Dec 2024 07:41) (107/58 - 132/68)  BP(mean): --  RR: 18 (27 Dec 2024 07:41) (17 - 22)  SpO2: 98% (27 Dec 2024 07:41) (96% - 98%)    Parameters below as of 27 Dec 2024 07:02  Patient On (Oxygen Delivery Method): room air

## 2024-12-28 LAB
ALBUMIN SERPL ELPH-MCNC: 3.7 G/DL — SIGNIFICANT CHANGE UP (ref 3.5–5.2)
ALP SERPL-CCNC: 60 U/L — SIGNIFICANT CHANGE UP (ref 30–115)
ALT FLD-CCNC: 62 U/L — HIGH (ref 0–41)
ANION GAP SERPL CALC-SCNC: 12 MMOL/L — SIGNIFICANT CHANGE UP (ref 7–14)
AST SERPL-CCNC: 140 U/L — HIGH (ref 0–41)
BILIRUB SERPL-MCNC: 0.5 MG/DL — SIGNIFICANT CHANGE UP (ref 0.2–1.2)
BUN SERPL-MCNC: 7 MG/DL — LOW (ref 10–20)
CALCIUM SERPL-MCNC: 7.9 MG/DL — LOW (ref 8.4–10.5)
CHLORIDE SERPL-SCNC: 100 MMOL/L — SIGNIFICANT CHANGE UP (ref 98–110)
CO2 SERPL-SCNC: 25 MMOL/L — SIGNIFICANT CHANGE UP (ref 17–32)
CREAT SERPL-MCNC: 0.8 MG/DL — SIGNIFICANT CHANGE UP (ref 0.7–1.5)
EGFR: 116 ML/MIN/1.73M2 — SIGNIFICANT CHANGE UP
GLUCOSE SERPL-MCNC: 97 MG/DL — SIGNIFICANT CHANGE UP (ref 70–99)
HAPTOGLOB SERPL-MCNC: 106 MG/DL — SIGNIFICANT CHANGE UP (ref 34–200)
HCV AB S/CO SERPL IA: 0.05 COI — SIGNIFICANT CHANGE UP
HCV AB SERPL-IMP: SIGNIFICANT CHANGE UP
HIV 1+2 AB+HIV1 P24 AG SERPL QL IA: SIGNIFICANT CHANGE UP
PHOSPHATE SERPL-MCNC: 1.4 MG/DL — LOW (ref 2.1–4.9)
PHOSPHATE SERPL-MCNC: 3.5 MG/DL — SIGNIFICANT CHANGE UP (ref 2.1–4.9)
POTASSIUM SERPL-MCNC: 3.7 MMOL/L — SIGNIFICANT CHANGE UP (ref 3.5–5)
POTASSIUM SERPL-SCNC: 3.7 MMOL/L — SIGNIFICANT CHANGE UP (ref 3.5–5)
PROT SERPL-MCNC: 5.6 G/DL — LOW (ref 6–8)
SODIUM SERPL-SCNC: 137 MMOL/L — SIGNIFICANT CHANGE UP (ref 135–146)

## 2024-12-28 PROCEDURE — 99232 SBSQ HOSP IP/OBS MODERATE 35: CPT

## 2024-12-28 PROCEDURE — 76705 ECHO EXAM OF ABDOMEN: CPT | Mod: 26

## 2024-12-28 PROCEDURE — 93970 EXTREMITY STUDY: CPT | Mod: 26

## 2024-12-28 PROCEDURE — 99233 SBSQ HOSP IP/OBS HIGH 50: CPT

## 2024-12-28 RX ORDER — INFLUENZA A VIRUS A/WISCONSIN/588/2019 (H1N1) RECOMBINANT HEMAGGLUTININ ANTIGEN, INFLUENZA A VIRUS A/DARWIN/6/2021 (H3N2) RECOMBINANT HEMAGGLUTININ ANTIGEN, INFLUENZA B VIRUS B/AUSTRIA/1359417/2021 RECOMBINANT HEMAGGLUTININ ANTIGEN, AND INFLUENZA B VIRUS B/PHUKET/3073/2013 RECOMBINANT HEMAGGLUTININ ANTIGEN 45; 45; 45; 45 UG/.5ML; UG/.5ML; UG/.5ML; UG/.5ML
0.5 INJECTION INTRAMUSCULAR ONCE
Refills: 0 | Status: DISCONTINUED | OUTPATIENT
Start: 2024-12-28 | End: 2024-12-30

## 2024-12-28 RX ORDER — BENZONATATE 100 MG
100 CAPSULE ORAL THREE TIMES A DAY
Refills: 0 | Status: DISCONTINUED | OUTPATIENT
Start: 2024-12-28 | End: 2025-01-01

## 2024-12-28 RX ORDER — ENOXAPARIN SODIUM 60 MG/.6ML
40 INJECTION INTRAVENOUS; SUBCUTANEOUS EVERY 24 HOURS
Refills: 0 | Status: DISCONTINUED | OUTPATIENT
Start: 2024-12-28 | End: 2025-01-01

## 2024-12-28 RX ORDER — POTASSIUM PHOSPHATE, MONOBASIC AND POTASSIUM PHOSPHATE, DIBASIC 224; 236 MG/ML; MG/ML
30 INJECTION, SOLUTION INTRAVENOUS ONCE
Refills: 0 | Status: COMPLETED | OUTPATIENT
Start: 2024-12-28 | End: 2024-12-28

## 2024-12-28 RX ADMIN — Medication 50 MILLIGRAM(S): at 06:52

## 2024-12-28 RX ADMIN — Medication 25 MILLIGRAM(S): at 14:42

## 2024-12-28 RX ADMIN — NICOTINE POLACRILEX 1 PATCH: 4 LOZENGE ORAL at 19:37

## 2024-12-28 RX ADMIN — POTASSIUM PHOSPHATE, MONOBASIC AND POTASSIUM PHOSPHATE, DIBASIC 83.33 MILLIMOLE(S): 224; 236 INJECTION, SOLUTION INTRAVENOUS at 11:10

## 2024-12-28 RX ADMIN — Medication 15 MILLIGRAM(S): at 11:37

## 2024-12-28 RX ADMIN — Medication 50 MILLIGRAM(S): at 02:59

## 2024-12-28 RX ADMIN — Medication 25 MILLIGRAM(S): at 11:11

## 2024-12-28 RX ADMIN — LORAZEPAM 2 MILLIGRAM(S): 1 TABLET ORAL at 08:36

## 2024-12-28 RX ADMIN — SODIUM CHLORIDE 100 MILLILITER(S): 9 INJECTION, SOLUTION INTRAVENOUS at 22:17

## 2024-12-28 RX ADMIN — Medication 25 MILLIGRAM(S): at 18:34

## 2024-12-28 RX ADMIN — Medication 100 MILLIGRAM(S): at 22:17

## 2024-12-28 RX ADMIN — Medication 1 MILLIGRAM(S): at 11:37

## 2024-12-28 RX ADMIN — THIAMINE HYDROCHLORIDE 105 MILLIGRAM(S): 100 INJECTION, SOLUTION INTRAMUSCULAR; INTRAVENOUS at 07:08

## 2024-12-28 RX ADMIN — LORAZEPAM 2 MILLIGRAM(S): 1 TABLET ORAL at 14:41

## 2024-12-28 RX ADMIN — PANTOPRAZOLE 40 MILLIGRAM(S): 40 TABLET, DELAYED RELEASE ORAL at 06:51

## 2024-12-28 RX ADMIN — LORAZEPAM 2 MILLIGRAM(S): 1 TABLET ORAL at 22:18

## 2024-12-28 RX ADMIN — THIAMINE HYDROCHLORIDE 105 MILLIGRAM(S): 100 INJECTION, SOLUTION INTRAMUSCULAR; INTRAVENOUS at 22:17

## 2024-12-28 RX ADMIN — Medication 50 MILLIGRAM(S): at 02:16

## 2024-12-28 RX ADMIN — GABAPENTIN 400 MILLIGRAM(S): 300 CAPSULE ORAL at 06:51

## 2024-12-28 RX ADMIN — NICOTINE POLACRILEX 1 PATCH: 4 LOZENGE ORAL at 11:37

## 2024-12-28 RX ADMIN — LORAZEPAM 2 MILLIGRAM(S): 1 TABLET ORAL at 18:34

## 2024-12-28 RX ADMIN — GABAPENTIN 400 MILLIGRAM(S): 300 CAPSULE ORAL at 22:16

## 2024-12-28 RX ADMIN — GABAPENTIN 400 MILLIGRAM(S): 300 CAPSULE ORAL at 14:42

## 2024-12-28 RX ADMIN — Medication 25 MILLIGRAM(S): at 22:17

## 2024-12-28 RX ADMIN — THIAMINE HYDROCHLORIDE 105 MILLIGRAM(S): 100 INJECTION, SOLUTION INTRAMUSCULAR; INTRAVENOUS at 14:42

## 2024-12-28 RX ADMIN — TRAZODONE HYDROCHLORIDE 150 MILLIGRAM(S): 150 TABLET ORAL at 22:17

## 2024-12-28 NOTE — PROGRESS NOTE ADULT - SUBJECTIVE AND OBJECTIVE BOX
Pt interviewed, examined and EMR chart reviewed.    38 yo domiciled Male with PMHx of withdrawal seizures and PPHx of depression, AUD and nicotine use disorder presenting to the ER after binging for 5 days admitted to medicine for alcohol withdrawal. Addiction Medicine consulted for alcohol withdrawal.     In the past 24 hours, patient has received 4 mg IV Ativan and 100mg oral librium total.   Patient admits to headaches, nausea, and tremors, disoriented to date by 2 days. CIWA 8-9.    [place] ISTOP:   This report was requested by: April Sullivan | Reference #: [id number]    Practitioner Count: 2  Pharmacy Count: 3  Current Opioid Prescriptions: 0  Current Benzodiazepine Prescriptions: 1  Current Stimulant Prescriptions: 0      Patient Demographic Information (PDI)       PDI	First Name	Last Name	Birth Date	Gender	Street Address	[facility]	Zip Code  LILI Morton	1985	Male	[address] [place] [phone]    Prescription Information      PDI Filter:    PDI	My Rx	Current Rx	Drug Type	Rx Written	Rx Dispensed	Drug	Quantity	Days Supply	Prescriber Name	Prescriber VLAD #	Payment Method	Dispenser  A	N	Y	B	12/16/2024	12/17/2024	alprazolam 1 mg tablet	30	30	Tudda, Sarah	[id number]	Insurance	[facility] #[id number]  A	N	Y		12/16/2024	12/17/2024	testosterone cyp 2000 mg/10 ml	10ml	28	Tudda Sarah	[id number]	[facility] #[id number]  A	N	N	S	11/22/2024	11/26/2024	dextroamp-amphetamin 20 mg tab	60	30	Minniti, Carla	[id number]	Insurance	[facility] #[id number]  A	N	N	B	11/18/2024	11/18/2024	alprazolam 1 mg tablet	30	30	Minniti, Carla	[id number]	Insurance	[facility] #[id number]  A	N	N		10/28/2024	11/18/2024	testosterone cyp 2000 mg/10 ml	10ml	28	Minniti, Carla	[id number]	[facility] #[id number]  A	N	N	S	10/28/2024	10/28/2024	dextroamp-amphetamin 20 mg tab	60	30	Minniti, Carla	[id number]	Insurance	[facility]  A	N	N	B	10/28/2024	10/28/2024	alprazolam 1 mg tablet	7	7	Minniti, Carla	[id number]	Insurance	[facility] #[id number]  A	N	N		09/25/2024	09/28/2024	testosterone cyp 200 mg/ml	4ml	28	Crociata, Ana	[id number]	[facility] #[id number]  A	N	N	S	09/25/2024	09/25/2024	dextroamp-amphetamin 20 mg tab	60	30	Crociata, Ana	[id number]	Insurance	[facility]  A	N	N		08/23/2024	08/29/2024	testosterone cyp 2000 mg/10 ml	10ml	35	Minniti, Carla	[id number]	Insurance	[facility] #[id number]  A	N	N	S	08/23/2024	08/27/2024	dextroamp-amphetamin 20 mg tab	60	30	Minniti, Carla	[id number]	Insurance	[facility]  A	N	N	S	07/24/2024	07/29/2024	dextroamp-amphetamin 20 mg tab	60	30	Angelina Lobo C	[id number]	Insurance	[facility]  A	N	N	S	06/28/2024	06/28/2024	dextroamp-amphetamin 20 mg tab	60	30	Angelina Lobo C	[id number]	Insurance	[facility]  A	N	N		06/18/2024	06/24/2024	testosterone cyp 2000 mg/10 ml	10ml	70	Crociata, Ana	[id number]	Insurance	[facility] #[id number]  A	N	N	S	05/21/2024	05/29/2024	dextroamp-amphetamin 20 mg tab	60	30	Angelina Lobo C	[id number]	Insurance	[facility]  A	N	N	S	04/30/2024	04/30/2024	dextroamp-amphetamin 20 mg tab	60	30	Angelina Lobo C	[id number]	Insurance	[facility]  A	N	N		03/26/2024	04/10/2024	testosterone cyp 2000 mg/10 ml	10ml	70	Angelina Lobo C	[id number]	Insurance	[facility] #[id number]  A	N	N	S	03/26/2024	03/26/2024	dextroamp-amphetamin 20 mg tab	60	30	Angelina Lobo C	[id number]	[facility]  A	N	N		03/12/2024	03/12/2024	testosterone cyp 200 mg/ml	4ml	28	YamilAngelina C	[id number]	Insurance	[facility] #[id number]  A	N	N	S	02/27/2024	02/27/2024	dextroamp-amphetamin 20 mg tab	60	30	YamilAngelina C	[id number]	[facility]  A	N	N		02/06/2024	02/21/2024	testosterone cyp 200 mg/ml	2ml	28	[facility]	[id number]	Insurance	[facility] #[id number]  A	N	N	S	01/08/2024	01/08/2024	dextroamp-amphetamin 20 mg tab	60	30	[id number]	[facility]        REVIEW OF SYSTEMS: per HPI     MEDICATIONS  (STANDING):  chlordiazePOXIDE   Oral   chlordiazePOXIDE 25 milliGRAM(s) Oral every 4 hours  dextrose 5% + lactated ringers. 1000 milliLiter(s) (100 mL/Hr) IV Continuous <Continuous>  FLUoxetine Solution 15 milliGRAM(s) Oral daily  folic acid 1 milliGRAM(s) Oral daily  gabapentin 400 milliGRAM(s) Oral three times a day  nicotine - 21 mG/24Hr(s) Patch 1 Patch Transdermal daily  pantoprazole    Tablet 40 milliGRAM(s) Oral before breakfast  thiamine IVPB 500 milliGRAM(s) IV Intermittent every 8 hours  traZODone 150 milliGRAM(s) Oral at bedtime    MEDICATIONS  (PRN):  LORazepam   Injectable 2 milliGRAM(s) IV Push every 2 hours PRN CIWA-Ar score 8 or greater      PHYSICAL EXAM:    Constitutional: NAD, well-groomed, well-developed  HEENT: PERRLA, EOMI, + horizontal nystagmus b/l   Respiratory: no increased work of breathing  Neurological: A/O x 3, no focal deficits, moderate b/l UE tremors     MENTAL STATUS EXAM:  Appearance: anxious, in hospital attire   Appearance in relation to age: looks stated age      Hygiene/Grooming: disheveled   Attitude toward examiner: fully cooperative  Alertness: alert  Orientation: oriented to place and person, off by date of 2 days   Posture: lying in bed  Gait: not evaluated  Behavior and psychomotor activity: mild psychomotor retardation   Mood: "not good"   Affect: constricted range  Speech: fluent and spontaneous; normal rate, rhythm, volume and tone   Perceptions: denies hallucinations  Thought process: logical, linear and goal-directed    Thought content: no delusions or particular preoccupation, denies SI/HI  Associations: no loosening of associations   Impulse Control: aware of socially acceptable behavior  Insight: limited, has previously sought treatment with continued frequent relapses   Judgment: poor    LABS:    CBC:            13.2   4.72  )-----------( 21       ( 12-27-24 @ 20:59 )             39.1     Chem:         ( 12-27-24 @ 16:22 )    131[L]  |  97[L]  |  7[L]  ----------------------------<  67[L]  3.7   |  25  |  0.6[L]    Liver Functions: ( 12-27-24 @ 16:22 )  Alb: 3.2 g/dL / Pro: 5.1 g/dL / ALK PHOS: 49 U/L / ALT: 61 U/L / AST: 165 U/L / GGT: x            RADIOLOGY & ADDITIONAL STUDIES:  < from: 12 Lead ECG (12.26.24 @ 11:36) >    Ventricular Rate 119 BPM    Atrial Rate 119 BPM    P-R Interval 122 ms    QRS Duration 70 ms    Q-T Interval 362 ms    QTC Calculation(Bazett) 509 ms    P Axis 58 degrees    R Axis 66 degrees    T Axis 43 degrees    Diagnosis Line Sinus tachycardia  Nonspecific ST abnormality  Abnormal ECG  Confirmed by Genaro Cordova ([id number]) on 12/26/2024 7:05:35 PM    < end of copied text >

## 2024-12-28 NOTE — PROGRESS NOTE ADULT - ASSESSMENT
alcohol use disorder  alcohol withdrawal  hypophos - concern for refeeding syndrome   hyponatremia  thrombocytopenia   hypoglycemia in setting of alcohol use disorder  elevated LFTs   elevated D dimer likely in setting of inflammatory state   INR stable  suspect thiamine and folate deficinecy   concern for wernicke's enceph    -was on precedex for one day able to be weaned  -on gabapentin  -on trazodone prn  -IV thiamine 500 mg q 8h for 3 days, followed by 250 mg IV daily up to additional 5 days per [employer] recommendations. Can also opt to switch to oral thiamine 100 mg daily after 3 days of IV repletion.   -librium taper    -aggressive phos repletion - phos goal of 4, mg of 2, K of 2  -phos levels q 8 hours until stable   -    dvt ppx  prepare for dc  pending  floor: DGTF    HPI was written on __. I saw and examined this pt with resident on __ and this note reflects my care of patient on the day of examining patient.  I personally reviewed labs and imaging and ordered necessary testing/medications  I discussed care of the patient with licensed providers  I personally reviewed chart and consultant recommendations  Pt has complex medical issues that require extensive diagnosis and intervention / threat to life  I have personally spent __ minutes of time on the encounter which excludes teaching and separately reported services  alcohol use disorder  alcohol withdrawal  hypophos - concern for refeeding syndrome   hyponatremia  thrombocytopenia   hypoglycemia in setting of alcohol use disorder  elevated LFTs   elevated D dimer likely in setting of inflammatory state   INR stable  suspect thiamine and folate deficinecy   concern for wernicke's enceph  ADHD  elevated d dimer    -was on precedex for one day able to be weaned  -on gabapentin  -on trazodone prn  -IV thiamine 500 mg q 8h for 3 days, followed by 250 mg IV daily up to additional 5 days per [employer] recommendations. Can also opt to switch to oral thiamine 100 mg daily after 3 days of IV repletion.   -librium taper  -aggressive phos repletion - phos goal of 4, mg of 2, K of 2  -phos levels q 8 hours until stable   -ciwa protocol   -holding home adderall 20 mg TID  -continue trazadone fluoxetine gabapentin  -LE duplex neg  -RUQ US  -Redemonstration hepatic steatosis. Stable liver size  -Right renal cyst and calculus, new    dvt ppx- lovenox  prepare for dc placed 12/28  pending - monitor phos very closely   floor: DGTF    I personally reviewed labs and imaging and ordered necessary testing/medications  I discussed care of the patient with licensed providers  I personally reviewed chart and consultant recommendations  Pt has complex medical issues that require extensive diagnosis and intervention / threat to life  I have personally spent 35 minutes of time on the encounter which excludes teaching and separately reported services

## 2024-12-28 NOTE — PROGRESS NOTE ADULT - SUBJECTIVE AND OBJECTIVE BOX
Patient is a 39y old  Male who presents with a chief complaint of Alcohol withdrawal (27 Dec 2024 12:31)        Over Night Events:    Sleeping comfortably   Not on precedex today         ROS:  See HPI    PHYSICAL EXAM    ICU Vital Signs Last 24 Hrs  T(C): 36.4 (27 Dec 2024 23:58), Max: 36.9 (27 Dec 2024 07:41)  T(F): 97.5 (27 Dec 2024 23:58), Max: 98.5 (27 Dec 2024 19:10)  HR: 89 (27 Dec 2024 23:58) (68 - 94)  BP: 119/79 (27 Dec 2024 23:58) (107/58 - 164/77)  BP(mean): 92 (27 Dec 2024 23:58) (92 - 92)  ABP: --  ABP(mean): --  RR: 20 (27 Dec 2024 23:58) (18 - 20)  SpO2: 99% (27 Dec 2024 23:58) (97% - 99%)    O2 Parameters below as of 27 Dec 2024 23:58  Patient On (Oxygen Delivery Method): room air            General: NAD  HEENT: HERMINIA             Lymphatic system: No cervical LN   Lungs: Bilateral BS  Cardiovascular: Regular   Gastrointestinal: Soft, Positive BS  Extremities: No clubbing.  Moves extremities.  Full Range of motion   Skin: Warm, intact  Neurological: No motor or sensory deficit       12-27-24 @ 07:01  -  12-28-24 @ 07:00  --------------------------------------------------------  IN:  Total IN: 0 mL    OUT:    Voided (mL): 1000 mL  Total OUT: 1000 mL    Total NET: -1000 mL          LABS:                            13.2   4.72  )-----------( 21       ( 27 Dec 2024 20:59 )             39.1                                               12-27    131[L]  |  97[L]  |  7[L]  ----------------------------<  67[L]  3.7   |  25  |  0.6[L]    Ca    7.3[L]      27 Dec 2024 16:22  Phos  1.4     12-28  Mg     2.0     12-27    TPro  5.1[L]  /  Alb  3.2[L]  /  TBili  0.7  /  DBili  0.3  /  AST  165[H]  /  ALT  61[H]  /  AlkPhos  49  12-27      PT/INR - ( 27 Dec 2024 20:59 )   PT: 9.60 sec;   INR: 0.82 ratio         PTT - ( 27 Dec 2024 20:59 )  PTT:26.7 sec                                       Urinalysis Basic - ( 27 Dec 2024 16:22 )    Color: x / Appearance: x / SG: x / pH: x  Gluc: 67 mg/dL / Ketone: x  / Bili: x / Urobili: x   Blood: x / Protein: x / Nitrite: x   Leuk Esterase: x / RBC: x / WBC x   Sq Epi: x / Non Sq Epi: x / Bacteria: x                                                  LIVER FUNCTIONS - ( 27 Dec 2024 16:22 )  Alb: 3.2 g/dL / Pro: 5.1 g/dL / ALK PHOS: 49 U/L / ALT: 61 U/L / AST: 165 U/L / GGT: x                                                                                                                                       MEDICATIONS  (STANDING):  chlordiazePOXIDE   Oral   chlordiazePOXIDE 50 milliGRAM(s) Oral every 4 hours  chlordiazePOXIDE 25 milliGRAM(s) Oral every 4 hours  dextrose 5% + lactated ringers. 1000 milliLiter(s) (100 mL/Hr) IV Continuous <Continuous>  FLUoxetine Solution 15 milliGRAM(s) Oral daily  folic acid 1 milliGRAM(s) Oral daily  gabapentin 400 milliGRAM(s) Oral three times a day  nicotine - 21 mG/24Hr(s) Patch 1 Patch Transdermal daily  pantoprazole    Tablet 40 milliGRAM(s) Oral before breakfast  potassium phosphate IVPB 30 milliMole(s) IV Intermittent once  thiamine IVPB 500 milliGRAM(s) IV Intermittent every 8 hours  traZODone 150 milliGRAM(s) Oral at bedtime    MEDICATIONS  (PRN):  LORazepam   Injectable 2 milliGRAM(s) IV Push every 2 hours PRN CIWA-Ar score 8 or greater      Xrays:                                                                                     ECHO

## 2024-12-28 NOTE — PROGRESS NOTE ADULT - ASSESSMENT
Impression :    Severe alcohol withdrawal/ DT - better now   History of withdrawal seizures   Thrombocytopenia   Hyponatremia   HO AUD    PLAN:    CNS: Librium oral protocol; gabapentin and Trazodone. Off precedex. Addiction medicine following.     HEENT: Oral care    PULMONARY:  HOB @ 45 degrees. Aspiration precautions On room air.     CARDIOVASCULAR: Lower D5LR to 50cc/hr. DC fluids when tolerating oral diet.     GI: GI prophylaxis.  Feeding: per speech and swallow, aspiration precautions. Daily LFTs.     RENAL:  Follow up lytes.  Correct as needed.     INFECTIOUS DISEASE: Follow up cultures; procal; observe off abx.     HEMATOLOGICAL:  SCDs until platelets more than 50k. CBC daily. Peripheral smear.     ENDOCRINE:  Follow up FS.  Insulin protocol if needed.    MUSCULOSKELETAL: bedrest with fall precautions.     Medical floor. Recall as needed.

## 2024-12-28 NOTE — PROGRESS NOTE ADULT - SUBJECTIVE AND OBJECTIVE BOX
Patient is a 39y old  Male who presents with a chief complaint of Alcohol withdrawal (12-28-24)      Pt seen and examined at bedside. No CP or SOB.          PAST MEDICAL & SURGICAL HISTORY:  No pertinent past medical history  anxiety  depression  polysubstance    No significant past surgical history        VITAL SIGNS (Last 24 hrs):  T(C): 36.6 (12-28-24 @ 07:44), Max: 36.9 (12-27-24 @ 19:10)  HR: 88 (12-28-24 @ 07:44) (88 - 94)  BP: 104/63 (12-28-24 @ 07:44) (104/63 - 164/77)  RR: 18 (12-28-24 @ 07:44) (18 - 20)  SpO2: 97% (12-28-24 @ 07:44) (97% - 99%)  Wt(kg): --  Daily     Daily     I&O's Summary    27 Dec 2024 07:01  -  28 Dec 2024 07:00  --------------------------------------------------------  IN: 0 mL / OUT: 1000 mL / NET: -1000 mL        PHYSICAL EXAM:  GENERAL: NAD, well-developed  HEAD:  Atraumatic, Normocephalic  EYES: EOMI, PERRLA, conjunctiva and sclera clear  NECK: Supple, No JVD  CHEST/LUNG: Clear to auscultation bilaterally; No wheeze  HEART: Regular rate and rhythm; No murmurs, rubs, or gallops  ABDOMEN: Soft, Nontender, Nondistended; Bowel sounds present  EXTREMITIES:  2+ Peripheral Pulses, No clubbing, cyanosis, or edema  PSYCH: AAOx3  NEUROLOGY: non-focal  SKIN: No rashes or lesions    Labs Reviewed  Spoke to patient in regards to abnormal labs.    CBC Full  -  ( 27 Dec 2024 20:59 )  WBC Count : 4.72 K/uL  Hemoglobin : 13.2 g/dL  Hematocrit : 39.1 %  Platelet Count - Automated : 21 K/uL  Mean Cell Volume : 93.1 fL  Mean Cell Hemoglobin : 31.4 pg  Mean Cell Hemoglobin Concentration : 33.8 g/dL  Auto Neutrophil # : 3.41 K/uL  Auto Lymphocyte # : 0.80 K/uL  Auto Monocyte # : 0.42 K/uL  Auto Eosinophil # : 0.06 K/uL  Auto Basophil # : 0.02 K/uL  Auto Neutrophil % : 72.3 %  Auto Lymphocyte % : 16.9 %  Auto Monocyte % : 8.9 %  Auto Eosinophil % : 1.3 %  Auto Basophil % : 0.4 %    BMP:    12-27 @ 16:22    Blood Urea Nitrogen - 7  Calcium - 7.3  Carbond Dioxide - 25  Chloride - 97  Creatinine - 0.6  Glucose - 67  Potassium - 3.7  Sodium - 131      Hemoglobin A1c -   PT/INR - ( 27 Dec 2024 20:59 )   PT: 9.60 sec;   INR: 0.82 ratio         PTT - ( 27 Dec 2024 20:59 )  PTT:26.7 sec  Urine Culture:        COVID Labs  CRP:      D-Dimer:  282 ng/mL DDU (12-27-24 @ 20:59)            Imaging reviewed independently and reviewed read        MEDICATIONS  (STANDING):  chlordiazePOXIDE   Oral   chlordiazePOXIDE 25 milliGRAM(s) Oral every 4 hours  dextrose 5% + lactated ringers. 1000 milliLiter(s) (100 mL/Hr) IV Continuous <Continuous>  FLUoxetine Solution 15 milliGRAM(s) Oral daily  folic acid 1 milliGRAM(s) Oral daily  gabapentin 400 milliGRAM(s) Oral three times a day  nicotine - 21 mG/24Hr(s) Patch 1 Patch Transdermal daily  pantoprazole    Tablet 40 milliGRAM(s) Oral before breakfast  thiamine IVPB 500 milliGRAM(s) IV Intermittent every 8 hours  traZODone 150 milliGRAM(s) Oral at bedtime    MEDICATIONS  (PRN):  LORazepam   Injectable 2 milliGRAM(s) IV Push every 2 hours PRN CIWA-Ar score 8 or greater

## 2024-12-28 NOTE — PATIENT PROFILE ADULT - FALL HARM RISK - HARM RISK INTERVENTIONS
Assistance with ambulation/Assistance OOB with selected safe patient handling equipment/Communicate Risk of Fall with Harm to all staff/Discuss with provider need for PT consult/Monitor gait and stability/Provide patient with walking aids - walker, cane, crutches/Reinforce activity limits and safety measures with patient and family/Sit up slowly, dangle for a short time, stand at bedside before walking/Tailored Fall Risk Interventions/Visual Cue: Yellow wristband and red socks/Bed in lowest position, wheels locked, appropriate side rails in place/Call bell, personal items and telephone in reach/Instruct patient to call for assistance before getting out of bed or chair/Non-slip footwear when patient is out of bed/[place] to call system/Physically safe environment - no spills, clutter or unnecessary equipment/Purposeful Proactive Rounding/Room/bathroom lighting operational, light cord in reach

## 2024-12-28 NOTE — CHART NOTE - NSCHARTNOTEFT_GEN_A_CORE
Transfer Note    Transfer from: SDU  Transfer to: Med      ED/MICU/SDU COURSE:  39-year-old male with past medical history of depression presents to the ED for evaluation of tremulousness.  Patient states he has been binge drinking 2 L of vodka over the last 5 days and was ruptured; last drink 2 AM this morning.  Patient is tremulous, feels anxious, and headache.  Patient has not had any recent nausea, vomiting, or hallucinations.  SigLabs Sodium 129 AG 21 glucose 220 calcium 7.5 ast 241 alt 83 13:31 - VBG - pH: 7.45  | pCO2: 38    | pO2: 62    | Lactate: 6.5      Pt was admitted to MICU and started on precedex drip taper. Pt was weaned off precedex and downgraded to SDU then medicine on a CIWA taper. Pt evaluated by addiction medicine and psychiatry, recs noted in A/P. Pt was treated for electrolyte abnormalities, including low phos and Na. Pt had elevated dimer and duplex was ordered. Pt is still requiring ativan for withdrawal symptoms. Hemodynamically stable for medical floors.     ASSESSMENT & PLAN:     #Alcohol use disorder   #Severe Alcohol Withdrawal/DT  #HX of withdrawal seizures  - weaned off precedex  - thiamine and folic acid supplementation  - urine and drug screen  - S&S for aspiration: regular   - psyc consult: Hold home medication of wellbutrin and xanax  - c/w home medication of Trazadone 150mg qhs for sleep  - c/w home medication of Fluoxetine 15mg qd  - c/w home medication of gabapentin --can increase to 400mg tid with help with anxiety/withdrawal symptoms  - c/w nicotine patch  - Addiction medicine consult: Day 1 Librium 50 mg q4h x24 hrs, Day 2 librium 25 mg q4h  x24 hrs, Day 3 librium 15 mg q4hr x24 hr, day 4 librium 10 mg q4h x 24 hr --> discontinue   - c/w CIWA    #Hyponatremia  #Hypophosphatemia  #Transaminitis  - phos repletion, goal of 4  - keep Mag 2, K 2  - f/u phos levels q8 until stable  - daily BMP    #Elevated Dimer  - dimer 282  - f/u LE duplex    #Thrombocytopenia  - PLT 39>> 21  - hold lovenox    #MISC  #DVT prophylaxis: Lovenox held in setting of thrombocytopenia   #GI prophylaxis: PPI  #Diet: regular  #Activity:  increase as valerie  #Code status: Full Code  #Disposition: [facility]    For Follow-Up:  - f/u LE duplex  - monitor phos and bmp q8, replete prn  - ciwa        Vital Signs Last 24 Hrs  T(C): 36.6 (28 Dec 2024 07:44), Max: 36.9 (27 Dec 2024 19:10)  T(F): 97.8 (28 Dec 2024 07:44), Max: 98.5 (27 Dec 2024 19:10)  HR: 88 (28 Dec 2024 07:44) (79 - 94)  BP: 104/63 (28 Dec 2024 07:44) (104/63 - 164/77)  BP(mean): 92 (27 Dec 2024 23:58) (92 - 92)  RR: 18 (28 Dec 2024 07:44) (18 - 20)  SpO2: 97% (28 Dec 2024 07:44) (97% - 99%)    Parameters below as of 28 Dec 2024 04:00  Patient On (Oxygen Delivery Method): room air      I&O's Summary    27 Dec 2024 07:01  -  28 Dec 2024 07:00  --------------------------------------------------------  IN: 0 mL / OUT: 1000 mL / NET: -1000 mL          MEDICATIONS  (STANDING):  chlordiazePOXIDE   Oral   chlordiazePOXIDE 25 milliGRAM(s) Oral every 4 hours  dextrose 5% + lactated ringers. 1000 milliLiter(s) (100 mL/Hr) IV Continuous <Continuous>  FLUoxetine Solution 15 milliGRAM(s) Oral daily  folic acid 1 milliGRAM(s) Oral daily  gabapentin 400 milliGRAM(s) Oral three times a day  nicotine - 21 mG/24Hr(s) Patch 1 Patch Transdermal daily  pantoprazole    Tablet 40 milliGRAM(s) Oral before breakfast  thiamine IVPB 500 milliGRAM(s) IV Intermittent every 8 hours  traZODone 150 milliGRAM(s) Oral at bedtime    MEDICATIONS  (PRN):  LORazepam   Injectable 2 milliGRAM(s) IV Push every 2 hours PRN CIWA-Ar score 8 or greater        LABS                                            13.2                  Neurophils% (auto):   72.3   (12-27 @ 20:59):    4.72 )-----------(21           Lymphocytes% (auto):  16.9                                          39.1                   Eosinphils% (auto):   1.3      Manual%: Neutrophils x    ; Lymphocytes x    ; Eosinophils x    ; Bands%: x    ; Blasts x                                    x      |  x      |  x                   Calcium: x     / iCa: x      (12-28 @ 01:30)    ----------------------------<  x         Magnesium: x                                x       |  x      |  x                Phosphorous: 1.4      TPro  5.1    /  Alb  3.2    /  TBili  0.7    /  DBili  0.3    /  AST  165    /  ALT  61     /  AlkPhos  49     27 Dec 2024 16:22    ( 12-27 @ 20:59 )   PT: 9.60 sec;   INR: 0.82 ratio  aPTT: 26.7 sec

## 2024-12-28 NOTE — PROGRESS NOTE ADULT - ASSESSMENT
38yo domiciled Male with PMHx of withdrawal seizures and PPHx of depression, AUD and nicotine use disorder presenting to the ER after binging for 5 days admitted to medicine for alcohol withdrawal. Addiction Medicine consulted for alcohol withdrawal.     #Alcohol withdrawal:  #Hx of withdrawal seizures:  - patient with increased quantity of vodka consumption in the setting of hx of withdrawal seizures, now with elevated CIWAs despite prn doses of ativan, would recommend treatment with long acting fixed dose benzodiazepine taper as followed:   -  Day 1 Librium 50 mg q4h x24 hrs, Day 2 librium 25 mg q4h  x24 hrs, Day 3 librium 15 mg q4hr x24 hr, day 4 librium 10 mg q4h x 24 hr --> discontinue   - please continue CIWA symptom triggered CIWA >7 q2h prn for breakthrough withdrawal treatment in addition to fixed dose taper as needed  - hold benzodiazepines for sedation or respiratory depression  - keep Mg>2, K>4    -continue thiamine, folate and multivitamin   - please offer PRN medications for supportive management of withdrawal including:  - patient has prolonged QT, please use antiemetics that have a lower QT prolonging risk if needed for nausea  - tylenol 650 mg q6h prn mild/moderate pain  - trazodone 25 mg nightly prn for insomnia  - CATCH team to assist with substance use aftercare options including inpatient vs outpatient rehab and will have ongoing discussions regarding medications for AUD such as naltrexone as withdrawals improve    #Concern for Wernicke's Encephalopathy:   - given concerns for  ophthalmoplegia and hx of gait instability and recent nutritional deficiencies, clinical concern for Wernicke's encephalopathy.   - Would agree with IV thiamine 500 mg q 8h for 3 days, followed by 250 mg IV daily up to additional 5 days per [employer] recommendations. Can also opt to switch to oral thiamine 100 mg daily after 3 days of IV repletion.   - keep Mg >2, K>4    #Nicotine use disorder:  - recommend 21 mg nicotine patch    #Anxiety and depression:  - please confirm psychiatric medications with patient (patient did not want to repeat all his medications to writer on assessment)  - patient takes xanax 1 mg qd prn anxiety at home, can reorder 1 mg xanax qd prn anxiety    #ADHD:  - hold home Adderall 20 mg TID, does not need it in the hospital at this time     Thank you for this consult. Rest of care per primary. Addiction medicine will continue to follow. Please reach out with any questions or concerns via TEAMS.

## 2024-12-29 LAB
ALBUMIN SERPL ELPH-MCNC: 3.2 G/DL — LOW (ref 3.5–5.2)
ALBUMIN SERPL ELPH-MCNC: 3.3 G/DL — LOW (ref 3.5–5.2)
ALBUMIN SERPL ELPH-MCNC: 3.6 G/DL — SIGNIFICANT CHANGE UP (ref 3.5–5.2)
ALP SERPL-CCNC: 52 U/L — SIGNIFICANT CHANGE UP (ref 30–115)
ALP SERPL-CCNC: 54 U/L — SIGNIFICANT CHANGE UP (ref 30–115)
ALP SERPL-CCNC: 56 U/L — SIGNIFICANT CHANGE UP (ref 30–115)
ALT FLD-CCNC: 46 U/L — HIGH (ref 0–41)
ALT FLD-CCNC: 54 U/L — HIGH (ref 0–41)
ALT FLD-CCNC: 61 U/L — HIGH (ref 0–41)
ANION GAP SERPL CALC-SCNC: 12 MMOL/L — SIGNIFICANT CHANGE UP (ref 7–14)
ANION GAP SERPL CALC-SCNC: 12 MMOL/L — SIGNIFICANT CHANGE UP (ref 7–14)
ANION GAP SERPL CALC-SCNC: 13 MMOL/L — SIGNIFICANT CHANGE UP (ref 7–14)
AST SERPL-CCNC: 131 U/L — HIGH (ref 0–41)
AST SERPL-CCNC: 78 U/L — HIGH (ref 0–41)
AST SERPL-CCNC: 98 U/L — HIGH (ref 0–41)
BASOPHILS # BLD AUTO: 0.01 K/UL — SIGNIFICANT CHANGE UP (ref 0–0.2)
BASOPHILS NFR BLD AUTO: 0.2 % — SIGNIFICANT CHANGE UP (ref 0–1)
BILIRUB SERPL-MCNC: 0.2 MG/DL — SIGNIFICANT CHANGE UP (ref 0.2–1.2)
BILIRUB SERPL-MCNC: 0.4 MG/DL — SIGNIFICANT CHANGE UP (ref 0.2–1.2)
BILIRUB SERPL-MCNC: 0.5 MG/DL — SIGNIFICANT CHANGE UP (ref 0.2–1.2)
BUN SERPL-MCNC: 5 MG/DL — LOW (ref 10–20)
BUN SERPL-MCNC: 5 MG/DL — LOW (ref 10–20)
BUN SERPL-MCNC: 6 MG/DL — LOW (ref 10–20)
CALCIUM SERPL-MCNC: 7.4 MG/DL — LOW (ref 8.4–10.4)
CALCIUM SERPL-MCNC: 7.8 MG/DL — LOW (ref 8.4–10.5)
CALCIUM SERPL-MCNC: 8 MG/DL — LOW (ref 8.4–10.5)
CHLORIDE SERPL-SCNC: 100 MMOL/L — SIGNIFICANT CHANGE UP (ref 98–110)
CHLORIDE SERPL-SCNC: 98 MMOL/L — SIGNIFICANT CHANGE UP (ref 98–110)
CHLORIDE SERPL-SCNC: 99 MMOL/L — SIGNIFICANT CHANGE UP (ref 98–110)
CO2 SERPL-SCNC: 23 MMOL/L — SIGNIFICANT CHANGE UP (ref 17–32)
CO2 SERPL-SCNC: 24 MMOL/L — SIGNIFICANT CHANGE UP (ref 17–32)
CO2 SERPL-SCNC: 24 MMOL/L — SIGNIFICANT CHANGE UP (ref 17–32)
CREAT SERPL-MCNC: 0.7 MG/DL — SIGNIFICANT CHANGE UP (ref 0.7–1.5)
CREAT SERPL-MCNC: 0.7 MG/DL — SIGNIFICANT CHANGE UP (ref 0.7–1.5)
CREAT SERPL-MCNC: 0.8 MG/DL — SIGNIFICANT CHANGE UP (ref 0.7–1.5)
EGFR: 116 ML/MIN/1.73M2 — SIGNIFICANT CHANGE UP
EGFR: 121 ML/MIN/1.73M2 — SIGNIFICANT CHANGE UP
EGFR: 121 ML/MIN/1.73M2 — SIGNIFICANT CHANGE UP
EOSINOPHIL # BLD AUTO: 0.06 K/UL — SIGNIFICANT CHANGE UP (ref 0–0.7)
EOSINOPHIL NFR BLD AUTO: 1.5 % — SIGNIFICANT CHANGE UP (ref 0–8)
GLUCOSE SERPL-MCNC: 105 MG/DL — HIGH (ref 70–99)
GLUCOSE SERPL-MCNC: 112 MG/DL — HIGH (ref 70–99)
GLUCOSE SERPL-MCNC: 135 MG/DL — HIGH (ref 70–99)
HCT VFR BLD CALC: 36.4 % — LOW (ref 42–52)
HGB BLD-MCNC: 12.2 G/DL — LOW (ref 14–18)
IMM GRANULOCYTES NFR BLD AUTO: 0.5 % — HIGH (ref 0.1–0.3)
LYMPHOCYTES # BLD AUTO: 0.25 K/UL — LOW (ref 1.2–3.4)
LYMPHOCYTES # BLD AUTO: 6.2 % — LOW (ref 20.5–51.1)
MAGNESIUM SERPL-MCNC: 1.4 MG/DL — LOW (ref 1.8–2.4)
MCHC RBC-ENTMCNC: 31.4 PG — HIGH (ref 27–31)
MCHC RBC-ENTMCNC: 33.5 G/DL — SIGNIFICANT CHANGE UP (ref 32–37)
MCV RBC AUTO: 93.6 FL — SIGNIFICANT CHANGE UP (ref 80–94)
MONOCYTES # BLD AUTO: 0.34 K/UL — SIGNIFICANT CHANGE UP (ref 0.1–0.6)
MONOCYTES NFR BLD AUTO: 8.4 % — SIGNIFICANT CHANGE UP (ref 1.7–9.3)
NEUTROPHILS # BLD AUTO: 3.38 K/UL — SIGNIFICANT CHANGE UP (ref 1.4–6.5)
NEUTROPHILS NFR BLD AUTO: 83.2 % — HIGH (ref 42.2–75.2)
NRBC # BLD: 0 /100 WBCS — SIGNIFICANT CHANGE UP (ref 0–0)
PHOSPHATE SERPL-MCNC: 3.3 MG/DL — SIGNIFICANT CHANGE UP (ref 2.1–4.9)
PHOSPHATE SERPL-MCNC: 4.1 MG/DL — SIGNIFICANT CHANGE UP (ref 2.1–4.9)
PHOSPHATE SERPL-MCNC: 4.3 MG/DL — SIGNIFICANT CHANGE UP (ref 2.1–4.9)
PLATELET # BLD AUTO: 39 K/UL — LOW (ref 130–400)
PMV BLD: 12 FL — HIGH (ref 7.4–10.4)
POTASSIUM SERPL-MCNC: 3.2 MMOL/L — LOW (ref 3.5–5)
POTASSIUM SERPL-MCNC: 3.3 MMOL/L — LOW (ref 3.5–5)
POTASSIUM SERPL-MCNC: 3.4 MMOL/L — LOW (ref 3.5–5)
POTASSIUM SERPL-SCNC: 3.2 MMOL/L — LOW (ref 3.5–5)
POTASSIUM SERPL-SCNC: 3.3 MMOL/L — LOW (ref 3.5–5)
POTASSIUM SERPL-SCNC: 3.4 MMOL/L — LOW (ref 3.5–5)
PROT SERPL-MCNC: 5.2 G/DL — LOW (ref 6–8)
PROT SERPL-MCNC: 5.3 G/DL — LOW (ref 6–8)
PROT SERPL-MCNC: 5.5 G/DL — LOW (ref 6–8)
RBC # BLD: 3.89 M/UL — LOW (ref 4.7–6.1)
RBC # FLD: 12.5 % — SIGNIFICANT CHANGE UP (ref 11.5–14.5)
SODIUM SERPL-SCNC: 135 MMOL/L — SIGNIFICANT CHANGE UP (ref 135–146)
WBC # BLD: 4.06 K/UL — LOW (ref 4.8–10.8)
WBC # FLD AUTO: 4.06 K/UL — LOW (ref 4.8–10.8)

## 2024-12-29 PROCEDURE — 99233 SBSQ HOSP IP/OBS HIGH 50: CPT

## 2024-12-29 RX ORDER — MAGNESIUM SULFATE 500 MG/ML
2 INJECTION, SOLUTION INTRAMUSCULAR; INTRAVENOUS
Refills: 0 | Status: COMPLETED | OUTPATIENT
Start: 2024-12-29 | End: 2024-12-29

## 2024-12-29 RX ORDER — ACETAMINOPHEN 80 MG/.8ML
650 SOLUTION/ DROPS ORAL EVERY 6 HOURS
Refills: 0 | Status: DISCONTINUED | OUTPATIENT
Start: 2024-12-29 | End: 2025-01-01

## 2024-12-29 RX ORDER — LORAZEPAM 1 MG/1
2 TABLET ORAL ONCE
Refills: 0 | Status: DISCONTINUED | OUTPATIENT
Start: 2024-12-29 | End: 2024-12-29

## 2024-12-29 RX ORDER — GUAIFENESIN 100 MG/5ML
100 SYRUP ORAL ONCE
Refills: 0 | Status: COMPLETED | OUTPATIENT
Start: 2024-12-29 | End: 2024-12-29

## 2024-12-29 RX ORDER — POTASSIUM CHLORIDE 600 MG/1
20 TABLET, FILM COATED, EXTENDED RELEASE ORAL ONCE
Refills: 0 | Status: DISCONTINUED | OUTPATIENT
Start: 2024-12-29 | End: 2025-01-01

## 2024-12-29 RX ORDER — POTASSIUM CHLORIDE 600 MG/1
40 TABLET, FILM COATED, EXTENDED RELEASE ORAL ONCE
Refills: 0 | Status: DISCONTINUED | OUTPATIENT
Start: 2024-12-29 | End: 2025-01-01

## 2024-12-29 RX ADMIN — MAGNESIUM SULFATE 25 GRAM(S): 500 INJECTION, SOLUTION INTRAMUSCULAR; INTRAVENOUS at 15:35

## 2024-12-29 RX ADMIN — LORAZEPAM 2 MILLIGRAM(S): 1 TABLET ORAL at 03:21

## 2024-12-29 RX ADMIN — Medication 25 MILLIGRAM(S): at 05:23

## 2024-12-29 RX ADMIN — Medication 400 MILLIGRAM(S): at 17:03

## 2024-12-29 RX ADMIN — NICOTINE POLACRILEX 1 PATCH: 4 LOZENGE ORAL at 11:39

## 2024-12-29 RX ADMIN — THIAMINE HYDROCHLORIDE 105 MILLIGRAM(S): 100 INJECTION, SOLUTION INTRAMUSCULAR; INTRAVENOUS at 13:11

## 2024-12-29 RX ADMIN — Medication 15 MILLIGRAM(S): at 17:03

## 2024-12-29 RX ADMIN — TRAZODONE HYDROCHLORIDE 150 MILLIGRAM(S): 150 TABLET ORAL at 22:33

## 2024-12-29 RX ADMIN — NICOTINE POLACRILEX 1 PATCH: 4 LOZENGE ORAL at 19:16

## 2024-12-29 RX ADMIN — SODIUM CHLORIDE 100 MILLILITER(S): 9 INJECTION, SOLUTION INTRAVENOUS at 06:50

## 2024-12-29 RX ADMIN — LORAZEPAM 2 MILLIGRAM(S): 1 TABLET ORAL at 21:03

## 2024-12-29 RX ADMIN — LORAZEPAM 2 MILLIGRAM(S): 1 TABLET ORAL at 09:50

## 2024-12-29 RX ADMIN — THIAMINE HYDROCHLORIDE 105 MILLIGRAM(S): 100 INJECTION, SOLUTION INTRAMUSCULAR; INTRAVENOUS at 05:27

## 2024-12-29 RX ADMIN — MAGNESIUM SULFATE 25 GRAM(S): 500 INJECTION, SOLUTION INTRAMUSCULAR; INTRAVENOUS at 13:04

## 2024-12-29 RX ADMIN — LORAZEPAM 2 MILLIGRAM(S): 1 TABLET ORAL at 06:50

## 2024-12-29 RX ADMIN — Medication 15 MILLIGRAM(S): at 22:33

## 2024-12-29 RX ADMIN — ACETAMINOPHEN 650 MILLIGRAM(S): 80 SOLUTION/ DROPS ORAL at 05:22

## 2024-12-29 RX ADMIN — NICOTINE POLACRILEX 1 PATCH: 4 LOZENGE ORAL at 06:59

## 2024-12-29 RX ADMIN — Medication 1 MILLIGRAM(S): at 11:19

## 2024-12-29 RX ADMIN — NICOTINE POLACRILEX 1 PATCH: 4 LOZENGE ORAL at 11:19

## 2024-12-29 RX ADMIN — SODIUM CHLORIDE 100 MILLILITER(S): 9 INJECTION, SOLUTION INTRAVENOUS at 17:10

## 2024-12-29 RX ADMIN — SODIUM CHLORIDE 100 MILLILITER(S): 9 INJECTION, SOLUTION INTRAVENOUS at 21:03

## 2024-12-29 RX ADMIN — GABAPENTIN 400 MILLIGRAM(S): 300 CAPSULE ORAL at 05:23

## 2024-12-29 RX ADMIN — Medication 100 MILLIGRAM(S): at 07:02

## 2024-12-29 RX ADMIN — GABAPENTIN 400 MILLIGRAM(S): 300 CAPSULE ORAL at 22:33

## 2024-12-29 RX ADMIN — LORAZEPAM 2 MILLIGRAM(S): 1 TABLET ORAL at 12:59

## 2024-12-29 RX ADMIN — PANTOPRAZOLE 40 MILLIGRAM(S): 40 TABLET, DELAYED RELEASE ORAL at 05:23

## 2024-12-29 RX ADMIN — LORAZEPAM 2 MILLIGRAM(S): 1 TABLET ORAL at 16:14

## 2024-12-29 RX ADMIN — Medication 15 MILLIGRAM(S): at 11:20

## 2024-12-29 RX ADMIN — Medication 15 MILLIGRAM(S): at 11:19

## 2024-12-29 RX ADMIN — Medication 25 MILLIGRAM(S): at 02:56

## 2024-12-29 RX ADMIN — Medication 15 MILLIGRAM(S): at 13:02

## 2024-12-29 RX ADMIN — ACETAMINOPHEN 650 MILLIGRAM(S): 80 SOLUTION/ DROPS ORAL at 05:59

## 2024-12-29 RX ADMIN — THIAMINE HYDROCHLORIDE 105 MILLIGRAM(S): 100 INJECTION, SOLUTION INTRAMUSCULAR; INTRAVENOUS at 22:34

## 2024-12-29 RX ADMIN — Medication 100 MILLIGRAM(S): at 05:23

## 2024-12-29 RX ADMIN — GABAPENTIN 400 MILLIGRAM(S): 300 CAPSULE ORAL at 13:02

## 2024-12-29 NOTE — PROGRESS NOTE ADULT - ASSESSMENT
40yo domiciled Male with PMHx of withdrawal seizures and PPHx of depression, AUD and nicotine use disorder presenting to the ER after binging for 5 days admitted to medicine for alcohol withdrawal. Addiction Medicine consulted for alcohol withdrawal.     #Alcohol withdrawal:  #Hx of withdrawal seizures:  - patient with increased quantity of vodka consumption in the setting of hx of withdrawal seizures, now with elevated CIWAs despite prn doses of ativan, would recommend treatment with long acting fixed dose benzodiazepine taper as followed:   - Day 1 Librium 50 mg q4h x24 hrs, Day 2 librium 25 mg q4h  x24 hrs, Day 3 librium 15 mg q4hr x24 hr, day 4 librium 10 mg q4h x 24 hr --> discontinue   - please continue CIWA symptom triggered CIWA >7 q2h prn for breakthrough withdrawal treatment in addition to fixed dose taper as needed  - hold benzodiazepines for sedation or respiratory depression  - keep Mg>2, K>4    -continue thiamine, folate and multivitamin   - please offer PRN medications for supportive management of withdrawal including:  - patient has prolonged QT, please use antiemetics that have a lower QT prolonging risk if needed for nausea  - tylenol 650 mg q6h prn mild/moderate pain  - trazodone 25 mg nightly prn for insomnia  - CATCH team to assist with substance use aftercare options including inpatient vs outpatient rehab and will have ongoing discussions regarding medications for AUD such as naltrexone as withdrawals improve    #Concern for Wernicke's Encephalopathy:   - given concerns for  ophthalmoplegia and hx of gait instability and recent nutritional deficiencies, clinical concern for Wernicke's encephalopathy.   - Would agree with IV thiamine 500 mg q 8h for 3 days, followed by 250 mg IV daily up to additional 5 days per [employer] recommendations. Can also opt to switch to oral thiamine 100 mg daily after 3 days of IV repletion.   - keep Mg >2, K>4    #Nicotine use disorder:  - recommend 21 mg nicotine patch    #Anxiety and depression:  - please confirm psychiatric medications with patient (patient did not want to repeat all his medications to writer on assessment)  - patient takes xanax 1 mg qd prn anxiety at home, can reorder 1 mg xanax qd prn anxiety    #ADHD:  - hold home Adderall 20 mg TID, does not need it in the hospital at this time     Thank you for this consult. Rest of care per primary. Addiction medicine will continue to follow. Please reach out with any questions or concerns via TEAMS.      40yo domiciled Male with PMHx of withdrawal seizures and PPHx of depression, AUD and nicotine use disorder presenting to the ER after binging for 5 days admitted to medicine for alcohol withdrawal. Addiction Medicine consulted for alcohol withdrawal.     #Alcohol withdrawal:  #Hx of withdrawal seizures:  - patient with increased quantity of vodka consumption in the setting of hx of withdrawal seizures, now with elevated CIWAs despite prn doses of ativan, would recommend treatment with long acting fixed dose benzodiazepine taper as followed:   - Day 1 Librium 50 mg q4h x24 hrs, Day 2 librium 25 mg q4h  x24 hrs, Day 3 librium 15 mg q4hr x24 hr, day 4 librium 10 mg q4h x 24 hr --> discontinue   -- 12/29: Given patient requested 4 doses of PRN Ativan overnight, will advise to keep patient on one additional day of 25mg Librium before decreasing to 15mg  - please continue CIWA symptom triggered CIWA >7 q2h prn for breakthrough withdrawal treatment in addition to fixed dose taper as needed  - hold benzodiazepines for sedation or respiratory depression  - keep Mg>2, K>4    -continue thiamine, folate and multivitamin   - please offer PRN medications for supportive management of withdrawal including:  - patient has prolonged QT, please use antiemetics that have a lower QT prolonging risk if needed for nausea  - tylenol 650 mg q6h prn mild/moderate pain  - trazodone 25 mg nightly prn for insomnia  - CATCH team to assist with substance use aftercare options including inpatient vs outpatient rehab and will have ongoing discussions regarding medications for AUD such as naltrexone as withdrawals improve    #Concern for Wernicke's Encephalopathy:   - given concerns for  ophthalmoplegia and hx of gait instability and recent nutritional deficiencies, clinical concern for Wernicke's encephalopathy.   - Would agree with IV thiamine 500 mg q 8h for 3 days, followed by 250 mg IV daily up to additional 5 days per [employer] recommendations. Can also opt to switch to oral thiamine 100 mg daily after 3 days of IV repletion.   - keep Mg >2, K>4    #Nicotine use disorder:  - recommend 21 mg nicotine patch    #Anxiety and depression:  - please confirm psychiatric medications with patient (patient did not want to repeat all his medications to writer on assessment)  - patient takes xanax 1 mg qd prn anxiety at home, can reorder 1 mg xanax qd prn anxiety    #ADHD:  - hold home Adderall 20 mg TID, does not need it in the hospital at this time     Thank you for this consult. Rest of care per primary. Addiction medicine will continue to follow. Please reach out with any questions or concerns via TEAMS.

## 2024-12-29 NOTE — PROGRESS NOTE ADULT - SUBJECTIVE AND OBJECTIVE BOX
Patient is a 39y old  Male who presents with a chief complaint of Alcohol withdrawal (29 Dec 2024 10:31)    INTERVAL HPI/OVERNIGHT EVENTS: Patient was examined and seen at bedside. This morning pt is resting comfortably in bed and reports cough, chills, myalgias. No other complaints.  ROS: Denies CP, SOB, AP, new weakness  All other systems reviewed and are within normal limits.  InitialHPI:  39-year-old male with past medical history of depression presents to the ED for evaluation of tremulousness.  Patient states he has been binge drinking 2 L of vodka over the last 5 days and was ruptured; last drink 2 AM this morning.  Patient is tremulous, feels anxious, and headache.  Patient has not had any recent nausea, vomiting, or hallucinations.  SigLabs Sodium 129 AG 21 glucose 220 calcium 7.5 ast 241 alt 83 13:31 - VBG - pH: 7.45  | pCO2: 38    | pO2: 62    | Lactate: 6.5     (26 Dec 2024 15:54)    PAST MEDICAL & SURGICAL HISTORY:  No pertinent past medical history  anxiety  depression  polysubstance      No significant past surgical history          General: NAD, AAO3  HEENT:  EOMI, no LAD  CV: S1 S2  Resp: decreased breath sounds at bases  GI: NT/ND/S +BS  MS: no clubbing/cyanosis/edema, + pulses b/l  Neuro: nonfocal, +reflexes thruout    MEDICATIONS  (STANDING):  chlordiazePOXIDE 15 milliGRAM(s) Oral every 4 hours  chlordiazePOXIDE   Oral   dextrose 5% + lactated ringers. 1000 milliLiter(s) (100 mL/Hr) IV Continuous <Continuous>  enoxaparin Injectable 40 milliGRAM(s) SubCutaneous every 24 hours  FLUoxetine Solution 15 milliGRAM(s) Oral daily  folic acid 1 milliGRAM(s) Oral daily  gabapentin 400 milliGRAM(s) Oral three times a day  influenza   Vaccine 0.5 milliLiter(s) IntraMuscular once  LORazepam     Tablet 2 milliGRAM(s) Oral once  nicotine - 21 mG/24Hr(s) Patch 1 Patch Transdermal daily  pantoprazole    Tablet 40 milliGRAM(s) Oral before breakfast  thiamine IVPB 500 milliGRAM(s) IV Intermittent every 8 hours  traZODone 150 milliGRAM(s) Oral at bedtime    MEDICATIONS  (PRN):  acetaminophen     Tablet .. 650 milliGRAM(s) Oral every 6 hours PRN Mild Pain (1 - 3), Moderate Pain (4 - 6)  benzonatate 100 milliGRAM(s) Oral three times a day PRN Cough  LORazepam   Injectable 2 milliGRAM(s) IV Push every 2 hours PRN CIWA-Ar score 8 or greater    Vital Signs Last 24 Hrs  T(C): 37.1 (29 Dec 2024 04:52), Max: 37.1 (29 Dec 2024 04:52)  T(F): 98.7 (29 Dec 2024 04:52), Max: 98.7 (29 Dec 2024 04:52)  HR: 101 (29 Dec 2024 04:52) (101 - 101)  BP: 104/67 (29 Dec 2024 04:52) (104/67 - 137/77)  BP(mean): --  RR: 18 (29 Dec 2024 04:52) (18 - 18)  SpO2: 98% (29 Dec 2024 04:52) (98% - 100%)    Parameters below as of 29 Dec 2024 04:52  Patient On (Oxygen Delivery Method): room air      CAPILLARY BLOOD GLUCOSE                              12.2   4.06  )-----------( 39       ( 29 Dec 2024 08:20 )             36.4     12-29    135  |  99  |  5[L]  ----------------------------<  105[H]  3.2[L]   |  24  |  0.7    Ca    7.4[L]      29 Dec 2024 08:20  Phos  4.3     12-29  Mg     1.4     12-29    TPro  5.3[L]  /  Alb  3.3[L]  /  TBili  0.4  /  DBili  x   /  AST  98[H]  /  ALT  54[H]  /  AlkPhos  52  12-29    LIVER FUNCTIONS - ( 29 Dec 2024 08:20 )  Alb: 3.3 g/dL / Pro: 5.3 g/dL / ALK PHOS: 52 U/L / ALT: 54 U/L / AST: 98 U/L / GGT: x               PT/INR - ( 27 Dec 2024 20:59 )   PT: 9.60 sec;   INR: 0.82 ratio         PTT - ( 27 Dec 2024 20:59 )  PTT:26.7 sec  Urinalysis Basic - ( 29 Dec 2024 08:20 )    Color: x / Appearance: x / SG: x / pH: x  Gluc: 105 mg/dL / Ketone: x  / Bili: x / Urobili: x   Blood: x / Protein: x / Nitrite: x   Leuk Esterase: x / RBC: x / WBC x   Sq Epi: x / Non Sq Epi: x / Bacteria: x              Chart, Consultant(s) Notes Reviewed:  [x ] YES  [ ] NO  Care Discussed with Consultants/Other Providers/ Housestaff [ x] YES  [ ] NO  Radiology, labs, old available records personally reviewed.                             Patient is a 39y old  Male who presents with a chief complaint of Alcohol withdrawal (29 Dec 2024 10:31)    INTERVAL HPI/OVERNIGHT EVENTS: Patient was examined and seen at bedside. This morning pt is resting comfortably in bed and reports cough, chills, myalgias. No other complaints.  ROS: Denies CP, SOB, AP, new weakness  All other systems reviewed and are within normal limits.  InitialHPI:  39-year-old male with past medical history of depression presents to the ED for evaluation of tremulousness.  Patient states he has been binge drinking 2 L of vodka over the last 5 days and was ruptured; last drink 2 AM this morning.  Patient is tremulous, feels anxious, and headache.  Patient has not had any recent nausea, vomiting, or hallucinations.  SigLabs Sodium 129 AG 21 glucose 220 calcium 7.5 ast 241 alt 83 13:31 - VBG - pH: 7.45  | pCO2: 38    | pO2: 62    | Lactate: 6.5     (26 Dec 2024 15:54)    PAST MEDICAL & SURGICAL HISTORY:  No pertinent past medical history  anxiety  depression  polysubstance      No significant past surgical history          General: NAD, AAO3  HEENT:  EOMI, no LAD  CV: S1 S2  Resp: decreased breath sounds at bases  GI: NT/ND/S +BS  MS: no clubbing/cyanosis/edema, + pulses b/l  Neuro: nonfocal, +reflexes thruout; +Hand Tremor    MEDICATIONS  (STANDING):  chlordiazePOXIDE 15 milliGRAM(s) Oral every 4 hours  chlordiazePOXIDE   Oral   dextrose 5% + lactated ringers. 1000 milliLiter(s) (100 mL/Hr) IV Continuous <Continuous>  enoxaparin Injectable 40 milliGRAM(s) SubCutaneous every 24 hours  FLUoxetine Solution 15 milliGRAM(s) Oral daily  folic acid 1 milliGRAM(s) Oral daily  gabapentin 400 milliGRAM(s) Oral three times a day  influenza   Vaccine 0.5 milliLiter(s) IntraMuscular once  LORazepam     Tablet 2 milliGRAM(s) Oral once  nicotine - 21 mG/24Hr(s) Patch 1 Patch Transdermal daily  pantoprazole    Tablet 40 milliGRAM(s) Oral before breakfast  thiamine IVPB 500 milliGRAM(s) IV Intermittent every 8 hours  traZODone 150 milliGRAM(s) Oral at bedtime    MEDICATIONS  (PRN):  acetaminophen     Tablet .. 650 milliGRAM(s) Oral every 6 hours PRN Mild Pain (1 - 3), Moderate Pain (4 - 6)  benzonatate 100 milliGRAM(s) Oral three times a day PRN Cough  LORazepam   Injectable 2 milliGRAM(s) IV Push every 2 hours PRN CIWA-Ar score 8 or greater    Vital Signs Last 24 Hrs  T(C): 37.1 (29 Dec 2024 04:52), Max: 37.1 (29 Dec 2024 04:52)  T(F): 98.7 (29 Dec 2024 04:52), Max: 98.7 (29 Dec 2024 04:52)  HR: 101 (29 Dec 2024 04:52) (101 - 101)  BP: 104/67 (29 Dec 2024 04:52) (104/67 - 137/77)  BP(mean): --  RR: 18 (29 Dec 2024 04:52) (18 - 18)  SpO2: 98% (29 Dec 2024 04:52) (98% - 100%)    Parameters below as of 29 Dec 2024 04:52  Patient On (Oxygen Delivery Method): room air      CAPILLARY BLOOD GLUCOSE                              12.2   4.06  )-----------( 39       ( 29 Dec 2024 08:20 )             36.4     12-29    135  |  99  |  5[L]  ----------------------------<  105[H]  3.2[L]   |  24  |  0.7    Ca    7.4[L]      29 Dec 2024 08:20  Phos  4.3     12-29  Mg     1.4     12-29    TPro  5.3[L]  /  Alb  3.3[L]  /  TBili  0.4  /  DBili  x   /  AST  98[H]  /  ALT  54[H]  /  AlkPhos  52  12-29    LIVER FUNCTIONS - ( 29 Dec 2024 08:20 )  Alb: 3.3 g/dL / Pro: 5.3 g/dL / ALK PHOS: 52 U/L / ALT: 54 U/L / AST: 98 U/L / GGT: x               PT/INR - ( 27 Dec 2024 20:59 )   PT: 9.60 sec;   INR: 0.82 ratio         PTT - ( 27 Dec 2024 20:59 )  PTT:26.7 sec  Urinalysis Basic - ( 29 Dec 2024 08:20 )    Color: x / Appearance: x / SG: x / pH: x  Gluc: 105 mg/dL / Ketone: x  / Bili: x / Urobili: x   Blood: x / Protein: x / Nitrite: x   Leuk Esterase: x / RBC: x / WBC x   Sq Epi: x / Non Sq Epi: x / Bacteria: x              Chart, Consultant(s) Notes Reviewed:  [x ] YES  [ ] NO  Care Discussed with Consultants/Other Providers/ Housestaff [ x] YES  [ ] NO  Radiology, labs, old available records personally reviewed.

## 2024-12-29 NOTE — PROGRESS NOTE ADULT - SUBJECTIVE AND OBJECTIVE BOX
Pt interviewed, examined and EMR chart reviewed.    40 yo domiciled Male with PMHx of withdrawal seizures and PPHx of depression, AUD and nicotine use disorder presenting to the ER after binging for 5 days admitted to medicine for alcohol withdrawal. Addiction Medicine consulted for alcohol withdrawal.     Since last evaluation on 12/28, patient received 8 mg IV PRN Ativan and 100mg oral librium total.  Patient this morning was dozing off as he was eating breakfast, on arousal he was noticeably irritable. He was alert and awake, disoriented to date by 1 day. Denied n/v, diarrhea, headache, sweating, AVH. Endorsed anxiety. Mild tremors noticed in bl hands. CIWA 4.    [place]:   This report was requested by: April Sullivan | Reference #: [id number]    Practitioner Count: 2  Pharmacy Count: 3  Current Opioid Prescriptions: 0  Current Benzodiazepine Prescriptions: 1  Current Stimulant Prescriptions: 0    Patient Demographic Information (PDI)       PDI	First Name	Last Name	Birth Date	Gender	Street Address	[facility]	Zip Code  LILI Morton	1985	Male	[address] [place] [id number]    Prescription Information      PDI Filter:    PDI	My Rx	Current Rx	Drug Type	Rx Written	Rx Dispensed	Drug	Quantity	Days Supply	Prescriber Name	Prescriber VLAD #	Payment Method	Dispenser  A	N	Y	B	12/16/2024	12/17/2024	alprazolam 1 mg tablet	30	30	TuddaSarah	[id number]	Insurance	[facility] #[id number]  A	N	Y		12/16/2024	12/17/2024	testosterone cyp 2000 mg/10 ml	10ml	28	TuddaSarah	[id number]	[facility] #[id number]  A	N	N	S	11/22/2024	11/26/2024	dextroamp-amphetamin 20 mg tab	60	30	Minniti, Carla	[id number]	Insurance	[facility] #[id number]  A	N	N	B	11/18/2024	11/18/2024	alprazolam 1 mg tablet	30	30	Minniti, Carla	[id number]	Insurance	[facility] #[id number]  A	N	N		10/28/2024	11/18/2024	testosterone cyp 2000 mg/10 ml	10ml	28	Minniti, Carla	[id number]	[facility] #[id number]  A	N	N	S	10/28/2024	10/28/2024	dextroamp-amphetamin 20 mg tab	60	30	Minniti, Carla	[id number]	Insurance	[facility]  A	N	N	B	10/28/2024	10/28/2024	alprazolam 1 mg tablet	7	7	Minniti, Carla	[id number]	Insurance	[facility] #[id number]  A	N	N		09/25/2024	09/28/2024	testosterone cyp 200 mg/ml	4ml	28	Crociata, Ana	[id number]	[facility] #[id number]  A	N	N	S	09/25/2024	09/25/2024	dextroamp-amphetamin 20 mg tab	60	30	Crociata, Ana	[id number]	Insurance	[facility]  A	N	N		08/23/2024	08/29/2024	testosterone cyp 2000 mg/10 ml	10ml	35	Minniti, Carla	[id number]	Insurance	[facility] #[id number]  A	N	N	S	08/23/2024	08/27/2024	dextroamp-amphetamin 20 mg tab	60	30	Minniti, Carla	[id number]	Insurance	[facility]  A	N	N	S	07/24/2024	07/29/2024	dextroamp-amphetamin 20 mg tab	60	30	Angelina Lobo C	[id number]	Insurance	[facility]  A	N	N	S	06/28/2024	06/28/2024	dextroamp-amphetamin 20 mg tab	60	30	Angelina Lobo C	[id number]	Insurance	[facility]  A	N	N		06/18/2024	06/24/2024	testosterone cyp 2000 mg/10 ml	10ml	70	CrociataAna	[id number]	Insurance	[facility] #[id number]  A	N	N	S	05/21/2024	05/29/2024	dextroamp-amphetamin 20 mg tab	60	30	Angelina Lobo C	[id number]	Insurance	[facility]  A	N	N	S	04/30/2024	04/30/2024	dextroamp-amphetamin 20 mg tab	60	30	Angelina Lobo C	[id number]	Insurance	[facility]  A	N	N		03/26/2024	04/10/2024	testosterone cyp 2000 mg/10 ml	10ml	70	Angelina Lobo C	[id number]	Insurance	[facility] #[id number]  A	N	N	S	03/26/2024	03/26/2024	dextroamp-amphetamin 20 mg tab	60	30	Angelina Lobo C	[id number]	[facility]  A	N	N		03/12/2024	03/12/2024	testosterone cyp 200 mg/ml	4ml	28	JaymeAngelina patrick C	[id number]	Insurance	[facility] #[id number]  A	N	N	S	02/27/2024	02/27/2024	dextroamp-amphetamin 20 mg tab	60	30	JaymeAngelina patrick C	[id number]	[facility]  A	N	N		02/06/2024	02/21/2024	testosterone cyp 200 mg/ml	2ml	28	[facility]	[id number]	Insurance	[facility] #[id number]  A	N	N	S	01/08/2024	01/08/2024	dextroamp-amphetamin 20 mg tab	60	30	[facility]	[id number]	[facility]        REVIEW OF SYSTEMS: per HPI     MEDICATIONS  (STANDING):  chlordiazePOXIDE   Oral   chlordiazePOXIDE 15 milliGRAM(s) Oral every 4 hours  dextrose 5% + lactated ringers. 1000 milliLiter(s) (100 mL/Hr) IV Continuous <Continuous>  enoxaparin Injectable 40 milliGRAM(s) SubCutaneous every 24 hours  FLUoxetine Solution 15 milliGRAM(s) Oral daily  folic acid 1 milliGRAM(s) Oral daily  gabapentin 400 milliGRAM(s) Oral three times a day  influenza   Vaccine 0.5 milliLiter(s) IntraMuscular once  nicotine - 21 mG/24Hr(s) Patch 1 Patch Transdermal daily  pantoprazole    Tablet 40 milliGRAM(s) Oral before breakfast  thiamine IVPB 500 milliGRAM(s) IV Intermittent every 8 hours  traZODone 150 milliGRAM(s) Oral at bedtime    MEDICATIONS  (PRN):  acetaminophen     Tablet .. 650 milliGRAM(s) Oral every 6 hours PRN Mild Pain (1 - 3), Moderate Pain (4 - 6)  benzonatate 100 milliGRAM(s) Oral three times a day PRN Cough  LORazepam   Injectable 2 milliGRAM(s) IV Push every 2 hours PRN CIWA-Ar score 8 or greater    PHYSICAL EXAM:    Constitutional: NAD, poorly-groomed  HEENT: PERRLA, EOMI, + horizontal nystagmus b/l   Respiratory: no increased work of breathing  Neurological: A/O x 3, no focal deficits, moderate b/l UE tremors     MENTAL STATUS EXAM:  Appearance: anxious, in hospital attire   Appearance in relation to age: looks stated age      Hygiene/Grooming: disheveled  Attitude toward examiner: guarded  Alertness: alert  Orientation: oriented to place and person, off by date of 1 day  Posture: lying in bed  Gait: not evaluated  Behavior and psychomotor activity: mild psychomotor retardation   Mood: "anxious"   Affect: constricted range  Speech: fluent and spontaneous; normal rate, rhythm, volume and tone   Perceptions: denies hallucinations  Thought process: logical, linear and goal-directed    Thought content: no delusions or particular preoccupation, denies SI/HI  Associations: no loosening of associations   Impulse Control: aware of socially acceptable behavior  Insight: limited, has previously sought treatment with continued frequent relapses   Judgment: poor    LABS:    CBC:            12.2   4.06  )-----------( 39       ( 12-29-24 @ 08:20 )             36.4     Chem:         ( 12-29-24 @ 08:20 )    135  |  99  |  5[L]  ----------------------------<  105[H]  3.2[L]   |  24  |  0.7    Liver Functions: ( 12-29-24 @ 08:20 )  Alb: 3.3 g/dL / Pro: 5.3 g/dL / ALK PHOS: 52 U/L / ALT: 54 U/L / AST: 98 U/L / GGT: x            RADIOLOGY & ADDITIONAL STUDIES:  < from: 12 Lead ECG (12.26.24 @ 11:36) >    Ventricular Rate 119 BPM    Atrial Rate 119 BPM    P-R Interval 122 ms    QRS Duration 70 ms    Q-T Interval 362 ms    QTC Calculation(Bazett) 509 ms    P Axis 58 degrees    R Axis 66 degrees    T Axis 43 degrees    Diagnosis Line Sinus tachycardia  Nonspecific ST abnormality  Abnormal ECG  Confirmed by Genaro Cordova ([id number]) on 12/26/2024 7:05:35 PM    < end of copied text >

## 2024-12-30 LAB
ALBUMIN SERPL ELPH-MCNC: 3.3 G/DL — LOW (ref 3.5–5.2)
ALBUMIN SERPL ELPH-MCNC: 3.3 G/DL — LOW (ref 3.5–5.2)
ALP SERPL-CCNC: 48 U/L — SIGNIFICANT CHANGE UP (ref 30–115)
ALP SERPL-CCNC: 49 U/L — SIGNIFICANT CHANGE UP (ref 30–115)
ALT FLD-CCNC: 41 U/L — SIGNIFICANT CHANGE UP (ref 0–41)
ALT FLD-CCNC: 44 U/L — HIGH (ref 0–41)
ANION GAP SERPL CALC-SCNC: 12 MMOL/L — SIGNIFICANT CHANGE UP (ref 7–14)
ANION GAP SERPL CALC-SCNC: 8 MMOL/L — SIGNIFICANT CHANGE UP (ref 7–14)
AST SERPL-CCNC: 58 U/L — HIGH (ref 0–41)
AST SERPL-CCNC: 65 U/L — HIGH (ref 0–41)
BASOPHILS # BLD AUTO: 0.05 K/UL — SIGNIFICANT CHANGE UP (ref 0–0.2)
BASOPHILS NFR BLD AUTO: 1 % — SIGNIFICANT CHANGE UP (ref 0–1)
BILIRUB SERPL-MCNC: 0.2 MG/DL — SIGNIFICANT CHANGE UP (ref 0.2–1.2)
BILIRUB SERPL-MCNC: <0.2 MG/DL — SIGNIFICANT CHANGE UP (ref 0.2–1.2)
BUN SERPL-MCNC: 5 MG/DL — LOW (ref 10–20)
BUN SERPL-MCNC: 5 MG/DL — LOW (ref 10–20)
CALCIUM SERPL-MCNC: 8.2 MG/DL — LOW (ref 8.4–10.5)
CALCIUM SERPL-MCNC: 8.2 MG/DL — LOW (ref 8.4–10.5)
CHLORIDE SERPL-SCNC: 101 MMOL/L — SIGNIFICANT CHANGE UP (ref 98–110)
CHLORIDE SERPL-SCNC: 102 MMOL/L — SIGNIFICANT CHANGE UP (ref 98–110)
CO2 SERPL-SCNC: 23 MMOL/L — SIGNIFICANT CHANGE UP (ref 17–32)
CO2 SERPL-SCNC: 27 MMOL/L — SIGNIFICANT CHANGE UP (ref 17–32)
CREAT SERPL-MCNC: 0.7 MG/DL — SIGNIFICANT CHANGE UP (ref 0.7–1.5)
CREAT SERPL-MCNC: 0.7 MG/DL — SIGNIFICANT CHANGE UP (ref 0.7–1.5)
EGFR: 121 ML/MIN/1.73M2 — SIGNIFICANT CHANGE UP
EGFR: 121 ML/MIN/1.73M2 — SIGNIFICANT CHANGE UP
EOSINOPHIL # BLD AUTO: 0.01 K/UL — SIGNIFICANT CHANGE UP (ref 0–0.7)
EOSINOPHIL NFR BLD AUTO: 0.2 % — SIGNIFICANT CHANGE UP (ref 0–8)
GI PCR PANEL: SIGNIFICANT CHANGE UP
GLUCOSE SERPL-MCNC: 130 MG/DL — HIGH (ref 70–99)
GLUCOSE SERPL-MCNC: 155 MG/DL — HIGH (ref 70–99)
HCT VFR BLD CALC: 36.3 % — LOW (ref 42–52)
HCV RNA FLD QL NAA+PROBE: SIGNIFICANT CHANGE UP
HCV RNA SPEC QL PROBE+SIG AMP: SIGNIFICANT CHANGE UP
HGB BLD-MCNC: 12.1 G/DL — LOW (ref 14–18)
IMM GRANULOCYTES NFR BLD AUTO: 0.8 % — HIGH (ref 0.1–0.3)
LYMPHOCYTES # BLD AUTO: 0.5 K/UL — LOW (ref 1.2–3.4)
LYMPHOCYTES # BLD AUTO: 10.1 % — LOW (ref 20.5–51.1)
MAGNESIUM SERPL-MCNC: 1.5 MG/DL — LOW (ref 1.8–2.4)
MCHC RBC-ENTMCNC: 31.3 PG — HIGH (ref 27–31)
MCHC RBC-ENTMCNC: 33.3 G/DL — SIGNIFICANT CHANGE UP (ref 32–37)
MCV RBC AUTO: 94 FL — SIGNIFICANT CHANGE UP (ref 80–94)
MONOCYTES # BLD AUTO: 0.64 K/UL — HIGH (ref 0.1–0.6)
MONOCYTES NFR BLD AUTO: 12.9 % — HIGH (ref 1.7–9.3)
NEUTROPHILS # BLD AUTO: 3.73 K/UL — SIGNIFICANT CHANGE UP (ref 1.4–6.5)
NEUTROPHILS NFR BLD AUTO: 75 % — SIGNIFICANT CHANGE UP (ref 42.2–75.2)
NRBC # BLD: 0 /100 WBCS — SIGNIFICANT CHANGE UP (ref 0–0)
PHOSPHATE SERPL-MCNC: 2.8 MG/DL — SIGNIFICANT CHANGE UP (ref 2.1–4.9)
PHOSPHATE SERPL-MCNC: 3.9 MG/DL — SIGNIFICANT CHANGE UP (ref 2.1–4.9)
PLATELET # BLD AUTO: 80 K/UL — LOW (ref 130–400)
PMV BLD: 10.7 FL — HIGH (ref 7.4–10.4)
POTASSIUM SERPL-MCNC: 3.7 MMOL/L — SIGNIFICANT CHANGE UP (ref 3.5–5)
POTASSIUM SERPL-MCNC: 3.8 MMOL/L — SIGNIFICANT CHANGE UP (ref 3.5–5)
POTASSIUM SERPL-SCNC: 3.7 MMOL/L — SIGNIFICANT CHANGE UP (ref 3.5–5)
POTASSIUM SERPL-SCNC: 3.8 MMOL/L — SIGNIFICANT CHANGE UP (ref 3.5–5)
PROT SERPL-MCNC: 5.5 G/DL — LOW (ref 6–8)
PROT SERPL-MCNC: 5.5 G/DL — LOW (ref 6–8)
RBC # BLD: 3.86 M/UL — LOW (ref 4.7–6.1)
RBC # FLD: 12.5 % — SIGNIFICANT CHANGE UP (ref 11.5–14.5)
SODIUM SERPL-SCNC: 136 MMOL/L — SIGNIFICANT CHANGE UP (ref 135–146)
SODIUM SERPL-SCNC: 137 MMOL/L — SIGNIFICANT CHANGE UP (ref 135–146)
WBC # BLD: 4.97 K/UL — SIGNIFICANT CHANGE UP (ref 4.8–10.8)
WBC # FLD AUTO: 4.97 K/UL — SIGNIFICANT CHANGE UP (ref 4.8–10.8)

## 2024-12-30 PROCEDURE — 99232 SBSQ HOSP IP/OBS MODERATE 35: CPT

## 2024-12-30 RX ORDER — NALTREXONE HYDROCHLORIDE 50 MG/1
50 TABLET, FILM COATED ORAL AT BEDTIME
Refills: 0 | Status: DISCONTINUED | OUTPATIENT
Start: 2024-12-30 | End: 2025-01-01

## 2024-12-30 RX ORDER — ALBUTEROL SULFATE 90 UG/1
2 INHALANT RESPIRATORY (INHALATION) EVERY 6 HOURS
Refills: 0 | Status: DISCONTINUED | OUTPATIENT
Start: 2024-12-30 | End: 2025-01-01

## 2024-12-30 RX ORDER — MAGNESIUM SULFATE 500 MG/ML
2 INJECTION, SOLUTION INTRAMUSCULAR; INTRAVENOUS
Refills: 0 | Status: COMPLETED | OUTPATIENT
Start: 2024-12-30 | End: 2024-12-30

## 2024-12-30 RX ADMIN — LORAZEPAM 2 MILLIGRAM(S): 1 TABLET ORAL at 05:07

## 2024-12-30 RX ADMIN — ENOXAPARIN SODIUM 40 MILLIGRAM(S): 60 INJECTION INTRAVENOUS; SUBCUTANEOUS at 17:06

## 2024-12-30 RX ADMIN — Medication 1 MILLIGRAM(S): at 11:23

## 2024-12-30 RX ADMIN — LORAZEPAM 2 MILLIGRAM(S): 1 TABLET ORAL at 11:59

## 2024-12-30 RX ADMIN — ACETAMINOPHEN 650 MILLIGRAM(S): 80 SOLUTION/ DROPS ORAL at 05:45

## 2024-12-30 RX ADMIN — Medication 400 MILLIGRAM(S): at 17:06

## 2024-12-30 RX ADMIN — ACETAMINOPHEN 650 MILLIGRAM(S): 80 SOLUTION/ DROPS ORAL at 05:07

## 2024-12-30 RX ADMIN — Medication 10 MILLIGRAM(S): at 14:01

## 2024-12-30 RX ADMIN — NALTREXONE HYDROCHLORIDE 50 MILLIGRAM(S): 50 TABLET, FILM COATED ORAL at 22:11

## 2024-12-30 RX ADMIN — Medication 15 MILLIGRAM(S): at 02:06

## 2024-12-30 RX ADMIN — GABAPENTIN 400 MILLIGRAM(S): 300 CAPSULE ORAL at 06:18

## 2024-12-30 RX ADMIN — Medication 400 MILLIGRAM(S): at 11:32

## 2024-12-30 RX ADMIN — Medication 400 MILLIGRAM(S): at 08:24

## 2024-12-30 RX ADMIN — Medication 10 MILLIGRAM(S): at 10:41

## 2024-12-30 RX ADMIN — THIAMINE HYDROCHLORIDE 105 MILLIGRAM(S): 100 INJECTION, SOLUTION INTRAMUSCULAR; INTRAVENOUS at 14:47

## 2024-12-30 RX ADMIN — THIAMINE HYDROCHLORIDE 105 MILLIGRAM(S): 100 INJECTION, SOLUTION INTRAMUSCULAR; INTRAVENOUS at 06:19

## 2024-12-30 RX ADMIN — Medication 10 MILLIGRAM(S): at 22:11

## 2024-12-30 RX ADMIN — Medication 10 MILLIGRAM(S): at 17:06

## 2024-12-30 RX ADMIN — LORAZEPAM 2 MILLIGRAM(S): 1 TABLET ORAL at 22:10

## 2024-12-30 RX ADMIN — PANTOPRAZOLE 40 MILLIGRAM(S): 40 TABLET, DELAYED RELEASE ORAL at 06:18

## 2024-12-30 RX ADMIN — GABAPENTIN 400 MILLIGRAM(S): 300 CAPSULE ORAL at 14:01

## 2024-12-30 RX ADMIN — Medication 15 MILLIGRAM(S): at 11:33

## 2024-12-30 RX ADMIN — Medication 15 MILLIGRAM(S): at 06:18

## 2024-12-30 RX ADMIN — MAGNESIUM SULFATE 25 GRAM(S): 500 INJECTION, SOLUTION INTRAMUSCULAR; INTRAVENOUS at 14:00

## 2024-12-30 RX ADMIN — NICOTINE POLACRILEX 1 PATCH: 4 LOZENGE ORAL at 19:05

## 2024-12-30 RX ADMIN — NICOTINE POLACRILEX 1 PATCH: 4 LOZENGE ORAL at 11:23

## 2024-12-30 RX ADMIN — LORAZEPAM 2 MILLIGRAM(S): 1 TABLET ORAL at 02:10

## 2024-12-30 RX ADMIN — TRAZODONE HYDROCHLORIDE 150 MILLIGRAM(S): 150 TABLET ORAL at 22:10

## 2024-12-30 RX ADMIN — MAGNESIUM SULFATE 25 GRAM(S): 500 INJECTION, SOLUTION INTRAMUSCULAR; INTRAVENOUS at 18:00

## 2024-12-30 RX ADMIN — Medication 100 MILLIGRAM(S): at 05:07

## 2024-12-30 RX ADMIN — LORAZEPAM 2 MILLIGRAM(S): 1 TABLET ORAL at 18:53

## 2024-12-30 RX ADMIN — GABAPENTIN 400 MILLIGRAM(S): 300 CAPSULE ORAL at 22:10

## 2024-12-30 RX ADMIN — SODIUM CHLORIDE 100 MILLILITER(S): 9 INJECTION, SOLUTION INTRAVENOUS at 06:19

## 2024-12-30 RX ADMIN — LORAZEPAM 2 MILLIGRAM(S): 1 TABLET ORAL at 08:24

## 2024-12-30 RX ADMIN — NICOTINE POLACRILEX 1 PATCH: 4 LOZENGE ORAL at 06:43

## 2024-12-30 NOTE — PROGRESS NOTE ADULT - SUBJECTIVE AND OBJECTIVE BOX
Pt interviewed, examined and EMR chart reviewed.    40 yo domiciled Male with PMHx of withdrawal seizures and PPHx of depression, AUD and nicotine use disorder presenting to the ER after binging for 5 days admitted to medicine for alcohol withdrawal. Addiction Medicine consulted for alcohol withdrawal.     Since last evaluation on 12/28, patient received 8 mg IV PRN Ativan and 60mg oral librium total.  Patient this morning was irritable, guarded, disoriented to date by 1 day. Denied n/v, diarrhea, sweating, AVH. Mild tremors noticed in bl hands. CIWA 4. Discussed about the option of starting Naltrexone, to which patient was agreeable.    [place]:   This report was requested by: April Sullivan | Reference #: [id number]    Practitioner Count: 2  Pharmacy Count: 3  Current Opioid Prescriptions: 0  Current Benzodiazepine Prescriptions: 1  Current Stimulant Prescriptions: 0    Patient Demographic Information (PDI)       PDI	First Name	Last Name	Birth Date	Gender	Street Address	[facility]	Zip Code  LILI Morton	1985	Male	[address] [place] [phone]    Prescription Information      PDI Filter:    PDI	My Rx	Current Rx	Drug Type	Rx Written	Rx Dispensed	Drug	Quantity	Days Supply	Prescriber Name	Prescriber VLAD #	Payment Method	Dispenser  A	N	Y	B	12/16/2024	12/17/2024	alprazolam 1 mg tablet	30	30	Tudda, Sarah	[id number]	Insurance	[facility][id number]  A	N	Y		12/16/2024	12/17/2024	testosterone cyp 2000 mg/10 ml	10ml	28	Tudda, Sarah	[id number]	[facility] #[id number]  A	N	N	S	11/22/2024	11/26/2024	dextroamp-amphetamin 20 mg tab	60	30	Minniti, Carla	[id number]	Insurance	[facility] #[id number]  A	N	N	B	11/18/2024	11/18/2024	alprazolam 1 mg tablet	30	30	Minniti, Carla	[id number]	Insurance	[facility] #[id number]  A	N	N		10/28/2024	11/18/2024	testosterone cyp 2000 mg/10 ml	10ml	28	Minniti, Carla	[id number]	[facility] #[id number]  A	N	N	S	10/28/2024	10/28/2024	dextroamp-amphetamin 20 mg tab	60	30	Minniti, Carla	[id number]	Insurance	[facility]  A	N	N	B	10/28/2024	10/28/2024	alprazolam 1 mg tablet	7	7	Minniti, Carla	[id number]	Insurance	[facility] #[id number]  A	N	N		09/25/2024	09/28/2024	testosterone cyp 200 mg/ml	4ml	28	Crociata, Ana	[id number]	[facility] #[id number]  A	N	N	S	09/25/2024	09/25/2024	dextroamp-amphetamin 20 mg tab	60	30	CrociataAna	[id number]	Insurance	[facility]  A	N	N		08/23/2024	08/29/2024	testosterone cyp 2000 mg/10 ml	10ml	35	Minniti, Carla	[id number]	Insurance	[facility] #[id number]  A	N	N	S	08/23/2024	08/27/2024	dextroamp-amphetamin 20 mg tab	60	30	Minniti, Carla	[id number]	Insurance	[facility]  A	N	N	S	07/24/2024	07/29/2024	dextroamp-amphetamin 20 mg tab	60	30	Angelina Lobo C	[id number]	Insurance	[facility]  A	N	N	S	06/28/2024	06/28/2024	dextroamp-amphetamin 20 mg tab	60	30	Angelina Lobo C	[id number]	Insurance	[facility]  A	N	N		06/18/2024	06/24/2024	testosterone cyp 2000 mg/10 ml	10ml	70	CrociataAna	[id number]	Insurance	[facility] #[id number]  A	N	N	S	05/21/2024	05/29/2024	dextroamp-amphetamin 20 mg tab	60	30	Angelina Lobo C	[id number]	Insurance	[facility]  A	N	N	S	04/30/2024	04/30/2024	dextroamp-amphetamin 20 mg tab	60	30	Angelina Lobo C	[id number]	Insurance	[facility]  A	N	N		03/26/2024	04/10/2024	testosterone cyp 2000 mg/10 ml	10ml	70	Angelina Lobo C	[id number]	Insurance	[facility] #[id number]  A	N	N	S	03/26/2024	03/26/2024	dextroamp-amphetamin 20 mg tab	60	30	Angelina Lobo C	[id number]	[facility]  A	N	N		03/12/2024	03/12/2024	testosterone cyp 200 mg/ml	4ml	28	JaymeAngelina patrick C	[id number]	Insurance	[facility] #[id number]  A	N	N	S	02/27/2024	02/27/2024	dextroamp-amphetamin 20 mg tab	60	30	Angelina Lobo C	[id number]	[facility]  A	N	N		02/06/2024	02/21/2024	testosterone cyp 200 mg/ml	2ml	28	[facility]	[id number]	Ins[facility] #[id number]  A	N	N	S	01/08/2024	01/08/2024	dextroamp-amphetamin 20 mg tab	60	30	Tudda Sarah	[id number]	[facility]      REVIEW OF SYSTEMS: per HPI     MEDICATIONS  (STANDING):  chlordiazePOXIDE 10 milliGRAM(s) Oral every 4 hours  chlordiazePOXIDE   Oral   dextrose 5% + lactated ringers. 1000 milliLiter(s) (100 mL/Hr) IV Continuous <Continuous>  enoxaparin Injectable 40 milliGRAM(s) SubCutaneous every 24 hours  FLUoxetine Solution 15 milliGRAM(s) Oral daily  folic acid 1 milliGRAM(s) Oral daily  gabapentin 400 milliGRAM(s) Oral three times a day  influenza   Vaccine 0.5 milliLiter(s) IntraMuscular once  magnesium oxide 400 milliGRAM(s) Oral three times a day with meals  nicotine - 21 mG/24Hr(s) Patch 1 Patch Transdermal daily  pantoprazole    Tablet 40 milliGRAM(s) Oral before breakfast  potassium chloride Tablet ER 40 milliEquivalent(s) Oral once  potassium chloride  20 mEq/100 mL IVPB 20 milliEquivalent(s) IV Intermittent once  thiamine IVPB 500 milliGRAM(s) IV Intermittent every 8 hours  traZODone 150 milliGRAM(s) Oral at bedtime    MEDICATIONS  (PRN):  acetaminophen     Tablet .. 650 milliGRAM(s) Oral every 6 hours PRN Mild Pain (1 - 3), Moderate Pain (4 - 6)  albuterol    90 MICROgram(s) HFA Inhaler 2 Puff(s) Inhalation every 6 hours PRN Shortness of Breath and/or Wheezing  benzonatate 100 milliGRAM(s) Oral three times a day PRN Cough  LORazepam   Injectable 2 milliGRAM(s) IV Push every 2 hours PRN CIWA-Ar score 8 or greater    PHYSICAL EXAM:    Constitutional: NAD, poorly-groomed  HEENT: PERRLA, EOMI, + horizontal nystagmus b/l   Respiratory: no increased work of breathing  Neurological: A/O x 3, no focal deficits, moderate b/l UE tremors     MENTAL STATUS EXAM:  Appearance: anxious, in hospital attire   Appearance in relation to age: looks stated age      Hygiene/Grooming: disheveled  Attitude toward examiner: guarded, irritable  Alertness: alert  Orientation: oriented to place and person, date off by 1 day  Posture: lying in bed  Gait: not evaluated  Behavior and psychomotor activity: mild psychomotor retardation   Mood: "agitated because of you"   Affect: constricted range  Speech: fluent and spontaneous; normal rate, rhythm, volume and tone   Perceptions: denies hallucinations  Thought process: logical, linear and goal-directed    Thought content: no delusions or particular preoccupation, denies SI/HI  Associations: no loosening of associations   Impulse Control: aware of socially acceptable behavior  Insight: limited, has previously sought treatment with continued frequent relapses   Judgment: poor    LABS:    CBC:            12.1   4.97  )-----------( 80       ( 12-30-24 @ 07:36 )             36.3     Chem:         ( 12-30-24 @ 07:36 )    136  |  101  |  5[L]  ----------------------------<  155[H]  3.8   |  23  |  0.7    Liver Functions: ( 12-30-24 @ 07:36 )  Alb: 3.3 g/dL / Pro: 5.5 g/dL / ALK PHOS: 48 U/L / ALT: 41 U/L / AST: 58 U/L / GGT: x            RADIOLOGY & ADDITIONAL STUDIES:  < from: 12 Lead ECG (12.26.24 @ 11:36) >    Ventricular Rate 119 BPM    Atrial Rate 119 BPM    P-R Interval 122 ms    QRS Duration 70 ms    Q-T Interval 362 ms    QTC Calculation(Bazett) 509 ms    P Axis 58 degrees    R Axis 66 degrees    T Axis 43 degrees    Diagnosis Line Sinus tachycardia  Nonspecific ST abnormality  Abnormal ECG  Confirmed by Genaro Cordova ([id number]) on 12/26/2024 7:05:35 PM    < end of copied text >     Pt interviewed, examined and EMR chart reviewed.    40 yo domiciled Male with PMHx of withdrawal seizures and PPHx of depression, AUD and nicotine use disorder presenting to the ER after binging for 5 days admitted to medicine for alcohol withdrawal. Addiction Medicine consulted for alcohol withdrawal.     Since last evaluation on 12/28, patient received 8 mg IV PRN Ativan and 60mg oral librium total.  Patient this morning was irritable, guarded, disoriented to date by 1 day. Denied n/v, diarrhea, sweating, AVH. Mild tremors noticed in bl hands. CIWA 4.     Discussed about the option of starting Naltrexone, to which patient was agreeable. He is also agreeable to outpatient substance use counseling at [facility]. CATCH To do the referral for outpatient care.     [place] ISTOP:   This report was requested by: April Sullivan | Reference #: [id number]    Practitioner Count: 2  Pharmacy Count: 3  Current Opioid Prescriptions: 0  Current Benzodiazepine Prescriptions: 1  Current Stimulant Prescriptions: 0    Patient Demographic Information (PDI)       PDI	First Name	Last Name	Birth Date	Gender	Street Address	[facility] Code  LILI Morton	1985	Male	[address] [place] [phone]    Prescription Information      PDI Filter:    PDI	My Rx	Current Rx	Drug Type	Rx Written	Rx Dispensed	Drug	Quantity	Days Supply	Prescriber Name	Prescriber VLAD #	Payment Method	Dispenser  A	N	Y	B	12/16/2024	12/17/2024	alprazolam 1 mg tablet	30	30	TuddaSarah	[id number]	Insurance	[facility] #[id number]  A	N	Y		12/16/2024	12/17/2024	testosterone cyp 2000 mg/10 ml	10ml	28	Sarah Velasco	[id number]	[facility] #[id number]  A	N	N	S	11/22/2024	11/26/2024	dextroamp-amphetamin 20 mg tab	60	30	Minniti, Carla	[id number]	Insurance	[facility] #[id number]  A	N	N	B	11/18/2024	11/18/2024	alprazolam 1 mg tablet	30	30	Minniti, Carla	[id number]	Insurance	[facility] #[id number]  A	N	N		10/28/2024	11/18/2024	testosterone cyp 2000 mg/10 ml	10ml	28	Minniti, Carla	[id number]	[facility] #[id number]  A	N	N	S	10/28/2024	10/28/2024	dextroamp-amphetamin 20 mg tab	60	30	Minniti, Carla	[id number]	Insurance	[facility]  A	N	N	B	10/28/2024	10/28/2024	alprazolam 1 mg tablet	7	7	Minniti, Carla	[id number]	Insurance	[facility] #[id number]  A	N	N		09/25/2024	09/28/2024	testosterone cyp 200 mg/ml	4ml	28	CrociataAna	[id number]	[facility] #[id number]  A	N	N	S	09/25/2024	09/25/2024	dextroamp-amphetamin 20 mg tab	60	30	CrociataAna	[id number]	Insurance	[facility]  A	N	N		08/23/2024	08/29/2024	testosterone cyp 2000 mg/10 ml	10ml	35	Minniti, Carla	[id number]	Insurance	[facility] #[id number]  A	N	N	S	08/23/2024	08/27/2024	dextroamp-amphetamin 20 mg tab	60	30	Minniti, Carla	[id number]	Insurance	[facility]  A	N	N	S	07/24/2024	07/29/2024	dextroamp-amphetamin 20 mg tab	60	30	Angelina Lobo C	[id number]	Insurance	[facility]  A	N	N	S	06/28/2024	06/28/2024	dextroamp-amphetamin 20 mg tab	60	30	Angelina Lobo C	[id number]	Insurance	[facility]  A	N	N		06/18/2024	06/24/2024	testosterone cyp 2000 mg/10 ml	10ml	70	CrociataAna	[id number]	Insurance	[facility] #[id number]  A	N	N	S	05/21/2024	05/29/2024	dextroamp-amphetamin 20 mg tab	60	30	Angelina Lobo C	[id number]	Insurance	[facility]  A	N	N	S	04/30/2024	04/30/2024	dextroamp-amphetamin 20 mg tab	60	30	Angelina Lobo C	[id number]	Insurance	[facility]  A	N	N		03/26/2024	04/10/2024	testosterone cyp 2000 mg/10 ml	10ml	70	Angelina Lobo C	[id number]	Insurance	[facility] #[id number]  A	N	N	S	03/26/2024	03/26/2024	dextroamp-amphetamin 20 mg tab	60	30	Angelina Lobo C	[id number]	[facility]  A	N	N		03/12/2024	03/12/2024	testosterone cyp 200 mg/ml	4ml	28	Angelina Lobo C	[id number]	Insurance	[facility] #[id number]  A	N	N	S	02/27/2024	02/27/2024	dextroamp-amphetamin 20 mg tab	60	30	JaymeAngelina patrick C	[id number]	[facility]  A	N	N		02/06/2024	02/21/2024	testosterone cyp 200 mg/ml	2ml	28	Tudda, Sarah	[id number]	Insurance	[facility] #[id number]  A	N	N	S	01/08/2024	01/08/2024	dextroamp-amphetamin 20 mg tab	60	30	[facility]	[id number]	[facility]      REVIEW OF SYSTEMS: per HPI     MEDICATIONS  (STANDING):  chlordiazePOXIDE 10 milliGRAM(s) Oral every 4 hours  chlordiazePOXIDE   Oral   dextrose 5% + lactated ringers. 1000 milliLiter(s) (100 mL/Hr) IV Continuous <Continuous>  enoxaparin Injectable 40 milliGRAM(s) SubCutaneous every 24 hours  FLUoxetine Solution 15 milliGRAM(s) Oral daily  folic acid 1 milliGRAM(s) Oral daily  gabapentin 400 milliGRAM(s) Oral three times a day  influenza   Vaccine 0.5 milliLiter(s) IntraMuscular once  magnesium oxide 400 milliGRAM(s) Oral three times a day with meals  nicotine - 21 mG/24Hr(s) Patch 1 Patch Transdermal daily  pantoprazole    Tablet 40 milliGRAM(s) Oral before breakfast  potassium chloride Tablet ER 40 milliEquivalent(s) Oral once  potassium chloride  20 mEq/100 mL IVPB 20 milliEquivalent(s) IV Intermittent once  thiamine IVPB 500 milliGRAM(s) IV Intermittent every 8 hours  traZODone 150 milliGRAM(s) Oral at bedtime    MEDICATIONS  (PRN):  acetaminophen     Tablet .. 650 milliGRAM(s) Oral every 6 hours PRN Mild Pain (1 - 3), Moderate Pain (4 - 6)  albuterol    90 MICROgram(s) HFA Inhaler 2 Puff(s) Inhalation every 6 hours PRN Shortness of Breath and/or Wheezing  benzonatate 100 milliGRAM(s) Oral three times a day PRN Cough  LORazepam   Injectable 2 milliGRAM(s) IV Push every 2 hours PRN CIWA-Ar score 8 or greater    PHYSICAL EXAM:    Constitutional: NAD, poorly-groomed  HEENT: PERRLA, EOMI, + horizontal nystagmus b/l   Respiratory: no increased work of breathing  Neurological: A/O x 3, no focal deficits, moderate b/l UE tremors     MENTAL STATUS EXAM:  Appearance: anxious, in hospital attire   Appearance in relation to age: looks stated age      Hygiene/Grooming: disheveled  Attitude toward examiner: guarded, irritable  Alertness: alert  Orientation: oriented to place and person, date off by 1 day  Posture: lying in bed  Gait: not evaluated  Behavior and psychomotor activity: mild psychomotor retardation   Mood: "agitated because of you"   Affect: constricted range  Speech: fluent and spontaneous; normal rate, rhythm, volume and tone   Perceptions: denies hallucinations  Thought process: logical, linear and goal-directed    Thought content: no delusions or particular preoccupation, denies SI/HI  Associations: no loosening of associations   Impulse Control: aware of socially acceptable behavior  Insight: limited, has previously sought treatment with continued frequent relapses   Judgment: poor    LABS:    CBC:            12.1   4.97  )-----------( 80       ( 12-30-24 @ 07:36 )             36.3     Chem:         ( 12-30-24 @ 07:36 )    136  |  101  |  5[L]  ----------------------------<  155[H]  3.8   |  23  |  0.7    Liver Functions: ( 12-30-24 @ 07:36 )  Alb: 3.3 g/dL / Pro: 5.5 g/dL / ALK PHOS: 48 U/L / ALT: 41 U/L / AST: 58 U/L / GGT: x            RADIOLOGY & ADDITIONAL STUDIES:  < from: 12 Lead ECG (12.26.24 @ 11:36) >    Ventricular Rate 119 BPM    Atrial Rate 119 BPM    P-R Interval 122 ms    QRS Duration 70 ms    Q-T Interval 362 ms    QTC Calculation(Bazett) 509 ms    P Axis 58 degrees    R Axis 66 degrees    T Axis 43 degrees    Diagnosis Line Sinus tachycardia  Nonspecific ST abnormality  Abnormal ECG  Confirmed by Genaro Cordova ([id number]) on 12/26/2024 7:05:35 PM    < end of copied text >

## 2024-12-30 NOTE — ED PROVIDER NOTE - CARE PLAN
1 Abormal VS: Temp > 100F or < 96.8F; SBP < 90 mmHG; HR > 120bpm; Resp > 24/min Principal Discharge DX:	Alcohol intoxication, uncomplicated

## 2024-12-30 NOTE — PROGRESS NOTE ADULT - SUBJECTIVE AND OBJECTIVE BOX
INTERVAL HPI/OVERNIGHT EVENTS:    pt is seen and examined     REVIEW OF SYSTEMS:  CONSTITUTIONAL: No fever, weight loss, or fatigue  EYES: No eye pain, visual disturbances, or discharge  ENMT:  No difficulty hearing, tinnitus, vertigo; No sinus or throat pain  NECK: No pain or stiffness  BREASTS: No pain, masses, or nipple discharge  RESPIRATORY: No cough, wheezing, chills or hemoptysis; No shortness of breath  CARDIOVASCULAR: No chest pain, palpitations, dizziness, or leg swelling  GASTROINTESTINAL: No abdominal or epigastric pain. No nausea, vomiting, or hematemesis; No diarrhea or constipation. No melena or hematochezia.  GENITOURINARY: No dysuria, frequency, hematuria, or incontinence  NEUROLOGICAL: No headaches, memory loss, loss of strength, numbness, or tremors  SKIN: No itching, burning, rashes, or lesions   LYMPH NODES: No enlarged glands  ENDOCRINE: No heat or cold intolerance; No hair loss  MUSCULOSKELETAL: No joint pain or swelling; No muscle, back, or extremity pain  PSYCHIATRIC: No depression, anxiety, mood swings, or difficulty sleeping  HEME/LYMPH: No easy bruising, or bleeding gums  ALLERY AND IMMUNOLOGIC: No hives or eczema    VITALS:  T(F): 99.2, Max: 99.2 (12-30-24 @ 04:49)  HR: 109  BP: 110/70  RR: 18  SpO2: 98%    PHYSICAL EXAM:  GENERAL: NAD, well-groomed, well-developed  HEAD:  Atraumatic, Normocephalic  EYES: EOMI, PERRLA, conjunctiva and sclera clear  ENMT: No tonsillar erythema, exudates, or enlargement; Moist mucous membranes, Good dentition, No lesions  NECK: Supple, No JVD, Normal thyroid  NERVOUS SYSTEM:  Alert & Oriented X3, Good concentration; Motor Strength 5/5 B/L upper and lower extremities; DTRs 2+ intact and symmetric  CHEST/LUNG: Clear to percussion bilaterally; No rales, rhonchi, wheezing, or rubs  HEART: Regular rate and rhythm; No murmurs, rubs, or gallops  ABDOMEN: Soft, Nontender, Nondistended; Bowel sounds present  EXTREMITIES:  2+ Peripheral Pulses, No clubbing, cyanosis, or edema  LYMPH: No lymphadenopathy noted  SKIN: No rashes or lesions      LABS:  Urinalysis Basic - ( 30 Dec 2024 07:36 )    Color: x / Appearance: x / SG: x / pH: x  Gluc: 155 mg/dL / Ketone: x  / Bili: x / Urobili: x   Blood: x / Protein: x / Nitrite: x   Leuk Esterase: x / RBC: x / WBC x   Sq Epi: x / Non Sq Epi: x / Bacteria: x      12-30    136  |  101  |  5[L]  ----------------------------<  155[H]  3.8   |  23  |  0.7    Ca    8.2[L]      30 Dec 2024 07:36  Phos  2.8     12-30  Mg     1.5     12-30    TPro  5.5[L]  /  Alb  3.3[L]  /  TBili  <0.2  /  DBili  x   /  AST  58[H]  /  ALT  41  /  AlkPhos  48  12-30                          12.1   4.97  )-----------( 80       ( 30 Dec 2024 07:36 )             36.3           RADIOLOGY & ADDITIONAL TESTS:    Imaging Personally Reviewed:  [ ] YES  [ ] NO    MEDICATIONS:     MEDICATIONS  (STANDING):  chlordiazePOXIDE   Oral   chlordiazePOXIDE 10 milliGRAM(s) Oral every 4 hours  dextrose 5% + lactated ringers. 1000 milliLiter(s) (100 mL/Hr) IV Continuous <Continuous>  enoxaparin Injectable 40 milliGRAM(s) SubCutaneous every 24 hours  FLUoxetine Solution 15 milliGRAM(s) Oral daily  folic acid 1 milliGRAM(s) Oral daily  gabapentin 400 milliGRAM(s) Oral three times a day  magnesium oxide 400 milliGRAM(s) Oral three times a day with meals  magnesium sulfate  IVPB 2 Gram(s) IV Intermittent every 2 hours  naltrexone 50 milliGRAM(s) Oral at bedtime  nicotine - 21 mG/24Hr(s) Patch 1 Patch Transdermal daily  pantoprazole    Tablet 40 milliGRAM(s) Oral before breakfast  potassium chloride    Tablet ER 40 milliEquivalent(s) Oral once  potassium chloride  20 mEq/100 mL IVPB 20 milliEquivalent(s) IV Intermittent once  traZODone 150 milliGRAM(s) Oral at bedtime    MEDICATIONS  (PRN):  acetaminophen     Tablet .. 650 milliGRAM(s) Oral every 6 hours PRN Mild Pain (1 - 3), Moderate Pain (4 - 6)  albuterol    90 MICROgram(s) HFA Inhaler 2 Puff(s) Inhalation every 6 hours PRN Shortness of Breath and/or Wheezing  benzonatate 100 milliGRAM(s) Oral three times a day PRN Cough  LORazepam   Injectable 2 milliGRAM(s) IV Push every 2 hours PRN CIWA-Ar score 8 or greater

## 2024-12-30 NOTE — PROGRESS NOTE ADULT - SUBJECTIVE AND OBJECTIVE BOX
24H events:    Patient is a 39y old Male who presents with a chief complaint of Alcohol withdrawal (30 Dec 2024 15:12)    Primary diagnosis of Alcohol withdrawal      Day 1:    Today is 4d of hospitalization. This morning patient was seen and examined at bedside, resting comfortably in bed.    No acute or major events overnight.    PAST MEDICAL & SURGICAL HISTORY  No pertinent past medical history  anxiety  depression  polysubstance    No significant past surgical history      SOCIAL HISTORY:  Social History:      ALLERGIES:  Ceclor (Rash; Hives)    MEDICATIONS:  STANDING MEDICATIONS  chlordiazePOXIDE   Oral   chlordiazePOXIDE 10 milliGRAM(s) Oral every 4 hours  dextrose 5% + lactated ringers. 1000 milliLiter(s) IV Continuous <Continuous>  enoxaparin Injectable 40 milliGRAM(s) SubCutaneous every 24 hours  FLUoxetine Solution 15 milliGRAM(s) Oral daily  folic acid 1 milliGRAM(s) Oral daily  gabapentin 400 milliGRAM(s) Oral three times a day  magnesium oxide 400 milliGRAM(s) Oral three times a day with meals  naltrexone 50 milliGRAM(s) Oral at bedtime  nicotine - 21 mG/24Hr(s) Patch 1 Patch Transdermal daily  pantoprazole    Tablet 40 milliGRAM(s) Oral before breakfast  potassium chloride    Tablet ER 40 milliEquivalent(s) Oral once  potassium chloride  20 mEq/100 mL IVPB 20 milliEquivalent(s) IV Intermittent once  traZODone 150 milliGRAM(s) Oral at bedtime    PRN MEDICATIONS  acetaminophen     Tablet .. 650 milliGRAM(s) Oral every 6 hours PRN  albuterol    90 MICROgram(s) HFA Inhaler 2 Puff(s) Inhalation every 6 hours PRN  benzonatate 100 milliGRAM(s) Oral three times a day PRN  LORazepam   Injectable 2 milliGRAM(s) IV Push every 2 hours PRN    VITALS:   T(F): 99.2  HR: 109  BP: 110/70  RR: 18  SpO2: 98%    PHYSICAL EXAM:  GENERAL:   ( x) NAD, lying in bed comfortably     (  ) obtunded     (  ) lethargic     (  ) somnolent    HEAD:   ( x) Atraumatic     (  ) hematoma     (  ) laceration (specify location:       )     NECK:  (x) Supple     (  ) neck stiffness     (  ) nuchal rigidity     (  ) JVD present ( -- cm)    HEART:  Rate -->     (x) normal rate     (  ) bradycardic     (  ) tachycardic  Rhythm -->     (x) regular     (  ) regularly irregular     (  ) irregularly irregular  Murmurs -->     (x) normal s1s2     (  ) systolic murmur     (  ) diastolic murmur     (  ) continuous murmur      (  ) S3 present     (  ) S4 present    LUNGS:   ( x)Unlabored respirations     (  ) tachypnea  ( x) B/L air entry     (  ) decreased breath sounds in:  (location     )    ( x) no adventitious sound     (  ) crackles     (  ) wheezing      (  ) rhonchi      (specify location:       )  (  ) chest wall tenderness (specify location:       )    ABDOMEN:   ( x) Soft     (  ) tense   |   (  ) nondistended     (  ) distended   |   (  ) +BS     (  ) hypoactive bowel sounds     (  ) hyperactive bowel sounds  ( x) nontender     (  ) RUQ tenderness     (  ) RLQ tenderness     (  ) LLQ tenderness     (  ) epigastric tenderness     (  ) diffuse tenderness  (  ) Splenomegaly      (  ) Hepatomegaly      (  ) Jaundice     (  ) ecchymosis     EXTREMITIES:  ( x) Normal     (  ) Rash     (  ) ecchymosis     (  ) varicose veins      (  ) pitting edema     (  ) non-pitting edema   (  ) ulceration     (  ) gangrene:     (location:     )    NERVOUS SYSTEM:    ( x) A&Ox3     (  ) confused     (  ) lethargic  CN II-XII:     ( x) Intact     (  ) deficits found     (Specify:     )   Upper extremities:     (  ) no sensorimotor deficits     (  ) weakness     (  ) loss of proprioception/vibration     (  ) loss of touch/temperature (specify:    )  Lower extremities:     (  ) no sensorimotor deficits     (  ) weakness     (  ) loss of proprioception/vibration     (  ) loss of touch/temperature (specify:    )    SKIN:   (  ) No rashes or lesions     (  ) maculopapular rash     (  ) pustules     (  ) vesicles     (  ) ulcer     (  ) ecchymosis     (specify location:     )    LABS:                        12.1   4.97  )-----------( 80       ( 30 Dec 2024 07:36 )             36.3     12-30    136  |  101  |  5[L]  ----------------------------<  155[H]  3.8   |  23  |  0.7    Ca    8.2[L]      30 Dec 2024 07:36  Phos  2.8     12-30  Mg     1.5     12-30    TPro  5.5[L]  /  Alb  3.3[L]  /  TBili  <0.2  /  DBili  x   /  AST  58[H]  /  ALT  41  /  AlkPhos  48  12-30      Urinalysis Basic - ( 30 Dec 2024 07:36 )    Color: x / Appearance: x / SG: x / pH: x  Gluc: 155 mg/dL / Ketone: x  / Bili: x / Urobili: x   Blood: x / Protein: x / Nitrite: x   Leuk Esterase: x / RBC: x / WBC x   Sq Epi: x / Non Sq Epi: x / Bacteria: x                RADIOLOGY:    ASSESSMENT AND PLAN  JUNE SAINI is a 39y-year-old Male with a history significant for alcohol use disorder  alcohol withdrawal  hypophos - concern for refeeding syndrome   hyponatremia, HypoK, HypoMg  hypoglycemia in setting of alcohol use disorder  elevated LFTs   elevated D dimer likely in setting of inflammatory state   suspect thiamine and folate deficinecy   ADHD  Severe thrombocytopenia    - closely monitor Plts  -on gabapentin  -on trazodone prn  -IV thiamine 500 mg q 8h for 3 days, followed by 250 mg IV daily up to additional 5 days per [employer] recommendations. Can also opt to switch to oral thiamine 100 mg daily after 3 days of IV repletion.   -librium taper  -aggressive electrolyte repletion   -ciwa protocol   -holding home adderall 20 mg TID  -continue trazadone fluoxetine gabapentin  -LE duplex neg  -Redemonstration hepatic steatosis. Stable liver size  -Right renal cyst and calculus, new  -pt counselled  - Catch team f/u : agreed with plan     - pt wants outpt program    #Viral URI Sx  check RVP  supportive care    dvt ppx- lovenox    #Progress Note Handoff  Pending: Clinical improvement and stability__x__  Pt/Family discussion: Pt/family informed and agree with the current plan  Disposition: Home__

## 2024-12-30 NOTE — PROGRESS NOTE ADULT - ASSESSMENT
alcohol use disorder  alcohol withdrawal  hypophos - concern for refeeding syndrome   hyponatremia, HypoK, HypoMg  hypoglycemia in setting of alcohol use disorder  elevated LFTs   elevated D dimer likely in setting of inflammatory state   suspect thiamine and folate deficinecy   ADHD  Severe thrombocytopenia    - closely monitor Plts  -on gabapentin  -on trazodone prn  -IV thiamine 500 mg q 8h for 3 days, followed by 250 mg IV daily up to additional 5 days per [employer] recommendations. Can also opt to switch to oral thiamine 100 mg daily after 3 days of IV repletion.   -librium taper  -aggressive electrolyte repletion   -ciwa protocol   -holding home adderall 20 mg TID  -continue trazadone fluoxetine gabapentin  -LE duplex neg  -Redemonstration hepatic steatosis. Stable liver size  -Right renal cyst and calculus, new  -pt counselled  - Catch team f/u : agreed with plan     - pt wants outpt program    #Viral URI Sx  check RVP  supportive care    dvt ppx- lovenox    #Progress Note Handoff  Pending: Clinical improvement and stability__x__  Pt/Family discussion: Pt/family informed and agree with the current plan  Disposition: Home__    My note supersedes the residents note should a discrepancy arise.    Chart and notes personally reviewed.  Care Discussed with Consultants/Other Providers/ Housestaff [ x] YES [ ] NO   Radiology, labs, old records personally reviewed.    discussed w/ housestaff, nursing, case management     Attestation Statements:  Risk Statement (NON-critical care).     On this date of service, level of risk to patient is considered: High.     Due to: pt with illness causing risk to life or bodily fxn    Time-based billing (NON-critical care).     50 minutes spent on total encounter. The necessity of the time spent during the encounter on this date of service was due to:     time spent on review of labs, imaging studies, old records, obtaining history, personally examining patient, counselling and communicating with patient/ family, entering orders for medications/tests/etc, discussions with other health care providers, documentation in electronic health records, independent interpretation of labs, imaging/procedure results and care coordination.

## 2024-12-30 NOTE — PROGRESS NOTE ADULT - ASSESSMENT
38yo domiciled Male with PMHx of withdrawal seizures and PPHx of depression, AUD and nicotine use disorder presenting to the ER after binging for 5 days admitted to medicine for alcohol withdrawal. Addiction Medicine consulted for alcohol withdrawal.     #Alcohol withdrawal:  #Hx of withdrawal seizures:  - patient with increased quantity of vodka consumption in the setting of hx of withdrawal seizures, now with elevated CIWAs despite prn doses of ativan, would recommend treatment with long acting fixed dose benzodiazepine taper as followed:   - Day 1 Librium 50 mg q4h x24 hrs, Day 2 librium 25 mg q4h  x24 hrs, Day 3 librium 15 mg q4hr x24 hr, day 4 librium 10 mg q4h x 24 hr --> discontinue   - please continue CIWA symptom triggered CIWA >7 q2h prn for breakthrough withdrawal treatment in addition to fixed dose taper as needed  - hold benzodiazepines for sedation or respiratory depression  - keep Mg>2, K>4    -continue thiamine, folate and multivitamin  - please offer PRN medications for supportive management of withdrawal including:   - patient has prolonged QT, please use antiemetics that have a lower QT prolonging risk if needed for nausea   - tylenol 650 mg q6h prn mild/moderate pain   - trazodone 25 mg nightly prn for insomnia  - naltrexone is an opioid antagonist that is FDA approved for alcohol use disorder by targeting alcohol cravings and decreasing binge consumption and total number of drinking days, and has been effective for patients abstaining from alcohol. Please prescribe Naltrexone 50 mg qhs to be given with food.  - CATCH team to assist with substance use aftercare options including inpatient vs outpatient rehab and will have ongoing discussions regarding medications for AUD such as naltrexone as withdrawals improve    #Concern for Wernicke's Encephalopathy:   - given concerns for  ophthalmoplegia and hx of gait instability and recent nutritional deficiencies, clinical concern for Wernicke's encephalopathy.   - Would agree with IV thiamine 500 mg q 8h for 3 days, followed by 250 mg IV daily up to additional 5 days per [employer] recommendations. Can also opt to switch to oral thiamine 100 mg daily after 3 days of IV repletion.   - keep Mg >2, K>4    #Nicotine use disorder:  - recommend 21 mg nicotine patch    #Anxiety and depression:  - please confirm psychiatric medications with patient (patient did not want to repeat all his medications to writer on assessment)  - patient takes xanax 1 mg qd prn anxiety at home, can reorder 1 mg xanax qd prn anxiety    #ADHD:  - hold home Adderall 20 mg TID, does not need it in the hospital at this time     Thank you for this consult. Rest of care per primary. Addiction medicine will continue to follow. Please reach out with any questions or concerns via TEAMS.      38yo domiciled Male with PMHx of withdrawal seizures and PPHx of depression, AUD and nicotine use disorder presenting to the ER after binging for 5 days admitted to medicine for alcohol withdrawal. Addiction Medicine consulted for alcohol withdrawal.     #Alcohol withdrawal:  #Hx of withdrawal seizures:  - patient with increased quantity of vodka consumption in the setting of hx of withdrawal seizures, now with elevated CIWAs despite prn doses of ativan, would recommend treatment with long acting fixed dose benzodiazepine taper as followed:   - continue librium taper as ordered day 4 librium 10 mg q4h x 24 hr --> discontinue.    - please continue CIWA symptom triggered CIWA >7 q2h prn for breakthrough withdrawal treatment in addition to fixed dose taper as needed for the next 24 hours followed by discontinuation   - hold benzodiazepines for sedation or respiratory depression  - keep Mg>2, K>4    -continue thiamine, folate and multivitamin  - please offer PRN medications for supportive management of withdrawal including:   - patient has prolonged QT, please use antiemetics that have a lower QT prolonging risk if needed for nausea   - tylenol 650 mg q6h prn mild/moderate pain   - trazodone 25 mg nightly prn for insomnia  - naltrexone is an opioid antagonist that is FDA approved for alcohol use disorder by targeting alcohol cravings and decreasing binge consumption and total number of drinking days, and has been effective for patients abstaining from alcohol. Please prescribe Naltrexone 50 mg qhs to be given with food starting tonight with dinner.   - CATCH team facilitated outpatient appt for patient for 1/7 at 11AM     #Concern for Wernicke's Encephalopathy:   - given concerns for  ophthalmoplegia and hx of gait instability and recent nutritional deficiencies, clinical concern for Wernicke's encephalopathy.   - Would agree with IV thiamine 500 mg q 8h for 3 days, followed by 250 mg IV daily up to additional 5 days per [employer] recommendations.   Can also opt to switch to oral thiamine 100 mg daily after 3 days of IV repletion and upon discharge.   - keep Mg >2, K>4    #Nicotine use disorder:  - recommend 21 mg nicotine patch    #Anxiety and depression:  - please confirm psychiatric medications with patient (patient did not want to repeat all his medications to writer on assessment)  - patient takes xanax 1 mg qd prn anxiety at home, can reorder 1 mg xanax qd prn anxiety    #ADHD:  - hold home Adderall 20 mg TID, does not need it in the hospital at this time     Thank you for this consult. Rest of care per primary. Addiction medicine will continue to follow. Please reach out with any questions or concerns via TEAMS.

## 2024-12-31 ENCOUNTER — TRANSCRIPTION ENCOUNTER (OUTPATIENT)
Age: 39
End: 2024-12-31

## 2024-12-31 DIAGNOSIS — F32.A DEPRESSION, UNSPECIFIED: ICD-10-CM

## 2024-12-31 LAB
ALBUMIN SERPL ELPH-MCNC: 3.8 G/DL — SIGNIFICANT CHANGE UP (ref 3.5–5.2)
ALP SERPL-CCNC: 50 U/L — SIGNIFICANT CHANGE UP (ref 30–115)
ALT FLD-CCNC: 39 U/L — SIGNIFICANT CHANGE UP (ref 0–41)
ANION GAP SERPL CALC-SCNC: 11 MMOL/L — SIGNIFICANT CHANGE UP (ref 7–14)
AST SERPL-CCNC: 44 U/L — HIGH (ref 0–41)
BASOPHILS # BLD AUTO: 0.04 K/UL — SIGNIFICANT CHANGE UP (ref 0–0.2)
BASOPHILS NFR BLD AUTO: 0.9 % — SIGNIFICANT CHANGE UP (ref 0–1)
BILIRUB SERPL-MCNC: <0.2 MG/DL — SIGNIFICANT CHANGE UP (ref 0.2–1.2)
BUN SERPL-MCNC: 5 MG/DL — LOW (ref 10–20)
CALCIUM SERPL-MCNC: 8.5 MG/DL — SIGNIFICANT CHANGE UP (ref 8.4–10.5)
CHLORIDE SERPL-SCNC: 101 MMOL/L — SIGNIFICANT CHANGE UP (ref 98–110)
CHOLEST SERPL-MCNC: 154 MG/DL — SIGNIFICANT CHANGE UP
CO2 SERPL-SCNC: 27 MMOL/L — SIGNIFICANT CHANGE UP (ref 17–32)
CREAT SERPL-MCNC: 0.9 MG/DL — SIGNIFICANT CHANGE UP (ref 0.7–1.5)
EGFR: 112 ML/MIN/1.73M2 — SIGNIFICANT CHANGE UP
EOSINOPHIL # BLD AUTO: 0 K/UL — SIGNIFICANT CHANGE UP (ref 0–0.7)
EOSINOPHIL NFR BLD AUTO: 0 % — SIGNIFICANT CHANGE UP (ref 0–8)
GIANT PLATELETS BLD QL SMEAR: PRESENT — SIGNIFICANT CHANGE UP
GLUCOSE SERPL-MCNC: 100 MG/DL — HIGH (ref 70–99)
HCT VFR BLD CALC: 40.3 % — LOW (ref 42–52)
HDLC SERPL-MCNC: 57 MG/DL — SIGNIFICANT CHANGE UP
HGB BLD-MCNC: 13 G/DL — LOW (ref 14–18)
LIPID PNL WITH DIRECT LDL SERPL: 80 MG/DL — SIGNIFICANT CHANGE UP
LYMPHOCYTES # BLD AUTO: 0.48 K/UL — LOW (ref 1.2–3.4)
LYMPHOCYTES # BLD AUTO: 11.3 % — LOW (ref 20.5–51.1)
MAGNESIUM SERPL-MCNC: 1.8 MG/DL — SIGNIFICANT CHANGE UP (ref 1.8–2.4)
MANUAL SMEAR VERIFICATION: SIGNIFICANT CHANGE UP
MCHC RBC-ENTMCNC: 31 PG — SIGNIFICANT CHANGE UP (ref 27–31)
MCHC RBC-ENTMCNC: 32.3 G/DL — SIGNIFICANT CHANGE UP (ref 32–37)
MCV RBC AUTO: 96.2 FL — HIGH (ref 80–94)
MONOCYTES # BLD AUTO: 0.85 K/UL — HIGH (ref 0.1–0.6)
MONOCYTES NFR BLD AUTO: 20 % — HIGH (ref 1.7–9.3)
NEUTROPHILS # BLD AUTO: 2.41 K/UL — SIGNIFICANT CHANGE UP (ref 1.4–6.5)
NEUTROPHILS NFR BLD AUTO: 56.5 % — SIGNIFICANT CHANGE UP (ref 42.2–75.2)
NON HDL CHOLESTEROL: 97 MG/DL — SIGNIFICANT CHANGE UP
PLAT MORPH BLD: NORMAL — SIGNIFICANT CHANGE UP
PLATELET # BLD AUTO: 142 K/UL — SIGNIFICANT CHANGE UP (ref 130–400)
PMV BLD: 9.8 FL — SIGNIFICANT CHANGE UP (ref 7.4–10.4)
POLYCHROMASIA BLD QL SMEAR: SLIGHT — SIGNIFICANT CHANGE UP
POTASSIUM SERPL-MCNC: 4.1 MMOL/L — SIGNIFICANT CHANGE UP (ref 3.5–5)
POTASSIUM SERPL-SCNC: 4.1 MMOL/L — SIGNIFICANT CHANGE UP (ref 3.5–5)
PROT SERPL-MCNC: 6 G/DL — SIGNIFICANT CHANGE UP (ref 6–8)
RBC # BLD: 4.19 M/UL — LOW (ref 4.7–6.1)
RBC # FLD: 12.8 % — SIGNIFICANT CHANGE UP (ref 11.5–14.5)
RBC BLD AUTO: ABNORMAL
SODIUM SERPL-SCNC: 139 MMOL/L — SIGNIFICANT CHANGE UP (ref 135–146)
TRIGL SERPL-MCNC: 82 MG/DL — SIGNIFICANT CHANGE UP
VARIANT LYMPHS # BLD: 11.3 % — HIGH (ref 0–5)
WBC # BLD: 4.26 K/UL — LOW (ref 4.8–10.8)
WBC # FLD AUTO: 4.26 K/UL — LOW (ref 4.8–10.8)

## 2024-12-31 PROCEDURE — 99232 SBSQ HOSP IP/OBS MODERATE 35: CPT

## 2024-12-31 RX ORDER — TRAZODONE HYDROCHLORIDE 150 MG/1
1 TABLET ORAL
Qty: 90 | Refills: 0
Start: 2024-12-31 | End: 2025-03-30

## 2024-12-31 RX ORDER — ALPRAZOLAM 0.25 MG/1
1 TABLET ORAL AT BEDTIME
Refills: 0 | Status: DISCONTINUED | OUTPATIENT
Start: 2024-12-31 | End: 2025-01-01

## 2024-12-31 RX ORDER — VITAMIN A 10000 UNIT
1 TABLET ORAL
Qty: 90 | Refills: 0
Start: 2024-12-31 | End: 2025-03-30

## 2024-12-31 RX ORDER — ALBUTEROL SULFATE 90 UG/1
2 INHALANT RESPIRATORY (INHALATION)
Qty: 1 | Refills: 0
Start: 2024-12-31 | End: 2025-03-30

## 2024-12-31 RX ORDER — NICOTINE POLACRILEX 4 MG/1
1 LOZENGE ORAL
Qty: 30 | Refills: 0
Start: 2024-12-31 | End: 2025-01-29

## 2024-12-31 RX ORDER — NALTREXONE HYDROCHLORIDE 50 MG/1
1 TABLET, FILM COATED ORAL
Qty: 90 | Refills: 0
Start: 2024-12-31 | End: 2025-03-30

## 2024-12-31 RX ORDER — BUPROPION HYDROCHLORIDE 150 MG/1
150 TABLET, EXTENDED RELEASE ORAL DAILY
Refills: 0 | Status: DISCONTINUED | OUTPATIENT
Start: 2024-12-31 | End: 2025-01-01

## 2024-12-31 RX ORDER — LORAZEPAM 1 MG/1
2 TABLET ORAL EVERY 6 HOURS
Refills: 0 | Status: DISCONTINUED | OUTPATIENT
Start: 2024-12-31 | End: 2025-01-01

## 2024-12-31 RX ORDER — GABAPENTIN 300 MG/1
1 CAPSULE ORAL
Qty: 270 | Refills: 0
Start: 2024-12-31 | End: 2025-03-30

## 2024-12-31 RX ADMIN — Medication 400 MILLIGRAM(S): at 08:19

## 2024-12-31 RX ADMIN — Medication 1 MILLIGRAM(S): at 12:50

## 2024-12-31 RX ADMIN — LORAZEPAM 2 MILLIGRAM(S): 1 TABLET ORAL at 08:19

## 2024-12-31 RX ADMIN — NICOTINE POLACRILEX 1 PATCH: 4 LOZENGE ORAL at 12:00

## 2024-12-31 RX ADMIN — PANTOPRAZOLE 40 MILLIGRAM(S): 40 TABLET, DELAYED RELEASE ORAL at 06:08

## 2024-12-31 RX ADMIN — LORAZEPAM 2 MILLIGRAM(S): 1 TABLET ORAL at 10:37

## 2024-12-31 RX ADMIN — LORAZEPAM 2 MILLIGRAM(S): 1 TABLET ORAL at 20:40

## 2024-12-31 RX ADMIN — Medication 400 MILLIGRAM(S): at 18:17

## 2024-12-31 RX ADMIN — Medication 30 MILLIGRAM(S): at 22:34

## 2024-12-31 RX ADMIN — Medication 15 MILLIGRAM(S): at 13:47

## 2024-12-31 RX ADMIN — GABAPENTIN 400 MILLIGRAM(S): 300 CAPSULE ORAL at 06:08

## 2024-12-31 RX ADMIN — GABAPENTIN 400 MILLIGRAM(S): 300 CAPSULE ORAL at 14:35

## 2024-12-31 RX ADMIN — NALTREXONE HYDROCHLORIDE 50 MILLIGRAM(S): 50 TABLET, FILM COATED ORAL at 22:19

## 2024-12-31 RX ADMIN — GABAPENTIN 400 MILLIGRAM(S): 300 CAPSULE ORAL at 22:19

## 2024-12-31 RX ADMIN — TRAZODONE HYDROCHLORIDE 150 MILLIGRAM(S): 150 TABLET ORAL at 22:19

## 2024-12-31 RX ADMIN — NICOTINE POLACRILEX 1 PATCH: 4 LOZENGE ORAL at 08:15

## 2024-12-31 RX ADMIN — Medication 400 MILLIGRAM(S): at 12:50

## 2024-12-31 RX ADMIN — Medication 10 MILLIGRAM(S): at 06:08

## 2024-12-31 NOTE — PROGRESS NOTE ADULT - PROBLEM SELECTOR PROBLEM 2
History of seizure due to alcohol withdrawal

## 2024-12-31 NOTE — BH CONSULTATION LIAISON PROGRESS NOTE - NSICDXBHSECONDARYDX_PSY_ALL_CORE
Alcohol dependence with withdrawal   F10.239  Nicotine use disorder   F17.200   Alcohol dependence with withdrawal   F10.239  Nicotine use disorder   F17.200  Depression, unspecified   F32.A

## 2024-12-31 NOTE — BH CONSULTATION LIAISON PROGRESS NOTE - NSBHCHARTREVIEWVS_PSY_A_CORE FT
Vital Signs Last 24 Hrs  T(C): 36.6 (31 Dec 2024 05:44), Max: 37.2 (30 Dec 2024 21:03)  T(F): 97.8 (31 Dec 2024 05:44), Max: 99 (30 Dec 2024 21:03)  HR: 92 (31 Dec 2024 05:44) (92 - 121)  BP: 107/68 (31 Dec 2024 05:44) (105/61 - 110/70)  BP(mean): --  RR: 18 (31 Dec 2024 05:44) (18 - 18)  SpO2: 98% (31 Dec 2024 05:44) (98% - 99%)    Parameters below as of 31 Dec 2024 05:44  Patient On (Oxygen Delivery Method): room air

## 2024-12-31 NOTE — PHYSICAL THERAPY INITIAL EVALUATION ADULT - SPECIFY REASON(S)
Chart reviewed. Pt. currently without activity orders. PT evaluation on hold pending activity orders as appropriate. Will follow.
Upon assessment pt is independent with transfers and ambulation without Assistive Device.

## 2024-12-31 NOTE — PROGRESS NOTE ADULT - SUBJECTIVE AND OBJECTIVE BOX
Pt interviewed, examined and EMR chart reviewed.    40 yo domiciled Male with PMHx of withdrawal seizures and PPHx of depression, AUD and nicotine use disorder presenting to the ER after binging for 5 days admitted to medicine for alcohol withdrawal. Addiction Medicine consulted for alcohol withdrawal.     Since last evaluation on 12/28, patient received 8 mg IV PRN Ativan and 60mg oral librium total.  Patient this morning was calm and euthymic, aox4. He apologized for acting out in the past few days, sobbed and mentioned that his dad was diagnosed of stage IV cancer which was very stressful for him. Denied n/v, diarrhea, sweating, AVH. Mild tremors noticed in bl hands. CIWA 2.    Denied experiencing AEs with Naltrexone, agreed to continue the treatment outpatient. He is also agreeable to outpatient substance use counseling at [facility]. CATCH To do the referral for outpatient care.     [facility] ISTOP:   This report was requested by: April Sullivan | Reference #: [id number]    Practitioner Count: 2  Pharmacy Count: 3  Current Opioid Prescriptions: 0  Current Benzodiazepine Prescriptions: 1  Current Stimulant Prescriptions: 0    Patient Demographic Information (PDI)       PDI	First Name	Last Name	Birth Date	Gender	Street Address	[facility]	Zip Code  LILI Morton	1985	Male	[address] [place] [id number]    Prescription Information      PDI Filter:    PDI	My Rx	Current Rx	Drug Type	Rx Written	Rx Dispensed	Drug	Quantity	Days Supply	Prescriber Name	Prescriber VLAD #	Payment Method	Dispenser  A	N	Y	B	12/16/2024	12/17/2024	alprazolam 1 mg tablet	30	30	Tudda, Sarah	[id number]	Insurance	[facility] #[id number]  A	N	Y		12/16/2024	12/17/2024	testosterone cyp 2000 mg/10 ml	10ml	28	TuddaSarah	[id number]	[facility] #[id number]  A	N	N	S	11/22/2024	11/26/2024	dextroamp-amphetamin 20 mg tab	60	30	MinniCarla dewitt	[id number]	Insurance	[facility] #[id number]  A	N	N	B	11/18/2024	11/18/2024	alprazolam 1 mg tablet	30	30	Minniti, Carla	[id number]	Insurance	[facility] #[id number]  A	N	N		10/28/2024	11/18/2024	testosterone cyp 2000 mg/10 ml	10ml	28	Minniti, Carla	[id number]	[facility] #[id number]  A	N	N	S	10/28/2024	10/28/2024	dextroamp-amphetamin 20 mg tab	60	30	Minniti, Carla	[id number]	Insurance	[facility]  A	N	N	B	10/28/2024	10/28/2024	alprazolam 1 mg tablet	7	7	Minniti, Carla	[id number]	Insurance	[facility] #[id number]  A	N	N		09/25/2024	09/28/2024	testosterone cyp 200 mg/ml	4ml	28	Crociata, Ana	[id number]	[facility] #[id number]  A	N	N	S	09/25/2024	09/25/2024	dextroamp-amphetamin 20 mg tab	60	30	Crociata, Ana	[id number]	Insurance	[facility]  A	N	N		08/23/2024	08/29/2024	testosterone cyp 2000 mg/10 ml	10ml	35	Minniti, Carla	[id number]	Insurance	[facility] #[id number]  A	N	N	S	08/23/2024	08/27/2024	dextroamp-amphetamin 20 mg tab	60	30	Minniti, Carla	[id number]	Insurance	[facility]  A	N	N	S	07/24/2024	07/29/2024	dextroamp-amphetamin 20 mg tab	60	30	Angelina Lobo C	[id number]	Insurance	[facility]  A	N	N	S	06/28/2024	06/28/2024	dextroamp-amphetamin 20 mg tab	60	30	Angelina Lobo C	[id number]	Insurance	[facility]  A	N	N		06/18/2024	06/24/2024	testosterone cyp 2000 mg/10 ml	10ml	70	Crociata, Ana	[id number]	Insurance	[facility] #[id number]  A	N	N	S	05/21/2024	05/29/2024	dextroamp-amphetamin 20 mg tab	60	30	Angelina Lobo C	[id number]	Insurance	[facility]  A	N	N	S	04/30/2024	04/30/2024	dextroamp-amphetamin 20 mg tab	60	30	Angelina Lobo C	[id number]	[facility]  A	N	N		03/26/2024	04/10/2024	testosterone cyp 2000 mg/10 ml	10ml	70	Angelina Lobo C	[id number]	Insurance	[facility] #[id number]  A	N	N	S	03/26/2024	03/26/2024	dextroamp-amphetamin 20 mg tab	60	30	Angelina Lobo C	[id number]	[facility]  A	N	N		03/12/2024	03/12/2024	testosterone cyp 200 mg/ml	4ml	28	Angelina Lobo C	[id number]	Insurance	[facility] #[id number]  A	N	N	S	02/27/2024	02/27/2024	dextroamp-amphetamin 20 mg tab	60	30	Angelina Lobo C	[id number]	[facility]  A	N	N		02/06/2024	02/21/2024	testosterone cyp 200 mg/ml	2ml	28	Tudda, Sarah	[id number]	Insurance	[facility] #[id number]  A	N	N	S	01/08/2024	01/08/2024	dextroamp-amphetamin 20 mg tab	60	30	Tua, [id number]	[facility]      REVIEW OF SYSTEMS: per HPI     MEDICATIONS  (STANDING):  dextrose 5% + lactated ringers. 1000 milliLiter(s) (100 mL/Hr) IV Continuous <Continuous>  enoxaparin Injectable 40 milliGRAM(s) SubCutaneous every 24 hours  FLUoxetine Solution 15 milliGRAM(s) Oral daily  folic acid 1 milliGRAM(s) Oral daily  gabapentin 400 milliGRAM(s) Oral three times a day  magnesium oxide 400 milliGRAM(s) Oral three times a day with meals  naltrexone 50 milliGRAM(s) Oral at bedtime  nicotine - 21 mG/24Hr(s) Patch 1 Patch Transdermal daily  pantoprazole    Tablet 40 milliGRAM(s) Oral before breakfast  potassium chloride    Tablet ER 40 milliEquivalent(s) Oral once  potassium chloride  20 mEq/100 mL IVPB 20 milliEquivalent(s) IV Intermittent once  traZODone 150 milliGRAM(s) Oral at bedtime    MEDICATIONS  (PRN):  acetaminophen     Tablet .. 650 milliGRAM(s) Oral every 6 hours PRN Mild Pain (1 - 3), Moderate Pain (4 - 6)  albuterol    90 MICROgram(s) HFA Inhaler 2 Puff(s) Inhalation every 6 hours PRN Shortness of Breath and/or Wheezing  benzonatate 100 milliGRAM(s) Oral three times a day PRN Cough  LORazepam   Injectable 2 milliGRAM(s) IV Push every 2 hours PRN CIWA-Ar score 8 or greater      PHYSICAL EXAM:    Constitutional: NAD, poorly-groomed  HEENT: PERRLA, EOMI, + horizontal nystagmus b/l   Respiratory: no increased work of breathing  Neurological: A/O x 3, no focal deficits, moderate b/l UE tremors     MENTAL STATUS EXAM:  Appearance: anxious, in hospital attire   Appearance in relation to age: looks stated age      Hygiene/Grooming: fair  Attitude toward examiner: cooperative  Alertness: alert  Orientation: oriented to time, place and person  Posture: sitting in bed  Gait: not evaluated  Behavior and psychomotor activity: mild psychomotor retardation   Mood: euthymic, depressed   Affect: normal  Speech: fluent and spontaneous; normal rate, rhythm, volume and tone   Perceptions: denies hallucinations  Thought process: logical, linear and goal-directed    Thought content: no delusions or particular preoccupation, denies SI/HI  Associations: no loosening of associations   Impulse Control: aware of socially acceptable behavior  Insight: limited, has previously sought treatment with continued frequent relapses   Judgment: fair    LABS:    CBC:            13.0   4.26  )-----------( 142      ( 12-31-24 @ 07:33 )             40.3     Chem:         ( 12-31-24 @ 07:33 )    139  |  101  |  5[L]  ----------------------------<  100[H]  4.1   |  27  |  0.9    Liver Functions: ( 12-31-24 @ 07:33 )  Alb: 3.8 g/dL / Pro: 6.0 g/dL / ALK PHOS: 50 U/L / ALT: 39 U/L / AST: 44 U/L / GGT: x      RADIOLOGY & ADDITIONAL STUDIES:  < from: 12 Lead ECG (12.26.24 @ 11:36) >    Ventricular Rate 119 BPM    Atrial Rate 119 BPM    P-R Interval 122 ms    QRS Duration 70 ms    Q-T Interval 362 ms    QTC Calculation(Bazett) 509 ms    P Axis 58 degrees    R Axis 66 degrees    T Axis 43 degrees    Diagnosis Line Sinus tachycardia  Nonspecific ST abnormality  Abnormal ECG  Confirmed by Genaro Cordova ([id number]) on 12/26/2024 7:05:35 PM    < end of copied text >     Pt interviewed, examined and EMR chart reviewed.    40 yo domiciled Male with PMHx of withdrawal seizures and PPHx of depression, AUD and nicotine use disorder presenting to the ER after binging for 5 days admitted to medicine for alcohol withdrawal. Addiction Medicine consulted for alcohol withdrawal.     Since last evaluation on 12/28, patient received 8 mg IV PRN Ativan and 60mg oral librium total.  Patient this morning was calm and euthymic, aox4. He apologized for acting out in the past few days, sobbed and mentioned that his dad was diagnosed of stage IV cancer which was very stressful for him. Denied n/v, diarrhea, sweating, AVH. Mild tremors noticed in bl hands. CIWA 2.    Denied experiencing AEs with Naltrexone, agreed to continue the treatment outpatient. He is also agreeable to outpatient substance use counseling at [facility]. CATCH To do the referral for outpatient care.     [facility] ISTOP:   This report was requested by: April Sullivan | Reference #: [id number]    Practitioner Count: 2  Pharmacy Count: 3  Current Opioid Prescriptions: 0  Current Benzodiazepine Prescriptions: 1  Current Stimulant Prescriptions: 0    Patient Demographic Information (PDI)       PDI	First Name	Last Name	Birth Date	Gender	Street Address	[facility]	Zip Code  LILI Morton	1985	Male	[address] [place] [phone]    Prescription Information      PDI Filter:    PDI	My Rx	Current Rx	Drug Type	Rx Written	Rx Dispensed	Drug	Quantity	Days Supply	Prescriber Name	Prescriber VLAD #	Payment Method	Dispenser  A	N	Y	B	12/16/2024	12/17/2024	alprazolam 1 mg tablet	30	30	Tudda, Sarah	[id number]	Insurance	[facility] #[id number]  A	N	Y		12/16/2024	12/17/2024	testosterone cyp 2000 mg/10 ml	10ml	28	TuddaSarah	[id number]	[facility] #[id number]  A	N	N	S	11/22/2024	11/26/2024	dextroamp-amphetamin 20 mg tab	60	30	MinniCarla dewitt	[id number]	Insurance	[facility] #[id number]  A	N	N	B	11/18/2024	11/18/2024	alprazolam 1 mg tablet	30	30	Minniti, Carla	[id number]	Insurance	[facility] #[id number]  A	N	N		10/28/2024	11/18/2024	testosterone cyp 2000 mg/10 ml	10ml	28	Minniti, Carla	[id number]	[facility] #[id number]  A	N	N	S	10/28/2024	10/28/2024	dextroamp-amphetamin 20 mg tab	60	30	Minniti, aCrla	[id number]	Insurance	[facility]  A	N	N	B	10/28/2024	10/28/2024	alprazolam 1 mg tablet	7	7	Minniti, Carla	[id number]	Insurance	[facility] #[id number]  A	N	N		09/25/2024	09/28/2024	testosterone cyp 200 mg/ml	4ml	28	Crociata, Ana	[id number]	[facility] #[id number]  A	N	N	S	09/25/2024	09/25/2024	dextroamp-amphetamin 20 mg tab	60	30	Crociata, Ana	[id number]	Insurance	[facility]  A	N	N		08/23/2024	08/29/2024	testosterone cyp 2000 mg/10 ml	10ml	35	Minniti, Carla	[id number]	Insurance	[facility] #[id number]  A	N	N	S	08/23/2024	08/27/2024	dextroamp-amphetamin 20 mg tab	60	30	Minniti, Carla	[id number]	Insurance	[facility]  A	N	N	S	07/24/2024	07/29/2024	dextroamp-amphetamin 20 mg tab	60	30	Angelina Lobo C	[id number]	Insurance	[facility]  A	N	N	S	06/28/2024	06/28/2024	dextroamp-amphetamin 20 mg tab	60	30	Angelina Lobo C	[id number]	Insurance	[facility]  A	N	N		06/18/2024	06/24/2024	testosterone cyp 2000 mg/10 ml	10ml	70	Crociata, Ana	[id number]	Insurance	[facility] #[id number]  A	N	N	S	05/21/2024	05/29/2024	dextroamp-amphetamin 20 mg tab	60	30	Angelina Lobo C	[id number]	Insurance	[facility]  A	N	N	S	04/30/2024	04/30/2024	dextroamp-amphetamin 20 mg tab	60	30	Angelina Lobo C	[id number]	[facility]  A	N	N		03/26/2024	04/10/2024	testosterone cyp 2000 mg/10 ml	10ml	70	Angelina Lobo C	[id number]	Insurance	[facility] #[id number]  A	N	N	S	03/26/2024	03/26/2024	dextroamp-amphetamin 20 mg tab	60	30	Angelina Lobo C	[id number]	[facility]  A	N	N		03/12/2024	03/12/2024	testosterone cyp 200 mg/ml	4ml	28	Angelina Lobo C	[id number]	Insurance	[facility] #[id number]  A	N	N	S	02/27/2024	02/27/2024	dextroamp-amphetamin 20 mg tab	60	30	Angelina Lobo C	[id number]	[facility]  A	N	N		02/06/2024	02/21/2024	testosterone cyp 200 mg/ml	2ml	28	Tudda, Sarah	[id number]	Insurance	[facility] #[id number]  A	N	N	S	01/08/2024	01/08/2024	dextroamp-amphetamin 20 mg tab	60	30	Tua, [id number]	[facility]      REVIEW OF SYSTEMS: per HPI     MEDICATIONS  (STANDING):  dextrose 5% + lactated ringers. 1000 milliLiter(s) (100 mL/Hr) IV Continuous <Continuous>  enoxaparin Injectable 40 milliGRAM(s) SubCutaneous every 24 hours  FLUoxetine Solution 15 milliGRAM(s) Oral daily  folic acid 1 milliGRAM(s) Oral daily  gabapentin 400 milliGRAM(s) Oral three times a day  magnesium oxide 400 milliGRAM(s) Oral three times a day with meals  naltrexone 50 milliGRAM(s) Oral at bedtime  nicotine - 21 mG/24Hr(s) Patch 1 Patch Transdermal daily  pantoprazole    Tablet 40 milliGRAM(s) Oral before breakfast  potassium chloride    Tablet ER 40 milliEquivalent(s) Oral once  potassium chloride  20 mEq/100 mL IVPB 20 milliEquivalent(s) IV Intermittent once  traZODone 150 milliGRAM(s) Oral at bedtime    MEDICATIONS  (PRN):  acetaminophen     Tablet .. 650 milliGRAM(s) Oral every 6 hours PRN Mild Pain (1 - 3), Moderate Pain (4 - 6)  albuterol    90 MICROgram(s) HFA Inhaler 2 Puff(s) Inhalation every 6 hours PRN Shortness of Breath and/or Wheezing  benzonatate 100 milliGRAM(s) Oral three times a day PRN Cough  LORazepam   Injectable 2 milliGRAM(s) IV Push every 2 hours PRN CIWA-Ar score 8 or greater      PHYSICAL EXAM:    Constitutional: NAD, poorly-groomed  HEENT: PERRLA, EOMI, + horizontal nystagmus b/l   Respiratory: no increased work of breathing  Neurological: A/O x 3, no focal deficits, mild b/l UE tremors     MENTAL STATUS EXAM:  Appearance: anxious, in hospital attire   Appearance in relation to age: looks stated age      Hygiene/Grooming: fair  Attitude toward examiner: cooperative  Alertness: alert  Orientation: oriented to time, place and person  Posture: sitting in bed  Gait: not evaluated  Behavior and psychomotor activity: mild psychomotor retardation   Mood: depressed   Affect: normal  Speech: fluent and spontaneous; normal rate, rhythm, volume and tone   Perceptions: denies hallucinations  Thought process: logical, linear and goal-directed    Thought content: no delusions or particular preoccupation, denies SI/HI  Associations: no loosening of associations   Impulse Control: aware of socially acceptable behavior  Insight: limited, has previously sought treatment with continued frequent relapses   Judgment: fair    LABS:    CBC:            13.0   4.26  )-----------( 142      ( 12-31-24 @ 07:33 )             40.3     Chem:         ( 12-31-24 @ 07:33 )    139  |  101  |  5[L]  ----------------------------<  100[H]  4.1   |  27  |  0.9    Liver Functions: ( 12-31-24 @ 07:33 )  Alb: 3.8 g/dL / Pro: 6.0 g/dL / ALK PHOS: 50 U/L / ALT: 39 U/L / AST: 44 U/L / GGT: x      RADIOLOGY & ADDITIONAL STUDIES:  < from: 12 Lead ECG (12.26.24 @ 11:36) >    Ventricular Rate 119 BPM    Atrial Rate 119 BPM    P-R Interval 122 ms    QRS Duration 70 ms    Q-T Interval 362 ms    QTC Calculation(Bazett) 509 ms    P Axis 58 degrees    R Axis 66 degrees    T Axis 43 degrees    Diagnosis Line Sinus tachycardia  Nonspecific ST abnormality  Abnormal ECG  Confirmed by Genaro Cordova ([id number]) on 12/26/2024 7:05:35 PM    < end of copied text >

## 2024-12-31 NOTE — BH CONSULTATION LIAISON PROGRESS NOTE - NSBHCONSULTFOLLOWAFTERCARE_PSY_A_CORE FT
Please have patient FU with an OP psychiatrist Please have patient FU with an OP psychiatrist. Patient given resources for outpatient psychiatric and psychotherapy services--[facility] ([facility]), [address] [place] [address] , [place] [address], phone number : [phone]

## 2024-12-31 NOTE — BH CONSULTATION LIAISON PROGRESS NOTE - NSBHCONSULTRECOMMENDOTHER_PSY_A_CORE FT
-Hold home medication of Wellbutrin and xanax  -continue Trazadone 150mg qhs for sleep  -increase Fluoxetine to 30mg qd  -continue gabapentin 400mg tid with help with anxiety/withdrawal symptoms  -continue nicotine patch   -appreciate addiction medicine and CATCH team recommendations and placement in OP substance use tx -resume home dose of Wellbutrin 150mg qd and xanax 1mg qd PRN for anxiety (please verify dosages on your medrec these dosages is per patient on admission)  -continue Trazadone 150mg qhs for sleep  -increase Fluoxetine to 30mg qd  -continue gabapentin 400mg tid with help with anxiety/withdrawal symptoms  -continue nicotine patch   -appreciate addiction medicine and CATCH team recommendations and placement in OP substance use tx -Resume home dose of Wellbutrin 150mg qd and xanax 1mg qd PRN for anxiety (please verify dosages on your medrec these dosages is per patient on admission)  -Continue Trazadone 150mg qhs for sleep  -Increase Fluoxetine to 30mg qd  -Continue gabapentin 400mg tid with help with anxiety/withdrawal symptoms  -Continue nicotine patch   -Appreciate addiction medicine and CATCH team recommendations and placement in OP substance use tx

## 2024-12-31 NOTE — PHYSICAL THERAPY INITIAL EVALUATION ADULT - PERTINENT HX OF CURRENT PROBLEM, REHAB EVAL
History of Present Illness:   39-year-old male with past medical history of depression presents to the ED for evaluation of tremulousness.  Patient states he has been binge drinking 2 L of vodka over the last 5 days and was ruptured; last drink 2 AM this morning.  Patient is tremulous, feels anxious, and headache.  Patient has not had any recent nausea, vomiting, or hallucinations.

## 2024-12-31 NOTE — BH CONSULTATION LIAISON PROGRESS NOTE - NSBHATTESTCOMMENTATTENDFT_PSY_A_CORE
I saw and evaluated the patient today 12-31-24 with resident during key/critical portions of the visit.  Nursing/primary team/consultant records reviewed. I agree with the resident's note including past psych history, home medications, social history, allergies, surgical history, family history, and review of system. I have reviewed relevant vitals, laboratory values, imaging studies, and microbiology.  I agree with the trainee's findings and assessment and plan as documented in the trainee's note.     - Above resident's note was edited by me     - agree with rest of A/P as per detailed resident note, unless noted below.

## 2024-12-31 NOTE — PROGRESS NOTE ADULT - PROVIDER SPECIALTY LIST ADULT
Critical Care
Hospitalist
Hospitalist
Internal Medicine
Critical Care
Hospitalist
Addiction Medicine

## 2024-12-31 NOTE — BH CONSULTATION LIAISON PROGRESS NOTE - NSBHFUPINTERVALHXFT_PSY_A_CORE
Patient was seen and evaluated this morning. Chart was reviewed and no acute events were noted. Patient is on last day of benzo taper. Patient is alert and oriented and engages with interviewer. Patient is cooperative and answers all questions appropriately. He states his mood is okay, but he would like to adjust his medication to better treat his mood symptoms. He is interested in OP psychiatric treatment in [place]. Patient denies any current passive or active suicidal ideation, intent or plan.

## 2024-12-31 NOTE — DISCHARGE NOTE PROVIDER - CARE PROVIDER_API CALL
Abdi Mcintyre  Internal Medicine  [address] [place] [address]  Phone: [phone]  Fax: [phone]  Follow Up Time: 1 week

## 2024-12-31 NOTE — PROGRESS NOTE ADULT - PROBLEM SELECTOR PROBLEM 4
Wernicke's encephalopathy

## 2024-12-31 NOTE — PROGRESS NOTE ADULT - ATTENDING COMMENTS
Patient evaluated with resident, agree with note and plan.
I saw and evaluated the patient today 12-31-24 with resident during key/critical portions of the visit.  Nursing/primary team/consultant records reviewed. I agree with the resident's note including past psych history, home medications, social history, allergies, surgical history, family history, and review of system. I have reviewed relevant vitals, laboratory values, imaging studies, and microbiology.  I agree with the trainee's findings and assessment and plan as documented in the trainee's note.   - Above resident's note was edited by me   - agree with rest of A/P as per detailed resident note, unless noted below.
I saw and evaluated the patient today 12-30-24 with resident during key/critical portions of the visit.  Nursing/primary team/consultant records reviewed. I agree with the resident's note including past psych history, home medications, social history, allergies, surgical history, family history, and review of system. I have reviewed relevant vitals, laboratory values, imaging studies, and microbiology.  I agree with the trainee's findings and assessment and plan as documented in the trainee's note.   - Above resident's note was edited by me   - agree with rest of A/P as per detailed resident note, unless noted below.

## 2024-12-31 NOTE — PROGRESS NOTE ADULT - ASSESSMENT
38yo domiciled Male with PMHx of withdrawal seizures and PPHx of depression, AUD and nicotine use disorder presenting to the ER after binging for 5 days admitted to medicine for alcohol withdrawal. Addiction Medicine consulted for alcohol withdrawal.     #Alcohol withdrawal:  #Hx of withdrawal seizures:  - patient with increased quantity of vodka consumption in the setting of hx of withdrawal seizures, now with elevated CIWAs despite prn doses of ativan, would recommend treatment with long acting fixed dose benzodiazepine taper as followed:   - librium taper completed as of 12/31  - please continue CIWA symptom triggered CIWA >7 q2h prn for breakthrough withdrawal treatment in addition to fixed dose taper as needed for the next 24 hours followed by discontinuation   - hold benzodiazepines for sedation or respiratory depression  - keep Mg>2, K>4    -continue thiamine, folate and multivitamin  - please offer PRN medications for supportive management of withdrawal including:   - patient has prolonged QT, please use antiemetics that have a lower QT prolonging risk if needed for nausea   - tylenol 650 mg q6h prn mild/moderate pain   - trazodone 25 mg nightly prn for insomnia  - naltrexone is an opioid antagonist that is FDA approved for alcohol use disorder by targeting alcohol cravings and decreasing binge consumption and total number of drinking days, and has been effective for patients abstaining from alcohol. Please c/w Naltrexone 50 mg qhs to be given with food starting tonight with dinner.   - CATCH team facilitated outpatient appt for patient for 1/7 at 11AM     #Concern for Wernicke's Encephalopathy:   - given concerns for  ophthalmoplegia and hx of gait instability and recent nutritional deficiencies, clinical concern for Wernicke's encephalopathy.   - Would agree with IV thiamine 500 mg q 8h for 3 days, followed by 250 mg IV daily up to additional 5 days per [employer] recommendations.   Can also opt to switch to oral thiamine 100 mg daily after 3 days of IV repletion and upon discharge.   - keep Mg >2, K>4    #Nicotine use disorder:  - recommend 21 mg nicotine patch    #Anxiety and depression:  - please confirm psychiatric medications with patient (patient did not want to repeat all his medications to writer on assessment)  - patient takes xanax 1 mg qd prn anxiety at home, can reorder 1 mg xanax qd prn anxiety    #ADHD:  - hold home Adderall 20 mg TID, does not need it in the hospital at this time     Thank you for this consult. Rest of care per primary. Addiction medicine will continue to follow. Please reach out with any questions or concerns via TEAMS.      40yo domiciled Male with PMHx of withdrawal seizures and PPHx of depression, AUD and nicotine use disorder presenting to the ER after binging for 5 days admitted to medicine for alcohol withdrawal. Addiction Medicine consulted for alcohol withdrawal.     #Alcohol withdrawal:  #Hx of withdrawal seizures:  - patient with increased quantity of vodka consumption in the setting of hx of withdrawal seizures, now with elevated CIWAs despite prn doses of ativan, would recommend treatment with long acting fixed dose benzodiazepine taper as followed:   - librium taper completed as of 12/31. Patient with continued improvement in withdrawal symptoms. Patient stable from alcohol withdrawal, [facility] CATCH team scheduled appt on 1/7.   - keep Mg>2, K>4    -continue thiamine, folate and multivitamin  - naltrexone is an opioid antagonist that is FDA approved for alcohol use disorder by targeting alcohol cravings and decreasing binge consumption and total number of drinking days, and has been effective for patients abstaining from alcohol. Please c/w Naltrexone 50 mg qhs to be given with food starting tonight with dinner and upon discharge (not a controlled substance).   - CATCH team facilitated outpatient appt for patient for 1/7 at 11AM     #Concern for Wernicke's Encephalopathy:   - given concerns for  ophthalmoplegia and hx of gait instability and recent nutritional deficiencies, clinical concern for Wernicke's encephalopathy.   - s/p 3 days of IV thiamine 500 mg q 8h for 3 days per [employer] recommendations.   - can continue oral thiamine 100 mg daily upon discharge.   - keep Mg >2, K>4    #Nicotine use disorder:  - recommend 21 mg nicotine patch    #Anxiety and depression:  - appreciate [facility] psychiatry recommendations for patient's mood disorders:   - Wellbutrin 150 mg XL daily, agree with gabapentin increase to 400 mg TID, fluoxetine 30 mg daily and trazodone 150 mg qhs   - patient takes xanax 1 mg qd prn anxiety at home, can reorder 1 mg xanax qd prn anxiety    #ADHD:  - hold home Adderall 20 mg TID, does not need it in the hospital at this time     Thank you for this consult. Rest of care per primary. Addiction medicine will sign off. Please reach out with any questions or concerns via TEAMS.

## 2024-12-31 NOTE — DISCHARGE NOTE PROVIDER - HOSPITAL COURSE
39-year-old male with past medical history of depression presents to the ED for evaluation of tremulousness.  Patient states he has been binge drinking 2 L of vodka over the last 5 days and was ruptured; last drink 2 AM this morning.  Patient is tremulous, feels anxious, and headache.  Patient has not had any recent nausea, vomiting, or hallucinations.  [employer]abs Sodium 129 AG 21 glucose 220 calcium 7.5 ast 241 alt 83 13:31 - VBG - pH: 7.45  | pCO2: 38    | pO2: 62    | Lactate: 6.5        alcohol use disorder  alcohol withdrawal- asymptomatic now  hypophos - concern for refeeding syndrome   hyponatremia, HypoK, HypoMg- improved  hypoglycemia in setting of alcohol use disorder  elevated LFTs   elevated D dimer likely in setting of inflammatory state   suspect thiamine and folate deficinecy   ADHD  Severe thrombocytopenia- improving    - platelet 142 now   -on gabapentin  -on trazodone prn  -IV thiamine 500 mg q 8h for 3 days, followed by 250 mg IV daily up to additional 5 days per [employer] recommendations.   - switched to oral thiamine 100 mg daily after 3 days of IV repletion.   - s/p librium taper  - aggressive electrolyte repletion   - ciwa protocol   - holding home adderall 20 mg TID  - continue trazadone fluoxetine gabapentin  - LE duplex neg  - Redemonstration hepatic steatosis. Stable liver size  - Right renal cyst and calculus, new, OP follow up  - pt counselled  - Catch team f/u : agreed with plan     - pt wants outpt program  Patient clinically and vitally stable to be discharged home. Medications sent to [facility].    39-year-old male with past medical history of depression presents to the ED for evaluation of tremulousness.  Patient states he has been binge drinking 2 L of vodka over the last 5 days and was ruptured; last drink 2 AM this morning.  Patient is tremulous, feels anxious, and headache.  Patient has not had any recent nausea, vomiting, or hallucinations.  [employer]abs Sodium 129 AG 21 glucose 220 calcium 7.5 ast 241 alt 83 13:31 - VBG - pH: 7.45  | pCO2: 38    | pO2: 62    | Lactate: 6.5        Hospital Course:  - pt was treated for alcohol withdrawal; however ativan taper did not complete as patient was still gettingfrequent doses of ativan  - pt was educated and counseled on the risks of withdrawal seizure and recommended to stay to taper ativan  - pt reports understanding the risks and wishes to leave against medical advise.    alcohol use disorder  alcohol withdrawal- asymptomatic now  hypophos - concern for refeeding syndrome   hyponatremia, HypoK, HypoMg- improved  hypoglycemia in setting of alcohol use disorder  elevated LFTs   elevated D dimer likely in setting of inflammatory state   suspect thiamine and folate deficinecy   ADHD  Severe thrombocytopenia- improving    - platelet 142 now   -on gabapentin  -on trazodone prn  -IV thiamine 500 mg q 8h for 3 days, followed by 250 mg IV daily up to additional 5 days per [employer] recommendations.   - switched to oral thiamine 100 mg daily after 3 days of IV repletion.   - s/p librium taper  - aggressive electrolyte repletion   - ciwa protocol   - holding home adderall 20 mg TID  - continue trazadone fluoxetine gabapentin  - LE duplex neg  - Redemonstration hepatic steatosis. Stable liver size  - Right renal cyst and calculus, new, OP follow up  - pt counselled  - Catch team f/u : agreed with plan     - pt wants outpt program  Medications sent to [facility].    39-year-old male with past medical history of depression presents to the ED for evaluation of tremulousness.  Patient states he has been binge drinking 2 L of vodka over the last 5 days and was ruptured; last drink 2 AM this morning.  Patient is tremulous, feels anxious, and headache.  Patient has not had any recent nausea, vomiting, or hallucinations.  SigLabs Sodium 129 AG 21 glucose 220 calcium 7.5 ast 241 alt 83 13:31 - VBG - pH: 7.45  | pCO2: 38    | pO2: 62    | Lactate: 6.5        Hospital Course:  - pt was treated for alcohol withdrawal; however ativan taper did not complete as patient was still getting frequent doses of ativan  - pt was educated and counseled on the risks of withdrawal seizure and recommended to stay to taper ativan  - pt reports understanding the risks and wishes to leave against medical advise.    alcohol use disorder  alcohol withdrawal- asymptomatic now  hypophos - concern for refeeding syndrome   hyponatremia, HypoK, HypoMg- improved  hypoglycemia in setting of alcohol use disorder  elevated LFTs   elevated D dimer likely in setting of inflammatory state   suspect thiamine and folate deficinecy   ADHD  Severe thrombocytopenia- improving    - platelet 142 now   -on gabapentin  -on trazodone prn  -IV thiamine 500 mg q 8h for 3 days, followed by 250 mg IV daily up to additional 5 days per [employer] recommendations.   - switched to oral thiamine 100 mg daily after 3 days of IV repletion.   - s/p librium taper  - aggressive electrolyte repletion   - ciwa protocol   - holding home adderall 20 mg TID  - continue trazadone fluoxetine gabapentin  - LE duplex neg  - Redemonstration hepatic steatosis. Stable liver size  - Right renal cyst and calculus, new, OP follow up  - pt counselled  - Catch team f/u : agreed with plan     - pt wants outpt program  Medications sent to [facility].

## 2024-12-31 NOTE — PROGRESS NOTE ADULT - REASON FOR ADMISSION
Alcohol withdrawal

## 2024-12-31 NOTE — DISCHARGE NOTE PROVIDER - NSDCMRMEDTOKEN_GEN_ALL_CORE_FT
albuterol 90 mcg/inh inhalation aerosol: 2 puff(s) inhaled every 6 hours as needed for Shortness of Breath and/or Wheezing  folic acid 1 mg oral tablet: 1 tab(s) orally once a day  gabapentin 400 mg oral capsule: 1 cap(s) orally 3 times a day  naltrexone 50 mg oral tablet: 1 tab(s) orally once a day (at bedtime)  nicotine 21 mg/24 hr transdermal film, extended release: 1 patch transdermal once a day  traZODone 150 mg oral tablet: 1 tab(s) orally once a day (at bedtime)   albuterol 90 mcg/inh inhalation aerosol: 2 puff(s) inhaled every 6 hours as needed for Shortness of Breath and/or Wheezing  ALPRAZolam 1 mg oral tablet: 1 tab(s) orally once a day (at bedtime) As needed anxiety  buPROPion 150 mg/24 hours (XL) oral tablet, extended release: 1 tab(s) orally once a day  FLUoxetine 20 mg/5 mL oral solution: 7.5 milliliter(s) orally once a day (at bedtime)  folic acid 1 mg oral tablet: 1 tab(s) orally once a day  gabapentin 400 mg oral capsule: 1 cap(s) orally 3 times a day  naltrexone 50 mg oral tablet: 1 tab(s) orally once a day (at bedtime)  nicotine 21 mg/24 hr transdermal film, extended release: 1 patch transdermal once a day  traZODone 150 mg oral tablet: 1 tab(s) orally once a day (at bedtime)

## 2024-12-31 NOTE — DISCHARGE NOTE PROVIDER - NSDCFUSCHEDAPPT_GEN_ALL_CORE_FT
Sindy Whaley  [facility] PreAdmits  Scheduled Appointment: 01/07/2025    [facility] Physician [facility]  PSYCHIATRY [facility]  Scheduled Appointment: 01/07/2025

## 2024-12-31 NOTE — BH CONSULTATION LIAISON PROGRESS NOTE - CURRENT MEDICATION
MEDICATIONS  (STANDING):  dextrose 5% + lactated ringers. 1000 milliLiter(s) (100 mL/Hr) IV Continuous <Continuous>  enoxaparin Injectable 40 milliGRAM(s) SubCutaneous every 24 hours  FLUoxetine Solution 15 milliGRAM(s) Oral daily  folic acid 1 milliGRAM(s) Oral daily  gabapentin 400 milliGRAM(s) Oral three times a day  magnesium oxide 400 milliGRAM(s) Oral three times a day with meals  naltrexone 50 milliGRAM(s) Oral at bedtime  nicotine - 21 mG/24Hr(s) Patch 1 Patch Transdermal daily  pantoprazole    Tablet 40 milliGRAM(s) Oral before breakfast  potassium chloride    Tablet ER 40 milliEquivalent(s) Oral once  potassium chloride  20 mEq/100 mL IVPB 20 milliEquivalent(s) IV Intermittent once  traZODone 150 milliGRAM(s) Oral at bedtime    MEDICATIONS  (PRN):  acetaminophen     Tablet .. 650 milliGRAM(s) Oral every 6 hours PRN Mild Pain (1 - 3), Moderate Pain (4 - 6)  albuterol    90 MICROgram(s) HFA Inhaler 2 Puff(s) Inhalation every 6 hours PRN Shortness of Breath and/or Wheezing  benzonatate 100 milliGRAM(s) Oral three times a day PRN Cough  LORazepam   Injectable 2 milliGRAM(s) IV Push every 2 hours PRN CIWA-Ar score 8 or greater

## 2024-12-31 NOTE — PHYSICAL THERAPY INITIAL EVALUATION ADULT - GENERAL OBSERVATIONS, REHAB EVAL
850-905 am Chart reviewed. Pt. seen semirecline in bed , in No apparent distress , + IV lock , denies pain, Pt. alert and oriented X 4, Pt. agreed to activity/therapy.

## 2024-12-31 NOTE — BH CONSULTATION LIAISON PROGRESS NOTE - NSBHASSESSMENTFT_PSY_ALL_CORE
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 Psychiatry consulted for depressed mood. On exam, patient is very tremulous and c/o withdrawal symptoms. His affect is anxious and constricted. His thought process is linear with clear associations.    Most likely diagnosis is substance induced mood d/o vs. MDD. Although patient's symptoms are characteristic of MDD these symptoms are seen in the setting of alcohol use/ alcohol withdrawal. On the other hand, his depressed mood could contribute to heavy drinking as a maladaptive coping strategy. In addition it appears that he had a psychosocial stressor ("bad breakup") that lead him to drink alcohol heavily and the alcohol use fully or partially contributed to the depressed mood and symptoms.    Addiction Medicine is treating pt's withdrawal symptoms and AUD which has improved patient's mood. Patient is still concerned about some dysphoria, and therefore we recommended increase his dosage of Prozac to 30mg. 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 Psychiatry consulted for depressed mood. On exam, patient is very tremulous and c/o withdrawal symptoms. His affect is anxious and constricted. His thought process is linear with clear associations.    Most likely diagnosis is substance induced mood d/o vs. MDD. Although patient's symptoms are characteristic of MDD these symptoms are seen in the setting of alcohol use/ alcohol withdrawal. On the other hand, his depressed mood could contribute to heavy drinking as a maladaptive coping strategy. In addition it appears that he had a psychosocial stressor ("bad breakup") that lead him to drink alcohol heavily and the alcohol use fully or partially contributed to the depressed mood and symptoms.    Addiction Medicine is treating pt's withdrawal symptoms and AUD which has improved patient's mood. Patient is still reporting some level of depressive symptoms, and therefore we recommended increase his dosage of Prozac to 30mg as patient still has room for further titration of antidepressant medications.

## 2024-12-31 NOTE — BH CONSULTATION LIAISON PROGRESS NOTE - NSBHCHARTREVIEWINVESTIGATE_PSY_A_CORE FT
< from: 12 Lead ECG (12.26.24 @ 11:36) >    QTC Calculation(Bazett) 509 ms    < end of copied text >    < from: 12 Lead ECG (12.26.24 @ 11:36) >      Diagnosis Line Sinus tachycardia  Nonspecific ST abnormality  Abnormal ECG  Confirmed by Genaro Cordova ([id number]) on 12/26/2024 7:05:35 PM    < end of copied text >

## 2025-01-01 ENCOUNTER — TRANSCRIPTION ENCOUNTER (OUTPATIENT)
Age: 40
End: 2025-01-01

## 2025-01-01 VITALS
DIASTOLIC BLOOD PRESSURE: 63 MMHG | TEMPERATURE: 98 F | RESPIRATION RATE: 18 BRPM | SYSTOLIC BLOOD PRESSURE: 100 MMHG | HEART RATE: 85 BPM | OXYGEN SATURATION: 97 %

## 2025-01-01 LAB
ALBUMIN SERPL ELPH-MCNC: 3.8 G/DL — SIGNIFICANT CHANGE UP (ref 3.5–5.2)
ALP SERPL-CCNC: 47 U/L — SIGNIFICANT CHANGE UP (ref 30–115)
ALT FLD-CCNC: 35 U/L — SIGNIFICANT CHANGE UP (ref 0–41)
ANION GAP SERPL CALC-SCNC: 13 MMOL/L — SIGNIFICANT CHANGE UP (ref 7–14)
AST SERPL-CCNC: 40 U/L — SIGNIFICANT CHANGE UP (ref 0–41)
BASOPHILS # BLD AUTO: 0.03 K/UL — SIGNIFICANT CHANGE UP (ref 0–0.2)
BASOPHILS NFR BLD AUTO: 0.7 % — SIGNIFICANT CHANGE UP (ref 0–1)
BILIRUB SERPL-MCNC: <0.2 MG/DL — SIGNIFICANT CHANGE UP (ref 0.2–1.2)
BUN SERPL-MCNC: 5 MG/DL — LOW (ref 10–20)
CALCIUM SERPL-MCNC: 8.3 MG/DL — LOW (ref 8.4–10.5)
CHLORIDE SERPL-SCNC: 101 MMOL/L — SIGNIFICANT CHANGE UP (ref 98–110)
CO2 SERPL-SCNC: 25 MMOL/L — SIGNIFICANT CHANGE UP (ref 17–32)
CREAT SERPL-MCNC: 0.7 MG/DL — SIGNIFICANT CHANGE UP (ref 0.7–1.5)
EGFR: 121 ML/MIN/1.73M2 — SIGNIFICANT CHANGE UP
EOSINOPHIL # BLD AUTO: 0.04 K/UL — SIGNIFICANT CHANGE UP (ref 0–0.7)
EOSINOPHIL NFR BLD AUTO: 0.9 % — SIGNIFICANT CHANGE UP (ref 0–8)
GLUCOSE SERPL-MCNC: 104 MG/DL — HIGH (ref 70–99)
HCT VFR BLD CALC: 36.4 % — LOW (ref 42–52)
HGB BLD-MCNC: 11.6 G/DL — LOW (ref 14–18)
IMM GRANULOCYTES NFR BLD AUTO: 0.9 % — HIGH (ref 0.1–0.3)
LYMPHOCYTES # BLD AUTO: 0.96 K/UL — LOW (ref 1.2–3.4)
LYMPHOCYTES # BLD AUTO: 21.3 % — SIGNIFICANT CHANGE UP (ref 20.5–51.1)
MAGNESIUM SERPL-MCNC: 1.9 MG/DL — SIGNIFICANT CHANGE UP (ref 1.8–2.4)
MCHC RBC-ENTMCNC: 31.2 PG — HIGH (ref 27–31)
MCHC RBC-ENTMCNC: 31.9 G/DL — LOW (ref 32–37)
MCV RBC AUTO: 97.8 FL — HIGH (ref 80–94)
MONOCYTES # BLD AUTO: 0.87 K/UL — HIGH (ref 0.1–0.6)
MONOCYTES NFR BLD AUTO: 19.3 % — HIGH (ref 1.7–9.3)
NEUTROPHILS # BLD AUTO: 2.56 K/UL — SIGNIFICANT CHANGE UP (ref 1.4–6.5)
NEUTROPHILS NFR BLD AUTO: 56.9 % — SIGNIFICANT CHANGE UP (ref 42.2–75.2)
NRBC # BLD: 0 /100 WBCS — SIGNIFICANT CHANGE UP (ref 0–0)
PLATELET # BLD AUTO: 191 K/UL — SIGNIFICANT CHANGE UP (ref 130–400)
PMV BLD: 10.2 FL — SIGNIFICANT CHANGE UP (ref 7.4–10.4)
POTASSIUM SERPL-MCNC: 3.7 MMOL/L — SIGNIFICANT CHANGE UP (ref 3.5–5)
POTASSIUM SERPL-SCNC: 3.7 MMOL/L — SIGNIFICANT CHANGE UP (ref 3.5–5)
PROT SERPL-MCNC: 5.9 G/DL — LOW (ref 6–8)
RBC # BLD: 3.72 M/UL — LOW (ref 4.7–6.1)
RBC # FLD: 12.9 % — SIGNIFICANT CHANGE UP (ref 11.5–14.5)
SODIUM SERPL-SCNC: 139 MMOL/L — SIGNIFICANT CHANGE UP (ref 135–146)
WBC # BLD: 4.5 K/UL — LOW (ref 4.8–10.8)
WBC # FLD AUTO: 4.5 K/UL — LOW (ref 4.8–10.8)

## 2025-01-01 PROCEDURE — 99232 SBSQ HOSP IP/OBS MODERATE 35: CPT

## 2025-01-01 RX ORDER — ALPRAZOLAM 0.25 MG/1
1 TABLET ORAL
Qty: 0 | Refills: 0 | DISCHARGE
Start: 2025-01-01

## 2025-01-01 RX ORDER — BUPROPION HYDROCHLORIDE 150 MG/1
1 TABLET, EXTENDED RELEASE ORAL
Qty: 15 | Refills: 0
Start: 2025-01-01

## 2025-01-01 RX ADMIN — Medication 400 MILLIGRAM(S): at 08:02

## 2025-01-01 RX ADMIN — ACETAMINOPHEN 650 MILLIGRAM(S): 80 SOLUTION/ DROPS ORAL at 08:04

## 2025-01-01 RX ADMIN — PANTOPRAZOLE 40 MILLIGRAM(S): 40 TABLET, DELAYED RELEASE ORAL at 05:08

## 2025-01-01 RX ADMIN — Medication 100 MILLIGRAM(S): at 08:02

## 2025-01-01 RX ADMIN — GABAPENTIN 400 MILLIGRAM(S): 300 CAPSULE ORAL at 05:08

## 2025-01-01 RX ADMIN — LORAZEPAM 2 MILLIGRAM(S): 1 TABLET ORAL at 04:53

## 2025-01-01 NOTE — DISCHARGE NOTE NURSING/CASE MANAGEMENT/SOCIAL WORK - NSDCPEFALRISK_GEN_ALL_CORE
For information on Fall & Injury Prevention, visit: [email or web address] OR  [email or web address] OR  https://www.cdc.gov/steadi/patient.html

## 2025-01-01 NOTE — DISCHARGE NOTE NURSING/CASE MANAGEMENT/SOCIAL WORK - FINANCIAL ASSISTANCE
[facility] provides services at a reduced cost to those who are determined to be eligible through [facility]’s financial assistance program. Information regarding [facility]’s financial assistance program can be found by going to [email or web address] or by calling [phone].

## 2025-01-01 NOTE — DISCHARGE NOTE NURSING/CASE MANAGEMENT/SOCIAL WORK - NSDCVIVACCINE_GEN_ALL_CORE_FT
Tdap; 17-May-2024 00:58; Radha Casanova (RN); [employer]; [id number]   (Exp. Date: 30-Nov-2025); IntraMuscular; Deltoid Right.; 0.5 milliLiter(s); VIS (VIS Published: 09-May-2013, VIS Presented: 17-May-2024);

## 2025-01-01 NOTE — DISCHARGE NOTE NURSING/CASE MANAGEMENT/SOCIAL WORK - PATIENT PORTAL LINK FT
You can access the FollowMyHealth Patient Portal offered by [facility] by registering at the following website: [email or web address]. By joining [facility]’s FollowMyHealth portal, you will also be able to view your health information using other applications (apps) compatible with our system.

## 2025-01-07 ENCOUNTER — OUTPATIENT (OUTPATIENT)
Dept: OUTPATIENT SERVICES | Facility: HOSPITAL | Age: 40
LOS: 1 days | End: 2025-01-07
Payer: COMMERCIAL

## 2025-01-07 ENCOUNTER — APPOINTMENT (OUTPATIENT)
Dept: PSYCHIATRY | Facility: CLINIC | Age: 40
End: 2025-01-07

## 2025-01-07 DIAGNOSIS — E87.6 HYPOKALEMIA: ICD-10-CM

## 2025-01-07 DIAGNOSIS — E51.2 WERNICKE'S ENCEPHALOPATHY: ICD-10-CM

## 2025-01-07 DIAGNOSIS — R74.01 ELEVATION OF LEVELS OF LIVER TRANSAMINASE LEVELS: ICD-10-CM

## 2025-01-07 DIAGNOSIS — F19.94 OTHER PSYCHOACTIVE SUBSTANCE USE, UNSPECIFIED WITH PSYCHOACTIVE SUBSTANCE-INDUCED MOOD DISORDER: ICD-10-CM

## 2025-01-07 DIAGNOSIS — J06.9 ACUTE UPPER RESPIRATORY INFECTION, UNSPECIFIED: ICD-10-CM

## 2025-01-07 DIAGNOSIS — Z88.1 ALLERGY STATUS TO OTHER ANTIBIOTIC AGENTS: ICD-10-CM

## 2025-01-07 DIAGNOSIS — E16.2 HYPOGLYCEMIA, UNSPECIFIED: ICD-10-CM

## 2025-01-07 DIAGNOSIS — E83.39 OTHER DISORDERS OF PHOSPHORUS METABOLISM: ICD-10-CM

## 2025-01-07 DIAGNOSIS — F10.231 ALCOHOL DEPENDENCE WITH WITHDRAWAL DELIRIUM: ICD-10-CM

## 2025-01-07 DIAGNOSIS — E83.42 HYPOMAGNESEMIA: ICD-10-CM

## 2025-01-07 DIAGNOSIS — N28.1 CYST OF KIDNEY, ACQUIRED: ICD-10-CM

## 2025-01-07 DIAGNOSIS — E53.8 DEFICIENCY OF OTHER SPECIFIED B GROUP VITAMINS: ICD-10-CM

## 2025-01-07 DIAGNOSIS — F41.9 ANXIETY DISORDER, UNSPECIFIED: ICD-10-CM

## 2025-01-07 DIAGNOSIS — F10.20 ALCOHOL DEPENDENCE, UNCOMPLICATED: ICD-10-CM

## 2025-01-07 DIAGNOSIS — N20.0 CALCULUS OF KIDNEY: ICD-10-CM

## 2025-01-07 DIAGNOSIS — E87.1 HYPO-OSMOLALITY AND HYPONATREMIA: ICD-10-CM

## 2025-01-07 DIAGNOSIS — F90.9 ATTENTION-DEFICIT HYPERACTIVITY DISORDER, UNSPECIFIED TYPE: ICD-10-CM

## 2025-01-07 DIAGNOSIS — K76.0 FATTY (CHANGE OF) LIVER, NOT ELSEWHERE CLASSIFIED: ICD-10-CM

## 2025-01-07 DIAGNOSIS — F32.A DEPRESSION, UNSPECIFIED: ICD-10-CM

## 2025-01-07 PROCEDURE — 99409 AUDIT/DAST OVER 30 MIN: CPT

## 2025-01-08 DIAGNOSIS — F10.20 ALCOHOL DEPENDENCE, UNCOMPLICATED: ICD-10-CM

## 2025-01-14 ENCOUNTER — APPOINTMENT (OUTPATIENT)
Dept: PSYCHIATRY | Facility: CLINIC | Age: 40
End: 2025-01-14

## 2025-01-14 ENCOUNTER — OUTPATIENT (OUTPATIENT)
Dept: OUTPATIENT SERVICES | Facility: HOSPITAL | Age: 40
LOS: 1 days | End: 2025-01-14
Payer: COMMERCIAL

## 2025-01-14 DIAGNOSIS — F10.20 ALCOHOL DEPENDENCE, UNCOMPLICATED: ICD-10-CM

## 2025-01-14 PROCEDURE — H0038: CPT | Mod: 95

## 2025-01-15 ENCOUNTER — APPOINTMENT (OUTPATIENT)
Dept: PSYCHIATRY | Facility: CLINIC | Age: 40
End: 2025-01-15

## 2025-01-15 DIAGNOSIS — F10.20 ALCOHOL DEPENDENCE, UNCOMPLICATED: ICD-10-CM

## 2025-01-15 NOTE — ED PROVIDER NOTE - CLINICAL SUMMARY MEDICAL DECISION MAKING FREE TEXT BOX
Schedule EGD repeat (call gastro to schedule: [phone]  Fasting labs  See cardiology for follow-up please call below:  Call [facility] cardiology: [phone]    Follow-up if you want to restart physical therapy    Acute alcohol withdrawal requiring several rounds of IV benzodiazepines.  Still slightly tachycardic and tremulous.  Lactic acidosis.  Given IV fluids.  Admitted to ICU.  Patient still on constant observation given passive suicidal ideation.  Can see psych as inpatient

## 2025-01-28 ENCOUNTER — APPOINTMENT (OUTPATIENT)
Dept: PSYCHIATRY | Facility: CLINIC | Age: 40
End: 2025-01-28

## 2025-01-28 ENCOUNTER — OUTPATIENT (OUTPATIENT)
Dept: OUTPATIENT SERVICES | Facility: HOSPITAL | Age: 40
LOS: 1 days | End: 2025-01-28
Payer: COMMERCIAL

## 2025-01-28 DIAGNOSIS — F10.20 ALCOHOL DEPENDENCE, UNCOMPLICATED: ICD-10-CM

## 2025-01-28 PROCEDURE — H0038: CPT | Mod: 95

## 2025-01-29 DIAGNOSIS — F10.20 ALCOHOL DEPENDENCE, UNCOMPLICATED: ICD-10-CM

## 2025-02-06 ENCOUNTER — APPOINTMENT (OUTPATIENT)
Dept: PSYCHIATRY | Facility: CLINIC | Age: 40
End: 2025-02-06

## 2025-02-06 ENCOUNTER — OUTPATIENT (OUTPATIENT)
Dept: OUTPATIENT SERVICES | Facility: HOSPITAL | Age: 40
LOS: 1 days | End: 2025-02-06
Payer: COMMERCIAL

## 2025-02-06 DIAGNOSIS — F10.20 ALCOHOL DEPENDENCE, UNCOMPLICATED: ICD-10-CM

## 2025-02-06 PROCEDURE — 90834 PSYTX W PT 45 MINUTES: CPT | Mod: 95

## 2025-02-07 DIAGNOSIS — F10.20 ALCOHOL DEPENDENCE, UNCOMPLICATED: ICD-10-CM

## 2025-02-11 ENCOUNTER — APPOINTMENT (OUTPATIENT)
Dept: PSYCHIATRY | Facility: CLINIC | Age: 40
End: 2025-02-11

## 2025-02-20 ENCOUNTER — APPOINTMENT (OUTPATIENT)
Dept: PSYCHIATRY | Facility: CLINIC | Age: 40
End: 2025-02-20

## 2025-02-20 ENCOUNTER — OUTPATIENT (OUTPATIENT)
Dept: OUTPATIENT SERVICES | Facility: HOSPITAL | Age: 40
LOS: 1 days | End: 2025-02-20
Payer: COMMERCIAL

## 2025-02-20 DIAGNOSIS — F10.20 ALCOHOL DEPENDENCE, UNCOMPLICATED: ICD-10-CM

## 2025-02-20 PROCEDURE — 90834 PSYTX W PT 45 MINUTES: CPT

## 2025-02-21 DIAGNOSIS — F10.20 ALCOHOL DEPENDENCE, UNCOMPLICATED: ICD-10-CM

## 2025-02-25 ENCOUNTER — APPOINTMENT (OUTPATIENT)
Dept: PSYCHIATRY | Facility: CLINIC | Age: 40
End: 2025-02-25

## 2025-02-25 ENCOUNTER — OUTPATIENT (OUTPATIENT)
Dept: OUTPATIENT SERVICES | Facility: HOSPITAL | Age: 40
LOS: 1 days | End: 2025-02-25
Payer: COMMERCIAL

## 2025-02-25 DIAGNOSIS — F10.20 ALCOHOL DEPENDENCE, UNCOMPLICATED: ICD-10-CM

## 2025-02-25 PROCEDURE — 90847 FAMILY PSYTX W/PT 50 MIN: CPT

## 2025-02-26 ENCOUNTER — APPOINTMENT (OUTPATIENT)
Dept: PSYCHIATRY | Facility: CLINIC | Age: 40
End: 2025-02-26

## 2025-02-26 DIAGNOSIS — F10.20 ALCOHOL DEPENDENCE, UNCOMPLICATED: ICD-10-CM

## 2025-03-04 ENCOUNTER — APPOINTMENT (OUTPATIENT)
Dept: PSYCHIATRY | Facility: CLINIC | Age: 40
End: 2025-03-04

## 2025-03-06 ENCOUNTER — APPOINTMENT (OUTPATIENT)
Dept: PSYCHIATRY | Facility: CLINIC | Age: 40
End: 2025-03-06

## 2025-03-11 ENCOUNTER — APPOINTMENT (OUTPATIENT)
Dept: PSYCHIATRY | Facility: CLINIC | Age: 40
End: 2025-03-11

## 2025-03-13 NOTE — ED PROVIDER NOTE - SEPSIS ALERT QUESTION 1
1 or 2 This patient was evaluated for sepsis.  At this time, a diagnosis of sepsis is not supported by the overall clinical picture.

## 2025-03-18 ENCOUNTER — APPOINTMENT (OUTPATIENT)
Dept: PSYCHIATRY | Facility: CLINIC | Age: 40
End: 2025-03-18

## 2025-03-24 ENCOUNTER — OUTPATIENT (OUTPATIENT)
Dept: OUTPATIENT SERVICES | Facility: HOSPITAL | Age: 40
LOS: 1 days | End: 2025-03-24
Payer: COMMERCIAL

## 2025-03-24 ENCOUNTER — APPOINTMENT (OUTPATIENT)
Dept: PSYCHIATRY | Facility: CLINIC | Age: 40
End: 2025-03-24

## 2025-03-24 DIAGNOSIS — F10.20 ALCOHOL DEPENDENCE, UNCOMPLICATED: ICD-10-CM

## 2025-03-24 PROCEDURE — 90832 PSYTX W PT 30 MINUTES: CPT | Mod: 95

## 2025-03-25 ENCOUNTER — APPOINTMENT (OUTPATIENT)
Dept: PSYCHIATRY | Facility: CLINIC | Age: 40
End: 2025-03-25

## 2025-03-25 DIAGNOSIS — F10.20 ALCOHOL DEPENDENCE, UNCOMPLICATED: ICD-10-CM

## 2025-04-01 ENCOUNTER — APPOINTMENT (OUTPATIENT)
Dept: PSYCHIATRY | Facility: CLINIC | Age: 40
End: 2025-04-01

## 2025-04-01 ENCOUNTER — OUTPATIENT (OUTPATIENT)
Dept: OUTPATIENT SERVICES | Facility: HOSPITAL | Age: 40
LOS: 1 days | End: 2025-04-01
Payer: COMMERCIAL

## 2025-04-01 DIAGNOSIS — F10.20 ALCOHOL DEPENDENCE, UNCOMPLICATED: ICD-10-CM

## 2025-04-01 PROCEDURE — H0038: CPT | Mod: 95

## 2025-04-02 ENCOUNTER — APPOINTMENT (OUTPATIENT)
Dept: PSYCHIATRY | Facility: CLINIC | Age: 40
End: 2025-04-02

## 2025-04-02 ENCOUNTER — INPATIENT (INPATIENT)
Facility: HOSPITAL | Age: 40
LOS: 3 days | Discharge: HOME | DRG: 897 | End: 2025-04-06
Attending: INTERNAL MEDICINE | Admitting: INTERNAL MEDICINE
Payer: COMMERCIAL

## 2025-04-02 VITALS
HEART RATE: 149 BPM | OXYGEN SATURATION: 99 % | RESPIRATION RATE: 28 BRPM | HEIGHT: 66 IN | DIASTOLIC BLOOD PRESSURE: 72 MMHG | SYSTOLIC BLOOD PRESSURE: 149 MMHG | WEIGHT: 149.91 LBS | TEMPERATURE: 99 F

## 2025-04-02 DIAGNOSIS — F10.20 ALCOHOL DEPENDENCE, UNCOMPLICATED: ICD-10-CM

## 2025-04-02 DIAGNOSIS — F10.939 ALCOHOL USE, UNSPECIFIED WITH WITHDRAWAL, UNSPECIFIED: ICD-10-CM

## 2025-04-02 LAB
ALBUMIN SERPL ELPH-MCNC: 3.8 G/DL — SIGNIFICANT CHANGE UP (ref 3.5–5.2)
ALBUMIN SERPL ELPH-MCNC: 4.4 G/DL — SIGNIFICANT CHANGE UP (ref 3.5–5.2)
ALP SERPL-CCNC: 66 U/L — SIGNIFICANT CHANGE UP (ref 30–115)
ALP SERPL-CCNC: 67 U/L — SIGNIFICANT CHANGE UP (ref 30–115)
ALT FLD-CCNC: 28 U/L — SIGNIFICANT CHANGE UP (ref 0–41)
ALT FLD-CCNC: 35 U/L — SIGNIFICANT CHANGE UP (ref 0–41)
ANION GAP SERPL CALC-SCNC: 18 MMOL/L — HIGH (ref 7–14)
ANION GAP SERPL CALC-SCNC: 21 MMOL/L — HIGH (ref 7–14)
AST SERPL-CCNC: 130 U/L — HIGH (ref 0–41)
AST SERPL-CCNC: 57 U/L — HIGH (ref 0–41)
BASOPHILS # BLD AUTO: 0 K/UL — SIGNIFICANT CHANGE UP (ref 0–0.2)
BASOPHILS NFR BLD AUTO: 0 % — SIGNIFICANT CHANGE UP (ref 0–1)
BILIRUB SERPL-MCNC: 0.5 MG/DL — SIGNIFICANT CHANGE UP (ref 0.2–1.2)
BILIRUB SERPL-MCNC: 0.5 MG/DL — SIGNIFICANT CHANGE UP (ref 0.2–1.2)
BUN SERPL-MCNC: 7 MG/DL — LOW (ref 10–20)
BUN SERPL-MCNC: 7 MG/DL — LOW (ref 10–20)
CALCIUM SERPL-MCNC: 7.1 MG/DL — LOW (ref 8.4–10.5)
CALCIUM SERPL-MCNC: 8.3 MG/DL — LOW (ref 8.4–10.5)
CHLORIDE SERPL-SCNC: 87 MMOL/L — LOW (ref 98–110)
CHLORIDE SERPL-SCNC: 87 MMOL/L — LOW (ref 98–110)
CO2 SERPL-SCNC: 21 MMOL/L — SIGNIFICANT CHANGE UP (ref 17–32)
CO2 SERPL-SCNC: 22 MMOL/L — SIGNIFICANT CHANGE UP (ref 17–32)
CREAT SERPL-MCNC: 0.8 MG/DL — SIGNIFICANT CHANGE UP (ref 0.7–1.5)
CREAT SERPL-MCNC: 1 MG/DL — SIGNIFICANT CHANGE UP (ref 0.7–1.5)
EGFR: 115 ML/MIN/1.73M2 — SIGNIFICANT CHANGE UP
EGFR: 115 ML/MIN/1.73M2 — SIGNIFICANT CHANGE UP
EGFR: 98 ML/MIN/1.73M2 — SIGNIFICANT CHANGE UP
EGFR: 98 ML/MIN/1.73M2 — SIGNIFICANT CHANGE UP
EOSINOPHIL # BLD AUTO: 0 K/UL — SIGNIFICANT CHANGE UP (ref 0–0.7)
EOSINOPHIL NFR BLD AUTO: 0 % — SIGNIFICANT CHANGE UP (ref 0–8)
ETHANOL SERPL-MCNC: 299 MG/DL — HIGH
GLUCOSE SERPL-MCNC: 103 MG/DL — HIGH (ref 70–99)
GLUCOSE SERPL-MCNC: 109 MG/DL — HIGH (ref 70–99)
HCT VFR BLD CALC: 50.2 % — SIGNIFICANT CHANGE UP (ref 42–52)
HGB BLD-MCNC: 17.1 G/DL — SIGNIFICANT CHANGE UP (ref 14–18)
LIDOCAIN IGE QN: 91 U/L — HIGH (ref 7–60)
LYMPHOCYTES # BLD AUTO: 0.9 K/UL — LOW (ref 1.2–3.4)
LYMPHOCYTES # BLD AUTO: 7.2 % — LOW (ref 20.5–51.1)
MAGNESIUM SERPL-MCNC: 1.7 MG/DL — LOW (ref 1.8–2.4)
MCHC RBC-ENTMCNC: 29.8 PG — SIGNIFICANT CHANGE UP (ref 27–31)
MCHC RBC-ENTMCNC: 34.1 G/DL — SIGNIFICANT CHANGE UP (ref 32–37)
MCV RBC AUTO: 87.6 FL — SIGNIFICANT CHANGE UP (ref 80–94)
MONOCYTES # BLD AUTO: 0.22 K/UL — SIGNIFICANT CHANGE UP (ref 0.1–0.6)
MONOCYTES NFR BLD AUTO: 1.8 % — SIGNIFICANT CHANGE UP (ref 1.7–9.3)
NEUTROPHILS # BLD AUTO: 10.89 K/UL — HIGH (ref 1.4–6.5)
NEUTROPHILS NFR BLD AUTO: 87.4 % — HIGH (ref 42.2–75.2)
PLATELET # BLD AUTO: 190 K/UL — SIGNIFICANT CHANGE UP (ref 130–400)
PMV BLD: 9.9 FL — SIGNIFICANT CHANGE UP (ref 7.4–10.4)
POTASSIUM SERPL-MCNC: 3.6 MMOL/L — SIGNIFICANT CHANGE UP (ref 3.5–5)
POTASSIUM SERPL-MCNC: SIGNIFICANT CHANGE UP MMOL/L (ref 3.5–5)
POTASSIUM SERPL-SCNC: 3.6 MMOL/L — SIGNIFICANT CHANGE UP (ref 3.5–5)
POTASSIUM SERPL-SCNC: SIGNIFICANT CHANGE UP MMOL/L (ref 3.5–5)
PROT SERPL-MCNC: 6.3 G/DL — SIGNIFICANT CHANGE UP (ref 6–8)
PROT SERPL-MCNC: 8.4 G/DL — HIGH (ref 6–8)
RBC # BLD: 5.73 M/UL — SIGNIFICANT CHANGE UP (ref 4.7–6.1)
RBC # FLD: 13.3 % — SIGNIFICANT CHANGE UP (ref 11.5–14.5)
SODIUM SERPL-SCNC: 127 MMOL/L — LOW (ref 135–146)
SODIUM SERPL-SCNC: 129 MMOL/L — LOW (ref 135–146)
TROPONIN T, HIGH SENSITIVITY RESULT: 11 NG/L — SIGNIFICANT CHANGE UP (ref 6–21)
WBC # BLD: 12.46 K/UL — HIGH (ref 4.8–10.8)
WBC # FLD AUTO: 12.46 K/UL — HIGH (ref 4.8–10.8)

## 2025-04-02 PROCEDURE — 80048 BASIC METABOLIC PNL TOTAL CA: CPT

## 2025-04-02 PROCEDURE — 83735 ASSAY OF MAGNESIUM: CPT

## 2025-04-02 PROCEDURE — 84100 ASSAY OF PHOSPHORUS: CPT

## 2025-04-02 PROCEDURE — 99223 1ST HOSP IP/OBS HIGH 75: CPT

## 2025-04-02 PROCEDURE — 85610 PROTHROMBIN TIME: CPT

## 2025-04-02 PROCEDURE — 80053 COMPREHEN METABOLIC PANEL: CPT

## 2025-04-02 PROCEDURE — 80076 HEPATIC FUNCTION PANEL: CPT

## 2025-04-02 PROCEDURE — 85027 COMPLETE CBC AUTOMATED: CPT

## 2025-04-02 PROCEDURE — 71045 X-RAY EXAM CHEST 1 VIEW: CPT | Mod: 26

## 2025-04-02 PROCEDURE — 36415 COLL VENOUS BLD VENIPUNCTURE: CPT

## 2025-04-02 PROCEDURE — 99285 EMERGENCY DEPT VISIT HI MDM: CPT

## 2025-04-02 PROCEDURE — 85025 COMPLETE CBC W/AUTO DIFF WBC: CPT

## 2025-04-02 RX ORDER — MAGNESIUM SULFATE 500 MG/ML
2 SYRINGE (ML) INJECTION ONCE
Refills: 0 | Status: COMPLETED | OUTPATIENT
Start: 2025-04-02 | End: 2025-04-02

## 2025-04-02 RX ORDER — LORAZEPAM 4 MG/ML
2 VIAL (ML) INJECTION
Refills: 0 | Status: DISCONTINUED | OUTPATIENT
Start: 2025-04-02 | End: 2025-04-03

## 2025-04-02 RX ORDER — CHLORDIAZEPOXIDE HCL 10 MG
25 CAPSULE ORAL ONCE
Refills: 0 | Status: DISCONTINUED | OUTPATIENT
Start: 2025-04-02 | End: 2025-04-02

## 2025-04-02 RX ORDER — METOPROLOL SUCCINATE 50 MG/1
5 TABLET, EXTENDED RELEASE ORAL ONCE
Refills: 0 | Status: COMPLETED | OUTPATIENT
Start: 2025-04-02 | End: 2025-04-02

## 2025-04-02 RX ORDER — ALPRAZOLAM 0.5 MG
1 TABLET, EXTENDED RELEASE 24 HR ORAL ONCE
Refills: 0 | Status: DISCONTINUED | OUTPATIENT
Start: 2025-04-02 | End: 2025-04-02

## 2025-04-02 RX ADMIN — Medication 1 MILLIGRAM(S): at 20:15

## 2025-04-02 RX ADMIN — METOPROLOL SUCCINATE 5 MILLIGRAM(S): 50 TABLET, EXTENDED RELEASE ORAL at 20:15

## 2025-04-02 RX ADMIN — Medication 2000 MILLILITER(S): at 20:16

## 2025-04-02 RX ADMIN — Medication 25 MILLIGRAM(S): at 23:34

## 2025-04-02 RX ADMIN — Medication 25 GRAM(S): at 21:57

## 2025-04-03 DIAGNOSIS — E51.2 WERNICKE'S ENCEPHALOPATHY: ICD-10-CM

## 2025-04-03 DIAGNOSIS — F17.200 NICOTINE DEPENDENCE, UNSPECIFIED, UNCOMPLICATED: ICD-10-CM

## 2025-04-03 DIAGNOSIS — F10.239 ALCOHOL DEPENDENCE WITH WITHDRAWAL, UNSPECIFIED: ICD-10-CM

## 2025-04-03 DIAGNOSIS — K76.0 FATTY (CHANGE OF) LIVER, NOT ELSEWHERE CLASSIFIED: ICD-10-CM

## 2025-04-03 LAB
ALBUMIN SERPL ELPH-MCNC: 3.9 G/DL — SIGNIFICANT CHANGE UP (ref 3.5–5.2)
ALP SERPL-CCNC: 77 U/L — SIGNIFICANT CHANGE UP (ref 30–115)
ALT FLD-CCNC: 31 U/L — SIGNIFICANT CHANGE UP (ref 0–41)
ANION GAP SERPL CALC-SCNC: 14 MMOL/L — SIGNIFICANT CHANGE UP (ref 7–14)
ANION GAP SERPL CALC-SCNC: 19 MMOL/L — HIGH (ref 7–14)
AST SERPL-CCNC: 97 U/L — HIGH (ref 0–41)
BASOPHILS # BLD AUTO: 0.05 K/UL — SIGNIFICANT CHANGE UP (ref 0–0.2)
BASOPHILS # BLD AUTO: 0.07 K/UL — SIGNIFICANT CHANGE UP (ref 0–0.2)
BASOPHILS NFR BLD AUTO: 0.7 % — SIGNIFICANT CHANGE UP (ref 0–1)
BASOPHILS NFR BLD AUTO: 0.8 % — SIGNIFICANT CHANGE UP (ref 0–1)
BILIRUB SERPL-MCNC: 0.7 MG/DL — SIGNIFICANT CHANGE UP (ref 0.2–1.2)
BUN SERPL-MCNC: 7 MG/DL — LOW (ref 10–20)
BUN SERPL-MCNC: 7 MG/DL — LOW (ref 10–20)
CALCIUM SERPL-MCNC: 7.4 MG/DL — LOW (ref 8.4–10.5)
CALCIUM SERPL-MCNC: 7.9 MG/DL — LOW (ref 8.4–10.5)
CHLORIDE SERPL-SCNC: 92 MMOL/L — LOW (ref 98–110)
CHLORIDE SERPL-SCNC: 93 MMOL/L — LOW (ref 98–110)
CO2 SERPL-SCNC: 23 MMOL/L — SIGNIFICANT CHANGE UP (ref 17–32)
CO2 SERPL-SCNC: 26 MMOL/L — SIGNIFICANT CHANGE UP (ref 17–32)
CREAT SERPL-MCNC: 0.8 MG/DL — SIGNIFICANT CHANGE UP (ref 0.7–1.5)
CREAT SERPL-MCNC: 0.9 MG/DL — SIGNIFICANT CHANGE UP (ref 0.7–1.5)
EGFR: 111 ML/MIN/1.73M2 — SIGNIFICANT CHANGE UP
EGFR: 111 ML/MIN/1.73M2 — SIGNIFICANT CHANGE UP
EGFR: 115 ML/MIN/1.73M2 — SIGNIFICANT CHANGE UP
EGFR: 115 ML/MIN/1.73M2 — SIGNIFICANT CHANGE UP
EOSINOPHIL # BLD AUTO: 0.02 K/UL — SIGNIFICANT CHANGE UP (ref 0–0.7)
EOSINOPHIL # BLD AUTO: 0.15 K/UL — SIGNIFICANT CHANGE UP (ref 0–0.7)
EOSINOPHIL NFR BLD AUTO: 0.3 % — SIGNIFICANT CHANGE UP (ref 0–8)
EOSINOPHIL NFR BLD AUTO: 1.7 % — SIGNIFICANT CHANGE UP (ref 0–8)
GLUCOSE SERPL-MCNC: 114 MG/DL — HIGH (ref 70–99)
GLUCOSE SERPL-MCNC: 91 MG/DL — SIGNIFICANT CHANGE UP (ref 70–99)
HCT VFR BLD CALC: 40.8 % — LOW (ref 42–52)
HCT VFR BLD CALC: 42 % — SIGNIFICANT CHANGE UP (ref 42–52)
HGB BLD-MCNC: 13.9 G/DL — LOW (ref 14–18)
HGB BLD-MCNC: 14.6 G/DL — SIGNIFICANT CHANGE UP (ref 14–18)
IMM GRANULOCYTES NFR BLD AUTO: 0.4 % — HIGH (ref 0.1–0.3)
IMM GRANULOCYTES NFR BLD AUTO: 0.4 % — HIGH (ref 0.1–0.3)
LYMPHOCYTES # BLD AUTO: 1.19 K/UL — LOW (ref 1.2–3.4)
LYMPHOCYTES # BLD AUTO: 1.72 K/UL — SIGNIFICANT CHANGE UP (ref 1.2–3.4)
LYMPHOCYTES # BLD AUTO: 15.7 % — LOW (ref 20.5–51.1)
LYMPHOCYTES # BLD AUTO: 19.2 % — LOW (ref 20.5–51.1)
MAGNESIUM SERPL-MCNC: 1.5 MG/DL — LOW (ref 1.8–2.4)
MCHC RBC-ENTMCNC: 30 PG — SIGNIFICANT CHANGE UP (ref 27–31)
MCHC RBC-ENTMCNC: 30.2 PG — SIGNIFICANT CHANGE UP (ref 27–31)
MCHC RBC-ENTMCNC: 34.1 G/DL — SIGNIFICANT CHANGE UP (ref 32–37)
MCHC RBC-ENTMCNC: 34.8 G/DL — SIGNIFICANT CHANGE UP (ref 32–37)
MCV RBC AUTO: 86.8 FL — SIGNIFICANT CHANGE UP (ref 80–94)
MCV RBC AUTO: 88.1 FL — SIGNIFICANT CHANGE UP (ref 80–94)
MONOCYTES # BLD AUTO: 0.5 K/UL — SIGNIFICANT CHANGE UP (ref 0.1–0.6)
MONOCYTES # BLD AUTO: 0.58 K/UL — SIGNIFICANT CHANGE UP (ref 0.1–0.6)
MONOCYTES NFR BLD AUTO: 5.6 % — SIGNIFICANT CHANGE UP (ref 1.7–9.3)
MONOCYTES NFR BLD AUTO: 7.6 % — SIGNIFICANT CHANGE UP (ref 1.7–9.3)
NEUTROPHILS # BLD AUTO: 5.73 K/UL — SIGNIFICANT CHANGE UP (ref 1.4–6.5)
NEUTROPHILS # BLD AUTO: 6.46 K/UL — SIGNIFICANT CHANGE UP (ref 1.4–6.5)
NEUTROPHILS NFR BLD AUTO: 72.3 % — SIGNIFICANT CHANGE UP (ref 42.2–75.2)
NEUTROPHILS NFR BLD AUTO: 75.3 % — HIGH (ref 42.2–75.2)
NRBC BLD AUTO-RTO: 0 /100 WBCS — SIGNIFICANT CHANGE UP (ref 0–0)
NRBC BLD AUTO-RTO: 0 /100 WBCS — SIGNIFICANT CHANGE UP (ref 0–0)
PHOSPHATE SERPL-MCNC: 1.6 MG/DL — LOW (ref 2.1–4.9)
PHOSPHATE SERPL-MCNC: 1.8 MG/DL — LOW (ref 2.1–4.9)
PLATELET # BLD AUTO: 123 K/UL — LOW (ref 130–400)
PLATELET # BLD AUTO: 123 K/UL — LOW (ref 130–400)
PMV BLD: 10.4 FL — SIGNIFICANT CHANGE UP (ref 7.4–10.4)
PMV BLD: 10.7 FL — HIGH (ref 7.4–10.4)
POTASSIUM SERPL-MCNC: 3.5 MMOL/L — SIGNIFICANT CHANGE UP (ref 3.5–5)
POTASSIUM SERPL-MCNC: 3.9 MMOL/L — SIGNIFICANT CHANGE UP (ref 3.5–5)
POTASSIUM SERPL-SCNC: 3.5 MMOL/L — SIGNIFICANT CHANGE UP (ref 3.5–5)
POTASSIUM SERPL-SCNC: 3.9 MMOL/L — SIGNIFICANT CHANGE UP (ref 3.5–5)
PROT SERPL-MCNC: 6.6 G/DL — SIGNIFICANT CHANGE UP (ref 6–8)
RBC # BLD: 4.63 M/UL — LOW (ref 4.7–6.1)
RBC # BLD: 4.84 M/UL — SIGNIFICANT CHANGE UP (ref 4.7–6.1)
RBC # FLD: 13 % — SIGNIFICANT CHANGE UP (ref 11.5–14.5)
RBC # FLD: 13.2 % — SIGNIFICANT CHANGE UP (ref 11.5–14.5)
SODIUM SERPL-SCNC: 133 MMOL/L — LOW (ref 135–146)
SODIUM SERPL-SCNC: 134 MMOL/L — LOW (ref 135–146)
WBC # BLD: 7.6 K/UL — SIGNIFICANT CHANGE UP (ref 4.8–10.8)
WBC # BLD: 8.94 K/UL — SIGNIFICANT CHANGE UP (ref 4.8–10.8)
WBC # FLD AUTO: 7.6 K/UL — SIGNIFICANT CHANGE UP (ref 4.8–10.8)
WBC # FLD AUTO: 8.94 K/UL — SIGNIFICANT CHANGE UP (ref 4.8–10.8)

## 2025-04-03 PROCEDURE — 99222 1ST HOSP IP/OBS MODERATE 55: CPT

## 2025-04-03 PROCEDURE — 99233 SBSQ HOSP IP/OBS HIGH 50: CPT

## 2025-04-03 RX ORDER — ACETAMINOPHEN 500 MG/5ML
650 LIQUID (ML) ORAL ONCE
Refills: 0 | Status: COMPLETED | OUTPATIENT
Start: 2025-04-03 | End: 2025-04-03

## 2025-04-03 RX ORDER — SOD PHOS DI, MONO/K PHOS MONO 250 MG
1 TABLET ORAL ONCE
Refills: 0 | Status: COMPLETED | OUTPATIENT
Start: 2025-04-03 | End: 2025-04-03

## 2025-04-03 RX ORDER — GABAPENTIN 400 MG/1
0 CAPSULE ORAL
Refills: 0 | DISCHARGE

## 2025-04-03 RX ORDER — MAGNESIUM SULFATE 500 MG/ML
2 SYRINGE (ML) INJECTION ONCE
Refills: 0 | Status: DISCONTINUED | OUTPATIENT
Start: 2025-04-03 | End: 2025-04-03

## 2025-04-03 RX ORDER — SODIUM CHLORIDE 9 G/1000ML
1000 INJECTION, SOLUTION INTRAVENOUS
Refills: 0 | Status: DISCONTINUED | OUTPATIENT
Start: 2025-04-03 | End: 2025-04-06

## 2025-04-03 RX ORDER — ENOXAPARIN SODIUM 100 MG/ML
40 INJECTION SUBCUTANEOUS EVERY 24 HOURS
Refills: 0 | Status: DISCONTINUED | OUTPATIENT
Start: 2025-04-03 | End: 2025-04-06

## 2025-04-03 RX ORDER — METOPROLOL SUCCINATE 50 MG/1
50 TABLET, EXTENDED RELEASE ORAL DAILY
Refills: 0 | Status: DISCONTINUED | OUTPATIENT
Start: 2025-04-03 | End: 2025-04-06

## 2025-04-03 RX ORDER — METOPROLOL SUCCINATE 50 MG/1
1 TABLET, EXTENDED RELEASE ORAL
Refills: 0 | DISCHARGE

## 2025-04-03 RX ORDER — FOLIC ACID 1 MG/1
1 TABLET ORAL DAILY
Refills: 0 | Status: DISCONTINUED | OUTPATIENT
Start: 2025-04-03 | End: 2025-04-06

## 2025-04-03 RX ORDER — FLUOXETINE HYDROCHLORIDE 20 MG/1
30 CAPSULE ORAL AT BEDTIME
Refills: 0 | Status: DISCONTINUED | OUTPATIENT
Start: 2025-04-03 | End: 2025-04-06

## 2025-04-03 RX ORDER — MAGNESIUM SULFATE 500 MG/ML
4 SYRINGE (ML) INJECTION ONCE
Refills: 0 | Status: DISCONTINUED | OUTPATIENT
Start: 2025-04-03 | End: 2025-04-03

## 2025-04-03 RX ORDER — CHLORDIAZEPOXIDE HCL 10 MG
50 CAPSULE ORAL EVERY 4 HOURS
Refills: 0 | Status: DISCONTINUED | OUTPATIENT
Start: 2025-04-03 | End: 2025-04-04

## 2025-04-03 RX ORDER — CHLORDIAZEPOXIDE HCL 10 MG
25 CAPSULE ORAL EVERY 4 HOURS
Refills: 0 | Status: DISCONTINUED | OUTPATIENT
Start: 2025-04-03 | End: 2025-04-06

## 2025-04-03 RX ORDER — MAGNESIUM SULFATE 500 MG/ML
2 SYRINGE (ML) INJECTION
Refills: 0 | Status: COMPLETED | OUTPATIENT
Start: 2025-04-03 | End: 2025-04-03

## 2025-04-03 RX ORDER — GABAPENTIN 400 MG/1
300 CAPSULE ORAL THREE TIMES A DAY
Refills: 0 | Status: DISCONTINUED | OUTPATIENT
Start: 2025-04-03 | End: 2025-04-06

## 2025-04-03 RX ADMIN — Medication 2 MILLIGRAM(S): at 03:55

## 2025-04-03 RX ADMIN — GABAPENTIN 300 MILLIGRAM(S): 400 CAPSULE ORAL at 21:39

## 2025-04-03 RX ADMIN — SODIUM CHLORIDE 60 MILLILITER(S): 9 INJECTION, SOLUTION INTRAVENOUS at 03:55

## 2025-04-03 RX ADMIN — Medication 50 MILLIGRAM(S): at 21:39

## 2025-04-03 RX ADMIN — GABAPENTIN 300 MILLIGRAM(S): 400 CAPSULE ORAL at 05:49

## 2025-04-03 RX ADMIN — ENOXAPARIN SODIUM 40 MILLIGRAM(S): 100 INJECTION SUBCUTANEOUS at 05:49

## 2025-04-03 RX ADMIN — FLUOXETINE HYDROCHLORIDE 30 MILLIGRAM(S): 20 CAPSULE ORAL at 21:40

## 2025-04-03 RX ADMIN — Medication 2 MILLIGRAM(S): at 05:49

## 2025-04-03 RX ADMIN — Medication 25 GRAM(S): at 08:58

## 2025-04-03 RX ADMIN — Medication 1 PACKET(S): at 09:23

## 2025-04-03 RX ADMIN — Medication 2 MILLIGRAM(S): at 12:08

## 2025-04-03 RX ADMIN — Medication 105 MILLIGRAM(S): at 00:05

## 2025-04-03 RX ADMIN — Medication 650 MILLIGRAM(S): at 22:50

## 2025-04-03 RX ADMIN — Medication 105 MILLIGRAM(S): at 21:41

## 2025-04-03 RX ADMIN — Medication 50 MILLIGRAM(S): at 17:11

## 2025-04-03 RX ADMIN — Medication 650 MILLIGRAM(S): at 22:23

## 2025-04-03 RX ADMIN — METOPROLOL SUCCINATE 50 MILLIGRAM(S): 50 TABLET, EXTENDED RELEASE ORAL at 05:49

## 2025-04-03 RX ADMIN — Medication 2 MILLIGRAM(S): at 10:00

## 2025-04-03 RX ADMIN — Medication 2 MILLIGRAM(S): at 08:02

## 2025-04-03 RX ADMIN — FOLIC ACID 1 MILLIGRAM(S): 1 TABLET ORAL at 11:18

## 2025-04-03 RX ADMIN — Medication 100 MILLIGRAM(S): at 11:22

## 2025-04-03 RX ADMIN — Medication 40 MILLIGRAM(S): at 11:18

## 2025-04-03 RX ADMIN — Medication 25 MILLIGRAM(S): at 23:39

## 2025-04-03 RX ADMIN — Medication 25 GRAM(S): at 11:21

## 2025-04-04 LAB
ALBUMIN SERPL ELPH-MCNC: 3.9 G/DL — SIGNIFICANT CHANGE UP (ref 3.5–5.2)
ALP SERPL-CCNC: 81 U/L — SIGNIFICANT CHANGE UP (ref 30–115)
ALT FLD-CCNC: 27 U/L — SIGNIFICANT CHANGE UP (ref 0–41)
ANION GAP SERPL CALC-SCNC: 12 MMOL/L — SIGNIFICANT CHANGE UP (ref 7–14)
AST SERPL-CCNC: 61 U/L — HIGH (ref 0–41)
BASOPHILS # BLD AUTO: 0.03 K/UL — SIGNIFICANT CHANGE UP (ref 0–0.2)
BASOPHILS NFR BLD AUTO: 0.6 % — SIGNIFICANT CHANGE UP (ref 0–1)
BILIRUB SERPL-MCNC: 0.8 MG/DL — SIGNIFICANT CHANGE UP (ref 0.2–1.2)
BUN SERPL-MCNC: 6 MG/DL — LOW (ref 10–20)
CALCIUM SERPL-MCNC: 8.3 MG/DL — LOW (ref 8.4–10.5)
CHLORIDE SERPL-SCNC: 95 MMOL/L — LOW (ref 98–110)
CO2 SERPL-SCNC: 27 MMOL/L — SIGNIFICANT CHANGE UP (ref 17–32)
CREAT SERPL-MCNC: 0.8 MG/DL — SIGNIFICANT CHANGE UP (ref 0.7–1.5)
EGFR: 115 ML/MIN/1.73M2 — SIGNIFICANT CHANGE UP
EGFR: 115 ML/MIN/1.73M2 — SIGNIFICANT CHANGE UP
EOSINOPHIL # BLD AUTO: 0.05 K/UL — SIGNIFICANT CHANGE UP (ref 0–0.7)
EOSINOPHIL NFR BLD AUTO: 1 % — SIGNIFICANT CHANGE UP (ref 0–8)
GLUCOSE SERPL-MCNC: 74 MG/DL — SIGNIFICANT CHANGE UP (ref 70–99)
HCT VFR BLD CALC: 46.5 % — SIGNIFICANT CHANGE UP (ref 42–52)
HGB BLD-MCNC: 15.7 G/DL — SIGNIFICANT CHANGE UP (ref 14–18)
IMM GRANULOCYTES NFR BLD AUTO: 0.2 % — SIGNIFICANT CHANGE UP (ref 0.1–0.3)
LYMPHOCYTES # BLD AUTO: 1.28 K/UL — SIGNIFICANT CHANGE UP (ref 1.2–3.4)
LYMPHOCYTES # BLD AUTO: 24.4 % — SIGNIFICANT CHANGE UP (ref 20.5–51.1)
MAGNESIUM SERPL-MCNC: 2.5 MG/DL — HIGH (ref 1.8–2.4)
MCHC RBC-ENTMCNC: 30.5 PG — SIGNIFICANT CHANGE UP (ref 27–31)
MCHC RBC-ENTMCNC: 33.8 G/DL — SIGNIFICANT CHANGE UP (ref 32–37)
MCV RBC AUTO: 90.5 FL — SIGNIFICANT CHANGE UP (ref 80–94)
MONOCYTES # BLD AUTO: 0.41 K/UL — SIGNIFICANT CHANGE UP (ref 0.1–0.6)
MONOCYTES NFR BLD AUTO: 7.8 % — SIGNIFICANT CHANGE UP (ref 1.7–9.3)
NEUTROPHILS # BLD AUTO: 3.46 K/UL — SIGNIFICANT CHANGE UP (ref 1.4–6.5)
NEUTROPHILS NFR BLD AUTO: 66 % — SIGNIFICANT CHANGE UP (ref 42.2–75.2)
NRBC BLD AUTO-RTO: 0 /100 WBCS — SIGNIFICANT CHANGE UP (ref 0–0)
PHOSPHATE SERPL-MCNC: 2 MG/DL — LOW (ref 2.1–4.9)
PLATELET # BLD AUTO: 81 K/UL — LOW (ref 130–400)
PMV BLD: 11.9 FL — HIGH (ref 7.4–10.4)
POTASSIUM SERPL-MCNC: 3.9 MMOL/L — SIGNIFICANT CHANGE UP (ref 3.5–5)
POTASSIUM SERPL-SCNC: 3.9 MMOL/L — SIGNIFICANT CHANGE UP (ref 3.5–5)
PROT SERPL-MCNC: 6.7 G/DL — SIGNIFICANT CHANGE UP (ref 6–8)
RBC # BLD: 5.14 M/UL — SIGNIFICANT CHANGE UP (ref 4.7–6.1)
RBC # FLD: 13 % — SIGNIFICANT CHANGE UP (ref 11.5–14.5)
SODIUM SERPL-SCNC: 134 MMOL/L — LOW (ref 135–146)
WBC # BLD: 5.24 K/UL — SIGNIFICANT CHANGE UP (ref 4.8–10.8)
WBC # FLD AUTO: 5.24 K/UL — SIGNIFICANT CHANGE UP (ref 4.8–10.8)

## 2025-04-04 PROCEDURE — 99221 1ST HOSP IP/OBS SF/LOW 40: CPT

## 2025-04-04 PROCEDURE — 99232 SBSQ HOSP IP/OBS MODERATE 35: CPT

## 2025-04-04 RX ORDER — NICOTINE POLACRILEX 4 MG/1
21 GUM, CHEWING ORAL ONCE
Refills: 0 | Status: COMPLETED | OUTPATIENT
Start: 2025-04-04 | End: 2025-04-04

## 2025-04-04 RX ORDER — ONDANSETRON HCL/PF 4 MG/2 ML
8 VIAL (ML) INJECTION EVERY 8 HOURS
Refills: 0 | Status: DISCONTINUED | OUTPATIENT
Start: 2025-04-04 | End: 2025-04-06

## 2025-04-04 RX ORDER — HYDROXYZINE HYDROCHLORIDE 25 MG/1
25 TABLET, FILM COATED ORAL EVERY 6 HOURS
Refills: 0 | Status: DISCONTINUED | OUTPATIENT
Start: 2025-04-04 | End: 2025-04-06

## 2025-04-04 RX ORDER — TRAZODONE HCL 100 MG
25 TABLET ORAL AT BEDTIME
Refills: 0 | Status: DISCONTINUED | OUTPATIENT
Start: 2025-04-04 | End: 2025-04-05

## 2025-04-04 RX ORDER — CHLORDIAZEPOXIDE HCL 10 MG
10 CAPSULE ORAL EVERY 4 HOURS
Refills: 0 | Status: DISCONTINUED | OUTPATIENT
Start: 2025-04-06 | End: 2025-04-06

## 2025-04-04 RX ORDER — CHLORDIAZEPOXIDE HCL 10 MG
25 CAPSULE ORAL EVERY 4 HOURS
Refills: 0 | Status: DISCONTINUED | OUTPATIENT
Start: 2025-04-04 | End: 2025-04-05

## 2025-04-04 RX ORDER — LORAZEPAM 4 MG/ML
2 VIAL (ML) INJECTION ONCE
Refills: 0 | Status: DISCONTINUED | OUTPATIENT
Start: 2025-04-04 | End: 2025-04-04

## 2025-04-04 RX ORDER — ACETAMINOPHEN 500 MG/5ML
650 LIQUID (ML) ORAL EVERY 6 HOURS
Refills: 0 | Status: DISCONTINUED | OUTPATIENT
Start: 2025-04-04 | End: 2025-04-06

## 2025-04-04 RX ORDER — CHLORDIAZEPOXIDE HCL 10 MG
50 CAPSULE ORAL ONCE
Refills: 0 | Status: DISCONTINUED | OUTPATIENT
Start: 2025-04-04 | End: 2025-04-04

## 2025-04-04 RX ORDER — CHLORDIAZEPOXIDE HCL 10 MG
15 CAPSULE ORAL EVERY 4 HOURS
Refills: 0 | Status: DISCONTINUED | OUTPATIENT
Start: 2025-04-05 | End: 2025-04-05

## 2025-04-04 RX ORDER — NALTREXONE HYDROCHLORIDE 50 MG/1
50 TABLET, FILM COATED ORAL
Refills: 0 | Status: DISCONTINUED | OUTPATIENT
Start: 2025-04-05 | End: 2025-04-06

## 2025-04-04 RX ORDER — SOD PHOS DI, MONO/K PHOS MONO 250 MG
1 TABLET ORAL ONCE
Refills: 0 | Status: COMPLETED | OUTPATIENT
Start: 2025-04-04 | End: 2025-04-04

## 2025-04-04 RX ADMIN — Medication 8 MILLIGRAM(S): at 17:20

## 2025-04-04 RX ADMIN — Medication 25 MILLIGRAM(S): at 08:45

## 2025-04-04 RX ADMIN — Medication 25 MILLIGRAM(S): at 17:34

## 2025-04-04 RX ADMIN — Medication 25 MILLIGRAM(S): at 22:43

## 2025-04-04 RX ADMIN — NICOTINE POLACRILEX 21 PATCH: 4 GUM, CHEWING ORAL at 22:44

## 2025-04-04 RX ADMIN — Medication 1 PACKET(S): at 14:52

## 2025-04-04 RX ADMIN — Medication 50 MILLIGRAM(S): at 14:52

## 2025-04-04 RX ADMIN — Medication 2 MILLIGRAM(S): at 22:05

## 2025-04-04 RX ADMIN — Medication 105 MILLIGRAM(S): at 13:41

## 2025-04-04 RX ADMIN — METOPROLOL SUCCINATE 50 MILLIGRAM(S): 50 TABLET, EXTENDED RELEASE ORAL at 06:28

## 2025-04-04 RX ADMIN — FLUOXETINE HYDROCHLORIDE 30 MILLIGRAM(S): 20 CAPSULE ORAL at 21:10

## 2025-04-04 RX ADMIN — Medication 8 MILLIGRAM(S): at 09:16

## 2025-04-04 RX ADMIN — GABAPENTIN 300 MILLIGRAM(S): 400 CAPSULE ORAL at 06:28

## 2025-04-04 RX ADMIN — GABAPENTIN 300 MILLIGRAM(S): 400 CAPSULE ORAL at 13:40

## 2025-04-04 RX ADMIN — Medication 40 MILLIGRAM(S): at 09:16

## 2025-04-04 RX ADMIN — Medication 50 MILLIGRAM(S): at 10:10

## 2025-04-04 RX ADMIN — Medication 650 MILLIGRAM(S): at 17:20

## 2025-04-04 RX ADMIN — HYDROXYZINE HYDROCHLORIDE 25 MILLIGRAM(S): 25 TABLET, FILM COATED ORAL at 22:43

## 2025-04-04 RX ADMIN — Medication 25 MILLIGRAM(S): at 21:19

## 2025-04-04 RX ADMIN — GABAPENTIN 300 MILLIGRAM(S): 400 CAPSULE ORAL at 21:11

## 2025-04-04 RX ADMIN — Medication 25 MILLIGRAM(S): at 21:42

## 2025-04-04 RX ADMIN — Medication 105 MILLIGRAM(S): at 09:08

## 2025-04-04 RX ADMIN — Medication 50 MILLIGRAM(S): at 02:11

## 2025-04-04 RX ADMIN — Medication 50 MILLIGRAM(S): at 06:57

## 2025-04-04 RX ADMIN — FOLIC ACID 1 MILLIGRAM(S): 1 TABLET ORAL at 11:54

## 2025-04-04 RX ADMIN — Medication 105 MILLIGRAM(S): at 21:11

## 2025-04-04 RX ADMIN — Medication 25 MILLIGRAM(S): at 16:41

## 2025-04-05 LAB
ALBUMIN SERPL ELPH-MCNC: 3.9 G/DL — SIGNIFICANT CHANGE UP (ref 3.5–5.2)
ALP SERPL-CCNC: 70 U/L — SIGNIFICANT CHANGE UP (ref 30–115)
ALT FLD-CCNC: 26 U/L — SIGNIFICANT CHANGE UP (ref 0–41)
ANION GAP SERPL CALC-SCNC: 14 MMOL/L — SIGNIFICANT CHANGE UP (ref 7–14)
AST SERPL-CCNC: 49 U/L — HIGH (ref 0–41)
BILIRUB DIRECT SERPL-MCNC: 0.2 MG/DL — SIGNIFICANT CHANGE UP (ref 0–0.3)
BILIRUB INDIRECT FLD-MCNC: 0.3 MG/DL — SIGNIFICANT CHANGE UP (ref 0.2–1.2)
BILIRUB SERPL-MCNC: 0.5 MG/DL — SIGNIFICANT CHANGE UP (ref 0.2–1.2)
BUN SERPL-MCNC: 6 MG/DL — LOW (ref 10–20)
CALCIUM SERPL-MCNC: 8.2 MG/DL — LOW (ref 8.4–10.5)
CHLORIDE SERPL-SCNC: 103 MMOL/L — SIGNIFICANT CHANGE UP (ref 98–110)
CO2 SERPL-SCNC: 23 MMOL/L — SIGNIFICANT CHANGE UP (ref 17–32)
CREAT SERPL-MCNC: 0.9 MG/DL — SIGNIFICANT CHANGE UP (ref 0.7–1.5)
EGFR: 111 ML/MIN/1.73M2 — SIGNIFICANT CHANGE UP
EGFR: 111 ML/MIN/1.73M2 — SIGNIFICANT CHANGE UP
GLUCOSE SERPL-MCNC: 79 MG/DL — SIGNIFICANT CHANGE UP (ref 70–99)
INR BLD: 0.85 RATIO — SIGNIFICANT CHANGE UP (ref 0.65–1.3)
MAGNESIUM SERPL-MCNC: 1.9 MG/DL — SIGNIFICANT CHANGE UP (ref 1.8–2.4)
MELD SCORE WITH DIALYSIS: 20 POINTS — SIGNIFICANT CHANGE UP
MELD SCORE WITHOUT DIALYSIS: 6 POINTS — SIGNIFICANT CHANGE UP
PHOSPHATE SERPL-MCNC: 2.6 MG/DL — SIGNIFICANT CHANGE UP (ref 2.1–4.9)
POTASSIUM SERPL-MCNC: 3.8 MMOL/L — SIGNIFICANT CHANGE UP (ref 3.5–5)
POTASSIUM SERPL-SCNC: 3.8 MMOL/L — SIGNIFICANT CHANGE UP (ref 3.5–5)
PROT SERPL-MCNC: 6.8 G/DL — SIGNIFICANT CHANGE UP (ref 6–8)
PROTHROM AB SERPL-ACNC: 10 SEC — SIGNIFICANT CHANGE UP (ref 9.95–12.87)
SODIUM SERPL-SCNC: 140 MMOL/L — SIGNIFICANT CHANGE UP (ref 135–146)

## 2025-04-05 PROCEDURE — 99232 SBSQ HOSP IP/OBS MODERATE 35: CPT

## 2025-04-05 PROCEDURE — G0443: CPT

## 2025-04-05 RX ORDER — CHLORDIAZEPOXIDE HCL 10 MG
15 CAPSULE ORAL EVERY 4 HOURS
Refills: 0 | Status: COMPLETED | OUTPATIENT
Start: 2025-04-05 | End: 2025-04-06

## 2025-04-05 RX ORDER — TRAZODONE HCL 100 MG
100 TABLET ORAL AT BEDTIME
Refills: 0 | Status: DISCONTINUED | OUTPATIENT
Start: 2025-04-05 | End: 2025-04-06

## 2025-04-05 RX ADMIN — Medication 15 MILLIGRAM(S): at 15:33

## 2025-04-05 RX ADMIN — NICOTINE POLACRILEX 21 PATCH: 4 GUM, CHEWING ORAL at 07:41

## 2025-04-05 RX ADMIN — NALTREXONE HYDROCHLORIDE 50 MILLIGRAM(S): 50 TABLET, FILM COATED ORAL at 08:18

## 2025-04-05 RX ADMIN — Medication 40 MILLIGRAM(S): at 06:07

## 2025-04-05 RX ADMIN — METOPROLOL SUCCINATE 50 MILLIGRAM(S): 50 TABLET, EXTENDED RELEASE ORAL at 05:58

## 2025-04-05 RX ADMIN — FOLIC ACID 1 MILLIGRAM(S): 1 TABLET ORAL at 11:04

## 2025-04-05 RX ADMIN — Medication 8 MILLIGRAM(S): at 08:38

## 2025-04-05 RX ADMIN — GABAPENTIN 300 MILLIGRAM(S): 400 CAPSULE ORAL at 13:47

## 2025-04-05 RX ADMIN — Medication 15 MILLIGRAM(S): at 22:13

## 2025-04-05 RX ADMIN — GABAPENTIN 300 MILLIGRAM(S): 400 CAPSULE ORAL at 22:13

## 2025-04-05 RX ADMIN — GABAPENTIN 300 MILLIGRAM(S): 400 CAPSULE ORAL at 06:04

## 2025-04-05 RX ADMIN — Medication 25 MILLIGRAM(S): at 13:47

## 2025-04-05 RX ADMIN — Medication 25 MILLIGRAM(S): at 06:04

## 2025-04-05 RX ADMIN — NICOTINE POLACRILEX 21 PATCH: 4 GUM, CHEWING ORAL at 19:00

## 2025-04-05 RX ADMIN — Medication 25 MILLIGRAM(S): at 02:00

## 2025-04-05 RX ADMIN — Medication 105 MILLIGRAM(S): at 06:07

## 2025-04-05 RX ADMIN — Medication 650 MILLIGRAM(S): at 12:46

## 2025-04-05 RX ADMIN — Medication 100 MILLIGRAM(S): at 22:13

## 2025-04-05 RX ADMIN — NICOTINE POLACRILEX 21 PATCH: 4 GUM, CHEWING ORAL at 22:18

## 2025-04-05 RX ADMIN — FLUOXETINE HYDROCHLORIDE 30 MILLIGRAM(S): 20 CAPSULE ORAL at 22:14

## 2025-04-05 RX ADMIN — Medication 25 MILLIGRAM(S): at 11:04

## 2025-04-05 RX ADMIN — Medication 15 MILLIGRAM(S): at 17:40

## 2025-04-06 VITALS
DIASTOLIC BLOOD PRESSURE: 82 MMHG | TEMPERATURE: 99 F | RESPIRATION RATE: 16 BRPM | OXYGEN SATURATION: 99 % | SYSTOLIC BLOOD PRESSURE: 126 MMHG | HEART RATE: 88 BPM

## 2025-04-06 LAB
ANION GAP SERPL CALC-SCNC: 19 MMOL/L — HIGH (ref 7–14)
BUN SERPL-MCNC: 6 MG/DL — LOW (ref 10–20)
CALCIUM SERPL-MCNC: 8.6 MG/DL — SIGNIFICANT CHANGE UP (ref 8.4–10.5)
CHLORIDE SERPL-SCNC: 102 MMOL/L — SIGNIFICANT CHANGE UP (ref 98–110)
CO2 SERPL-SCNC: 21 MMOL/L — SIGNIFICANT CHANGE UP (ref 17–32)
CREAT SERPL-MCNC: 0.9 MG/DL — SIGNIFICANT CHANGE UP (ref 0.7–1.5)
EGFR: 111 ML/MIN/1.73M2 — SIGNIFICANT CHANGE UP
EGFR: 111 ML/MIN/1.73M2 — SIGNIFICANT CHANGE UP
GLUCOSE SERPL-MCNC: 119 MG/DL — HIGH (ref 70–99)
HCT VFR BLD CALC: 43.5 % — SIGNIFICANT CHANGE UP (ref 42–52)
HGB BLD-MCNC: 14.6 G/DL — SIGNIFICANT CHANGE UP (ref 14–18)
MCHC RBC-ENTMCNC: 30.7 PG — SIGNIFICANT CHANGE UP (ref 27–31)
MCHC RBC-ENTMCNC: 33.6 G/DL — SIGNIFICANT CHANGE UP (ref 32–37)
MCV RBC AUTO: 91.4 FL — SIGNIFICANT CHANGE UP (ref 80–94)
NRBC BLD AUTO-RTO: 0 /100 WBCS — SIGNIFICANT CHANGE UP (ref 0–0)
PLATELET # BLD AUTO: 68 K/UL — LOW (ref 130–400)
PMV BLD: 11.3 FL — HIGH (ref 7.4–10.4)
POTASSIUM SERPL-MCNC: 3.7 MMOL/L — SIGNIFICANT CHANGE UP (ref 3.5–5)
POTASSIUM SERPL-SCNC: 3.7 MMOL/L — SIGNIFICANT CHANGE UP (ref 3.5–5)
RBC # BLD: 4.76 M/UL — SIGNIFICANT CHANGE UP (ref 4.7–6.1)
RBC # FLD: 12.9 % — SIGNIFICANT CHANGE UP (ref 11.5–14.5)
SODIUM SERPL-SCNC: 142 MMOL/L — SIGNIFICANT CHANGE UP (ref 135–146)
WBC # BLD: 6.38 K/UL — SIGNIFICANT CHANGE UP (ref 4.8–10.8)
WBC # FLD AUTO: 6.38 K/UL — SIGNIFICANT CHANGE UP (ref 4.8–10.8)

## 2025-04-06 PROCEDURE — 99239 HOSP IP/OBS DSCHRG MGMT >30: CPT

## 2025-04-06 RX ORDER — ONDANSETRON HCL/PF 4 MG/2 ML
1 VIAL (ML) INJECTION
Qty: 2 | Refills: 0
Start: 2025-04-06

## 2025-04-06 RX ORDER — DEXTROAMPHETAMINE SACCHARATE, AMPHETAMINE ASPARTATE MONOHYDRATE, DEXTROAMPHETAMINE SULFATE AND AMPHETAMINE SULFATE 2.5; 2.5; 2.5; 2.5 MG/1; MG/1; MG/1; MG/1
1 CAPSULE, EXTENDED RELEASE ORAL
Qty: 0 | Refills: 0 | DISCHARGE

## 2025-04-06 RX ORDER — FLUOXETINE HYDROCHLORIDE 20 MG/1
1 CAPSULE ORAL
Qty: 0 | Refills: 0 | DISCHARGE

## 2025-04-06 RX ORDER — BUPROPION HYDROBROMIDE 522 MG/1
1 TABLET, EXTENDED RELEASE ORAL
Qty: 0 | Refills: 0 | DISCHARGE

## 2025-04-06 RX ORDER — FOLIC ACID 1 MG/1
1 TABLET ORAL
Qty: 14 | Refills: 0
Start: 2025-04-06 | End: 2025-04-19

## 2025-04-06 RX ORDER — TRAZODONE HCL 100 MG
1 TABLET ORAL
Qty: 30 | Refills: 0
Start: 2025-04-06 | End: 2025-05-05

## 2025-04-06 RX ADMIN — METOPROLOL SUCCINATE 50 MILLIGRAM(S): 50 TABLET, EXTENDED RELEASE ORAL at 05:42

## 2025-04-06 RX ADMIN — Medication 15 MILLIGRAM(S): at 09:36

## 2025-04-06 RX ADMIN — Medication 1 APPLICATION(S): at 05:47

## 2025-04-06 RX ADMIN — NALTREXONE HYDROCHLORIDE 50 MILLIGRAM(S): 50 TABLET, FILM COATED ORAL at 07:46

## 2025-04-06 RX ADMIN — Medication 100 MILLIGRAM(S): at 07:46

## 2025-04-06 RX ADMIN — Medication 40 MILLIGRAM(S): at 05:47

## 2025-04-06 RX ADMIN — Medication 15 MILLIGRAM(S): at 05:41

## 2025-04-06 RX ADMIN — GABAPENTIN 300 MILLIGRAM(S): 400 CAPSULE ORAL at 05:43

## 2025-04-06 RX ADMIN — Medication 15 MILLIGRAM(S): at 01:27

## 2025-04-09 ENCOUNTER — APPOINTMENT (OUTPATIENT)
Dept: PSYCHIATRY | Facility: CLINIC | Age: 40
End: 2025-04-09

## 2025-04-09 ENCOUNTER — OUTPATIENT (OUTPATIENT)
Dept: OUTPATIENT SERVICES | Facility: HOSPITAL | Age: 40
LOS: 1 days | End: 2025-04-09
Payer: COMMERCIAL

## 2025-04-09 DIAGNOSIS — F10.20 ALCOHOL DEPENDENCE, UNCOMPLICATED: ICD-10-CM

## 2025-04-09 PROCEDURE — 90834 PSYTX W PT 45 MINUTES: CPT

## 2025-04-10 DIAGNOSIS — E87.1 HYPO-OSMOLALITY AND HYPONATREMIA: ICD-10-CM

## 2025-04-10 DIAGNOSIS — F41.9 ANXIETY DISORDER, UNSPECIFIED: ICD-10-CM

## 2025-04-10 DIAGNOSIS — F12.99 CANNABIS USE, UNSPECIFIED WITH UNSPECIFIED CANNABIS-INDUCED DISORDER: ICD-10-CM

## 2025-04-10 DIAGNOSIS — F10.229 ALCOHOL DEPENDENCE WITH INTOXICATION, UNSPECIFIED: ICD-10-CM

## 2025-04-10 DIAGNOSIS — Z79.899 OTHER LONG TERM (CURRENT) DRUG THERAPY: ICD-10-CM

## 2025-04-10 DIAGNOSIS — K76.0 FATTY (CHANGE OF) LIVER, NOT ELSEWHERE CLASSIFIED: ICD-10-CM

## 2025-04-10 DIAGNOSIS — D75.1 SECONDARY POLYCYTHEMIA: ICD-10-CM

## 2025-04-10 DIAGNOSIS — I50.22 CHRONIC SYSTOLIC (CONGESTIVE) HEART FAILURE: ICD-10-CM

## 2025-04-10 DIAGNOSIS — E86.0 DEHYDRATION: ICD-10-CM
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
## 2025-04-10 DIAGNOSIS — F10.20 ALCOHOL DEPENDENCE, UNCOMPLICATED: ICD-10-CM

## 2025-04-10 DIAGNOSIS — E51.2 WERNICKE'S ENCEPHALOPATHY: ICD-10-CM

## 2025-04-10 DIAGNOSIS — D69.6 THROMBOCYTOPENIA, UNSPECIFIED: ICD-10-CM

## 2025-04-10 DIAGNOSIS — Z88.1 ALLERGY STATUS TO OTHER ANTIBIOTIC AGENTS: ICD-10-CM

## 2025-04-10 DIAGNOSIS — F17.290 NICOTINE DEPENDENCE, OTHER TOBACCO PRODUCT, UNCOMPLICATED: ICD-10-CM

## 2025-04-10 DIAGNOSIS — F10.239 ALCOHOL DEPENDENCE WITH WITHDRAWAL, UNSPECIFIED: ICD-10-CM

## 2025-04-10 DIAGNOSIS — F32.A DEPRESSION, UNSPECIFIED: ICD-10-CM

## 2025-04-10 DIAGNOSIS — K70.0 ALCOHOLIC FATTY LIVER: ICD-10-CM

## 2025-04-10 DIAGNOSIS — Z63.79 OTHER STRESSFUL LIFE EVENTS AFFECTING FAMILY AND HOUSEHOLD: ICD-10-CM

## 2025-04-10 DIAGNOSIS — E83.42 HYPOMAGNESEMIA: ICD-10-CM

## 2025-04-15 ENCOUNTER — OUTPATIENT (OUTPATIENT)
Dept: OUTPATIENT SERVICES | Facility: HOSPITAL | Age: 40
LOS: 1 days | End: 2025-04-15
Payer: COMMERCIAL

## 2025-04-15 ENCOUNTER — APPOINTMENT (OUTPATIENT)
Dept: PSYCHIATRY | Facility: CLINIC | Age: 40
End: 2025-04-15

## 2025-04-15 DIAGNOSIS — F10.20 ALCOHOL DEPENDENCE, UNCOMPLICATED: ICD-10-CM

## 2025-04-15 PROCEDURE — 90834 PSYTX W PT 45 MINUTES: CPT | Mod: 95

## 2025-04-16 DIAGNOSIS — F10.20 ALCOHOL DEPENDENCE, UNCOMPLICATED: ICD-10-CM

## 2025-04-23 ENCOUNTER — APPOINTMENT (OUTPATIENT)
Dept: PSYCHIATRY | Facility: CLINIC | Age: 40
End: 2025-04-23

## 2025-05-09 ENCOUNTER — APPOINTMENT (OUTPATIENT)
Dept: INTERNAL MEDICINE | Facility: CLINIC | Age: 40
End: 2025-05-09

## 2025-05-29 ENCOUNTER — APPOINTMENT (OUTPATIENT)
Dept: CARDIOLOGY | Facility: CLINIC | Age: 40
End: 2025-05-29

## 2025-06-25 ENCOUNTER — INPATIENT (INPATIENT)
Facility: HOSPITAL | Age: 40
LOS: 2 days | Discharge: HOME | DRG: 897 | End: 2025-06-28
Attending: STUDENT IN AN ORGANIZED HEALTH CARE EDUCATION/TRAINING PROGRAM | Admitting: INTERNAL MEDICINE
Payer: COMMERCIAL

## 2025-06-25 VITALS
HEART RATE: 98 BPM | SYSTOLIC BLOOD PRESSURE: 128 MMHG | TEMPERATURE: 98 F | DIASTOLIC BLOOD PRESSURE: 85 MMHG | RESPIRATION RATE: 16 BRPM | OXYGEN SATURATION: 99 %

## 2025-06-25 DIAGNOSIS — F10.929 ALCOHOL USE, UNSPECIFIED WITH INTOXICATION, UNSPECIFIED: ICD-10-CM

## 2025-06-25 LAB
ALBUMIN SERPL ELPH-MCNC: 3.9 G/DL — SIGNIFICANT CHANGE UP (ref 3.5–5.2)
ALBUMIN SERPL ELPH-MCNC: 4.6 G/DL — SIGNIFICANT CHANGE UP (ref 3.5–5.2)
ALP SERPL-CCNC: 66 U/L — SIGNIFICANT CHANGE UP (ref 30–115)
ALP SERPL-CCNC: 82 U/L — SIGNIFICANT CHANGE UP (ref 30–115)
ALT FLD-CCNC: 343 U/L — HIGH (ref 0–41)
ALT FLD-CCNC: 380 U/L — HIGH (ref 0–41)
ANION GAP SERPL CALC-SCNC: 14 MMOL/L — SIGNIFICANT CHANGE UP (ref 7–14)
ANION GAP SERPL CALC-SCNC: 16 MMOL/L — HIGH (ref 7–14)
APTT BLD: 38 SEC — SIGNIFICANT CHANGE UP (ref 27–39.2)
AST SERPL-CCNC: 729 U/L — HIGH (ref 0–41)
AST SERPL-CCNC: 737 U/L — HIGH (ref 0–41)
BASOPHILS # BLD AUTO: 0.15 K/UL — SIGNIFICANT CHANGE UP (ref 0–0.2)
BASOPHILS NFR BLD AUTO: 1.9 % — HIGH (ref 0–1)
BILIRUB DIRECT SERPL-MCNC: 0.3 MG/DL — SIGNIFICANT CHANGE UP (ref 0–0.3)
BILIRUB INDIRECT FLD-MCNC: 0.1 MG/DL — LOW (ref 0.2–1.2)
BILIRUB SERPL-MCNC: 0.3 MG/DL — SIGNIFICANT CHANGE UP (ref 0.2–1.2)
BILIRUB SERPL-MCNC: 0.4 MG/DL — SIGNIFICANT CHANGE UP (ref 0.2–1.2)
BUN SERPL-MCNC: 12 MG/DL — SIGNIFICANT CHANGE UP (ref 10–20)
BUN SERPL-MCNC: 13 MG/DL — SIGNIFICANT CHANGE UP (ref 10–20)
CALCIUM SERPL-MCNC: 8 MG/DL — LOW (ref 8.4–10.5)
CALCIUM SERPL-MCNC: 8.3 MG/DL — LOW (ref 8.4–10.5)
CHLORIDE SERPL-SCNC: 100 MMOL/L — SIGNIFICANT CHANGE UP (ref 98–110)
CHLORIDE SERPL-SCNC: 101 MMOL/L — SIGNIFICANT CHANGE UP (ref 98–110)
CO2 SERPL-SCNC: 17 MMOL/L — SIGNIFICANT CHANGE UP (ref 17–32)
CO2 SERPL-SCNC: 19 MMOL/L — SIGNIFICANT CHANGE UP (ref 17–32)
CREAT SERPL-MCNC: 0.9 MG/DL — SIGNIFICANT CHANGE UP (ref 0.7–1.5)
CREAT SERPL-MCNC: 0.9 MG/DL — SIGNIFICANT CHANGE UP (ref 0.7–1.5)
EGFR: 111 ML/MIN/1.73M2 — SIGNIFICANT CHANGE UP
EOSINOPHIL # BLD AUTO: 0.1 K/UL — SIGNIFICANT CHANGE UP (ref 0–0.7)
EOSINOPHIL NFR BLD AUTO: 1.3 % — SIGNIFICANT CHANGE UP (ref 0–8)
ETHANOL SERPL-MCNC: 443 MG/DL — HIGH
GLUCOSE SERPL-MCNC: 115 MG/DL — HIGH (ref 70–99)
GLUCOSE SERPL-MCNC: 116 MG/DL — HIGH (ref 70–99)
HCT VFR BLD CALC: 48.3 % — SIGNIFICANT CHANGE UP (ref 42–52)
HGB BLD-MCNC: 16.8 G/DL — SIGNIFICANT CHANGE UP (ref 14–18)
IMM GRANULOCYTES NFR BLD AUTO: 0.1 % — SIGNIFICANT CHANGE UP (ref 0.1–0.3)
INR BLD: 1.1 RATIO — SIGNIFICANT CHANGE UP (ref 0.65–1.3)
LACTATE SERPL-SCNC: 2 MMOL/L — SIGNIFICANT CHANGE UP (ref 0.7–2)
LIDOCAIN IGE QN: 55 U/L — SIGNIFICANT CHANGE UP (ref 7–60)
LYMPHOCYTES # BLD AUTO: 3.15 K/UL — SIGNIFICANT CHANGE UP (ref 1.2–3.4)
LYMPHOCYTES # BLD AUTO: 40 % — SIGNIFICANT CHANGE UP (ref 20.5–51.1)
MAGNESIUM SERPL-MCNC: 1.8 MG/DL — SIGNIFICANT CHANGE UP (ref 1.8–2.4)
MCHC RBC-ENTMCNC: 29.3 PG — SIGNIFICANT CHANGE UP (ref 27–31)
MCHC RBC-ENTMCNC: 34.8 G/DL — SIGNIFICANT CHANGE UP (ref 32–37)
MCV RBC AUTO: 84.3 FL — SIGNIFICANT CHANGE UP (ref 80–94)
MONOCYTES # BLD AUTO: 0.49 K/UL — SIGNIFICANT CHANGE UP (ref 0.1–0.6)
MONOCYTES NFR BLD AUTO: 6.2 % — SIGNIFICANT CHANGE UP (ref 1.7–9.3)
NEUTROPHILS # BLD AUTO: 3.98 K/UL — SIGNIFICANT CHANGE UP (ref 1.4–6.5)
NEUTROPHILS NFR BLD AUTO: 50.5 % — SIGNIFICANT CHANGE UP (ref 42.2–75.2)
NRBC BLD AUTO-RTO: 0 /100 WBCS — SIGNIFICANT CHANGE UP (ref 0–0)
PHOSPHATE SERPL-MCNC: 3 MG/DL — SIGNIFICANT CHANGE UP (ref 2.1–4.9)
PLATELET # BLD AUTO: 206 K/UL — SIGNIFICANT CHANGE UP (ref 130–400)
PMV BLD: 10.4 FL — SIGNIFICANT CHANGE UP (ref 7.4–10.4)
POTASSIUM SERPL-MCNC: 4.4 MMOL/L — SIGNIFICANT CHANGE UP (ref 3.5–5)
POTASSIUM SERPL-MCNC: SIGNIFICANT CHANGE UP MMOL/L (ref 3.5–5)
POTASSIUM SERPL-SCNC: 4.4 MMOL/L — SIGNIFICANT CHANGE UP (ref 3.5–5)
POTASSIUM SERPL-SCNC: SIGNIFICANT CHANGE UP MMOL/L (ref 3.5–5)
PROT SERPL-MCNC: 7.5 G/DL — SIGNIFICANT CHANGE UP (ref 6–8)
PROT SERPL-MCNC: 7.5 G/DL — SIGNIFICANT CHANGE UP (ref 6–8)
PROTHROM AB SERPL-ACNC: 13 SEC — HIGH (ref 9.95–12.87)
RBC # BLD: 5.73 M/UL — SIGNIFICANT CHANGE UP (ref 4.7–6.1)
RBC # FLD: 13.5 % — SIGNIFICANT CHANGE UP (ref 11.5–14.5)
SODIUM SERPL-SCNC: 131 MMOL/L — LOW (ref 135–146)
SODIUM SERPL-SCNC: 136 MMOL/L — SIGNIFICANT CHANGE UP (ref 135–146)
WBC # BLD: 7.88 K/UL — SIGNIFICANT CHANGE UP (ref 4.8–10.8)
WBC # FLD AUTO: 7.88 K/UL — SIGNIFICANT CHANGE UP (ref 4.8–10.8)

## 2025-06-25 PROCEDURE — 80053 COMPREHEN METABOLIC PANEL: CPT

## 2025-06-25 PROCEDURE — 85730 THROMBOPLASTIN TIME PARTIAL: CPT

## 2025-06-25 PROCEDURE — 71260 CT THORAX DX C+: CPT | Mod: 26

## 2025-06-25 PROCEDURE — 72170 X-RAY EXAM OF PELVIS: CPT | Mod: 26

## 2025-06-25 PROCEDURE — 36415 COLL VENOUS BLD VENIPUNCTURE: CPT

## 2025-06-25 PROCEDURE — 80076 HEPATIC FUNCTION PANEL: CPT

## 2025-06-25 PROCEDURE — 83735 ASSAY OF MAGNESIUM: CPT

## 2025-06-25 PROCEDURE — 72125 CT NECK SPINE W/O DYE: CPT | Mod: 26

## 2025-06-25 PROCEDURE — 85610 PROTHROMBIN TIME: CPT

## 2025-06-25 PROCEDURE — 99222 1ST HOSP IP/OBS MODERATE 55: CPT

## 2025-06-25 PROCEDURE — 86850 RBC ANTIBODY SCREEN: CPT

## 2025-06-25 PROCEDURE — 80048 BASIC METABOLIC PNL TOTAL CA: CPT

## 2025-06-25 PROCEDURE — 99285 EMERGENCY DEPT VISIT HI MDM: CPT

## 2025-06-25 PROCEDURE — 74177 CT ABD & PELVIS W/CONTRAST: CPT | Mod: 26

## 2025-06-25 PROCEDURE — 82550 ASSAY OF CK (CPK): CPT

## 2025-06-25 PROCEDURE — 85025 COMPLETE CBC W/AUTO DIFF WBC: CPT

## 2025-06-25 PROCEDURE — 84100 ASSAY OF PHOSPHORUS: CPT

## 2025-06-25 PROCEDURE — 86900 BLOOD TYPING SEROLOGIC ABO: CPT

## 2025-06-25 PROCEDURE — 80074 ACUTE HEPATITIS PANEL: CPT

## 2025-06-25 PROCEDURE — 86901 BLOOD TYPING SEROLOGIC RH(D): CPT

## 2025-06-25 PROCEDURE — 70450 CT HEAD/BRAIN W/O DYE: CPT | Mod: 26

## 2025-06-25 PROCEDURE — 71045 X-RAY EXAM CHEST 1 VIEW: CPT | Mod: 26

## 2025-06-25 RX ORDER — ENOXAPARIN SODIUM 100 MG/ML
40 INJECTION SUBCUTANEOUS EVERY 24 HOURS
Refills: 0 | Status: DISCONTINUED | OUTPATIENT
Start: 2025-06-25 | End: 2025-06-28

## 2025-06-25 RX ORDER — OLANZAPINE 10 MG/1
2.5 TABLET ORAL ONCE
Refills: 0 | Status: COMPLETED | OUTPATIENT
Start: 2025-06-25 | End: 2025-06-25

## 2025-06-25 RX ORDER — LORAZEPAM 4 MG/ML
2 VIAL (ML) INJECTION
Refills: 0 | Status: DISCONTINUED | OUTPATIENT
Start: 2025-06-25 | End: 2025-06-25

## 2025-06-25 RX ORDER — DEXTROAMPHETAMINE SACCHARATE, AMPHETAMINE ASPARTATE MONOHYDRATE, DEXTROAMPHETAMINE SULFATE AND AMPHETAMINE SULFATE 2.5; 2.5; 2.5; 2.5 MG/1; MG/1; MG/1; MG/1
20 CAPSULE, EXTENDED RELEASE ORAL DAILY
Refills: 0 | Status: DISCONTINUED | OUTPATIENT
Start: 2025-06-25 | End: 2025-06-28

## 2025-06-25 RX ORDER — LORAZEPAM 4 MG/ML
2 VIAL (ML) INJECTION EVERY 6 HOURS
Refills: 0 | Status: DISCONTINUED | OUTPATIENT
Start: 2025-06-25 | End: 2025-06-26

## 2025-06-25 RX ORDER — GABAPENTIN 400 MG/1
300 CAPSULE ORAL THREE TIMES A DAY
Refills: 0 | Status: DISCONTINUED | OUTPATIENT
Start: 2025-06-25 | End: 2025-06-28

## 2025-06-25 RX ORDER — LORAZEPAM 4 MG/ML
VIAL (ML) INJECTION
Refills: 0 | Status: DISCONTINUED | OUTPATIENT
Start: 2025-06-25 | End: 2025-06-28

## 2025-06-25 RX ORDER — FLUOXETINE HYDROCHLORIDE 20 MG/1
20 CAPSULE ORAL DAILY
Refills: 0 | Status: DISCONTINUED | OUTPATIENT
Start: 2025-06-25 | End: 2025-06-28

## 2025-06-25 RX ORDER — LORAZEPAM 4 MG/ML
1 VIAL (ML) INJECTION EVERY 6 HOURS
Refills: 0 | Status: DISCONTINUED | OUTPATIENT
Start: 2025-06-27 | End: 2025-06-28

## 2025-06-25 RX ORDER — LORAZEPAM 4 MG/ML
1.5 VIAL (ML) INJECTION EVERY 6 HOURS
Refills: 0 | Status: DISCONTINUED | OUTPATIENT
Start: 2025-06-26 | End: 2025-06-27

## 2025-06-25 RX ORDER — SENNA 187 MG
2 TABLET ORAL AT BEDTIME
Refills: 0 | Status: DISCONTINUED | OUTPATIENT
Start: 2025-06-25 | End: 2025-06-28

## 2025-06-25 RX ORDER — POLYETHYLENE GLYCOL 3350 17 G/17G
17 POWDER, FOR SOLUTION ORAL DAILY
Refills: 0 | Status: DISCONTINUED | OUTPATIENT
Start: 2025-06-25 | End: 2025-06-28

## 2025-06-25 RX ORDER — METOPROLOL SUCCINATE 50 MG/1
50 TABLET, EXTENDED RELEASE ORAL DAILY
Refills: 0 | Status: DISCONTINUED | OUTPATIENT
Start: 2025-06-25 | End: 2025-06-28

## 2025-06-25 RX ORDER — TRAZODONE HCL 100 MG
150 TABLET ORAL AT BEDTIME
Refills: 0 | Status: DISCONTINUED | OUTPATIENT
Start: 2025-06-25 | End: 2025-06-28

## 2025-06-25 RX ORDER — DIAZEPAM 5 MG/1
10 TABLET ORAL
Refills: 0 | Status: DISCONTINUED | OUTPATIENT
Start: 2025-06-25 | End: 2025-06-28

## 2025-06-25 RX ORDER — ONDANSETRON HCL/PF 4 MG/2 ML
4 VIAL (ML) INJECTION EVERY 6 HOURS
Refills: 0 | Status: DISCONTINUED | OUTPATIENT
Start: 2025-06-25 | End: 2025-06-28

## 2025-06-25 RX ORDER — LORAZEPAM 4 MG/ML
0.5 VIAL (ML) INJECTION EVERY 6 HOURS
Refills: 0 | Status: DISCONTINUED | OUTPATIENT
Start: 2025-06-28 | End: 2025-06-28

## 2025-06-25 RX ORDER — BUPROPION HYDROBROMIDE 522 MG/1
150 TABLET, EXTENDED RELEASE ORAL DAILY
Refills: 0 | Status: DISCONTINUED | OUTPATIENT
Start: 2025-06-25 | End: 2025-06-28

## 2025-06-25 RX ORDER — FOLIC ACID 1 MG/1
1 TABLET ORAL DAILY
Refills: 0 | Status: DISCONTINUED | OUTPATIENT
Start: 2025-06-25 | End: 2025-06-28

## 2025-06-25 RX ADMIN — OLANZAPINE 2.5 MILLIGRAM(S): 10 TABLET ORAL at 16:07

## 2025-06-25 RX ADMIN — Medication 100 MILLIGRAM(S): at 11:04

## 2025-06-25 RX ADMIN — Medication 2 MILLIGRAM(S): at 18:52

## 2025-06-25 RX ADMIN — POLYETHYLENE GLYCOL 3350 17 GRAM(S): 17 POWDER, FOR SOLUTION ORAL at 18:53

## 2025-06-25 RX ADMIN — ENOXAPARIN SODIUM 40 MILLIGRAM(S): 100 INJECTION SUBCUTANEOUS at 18:53

## 2025-06-25 RX ADMIN — Medication 1000 MILLILITER(S): at 13:38

## 2025-06-25 RX ADMIN — Medication 2 MILLIGRAM(S): at 23:04

## 2025-06-25 RX ADMIN — FOLIC ACID 1 MILLIGRAM(S): 1 TABLET ORAL at 11:04

## 2025-06-26 DIAGNOSIS — G47.00 INSOMNIA, UNSPECIFIED: ICD-10-CM

## 2025-06-26 DIAGNOSIS — Z87.898 PERSONAL HISTORY OF OTHER SPECIFIED CONDITIONS: ICD-10-CM

## 2025-06-26 DIAGNOSIS — K76.0 FATTY (CHANGE OF) LIVER, NOT ELSEWHERE CLASSIFIED: ICD-10-CM

## 2025-06-26 DIAGNOSIS — F10.20 ALCOHOL DEPENDENCE, UNCOMPLICATED: ICD-10-CM

## 2025-06-26 DIAGNOSIS — F10.239 ALCOHOL DEPENDENCE WITH WITHDRAWAL, UNSPECIFIED: ICD-10-CM

## 2025-06-26 LAB
ALBUMIN SERPL ELPH-MCNC: 4.2 G/DL — SIGNIFICANT CHANGE UP (ref 3.5–5.2)
ALP SERPL-CCNC: 96 U/L — SIGNIFICANT CHANGE UP (ref 30–115)
ALT FLD-CCNC: 496 U/L — HIGH (ref 0–41)
ANION GAP SERPL CALC-SCNC: 19 MMOL/L — HIGH (ref 7–14)
APTT BLD: 28 SEC — SIGNIFICANT CHANGE UP (ref 27–39.2)
AST SERPL-CCNC: 885 U/L — HIGH (ref 0–41)
BASOPHILS # BLD AUTO: 0.13 K/UL — SIGNIFICANT CHANGE UP (ref 0–0.2)
BASOPHILS NFR BLD AUTO: 1.6 % — HIGH (ref 0–1)
BILIRUB SERPL-MCNC: 0.9 MG/DL — SIGNIFICANT CHANGE UP (ref 0.2–1.2)
BUN SERPL-MCNC: 13 MG/DL — SIGNIFICANT CHANGE UP (ref 10–20)
CALCIUM SERPL-MCNC: 8.6 MG/DL — SIGNIFICANT CHANGE UP (ref 8.4–10.5)
CHLORIDE SERPL-SCNC: 95 MMOL/L — LOW (ref 98–110)
CO2 SERPL-SCNC: 18 MMOL/L — SIGNIFICANT CHANGE UP (ref 17–32)
CREAT SERPL-MCNC: 1 MG/DL — SIGNIFICANT CHANGE UP (ref 0.7–1.5)
EGFR: 98 ML/MIN/1.73M2 — SIGNIFICANT CHANGE UP
EGFR: 98 ML/MIN/1.73M2 — SIGNIFICANT CHANGE UP
EOSINOPHIL # BLD AUTO: 0.1 K/UL — SIGNIFICANT CHANGE UP (ref 0–0.7)
EOSINOPHIL NFR BLD AUTO: 1.2 % — SIGNIFICANT CHANGE UP (ref 0–8)
GLUCOSE SERPL-MCNC: 68 MG/DL — LOW (ref 70–99)
HAV IGM SER-ACNC: SIGNIFICANT CHANGE UP
HBV CORE IGM SER-ACNC: SIGNIFICANT CHANGE UP
HBV SURFACE AG SER-ACNC: SIGNIFICANT CHANGE UP
HCT VFR BLD CALC: 49.6 % — SIGNIFICANT CHANGE UP (ref 42–52)
HCV AB S/CO SERPL IA: 0.14 S/CO — SIGNIFICANT CHANGE UP (ref 0–0.79)
HCV AB SERPL-IMP: SIGNIFICANT CHANGE UP
HGB BLD-MCNC: 17.1 G/DL — SIGNIFICANT CHANGE UP (ref 14–18)
IMM GRANULOCYTES NFR BLD AUTO: 0.4 % — HIGH (ref 0.1–0.3)
INR BLD: 0.93 RATIO — SIGNIFICANT CHANGE UP (ref 0.65–1.3)
LYMPHOCYTES # BLD AUTO: 2.16 K/UL — SIGNIFICANT CHANGE UP (ref 1.2–3.4)
LYMPHOCYTES # BLD AUTO: 26 % — SIGNIFICANT CHANGE UP (ref 20.5–51.1)
MAGNESIUM SERPL-MCNC: 1.4 MG/DL — LOW (ref 1.8–2.4)
MCHC RBC-ENTMCNC: 29.2 PG — SIGNIFICANT CHANGE UP (ref 27–31)
MCHC RBC-ENTMCNC: 34.5 G/DL — SIGNIFICANT CHANGE UP (ref 32–37)
MCV RBC AUTO: 84.6 FL — SIGNIFICANT CHANGE UP (ref 80–94)
MONOCYTES # BLD AUTO: 0.46 K/UL — SIGNIFICANT CHANGE UP (ref 0.1–0.6)
MONOCYTES NFR BLD AUTO: 5.5 % — SIGNIFICANT CHANGE UP (ref 1.7–9.3)
NEUTROPHILS # BLD AUTO: 5.44 K/UL — SIGNIFICANT CHANGE UP (ref 1.4–6.5)
NEUTROPHILS NFR BLD AUTO: 65.3 % — SIGNIFICANT CHANGE UP (ref 42.2–75.2)
NRBC BLD AUTO-RTO: 0 /100 WBCS — SIGNIFICANT CHANGE UP (ref 0–0)
PHOSPHATE SERPL-MCNC: 3.1 MG/DL — SIGNIFICANT CHANGE UP (ref 2.1–4.9)
PLATELET # BLD AUTO: 188 K/UL — SIGNIFICANT CHANGE UP (ref 130–400)
PMV BLD: 10.8 FL — HIGH (ref 7.4–10.4)
POTASSIUM SERPL-MCNC: 4.1 MMOL/L — SIGNIFICANT CHANGE UP (ref 3.5–5)
POTASSIUM SERPL-SCNC: 4.1 MMOL/L — SIGNIFICANT CHANGE UP (ref 3.5–5)
PROT SERPL-MCNC: 7.2 G/DL — SIGNIFICANT CHANGE UP (ref 6–8)
PROTHROM AB SERPL-ACNC: 11 SEC — SIGNIFICANT CHANGE UP (ref 9.95–12.87)
RBC # BLD: 5.86 M/UL — SIGNIFICANT CHANGE UP (ref 4.7–6.1)
RBC # FLD: 13.7 % — SIGNIFICANT CHANGE UP (ref 11.5–14.5)
SODIUM SERPL-SCNC: 132 MMOL/L — LOW (ref 135–146)
WBC # BLD: 8.32 K/UL — SIGNIFICANT CHANGE UP (ref 4.8–10.8)
WBC # FLD AUTO: 8.32 K/UL — SIGNIFICANT CHANGE UP (ref 4.8–10.8)

## 2025-06-26 PROCEDURE — 99223 1ST HOSP IP/OBS HIGH 75: CPT

## 2025-06-26 PROCEDURE — 99232 SBSQ HOSP IP/OBS MODERATE 35: CPT

## 2025-06-26 RX ADMIN — METOPROLOL SUCCINATE 50 MILLIGRAM(S): 50 TABLET, EXTENDED RELEASE ORAL at 05:08

## 2025-06-26 RX ADMIN — FOLIC ACID 1 MILLIGRAM(S): 1 TABLET ORAL at 11:06

## 2025-06-26 RX ADMIN — Medication 100 MILLIGRAM(S): at 11:05

## 2025-06-26 RX ADMIN — Medication 2 MILLIGRAM(S): at 11:05

## 2025-06-26 RX ADMIN — POLYETHYLENE GLYCOL 3350 17 GRAM(S): 17 POWDER, FOR SOLUTION ORAL at 11:06

## 2025-06-26 RX ADMIN — Medication 1.5 MILLIGRAM(S): at 17:15

## 2025-06-26 RX ADMIN — Medication 2 TABLET(S): at 21:48

## 2025-06-26 RX ADMIN — GABAPENTIN 300 MILLIGRAM(S): 400 CAPSULE ORAL at 21:48

## 2025-06-26 RX ADMIN — Medication 2 MILLIGRAM(S): at 05:07

## 2025-06-26 RX ADMIN — Medication 150 MILLIGRAM(S): at 21:48

## 2025-06-26 RX ADMIN — GABAPENTIN 300 MILLIGRAM(S): 400 CAPSULE ORAL at 05:07

## 2025-06-26 RX ADMIN — Medication 1 APPLICATION(S): at 05:08

## 2025-06-26 RX ADMIN — GABAPENTIN 300 MILLIGRAM(S): 400 CAPSULE ORAL at 13:04

## 2025-06-26 RX ADMIN — FLUOXETINE HYDROCHLORIDE 20 MILLIGRAM(S): 20 CAPSULE ORAL at 11:06

## 2025-06-26 RX ADMIN — BUPROPION HYDROBROMIDE 150 MILLIGRAM(S): 522 TABLET, EXTENDED RELEASE ORAL at 11:05

## 2025-06-26 RX ADMIN — Medication 1.5 MILLIGRAM(S): at 23:05

## 2025-06-26 RX ADMIN — Medication 40 MILLIGRAM(S): at 05:08

## 2025-06-27 LAB
ALBUMIN SERPL ELPH-MCNC: 3.9 G/DL — SIGNIFICANT CHANGE UP (ref 3.5–5.2)
ALP SERPL-CCNC: 83 U/L — SIGNIFICANT CHANGE UP (ref 30–115)
ALT FLD-CCNC: 327 U/L — HIGH (ref 0–41)
ANION GAP SERPL CALC-SCNC: 13 MMOL/L — SIGNIFICANT CHANGE UP (ref 7–14)
APTT BLD: 27.3 SEC — SIGNIFICANT CHANGE UP (ref 27–39.2)
AST SERPL-CCNC: 387 U/L — HIGH (ref 0–41)
BASOPHILS # BLD AUTO: 0.05 K/UL — SIGNIFICANT CHANGE UP (ref 0–0.2)
BASOPHILS NFR BLD AUTO: 1 % — SIGNIFICANT CHANGE UP (ref 0–1)
BILIRUB SERPL-MCNC: 1.8 MG/DL — HIGH (ref 0.2–1.2)
BUN SERPL-MCNC: 11 MG/DL — SIGNIFICANT CHANGE UP (ref 10–20)
CALCIUM SERPL-MCNC: 9 MG/DL — SIGNIFICANT CHANGE UP (ref 8.4–10.5)
CHLORIDE SERPL-SCNC: 95 MMOL/L — LOW (ref 98–110)
CK SERPL-CCNC: 578 U/L — HIGH (ref 0–225)
CO2 SERPL-SCNC: 25 MMOL/L — SIGNIFICANT CHANGE UP (ref 17–32)
CREAT SERPL-MCNC: 0.9 MG/DL — SIGNIFICANT CHANGE UP (ref 0.7–1.5)
EGFR: 111 ML/MIN/1.73M2 — SIGNIFICANT CHANGE UP
EGFR: 111 ML/MIN/1.73M2 — SIGNIFICANT CHANGE UP
EOSINOPHIL # BLD AUTO: 0.2 K/UL — SIGNIFICANT CHANGE UP (ref 0–0.7)
EOSINOPHIL NFR BLD AUTO: 4.1 % — SIGNIFICANT CHANGE UP (ref 0–8)
GLUCOSE SERPL-MCNC: 138 MG/DL — HIGH (ref 70–99)
HCT VFR BLD CALC: 44.5 % — SIGNIFICANT CHANGE UP (ref 42–52)
HGB BLD-MCNC: 15.4 G/DL — SIGNIFICANT CHANGE UP (ref 14–18)
IMM GRANULOCYTES NFR BLD AUTO: 0.2 % — SIGNIFICANT CHANGE UP (ref 0.1–0.3)
INR BLD: 0.87 RATIO — SIGNIFICANT CHANGE UP (ref 0.65–1.3)
LYMPHOCYTES # BLD AUTO: 1.52 K/UL — SIGNIFICANT CHANGE UP (ref 1.2–3.4)
LYMPHOCYTES # BLD AUTO: 31.2 % — SIGNIFICANT CHANGE UP (ref 20.5–51.1)
MAGNESIUM SERPL-MCNC: 1.7 MG/DL — LOW (ref 1.8–2.4)
MCHC RBC-ENTMCNC: 29.4 PG — SIGNIFICANT CHANGE UP (ref 27–31)
MCHC RBC-ENTMCNC: 34.6 G/DL — SIGNIFICANT CHANGE UP (ref 32–37)
MCV RBC AUTO: 85.1 FL — SIGNIFICANT CHANGE UP (ref 80–94)
MONOCYTES # BLD AUTO: 0.47 K/UL — SIGNIFICANT CHANGE UP (ref 0.1–0.6)
MONOCYTES NFR BLD AUTO: 9.7 % — HIGH (ref 1.7–9.3)
NEUTROPHILS # BLD AUTO: 2.62 K/UL — SIGNIFICANT CHANGE UP (ref 1.4–6.5)
NEUTROPHILS NFR BLD AUTO: 53.8 % — SIGNIFICANT CHANGE UP (ref 42.2–75.2)
NRBC BLD AUTO-RTO: 0 /100 WBCS — SIGNIFICANT CHANGE UP (ref 0–0)
PLATELET # BLD AUTO: 125 K/UL — LOW (ref 130–400)
PMV BLD: 11.2 FL — HIGH (ref 7.4–10.4)
POTASSIUM SERPL-MCNC: 3.9 MMOL/L — SIGNIFICANT CHANGE UP (ref 3.5–5)
POTASSIUM SERPL-SCNC: 3.9 MMOL/L — SIGNIFICANT CHANGE UP (ref 3.5–5)
PROT SERPL-MCNC: 6.5 G/DL — SIGNIFICANT CHANGE UP (ref 6–8)
PROTHROM AB SERPL-ACNC: 10.2 SEC — SIGNIFICANT CHANGE UP (ref 9.95–12.87)
RBC # BLD: 5.23 M/UL — SIGNIFICANT CHANGE UP (ref 4.7–6.1)
RBC # FLD: 13.2 % — SIGNIFICANT CHANGE UP (ref 11.5–14.5)
SODIUM SERPL-SCNC: 133 MMOL/L — LOW (ref 135–146)
WBC # BLD: 4.87 K/UL — SIGNIFICANT CHANGE UP (ref 4.8–10.8)
WBC # FLD AUTO: 4.87 K/UL — SIGNIFICANT CHANGE UP (ref 4.8–10.8)

## 2025-06-27 PROCEDURE — 99233 SBSQ HOSP IP/OBS HIGH 50: CPT

## 2025-06-27 PROCEDURE — 99232 SBSQ HOSP IP/OBS MODERATE 35: CPT

## 2025-06-27 RX ORDER — ALPRAZOLAM 0.5 MG
1 TABLET, EXTENDED RELEASE 24 HR ORAL DAILY
Refills: 0 | Status: DISCONTINUED | OUTPATIENT
Start: 2025-06-27 | End: 2025-06-28

## 2025-06-27 RX ORDER — HYDROXYZINE HYDROCHLORIDE 25 MG/1
25 TABLET, FILM COATED ORAL EVERY 6 HOURS
Refills: 0 | Status: DISCONTINUED | OUTPATIENT
Start: 2025-06-27 | End: 2025-06-28

## 2025-06-27 RX ORDER — ACETAMINOPHEN 500 MG/5ML
650 LIQUID (ML) ORAL EVERY 6 HOURS
Refills: 0 | Status: DISCONTINUED | OUTPATIENT
Start: 2025-06-27 | End: 2025-06-28

## 2025-06-27 RX ADMIN — METOPROLOL SUCCINATE 50 MILLIGRAM(S): 50 TABLET, EXTENDED RELEASE ORAL at 06:30

## 2025-06-27 RX ADMIN — Medication 1.5 MILLIGRAM(S): at 06:30

## 2025-06-27 RX ADMIN — GABAPENTIN 300 MILLIGRAM(S): 400 CAPSULE ORAL at 21:35

## 2025-06-27 RX ADMIN — Medication 150 MILLIGRAM(S): at 21:36

## 2025-06-27 RX ADMIN — POLYETHYLENE GLYCOL 3350 17 GRAM(S): 17 POWDER, FOR SOLUTION ORAL at 11:02

## 2025-06-27 RX ADMIN — BUPROPION HYDROBROMIDE 150 MILLIGRAM(S): 522 TABLET, EXTENDED RELEASE ORAL at 11:02

## 2025-06-27 RX ADMIN — GABAPENTIN 300 MILLIGRAM(S): 400 CAPSULE ORAL at 13:19

## 2025-06-27 RX ADMIN — Medication 1.5 MILLIGRAM(S): at 11:01

## 2025-06-27 RX ADMIN — Medication 40 MILLIGRAM(S): at 06:30

## 2025-06-27 RX ADMIN — Medication 1 MILLIGRAM(S): at 23:54

## 2025-06-27 RX ADMIN — FOLIC ACID 1 MILLIGRAM(S): 1 TABLET ORAL at 11:02

## 2025-06-27 RX ADMIN — GABAPENTIN 300 MILLIGRAM(S): 400 CAPSULE ORAL at 06:30

## 2025-06-27 RX ADMIN — FLUOXETINE HYDROCHLORIDE 20 MILLIGRAM(S): 20 CAPSULE ORAL at 11:02

## 2025-06-27 RX ADMIN — Medication 1 MILLIGRAM(S): at 17:22

## 2025-06-27 RX ADMIN — Medication 100 MILLIGRAM(S): at 11:02

## 2025-06-27 RX ADMIN — Medication 2 TABLET(S): at 21:36

## 2025-06-27 RX ADMIN — Medication 1 APPLICATION(S): at 06:34

## 2025-06-28 ENCOUNTER — TRANSCRIPTION ENCOUNTER (OUTPATIENT)
Age: 40
End: 2025-06-28

## 2025-06-28 VITALS
SYSTOLIC BLOOD PRESSURE: 104 MMHG | RESPIRATION RATE: 18 BRPM | TEMPERATURE: 98 F | DIASTOLIC BLOOD PRESSURE: 64 MMHG | HEART RATE: 74 BPM

## 2025-06-28 LAB
ALBUMIN SERPL ELPH-MCNC: 4 G/DL — SIGNIFICANT CHANGE UP (ref 3.5–5.2)
ALP SERPL-CCNC: 73 U/L — SIGNIFICANT CHANGE UP (ref 30–115)
ALT FLD-CCNC: 234 U/L — HIGH (ref 0–41)
ANION GAP SERPL CALC-SCNC: 13 MMOL/L — SIGNIFICANT CHANGE UP (ref 7–14)
AST SERPL-CCNC: 190 U/L — HIGH (ref 0–41)
BASOPHILS # BLD AUTO: 0.03 K/UL — SIGNIFICANT CHANGE UP (ref 0–0.2)
BASOPHILS NFR BLD AUTO: 0.5 % — SIGNIFICANT CHANGE UP (ref 0–1)
BILIRUB SERPL-MCNC: 1.5 MG/DL — HIGH (ref 0.2–1.2)
BUN SERPL-MCNC: 7 MG/DL — LOW (ref 10–20)
CALCIUM SERPL-MCNC: 8.9 MG/DL — SIGNIFICANT CHANGE UP (ref 8.4–10.5)
CHLORIDE SERPL-SCNC: 99 MMOL/L — SIGNIFICANT CHANGE UP (ref 98–110)
CO2 SERPL-SCNC: 25 MMOL/L — SIGNIFICANT CHANGE UP (ref 17–32)
CREAT SERPL-MCNC: 0.9 MG/DL — SIGNIFICANT CHANGE UP (ref 0.7–1.5)
EGFR: 111 ML/MIN/1.73M2 — SIGNIFICANT CHANGE UP
EGFR: 111 ML/MIN/1.73M2 — SIGNIFICANT CHANGE UP
EOSINOPHIL # BLD AUTO: 0.17 K/UL — SIGNIFICANT CHANGE UP (ref 0–0.7)
EOSINOPHIL NFR BLD AUTO: 2.8 % — SIGNIFICANT CHANGE UP (ref 0–8)
GLUCOSE SERPL-MCNC: 127 MG/DL — HIGH (ref 70–99)
HCT VFR BLD CALC: 43.4 % — SIGNIFICANT CHANGE UP (ref 42–52)
HGB BLD-MCNC: 14.9 G/DL — SIGNIFICANT CHANGE UP (ref 14–18)
IMM GRANULOCYTES NFR BLD AUTO: 0.2 % — SIGNIFICANT CHANGE UP (ref 0.1–0.3)
INR BLD: 0.81 RATIO — SIGNIFICANT CHANGE UP (ref 0.65–1.3)
LYMPHOCYTES # BLD AUTO: 1.82 K/UL — SIGNIFICANT CHANGE UP (ref 1.2–3.4)
LYMPHOCYTES # BLD AUTO: 29.5 % — SIGNIFICANT CHANGE UP (ref 20.5–51.1)
MAGNESIUM SERPL-MCNC: 1.8 MG/DL — SIGNIFICANT CHANGE UP (ref 1.8–2.4)
MCHC RBC-ENTMCNC: 29.7 PG — SIGNIFICANT CHANGE UP (ref 27–31)
MCHC RBC-ENTMCNC: 34.3 G/DL — SIGNIFICANT CHANGE UP (ref 32–37)
MCV RBC AUTO: 86.5 FL — SIGNIFICANT CHANGE UP (ref 80–94)
MELD SCORE WITH DIALYSIS: 21 POINTS — SIGNIFICANT CHANGE UP
MELD SCORE WITHOUT DIALYSIS: 8 POINTS — SIGNIFICANT CHANGE UP
MONOCYTES # BLD AUTO: 0.47 K/UL — SIGNIFICANT CHANGE UP (ref 0.1–0.6)
MONOCYTES NFR BLD AUTO: 7.6 % — SIGNIFICANT CHANGE UP (ref 1.7–9.3)
NEUTROPHILS # BLD AUTO: 3.66 K/UL — SIGNIFICANT CHANGE UP (ref 1.4–6.5)
NEUTROPHILS NFR BLD AUTO: 59.4 % — SIGNIFICANT CHANGE UP (ref 42.2–75.2)
NRBC BLD AUTO-RTO: 0 /100 WBCS — SIGNIFICANT CHANGE UP (ref 0–0)
PLATELET # BLD AUTO: 108 K/UL — LOW (ref 130–400)
PMV BLD: 11.4 FL — HIGH (ref 7.4–10.4)
POTASSIUM SERPL-MCNC: 3.7 MMOL/L — SIGNIFICANT CHANGE UP (ref 3.5–5)
POTASSIUM SERPL-SCNC: 3.7 MMOL/L — SIGNIFICANT CHANGE UP (ref 3.5–5)
PROT SERPL-MCNC: 7.2 G/DL — SIGNIFICANT CHANGE UP (ref 6–8)
PROTHROM AB SERPL-ACNC: 9.5 SEC — LOW (ref 9.95–12.87)
RBC # BLD: 5.02 M/UL — SIGNIFICANT CHANGE UP (ref 4.7–6.1)
RBC # FLD: 13.4 % — SIGNIFICANT CHANGE UP (ref 11.5–14.5)
SODIUM SERPL-SCNC: 137 MMOL/L — SIGNIFICANT CHANGE UP (ref 135–146)
WBC # BLD: 6.16 K/UL — SIGNIFICANT CHANGE UP (ref 4.8–10.8)
WBC # FLD AUTO: 6.16 K/UL — SIGNIFICANT CHANGE UP (ref 4.8–10.8)

## 2025-06-28 PROCEDURE — 99232 SBSQ HOSP IP/OBS MODERATE 35: CPT

## 2025-06-28 PROCEDURE — 99239 HOSP IP/OBS DSCHRG MGMT >30: CPT

## 2025-06-28 RX ADMIN — Medication 100 MILLIGRAM(S): at 11:27

## 2025-06-28 RX ADMIN — Medication 1 APPLICATION(S): at 05:20

## 2025-06-28 RX ADMIN — METOPROLOL SUCCINATE 50 MILLIGRAM(S): 50 TABLET, EXTENDED RELEASE ORAL at 05:19

## 2025-06-28 RX ADMIN — Medication 1 MILLIGRAM(S): at 05:19

## 2025-06-28 RX ADMIN — GABAPENTIN 300 MILLIGRAM(S): 400 CAPSULE ORAL at 05:20

## 2025-06-28 RX ADMIN — Medication 1 MILLIGRAM(S): at 11:27

## 2025-06-28 RX ADMIN — FLUOXETINE HYDROCHLORIDE 20 MILLIGRAM(S): 20 CAPSULE ORAL at 11:28

## 2025-06-28 RX ADMIN — BUPROPION HYDROBROMIDE 150 MILLIGRAM(S): 522 TABLET, EXTENDED RELEASE ORAL at 11:27

## 2025-06-28 RX ADMIN — Medication 40 MILLIGRAM(S): at 05:19

## 2025-06-28 RX ADMIN — FOLIC ACID 1 MILLIGRAM(S): 1 TABLET ORAL at 11:28

## 2025-07-02 ENCOUNTER — APPOINTMENT (OUTPATIENT)
Dept: PSYCHIATRY | Facility: CLINIC | Age: 40
End: 2025-07-02

## 2025-07-02 ENCOUNTER — OUTPATIENT (OUTPATIENT)
Dept: OUTPATIENT SERVICES | Facility: HOSPITAL | Age: 40
LOS: 1 days | End: 2025-07-02
Payer: COMMERCIAL

## 2025-07-02 DIAGNOSIS — F10.20 ALCOHOL DEPENDENCE, UNCOMPLICATED: ICD-10-CM

## 2025-07-02 PROCEDURE — 99409 AUDIT/DAST OVER 30 MIN: CPT

## 2025-07-03 DIAGNOSIS — K70.10 ALCOHOLIC HEPATITIS WITHOUT ASCITES: ICD-10-CM

## 2025-07-03 DIAGNOSIS — F10.20 ALCOHOL DEPENDENCE, UNCOMPLICATED: ICD-10-CM

## 2025-07-03 DIAGNOSIS — F10.239 ALCOHOL DEPENDENCE WITH WITHDRAWAL, UNSPECIFIED: ICD-10-CM

## 2025-07-03 DIAGNOSIS — I11.0 HYPERTENSIVE HEART DISEASE WITH HEART FAILURE: ICD-10-CM

## 2025-07-03 DIAGNOSIS — I50.22 CHRONIC SYSTOLIC (CONGESTIVE) HEART FAILURE: ICD-10-CM

## 2025-07-03 DIAGNOSIS — R74.01 ELEVATION OF LEVELS OF LIVER TRANSAMINASE LEVELS: ICD-10-CM

## 2025-07-03 DIAGNOSIS — Z88.1 ALLERGY STATUS TO OTHER ANTIBIOTIC AGENTS: ICD-10-CM

## 2025-07-03 DIAGNOSIS — F32.A DEPRESSION, UNSPECIFIED: ICD-10-CM

## 2025-07-03 DIAGNOSIS — K76.0 FATTY (CHANGE OF) LIVER, NOT ELSEWHERE CLASSIFIED: ICD-10-CM

## 2025-07-03 DIAGNOSIS — F17.210 NICOTINE DEPENDENCE, CIGARETTES, UNCOMPLICATED: ICD-10-CM
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
## 2025-07-03 DIAGNOSIS — F10.229 ALCOHOL DEPENDENCE WITH INTOXICATION, UNSPECIFIED: ICD-10-CM

## 2025-07-03 DIAGNOSIS — K92.0 HEMATEMESIS: ICD-10-CM

## 2025-07-03 DIAGNOSIS — G47.00 INSOMNIA, UNSPECIFIED: ICD-10-CM
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
## 2025-07-03 DIAGNOSIS — F41.9 ANXIETY DISORDER, UNSPECIFIED: ICD-10-CM

## 2025-07-08 ENCOUNTER — OUTPATIENT (OUTPATIENT)
Dept: OUTPATIENT SERVICES | Facility: HOSPITAL | Age: 40
LOS: 1 days | End: 2025-07-08
Payer: COMMERCIAL

## 2025-07-08 ENCOUNTER — APPOINTMENT (OUTPATIENT)
Dept: PSYCHIATRY | Facility: CLINIC | Age: 40
End: 2025-07-08

## 2025-07-08 DIAGNOSIS — F10.20 ALCOHOL DEPENDENCE, UNCOMPLICATED: ICD-10-CM

## 2025-07-08 PROCEDURE — 99409 AUDIT/DAST OVER 30 MIN: CPT

## 2025-07-09 DIAGNOSIS — F10.20 ALCOHOL DEPENDENCE, UNCOMPLICATED: ICD-10-CM

## 2025-07-14 ENCOUNTER — OUTPATIENT (OUTPATIENT)
Dept: OUTPATIENT SERVICES | Facility: HOSPITAL | Age: 40
LOS: 1 days | End: 2025-07-14
Payer: COMMERCIAL

## 2025-07-14 ENCOUNTER — APPOINTMENT (OUTPATIENT)
Dept: PSYCHIATRY | Facility: CLINIC | Age: 40
End: 2025-07-14

## 2025-07-14 DIAGNOSIS — F10.20 ALCOHOL DEPENDENCE, UNCOMPLICATED: ICD-10-CM

## 2025-07-14 PROCEDURE — 90834 PSYTX W PT 45 MINUTES: CPT

## 2025-07-15 DIAGNOSIS — F10.20 ALCOHOL DEPENDENCE, UNCOMPLICATED: ICD-10-CM

## 2025-07-16 ENCOUNTER — APPOINTMENT (OUTPATIENT)
Dept: PSYCHIATRY | Facility: CLINIC | Age: 40
End: 2025-07-16

## 2025-07-22 ENCOUNTER — OUTPATIENT (OUTPATIENT)
Dept: OUTPATIENT SERVICES | Facility: HOSPITAL | Age: 40
LOS: 1 days | End: 2025-07-22
Payer: COMMERCIAL

## 2025-07-22 ENCOUNTER — APPOINTMENT (OUTPATIENT)
Dept: PSYCHIATRY | Facility: CLINIC | Age: 40
End: 2025-07-22

## 2025-07-22 DIAGNOSIS — F10.20 ALCOHOL DEPENDENCE, UNCOMPLICATED: ICD-10-CM

## 2025-07-22 PROCEDURE — 90834 PSYTX W PT 45 MINUTES: CPT

## 2025-07-23 ENCOUNTER — OUTPATIENT (OUTPATIENT)
Dept: OUTPATIENT SERVICES | Facility: HOSPITAL | Age: 40
LOS: 1 days | End: 2025-07-23
Payer: COMMERCIAL

## 2025-07-23 ENCOUNTER — APPOINTMENT (OUTPATIENT)
Dept: PSYCHIATRY | Facility: CLINIC | Age: 40
End: 2025-07-23
Payer: COMMERCIAL

## 2025-07-23 DIAGNOSIS — F10.20 ALCOHOL DEPENDENCE, UNCOMPLICATED: ICD-10-CM

## 2025-07-23 PROCEDURE — 90792 PSYCH DIAG EVAL W/MED SRVCS: CPT

## 2025-07-23 PROCEDURE — 90847 FAMILY PSYTX W/PT 50 MIN: CPT

## 2025-07-24 DIAGNOSIS — F10.20 ALCOHOL DEPENDENCE, UNCOMPLICATED: ICD-10-CM

## 2025-07-30 ENCOUNTER — APPOINTMENT (OUTPATIENT)
Dept: PSYCHIATRY | Facility: CLINIC | Age: 40
End: 2025-07-30

## 2025-07-31 ENCOUNTER — OUTPATIENT (OUTPATIENT)
Dept: OUTPATIENT SERVICES | Facility: HOSPITAL | Age: 40
LOS: 1 days | End: 2025-07-31
Payer: COMMERCIAL

## 2025-07-31 ENCOUNTER — APPOINTMENT (OUTPATIENT)
Dept: PSYCHIATRY | Facility: CLINIC | Age: 40
End: 2025-07-31

## 2025-07-31 DIAGNOSIS — F10.20 ALCOHOL DEPENDENCE, UNCOMPLICATED: ICD-10-CM

## 2025-07-31 PROCEDURE — H0038: CPT | Mod: 93

## 2025-08-01 DIAGNOSIS — F10.20 ALCOHOL DEPENDENCE, UNCOMPLICATED: ICD-10-CM

## 2025-08-03 ENCOUNTER — EMERGENCY (EMERGENCY)
Facility: HOSPITAL | Age: 40
LOS: 0 days | Discharge: HOME | End: 2025-08-03
Attending: EMERGENCY MEDICINE
Payer: COMMERCIAL

## 2025-08-03 VITALS
HEART RATE: 98 BPM | DIASTOLIC BLOOD PRESSURE: 94 MMHG | WEIGHT: 154.98 LBS | RESPIRATION RATE: 16 BRPM | OXYGEN SATURATION: 100 % | HEIGHT: 66 IN | SYSTOLIC BLOOD PRESSURE: 146 MMHG

## 2025-08-03 DIAGNOSIS — Z88.1 ALLERGY STATUS TO OTHER ANTIBIOTIC AGENTS: ICD-10-CM

## 2025-08-03 DIAGNOSIS — Z87.891 PERSONAL HISTORY OF NICOTINE DEPENDENCE: ICD-10-CM

## 2025-08-03 DIAGNOSIS — F13.229 SEDATIVE, HYPNOTIC OR ANXIOLYTIC DEPENDENCE WITH INTOXICATION, UNSPECIFIED: ICD-10-CM

## 2025-08-03 PROCEDURE — 99283 EMERGENCY DEPT VISIT LOW MDM: CPT

## 2025-08-03 PROCEDURE — 99285 EMERGENCY DEPT VISIT HI MDM: CPT

## 2025-08-03 RX ORDER — CHLORDIAZEPOXIDE HCL 10 MG
50 CAPSULE ORAL ONCE
Refills: 0 | Status: DISCONTINUED | OUTPATIENT
Start: 2025-08-03 | End: 2025-08-03

## 2025-08-03 RX ADMIN — Medication 50 MILLIGRAM(S): at 15:50

## 2025-08-06 ENCOUNTER — APPOINTMENT (OUTPATIENT)
Dept: PSYCHIATRY | Facility: CLINIC | Age: 40
End: 2025-08-06

## 2025-08-07 ENCOUNTER — APPOINTMENT (OUTPATIENT)
Dept: PSYCHIATRY | Facility: CLINIC | Age: 40
End: 2025-08-07

## 2025-08-08 ENCOUNTER — APPOINTMENT (OUTPATIENT)
Dept: PSYCHIATRY | Facility: CLINIC | Age: 40
End: 2025-08-08

## 2025-08-13 ENCOUNTER — OUTPATIENT (OUTPATIENT)
Dept: OUTPATIENT SERVICES | Facility: HOSPITAL | Age: 40
LOS: 1 days | End: 2025-08-13
Payer: COMMERCIAL

## 2025-08-13 ENCOUNTER — APPOINTMENT (OUTPATIENT)
Dept: PSYCHIATRY | Facility: CLINIC | Age: 40
End: 2025-08-13

## 2025-08-13 DIAGNOSIS — F10.20 ALCOHOL DEPENDENCE, UNCOMPLICATED: ICD-10-CM

## 2025-08-13 PROCEDURE — 90847 FAMILY PSYTX W/PT 50 MIN: CPT

## 2025-08-14 DIAGNOSIS — F10.20 ALCOHOL DEPENDENCE, UNCOMPLICATED: ICD-10-CM

## 2025-08-18 ENCOUNTER — APPOINTMENT (OUTPATIENT)
Dept: PSYCHIATRY | Facility: CLINIC | Age: 40
End: 2025-08-18

## 2025-08-20 ENCOUNTER — APPOINTMENT (OUTPATIENT)
Dept: PSYCHIATRY | Facility: CLINIC | Age: 40
End: 2025-08-20

## 2025-08-27 ENCOUNTER — APPOINTMENT (OUTPATIENT)
Dept: PSYCHIATRY | Facility: CLINIC | Age: 40
End: 2025-08-27

## 2025-09-05 ENCOUNTER — OUTPATIENT (OUTPATIENT)
Dept: OUTPATIENT SERVICES | Facility: HOSPITAL | Age: 40
LOS: 1 days | End: 2025-09-05
Payer: COMMERCIAL

## 2025-09-05 ENCOUNTER — APPOINTMENT (OUTPATIENT)
Dept: PSYCHIATRY | Facility: CLINIC | Age: 40
End: 2025-09-05

## 2025-09-05 DIAGNOSIS — F10.20 ALCOHOL DEPENDENCE, UNCOMPLICATED: ICD-10-CM

## 2025-09-05 PROCEDURE — 99215 OFFICE O/P EST HI 40 MIN: CPT

## 2025-09-06 DIAGNOSIS — F10.20 ALCOHOL DEPENDENCE, UNCOMPLICATED: ICD-10-CM

## 2025-09-09 ENCOUNTER — APPOINTMENT (OUTPATIENT)
Dept: PSYCHIATRY | Facility: CLINIC | Age: 40
End: 2025-09-09

## 2025-09-18 ENCOUNTER — APPOINTMENT (OUTPATIENT)
Dept: PSYCHIATRY | Facility: CLINIC | Age: 40
End: 2025-09-18